# Patient Record
Sex: MALE | Race: WHITE | NOT HISPANIC OR LATINO | URBAN - METROPOLITAN AREA
[De-identification: names, ages, dates, MRNs, and addresses within clinical notes are randomized per-mention and may not be internally consistent; named-entity substitution may affect disease eponyms.]

---

## 2017-05-10 ENCOUNTER — EMERGENCY (EMERGENCY)
Facility: HOSPITAL | Age: 77
LOS: 0 days | Discharge: HOME | End: 2017-05-11
Attending: EMERGENCY MEDICINE | Admitting: EMERGENCY MEDICINE
Payer: MEDICARE

## 2017-05-10 VITALS
OXYGEN SATURATION: 97 % | WEIGHT: 164.91 LBS | DIASTOLIC BLOOD PRESSURE: 63 MMHG | HEART RATE: 88 BPM | TEMPERATURE: 98 F | SYSTOLIC BLOOD PRESSURE: 129 MMHG | RESPIRATION RATE: 17 BRPM

## 2017-05-10 DIAGNOSIS — R11.0 NAUSEA: ICD-10-CM

## 2017-05-10 PROCEDURE — 99283 EMERGENCY DEPT VISIT LOW MDM: CPT | Mod: 25

## 2017-05-10 NOTE — ED ADULT TRIAGE NOTE - CHIEF COMPLAINT QUOTE
recent back surgery 3/10/17 prescribed pain meds that are causing nausea since Sunday recent back surgery 4/10/17 prescribed pain meds that are causing nausea since Sunday

## 2017-05-11 VITALS
OXYGEN SATURATION: 95 % | RESPIRATION RATE: 18 BRPM | SYSTOLIC BLOOD PRESSURE: 153 MMHG | DIASTOLIC BLOOD PRESSURE: 88 MMHG | HEART RATE: 74 BPM | TEMPERATURE: 98 F

## 2017-05-11 RX ORDER — ONDANSETRON 8 MG/1
8 TABLET, FILM COATED ORAL ONCE
Qty: 0 | Refills: 0 | Status: COMPLETED | OUTPATIENT
Start: 2017-05-11 | End: 2017-05-11

## 2017-05-11 RX ORDER — HYDROMORPHONE HYDROCHLORIDE 2 MG/ML
4 INJECTION INTRAMUSCULAR; INTRAVENOUS; SUBCUTANEOUS ONCE
Qty: 0 | Refills: 0 | Status: DISCONTINUED | OUTPATIENT
Start: 2017-05-11 | End: 2017-05-11

## 2017-05-11 RX ADMIN — ONDANSETRON 8 MILLIGRAM(S): 8 TABLET, FILM COATED ORAL at 00:26

## 2017-05-11 RX ADMIN — HYDROMORPHONE HYDROCHLORIDE 4 MILLIGRAM(S): 2 INJECTION INTRAMUSCULAR; INTRAVENOUS; SUBCUTANEOUS at 06:13

## 2017-05-11 RX ADMIN — HYDROMORPHONE HYDROCHLORIDE 4 MILLIGRAM(S): 2 INJECTION INTRAMUSCULAR; INTRAVENOUS; SUBCUTANEOUS at 04:52

## 2017-05-11 RX ADMIN — HYDROMORPHONE HYDROCHLORIDE 4 MILLIGRAM(S): 2 INJECTION INTRAMUSCULAR; INTRAVENOUS; SUBCUTANEOUS at 03:54

## 2017-05-11 NOTE — ED PROVIDER NOTE - PROGRESS NOTE DETAILS
pt with no c/o vomiting after getting zofran will d/c back to rehab, pt wtihout abdominal distention, no nausea or vomiting afebrile, he states he feels better and nausea is gone

## 2017-05-11 NOTE — ED PROVIDER NOTE - CONSTITUTIONAL, MLM
normal... thin, pale, awake, alert, oriented to person, place, time/situation and in no apparent distress.

## 2017-05-11 NOTE — ED PROVIDER NOTE - DETAILS:
I, Rhiannon Tanner, performed the initial face to face bedside interview with this patient regarding history of present illness, review of symptoms and relevant past medical, social and family history.  I completed an independent physical examination.  I was the initial provider who evaluated this patient. The history, relevant review of systems, past medical and surgical history, medical decision making, and physical examination was documented by the scribe in my presence and I attest to the accuracy of the documentation.

## 2017-05-11 NOTE — ED PROVIDER NOTE - OBJECTIVE STATEMENT
75 y/o male presents to ED with c/o nausea.  Pt is currently taking pain medication for previous 77 y/o male presents to ED with c/o nausea.  Pt is currently taking pain medication for previous spinal surgery which he states makes him nauseous.   Denies fever, chills.

## 2017-05-11 NOTE — ED PROVIDER NOTE - NS ED MD SCRIBE ATTENDING SCRIBE SECTIONS
REVIEW OF SYSTEMS/PAST MEDICAL/SURGICAL/SOCIAL HISTORY/RESULTS/MANDATORY DOCUMENTATION/SCALES/PROGRESS NOTE/HISTORY OF PRESENT ILLNESS/VITAL SIGNS( Pullset)/PROVIDER CARE INITIATION/OBSERVATION MONITORING PLAN/PHYSICAL EXAM/CONSULTATIONS/SHIFT CHANGE/DISPOSITION/INTAKE ASSESSMENT/SCREENINGS/HIV

## 2017-05-11 NOTE — ED ADULT NURSE REASSESSMENT NOTE - NS ED NURSE REASSESS COMMENT FT1
pt appears comfortable with no complaints. pt states nausea has gone away. no other complaints. pt spinal infusion site has no s/s of infection and is clean.

## 2017-05-11 NOTE — ED PROVIDER NOTE - MEDICAL DECISION MAKING DETAILS
pt with nausea after getting oral pain medication, will give zofran, pt afebrile, no cough, no vomiting, no c/o chest pain, abdominal pain or back pain at this time, re myraal

## 2017-05-11 NOTE — ED PROVIDER NOTE - ENMT, MLM
Airway patent, Nasal mucosa clear. Mouth with dry opral mucosa Throat has no vesicles, no oropharyngeal exudates and uvula is midline.

## 2017-05-13 ENCOUNTER — EMERGENCY (EMERGENCY)
Facility: HOSPITAL | Age: 77
LOS: 0 days | Discharge: HOME | End: 2017-05-13
Attending: EMERGENCY MEDICINE | Admitting: EMERGENCY MEDICINE
Payer: MEDICARE

## 2017-05-13 VITALS
SYSTOLIC BLOOD PRESSURE: 103 MMHG | DIASTOLIC BLOOD PRESSURE: 75 MMHG | TEMPERATURE: 99 F | OXYGEN SATURATION: 98 % | RESPIRATION RATE: 16 BRPM | HEART RATE: 90 BPM | WEIGHT: 164.91 LBS

## 2017-05-13 VITALS
DIASTOLIC BLOOD PRESSURE: 73 MMHG | TEMPERATURE: 98 F | SYSTOLIC BLOOD PRESSURE: 118 MMHG | RESPIRATION RATE: 17 BRPM | HEART RATE: 83 BPM | OXYGEN SATURATION: 100 %

## 2017-05-13 DIAGNOSIS — M54.9 DORSALGIA, UNSPECIFIED: ICD-10-CM

## 2017-05-13 DIAGNOSIS — Z98.1 ARTHRODESIS STATUS: ICD-10-CM

## 2017-05-13 DIAGNOSIS — Z98.1 ARTHRODESIS STATUS: Chronic | ICD-10-CM

## 2017-05-13 PROCEDURE — 99284 EMERGENCY DEPT VISIT MOD MDM: CPT

## 2017-05-13 RX ORDER — HYDROMORPHONE HYDROCHLORIDE 2 MG/ML
2 INJECTION INTRAMUSCULAR; INTRAVENOUS; SUBCUTANEOUS ONCE
Qty: 0 | Refills: 0 | Status: DISCONTINUED | OUTPATIENT
Start: 2017-05-13 | End: 2017-05-13

## 2017-05-13 RX ORDER — HYDROMORPHONE HYDROCHLORIDE 2 MG/ML
4 INJECTION INTRAMUSCULAR; INTRAVENOUS; SUBCUTANEOUS ONCE
Qty: 0 | Refills: 0 | Status: DISCONTINUED | OUTPATIENT
Start: 2017-05-13 | End: 2017-05-13

## 2017-05-13 RX ADMIN — HYDROMORPHONE HYDROCHLORIDE 4 MILLIGRAM(S): 2 INJECTION INTRAMUSCULAR; INTRAVENOUS; SUBCUTANEOUS at 03:44

## 2017-05-13 RX ADMIN — HYDROMORPHONE HYDROCHLORIDE 2 MILLIGRAM(S): 2 INJECTION INTRAMUSCULAR; INTRAVENOUS; SUBCUTANEOUS at 03:25

## 2017-05-13 RX ADMIN — HYDROMORPHONE HYDROCHLORIDE 2 MILLIGRAM(S): 2 INJECTION INTRAMUSCULAR; INTRAVENOUS; SUBCUTANEOUS at 02:55

## 2017-05-13 NOTE — ED PROVIDER NOTE - MUSCULOSKELETAL MINIMAL EXAM
healing surgical site over the t/l spine with no discharge/motor intact/atraumatic/normal range of motion

## 2017-05-13 NOTE — ED PROVIDER NOTE - SKIN, MLM
Skin normal color for race, warm, dry and intact. No evidence of rash. back: surgical site intact of the thoracic lumbar spine with no discharge. Skin normal color for race, warm, dry and intact. No evidence of rash.

## 2017-05-13 NOTE — ED PROVIDER NOTE - NS ED MD SCRIBE ATTENDING SCRIBE SECTIONS
DISPOSITION/PHYSICAL EXAM/PAST MEDICAL/SURGICAL/SOCIAL HISTORY/PROGRESS NOTE/VITAL SIGNS( Pullset)/HISTORY OF PRESENT ILLNESS/RESULTS/REVIEW OF SYSTEMS

## 2017-05-14 ENCOUNTER — EMERGENCY (EMERGENCY)
Facility: HOSPITAL | Age: 77
LOS: 0 days | Discharge: HOME | End: 2017-05-14
Attending: EMERGENCY MEDICINE | Admitting: EMERGENCY MEDICINE
Payer: MEDICARE

## 2017-05-14 VITALS
TEMPERATURE: 98 F | SYSTOLIC BLOOD PRESSURE: 129 MMHG | HEIGHT: 69 IN | OXYGEN SATURATION: 95 % | WEIGHT: 164.91 LBS | DIASTOLIC BLOOD PRESSURE: 65 MMHG | HEART RATE: 75 BPM | RESPIRATION RATE: 16 BRPM

## 2017-05-14 VITALS
RESPIRATION RATE: 19 BRPM | DIASTOLIC BLOOD PRESSURE: 76 MMHG | TEMPERATURE: 98 F | OXYGEN SATURATION: 100 % | HEART RATE: 86 BPM | SYSTOLIC BLOOD PRESSURE: 146 MMHG

## 2017-05-14 DIAGNOSIS — M54.9 DORSALGIA, UNSPECIFIED: ICD-10-CM

## 2017-05-14 DIAGNOSIS — Z98.1 ARTHRODESIS STATUS: Chronic | ICD-10-CM

## 2017-05-14 DIAGNOSIS — M54.5 LOW BACK PAIN: ICD-10-CM

## 2017-05-14 DIAGNOSIS — Z98.1 ARTHRODESIS STATUS: ICD-10-CM

## 2017-05-14 PROCEDURE — 99283 EMERGENCY DEPT VISIT LOW MDM: CPT

## 2017-05-14 RX ORDER — PHENOBARBITAL 60 MG
5 TABLET ORAL ONCE
Qty: 0 | Refills: 0 | Status: DISCONTINUED | OUTPATIENT
Start: 2017-05-14 | End: 2017-05-14

## 2017-05-14 RX ORDER — HYDROMORPHONE HYDROCHLORIDE 2 MG/ML
2 INJECTION INTRAMUSCULAR; INTRAVENOUS; SUBCUTANEOUS ONCE
Qty: 0 | Refills: 0 | Status: DISCONTINUED | OUTPATIENT
Start: 2017-05-14 | End: 2017-05-14

## 2017-05-14 RX ORDER — HYDROMORPHONE HYDROCHLORIDE 2 MG/ML
6 INJECTION INTRAMUSCULAR; INTRAVENOUS; SUBCUTANEOUS ONCE
Qty: 0 | Refills: 0 | Status: DISCONTINUED | OUTPATIENT
Start: 2017-05-14 | End: 2017-05-14

## 2017-05-14 RX ORDER — LIDOCAINE 4 G/100G
10 CREAM TOPICAL ONCE
Qty: 0 | Refills: 0 | Status: COMPLETED | OUTPATIENT
Start: 2017-05-14 | End: 2017-05-14

## 2017-05-14 RX ADMIN — Medication 5 MILLIGRAM(S): at 06:41

## 2017-05-14 RX ADMIN — LIDOCAINE 10 MILLILITER(S): 4 CREAM TOPICAL at 06:41

## 2017-05-14 RX ADMIN — HYDROMORPHONE HYDROCHLORIDE 6 MILLIGRAM(S): 2 INJECTION INTRAMUSCULAR; INTRAVENOUS; SUBCUTANEOUS at 04:30

## 2017-05-14 RX ADMIN — HYDROMORPHONE HYDROCHLORIDE 6 MILLIGRAM(S): 2 INJECTION INTRAMUSCULAR; INTRAVENOUS; SUBCUTANEOUS at 04:00

## 2017-05-14 RX ADMIN — HYDROMORPHONE HYDROCHLORIDE 2 MILLIGRAM(S): 2 INJECTION INTRAMUSCULAR; INTRAVENOUS; SUBCUTANEOUS at 06:44

## 2017-05-14 RX ADMIN — Medication 30 MILLILITER(S): at 06:41

## 2017-05-14 NOTE — ED ADULT NURSE REASSESSMENT NOTE - NS ED NURSE REASSESS COMMENT FT1
Two RNs assisted pt to ambulate to bathroom, pt voided clear yellow urine. Pt states his pain is better than upon arrival but still present. Pt updated as to current plan of care, pt asking to go back to [facility] Briseida Dickinson made aware. Will continue to monitor.

## 2017-05-14 NOTE — ED PROVIDER NOTE - OBJECTIVE STATEMENT
75 yo M s/p recent spinal fusion presents from [facility] requesting pain meds. Pt takes dilaudid every 3 hours from 8am-8pm but wants pain meds after 8pm. Pt was in  ED yesterday for the same issue.

## 2017-05-14 NOTE — ED ADULT NURSE REASSESSMENT NOTE - NS ED NURSE REASSESS COMMENT FT1
Pt updated as to plan for discharge, pt verbalized understanding of plan of care. Awaiting transport for pt back to [facility] assisted living. Will continue to monitor.

## 2017-05-14 NOTE — ED ADULT NURSE NOTE - OBJECTIVE STATEMENT
Pt presents to the ED with complaints of increased chronic back pain, pt states he had back sx April 10th. Pt states he was discharged from a rehabilitation facility back to [facility] two days ago where his Dilaudid dosage was changed. Pt states he was receiving Dilaudid 6mg q3h around the clock. Pt now receives Dilaudid 6mg q3h from 8AM to 8PM and receives no pain medication after 8PM. Pt states he was here last night due to the same issue.

## 2017-05-14 NOTE — ED PROVIDER NOTE - MEDICAL DECISION MAKING DETAILS
77 yo male s/p spinal fusion requesting pain medication as the current orders at AL stop his diluadid from 8p-8a.  No new neuro symptoms

## 2017-05-14 NOTE — ED ADULT NURSE REASSESSMENT NOTE - NS ED NURSE REASSESS COMMENT FT1
Pt began complaining of GERD/acid like pain. MD Goins made aware and pt re-evaluated by MD. Pt medicated as per MD order. Pt updated as to current plan of care. Awaiting transportation back to [facility]. Will continue to monitor.

## 2017-05-14 NOTE — ED PROVIDER NOTE - DETAILS:
Mary Goins MD - The scribe's documentation has been prepared under my direction and personally reviewed by me in its entirety. I confirm that the note above accurately reflects all work, treatment, procedures, and medical decision making performed by me.

## 2017-05-14 NOTE — ED PROVIDER NOTE - NS ED MD SCRIBE ATTENDING SCRIBE SECTIONS
PAST MEDICAL/SURGICAL/SOCIAL HISTORY/HISTORY OF PRESENT ILLNESS/PROGRESS NOTE/RESULTS/DISPOSITION/PHYSICAL EXAM/REVIEW OF SYSTEMS

## 2017-05-14 NOTE — ED ADULT TRIAGE NOTE - NS ED NURSE DIRECT TO ROOM YN
Yes normal... Well appearing, well nourished, awake, alert, oriented to person, place, time/situation and in no apparent distress.

## 2017-05-14 NOTE — ED PROVIDER NOTE - PROGRESS NOTE DETAILS
attempted multiple times to contact the MD dr Springer that covers the [facility], but the number provided by them was disconnected.  Supervisor states she will inform the RN in the morning to call Dr Springer about changing his medication

## 2017-05-14 NOTE — ED ADULT NURSE REASSESSMENT NOTE - NS ED NURSE REASSESS COMMENT FT1
Pt updated as to contact with [facility] in regards to pt care and numerous attempts made to contact MD Springer on pt behalf in regards to pain medication. Unable to get in contact with MD Moj[facility]. Pt medicated as per MD order, call bell within reach. Pt has no questions in regards to plan of care at this time. Will continue to monitor.

## 2017-05-18 ENCOUNTER — EMERGENCY (EMERGENCY)
Facility: HOSPITAL | Age: 77
LOS: 0 days | Discharge: HOME | End: 2017-05-18
Attending: EMERGENCY MEDICINE | Admitting: EMERGENCY MEDICINE
Payer: MEDICARE

## 2017-05-18 VITALS
TEMPERATURE: 98 F | DIASTOLIC BLOOD PRESSURE: 61 MMHG | HEART RATE: 78 BPM | SYSTOLIC BLOOD PRESSURE: 114 MMHG | HEIGHT: 69 IN | OXYGEN SATURATION: 100 % | WEIGHT: 160.06 LBS | RESPIRATION RATE: 16 BRPM

## 2017-05-18 VITALS
SYSTOLIC BLOOD PRESSURE: 158 MMHG | RESPIRATION RATE: 18 BRPM | HEART RATE: 80 BPM | DIASTOLIC BLOOD PRESSURE: 84 MMHG | OXYGEN SATURATION: 100 % | TEMPERATURE: 98 F

## 2017-05-18 DIAGNOSIS — Z98.1 ARTHRODESIS STATUS: Chronic | ICD-10-CM

## 2017-05-18 DIAGNOSIS — R33.9 RETENTION OF URINE, UNSPECIFIED: ICD-10-CM

## 2017-05-18 DIAGNOSIS — R11.0 NAUSEA: ICD-10-CM

## 2017-05-18 DIAGNOSIS — G89.4 CHRONIC PAIN SYNDROME: ICD-10-CM

## 2017-05-18 LAB
ALBUMIN SERPL ELPH-MCNC: 3.4 G/DL — SIGNIFICANT CHANGE UP (ref 3.3–5)
ALP SERPL-CCNC: 123 U/L — HIGH (ref 40–120)
ALT FLD-CCNC: 13 U/L — SIGNIFICANT CHANGE UP (ref 12–78)
ANION GAP SERPL CALC-SCNC: 7 MMOL/L — SIGNIFICANT CHANGE UP (ref 5–17)
APPEARANCE UR: CLEAR — SIGNIFICANT CHANGE UP
AST SERPL-CCNC: 13 U/L — LOW (ref 15–37)
BASOPHILS # BLD AUTO: 0.1 K/UL — SIGNIFICANT CHANGE UP (ref 0–0.2)
BASOPHILS NFR BLD AUTO: 0.9 % — SIGNIFICANT CHANGE UP (ref 0–2)
BILIRUB SERPL-MCNC: 0.3 MG/DL — SIGNIFICANT CHANGE UP (ref 0.2–1.2)
BILIRUB UR-MCNC: NEGATIVE — SIGNIFICANT CHANGE UP
BUN SERPL-MCNC: 11 MG/DL — SIGNIFICANT CHANGE UP (ref 7–23)
CALCIUM SERPL-MCNC: 9.1 MG/DL — SIGNIFICANT CHANGE UP (ref 8.5–10.1)
CHLORIDE SERPL-SCNC: 104 MMOL/L — SIGNIFICANT CHANGE UP (ref 96–108)
CO2 SERPL-SCNC: 27 MMOL/L — SIGNIFICANT CHANGE UP (ref 22–31)
COLOR SPEC: YELLOW — SIGNIFICANT CHANGE UP
CREAT SERPL-MCNC: 1.18 MG/DL — SIGNIFICANT CHANGE UP (ref 0.5–1.3)
DIFF PNL FLD: NEGATIVE — SIGNIFICANT CHANGE UP
EOSINOPHIL # BLD AUTO: 0.2 K/UL — SIGNIFICANT CHANGE UP (ref 0–0.5)
EOSINOPHIL NFR BLD AUTO: 3.7 % — SIGNIFICANT CHANGE UP (ref 0–6)
GLUCOSE SERPL-MCNC: 109 MG/DL — HIGH (ref 70–99)
GLUCOSE UR QL: NEGATIVE MG/DL — SIGNIFICANT CHANGE UP
HCT VFR BLD CALC: 36.7 % — LOW (ref 39–50)
HGB BLD-MCNC: 11.5 G/DL — LOW (ref 13–17)
KETONES UR-MCNC: (no result)
LEUKOCYTE ESTERASE UR-ACNC: NEGATIVE — SIGNIFICANT CHANGE UP
LYMPHOCYTES # BLD AUTO: 0.9 K/UL — LOW (ref 1–3.3)
LYMPHOCYTES # BLD AUTO: 14 % — SIGNIFICANT CHANGE UP (ref 13–44)
MCHC RBC-ENTMCNC: 26.4 PG — LOW (ref 27–34)
MCHC RBC-ENTMCNC: 31.4 GM/DL — LOW (ref 32–36)
MCV RBC AUTO: 84 FL — SIGNIFICANT CHANGE UP (ref 80–100)
MONOCYTES # BLD AUTO: 0.4 K/UL — SIGNIFICANT CHANGE UP (ref 0–0.9)
MONOCYTES NFR BLD AUTO: 6.4 % — SIGNIFICANT CHANGE UP (ref 2–14)
NEUTROPHILS # BLD AUTO: 5 K/UL — SIGNIFICANT CHANGE UP (ref 1.8–7.4)
NEUTROPHILS NFR BLD AUTO: 75 % — SIGNIFICANT CHANGE UP (ref 43–77)
NITRITE UR-MCNC: NEGATIVE — SIGNIFICANT CHANGE UP
PH UR: 8 — SIGNIFICANT CHANGE UP (ref 5–8)
PLATELET # BLD AUTO: 389 K/UL — SIGNIFICANT CHANGE UP (ref 150–400)
POTASSIUM SERPL-MCNC: 4.2 MMOL/L — SIGNIFICANT CHANGE UP (ref 3.5–5.3)
POTASSIUM SERPL-SCNC: 4.2 MMOL/L — SIGNIFICANT CHANGE UP (ref 3.5–5.3)
PROT SERPL-MCNC: 7.5 GM/DL — SIGNIFICANT CHANGE UP (ref 6–8.3)
PROT UR-MCNC: NEGATIVE MG/DL — SIGNIFICANT CHANGE UP
RBC # BLD: 4.37 M/UL — SIGNIFICANT CHANGE UP (ref 4.2–5.8)
RBC # FLD: 14.6 % — HIGH (ref 10.3–14.5)
SODIUM SERPL-SCNC: 138 MMOL/L — SIGNIFICANT CHANGE UP (ref 135–145)
SP GR SPEC: 1.01 — SIGNIFICANT CHANGE UP (ref 1.01–1.02)
UROBILINOGEN FLD QL: NEGATIVE MG/DL — SIGNIFICANT CHANGE UP
WBC # BLD: 6.7 K/UL — SIGNIFICANT CHANGE UP (ref 3.8–10.5)
WBC # FLD AUTO: 6.7 K/UL — SIGNIFICANT CHANGE UP (ref 3.8–10.5)

## 2017-05-18 PROCEDURE — 99284 EMERGENCY DEPT VISIT MOD MDM: CPT

## 2017-05-18 RX ORDER — ONDANSETRON 8 MG/1
8 TABLET, FILM COATED ORAL ONCE
Qty: 0 | Refills: 0 | Status: COMPLETED | OUTPATIENT
Start: 2017-05-18 | End: 2017-05-18

## 2017-05-18 RX ORDER — SODIUM CHLORIDE 9 MG/ML
1000 INJECTION INTRAMUSCULAR; INTRAVENOUS; SUBCUTANEOUS ONCE
Qty: 0 | Refills: 0 | Status: COMPLETED | OUTPATIENT
Start: 2017-05-18 | End: 2017-05-18

## 2017-05-18 RX ADMIN — ONDANSETRON 8 MILLIGRAM(S): 8 TABLET, FILM COATED ORAL at 01:44

## 2017-05-18 RX ADMIN — SODIUM CHLORIDE 1000 MILLILITER(S): 9 INJECTION INTRAMUSCULAR; INTRAVENOUS; SUBCUTANEOUS at 01:45

## 2017-05-18 NOTE — ED PROVIDER NOTE - MEDICAL DECISION MAKING DETAILS
Pt pw decreased urine output and nausea. Will scan bladder, give IVF for hydration, give IV Zofran for nausea, draw labs reassess.

## 2017-05-18 NOTE — ED ADULT NURSE REASSESSMENT NOTE - NS ED NURSE REASSESS COMMENT FT1
report taken at the change of shift at bedside. pt awake alert and oriented x4 resting comfortably in bed with no acute distress noted. Vitals stable. Afebrile. Cunningham in place. 1L ns infusing at this time. Awaiting for lab results. Will cont to monitor for safety and comfort.

## 2017-05-18 NOTE — ED PROVIDER NOTE - PROGRESS NOTE DETAILS
Alfredo VEGA: YURIDIA Campos has scanned pt's bladder showing 250mL of urine in bladder. Pt states he cannot urinate, will place moreno catheter to drain bladder. pt had 800ml drain from bladder. pt is demanding his cathter be removed, pt is aao x 3, will d/c cathter and d/c pt to home

## 2017-05-18 NOTE — ED ADULT NURSE REASSESSMENT NOTE - NS ED NURSE REASSESS COMMENT FT1
pt c/o burning sensation at moreno insertion site. Dr. JOHNSON made aware. Moreno d/cd per md order. Urine sample obtained and sent to lab. Will cont to monitor for safety and comfort.

## 2017-05-18 NOTE — ED ADULT NURSE REASSESSMENT NOTE - NS ED NURSE REASSESS COMMENT FT1
pt c/o chills. Warm to touch. rectal temp obtained and 98.7 noted. Warm blanket provided for comfort. Will cont to monitor for safety and comfort.

## 2017-05-18 NOTE — ED PROVIDER NOTE - NS ED MD SCRIBE ATTENDING SCRIBE SECTIONS
REVIEW OF SYSTEMS/PROGRESS NOTE/HISTORY OF PRESENT ILLNESS/RESULTS/PAST MEDICAL/SURGICAL/SOCIAL HISTORY/DISPOSITION/VITAL SIGNS( Pullset)/PHYSICAL EXAM

## 2017-05-18 NOTE — ED ADULT TRIAGE NOTE - CHIEF COMPLAINT QUOTE
Pt. to the ED C/O Urinary Retention x 2 days- Pt. states hx. of Back surgery 1 month ago and hx of of Urinary Retention

## 2017-05-18 NOTE — ED PROVIDER NOTE - ENMT, MLM
Airway patent, Nasal mucosa clear. Mouth with dry MM. Throat has no vesicles, no oropharyngeal exudates and uvula is midline.

## 2017-05-18 NOTE — ED ADULT NURSE REASSESSMENT NOTE - NS ED NURSE REASSESS COMMENT FT1
pt assisted to use urinal at bedside. Pt tolerated well. Pt made aware of transport  0530am. Will cont to monitor for safety and comfort.

## 2017-05-18 NOTE — ED PROVIDER NOTE - OBJECTIVE STATEMENT
75 y/o M with Hx of chronic pain on dilaudid 6mg [id number] presents to ED for evaluation of nausea after taking his pain medication and decreased urine output. Pt states that he has been nauseas since earlier this week and that his PO anti nausea medication is not working. He also reports he has not been able to urinate for the past 16 hours. Pt reports he has been having a poor appetite for 2 days. Pt denies fever, SOB, n/v/d, dysuria, hematuria.

## 2017-05-19 ENCOUNTER — EMERGENCY (EMERGENCY)
Facility: HOSPITAL | Age: 77
LOS: 0 days | Discharge: HOME | End: 2017-05-20
Attending: EMERGENCY MEDICINE | Admitting: EMERGENCY MEDICINE
Payer: MEDICARE

## 2017-05-19 VITALS
RESPIRATION RATE: 16 BRPM | TEMPERATURE: 98 F | HEART RATE: 72 BPM | WEIGHT: 160.06 LBS | DIASTOLIC BLOOD PRESSURE: 61 MMHG | SYSTOLIC BLOOD PRESSURE: 116 MMHG | OXYGEN SATURATION: 95 % | HEIGHT: 69 IN

## 2017-05-19 DIAGNOSIS — Z98.1 ARTHRODESIS STATUS: ICD-10-CM

## 2017-05-19 DIAGNOSIS — M54.9 DORSALGIA, UNSPECIFIED: ICD-10-CM

## 2017-05-19 DIAGNOSIS — Z98.1 ARTHRODESIS STATUS: Chronic | ICD-10-CM

## 2017-05-19 PROCEDURE — 99283 EMERGENCY DEPT VISIT LOW MDM: CPT

## 2017-05-19 RX ORDER — HYDROMORPHONE HYDROCHLORIDE 2 MG/ML
6 INJECTION INTRAMUSCULAR; INTRAVENOUS; SUBCUTANEOUS ONCE
Qty: 0 | Refills: 0 | Status: DISCONTINUED | OUTPATIENT
Start: 2017-05-19 | End: 2017-05-19

## 2017-05-19 RX ADMIN — HYDROMORPHONE HYDROCHLORIDE 6 MILLIGRAM(S): 2 INJECTION INTRAMUSCULAR; INTRAVENOUS; SUBCUTANEOUS at 22:13

## 2017-05-19 NOTE — ED PROVIDER NOTE - NS ED MD SCRIBE ATTENDING SCRIBE SECTIONS
PHYSICAL EXAM/PAST MEDICAL/SURGICAL/SOCIAL HISTORY/DISPOSITION/HISTORY OF PRESENT ILLNESS/HIV/VITAL SIGNS( Pullset)/REVIEW OF SYSTEMS

## 2017-05-19 NOTE — ED PROVIDER NOTE - OBJECTIVE STATEMENT
Pt is a 77 y/o M presents to the ED c/o chronic lower back pain. States he had surgery done 30 days ago in [place] for the lower back and was prescribed 6ml of Dilaudid every 3 hours not taken the Dilaudid since 12pm today due to a pharmacy issue. Denies any new pain as he has chronic low back pain. Was seen in the ED twice in the past week for similar sx. Denies any leg weakness or urinary retention or loss of bowels.

## 2017-05-19 NOTE — ED ADULT NURSE NOTE - CHPI ED SYMPTOMS NEG
no tingling/no anorexia/no motor function loss/no neck tenderness/no numbness/no constipation/no difficulty bearing weight/no bladder dysfunction/no fatigue/no bowel dysfunction

## 2017-05-19 NOTE — ED PROVIDER NOTE - PROGRESS NOTE DETAILS
patient resting comfortably. no concern for acute process at this time. will discharge back to nursing facility. pain likely secondary to missed pain control doses at 3pm, 6pm, and 9pm today.

## 2017-05-19 NOTE — ED PROVIDER NOTE - MUSCULOSKELETAL, MLM
Spine appears normal, range of motion is not limited, no muscle or joint tenderness. 5/5 strength in B/L lower extremities.

## 2017-05-20 VITALS
HEART RATE: 75 BPM | DIASTOLIC BLOOD PRESSURE: 63 MMHG | RESPIRATION RATE: 16 BRPM | SYSTOLIC BLOOD PRESSURE: 126 MMHG | TEMPERATURE: 98 F | OXYGEN SATURATION: 95 %

## 2017-05-22 ENCOUNTER — EMERGENCY (EMERGENCY)
Facility: HOSPITAL | Age: 77
LOS: 0 days | Discharge: HOME | End: 2017-05-23
Attending: FAMILY MEDICINE | Admitting: FAMILY MEDICINE
Payer: MEDICARE

## 2017-05-22 VITALS
HEIGHT: 69 IN | RESPIRATION RATE: 16 BRPM | WEIGHT: 160.06 LBS | DIASTOLIC BLOOD PRESSURE: 67 MMHG | SYSTOLIC BLOOD PRESSURE: 127 MMHG | TEMPERATURE: 98 F | OXYGEN SATURATION: 95 % | HEART RATE: 82 BPM

## 2017-05-22 DIAGNOSIS — Z98.1 ARTHRODESIS STATUS: Chronic | ICD-10-CM

## 2017-05-22 DIAGNOSIS — F41.9 ANXIETY DISORDER, UNSPECIFIED: ICD-10-CM

## 2017-05-22 DIAGNOSIS — N40.0 BENIGN PROSTATIC HYPERPLASIA WITHOUT LOWER URINARY TRACT SYMPTOMS: ICD-10-CM

## 2017-05-22 DIAGNOSIS — M54.9 DORSALGIA, UNSPECIFIED: ICD-10-CM

## 2017-05-22 DIAGNOSIS — G62.9 POLYNEUROPATHY, UNSPECIFIED: ICD-10-CM

## 2017-05-22 DIAGNOSIS — F32.9 MAJOR DEPRESSIVE DISORDER, SINGLE EPISODE, UNSPECIFIED: ICD-10-CM

## 2017-05-22 DIAGNOSIS — Z98.1 ARTHRODESIS STATUS: ICD-10-CM

## 2017-05-22 DIAGNOSIS — D64.9 ANEMIA, UNSPECIFIED: ICD-10-CM

## 2017-05-22 DIAGNOSIS — M48.00 SPINAL STENOSIS, SITE UNSPECIFIED: ICD-10-CM

## 2017-05-22 DIAGNOSIS — K21.9 GASTRO-ESOPHAGEAL REFLUX DISEASE WITHOUT ESOPHAGITIS: ICD-10-CM

## 2017-05-22 PROCEDURE — 71010: CPT | Mod: 26

## 2017-05-22 PROCEDURE — 99284 EMERGENCY DEPT VISIT MOD MDM: CPT

## 2017-05-22 RX ORDER — ONDANSETRON 8 MG/1
8 TABLET, FILM COATED ORAL ONCE
Qty: 0 | Refills: 0 | Status: COMPLETED | OUTPATIENT
Start: 2017-05-22 | End: 2017-05-22

## 2017-05-22 RX ORDER — HYDROMORPHONE HYDROCHLORIDE 2 MG/ML
1 INJECTION INTRAMUSCULAR; INTRAVENOUS; SUBCUTANEOUS ONCE
Qty: 0 | Refills: 0 | Status: DISCONTINUED | OUTPATIENT
Start: 2017-05-22 | End: 2017-05-22

## 2017-05-22 RX ORDER — SODIUM CHLORIDE 9 MG/ML
1000 INJECTION INTRAMUSCULAR; INTRAVENOUS; SUBCUTANEOUS ONCE
Qty: 0 | Refills: 0 | Status: COMPLETED | OUTPATIENT
Start: 2017-05-22 | End: 2017-05-22

## 2017-05-22 NOTE — ED PROVIDER NOTE - CHPI ED SYMPTOMS NEG
no fever/no diarrhea/no hematuria/no blood in stool/no abdominal distension/no dysuria/no vomiting/no chills/no burning urination

## 2017-05-22 NOTE — ED PROVIDER NOTE - NS ED MD SCRIBE ATTENDING SCRIBE SECTIONS
PHYSICAL EXAM/PROGRESS NOTE/REVIEW OF SYSTEMS/VITAL SIGNS( Pullset)/HISTORY OF PRESENT ILLNESS/DISPOSITION/INTAKE ASSESSMENT/SCREENINGS/HIV/RESULTS/CONSULTATIONS/SHIFT CHANGE/PAST MEDICAL/SURGICAL/SOCIAL HISTORY

## 2017-05-22 NOTE — ED PROVIDER NOTE - OBJECTIVE STATEMENT
77 y/o M PMHx spinal fusion 1 month ago, takes dilaudid, presents to the ED c/o nausea. The pt provides that he has been having nausea since this morning. The dennis notes that he has chronic upper and lower back pain. No h/o cp, cough, sob, abd pain, diarrhea/constipation, headache, fever, chills, dizziness, bowel/urinary incontinence. or rash. 75 y/o M PMHx spinal fusion 1 month ago, takes dilaudid, presents to the ED c/o nausea. The pt provides that he has been having nausea since this morning. The pt notes that he has chronic upper and lower back pain. No h/o cp, cough, sob, abd pain, diarrhea/constipation, headache, fever, chills, dizziness, bowel/urinary incontinence. or rash.

## 2017-05-22 NOTE — ED PROVIDER NOTE - MEDICAL DECISION MAKING DETAILS
Pt with chronic back pain on opiates, c/o nausea due to opiates.  Antiemetics, iv fluids.  Pt needs follow up with his pain management doctor re side effects of pain meds.

## 2017-05-22 NOTE — ED PROVIDER NOTE - PRINCIPAL DIAGNOSIS
Spinal Block    Patient location during procedure: OB  Indication:at surgeon's request  Performed By  Anesthesiologist: GORGE BELCHER  Preanesthetic Checklist  Completed: patient identified, site marked, surgical consent, pre-op evaluation, timeout performed, IV checked, risks and benefits discussed and monitors and equipment checked  Spinal Block Prep:  Patient Position:sitting  Sterile Tech:gloves, cap, mask and sterile barriers  Prep:Chloraprep  Patient Monitoring:blood pressure monitoring, continuous pulse oximetry and EKG  Spinal Block Procedure  Approach:midline  Guidance:landmark technique and palpation technique  Location:L4-L5  Needle Type:Alba  Needle Gauge:25  Placement of Spinal needle event:cerebrospinal fluid  Fluid Appearance:clear  Post Assessment  Patient Tolerance:patient tolerated the procedure well with no apparent complications  Complications no             Chronic back pain greater than 3 months duration

## 2017-05-23 ENCOUNTER — INPATIENT (INPATIENT)
Facility: HOSPITAL | Age: 77
LOS: 9 days | Discharge: NURSING, REHAB OR LONG-TERM CARE | DRG: 552 | End: 2017-06-02
Attending: INTERNAL MEDICINE | Admitting: INTERNAL MEDICINE
Payer: MEDICARE

## 2017-05-23 VITALS
RESPIRATION RATE: 17 BRPM | DIASTOLIC BLOOD PRESSURE: 91 MMHG | OXYGEN SATURATION: 94 % | TEMPERATURE: 99 F | HEART RATE: 85 BPM | SYSTOLIC BLOOD PRESSURE: 174 MMHG | WEIGHT: 160.06 LBS | HEIGHT: 69 IN

## 2017-05-23 DIAGNOSIS — K21.0 GASTRO-ESOPHAGEAL REFLUX DISEASE WITH ESOPHAGITIS: ICD-10-CM

## 2017-05-23 DIAGNOSIS — R11.2 NAUSEA WITH VOMITING, UNSPECIFIED: ICD-10-CM

## 2017-05-23 DIAGNOSIS — G62.9 POLYNEUROPATHY, UNSPECIFIED: ICD-10-CM

## 2017-05-23 DIAGNOSIS — Z98.1 ARTHRODESIS STATUS: Chronic | ICD-10-CM

## 2017-05-23 DIAGNOSIS — F41.9 ANXIETY DISORDER, UNSPECIFIED: ICD-10-CM

## 2017-05-23 DIAGNOSIS — M54.9 DORSALGIA, UNSPECIFIED: ICD-10-CM

## 2017-05-23 DIAGNOSIS — M40.204 UNSPECIFIED KYPHOSIS, THORACIC REGION: ICD-10-CM

## 2017-05-23 LAB
ALBUMIN SERPL ELPH-MCNC: 3.1 G/DL — LOW (ref 3.3–5)
ALBUMIN SERPL ELPH-MCNC: 3.7 G/DL — SIGNIFICANT CHANGE UP (ref 3.3–5)
ALP SERPL-CCNC: 112 U/L — SIGNIFICANT CHANGE UP (ref 40–120)
ALP SERPL-CCNC: 126 U/L — HIGH (ref 30–120)
ALT FLD-CCNC: 12 U/L DA — SIGNIFICANT CHANGE UP (ref 10–60)
ALT FLD-CCNC: 12 U/L — SIGNIFICANT CHANGE UP (ref 12–78)
ANION GAP SERPL CALC-SCNC: 10 MMOL/L — SIGNIFICANT CHANGE UP (ref 5–17)
ANION GAP SERPL CALC-SCNC: 8 MMOL/L — SIGNIFICANT CHANGE UP (ref 5–17)
APPEARANCE UR: CLEAR — SIGNIFICANT CHANGE UP
APPEARANCE UR: CLEAR — SIGNIFICANT CHANGE UP
APTT BLD: 33 SEC — SIGNIFICANT CHANGE UP (ref 27.5–37.4)
AST SERPL-CCNC: 15 U/L — SIGNIFICANT CHANGE UP (ref 10–40)
AST SERPL-CCNC: 15 U/L — SIGNIFICANT CHANGE UP (ref 15–37)
BASOPHILS # BLD AUTO: 0.1 K/UL — SIGNIFICANT CHANGE UP (ref 0–0.2)
BASOPHILS # BLD AUTO: 0.1 K/UL — SIGNIFICANT CHANGE UP (ref 0–0.2)
BASOPHILS NFR BLD AUTO: 0.7 % — SIGNIFICANT CHANGE UP (ref 0–2)
BASOPHILS NFR BLD AUTO: 1.3 % — SIGNIFICANT CHANGE UP (ref 0–2)
BILIRUB SERPL-MCNC: 0.2 MG/DL — SIGNIFICANT CHANGE UP (ref 0.2–1.2)
BILIRUB SERPL-MCNC: 0.9 MG/DL — SIGNIFICANT CHANGE UP (ref 0.2–1.2)
BILIRUB UR-MCNC: NEGATIVE — SIGNIFICANT CHANGE UP
BILIRUB UR-MCNC: NEGATIVE — SIGNIFICANT CHANGE UP
BUN SERPL-MCNC: 10 MG/DL — SIGNIFICANT CHANGE UP (ref 7–23)
BUN SERPL-MCNC: 14 MG/DL — SIGNIFICANT CHANGE UP (ref 7–23)
CALCIUM SERPL-MCNC: 8.8 MG/DL — SIGNIFICANT CHANGE UP (ref 8.5–10.1)
CALCIUM SERPL-MCNC: 9.6 MG/DL — SIGNIFICANT CHANGE UP (ref 8.4–10.5)
CHLORIDE SERPL-SCNC: 103 MMOL/L — SIGNIFICANT CHANGE UP (ref 96–108)
CHLORIDE SERPL-SCNC: 107 MMOL/L — SIGNIFICANT CHANGE UP (ref 96–108)
CO2 SERPL-SCNC: 25 MMOL/L — SIGNIFICANT CHANGE UP (ref 22–31)
CO2 SERPL-SCNC: 26 MMOL/L — SIGNIFICANT CHANGE UP (ref 22–31)
COLOR SPEC: YELLOW — SIGNIFICANT CHANGE UP
COLOR SPEC: YELLOW — SIGNIFICANT CHANGE UP
CREAT SERPL-MCNC: 1.07 MG/DL — SIGNIFICANT CHANGE UP (ref 0.5–1.3)
CREAT SERPL-MCNC: 1.09 MG/DL — SIGNIFICANT CHANGE UP (ref 0.5–1.3)
DIFF PNL FLD: NEGATIVE — SIGNIFICANT CHANGE UP
DIFF PNL FLD: NEGATIVE — SIGNIFICANT CHANGE UP
EOSINOPHIL # BLD AUTO: 0.2 K/UL — SIGNIFICANT CHANGE UP (ref 0–0.5)
EOSINOPHIL # BLD AUTO: 0.3 K/UL — SIGNIFICANT CHANGE UP (ref 0–0.5)
EOSINOPHIL NFR BLD AUTO: 2.6 % — SIGNIFICANT CHANGE UP (ref 0–6)
EOSINOPHIL NFR BLD AUTO: 4.3 % — SIGNIFICANT CHANGE UP (ref 0–6)
GLUCOSE SERPL-MCNC: 108 MG/DL — HIGH (ref 70–99)
GLUCOSE SERPL-MCNC: 125 MG/DL — HIGH (ref 70–99)
GLUCOSE UR QL: NEGATIVE MG/DL — SIGNIFICANT CHANGE UP
GLUCOSE UR QL: NEGATIVE MG/DL — SIGNIFICANT CHANGE UP
HCT VFR BLD CALC: 34.8 % — LOW (ref 39–50)
HCT VFR BLD CALC: 36 % — LOW (ref 39–50)
HGB BLD-MCNC: 11.1 G/DL — LOW (ref 13–17)
HGB BLD-MCNC: 11.8 G/DL — LOW (ref 13–17)
INR BLD: 1.36 RATIO — HIGH (ref 0.88–1.16)
KETONES UR-MCNC: (no result)
KETONES UR-MCNC: ABNORMAL
LEUKOCYTE ESTERASE UR-ACNC: NEGATIVE — SIGNIFICANT CHANGE UP
LEUKOCYTE ESTERASE UR-ACNC: NEGATIVE — SIGNIFICANT CHANGE UP
LIDOCAIN IGE QN: 115 U/L — SIGNIFICANT CHANGE UP (ref 73–393)
LYMPHOCYTES # BLD AUTO: 1 K/UL — SIGNIFICANT CHANGE UP (ref 1–3.3)
LYMPHOCYTES # BLD AUTO: 1.2 K/UL — SIGNIFICANT CHANGE UP (ref 1–3.3)
LYMPHOCYTES # BLD AUTO: 13.2 % — SIGNIFICANT CHANGE UP (ref 13–44)
LYMPHOCYTES # BLD AUTO: 14.1 % — SIGNIFICANT CHANGE UP (ref 13–44)
MCHC RBC-ENTMCNC: 25.9 PG — LOW (ref 27–34)
MCHC RBC-ENTMCNC: 27.2 PG — SIGNIFICANT CHANGE UP (ref 27–34)
MCHC RBC-ENTMCNC: 31 GM/DL — LOW (ref 32–36)
MCHC RBC-ENTMCNC: 34 GM/DL — SIGNIFICANT CHANGE UP (ref 32–36)
MCV RBC AUTO: 79.9 FL — LOW (ref 80–100)
MCV RBC AUTO: 83.7 FL — SIGNIFICANT CHANGE UP (ref 80–100)
MONOCYTES # BLD AUTO: 0.5 K/UL — SIGNIFICANT CHANGE UP (ref 0–0.9)
MONOCYTES # BLD AUTO: 0.7 K/UL — SIGNIFICANT CHANGE UP (ref 0–0.9)
MONOCYTES NFR BLD AUTO: 6.5 % — SIGNIFICANT CHANGE UP (ref 2–14)
MONOCYTES NFR BLD AUTO: 7.3 % — SIGNIFICANT CHANGE UP (ref 2–14)
NEUTROPHILS # BLD AUTO: 5.2 K/UL — SIGNIFICANT CHANGE UP (ref 1.8–7.4)
NEUTROPHILS # BLD AUTO: 7.1 K/UL — SIGNIFICANT CHANGE UP (ref 1.8–7.4)
NEUTROPHILS NFR BLD AUTO: 74.3 % — SIGNIFICANT CHANGE UP (ref 43–77)
NEUTROPHILS NFR BLD AUTO: 75.7 % — SIGNIFICANT CHANGE UP (ref 43–77)
NITRITE UR-MCNC: NEGATIVE — SIGNIFICANT CHANGE UP
NITRITE UR-MCNC: NEGATIVE — SIGNIFICANT CHANGE UP
PH UR: 5 — SIGNIFICANT CHANGE UP (ref 5–8)
PH UR: 7 — SIGNIFICANT CHANGE UP (ref 5–8)
PLATELET # BLD AUTO: 339 K/UL — SIGNIFICANT CHANGE UP (ref 150–400)
PLATELET # BLD AUTO: 375 K/UL — SIGNIFICANT CHANGE UP (ref 150–400)
POTASSIUM SERPL-MCNC: 3.8 MMOL/L — SIGNIFICANT CHANGE UP (ref 3.5–5.3)
POTASSIUM SERPL-MCNC: 4.2 MMOL/L — SIGNIFICANT CHANGE UP (ref 3.5–5.3)
POTASSIUM SERPL-SCNC: 3.8 MMOL/L — SIGNIFICANT CHANGE UP (ref 3.5–5.3)
POTASSIUM SERPL-SCNC: 4.2 MMOL/L — SIGNIFICANT CHANGE UP (ref 3.5–5.3)
PROT SERPL-MCNC: 6.9 GM/DL — SIGNIFICANT CHANGE UP (ref 6–8.3)
PROT SERPL-MCNC: 7.9 G/DL — SIGNIFICANT CHANGE UP (ref 6–8.3)
PROT UR-MCNC: NEGATIVE MG/DL — SIGNIFICANT CHANGE UP
PROT UR-MCNC: NEGATIVE MG/DL — SIGNIFICANT CHANGE UP
PROTHROM AB SERPL-ACNC: 14.9 SEC — HIGH (ref 9.8–12.7)
RBC # BLD: 4.3 M/UL — SIGNIFICANT CHANGE UP (ref 4.2–5.8)
RBC # BLD: 4.35 M/UL — SIGNIFICANT CHANGE UP (ref 4.2–5.8)
RBC # FLD: 15 % — HIGH (ref 10.3–14.5)
RBC # FLD: 15.2 % — HIGH (ref 10.3–14.5)
SODIUM SERPL-SCNC: 139 MMOL/L — SIGNIFICANT CHANGE UP (ref 135–145)
SODIUM SERPL-SCNC: 140 MMOL/L — SIGNIFICANT CHANGE UP (ref 135–145)
SP GR SPEC: 1 — LOW (ref 1.01–1.02)
SP GR SPEC: 1.01 — SIGNIFICANT CHANGE UP (ref 1.01–1.02)
UROBILINOGEN FLD QL: NEGATIVE MG/DL — SIGNIFICANT CHANGE UP
UROBILINOGEN FLD QL: NEGATIVE MG/DL — SIGNIFICANT CHANGE UP
WBC # BLD: 7 K/UL — SIGNIFICANT CHANGE UP (ref 3.8–10.5)
WBC # BLD: 9.3 K/UL — SIGNIFICANT CHANGE UP (ref 3.8–10.5)
WBC # FLD AUTO: 7 K/UL — SIGNIFICANT CHANGE UP (ref 3.8–10.5)
WBC # FLD AUTO: 9.3 K/UL — SIGNIFICANT CHANGE UP (ref 3.8–10.5)

## 2017-05-23 PROCEDURE — 99285 EMERGENCY DEPT VISIT HI MDM: CPT

## 2017-05-23 PROCEDURE — 93010 ELECTROCARDIOGRAM REPORT: CPT

## 2017-05-23 RX ORDER — CYCLOBENZAPRINE HYDROCHLORIDE 10 MG/1
10 TABLET, FILM COATED ORAL EVERY 12 HOURS
Qty: 0 | Refills: 0 | Status: DISCONTINUED | OUTPATIENT
Start: 2017-05-23 | End: 2017-06-02

## 2017-05-23 RX ORDER — DULOXETINE HYDROCHLORIDE 30 MG/1
0 CAPSULE, DELAYED RELEASE ORAL
Qty: 0 | Refills: 0 | COMMUNITY

## 2017-05-23 RX ORDER — TAMSULOSIN HYDROCHLORIDE 0.4 MG/1
0.4 CAPSULE ORAL AT BEDTIME
Qty: 0 | Refills: 0 | Status: DISCONTINUED | OUTPATIENT
Start: 2017-05-23 | End: 2017-05-29

## 2017-05-23 RX ORDER — CYCLOBENZAPRINE HYDROCHLORIDE 10 MG/1
10 TABLET, FILM COATED ORAL ONCE
Qty: 0 | Refills: 0 | Status: COMPLETED | OUTPATIENT
Start: 2017-05-23 | End: 2017-05-23

## 2017-05-23 RX ORDER — ONDANSETRON 8 MG/1
1 TABLET, FILM COATED ORAL
Qty: 28 | Refills: 0 | OUTPATIENT
Start: 2017-05-23 | End: 2017-05-30

## 2017-05-23 RX ORDER — AMLODIPINE BESYLATE 2.5 MG/1
10 TABLET ORAL DAILY
Qty: 0 | Refills: 0 | Status: DISCONTINUED | OUTPATIENT
Start: 2017-05-23 | End: 2017-06-02

## 2017-05-23 RX ORDER — PANTOPRAZOLE SODIUM 20 MG/1
40 TABLET, DELAYED RELEASE ORAL DAILY
Qty: 0 | Refills: 0 | Status: DISCONTINUED | OUTPATIENT
Start: 2017-05-23 | End: 2017-06-02

## 2017-05-23 RX ORDER — PANTOPRAZOLE SODIUM 20 MG/1
0 TABLET, DELAYED RELEASE ORAL
Qty: 0 | Refills: 0 | COMMUNITY

## 2017-05-23 RX ORDER — SODIUM CHLORIDE 9 MG/ML
500 INJECTION INTRAMUSCULAR; INTRAVENOUS; SUBCUTANEOUS ONCE
Qty: 0 | Refills: 0 | Status: COMPLETED | OUTPATIENT
Start: 2017-05-23 | End: 2017-05-23

## 2017-05-23 RX ORDER — HYDROMORPHONE HYDROCHLORIDE 2 MG/ML
0 INJECTION INTRAMUSCULAR; INTRAVENOUS; SUBCUTANEOUS
Qty: 0 | Refills: 0 | COMMUNITY

## 2017-05-23 RX ORDER — DOCUSATE SODIUM 100 MG
100 CAPSULE ORAL THREE TIMES A DAY
Qty: 0 | Refills: 0 | Status: DISCONTINUED | OUTPATIENT
Start: 2017-05-23 | End: 2017-06-02

## 2017-05-23 RX ORDER — HYDROMORPHONE HYDROCHLORIDE 2 MG/ML
0.5 INJECTION INTRAMUSCULAR; INTRAVENOUS; SUBCUTANEOUS ONCE
Qty: 0 | Refills: 0 | Status: DISCONTINUED | OUTPATIENT
Start: 2017-05-23 | End: 2017-05-23

## 2017-05-23 RX ORDER — ALPRAZOLAM 0.25 MG
0 TABLET ORAL
Qty: 0 | Refills: 0 | COMMUNITY

## 2017-05-23 RX ORDER — OXYCODONE HYDROCHLORIDE 5 MG/1
1 TABLET ORAL
Qty: 0 | Refills: 0 | COMMUNITY

## 2017-05-23 RX ORDER — SENNA PLUS 8.6 MG/1
0 TABLET ORAL
Qty: 0 | Refills: 0 | COMMUNITY

## 2017-05-23 RX ORDER — ONDANSETRON 8 MG/1
4 TABLET, FILM COATED ORAL ONCE
Qty: 0 | Refills: 0 | Status: COMPLETED | OUTPATIENT
Start: 2017-05-23 | End: 2017-05-23

## 2017-05-23 RX ORDER — DOCUSATE SODIUM 100 MG
0 CAPSULE ORAL
Qty: 0 | Refills: 0 | COMMUNITY

## 2017-05-23 RX ORDER — HYDROMORPHONE HYDROCHLORIDE 2 MG/ML
1 INJECTION INTRAMUSCULAR; INTRAVENOUS; SUBCUTANEOUS
Qty: 0 | Refills: 0 | Status: DISCONTINUED | OUTPATIENT
Start: 2017-05-23 | End: 2017-05-25

## 2017-05-23 RX ORDER — DULOXETINE HYDROCHLORIDE 30 MG/1
60 CAPSULE, DELAYED RELEASE ORAL DAILY
Qty: 0 | Refills: 0 | Status: DISCONTINUED | OUTPATIENT
Start: 2017-05-23 | End: 2017-06-02

## 2017-05-23 RX ORDER — SENNA PLUS 8.6 MG/1
2 TABLET ORAL AT BEDTIME
Qty: 0 | Refills: 0 | Status: DISCONTINUED | OUTPATIENT
Start: 2017-05-23 | End: 2017-06-02

## 2017-05-23 RX ORDER — HYDROMORPHONE HYDROCHLORIDE 2 MG/ML
0.5 INJECTION INTRAMUSCULAR; INTRAVENOUS; SUBCUTANEOUS
Qty: 0 | Refills: 0 | Status: DISCONTINUED | OUTPATIENT
Start: 2017-05-23 | End: 2017-05-25

## 2017-05-23 RX ORDER — LISINOPRIL 2.5 MG/1
20 TABLET ORAL DAILY
Qty: 0 | Refills: 0 | Status: DISCONTINUED | OUTPATIENT
Start: 2017-05-23 | End: 2017-06-02

## 2017-05-23 RX ORDER — ONDANSETRON 8 MG/1
4 TABLET, FILM COATED ORAL ONCE
Qty: 0 | Refills: 0 | Status: DISCONTINUED | OUTPATIENT
Start: 2017-05-23 | End: 2017-05-23

## 2017-05-23 RX ORDER — ENOXAPARIN SODIUM 100 MG/ML
40 INJECTION SUBCUTANEOUS EVERY 24 HOURS
Qty: 0 | Refills: 0 | Status: DISCONTINUED | OUTPATIENT
Start: 2017-05-23 | End: 2017-06-02

## 2017-05-23 RX ORDER — ACETAMINOPHEN 500 MG
650 TABLET ORAL EVERY 6 HOURS
Qty: 0 | Refills: 0 | Status: DISCONTINUED | OUTPATIENT
Start: 2017-05-23 | End: 2017-06-02

## 2017-05-23 RX ORDER — ALPRAZOLAM 0.25 MG
0.25 TABLET ORAL
Qty: 0 | Refills: 0 | Status: DISCONTINUED | OUTPATIENT
Start: 2017-05-23 | End: 2017-05-30

## 2017-05-23 RX ORDER — ONDANSETRON 8 MG/1
4 TABLET, FILM COATED ORAL EVERY 4 HOURS
Qty: 0 | Refills: 0 | Status: DISCONTINUED | OUTPATIENT
Start: 2017-05-23 | End: 2017-05-26

## 2017-05-23 RX ORDER — CYCLOBENZAPRINE HYDROCHLORIDE 10 MG/1
10 TABLET, FILM COATED ORAL EVERY 12 HOURS
Qty: 0 | Refills: 0 | Status: DISCONTINUED | OUTPATIENT
Start: 2017-05-23 | End: 2017-05-23

## 2017-05-23 RX ORDER — LISINOPRIL 2.5 MG/1
0 TABLET ORAL
Qty: 0 | Refills: 0 | COMMUNITY

## 2017-05-23 RX ADMIN — ENOXAPARIN SODIUM 40 MILLIGRAM(S): 100 INJECTION SUBCUTANEOUS at 21:34

## 2017-05-23 RX ADMIN — Medication 100 MILLIGRAM(S): at 21:35

## 2017-05-23 RX ADMIN — Medication 0.1 MILLIGRAM(S): at 22:17

## 2017-05-23 RX ADMIN — HYDROMORPHONE HYDROCHLORIDE 0.5 MILLIGRAM(S): 2 INJECTION INTRAMUSCULAR; INTRAVENOUS; SUBCUTANEOUS at 19:23

## 2017-05-23 RX ADMIN — HYDROMORPHONE HYDROCHLORIDE 1 MILLIGRAM(S): 2 INJECTION INTRAMUSCULAR; INTRAVENOUS; SUBCUTANEOUS at 00:18

## 2017-05-23 RX ADMIN — HYDROMORPHONE HYDROCHLORIDE 0.5 MILLIGRAM(S): 2 INJECTION INTRAMUSCULAR; INTRAVENOUS; SUBCUTANEOUS at 22:23

## 2017-05-23 RX ADMIN — HYDROMORPHONE HYDROCHLORIDE 0.5 MILLIGRAM(S): 2 INJECTION INTRAMUSCULAR; INTRAVENOUS; SUBCUTANEOUS at 22:45

## 2017-05-23 RX ADMIN — SODIUM CHLORIDE 1000 MILLILITER(S): 9 INJECTION INTRAMUSCULAR; INTRAVENOUS; SUBCUTANEOUS at 00:18

## 2017-05-23 RX ADMIN — HYDROMORPHONE HYDROCHLORIDE 1 MILLIGRAM(S): 2 INJECTION INTRAMUSCULAR; INTRAVENOUS; SUBCUTANEOUS at 01:00

## 2017-05-23 RX ADMIN — ONDANSETRON 4 MILLIGRAM(S): 8 TABLET, FILM COATED ORAL at 19:17

## 2017-05-23 RX ADMIN — CYCLOBENZAPRINE HYDROCHLORIDE 10 MILLIGRAM(S): 10 TABLET, FILM COATED ORAL at 22:16

## 2017-05-23 RX ADMIN — Medication 200 MILLIGRAM(S): at 23:45

## 2017-05-23 RX ADMIN — TAMSULOSIN HYDROCHLORIDE 0.4 MILLIGRAM(S): 0.4 CAPSULE ORAL at 21:35

## 2017-05-23 RX ADMIN — ONDANSETRON 8 MILLIGRAM(S): 8 TABLET, FILM COATED ORAL at 00:18

## 2017-05-23 RX ADMIN — SODIUM CHLORIDE 500 MILLILITER(S): 9 INJECTION INTRAMUSCULAR; INTRAVENOUS; SUBCUTANEOUS at 19:21

## 2017-05-23 RX ADMIN — HYDROMORPHONE HYDROCHLORIDE 0.5 MILLIGRAM(S): 2 INJECTION INTRAMUSCULAR; INTRAVENOUS; SUBCUTANEOUS at 19:16

## 2017-05-23 RX ADMIN — ONDANSETRON 4 MILLIGRAM(S): 8 TABLET, FILM COATED ORAL at 22:23

## 2017-05-23 NOTE — ED ADULT NURSE NOTE - OBJECTIVE STATEMENT
Pt states he is s/p spinal fusion one month ago.  Complaint of lower back pain radiating to upper chest    Healed suture line noted on back.  Clean with no drainage noted. Able to move extremities well. Left foot drop noted  Extremities warm color good Positive pedal pulses noted  Blanchable erythema noted both heels.  Blanchable erythema noted on buttocks  No open areas noted Pt states he is s/p spinal fusion one month ago.  Complaint of lower back pain radiating to upper chest    Healed suture line noted on back.  Clean with no drainage noted. Able to move extremities well. Left foot drop noted  Extremities warm color good Positive pedal pulses noted  Blanchable erythema noted both heels.  Blanchable erythema noted on buttocks  No open areas noted Pt turned and positioned  Pressure off bony prominences

## 2017-05-23 NOTE — ED PROVIDER NOTE - MUSCULOSKELETAL, MLM
Spine appears normal, range of motion is not limited, no muscle or joint tenderness. lower extremities full ROM intact. Deep tendon reflexes intact. Negative babinski's sign.

## 2017-05-23 NOTE — ED ADULT NURSE NOTE - PMH
Anemia    Anxiety    Benign prostatic hypertrophy without urinary obstruction    Depression    GERD (gastroesophageal reflux disease)    Hypertension    Kyphosis    Neuropathy    Spinal stenosis

## 2017-05-23 NOTE — H&P ADULT - HISTORY OF PRESENT ILLNESS
This is a 76y Male complaining of pain, low back with history of chronic back pain, spinal fusion (approximately a month ago at [facility]) presents to the ED from [facility] c/o lower back pain, as well as nausea. Pt takes 6mg Dilaudid every 3 hours for pain, and took his medication this morning. Pt states before the surgery, he saw someone for pain management, but he does not currently have a pain management physician. Denies numbness/tingling in lower extremities. No further complaints at this time. This is a 75 YO Male complaining of pain, low back with history of chronic back pain, spinal fusion (approximately a month ago at [facility]) presents to the ED from [facility] c/o lower back pain, as well as nausea. Patient takes 6 mg Dilaudid every 3 hours for pain, and took his medication this morning. Patient states before the surgery, he saw someone for pain management, but he does not currently have a pain management physician. Denies numbness/tingling in lower extremities. No further complaints at this time.

## 2017-05-23 NOTE — ED ADULT NURSE NOTE - CHPI ED SYMPTOMS NEG
no decreased eating/drinking/no fever/no numbness/no nausea/no dizziness/no vomiting/no tingling/no weakness/no chills

## 2017-05-23 NOTE — ED ADULT NURSE NOTE - CHIEF COMPLAINT QUOTE
" Im having intractable lower back pain and I cant control my nauseousness. " Pt sent from [facility]

## 2017-05-23 NOTE — ED PROVIDER NOTE - SKIN, MLM
Skin normal color for race, warm, dry and intact. No evidence of rash. Large surgical scar over thoracic and lumbar spine, healed.

## 2017-05-23 NOTE — ED PROVIDER NOTE - DETAILS:
Karan Vazquez MD - The scribe's documentation has been prepared under my direction and personally reviewed by me in its entirety. I confirm that the note above accurately reflects all work, treatment, procedures, and medical decision making performed by me.

## 2017-05-23 NOTE — ED PROVIDER NOTE - OBJECTIVE STATEMENT
75 y/o M pt with history of chronic back pain, spinal fusion (approximately a month ago at [facility]) presents to the ED from [facility] c/o lower back pain, as well as nausea. Pt takes 6mg Dilaudid every 3 hours for pain, and took his medication this morning. Pt states before the surgery, he saw someone for pain management, but he does not currently have a pain management physician. Denies numbness/tingling in lower extremities. No further complaints at this time.   PCP: Dr. Springer

## 2017-05-23 NOTE — ED ADULT TRIAGE NOTE - CHIEF COMPLAINT QUOTE
" Im having intractable lower back pain and I cant control my nauseousness. " Pt sent from [facility]

## 2017-05-24 DIAGNOSIS — I10 ESSENTIAL (PRIMARY) HYPERTENSION: ICD-10-CM

## 2017-05-24 LAB
ANION GAP SERPL CALC-SCNC: 9 MMOL/L — SIGNIFICANT CHANGE UP (ref 5–17)
BASOPHILS # BLD AUTO: 0.1 K/UL — SIGNIFICANT CHANGE UP (ref 0–0.2)
BASOPHILS NFR BLD AUTO: 1.3 % — SIGNIFICANT CHANGE UP (ref 0–2)
BUN SERPL-MCNC: 9 MG/DL — SIGNIFICANT CHANGE UP (ref 7–23)
CALCIUM SERPL-MCNC: 9.1 MG/DL — SIGNIFICANT CHANGE UP (ref 8.4–10.5)
CHLORIDE SERPL-SCNC: 105 MMOL/L — SIGNIFICANT CHANGE UP (ref 96–108)
CHOLEST SERPL-MCNC: 165 MG/DL — SIGNIFICANT CHANGE UP (ref 10–199)
CO2 SERPL-SCNC: 26 MMOL/L — SIGNIFICANT CHANGE UP (ref 22–31)
CREAT SERPL-MCNC: 0.97 MG/DL — SIGNIFICANT CHANGE UP (ref 0.5–1.3)
EOSINOPHIL # BLD AUTO: 0.3 K/UL — SIGNIFICANT CHANGE UP (ref 0–0.5)
EOSINOPHIL NFR BLD AUTO: 3.6 % — SIGNIFICANT CHANGE UP (ref 0–6)
GLUCOSE SERPL-MCNC: 111 MG/DL — HIGH (ref 70–99)
HBA1C BLD-MCNC: 5.6 % — SIGNIFICANT CHANGE UP (ref 4–5.6)
HCT VFR BLD CALC: 35.8 % — LOW (ref 39–50)
HDLC SERPL-MCNC: 46 MG/DL — SIGNIFICANT CHANGE UP (ref 40–125)
HGB BLD-MCNC: 11.6 G/DL — LOW (ref 13–17)
LIPID PNL WITH DIRECT LDL SERPL: 103 MG/DL — SIGNIFICANT CHANGE UP
LYMPHOCYTES # BLD AUTO: 1.2 K/UL — SIGNIFICANT CHANGE UP (ref 1–3.3)
LYMPHOCYTES # BLD AUTO: 17.2 % — SIGNIFICANT CHANGE UP (ref 13–44)
MAGNESIUM SERPL-MCNC: 1.5 MG/DL — LOW (ref 1.6–2.6)
MCHC RBC-ENTMCNC: 26.4 PG — LOW (ref 27–34)
MCHC RBC-ENTMCNC: 32.3 GM/DL — SIGNIFICANT CHANGE UP (ref 32–36)
MCV RBC AUTO: 81.7 FL — SIGNIFICANT CHANGE UP (ref 80–100)
MONOCYTES # BLD AUTO: 0.6 K/UL — SIGNIFICANT CHANGE UP (ref 0–0.9)
MONOCYTES NFR BLD AUTO: 8.7 % — SIGNIFICANT CHANGE UP (ref 2–14)
NEUTROPHILS # BLD AUTO: 4.9 K/UL — SIGNIFICANT CHANGE UP (ref 1.8–7.4)
NEUTROPHILS NFR BLD AUTO: 69.2 % — SIGNIFICANT CHANGE UP (ref 43–77)
PHOSPHATE SERPL-MCNC: 3.3 MG/DL — SIGNIFICANT CHANGE UP (ref 2.5–4.5)
PLATELET # BLD AUTO: 355 K/UL — SIGNIFICANT CHANGE UP (ref 150–400)
POTASSIUM SERPL-MCNC: 4 MMOL/L — SIGNIFICANT CHANGE UP (ref 3.5–5.3)
POTASSIUM SERPL-SCNC: 4 MMOL/L — SIGNIFICANT CHANGE UP (ref 3.5–5.3)
RAPID RVP RESULT: SIGNIFICANT CHANGE UP
RBC # BLD: 4.39 M/UL — SIGNIFICANT CHANGE UP (ref 4.2–5.8)
RBC # FLD: 15 % — HIGH (ref 10.3–14.5)
SODIUM SERPL-SCNC: 140 MMOL/L — SIGNIFICANT CHANGE UP (ref 135–145)
T3 SERPL-MCNC: 107 NG/DL — SIGNIFICANT CHANGE UP (ref 80–200)
T4 AB SER-ACNC: 7.1 UG/DL — SIGNIFICANT CHANGE UP (ref 4.6–12)
TOTAL CHOLESTEROL/HDL RATIO MEASUREMENT: 3.6 RATIO — SIGNIFICANT CHANGE UP (ref 3.4–9.6)
TRIGL SERPL-MCNC: 81 MG/DL — SIGNIFICANT CHANGE UP (ref 10–149)
TSH SERPL-MCNC: 2.96 UIU/ML — SIGNIFICANT CHANGE UP (ref 0.27–4.2)
WBC # BLD: 7 K/UL — SIGNIFICANT CHANGE UP (ref 3.8–10.5)
WBC # FLD AUTO: 7 K/UL — SIGNIFICANT CHANGE UP (ref 3.8–10.5)

## 2017-05-24 RX ORDER — MAGNESIUM SULFATE 500 MG/ML
2 VIAL (ML) INJECTION ONCE
Qty: 0 | Refills: 0 | Status: COMPLETED | OUTPATIENT
Start: 2017-05-24 | End: 2017-05-24

## 2017-05-24 RX ADMIN — CYCLOBENZAPRINE HYDROCHLORIDE 10 MILLIGRAM(S): 10 TABLET, FILM COATED ORAL at 21:21

## 2017-05-24 RX ADMIN — Medication 100 MILLIGRAM(S): at 21:21

## 2017-05-24 RX ADMIN — Medication 0.25 MILLIGRAM(S): at 05:17

## 2017-05-24 RX ADMIN — HYDROMORPHONE HYDROCHLORIDE 1 MILLIGRAM(S): 2 INJECTION INTRAMUSCULAR; INTRAVENOUS; SUBCUTANEOUS at 11:36

## 2017-05-24 RX ADMIN — HYDROMORPHONE HYDROCHLORIDE 1 MILLIGRAM(S): 2 INJECTION INTRAMUSCULAR; INTRAVENOUS; SUBCUTANEOUS at 08:36

## 2017-05-24 RX ADMIN — HYDROMORPHONE HYDROCHLORIDE 1 MILLIGRAM(S): 2 INJECTION INTRAMUSCULAR; INTRAVENOUS; SUBCUTANEOUS at 14:39

## 2017-05-24 RX ADMIN — ONDANSETRON 4 MILLIGRAM(S): 8 TABLET, FILM COATED ORAL at 14:51

## 2017-05-24 RX ADMIN — HYDROMORPHONE HYDROCHLORIDE 1 MILLIGRAM(S): 2 INJECTION INTRAMUSCULAR; INTRAVENOUS; SUBCUTANEOUS at 12:06

## 2017-05-24 RX ADMIN — HYDROMORPHONE HYDROCHLORIDE 1 MILLIGRAM(S): 2 INJECTION INTRAMUSCULAR; INTRAVENOUS; SUBCUTANEOUS at 19:39

## 2017-05-24 RX ADMIN — HYDROMORPHONE HYDROCHLORIDE 1 MILLIGRAM(S): 2 INJECTION INTRAMUSCULAR; INTRAVENOUS; SUBCUTANEOUS at 20:00

## 2017-05-24 RX ADMIN — Medication 100 MILLIGRAM(S): at 14:26

## 2017-05-24 RX ADMIN — HYDROMORPHONE HYDROCHLORIDE 0.5 MILLIGRAM(S): 2 INJECTION INTRAMUSCULAR; INTRAVENOUS; SUBCUTANEOUS at 05:00

## 2017-05-24 RX ADMIN — TAMSULOSIN HYDROCHLORIDE 0.4 MILLIGRAM(S): 0.4 CAPSULE ORAL at 21:21

## 2017-05-24 RX ADMIN — HYDROMORPHONE HYDROCHLORIDE 1 MILLIGRAM(S): 2 INJECTION INTRAMUSCULAR; INTRAVENOUS; SUBCUTANEOUS at 15:09

## 2017-05-24 RX ADMIN — HYDROMORPHONE HYDROCHLORIDE 1 MILLIGRAM(S): 2 INJECTION INTRAMUSCULAR; INTRAVENOUS; SUBCUTANEOUS at 08:06

## 2017-05-24 RX ADMIN — DULOXETINE HYDROCHLORIDE 60 MILLIGRAM(S): 30 CAPSULE, DELAYED RELEASE ORAL at 11:36

## 2017-05-24 RX ADMIN — Medication 0.1 MILLIGRAM(S): at 05:13

## 2017-05-24 RX ADMIN — LISINOPRIL 20 MILLIGRAM(S): 2.5 TABLET ORAL at 05:13

## 2017-05-24 RX ADMIN — AMLODIPINE BESYLATE 10 MILLIGRAM(S): 2.5 TABLET ORAL at 05:17

## 2017-05-24 RX ADMIN — Medication 30 MILLILITER(S): at 14:51

## 2017-05-24 RX ADMIN — PANTOPRAZOLE SODIUM 40 MILLIGRAM(S): 20 TABLET, DELAYED RELEASE ORAL at 11:36

## 2017-05-24 RX ADMIN — Medication 100 MILLIGRAM(S): at 05:13

## 2017-05-24 RX ADMIN — CYCLOBENZAPRINE HYDROCHLORIDE 10 MILLIGRAM(S): 10 TABLET, FILM COATED ORAL at 11:33

## 2017-05-24 RX ADMIN — ENOXAPARIN SODIUM 40 MILLIGRAM(S): 100 INJECTION SUBCUTANEOUS at 21:20

## 2017-05-24 RX ADMIN — Medication 0.1 MILLIGRAM(S): at 17:25

## 2017-05-24 RX ADMIN — SENNA PLUS 2 TABLET(S): 8.6 TABLET ORAL at 21:21

## 2017-05-24 RX ADMIN — HYDROMORPHONE HYDROCHLORIDE 0.5 MILLIGRAM(S): 2 INJECTION INTRAMUSCULAR; INTRAVENOUS; SUBCUTANEOUS at 04:32

## 2017-05-24 RX ADMIN — Medication 0.25 MILLIGRAM(S): at 17:25

## 2017-05-24 NOTE — PROVIDER CONTACT NOTE (MEDICATION) - ACTION/TREATMENT ORDERED:
Pt was educated on the importance of adhering to MD's treatment as his lab result has come back with depleted Mg level.  MD to follow up with pt.

## 2017-05-24 NOTE — CONSULT NOTE ADULT - SUBJECTIVE AND OBJECTIVE BOX
Pt is sp back surgery with foot drop and continued pain. The patient does not tolerate oral dilaudid because of n/v. The patient is not having pain while in the prone position. THe patient takes diluadid 6 mg PO q3 as an outpt. He has not has imaging studies since the surgery.  is cw pt history. Please see H+P for full ROS and Physical

## 2017-05-24 NOTE — PROGRESS NOTE ADULT - SUBJECTIVE AND OBJECTIVE BOX
Patient is a 76y old  Male who presents with a chief complaint of Nausea, vomiting and back pain (23 May 2017 20:31)      INTERVAL HPI/OVERNIGHT EVENTS: Patient seen and examined. NAD. No complaints. No n/v. Back pain under control with iv regimen.      Vital Signs Last 24 Hrs  T(C): 36.9, Max: 37.4 ( @ 18:17)  T(F): 98.4, Max: 99.4 ( @ 18:17)  HR: 76 (75 - 85)  BP: 157/78 (157/78 - 177/80)  BP(mean): 78 (78 - 78)  RR: 16 (16 - 17)  SpO2: 95% (94% - 96%)I&O's Summary      LABS:                        11.6   7.0   )-----------( 355      ( 24 May 2017 05:45 )             35.8     -    140  |  105  |  9   ----------------------------<  111<H>  4.0   |  26  |  0.97    Ca    9.1      24 May 2017 05:45  Phos  3.3     -  Mg     1.5         TPro  7.9  /  Alb  3.7  /  TBili  0.9  /  DBili  x   /  AST  15  /  ALT  12  /  AlkPhos  126<H>      PT/INR - ( 23 May 2017 19:09 )   PT: 14.9 sec;   INR: 1.36 ratio         PTT - ( 23 May 2017 19:09 )  PTT:33.0 sec  Urinalysis Basic - ( 23 May 2017 19:44 )    Color: Yellow / Appearance: Clear / S.005 / pH: x  Gluc: x / Ketone: Trace  / Bili: Negative / Urobili: Negative mg/dL   Blood: x / Protein: Negative mg/dL / Nitrite: Negative   Leuk Esterase: Negative / RBC: x / WBC x   Sq Epi: x / Non Sq Epi: x / Bacteria: x      CAPILLARY BLOOD GLUCOSE          enoxaparin Injectable 40milliGRAM(s) SubCutaneous every 24 hours  acetaminophen   Tablet. 650milliGRAM(s) Oral every 6 hours PRN  HYDROmorphone  Injectable 1milliGRAM(s) IV Push every 3 hours PRN  HYDROmorphone  Injectable 0.5milliGRAM(s) IV Push every 3 hours PRN  lisinopril 20milliGRAM(s) Oral daily  cloNIDine 0.1milliGRAM(s) Oral every 12 hours  tamsulosin 0.4milliGRAM(s) Oral at bedtime  DULoxetine 60milliGRAM(s) Oral daily  ondansetron Injectable 4milliGRAM(s) IV Push every 4 hours PRN  ALPRAZolam 0.25milliGRAM(s) Oral two times a day  amLODIPine   Tablet 10milliGRAM(s) Oral daily  docusate sodium 100milliGRAM(s) Oral three times a day  senna 2Tablet(s) Oral at bedtime  pantoprazole   Suspension 40milliGRAM(s) Oral daily  cyclobenzaprine 10milliGRAM(s) Oral every 12 hours  aluminum hydroxide/magnesium hydroxide/simethicone Suspension 30milliLiter(s) Oral every 6 hours PRN      REVIEW OF SYSTEMS:  CONSTITUTIONAL: No fever, weight loss, or fatigue  NECK: No pain or stiffness  RESPIRATORY: No cough, wheezing, chills or hemoptysis; No shortness of breath  CARDIOVASCULAR: No chest pain, palpitations, dizziness, or leg swelling  GASTROINTESTINAL: No abdominal or epigastric pain. No nausea, vomiting, or hematemesis; No diarrhea or constipation. No melena or hematochezia.  GENITOURINARY: No dysuria, frequency, hematuria, or incontinence  NEUROLOGICAL: No headaches, loss of strength, numbness, or tremors  SKIN: No itching, burning  MUSCULOSKELETAL: No joint pain or swelling; No muscle, back, or extremity pain  PSYCHIATRIC: No depression, mood swings, HEME/LYMPH: No easy bruising, or bleeding gums  ALLERY AND IMMUNOLOGIC: No hives     RADIOLOGY & ADDITIONAL TESTS:    Imaging Personally Reviewed:  [x ] YES  [ ] NO    Consultant(s) Notes Reviewed:  [x ] YES  [ ] NO    PHYSICAL EXAM:  GENERAL: NAD, well-groomed, well-developed  HEAD:  Atraumatic, Normocephalic  EYES: EOMI, PERRLA, conjunctiva and sclera clear  ENMT: No tonsillar erythema, exudates, or enlargement; Moist mucous membranes  NECK: Supple, No JVD  NERVOUS SYSTEM:  Awake & alert, Good concentration;   CHEST/LUNG: Clear to auscultation bilaterally; No rales, rhonchi, wheezing,  HEART: Regular rate and rhythm  ABDOMEN: Soft, Nontender, Nondistended; Bowel sounds present  EXTREMITIES:  No clubbing, cyanosis, or edema  LYMPH: No lymphadenopathy noted  SKIN: No rashes    Care Discussed with Consultants/Other Providers [ x] YES  [ ] NO

## 2017-05-24 NOTE — PHYSICAL THERAPY INITIAL EVALUATION ADULT - RANGE OF MOTION EXAMINATION, REHAB EVAL
left foot drop/bilateral lower extremity ROM was WFL (within functional limits)/deficits as listed below/bilateral upper extremity ROM was WFL (within functional limits)

## 2017-05-24 NOTE — CONSULT NOTE ADULT - PROBLEM SELECTOR RECOMMENDATION 9
MRI of LS spine  Neurology consultation  Due to n/v with PO diluadid consider oral oxycodone 10 mg PO q3 PRN

## 2017-05-25 DIAGNOSIS — Z29.9 ENCOUNTER FOR PROPHYLACTIC MEASURES, UNSPECIFIED: ICD-10-CM

## 2017-05-25 DIAGNOSIS — M21.372 FOOT DROP, LEFT FOOT: ICD-10-CM

## 2017-05-25 LAB
ANION GAP SERPL CALC-SCNC: 9 MMOL/L — SIGNIFICANT CHANGE UP (ref 5–17)
BUN SERPL-MCNC: 17 MG/DL — SIGNIFICANT CHANGE UP (ref 7–23)
CALCIUM SERPL-MCNC: 9.1 MG/DL — SIGNIFICANT CHANGE UP (ref 8.4–10.5)
CHLORIDE SERPL-SCNC: 101 MMOL/L — SIGNIFICANT CHANGE UP (ref 96–108)
CO2 SERPL-SCNC: 27 MMOL/L — SIGNIFICANT CHANGE UP (ref 22–31)
CREAT SERPL-MCNC: 1.02 MG/DL — SIGNIFICANT CHANGE UP (ref 0.5–1.3)
GLUCOSE SERPL-MCNC: 114 MG/DL — HIGH (ref 70–99)
HCT VFR BLD CALC: 35.7 % — LOW (ref 39–50)
HGB BLD-MCNC: 11.4 G/DL — LOW (ref 13–17)
MAGNESIUM SERPL-MCNC: 2.1 MG/DL — SIGNIFICANT CHANGE UP (ref 1.6–2.6)
MCHC RBC-ENTMCNC: 25.8 PG — LOW (ref 27–34)
MCHC RBC-ENTMCNC: 32 GM/DL — SIGNIFICANT CHANGE UP (ref 32–36)
MCV RBC AUTO: 80.6 FL — SIGNIFICANT CHANGE UP (ref 80–100)
PLATELET # BLD AUTO: 322 K/UL — SIGNIFICANT CHANGE UP (ref 150–400)
POTASSIUM SERPL-MCNC: 4.1 MMOL/L — SIGNIFICANT CHANGE UP (ref 3.5–5.3)
POTASSIUM SERPL-SCNC: 4.1 MMOL/L — SIGNIFICANT CHANGE UP (ref 3.5–5.3)
RBC # BLD: 4.44 M/UL — SIGNIFICANT CHANGE UP (ref 4.2–5.8)
RBC # FLD: 15.5 % — HIGH (ref 10.3–14.5)
SODIUM SERPL-SCNC: 137 MMOL/L — SIGNIFICANT CHANGE UP (ref 135–145)
WBC # BLD: 6.4 K/UL — SIGNIFICANT CHANGE UP (ref 3.8–10.5)
WBC # FLD AUTO: 6.4 K/UL — SIGNIFICANT CHANGE UP (ref 3.8–10.5)

## 2017-05-25 PROCEDURE — 72110 X-RAY EXAM L-2 SPINE 4/>VWS: CPT | Mod: 26

## 2017-05-25 PROCEDURE — 72070 X-RAY EXAM THORAC SPINE 2VWS: CPT | Mod: 26

## 2017-05-25 RX ORDER — HYDROMORPHONE HYDROCHLORIDE 2 MG/ML
0.5 INJECTION INTRAMUSCULAR; INTRAVENOUS; SUBCUTANEOUS
Qty: 0 | Refills: 0 | Status: DISCONTINUED | OUTPATIENT
Start: 2017-05-25 | End: 2017-05-26

## 2017-05-25 RX ORDER — OXYCODONE HYDROCHLORIDE 5 MG/1
10 TABLET ORAL
Qty: 0 | Refills: 0 | Status: DISCONTINUED | OUTPATIENT
Start: 2017-05-25 | End: 2017-05-26

## 2017-05-25 RX ADMIN — Medication 100 MILLIGRAM(S): at 13:39

## 2017-05-25 RX ADMIN — OXYCODONE HYDROCHLORIDE 10 MILLIGRAM(S): 5 TABLET ORAL at 18:51

## 2017-05-25 RX ADMIN — AMLODIPINE BESYLATE 10 MILLIGRAM(S): 2.5 TABLET ORAL at 05:32

## 2017-05-25 RX ADMIN — OXYCODONE HYDROCHLORIDE 10 MILLIGRAM(S): 5 TABLET ORAL at 16:10

## 2017-05-25 RX ADMIN — HYDROMORPHONE HYDROCHLORIDE 1 MILLIGRAM(S): 2 INJECTION INTRAMUSCULAR; INTRAVENOUS; SUBCUTANEOUS at 13:55

## 2017-05-25 RX ADMIN — PANTOPRAZOLE SODIUM 40 MILLIGRAM(S): 20 TABLET, DELAYED RELEASE ORAL at 06:10

## 2017-05-25 RX ADMIN — CYCLOBENZAPRINE HYDROCHLORIDE 10 MILLIGRAM(S): 10 TABLET, FILM COATED ORAL at 21:18

## 2017-05-25 RX ADMIN — ONDANSETRON 4 MILLIGRAM(S): 8 TABLET, FILM COATED ORAL at 20:00

## 2017-05-25 RX ADMIN — HYDROMORPHONE HYDROCHLORIDE 1 MILLIGRAM(S): 2 INJECTION INTRAMUSCULAR; INTRAVENOUS; SUBCUTANEOUS at 05:33

## 2017-05-25 RX ADMIN — LISINOPRIL 20 MILLIGRAM(S): 2.5 TABLET ORAL at 05:32

## 2017-05-25 RX ADMIN — HYDROMORPHONE HYDROCHLORIDE 1 MILLIGRAM(S): 2 INJECTION INTRAMUSCULAR; INTRAVENOUS; SUBCUTANEOUS at 00:15

## 2017-05-25 RX ADMIN — DULOXETINE HYDROCHLORIDE 60 MILLIGRAM(S): 30 CAPSULE, DELAYED RELEASE ORAL at 13:38

## 2017-05-25 RX ADMIN — OXYCODONE HYDROCHLORIDE 10 MILLIGRAM(S): 5 TABLET ORAL at 18:17

## 2017-05-25 RX ADMIN — HYDROMORPHONE HYDROCHLORIDE 1 MILLIGRAM(S): 2 INJECTION INTRAMUSCULAR; INTRAVENOUS; SUBCUTANEOUS at 10:17

## 2017-05-25 RX ADMIN — ENOXAPARIN SODIUM 40 MILLIGRAM(S): 100 INJECTION SUBCUTANEOUS at 21:18

## 2017-05-25 RX ADMIN — Medication 100 MILLIGRAM(S): at 05:32

## 2017-05-25 RX ADMIN — HYDROMORPHONE HYDROCHLORIDE 1 MILLIGRAM(S): 2 INJECTION INTRAMUSCULAR; INTRAVENOUS; SUBCUTANEOUS at 11:55

## 2017-05-25 RX ADMIN — HYDROMORPHONE HYDROCHLORIDE 1 MILLIGRAM(S): 2 INJECTION INTRAMUSCULAR; INTRAVENOUS; SUBCUTANEOUS at 11:14

## 2017-05-25 RX ADMIN — TAMSULOSIN HYDROCHLORIDE 0.4 MILLIGRAM(S): 0.4 CAPSULE ORAL at 21:18

## 2017-05-25 RX ADMIN — HYDROMORPHONE HYDROCHLORIDE 0.5 MILLIGRAM(S): 2 INJECTION INTRAMUSCULAR; INTRAVENOUS; SUBCUTANEOUS at 20:05

## 2017-05-25 RX ADMIN — Medication 0.25 MILLIGRAM(S): at 06:10

## 2017-05-25 RX ADMIN — HYDROMORPHONE HYDROCHLORIDE 1 MILLIGRAM(S): 2 INJECTION INTRAMUSCULAR; INTRAVENOUS; SUBCUTANEOUS at 00:30

## 2017-05-25 RX ADMIN — CYCLOBENZAPRINE HYDROCHLORIDE 10 MILLIGRAM(S): 10 TABLET, FILM COATED ORAL at 10:19

## 2017-05-25 RX ADMIN — OXYCODONE HYDROCHLORIDE 10 MILLIGRAM(S): 5 TABLET ORAL at 15:03

## 2017-05-25 RX ADMIN — Medication 30 MILLILITER(S): at 14:54

## 2017-05-25 RX ADMIN — HYDROMORPHONE HYDROCHLORIDE 0.5 MILLIGRAM(S): 2 INJECTION INTRAMUSCULAR; INTRAVENOUS; SUBCUTANEOUS at 20:20

## 2017-05-25 RX ADMIN — Medication 0.1 MILLIGRAM(S): at 05:33

## 2017-05-25 RX ADMIN — Medication 100 MILLIGRAM(S): at 21:18

## 2017-05-25 RX ADMIN — HYDROMORPHONE HYDROCHLORIDE 1 MILLIGRAM(S): 2 INJECTION INTRAMUSCULAR; INTRAVENOUS; SUBCUTANEOUS at 13:39

## 2017-05-25 RX ADMIN — HYDROMORPHONE HYDROCHLORIDE 1 MILLIGRAM(S): 2 INJECTION INTRAMUSCULAR; INTRAVENOUS; SUBCUTANEOUS at 05:50

## 2017-05-25 RX ADMIN — SENNA PLUS 2 TABLET(S): 8.6 TABLET ORAL at 21:18

## 2017-05-25 NOTE — PROGRESS NOTE ADULT - SUBJECTIVE AND OBJECTIVE BOX
Patient is a 76y old  Male who presents with a chief complaint of Nausea, vomiting and back pain (23 May 2017 20:31)    HPI:  This is a 76y Male complaining of pain, low back with history of chronic back pain, spinal fusion (approximately a month ago at [facility] for [facility]) presents to the ED from [facility] c/o lower back pain, as well as nausea. Pt takes 6mg Dilaudid every 3 hours for pain, and took his medication this morning. Pt states before the surgery, he saw someone for pain management, but he does not currently have a pain management physician. Denies numbness/tingling in lower extremities. No further complaints at this time. (23 May 2017 20:31)    INTERVAL HPI/OVERNIGHT EVENTS:  Chart reviewed, notes reviewed.   Patient seen and examined.  Pain is fairly controlled with current medications.  Ambulated with physical therapy.   Being followed by following specialists.     Consultant(s) Notes Reviewed:  [ ] Yes    Care Discussed with Consultants/Other Providers: [ ] Yes    REVIEW OF SYSTEMS:  CONSTITUTIONAL: No fever, weight loss, or fatigue  EYES: No eye pain, visual disturbances, or discharge  ENMT:  No difficulty hearing, tinnitus, vertigo; No sinus or throat pain  NECK: No pain or stiffness  BREASTS: No pain, masses, or nipple discharge  RESPIRATORY: No cough, wheezing, chills or hemoptysis; No shortness of breath  CARDIOVASCULAR: No chest pain, palpitations, dizziness, or leg swelling  GASTROINTESTINAL: No abdominal or epigastric pain. No nausea, vomiting, or hematemesis; No diarrhea or constipation. No melena or hematochezia.  GENITOURINARY: No dysuria, frequency, hematuria, or incontinence  NEUROLOGICAL: No headaches, memory loss, loss of strength, numbness, or tremors  SKIN: No itching, burning, rashes, or lesions   LYMPH NODES: No enlarged glands  ENDOCRINE: No heat or cold intolerance; No hair loss; No polydipsia or polyuria  MUSCULOSKELETAL: No joint pain or swelling; No muscle, back, or extremity pain  PSYCHIATRIC: No depression, anxiety, mood swings, or difficulty sleeping  HEME/LYMPH: No easy bruising, or bleeding gums  ALLERGY AND IMMUNOLOGIC: No hives or eczema  Allergies    No Known Allergies    Intolerances      MEDICATIONS  (STANDING):  enoxaparin Injectable 40milliGRAM(s) SubCutaneous every 24 hours  lisinopril 20milliGRAM(s) Oral daily  cloNIDine 0.1milliGRAM(s) Oral every 12 hours  tamsulosin 0.4milliGRAM(s) Oral at bedtime  DULoxetine 60milliGRAM(s) Oral daily  ALPRAZolam 0.25milliGRAM(s) Oral two times a day  amLODIPine   Tablet 10milliGRAM(s) Oral daily  docusate sodium 100milliGRAM(s) Oral three times a day  senna 2Tablet(s) Oral at bedtime  pantoprazole   Suspension 40milliGRAM(s) Oral daily  cyclobenzaprine 10milliGRAM(s) Oral every 12 hours    MEDICATIONS  (PRN):  acetaminophen   Tablet. 650milliGRAM(s) Oral every 6 hours PRN Mild Pain (1 - 3)  ondansetron Injectable 4milliGRAM(s) IV Push every 4 hours PRN Nausea and/or Vomiting  aluminum hydroxide/magnesium hydroxide/simethicone Suspension 30milliLiter(s) Oral every 6 hours PRN Dyspepsia  oxyCODONE IR 10milliGRAM(s) Oral every 3 hours PRN Severe Pain (7 - 10)  HYDROmorphone  Injectable 0.5milliGRAM(s) IV Push every 3 hours PRN Severe Pain (7 - 10)    Vital Signs Last 24 Hrs  T(C): 36.6, Max: 37.1 (05-24 @ 16:21)  T(F): 97.8, Max: 98.7 (05-24 @ 16:21)  HR: 89 (74 - 90)  BP: 116/68 (104/66 - 152/87)  BP(mean): --  RR: 16 (14 - 16)  SpO2: 96% (96% - 100%)  PHYSICAL EXAM:  GENERAL: NAD, well-groomed, well-developed  HEAD:  Atraumatic, Normocephalic  EYES: EOMI, PERRLA, conjunctiva and sclera clear  ENMT: No tonsillar erythema, exudates, or enlargement; Moist mucous membranes, Good dentition, No lesions  NECK: Supple, No JVD, Normal thyroid  CHEST/LUNG: Clear to auscultation bilaterally; No rales, rhonchi, wheezing, or rubs  HEART: Regular rate and rhythm; No murmurs, rubs, or gallops  ABDOMEN: Soft, Nontender, Nondistended; Bowel sounds present  EXTREMITIES:  2+ Peripheral Pulses, No clubbing, cyanosis, or edema  MS: No joint swelling or deformity.   LYMPH: No lymphadenopathy noted  SKIN: No rashes or lesions  NERVOUS SYSTEM:  Motor Strength 4/5 B/L upper and lower extremities; DTRs 2+ intact and symmetric  PSYCH:  Alert & Oriented X3, Good concentration.                        11.4   6.4   )-----------( 322      ( 25 May 2017 06:53 )             35.7     05-    137  |  101  |  17  ----------------------------<  114<H>  4.1   |  27  |  1.02    Ca    9.1      25 May 2017 06:53  Phos  3.3     05-24  Mg     2.1         TPro  7.9  /  Alb  3.7  /  TBili  0.9  /  DBili  x   /  AST  15  /  ALT  12  /  AlkPhos  126<H>      CAPILLARY BLOOD GLUCOSE    PT/INR - ( 23 May 2017 19:09 )   PT: 14.9 sec;   INR: 1.36 ratio         PTT - ( 23 May 2017 19:09 )  PTT:33.0 sec  -24 [id number] 5.6    -24 Chol 165  HDL 46 Trig 81    Thyroid          PT/INR - ( 23 May 2017 19:09 )   PT: 14.9 sec;   INR: 1.36 ratio         PTT - ( 23 May 2017 19:09 )  PTT:33.0 sec  Urinalysis Basic - ( 23 May 2017 19:44 )    Color: Yellow / Appearance: Clear / S.005 / pH: x  Gluc: x / Ketone: Trace  / Bili: Negative / Urobili: Negative mg/dL   Blood: x / Protein: Negative mg/dL / Nitrite: Negative   Leuk Esterase: Negative / RBC: x / WBC x   Sq Epi: x / Non Sq Epi: x / Bacteria: x        RADIOLOGY TEST: (IMAGES REVIEWED BY ME)    Imaging Personally Reviewed:  [ ] YES        HEALTH ISSUES - PROBLEM Dx:  Essential hypertension: Essential hypertension  Anxiety: Anxiety  Gastroesophageal reflux disease with esophagitis: Gastroesophageal reflux disease with esophagitis  Kyphosis of thoracic region, unspecified kyphosis type: Kyphosis of thoracic region, unspecified kyphosis type  Neuropathy: Neuropathy  Intractable back pain: Intractable back pain  Nausea and vomiting, intractability of vomiting not specified, unspecified vomiting type: Nausea and vomiting, intractability of vomiting not specified, unspecified vomiting type Patient is a 76y old  Male who presents with a chief complaint of Nausea, vomiting and back pain (23 May 2017 20:31)    HPI:  This is a 76y Male complaining of pain, low back with history of chronic back pain, spinal fusion (approximately a month ago at [facility] for [facility]) presents to the ED from [facility] c/o lower back pain, as well as nausea. Pt takes 6mg Dilaudid every 3 hours for pain, and took his medication this morning. Pt states before the surgery, he saw someone for pain management, but he does not currently have a pain management physician. Denies numbness/tingling in lower extremities. No further complaints at this time. (23 May 2017 20:31)    INTERVAL HPI/OVERNIGHT EVENTS:  Chart reviewed, notes reviewed.   Patient seen and examined.  Pain is fairly controlled with current medications.  Ambulated with physical therapy.   Being followed by following specialists.     Consultant(s) Notes Reviewed:  [ ] Yes    Care Discussed with Consultants/Other Providers: [ ] Yes    REVIEW OF SYSTEMS:  CONSTITUTIONAL: No fever, weight loss, or fatigue  EYES: No eye pain, visual disturbances, or discharge  ENMT:  No difficulty hearing, tinnitus, vertigo; No sinus or throat pain  NECK: No pain or stiffness  BREASTS: No pain, masses, or nipple discharge  RESPIRATORY: No cough, wheezing, chills or hemoptysis; No shortness of breath  CARDIOVASCULAR: No chest pain, palpitations, dizziness, or leg swelling  GASTROINTESTINAL: No abdominal or epigastric pain. No nausea, vomiting, or hematemesis; No diarrhea or constipation. No melena or hematochezia.  GENITOURINARY: No dysuria, frequency, hematuria, or incontinence  NEUROLOGICAL: No headaches, memory loss, loss of strength, numbness, or tremors  SKIN: No itching, burning, rashes, or lesions   LYMPH NODES: No enlarged glands  ENDOCRINE: No heat or cold intolerance; No hair loss; No polydipsia or polyuria  MUSCULOSKELETAL: + back pain.   PSYCHIATRIC: No depression, anxiety, mood swings, or difficulty sleeping  HEME/LYMPH: No easy bruising, or bleeding gums  ALLERGY AND IMMUNOLOGIC: No hives or eczema  Allergies    No Known Allergies    Intolerances      MEDICATIONS  (STANDING):  enoxaparin Injectable 40milliGRAM(s) SubCutaneous every 24 hours  lisinopril 20milliGRAM(s) Oral daily  cloNIDine 0.1milliGRAM(s) Oral every 12 hours  tamsulosin 0.4milliGRAM(s) Oral at bedtime  DULoxetine 60milliGRAM(s) Oral daily  ALPRAZolam 0.25milliGRAM(s) Oral two times a day  amLODIPine   Tablet 10milliGRAM(s) Oral daily  docusate sodium 100milliGRAM(s) Oral three times a day  senna 2Tablet(s) Oral at bedtime  pantoprazole   Suspension 40milliGRAM(s) Oral daily  cyclobenzaprine 10milliGRAM(s) Oral every 12 hours    MEDICATIONS  (PRN):  acetaminophen   Tablet. 650milliGRAM(s) Oral every 6 hours PRN Mild Pain (1 - 3)  ondansetron Injectable 4milliGRAM(s) IV Push every 4 hours PRN Nausea and/or Vomiting  aluminum hydroxide/magnesium hydroxide/simethicone Suspension 30milliLiter(s) Oral every 6 hours PRN Dyspepsia  oxyCODONE IR 10milliGRAM(s) Oral every 3 hours PRN Severe Pain (7 - 10)  HYDROmorphone  Injectable 0.5milliGRAM(s) IV Push every 3 hours PRN Severe Pain (7 - 10)    Vital Signs Last 24 Hrs  T(C): 36.6, Max: 37.1 (05-24 @ 16:21)  T(F): 97.8, Max: 98.7 (05-24 @ 16:21)  HR: 89 (74 - 90)  BP: 116/68 (104/66 - 152/87)  BP(mean): --  RR: 16 (14 - 16)  SpO2: 96% (96% - 100%)    PHYSICAL EXAM:  GENERAL: NAD, well-groomed, well-developed  HEAD:  Atraumatic, Normocephalic  EYES: EOMI, PERRLA, conjunctiva and sclera clear  ENMT: No tonsillar erythema, exudates, or enlargement; Moist mucous membranes, Good dentition, No lesions  NECK: Supple, No JVD, Normal thyroid  CHEST/LUNG: Clear to auscultation bilaterally; No rales, rhonchi, wheezing, or rubs  HEART: Regular rate and rhythm; No murmurs, rubs, or gallops  ABDOMEN: Soft, Nontender, Nondistended; Bowel sounds present  EXTREMITIES:  2+ Peripheral Pulses, No clubbing, cyanosis, or edema  MS: No joint swelling or deformity.   LYMPH: No lymphadenopathy noted  SKIN: No rashes or lesions  NERVOUS SYSTEM:  left foot drop.   PSYCH:  Alert & Oriented X3, Good concentration.                        11.4   6.4   )-----------( 322      ( 25 May 2017 06:53 )             35.7     05-    137  |  101  |  17  ----------------------------<  114<H>  4.1   |  27  |  1.02    Ca    9.1      25 May 2017 06:53  Phos  3.3     05-24  Mg     2.1         TPro  7.9  /  Alb  3.7  /  TBili  0.9  /  DBili  x   /  AST  15  /  ALT  12  /  AlkPhos  126<H>      CAPILLARY BLOOD GLUCOSE    PT/INR - ( 23 May 2017 19:09 )   PT: 14.9 sec;   INR: 1.36 ratio         PTT - ( 23 May 2017 19:09 )  PTT:33.0 sec   [id number] 5.6    -24 Chol 165  HDL 46 Trig 81    Thyroid          PT/INR - ( 23 May 2017 19:09 )   PT: 14.9 sec;   INR: 1.36 ratio         PTT - ( 23 May 2017 19:09 )  PTT:33.0 sec  Urinalysis Basic - ( 23 May 2017 19:44 )    Color: Yellow / Appearance: Clear / S.005 / pH: x  Gluc: x / Ketone: Trace  / Bili: Negative / Urobili: Negative mg/dL   Blood: x / Protein: Negative mg/dL / Nitrite: Negative   Leuk Esterase: Negative / RBC: x / WBC x   Sq Epi: x / Non Sq Epi: x / Bacteria: x        RADIOLOGY TEST: (IMAGES REVIEWED BY ME)    Imaging Personally Reviewed:  [ ] YES        HEALTH ISSUES - PROBLEM Dx:  Essential hypertension: Essential hypertension  Anxiety: Anxiety  Gastroesophageal reflux disease with esophagitis: Gastroesophageal reflux disease with esophagitis  Kyphosis of thoracic region, unspecified kyphosis type: Kyphosis of thoracic region, unspecified kyphosis type  Neuropathy: Neuropathy  Intractable back pain: Intractable back pain  Nausea and vomiting, intractability of vomiting not specified, unspecified vomiting type: Nausea and vomiting, intractability of vomiting not specified, unspecified vomiting type

## 2017-05-25 NOTE — CONSULT NOTE ADULT - PROBLEM SELECTOR RECOMMENDATION 9
Chronic.  Had spinal fusion surgery done last month at [facility] surgeries.  Pt was using large dose of Dilaudid daily(6 mg every three hrs) - Vomiting likely secondary to opioid overuse.  Pian meds are reduced.  Recommend pain management eval to optimize his meds to avoid opioid withdrawal.  No indication for repeat spinal images for now.  Counseling was done to use pain meds wisely and if possible completely come off of them in near future.

## 2017-05-25 NOTE — CONSULT NOTE ADULT - SUBJECTIVE AND OBJECTIVE BOX
Patient is a 76y old  Male who presents with a chief complaint of Nausea, vomiting and back pain (23 May 2017 20:31)      HPI:  This is a 76y Male complaining of pain, low back with history of chronic back pain, spinal fusion (approximately a month ago at [facility]) presents to the ED from [facility] c/o lower back pain, as well as nausea. Pt takes 6mg Dilaudid every 3 hours for pain, and took his medication this morning. Pt states before the surgery, he saw someone for pain management, but he does not currently have a pain management physician. Denies numbness/tingling in lower extremities. No further complaints at this time. (23 May 2017 20:31).  No bladder or bowel dysfunction.  Walks with walker.      PAST MEDICAL & SURGICAL HISTORY:  Benign prostatic hypertrophy without urinary obstruction  Anxiety  Depression  Hypertension  Spinal stenosis  Kyphosis  Neuropathy  GERD (gastroesophageal reflux disease)  Anemia  No pertinent past medical history  No pertinent past medical history  S/P spinal fusion    MEDICATIONS  (STANDING):  enoxaparin Injectable 40milliGRAM(s) SubCutaneous every 24 hours  lisinopril 20milliGRAM(s) Oral daily  cloNIDine 0.1milliGRAM(s) Oral every 12 hours  tamsulosin 0.4milliGRAM(s) Oral at bedtime  DULoxetine 60milliGRAM(s) Oral daily  ALPRAZolam 0.25milliGRAM(s) Oral two times a day  amLODIPine   Tablet 10milliGRAM(s) Oral daily  docusate sodium 100milliGRAM(s) Oral three times a day  senna 2Tablet(s) Oral at bedtime  pantoprazole   Suspension 40milliGRAM(s) Oral daily  cyclobenzaprine 10milliGRAM(s) Oral every 12 hours    MEDICATIONS  (PRN):  acetaminophen   Tablet. 650milliGRAM(s) Oral every 6 hours PRN Mild Pain (1 - 3)  ondansetron Injectable 4milliGRAM(s) IV Push every 4 hours PRN Nausea and/or Vomiting  aluminum hydroxide/magnesium hydroxide/simethicone Suspension 30milliLiter(s) Oral every 6 hours PRN Dyspepsia  oxyCODONE IR 10milliGRAM(s) Oral every 3 hours PRN Severe Pain (7 - 10)  HYDROmorphone  Injectable 0.5milliGRAM(s) IV Push every 3 hours PRN Severe Pain (7 - 10)      Allergies    No Known Allergies    SOCIAL HISTORY:    Ex Smoker. Quite in past.  Denies use of alcohol.    FAMILY HISTORY: Asked. N/C.      REVIEW OF SYSTEMS:    CONSTITUTIONAL: No fever  EYES: No eye pain,   ENMT:  No sinus or throat pain  NECK: No pain or stiffness  RESPIRATORY: No cough, No hemoptysis; No shortness of breath  CARDIOVASCULAR: No acute chest pain, palpitations,  or leg swelling  GASTROINTESTINAL: No abdominal pain. No nausea, vomiting, or hematemesis;  No melena or hematochezia.  GENITOURINARY: No  hematuria, or incontinence  MUSCULOSKELETAL: Had kyphosis/S/p Spinal fusion surgery only a month ago to correct his posture.  SKIN: No itching, rashes, or lesions   LYMPH NODES: No enlarged glands  NEUROLOGICAL: No headaches, memory loss,   PSYCHIATRIC: No depression, anxiety, mood swings, or difficulty sleeping  ENDOCRINE: No heat or cold intolerance;   HEME/LYMPH: No easy bruising, or bleeding gums  Allergy/Immunology. No medication allergy. No seasonal allergies.    PHYSICAL EXAM:  Vital Signs Last 24 Hrs  T(F): 98.5  HR: 75  BP: 99/62  RR: 16  Wt(kg): --    GENERAL: NAD, well-groomed, well-developed  HEAD:  Atraumatic, Normocephalic  EYES: EOMI, PERRLA, conjunctiva and sclera clear  NECK: Supple, No JVD, thyroid non-palpable    On Neurological Examination:    Mental Status - Pt is alert, awake, oriented X3. Higher functions are intact Follows commands well and able to answer questions appropriately.    Speech -  Normal. Pt has no aphasia.    Cranial Nerves - Pupils 3 mm equal and reactive to light, extraocular eye movements intact. Pt has no visual field deficit.  Pt has no facial asymmetry. Tongue - is in midline.    Motor Exam - 4 plus/5 all over, No drift. No shaking or tremors. Muscle tone - is normal all over. Moves all extremities equally. Has left Foot drop.    Sensory Exam -  Pt withdraws all extremities equally on stimulation. No asymmetry seen. No complaints of tingling, numbness.    Gait - Not tested currently. Pt walks with walker as per base line.    Deep tendon Reflexes - 2 plus all over.    Coordination - Fine finger movements are normal on both sides. Finger to nose is also normal on both sides.       Neck Supple -  Yes.    LABS:                        11.4   6.4   )-----------( 322      ( 25 May 2017 06:53 )             35.7         137  |  101  |  17  ----------------------------<  114<H>  4.1   |  27  |  1.02    Ca    9.1      25 May 2017 06:53  Phos  3.3       Mg     2.1         TPro  7.9  /  Alb  3.7  /  TBili  0.9  /  DBili  x   /  AST  15  /  ALT  12  /  AlkPhos  126<H>      PT/INR - ( 23 May 2017 19:09 )   PT: 14.9 sec;   INR: 1.36 ratio         PTT - ( 23 May 2017 19:09 )  PTT:33.0 sec  Urinalysis Basic - ( 23 May 2017 19:44 )    Color: Yellow / Appearance: Clear / S.005 / pH: x  Gluc: x / Ketone: Trace  / Bili: Negative / Urobili: Negative mg/dL   Blood: x / Protein: Negative mg/dL / Nitrite: Negative   Leuk Esterase: Negative / RBC: x / WBC x   Sq Epi: x / Non Sq Epi: x / Bacteria: x        RADIOLOGY & ADDITIONAL STUDIES:      EXAM:  CHEST SINGLE VIEW FRONTAL                            PROCEDURE DATE:  2017        INTERPRETATION:    INDICATION: Chest pain with Nausea vomiting.    Single frontal view of chest dated 2017 11:54 PM.  Prior study   2016.    FINDINGS /   IMPRESSION:     There is elevation of right hemidiaphragm.  There is no acute   consolidation. Cardiac and mediastinal structures are within normal   limits.   Postoperative changes related to thoracic fusion rods and   supporting hardware.      CATALINA BAEZA   This document has been electronically signed. May 23 2017 11:37AM Patient is a 76y old  Male who presents with a chief complaint of Nausea, vomiting and back pain (23 May 2017 20:31)      HPI:  This is a 76y Male complaining of pain, low back with history of chronic back pain, spinal fusion (approximately a month ago at [facility]) presents to the ED from [facility] c/o lower back pain, as well as nausea. Pt takes 6mg Dilaudid every 3 hours for pain, and took his medication this morning. Pt states before the surgery, he saw someone for pain management, but he does not currently have a pain management physician. Denies numbness/tingling in lower extremities. No further complaints at this time. (23 May 2017 20:31).  No bladder or bowel dysfunction.  Walks with walker.      PAST MEDICAL & SURGICAL HISTORY:  Benign prostatic hypertrophy without urinary obstruction  Anxiety  Depression  Hypertension  Spinal stenosis  Kyphosis  Neuropathy  GERD (gastroesophageal reflux disease)  Anemia  No pertinent past medical history  No pertinent past medical history  S/P spinal fusion    MEDICATIONS  (STANDING):  enoxaparin Injectable 40milliGRAM(s) SubCutaneous every 24 hours  lisinopril 20milliGRAM(s) Oral daily  cloNIDine 0.1milliGRAM(s) Oral every 12 hours  tamsulosin 0.4milliGRAM(s) Oral at bedtime  DULoxetine 60milliGRAM(s) Oral daily  ALPRAZolam 0.25milliGRAM(s) Oral two times a day  amLODIPine   Tablet 10milliGRAM(s) Oral daily  docusate sodium 100milliGRAM(s) Oral three times a day  senna 2Tablet(s) Oral at bedtime  pantoprazole   Suspension 40milliGRAM(s) Oral daily  cyclobenzaprine 10milliGRAM(s) Oral every 12 hours    MEDICATIONS  (PRN):  acetaminophen   Tablet. 650milliGRAM(s) Oral every 6 hours PRN Mild Pain (1 - 3)  ondansetron Injectable 4milliGRAM(s) IV Push every 4 hours PRN Nausea and/or Vomiting  aluminum hydroxide/magnesium hydroxide/simethicone Suspension 30milliLiter(s) Oral every 6 hours PRN Dyspepsia  oxyCODONE IR 10milliGRAM(s) Oral every 3 hours PRN Severe Pain (7 - 10)  HYDROmorphone  Injectable 0.5milliGRAM(s) IV Push every 3 hours PRN Severe Pain (7 - 10)      Allergies    No Known Allergies    SOCIAL HISTORY:    Ex Smoker. Quite in past.  Denies use of alcohol.    FAMILY HISTORY: Asked. N/C.      REVIEW OF SYSTEMS:    CONSTITUTIONAL: No fever  EYES: No eye pain,   ENMT:  No sinus or throat pain  NECK: No pain or stiffness  RESPIRATORY: No cough, No hemoptysis; No shortness of breath  CARDIOVASCULAR: No acute chest pain, palpitations,  or leg swelling  GASTROINTESTINAL: No abdominal pain. No nausea, vomiting, or hematemesis;  No melena or hematochezia.  GENITOURINARY: No  hematuria, or incontinence  MUSCULOSKELETAL: Had kyphosis/S/p Spinal fusion surgery only a month ago to correct his posture.  SKIN: No itching, rashes, or lesions   LYMPH NODES: No enlarged glands  NEUROLOGICAL: Low back pain/Neuropathy.  PSYCHIATRIC: H/o depression/anxiety.  ENDOCRINE: No heat or cold intolerance;   HEME/LYMPH: No easy bruising, or bleeding gums  Allergy/Immunology. No medication allergy. No seasonal allergies.    PHYSICAL EXAM:  Vital Signs Last 24 Hrs  T(F): 98.5  HR: 75  BP: 99/62  RR: 16    GENERAL: NAD, well-groomed, well-developed  HEAD:  Atraumatic, Normocephalic  EYES: EOMI, PERRLA, conjunctiva and sclera clear  NECK: Supple, No JVD.    On Neurological Examination:    Mental Status - Pt is alert, awake, oriented X3. Higher functions are intact Follows commands well and able to answer questions appropriately.    Speech -  Normal. Pt has no aphasia.    Cranial Nerves - Pupils 3 mm equal and reactive to light, extraocular eye movements intact. Pt has no visual field deficit.  Pt has no facial asymmetry. Tongue - is in midline.    Motor Exam - 4 plus/5 all over, No drift. No shaking or tremors. Muscle tone - is normal all over. Moves all extremities equally. Has left Foot drop.    Sensory Exam -  Pt withdraws all extremities equally on stimulation. No asymmetry seen. No complaints of tingling, numbness.    Gait - Not tested currently. Pt walks with walker as per base line.    Deep tendon Reflexes - 2 plus all over.    Coordination - Fine finger movements are normal on both sides. Finger to nose is also normal on both sides.       Neck Supple -  Yes.    LABS:                        11.4   6.4   )-----------( 322      ( 25 May 2017 06:53 )             35.7     05-    137  |  101  |  17  ----------------------------<  114<H>  4.1   |  27  |  1.02    Ca    9.1      25 May 2017 06:53  Phos  3.3     05-  Mg     2.1         TPro  7.9  /  Alb  3.7  /  TBili  0.9  /  DBili  x   /  AST  15  /  ALT  12  /  AlkPhos  126<H>      PT/INR - ( 23 May 2017 19:09 )   PT: 14.9 sec;   INR: 1.36 ratio         PTT - ( 23 May 2017 19:09 )  PTT:33.0 sec  Urinalysis Basic - ( 23 May 2017 19:44 )    Color: Yellow / Appearance: Clear / S.005 / pH: x  Gluc: x / Ketone: Trace  / Bili: Negative / Urobili: Negative mg/dL   Blood: x / Protein: Negative mg/dL / Nitrite: Negative   Leuk Esterase: Negative / RBC: x / WBC x   Sq Epi: x / Non Sq Epi: x / Bacteria: x      RADIOLOGY & ADDITIONAL STUDIES:      EXAM:  CHEST SINGLE VIEW FRONTAL                            PROCEDURE DATE:  2017        INTERPRETATION:    INDICATION: Chest pain with Nausea vomiting.    Single frontal view of chest dated 2017 11:54 PM.  Prior study   2016.    FINDINGS /   IMPRESSION:     There is elevation of right hemidiaphragm.  There is no acute   consolidation. Cardiac and mediastinal structures are within normal   limits.   Postoperative changes related to thoracic fusion rods and   supporting hardware.      CATALINA BAEZA   This document has been electronically signed. May 23 2017 11:37AM

## 2017-05-25 NOTE — CONSULT NOTE ADULT - PROBLEM SELECTOR RECOMMENDATION 4
Chronic.  Secondary to LS radiculopathy.  Pt says that Braces have been ordered through [facility].  Ambulation with RW/Assistance to prevent - trip/Falls.

## 2017-05-26 LAB — VIT B12 SERPL-MCNC: 585 PG/ML — SIGNIFICANT CHANGE UP (ref 243–894)

## 2017-05-26 RX ORDER — ALPRAZOLAM 0.25 MG
0.25 TABLET ORAL ONCE
Qty: 0 | Refills: 0 | Status: DISCONTINUED | OUTPATIENT
Start: 2017-05-26 | End: 2017-05-26

## 2017-05-26 RX ORDER — HYDROMORPHONE HYDROCHLORIDE 2 MG/ML
6 INJECTION INTRAMUSCULAR; INTRAVENOUS; SUBCUTANEOUS EVERY 4 HOURS
Qty: 0 | Refills: 0 | Status: DISCONTINUED | OUTPATIENT
Start: 2017-05-26 | End: 2017-06-02

## 2017-05-26 RX ORDER — ONDANSETRON 8 MG/1
4 TABLET, FILM COATED ORAL EVERY 4 HOURS
Qty: 0 | Refills: 0 | Status: DISCONTINUED | OUTPATIENT
Start: 2017-05-26 | End: 2017-06-02

## 2017-05-26 RX ORDER — HYDROMORPHONE HYDROCHLORIDE 2 MG/ML
4 INJECTION INTRAMUSCULAR; INTRAVENOUS; SUBCUTANEOUS EVERY 4 HOURS
Qty: 0 | Refills: 0 | Status: DISCONTINUED | OUTPATIENT
Start: 2017-05-26 | End: 2017-05-26

## 2017-05-26 RX ADMIN — PANTOPRAZOLE SODIUM 40 MILLIGRAM(S): 20 TABLET, DELAYED RELEASE ORAL at 12:50

## 2017-05-26 RX ADMIN — TAMSULOSIN HYDROCHLORIDE 0.4 MILLIGRAM(S): 0.4 CAPSULE ORAL at 22:48

## 2017-05-26 RX ADMIN — HYDROMORPHONE HYDROCHLORIDE 6 MILLIGRAM(S): 2 INJECTION INTRAMUSCULAR; INTRAVENOUS; SUBCUTANEOUS at 15:13

## 2017-05-26 RX ADMIN — HYDROMORPHONE HYDROCHLORIDE 0.5 MILLIGRAM(S): 2 INJECTION INTRAMUSCULAR; INTRAVENOUS; SUBCUTANEOUS at 06:45

## 2017-05-26 RX ADMIN — CYCLOBENZAPRINE HYDROCHLORIDE 10 MILLIGRAM(S): 10 TABLET, FILM COATED ORAL at 22:48

## 2017-05-26 RX ADMIN — HYDROMORPHONE HYDROCHLORIDE 0.5 MILLIGRAM(S): 2 INJECTION INTRAMUSCULAR; INTRAVENOUS; SUBCUTANEOUS at 03:20

## 2017-05-26 RX ADMIN — HYDROMORPHONE HYDROCHLORIDE 0.5 MILLIGRAM(S): 2 INJECTION INTRAMUSCULAR; INTRAVENOUS; SUBCUTANEOUS at 10:47

## 2017-05-26 RX ADMIN — CYCLOBENZAPRINE HYDROCHLORIDE 10 MILLIGRAM(S): 10 TABLET, FILM COATED ORAL at 08:55

## 2017-05-26 RX ADMIN — Medication 100 MILLIGRAM(S): at 22:48

## 2017-05-26 RX ADMIN — Medication 0.25 MILLIGRAM(S): at 17:00

## 2017-05-26 RX ADMIN — OXYCODONE HYDROCHLORIDE 10 MILLIGRAM(S): 5 TABLET ORAL at 08:54

## 2017-05-26 RX ADMIN — DULOXETINE HYDROCHLORIDE 60 MILLIGRAM(S): 30 CAPSULE, DELAYED RELEASE ORAL at 12:48

## 2017-05-26 RX ADMIN — ONDANSETRON 4 MILLIGRAM(S): 8 TABLET, FILM COATED ORAL at 10:51

## 2017-05-26 RX ADMIN — Medication 0.25 MILLIGRAM(S): at 22:47

## 2017-05-26 RX ADMIN — Medication 100 MILLIGRAM(S): at 06:44

## 2017-05-26 RX ADMIN — HYDROMORPHONE HYDROCHLORIDE 6 MILLIGRAM(S): 2 INJECTION INTRAMUSCULAR; INTRAVENOUS; SUBCUTANEOUS at 23:16

## 2017-05-26 RX ADMIN — AMLODIPINE BESYLATE 10 MILLIGRAM(S): 2.5 TABLET ORAL at 06:44

## 2017-05-26 RX ADMIN — HYDROMORPHONE HYDROCHLORIDE 0.5 MILLIGRAM(S): 2 INJECTION INTRAMUSCULAR; INTRAVENOUS; SUBCUTANEOUS at 03:35

## 2017-05-26 RX ADMIN — ENOXAPARIN SODIUM 40 MILLIGRAM(S): 100 INJECTION SUBCUTANEOUS at 22:48

## 2017-05-26 RX ADMIN — OXYCODONE HYDROCHLORIDE 10 MILLIGRAM(S): 5 TABLET ORAL at 13:20

## 2017-05-26 RX ADMIN — ONDANSETRON 4 MILLIGRAM(S): 8 TABLET, FILM COATED ORAL at 15:00

## 2017-05-26 RX ADMIN — HYDROMORPHONE HYDROCHLORIDE 6 MILLIGRAM(S): 2 INJECTION INTRAMUSCULAR; INTRAVENOUS; SUBCUTANEOUS at 19:44

## 2017-05-26 RX ADMIN — Medication 0.1 MILLIGRAM(S): at 17:22

## 2017-05-26 RX ADMIN — HYDROMORPHONE HYDROCHLORIDE 0.5 MILLIGRAM(S): 2 INJECTION INTRAMUSCULAR; INTRAVENOUS; SUBCUTANEOUS at 00:00

## 2017-05-26 RX ADMIN — ONDANSETRON 4 MILLIGRAM(S): 8 TABLET, FILM COATED ORAL at 18:50

## 2017-05-26 RX ADMIN — ONDANSETRON 4 MILLIGRAM(S): 8 TABLET, FILM COATED ORAL at 22:48

## 2017-05-26 RX ADMIN — HYDROMORPHONE HYDROCHLORIDE 0.5 MILLIGRAM(S): 2 INJECTION INTRAMUSCULAR; INTRAVENOUS; SUBCUTANEOUS at 06:30

## 2017-05-26 RX ADMIN — Medication 0.25 MILLIGRAM(S): at 06:45

## 2017-05-26 RX ADMIN — OXYCODONE HYDROCHLORIDE 10 MILLIGRAM(S): 5 TABLET ORAL at 09:30

## 2017-05-26 RX ADMIN — LISINOPRIL 20 MILLIGRAM(S): 2.5 TABLET ORAL at 06:44

## 2017-05-26 RX ADMIN — HYDROMORPHONE HYDROCHLORIDE 6 MILLIGRAM(S): 2 INJECTION INTRAMUSCULAR; INTRAVENOUS; SUBCUTANEOUS at 15:49

## 2017-05-26 RX ADMIN — HYDROMORPHONE HYDROCHLORIDE 0.5 MILLIGRAM(S): 2 INJECTION INTRAMUSCULAR; INTRAVENOUS; SUBCUTANEOUS at 00:15

## 2017-05-26 RX ADMIN — Medication 100 MILLIGRAM(S): at 12:50

## 2017-05-26 RX ADMIN — OXYCODONE HYDROCHLORIDE 10 MILLIGRAM(S): 5 TABLET ORAL at 12:54

## 2017-05-26 RX ADMIN — HYDROMORPHONE HYDROCHLORIDE 6 MILLIGRAM(S): 2 INJECTION INTRAMUSCULAR; INTRAVENOUS; SUBCUTANEOUS at 19:13

## 2017-05-26 RX ADMIN — HYDROMORPHONE HYDROCHLORIDE 6 MILLIGRAM(S): 2 INJECTION INTRAMUSCULAR; INTRAVENOUS; SUBCUTANEOUS at 23:48

## 2017-05-26 RX ADMIN — Medication 0.1 MILLIGRAM(S): at 06:44

## 2017-05-26 RX ADMIN — SENNA PLUS 2 TABLET(S): 8.6 TABLET ORAL at 22:48

## 2017-05-26 RX ADMIN — HYDROMORPHONE HYDROCHLORIDE 0.5 MILLIGRAM(S): 2 INJECTION INTRAMUSCULAR; INTRAVENOUS; SUBCUTANEOUS at 11:10

## 2017-05-26 NOTE — PROGRESS NOTE ADULT - SUBJECTIVE AND OBJECTIVE BOX
Patient is a 76y old  Male who presents with a chief complaint of Nausea, vomiting and back pain (23 May 2017 20:31)    HPI:  This is a 76y Male complaining of pain, low back with history of chronic back pain, spinal fusion (approximately a month ago at [facility]) presents to the ED from [facility] c/o lower back pain, as well as nausea. Pt takes 6mg Dilaudid every 3 hours for pain, and took his medication this morning. Pt states before the surgery, he saw someone for pain management, but he does not currently have a pain management physician. Denies numbness/tingling in lower extremities. No further complaints at this time. (23 May 2017 20:31)    INTERVAL HPI/OVERNIGHT EVENTS:  Chart reviewed, notes reviewed.   Patient seen and examined.  Ambulated with physical therapy.   Being followed by following specialists: pain mm, neurology.     Consultant(s) Notes Reviewed:  [X] Yes    Care Discussed with Consultants/Other Providers: [X] Yes    Still having pain. Doesn't want Oxycodone.  No further nausea.  Tolerating diet well.     REVIEW OF SYSTEMS:  CONSTITUTIONAL: No fever, weight loss, or fatigue  EYES: No eye pain, visual disturbances, or discharge  ENMT:  No difficulty hearing, tinnitus, vertigo; No sinus or throat pain  NECK: No pain or stiffness  BREASTS: No pain, masses, or nipple discharge  RESPIRATORY: No cough, wheezing, chills or hemoptysis; No shortness of breath  CARDIOVASCULAR: No chest pain, palpitations, dizziness, or leg swelling  GASTROINTESTINAL: No abdominal or epigastric pain. No nausea, vomiting, or hematemesis; No diarrhea or constipation. No melena or hematochezia.  GENITOURINARY: No dysuria, frequency, hematuria, or incontinence  NEUROLOGICAL: No headaches, memory loss, loss of strength, numbness, or tremors  SKIN: No itching, burning, rashes, or lesions   LYMPH NODES: No enlarged glands  ENDOCRINE: No heat or cold intolerance; No hair loss; No polydipsia or polyuria  MUSCULOSKELETAL: + back pain.   PSYCHIATRIC: No depression, anxiety, mood swings, or difficulty sleeping  HEME/LYMPH: No easy bruising, or bleeding gums  ALLERGY AND IMMUNOLOGIC: No hives or eczema  Allergies    No Known Allergies    Intolerances      MEDICATIONS  (STANDING):  enoxaparin Injectable 40milliGRAM(s) SubCutaneous every 24 hours  lisinopril 20milliGRAM(s) Oral daily  cloNIDine 0.1milliGRAM(s) Oral every 12 hours  tamsulosin 0.4milliGRAM(s) Oral at bedtime  DULoxetine 60milliGRAM(s) Oral daily  ALPRAZolam 0.25milliGRAM(s) Oral two times a day  amLODIPine   Tablet 10milliGRAM(s) Oral daily  docusate sodium 100milliGRAM(s) Oral three times a day  senna 2Tablet(s) Oral at bedtime  pantoprazole   Suspension 40milliGRAM(s) Oral daily  cyclobenzaprine 10milliGRAM(s) Oral every 12 hours    MEDICATIONS  (PRN):  acetaminophen   Tablet. 650milliGRAM(s) Oral every 6 hours PRN Mild Pain (1 - 3)  aluminum hydroxide/magnesium hydroxide/simethicone Suspension 30milliLiter(s) Oral every 6 hours PRN Dyspepsia  ondansetron    Tablet 4milliGRAM(s) Oral every 4 hours PRN Nausea and/or Vomiting  HYDROmorphone   Tablet 6milliGRAM(s) Oral every 4 hours PRN Severe Pain (7 - 10)  HYDROmorphone   Tablet 4milliGRAM(s) Oral every 4 hours PRN Moderate Pain (4 - 6)    Vital Signs Last 24 Hrs  T(C): 36.3, Max: 36.9 (05-25 @ 15:32)  T(F): 97.4, Max: 98.5 (05-25 @ 15:32)  HR: 86 (75 - 90)  BP: 117/67 (99/62 - 147/84)  BP(mean): --  RR: 16 (16 - 18)  SpO2: 95% (93% - 96%)    PHYSICAL EXAM:  GENERAL: NAD, well-groomed, well-developed  HEAD:  Atraumatic, Normocephalic  EYES: EOMI, PERRLA, conjunctiva and sclera clear  ENMT: No tonsillar erythema, exudates, or enlargement; Moist mucous membranes, Good dentition, No lesions  NECK: Supple, No JVD, Normal thyroid  CHEST/LUNG: Clear to auscultation bilaterally; No rales, rhonchi, wheezing, or rubs  HEART: Regular rate and rhythm; No murmurs, rubs, or gallops  ABDOMEN: Soft, Nontender, Nondistended; Bowel sounds present  EXTREMITIES:  2+ Peripheral Pulses, No clubbing, cyanosis, or edema  MS: No joint swelling or deformity.   LYMPH: No lymphadenopathy noted  SKIN: No rashes or lesions  NERVOUS SYSTEM:  left foot drop.   PSYCH:  Alert & Oriented X3, Good concentration.             LABS:                      11.4   6.4   )-----------( 322      ( 25 May 2017 06:53 )             35.7     05-25    137  |  101  |  17  ----------------------------<  114<H>  4.1   |  27  |  1.02    Ca    9.1      25 May 2017 06:53  Mg     2.1     05-25      CAPILLARY BLOOD GLUCOSE      05-24 [id number] 5.6    05-24 Chol 165  HDL 46 Trig 81      RADIOLOGY TEST: (IMAGES REVIEWED BY ME)  EXAM:  LUMBO-SACRAL SPINE                          EXAM:  THORACIC SPINE                          PROCEDURE DATE:  05/25/2017        INTERPRETATION:  Clinical history back pain history of spinal fusion.    AP and lateral views of the thoracic and lumbar spine.    There are no prior imaging studies for comparison.    There is bilateral posterior spinal jessica and screw instrumentation T4-S1.  There is a scoliosis with convex curvature to the right at L3.  At L3, there is a compression fracture deformity with anterior and   posterior displacement.    There is some mild compression deformities of was 1 liver one from   yesterday several thoracic vertebral bodies.      Impression:    Long segment thoracic lumbar spinal fusion. There is compression fracture   deformities of the L3 to vertebral body with anterior and posterior   displaced fracture fragments.  Recommend further clinical correlation and comparison with prior imaging   studies.  If there are no prior imaging studies for comparison, recommend further   evaluation with CT scan to evaluate the spinal canal.      Imaging Personally Reviewed:  [X] YES        HEALTH ISSUES - PROBLEM Dx:  Essential hypertension: Essential hypertension  Anxiety: Anxiety  Gastroesophageal reflux disease with esophagitis: Gastroesophageal reflux disease with esophagitis  Kyphosis of thoracic region, unspecified kyphosis type: Kyphosis of thoracic region, unspecified kyphosis type  Neuropathy: Neuropathy  Intractable back pain: Intractable back pain  Nausea and vomiting, intractability of vomiting not specified, unspecified vomiting type: Nausea and vomiting, intractability of vomiting not specified, unspecified vomiting type

## 2017-05-26 NOTE — PROGRESS NOTE ADULT - ASSESSMENT
76y Male complaining of pain, low back with history of chronic back pain, spinal fusion (approximately a month ago at [facility]) presents to the ED from [facility] c/o lower back pain, as well as nausea. Pt takes 6mg Dilaudid every 3 hours for pain.  Pt has left foot drop which is chronic.

## 2017-05-26 NOTE — PROGRESS NOTE ADULT - ATTENDING COMMENTS
Pain is accessed and addressed. Pt's pain is being addressed. .  Risk of falls accessed. Fall prevention and plan of care is in place.  Use of narcotic pain meds was discussed. Pt is advised to use narcotic meds wisely and to refrain from over using them.

## 2017-05-26 NOTE — PROGRESS NOTE ADULT - SUBJECTIVE AND OBJECTIVE BOX
Neurology Follow up note    TOM HERRERA 76y Male    HPI:  This is a 77 YO Male complaining of pain, low back with history of chronic back pain, spinal fusion (approximately a month ago at [facility]) presents to the ED from [facility] c/o lower back pain, as well as nausea. Patient takes 6 mg Dilaudid every 3 hours for pain, and took his medication this morning. Patient states before the surgery, he saw someone for pain management, but he does not currently have a pain management physician. Denies numbness/tingling in lower extremities. No further complaints at this time. (23 May 2017 20:31)      Interval History -    Patient is seen, chart was reviewed and case was discussed with the treatment team.      Vital Signs Last 24 Hrs  T(C): 37.1, Max: 37.1 (05-26 @ 15:25)  T(F): 98.8, Max: 98.8 (05-26 @ 15:25)  HR: 102 (86 - 102)  BP: 147/77 (117/67 - 147/84)  BP(mean): --  RR: 18 (16 - 18)  SpO2: 94% (93% - 95%)    REVIEW OF SYSTEMS:    CONSTITUTIONAL: No fever  EYES: No eye pain,   ENMT:  No sinus or throat pain  NECK: No pain or stiffness  RESPIRATORY: No cough, No hemoptysis; No shortness of breath  CARDIOVASCULAR: No acute chest pain, palpitations,  or leg swelling  GASTROINTESTINAL: No abdominal pain. No nausea, vomiting, or hematemesis;  No melena or hematochezia.  GENITOURINARY: No  hematuria, or incontinence  MUSCULOSKELETAL: Had kyphosis/S/p Spinal fusion surgery only a month ago to correct his posture.  SKIN: No itching, rashes, or lesions   LYMPH NODES: No enlarged glands  NEUROLOGICAL: Low back pain/Neuropathy.  PSYCHIATRIC: H/o depression/anxiety.  ENDOCRINE: No heat or cold intolerance;   HEME/LYMPH: No easy bruising, or bleeding gums  Allergy/Immunology. No medication allergy. No seasonal allergies.    GENERAL: NAD, well-groomed, well-developed  HEAD:  Atraumatic, Normocephalic  EYES: EOMI, PERRLA, conjunctiva and sclera clear  NECK: Supple, No JVD.    On Neurological Examination:    Mental Status - Pt is alert, awake, oriented X3. Higher functions are intact Follows commands well and able to answer questions appropriately.    Speech -  Normal. Pt has no aphasia.    Cranial Nerves - Pupils 3 mm equal and reactive to light, extraocular eye movements intact. Pt has no visual field deficit. Pt has no facial asymmetry. Tongue - is in midline.    Motor Exam - 4 plus/5 all over, No drift. No shaking or tremors. Muscle tone - is normal all over. Moves all extremities equally. Has left Foot drop.    Sensory Exam -  Pt withdraws all extremities equally on stimulation. No asymmetry seen. No complaints of tingling, numbness.    Gait - Pt walks with walker as per base line.    Deep tendon Reflexes - 2 plus all over.    Coordination - Fine finger movements are normal on both sides. Finger to nose is also normal on both sides.       Neck Supple -  Yes.      MEDICATIONS    enoxaparin Injectable 40milliGRAM(s) SubCutaneous every 24 hours  acetaminophen   Tablet. 650milliGRAM(s) Oral every 6 hours PRN  lisinopril 20milliGRAM(s) Oral daily  cloNIDine 0.1milliGRAM(s) Oral every 12 hours  tamsulosin 0.4milliGRAM(s) Oral at bedtime  DULoxetine 60milliGRAM(s) Oral daily  ALPRAZolam 0.25milliGRAM(s) Oral two times a day  amLODIPine   Tablet 10milliGRAM(s) Oral daily  docusate sodium 100milliGRAM(s) Oral three times a day  senna 2Tablet(s) Oral at bedtime  pantoprazole   Suspension 40milliGRAM(s) Oral daily  cyclobenzaprine 10milliGRAM(s) Oral every 12 hours  aluminum hydroxide/magnesium hydroxide/simethicone Suspension 30milliLiter(s) Oral every 6 hours PRN  ondansetron    Tablet 4milliGRAM(s) Oral every 4 hours PRN  HYDROmorphone   Tablet 6milliGRAM(s) Oral every 4 hours PRN  HYDROmorphone   Tablet 4milliGRAM(s) Oral every 4 hours PRN      Allergies    No Known Allergies    LABS:    CBC Full  -  ( 25 May 2017 06:53 )  WBC Count : 6.4 K/uL  Hemoglobin : 11.4 g/dL  Hematocrit : 35.7 %  Platelet Count - Automated : 322 K/uL  Mean Cell Volume : 80.6 fl  Mean Cell Hemoglobin : 25.8 pg  Mean Cell Hemoglobin Concentration : 32.0 gm/dL  Auto Neutrophil # : x  Auto Lymphocyte # : x  Auto Monocyte # : x  Auto Eosinophil # : x  Auto Basophil # : x  Auto Neutrophil % : x  Auto Lymphocyte % : x  Auto Monocyte % : x  Auto Eosinophil % : x  Auto Basophil % : x      05-25    137  |  101  |  17  ----------------------------<  114<H>  4.1   |  27  |  1.02    Ca    9.1      25 May 2017 06:53  Mg     2.1     05-25      Vitamin B12, Serum: 585 pg/mL (05-26 @ 12:01)      RADIOLOGY    EXAM:  LUMBO-SACRAL SPINE                          EXAM:  THORACIC SPINE                            PROCEDURE DATE:  05/25/2017        INTERPRETATION:  Clinical history back pain history of spinal fusion.    AP and lateral views of the thoracic and lumbar spine.    There are no prior imaging studies for comparison.    There is bilateral posterior spinal jessica and screw instrumentation T4-S1.  There is a scoliosis with convex curvature to the right at L3.  At L3, there is a compression fracture deformity with anterior and   posterior displacement.    There is some mild compression deformities of was 1 liver one from   yesterday several thoracic vertebral bodies.      Impression:    Long segment thoracic lumbar spinal fusion. There is compression fracture   deformities of the L3 to vertebral body with anterior and posterior   displaced fracture fragments.  Recommend further clinical correlation and comparison with prior imaging   studies.  If there are no prior imaging studies for comparison, recommend further   evaluation with CT scan to evaluate the spinal canal.        ANU BONILLA M.D., ATTENDING RADIOLOGIST  This document has been electronically signed. May 26 2017  8:49AM       Advanced care planning was discussed with family. Pt is full code.  Pain is accessed and addressed. Pt has no pain currently.  Pt was screened for signs of clinical depression. No signs of deprression.  Risk of falls accessed. Fall prevention and plan of care is in place.  Pt is screened for tobacco and alcohol use. Counseling was done for smoking and alcohol cessation. Pt doesn't smoke or drink.  Use of narcotic pain meds was dicussed. Pt is advised to use narcotic meds wisely and to refrain from over using them.  Plan of care was discussed with patient family. Questions answered.

## 2017-05-27 RX ADMIN — ONDANSETRON 4 MILLIGRAM(S): 8 TABLET, FILM COATED ORAL at 23:03

## 2017-05-27 RX ADMIN — HYDROMORPHONE HYDROCHLORIDE 6 MILLIGRAM(S): 2 INJECTION INTRAMUSCULAR; INTRAVENOUS; SUBCUTANEOUS at 03:47

## 2017-05-27 RX ADMIN — ONDANSETRON 4 MILLIGRAM(S): 8 TABLET, FILM COATED ORAL at 14:47

## 2017-05-27 RX ADMIN — HYDROMORPHONE HYDROCHLORIDE 6 MILLIGRAM(S): 2 INJECTION INTRAMUSCULAR; INTRAVENOUS; SUBCUTANEOUS at 07:46

## 2017-05-27 RX ADMIN — Medication 0.25 MILLIGRAM(S): at 18:22

## 2017-05-27 RX ADMIN — HYDROMORPHONE HYDROCHLORIDE 6 MILLIGRAM(S): 2 INJECTION INTRAMUSCULAR; INTRAVENOUS; SUBCUTANEOUS at 23:52

## 2017-05-27 RX ADMIN — HYDROMORPHONE HYDROCHLORIDE 6 MILLIGRAM(S): 2 INJECTION INTRAMUSCULAR; INTRAVENOUS; SUBCUTANEOUS at 11:45

## 2017-05-27 RX ADMIN — Medication 100 MILLIGRAM(S): at 23:04

## 2017-05-27 RX ADMIN — AMLODIPINE BESYLATE 10 MILLIGRAM(S): 2.5 TABLET ORAL at 06:51

## 2017-05-27 RX ADMIN — PANTOPRAZOLE SODIUM 40 MILLIGRAM(S): 20 TABLET, DELAYED RELEASE ORAL at 09:51

## 2017-05-27 RX ADMIN — CYCLOBENZAPRINE HYDROCHLORIDE 10 MILLIGRAM(S): 10 TABLET, FILM COATED ORAL at 23:04

## 2017-05-27 RX ADMIN — HYDROMORPHONE HYDROCHLORIDE 6 MILLIGRAM(S): 2 INJECTION INTRAMUSCULAR; INTRAVENOUS; SUBCUTANEOUS at 15:11

## 2017-05-27 RX ADMIN — Medication 0.25 MILLIGRAM(S): at 06:50

## 2017-05-27 RX ADMIN — Medication 100 MILLIGRAM(S): at 14:47

## 2017-05-27 RX ADMIN — DULOXETINE HYDROCHLORIDE 60 MILLIGRAM(S): 30 CAPSULE, DELAYED RELEASE ORAL at 09:51

## 2017-05-27 RX ADMIN — HYDROMORPHONE HYDROCHLORIDE 6 MILLIGRAM(S): 2 INJECTION INTRAMUSCULAR; INTRAVENOUS; SUBCUTANEOUS at 03:16

## 2017-05-27 RX ADMIN — Medication 30 MILLILITER(S): at 09:49

## 2017-05-27 RX ADMIN — ONDANSETRON 4 MILLIGRAM(S): 8 TABLET, FILM COATED ORAL at 02:52

## 2017-05-27 RX ADMIN — ENOXAPARIN SODIUM 40 MILLIGRAM(S): 100 INJECTION SUBCUTANEOUS at 23:04

## 2017-05-27 RX ADMIN — LISINOPRIL 20 MILLIGRAM(S): 2.5 TABLET ORAL at 06:51

## 2017-05-27 RX ADMIN — Medication 0.1 MILLIGRAM(S): at 06:51

## 2017-05-27 RX ADMIN — HYDROMORPHONE HYDROCHLORIDE 6 MILLIGRAM(S): 2 INJECTION INTRAMUSCULAR; INTRAVENOUS; SUBCUTANEOUS at 16:00

## 2017-05-27 RX ADMIN — HYDROMORPHONE HYDROCHLORIDE 6 MILLIGRAM(S): 2 INJECTION INTRAMUSCULAR; INTRAVENOUS; SUBCUTANEOUS at 11:15

## 2017-05-27 RX ADMIN — HYDROMORPHONE HYDROCHLORIDE 6 MILLIGRAM(S): 2 INJECTION INTRAMUSCULAR; INTRAVENOUS; SUBCUTANEOUS at 19:13

## 2017-05-27 RX ADMIN — HYDROMORPHONE HYDROCHLORIDE 6 MILLIGRAM(S): 2 INJECTION INTRAMUSCULAR; INTRAVENOUS; SUBCUTANEOUS at 07:16

## 2017-05-27 RX ADMIN — CYCLOBENZAPRINE HYDROCHLORIDE 10 MILLIGRAM(S): 10 TABLET, FILM COATED ORAL at 09:50

## 2017-05-27 RX ADMIN — HYDROMORPHONE HYDROCHLORIDE 6 MILLIGRAM(S): 2 INJECTION INTRAMUSCULAR; INTRAVENOUS; SUBCUTANEOUS at 23:21

## 2017-05-27 RX ADMIN — ONDANSETRON 4 MILLIGRAM(S): 8 TABLET, FILM COATED ORAL at 18:49

## 2017-05-27 RX ADMIN — ONDANSETRON 4 MILLIGRAM(S): 8 TABLET, FILM COATED ORAL at 10:41

## 2017-05-27 RX ADMIN — ONDANSETRON 4 MILLIGRAM(S): 8 TABLET, FILM COATED ORAL at 06:51

## 2017-05-27 RX ADMIN — HYDROMORPHONE HYDROCHLORIDE 6 MILLIGRAM(S): 2 INJECTION INTRAMUSCULAR; INTRAVENOUS; SUBCUTANEOUS at 19:45

## 2017-05-27 RX ADMIN — SENNA PLUS 2 TABLET(S): 8.6 TABLET ORAL at 23:04

## 2017-05-27 RX ADMIN — Medication 100 MILLIGRAM(S): at 06:51

## 2017-05-27 RX ADMIN — TAMSULOSIN HYDROCHLORIDE 0.4 MILLIGRAM(S): 0.4 CAPSULE ORAL at 23:04

## 2017-05-27 NOTE — PROGRESS NOTE ADULT - ATTENDING COMMENTS
Pain is accessed and addressed. Pt's pain is being addressed. .  Risk of falls accessed. Fall prevention and plan of care is in place.  Use of narcotic pain meds was discussed. Pt is advised to use narcotic meds wisely and to refrain from over using them.  Discharge planning.

## 2017-05-27 NOTE — PROGRESS NOTE ADULT - SUBJECTIVE AND OBJECTIVE BOX
Neurology Follow up note    TOM HERRERA 76y Male    HPI:  This is a 77 YO Male complaining of pain, low back with history of chronic back pain, spinal fusion (approximately a month ago at [facility]) presents to the ED from [facility] c/o lower back pain, as well as nausea. Patient takes 6 mg Dilaudid every 3 hours for pain, and took his medication this morning. Patient states before the surgery, he saw someone for pain management, but he does not currently have a pain management physician. Denies numbness/tingling in lower extremities. No further complaints at this time. (23 May 2017 20:31)      Interval History -    Patient is seen, chart was reviewed and case was discussed with the treatment team.  Pt is not in any distress.     Vital Signs Last 24 Hrs  T(C): 36.6, Max: 37.1 (05-26 @ 23:30)  T(F): 97.9, Max: 98.8 (05-26 @ 23:30)  HR: 84 (84 - 98)  BP: 113/67 (113/67 - 139/91)  BP(mean): --  RR: 16 (16 - 18)  SpO2: 91% (91% - 92%)    REVIEW OF SYSTEMS:    CONSTITUTIONAL: No fever  EYES: No eye pain,   ENMT:  No sinus or throat pain  NECK: No pain or stiffness  RESPIRATORY: No cough, No hemoptysis; No shortness of breath  CARDIOVASCULAR: No acute chest pain, palpitations,  or leg swelling  GASTROINTESTINAL: No abdominal pain. No nausea, vomiting, or hematemesis;  No melena or hematochezia.  GENITOURINARY: No  hematuria, or incontinence  MUSCULOSKELETAL: Had kyphosis/S/p Spinal fusion surgery only a month ago to correct his posture.  SKIN: No itching, rashes, or lesions   LYMPH NODES: No enlarged glands  NEUROLOGICAL: Low back pain/Neuropathy.  PSYCHIATRIC: H/o depression/anxiety.  ENDOCRINE: No heat or cold intolerance;   HEME/LYMPH: No easy bruising, or bleeding gums  Allergy/Immunology. No medication allergy. No seasonal allergies.    GENERAL: NAD, well-groomed, well-developed  HEAD:  Atraumatic, Normocephalic  EYES: EOMI, PERRLA, conjunctiva and sclera clear  NECK: Supple, No JVD.    On Neurological Examination:    Mental Status - Pt is alert, awake, oriented X3. Higher functions are intact Follows commands well and able to answer questions appropriately.    Speech -  Normal. Pt has no aphasia.    Cranial Nerves - Pupils 3 mm equal and reactive to light, extraocular eye movements intact. Pt has no visual field deficit. Pt has no facial asymmetry. Tongue - is in midline.    Motor Exam - 4 plus/5 all over, No drift. No shaking or tremors. Muscle tone - is normal all over. Moves all extremities equally. Has left Foot drop.    Sensory Exam -  Pt withdraws all extremities equally on stimulation. No asymmetry seen. No complaints of tingling, numbness.    Gait - Pt walks with walker as per base line.    Deep tendon Reflexes - 2 plus all over.    Coordination - Fine finger movements are normal on both sides. Finger to nose is also normal on both sides.       Neck Supple -  Yes.      MEDICATIONS    enoxaparin Injectable 40milliGRAM(s) SubCutaneous every 24 hours  acetaminophen   Tablet. 650milliGRAM(s) Oral every 6 hours PRN  lisinopril 20milliGRAM(s) Oral daily  cloNIDine 0.1milliGRAM(s) Oral every 12 hours  tamsulosin 0.4milliGRAM(s) Oral at bedtime  DULoxetine 60milliGRAM(s) Oral daily  ALPRAZolam 0.25milliGRAM(s) Oral two times a day  amLODIPine   Tablet 10milliGRAM(s) Oral daily  docusate sodium 100milliGRAM(s) Oral three times a day  senna 2Tablet(s) Oral at bedtime  pantoprazole   Suspension 40milliGRAM(s) Oral daily  cyclobenzaprine 10milliGRAM(s) Oral every 12 hours  aluminum hydroxide/magnesium hydroxide/simethicone Suspension 30milliLiter(s) Oral every 6 hours PRN  ondansetron    Tablet 4milliGRAM(s) Oral every 4 hours PRN  HYDROmorphone   Tablet 6milliGRAM(s) Oral every 4 hours PRN  HYDROmorphone   Tablet 4milliGRAM(s) Oral every 4 hours PRN      Allergies    No Known Allergies      Vitamin B12, Serum: 585 pg/mL (05-26 @ 12:01)      RADIOLOGY    EXAM:  LUMBO-SACRAL SPINE                          EXAM:  THORACIC SPINE                            PROCEDURE DATE:  05/25/2017        INTERPRETATION:  Clinical history back pain history of spinal fusion.    AP and lateral views of the thoracic and lumbar spine.    There are no prior imaging studies for comparison.    There is bilateral posterior spinal jessica and screw instrumentation T4-S1.  There is a scoliosis with convex curvature to the right at L3.  At L3, there is a compression fracture deformity with anterior and   posterior displacement.    There is some mild compression deformities of was 1 liver one from   yesterday several thoracic vertebral bodies.      Impression:    Long segment thoracic lumbar spinal fusion. There is compression fracture   deformities of the L3 to vertebral body with anterior and posterior   displaced fracture fragments.  Recommend further clinical correlation and comparison with prior imaging   studies.  If there are no prior imaging studies for comparison, recommend further   evaluation with CT scan to evaluate the spinal canal.        ANU BONILLA M.D., ATTENDING RADIOLOGIST  This document has been electronically signed. May 26 2017  8:49AM

## 2017-05-27 NOTE — PROGRESS NOTE ADULT - SUBJECTIVE AND OBJECTIVE BOX
Patient is a 76y old  Male who presents with a chief complaint of Nausea, vomiting and back pain (23 May 2017 20:31)    HPI:  This is a 76y Male complaining of pain, low back with history of chronic back pain, spinal fusion (approximately a month ago at [facility]) presents to the ED from [facility] c/o lower back pain, as well as nausea. Pt takes 6mg Dilaudid every 3 hours for pain, and took his medication this morning. Pt states before the surgery, he saw someone for pain management, but he does not currently have a pain management physician. Denies numbness/tingling in lower extremities. No further complaints at this time. (23 May 2017 20:31)    INTERVAL HPI/OVERNIGHT EVENTS:  Chart reviewed, notes reviewed.   Patient seen and examined.  Ambulated with physical therapy.   Being followed by following specialists: pain mm, neurology.     Consultant(s) Notes Reviewed:  [X] Yes    Care Discussed with Consultants/Other Providers: [X] Yes    On Oral Dilaudid with Zofran prior to Dilaudid.   Still c/o back pain.   No further nausea.  Tolerating diet well.     REVIEW OF SYSTEMS:  CONSTITUTIONAL: No fever, weight loss, or fatigue  EYES: No eye pain, visual disturbances, or discharge  ENMT:  No difficulty hearing, tinnitus, vertigo; No sinus or throat pain  NECK: No pain or stiffness  BREASTS: No pain, masses, or nipple discharge  RESPIRATORY: No cough, wheezing, chills or hemoptysis; No shortness of breath  CARDIOVASCULAR: No chest pain, palpitations, dizziness, or leg swelling  GASTROINTESTINAL: No abdominal or epigastric pain. No nausea, vomiting, or hematemesis; No diarrhea or constipation. No melena or hematochezia.  GENITOURINARY: No dysuria, frequency, hematuria, or incontinence  NEUROLOGICAL: No headaches, memory loss, loss of strength, numbness, or tremors  SKIN: No itching, burning, rashes, or lesions   LYMPH NODES: No enlarged glands  ENDOCRINE: No heat or cold intolerance; No hair loss; No polydipsia or polyuria  MUSCULOSKELETAL: + back pain.   PSYCHIATRIC: No depression, anxiety, mood swings, or difficulty sleeping  HEME/LYMPH: No easy bruising, or bleeding gums  ALLERGY AND IMMUNOLOGIC: No hives or eczema  Allergies    No Known Allergies    Intolerances    MEDICATIONS  (STANDING):  enoxaparin Injectable 40milliGRAM(s) SubCutaneous every 24 hours  lisinopril 20milliGRAM(s) Oral daily  cloNIDine 0.1milliGRAM(s) Oral every 12 hours  tamsulosin 0.4milliGRAM(s) Oral at bedtime  DULoxetine 60milliGRAM(s) Oral daily  ALPRAZolam 0.25milliGRAM(s) Oral two times a day  amLODIPine   Tablet 10milliGRAM(s) Oral daily  docusate sodium 100milliGRAM(s) Oral three times a day  senna 2Tablet(s) Oral at bedtime  pantoprazole   Suspension 40milliGRAM(s) Oral daily  cyclobenzaprine 10milliGRAM(s) Oral every 12 hours    MEDICATIONS  (PRN):  acetaminophen   Tablet. 650milliGRAM(s) Oral every 6 hours PRN Mild Pain (1 - 3)  aluminum hydroxide/magnesium hydroxide/simethicone Suspension 30milliLiter(s) Oral every 6 hours PRN Dyspepsia  ondansetron    Tablet 4milliGRAM(s) Oral every 4 hours PRN Nausea and/or Vomiting  HYDROmorphone   Tablet 6milliGRAM(s) Oral every 4 hours PRN Severe Pain (7 - 10)  HYDROmorphone   Tablet 4milliGRAM(s) Oral every 4 hours PRN Moderate Pain (4 - 6)    Vital Signs Last 24 Hrs  T(C): 36.4, Max: 37.1 (05-26 @ 15:25)  T(F): 97.5, Max: 98.8 (05-26 @ 15:25)  HR: 84 (84 - 102)  BP: 121/73 (121/73 - 147/77)  BP(mean): --  RR: 16 (16 - 18)  SpO2: 91% (91% - 94%)    PHYSICAL EXAM:  GENERAL: NAD, well-groomed, well-developed  HEAD:  Atraumatic, Normocephalic  EYES: EOMI, PERRLA, conjunctiva and sclera clear  ENMT: No tonsillar erythema, exudates, or enlargement; Moist mucous membranes, Good dentition, No lesions  NECK: Supple, No JVD, Normal thyroid  CHEST/LUNG: Clear to auscultation bilaterally; No rales, rhonchi, wheezing, or rubs  HEART: Regular rate and rhythm; No murmurs, rubs, or gallops  ABDOMEN: Soft, Nontender, Nondistended; Bowel sounds present  EXTREMITIES:  2+ Peripheral Pulses, No clubbing, cyanosis, or edema  MS: No joint swelling or deformity.   LYMPH: No lymphadenopathy noted  SKIN: No rashes or lesions  NERVOUS SYSTEM:  left foot drop.   PSYCH:  Alert & Oriented X3, Good concentration.             LABS:            CAPILLARY BLOOD GLUCOSE      05-24 [id number] 5.6    05-24 Chol 165  HDL 46 Trig 81    Thyroid                      HEALTH ISSUES - PROBLEM Dx:  Essential hypertension: Essential hypertension  Anxiety: Anxiety  Gastroesophageal reflux disease with esophagitis: Gastroesophageal reflux disease with esophagitis  Kyphosis of thoracic region, unspecified kyphosis type: Kyphosis of thoracic region, unspecified kyphosis type  Neuropathy: Neuropathy  Intractable back pain: Intractable back pain  Nausea and vomiting, intractability of vomiting not specified, unspecified vomiting type: Nausea and vomiting, intractability of vomiting not specified, unspecified vomiting type

## 2017-05-28 LAB
ANION GAP SERPL CALC-SCNC: 5 MMOL/L — SIGNIFICANT CHANGE UP (ref 5–17)
BUN SERPL-MCNC: 28 MG/DL — HIGH (ref 7–23)
CALCIUM SERPL-MCNC: 8.6 MG/DL — SIGNIFICANT CHANGE UP (ref 8.4–10.5)
CHLORIDE SERPL-SCNC: 105 MMOL/L — SIGNIFICANT CHANGE UP (ref 96–108)
CO2 SERPL-SCNC: 30 MMOL/L — SIGNIFICANT CHANGE UP (ref 22–31)
CREAT SERPL-MCNC: 0.97 MG/DL — SIGNIFICANT CHANGE UP (ref 0.5–1.3)
GLUCOSE SERPL-MCNC: 102 MG/DL — HIGH (ref 70–99)
HCT VFR BLD CALC: 31.2 % — LOW (ref 39–50)
HGB BLD-MCNC: 10.2 G/DL — LOW (ref 13–17)
MCHC RBC-ENTMCNC: 27 PG — SIGNIFICANT CHANGE UP (ref 27–34)
MCHC RBC-ENTMCNC: 32.8 GM/DL — SIGNIFICANT CHANGE UP (ref 32–36)
MCV RBC AUTO: 82.4 FL — SIGNIFICANT CHANGE UP (ref 80–100)
PLATELET # BLD AUTO: 284 K/UL — SIGNIFICANT CHANGE UP (ref 150–400)
POTASSIUM SERPL-MCNC: 4.3 MMOL/L — SIGNIFICANT CHANGE UP (ref 3.5–5.3)
POTASSIUM SERPL-SCNC: 4.3 MMOL/L — SIGNIFICANT CHANGE UP (ref 3.5–5.3)
RBC # BLD: 3.79 M/UL — LOW (ref 4.2–5.8)
RBC # FLD: 14.9 % — HIGH (ref 10.3–14.5)
SODIUM SERPL-SCNC: 140 MMOL/L — SIGNIFICANT CHANGE UP (ref 135–145)
WBC # BLD: 5.7 K/UL — SIGNIFICANT CHANGE UP (ref 3.8–10.5)
WBC # FLD AUTO: 5.7 K/UL — SIGNIFICANT CHANGE UP (ref 3.8–10.5)

## 2017-05-28 RX ADMIN — LISINOPRIL 20 MILLIGRAM(S): 2.5 TABLET ORAL at 06:55

## 2017-05-28 RX ADMIN — HYDROMORPHONE HYDROCHLORIDE 6 MILLIGRAM(S): 2 INJECTION INTRAMUSCULAR; INTRAVENOUS; SUBCUTANEOUS at 21:03

## 2017-05-28 RX ADMIN — HYDROMORPHONE HYDROCHLORIDE 6 MILLIGRAM(S): 2 INJECTION INTRAMUSCULAR; INTRAVENOUS; SUBCUTANEOUS at 12:25

## 2017-05-28 RX ADMIN — HYDROMORPHONE HYDROCHLORIDE 6 MILLIGRAM(S): 2 INJECTION INTRAMUSCULAR; INTRAVENOUS; SUBCUTANEOUS at 16:25

## 2017-05-28 RX ADMIN — Medication 0.25 MILLIGRAM(S): at 18:08

## 2017-05-28 RX ADMIN — TAMSULOSIN HYDROCHLORIDE 0.4 MILLIGRAM(S): 0.4 CAPSULE ORAL at 21:03

## 2017-05-28 RX ADMIN — Medication 0.1 MILLIGRAM(S): at 06:55

## 2017-05-28 RX ADMIN — CYCLOBENZAPRINE HYDROCHLORIDE 10 MILLIGRAM(S): 10 TABLET, FILM COATED ORAL at 10:13

## 2017-05-28 RX ADMIN — ONDANSETRON 4 MILLIGRAM(S): 8 TABLET, FILM COATED ORAL at 06:56

## 2017-05-28 RX ADMIN — HYDROMORPHONE HYDROCHLORIDE 6 MILLIGRAM(S): 2 INJECTION INTRAMUSCULAR; INTRAVENOUS; SUBCUTANEOUS at 08:15

## 2017-05-28 RX ADMIN — Medication 100 MILLIGRAM(S): at 06:55

## 2017-05-28 RX ADMIN — HYDROMORPHONE HYDROCHLORIDE 6 MILLIGRAM(S): 2 INJECTION INTRAMUSCULAR; INTRAVENOUS; SUBCUTANEOUS at 03:45

## 2017-05-28 RX ADMIN — HYDROMORPHONE HYDROCHLORIDE 6 MILLIGRAM(S): 2 INJECTION INTRAMUSCULAR; INTRAVENOUS; SUBCUTANEOUS at 07:19

## 2017-05-28 RX ADMIN — HYDROMORPHONE HYDROCHLORIDE 6 MILLIGRAM(S): 2 INJECTION INTRAMUSCULAR; INTRAVENOUS; SUBCUTANEOUS at 11:29

## 2017-05-28 RX ADMIN — Medication 100 MILLIGRAM(S): at 15:27

## 2017-05-28 RX ADMIN — CYCLOBENZAPRINE HYDROCHLORIDE 10 MILLIGRAM(S): 10 TABLET, FILM COATED ORAL at 21:03

## 2017-05-28 RX ADMIN — Medication 0.25 MILLIGRAM(S): at 06:56

## 2017-05-28 RX ADMIN — Medication 0.1 MILLIGRAM(S): at 18:08

## 2017-05-28 RX ADMIN — ENOXAPARIN SODIUM 40 MILLIGRAM(S): 100 INJECTION SUBCUTANEOUS at 21:04

## 2017-05-28 RX ADMIN — ONDANSETRON 4 MILLIGRAM(S): 8 TABLET, FILM COATED ORAL at 19:52

## 2017-05-28 RX ADMIN — AMLODIPINE BESYLATE 10 MILLIGRAM(S): 2.5 TABLET ORAL at 06:55

## 2017-05-28 RX ADMIN — ONDANSETRON 4 MILLIGRAM(S): 8 TABLET, FILM COATED ORAL at 02:55

## 2017-05-28 RX ADMIN — ONDANSETRON 4 MILLIGRAM(S): 8 TABLET, FILM COATED ORAL at 15:56

## 2017-05-28 RX ADMIN — HYDROMORPHONE HYDROCHLORIDE 6 MILLIGRAM(S): 2 INJECTION INTRAMUSCULAR; INTRAVENOUS; SUBCUTANEOUS at 15:27

## 2017-05-28 RX ADMIN — DULOXETINE HYDROCHLORIDE 60 MILLIGRAM(S): 30 CAPSULE, DELAYED RELEASE ORAL at 10:14

## 2017-05-28 RX ADMIN — Medication 30 MILLILITER(S): at 16:36

## 2017-05-28 RX ADMIN — PANTOPRAZOLE SODIUM 40 MILLIGRAM(S): 20 TABLET, DELAYED RELEASE ORAL at 10:14

## 2017-05-28 RX ADMIN — HYDROMORPHONE HYDROCHLORIDE 6 MILLIGRAM(S): 2 INJECTION INTRAMUSCULAR; INTRAVENOUS; SUBCUTANEOUS at 21:50

## 2017-05-28 RX ADMIN — Medication 100 MILLIGRAM(S): at 21:03

## 2017-05-28 RX ADMIN — HYDROMORPHONE HYDROCHLORIDE 6 MILLIGRAM(S): 2 INJECTION INTRAMUSCULAR; INTRAVENOUS; SUBCUTANEOUS at 03:15

## 2017-05-28 RX ADMIN — SENNA PLUS 2 TABLET(S): 8.6 TABLET ORAL at 21:03

## 2017-05-28 NOTE — PROGRESS NOTE ADULT - SUBJECTIVE AND OBJECTIVE BOX
Neurology Follow up note    TOM HERRERA 76y Male    HPI:  This is a 75 YO Male complaining of pain, low back with history of chronic back pain, spinal fusion (approximately a month ago at [facility]) presents to the ED from [facility] c/o lower back pain, as well as nausea. Patient takes 6 mg Dilaudid every 3 hours for pain, and took his medication this morning. Patient states before the surgery, he saw someone for pain management, but he does not currently have a pain management physician. Denies numbness/tingling in lower extremities. No further complaints at this time. (23 May 2017 20:31)    Interval History -    Patient is seen, chart was reviewed and case was discussed with the treatment team.  Lying on bed comfortably.   Able to walk with help.      Vital Signs Last 24 Hrs  T(C): 36.3, Max: 36.6 (05-27 @ 15:00)  T(F): 97.4, Max: 97.9 (05-27 @ 15:00)  HR: 82 (81 - 86)  BP: 107/63 (94/56 - 116/73)  BP(mean): --  RR: 18 (16 - 18)  SpO2: 92% (91% - 93%)    REVIEW OF SYSTEMS:    CONSTITUTIONAL: No fever  EYES: No eye pain,   ENMT:  No sinus or throat pain  NECK: No pain or stiffness  RESPIRATORY: No cough, No hemoptysis; No shortness of breath  CARDIOVASCULAR: No acute chest pain, palpitations,  or leg swelling  GASTROINTESTINAL: No abdominal pain. No nausea, vomiting, or hematemesis;  No melena or hematochezia.  GENITOURINARY: No  hematuria, or incontinence  MUSCULOSKELETAL: Had kyphosis/S/p Spinal fusion surgery only a month ago to correct his posture.  SKIN: No itching, rashes, or lesions   LYMPH NODES: No enlarged glands  NEUROLOGICAL: Low back pain/Neuropathy.  PSYCHIATRIC: H/o depression/anxiety.  ENDOCRINE: No heat or cold intolerance;   HEME/LYMPH: No easy bruising, or bleeding gums  Allergy/Immunology. No medication allergy. No seasonal allergies.    GENERAL: NAD, well-groomed, well-developed  HEAD:  Atraumatic, Normocephalic  EYES: EOMI, PERRLA, conjunctiva and sclera clear  NECK: Supple, No JVD.    On Neurological Examination:    Mental Status - Pt is alert, awake, oriented X3. Higher functions are intact Follows commands well and able to answer questions appropriately.    Speech -  Normal. Pt has no aphasia.    Cranial Nerves - Pupils 3 mm equal and reactive to light, extraocular eye movements intact. Pt has no visual field deficit. Pt has no facial asymmetry. Tongue - is in midline.    Motor Exam - 4 plus/5 all over, No drift. No shaking or tremors. Muscle tone - is normal all over. Moves all extremities equally. Has left Foot drop.    Sensory Exam -  Pt withdraws all extremities equally on stimulation. No asymmetry seen. No complaints of tingling, numbness.    Gait - Pt walks with walker as per base line.    Deep tendon Reflexes - 2 plus all over.    Coordination - Fine finger movements are normal on both sides. Finger to nose is also normal on both sides.       Neck Supple -  Yes.    MEDICATIONS    enoxaparin Injectable 40milliGRAM(s) SubCutaneous every 24 hours  acetaminophen   Tablet. 650milliGRAM(s) Oral every 6 hours PRN  lisinopril 20milliGRAM(s) Oral daily  cloNIDine 0.1milliGRAM(s) Oral every 12 hours  tamsulosin 0.4milliGRAM(s) Oral at bedtime  DULoxetine 60milliGRAM(s) Oral daily  ALPRAZolam 0.25milliGRAM(s) Oral two times a day  amLODIPine   Tablet 10milliGRAM(s) Oral daily  docusate sodium 100milliGRAM(s) Oral three times a day  senna 2Tablet(s) Oral at bedtime  pantoprazole   Suspension 40milliGRAM(s) Oral daily  cyclobenzaprine 10milliGRAM(s) Oral every 12 hours  aluminum hydroxide/magnesium hydroxide/simethicone Suspension 30milliLiter(s) Oral every 6 hours PRN  ondansetron    Tablet 4milliGRAM(s) Oral every 4 hours PRN  HYDROmorphone   Tablet 6milliGRAM(s) Oral every 4 hours PRN  HYDROmorphone   Tablet 4milliGRAM(s) Oral every 4 hours PRN    Allergies    No Known Allergies      LABS:    CBC Full  -  ( 28 May 2017 06:10 )  WBC Count : 5.7 K/uL  Hemoglobin : 10.2 g/dL  Hematocrit : 31.2 %  Platelet Count - Automated : 284 K/uL  Mean Cell Volume : 82.4 fl  Mean Cell Hemoglobin : 27.0 pg  Mean Cell Hemoglobin Concentration : 32.8 gm/dL      05-28    140  |  105  |  28<H>  ----------------------------<  102<H>  4.3   |  30  |  0.97    Ca    8.6      28 May 2017 06:10

## 2017-05-28 NOTE — PROGRESS NOTE ADULT - SUBJECTIVE AND OBJECTIVE BOX
Patient is a 76y old  Male who presents with a chief complaint of Nausea, vomiting and back pain (23 May 2017 20:31)    HPI:  This is a 76y Male complaining of pain, low back with history of chronic back pain, spinal fusion (approximately a month ago at [facility]) presents to the ED from [facility] c/o lower back pain, as well as nausea. Pt takes 6mg Dilaudid every 3 hours for pain, and took his medication this morning. Pt states before the surgery, he saw someone for pain management, but he does not currently have a pain management physician. Denies numbness/tingling in lower extremities. No further complaints at this time. (23 May 2017 20:31)    INTERVAL HPI/OVERNIGHT EVENTS:  Chart reviewed, notes reviewed.   Patient seen and examined.  Ambulated with physical therapy.   Being followed by following specialists: pain mm, neurology.     Consultant(s) Notes Reviewed:  [X] Yes    Care Discussed with Consultants/Other Providers: [X] Yes    On Oral Dilaudid with Zofran prior to Dilaudid.   Still c/o back pain.   No further nausea.  Tolerating diet well.     REVIEW OF SYSTEMS:  CONSTITUTIONAL: No fever, weight loss, or fatigue  EYES: No eye pain, visual disturbances, or discharge  ENMT:  No difficulty hearing, tinnitus, vertigo; No sinus or throat pain  NECK: No pain or stiffness  BREASTS: No pain, masses, or nipple discharge  RESPIRATORY: No cough, wheezing, chills or hemoptysis; No shortness of breath  CARDIOVASCULAR: No chest pain, palpitations, dizziness, or leg swelling  GASTROINTESTINAL: No abdominal or epigastric pain. No nausea, vomiting, or hematemesis; No diarrhea or constipation. No melena or hematochezia.  GENITOURINARY: No dysuria, frequency, hematuria, or incontinence  NEUROLOGICAL: No headaches, memory loss, loss of strength, numbness, or tremors  SKIN: No itching, burning, rashes, or lesions   LYMPH NODES: No enlarged glands  ENDOCRINE: No heat or cold intolerance; No hair loss; No polydipsia or polyuria  MUSCULOSKELETAL: + back pain.   PSYCHIATRIC: No depression, anxiety, mood swings, or difficulty sleeping  HEME/LYMPH: No easy bruising, or bleeding gums  ALLERGY AND IMMUNOLOGIC: No hives or eczema  Allergies    No Known Allergies    Intolerances    MEDICATIONS  (STANDING):  enoxaparin Injectable 40milliGRAM(s) SubCutaneous every 24 hours  lisinopril 20milliGRAM(s) Oral daily  cloNIDine 0.1milliGRAM(s) Oral every 12 hours  tamsulosin 0.4milliGRAM(s) Oral at bedtime  DULoxetine 60milliGRAM(s) Oral daily  ALPRAZolam 0.25milliGRAM(s) Oral two times a day  amLODIPine   Tablet 10milliGRAM(s) Oral daily  docusate sodium 100milliGRAM(s) Oral three times a day  senna 2Tablet(s) Oral at bedtime  pantoprazole   Suspension 40milliGRAM(s) Oral daily  cyclobenzaprine 10milliGRAM(s) Oral every 12 hours    MEDICATIONS  (PRN):  acetaminophen   Tablet. 650milliGRAM(s) Oral every 6 hours PRN Mild Pain (1 - 3)  aluminum hydroxide/magnesium hydroxide/simethicone Suspension 30milliLiter(s) Oral every 6 hours PRN Dyspepsia  ondansetron    Tablet 4milliGRAM(s) Oral every 4 hours PRN Nausea and/or Vomiting  HYDROmorphone   Tablet 6milliGRAM(s) Oral every 4 hours PRN Severe Pain (7 - 10)  HYDROmorphone   Tablet 4milliGRAM(s) Oral every 4 hours PRN Moderate Pain (4 - 6)    Vital Signs Last 24 Hrs  T(C): 36.3, Max: 36.6 (05-27 @ 15:00)  T(F): 97.4, Max: 97.9 (05-27 @ 15:00)  HR: 82 (81 - 86)  BP: 107/63 (94/56 - 116/73)  BP(mean): --  RR: 18 (16 - 18)  SpO2: 92% (91% - 93%)    PHYSICAL EXAM:  GENERAL: NAD, well-groomed, well-developed  HEAD:  Atraumatic, Normocephalic  EYES: EOMI, PERRLA, conjunctiva and sclera clear  ENMT: No tonsillar erythema, exudates, or enlargement; Moist mucous membranes, Good dentition, No lesions  NECK: Supple, No JVD, Normal thyroid  CHEST/LUNG: Clear to auscultation bilaterally; No rales, rhonchi, wheezing, or rubs  HEART: Regular rate and rhythm; No murmurs, rubs, or gallops  ABDOMEN: Soft, Nontender, Nondistended; Bowel sounds present  EXTREMITIES:  2+ Peripheral Pulses, No clubbing, cyanosis, or edema  MS: No joint swelling or deformity.   LYMPH: No lymphadenopathy noted  SKIN: No rashes or lesions  NERVOUS SYSTEM:  left foot drop.   PSYCH:  Alert & Oriented X3, Good concentration.             LABS:                                10.2   5.7   )-----------( 284      ( 28 May 2017 06:10 )             31.2     28 May 2017 06:10    140    |  105    |  28     ----------------------------<  102    4.3     |  30     |  0.97     Ca    8.6        28 May 2017 06:10      CAPILLARY BLOOD GLUCOSE      05-24 [id number] 5.6    05-24 Chol 165  HDL 46 Trig 81    HEALTH ISSUES - PROBLEM Dx:  Essential hypertension: Essential hypertension  Anxiety: Anxiety  Gastroesophageal reflux disease with esophagitis: Gastroesophageal reflux disease with esophagitis  Kyphosis of thoracic region, unspecified kyphosis type: Kyphosis of thoracic region, unspecified kyphosis type  Neuropathy: Neuropathy  Intractable back pain: Intractable back pain  Nausea and vomiting, intractability of vomiting not specified, unspecified vomiting type: Nausea and vomiting, intractability of vomiting not specified, unspecified vomiting type

## 2017-05-29 DIAGNOSIS — K59.00 CONSTIPATION, UNSPECIFIED: ICD-10-CM

## 2017-05-29 DIAGNOSIS — N40.1 BENIGN PROSTATIC HYPERPLASIA WITH LOWER URINARY TRACT SYMPTOMS: ICD-10-CM

## 2017-05-29 RX ORDER — MULTIVIT WITH MIN/MFOLATE/K2 340-15/3 G
150 POWDER (GRAM) ORAL EVERY 6 HOURS
Qty: 0 | Refills: 0 | Status: COMPLETED | OUTPATIENT
Start: 2017-05-29 | End: 2017-05-30

## 2017-05-29 RX ORDER — TAMSULOSIN HYDROCHLORIDE 0.4 MG/1
0.8 CAPSULE ORAL AT BEDTIME
Qty: 0 | Refills: 0 | Status: DISCONTINUED | OUTPATIENT
Start: 2017-05-29 | End: 2017-06-02

## 2017-05-29 RX ADMIN — TAMSULOSIN HYDROCHLORIDE 0.8 MILLIGRAM(S): 0.4 CAPSULE ORAL at 21:33

## 2017-05-29 RX ADMIN — Medication 100 MILLIGRAM(S): at 05:40

## 2017-05-29 RX ADMIN — AMLODIPINE BESYLATE 10 MILLIGRAM(S): 2.5 TABLET ORAL at 05:40

## 2017-05-29 RX ADMIN — Medication 0.1 MILLIGRAM(S): at 05:40

## 2017-05-29 RX ADMIN — Medication 0.25 MILLIGRAM(S): at 17:37

## 2017-05-29 RX ADMIN — ONDANSETRON 4 MILLIGRAM(S): 8 TABLET, FILM COATED ORAL at 21:32

## 2017-05-29 RX ADMIN — HYDROMORPHONE HYDROCHLORIDE 6 MILLIGRAM(S): 2 INJECTION INTRAMUSCULAR; INTRAVENOUS; SUBCUTANEOUS at 22:39

## 2017-05-29 RX ADMIN — HYDROMORPHONE HYDROCHLORIDE 6 MILLIGRAM(S): 2 INJECTION INTRAMUSCULAR; INTRAVENOUS; SUBCUTANEOUS at 18:00

## 2017-05-29 RX ADMIN — Medication 0.25 MILLIGRAM(S): at 05:40

## 2017-05-29 RX ADMIN — CYCLOBENZAPRINE HYDROCHLORIDE 10 MILLIGRAM(S): 10 TABLET, FILM COATED ORAL at 10:00

## 2017-05-29 RX ADMIN — ENOXAPARIN SODIUM 40 MILLIGRAM(S): 100 INJECTION SUBCUTANEOUS at 21:33

## 2017-05-29 RX ADMIN — HYDROMORPHONE HYDROCHLORIDE 6 MILLIGRAM(S): 2 INJECTION INTRAMUSCULAR; INTRAVENOUS; SUBCUTANEOUS at 01:52

## 2017-05-29 RX ADMIN — HYDROMORPHONE HYDROCHLORIDE 6 MILLIGRAM(S): 2 INJECTION INTRAMUSCULAR; INTRAVENOUS; SUBCUTANEOUS at 22:09

## 2017-05-29 RX ADMIN — HYDROMORPHONE HYDROCHLORIDE 6 MILLIGRAM(S): 2 INJECTION INTRAMUSCULAR; INTRAVENOUS; SUBCUTANEOUS at 14:36

## 2017-05-29 RX ADMIN — ONDANSETRON 4 MILLIGRAM(S): 8 TABLET, FILM COATED ORAL at 09:29

## 2017-05-29 RX ADMIN — Medication 100 MILLIGRAM(S): at 21:32

## 2017-05-29 RX ADMIN — HYDROMORPHONE HYDROCHLORIDE 6 MILLIGRAM(S): 2 INJECTION INTRAMUSCULAR; INTRAVENOUS; SUBCUTANEOUS at 13:59

## 2017-05-29 RX ADMIN — SENNA PLUS 2 TABLET(S): 8.6 TABLET ORAL at 21:32

## 2017-05-29 RX ADMIN — HYDROMORPHONE HYDROCHLORIDE 6 MILLIGRAM(S): 2 INJECTION INTRAMUSCULAR; INTRAVENOUS; SUBCUTANEOUS at 02:40

## 2017-05-29 RX ADMIN — HYDROMORPHONE HYDROCHLORIDE 6 MILLIGRAM(S): 2 INJECTION INTRAMUSCULAR; INTRAVENOUS; SUBCUTANEOUS at 06:07

## 2017-05-29 RX ADMIN — Medication 100 MILLIGRAM(S): at 13:31

## 2017-05-29 RX ADMIN — LISINOPRIL 20 MILLIGRAM(S): 2.5 TABLET ORAL at 05:40

## 2017-05-29 RX ADMIN — HYDROMORPHONE HYDROCHLORIDE 6 MILLIGRAM(S): 2 INJECTION INTRAMUSCULAR; INTRAVENOUS; SUBCUTANEOUS at 10:01

## 2017-05-29 RX ADMIN — HYDROMORPHONE HYDROCHLORIDE 6 MILLIGRAM(S): 2 INJECTION INTRAMUSCULAR; INTRAVENOUS; SUBCUTANEOUS at 06:50

## 2017-05-29 RX ADMIN — PANTOPRAZOLE SODIUM 40 MILLIGRAM(S): 20 TABLET, DELAYED RELEASE ORAL at 12:46

## 2017-05-29 RX ADMIN — ONDANSETRON 4 MILLIGRAM(S): 8 TABLET, FILM COATED ORAL at 05:40

## 2017-05-29 RX ADMIN — DULOXETINE HYDROCHLORIDE 60 MILLIGRAM(S): 30 CAPSULE, DELAYED RELEASE ORAL at 12:45

## 2017-05-29 RX ADMIN — Medication 0.1 MILLIGRAM(S): at 17:42

## 2017-05-29 RX ADMIN — ONDANSETRON 4 MILLIGRAM(S): 8 TABLET, FILM COATED ORAL at 00:56

## 2017-05-29 RX ADMIN — CYCLOBENZAPRINE HYDROCHLORIDE 10 MILLIGRAM(S): 10 TABLET, FILM COATED ORAL at 21:32

## 2017-05-29 RX ADMIN — ONDANSETRON 4 MILLIGRAM(S): 8 TABLET, FILM COATED ORAL at 17:32

## 2017-05-29 RX ADMIN — ONDANSETRON 4 MILLIGRAM(S): 8 TABLET, FILM COATED ORAL at 13:31

## 2017-05-29 RX ADMIN — HYDROMORPHONE HYDROCHLORIDE 6 MILLIGRAM(S): 2 INJECTION INTRAMUSCULAR; INTRAVENOUS; SUBCUTANEOUS at 10:31

## 2017-05-29 NOTE — PROGRESS NOTE ADULT - ATTENDING COMMENTS
Continue PT.  Fall precautions.  DC planning to rehab. Continue PT.  Fall precautions.  DC to rehab in am if bed is available.

## 2017-05-29 NOTE — PROGRESS NOTE ADULT - SUBJECTIVE AND OBJECTIVE BOX
Patient is a 76y old  Male who presents with a chief complaint of Nausea, vomiting and back pain (23 May 2017 20:31)    HPI:  This is a 77 YO Male complaining of pain, low back with history of chronic back pain, spinal fusion (approximately a month ago at [facility]) presents to the ED from [facility] c/o lower back pain, as well as nausea. Patient takes 6 mg Dilaudid every 3 hours for pain, and took his medication this morning. Patient states before the surgery, he saw someone for pain management, but he does not currently have a pain management physician. Denies numbness/tingling in lower extremities. No further complaints at this time. (23 May 2017 20:31)    INTERVAL HPI/OVERNIGHT EVENTS:  Chart reviewed, notes reviewed.   Patient seen and examined.  Pain is fairly controlled with current medications.  Ambulated with physical therapy.   Being followed by following specialists: neurology and pain management.    Consultant(s) Notes Reviewed:  [X] Yes    Care Discussed with Consultants/Other Providers: [X] Yes    C/O constipation and difficulty voiding.   No numbness or tingling.   No dysuria or hematuria.     REVIEW OF SYSTEMS:  CONSTITUTIONAL: No fever, weight loss, or fatigue  EYES: No eye pain, visual disturbances, or discharge  ENMT:  No difficulty hearing, tinnitus, vertigo; No sinus or throat pain  NECK: No pain or stiffness  BREASTS: No pain, masses, or nipple discharge  RESPIRATORY: No cough, wheezing, chills or hemoptysis; No shortness of breath  CARDIOVASCULAR: No chest pain, palpitations, dizziness, or leg swelling  GASTROINTESTINAL: No abdominal or epigastric pain. No nausea, vomiting, or hematemesis; + Constipation.   GENITOURINARY: difficulty voiding. No dysuria or hematuria.   NEUROLOGICAL: No headaches, memory loss, loss of strength, numbness, or tremors  SKIN: No itching, burning, rashes, or lesions   LYMPH NODES: No enlarged glands  ENDOCRINE: No heat or cold intolerance; No hair loss; No polydipsia or polyuria  MUSCULOSKELETAL: back pain.   PSYCHIATRIC: No depression, anxiety, mood swings, or difficulty sleeping  HEME/LYMPH: No easy bruising, or bleeding gums  ALLERGY AND IMMUNOLOGIC: No hives or eczema  Allergies    No Known Allergies    Intolerances      MEDICATIONS  (STANDING):  enoxaparin Injectable 40milliGRAM(s) SubCutaneous every 24 hours  lisinopril 20milliGRAM(s) Oral daily  cloNIDine 0.1milliGRAM(s) Oral every 12 hours  DULoxetine 60milliGRAM(s) Oral daily  ALPRAZolam 0.25milliGRAM(s) Oral two times a day  amLODIPine   Tablet 10milliGRAM(s) Oral daily  docusate sodium 100milliGRAM(s) Oral three times a day  senna 2Tablet(s) Oral at bedtime  pantoprazole   Suspension 40milliGRAM(s) Oral daily  cyclobenzaprine 10milliGRAM(s) Oral every 12 hours  tamsulosin 0.8milliGRAM(s) Oral at bedtime    MEDICATIONS  (PRN):  acetaminophen   Tablet. 650milliGRAM(s) Oral every 6 hours PRN Mild Pain (1 - 3)  aluminum hydroxide/magnesium hydroxide/simethicone Suspension 30milliLiter(s) Oral every 6 hours PRN Dyspepsia  ondansetron    Tablet 4milliGRAM(s) Oral every 4 hours PRN Nausea and/or Vomiting  HYDROmorphone   Tablet 6milliGRAM(s) Oral every 4 hours PRN Severe Pain (7 - 10)  HYDROmorphone   Tablet 4milliGRAM(s) Oral every 4 hours PRN Moderate Pain (4 - 6)    Vital Signs Last 24 Hrs  T(C): 36.8, Max: 36.9 (05-28 @ 23:00)  T(F): 98.3, Max: 98.5 (05-28 @ 23:00)  HR: 74 (74 - 93)  BP: 92/60 (92/60 - 144/74)  BP(mean): --  RR: 15 (15 - 16)  SpO2: 92% (90% - 100%)    PHYSICAL EXAM:  GENERAL: NAD, well-groomed, well-developed  HEAD:  Atraumatic, Normocephalic  EYES: EOMI, PERRLA, conjunctiva and sclera clear  ENMT: No tonsillar erythema, exudates, or enlargement; Moist mucous membranes, Good dentition, No lesions  NECK: Supple, No JVD, Normal thyroid  CHEST/LUNG: Clear to auscultation bilaterally; No rales, rhonchi, wheezing, or rubs  HEART: Regular rate and rhythm; No murmurs, rubs, or gallops  ABDOMEN: Soft, Nontender, Nondistended; Bowel sounds present  EXTREMITIES:  2+ Peripheral Pulses, No clubbing, cyanosis, or edema  MS: No joint swelling or deformity.   LYMPH: No lymphadenopathy noted  SKIN: No rashes or lesions  NERVOUS SYSTEM:  Motor Strength 4/5 B/L upper and lower extremities; DTRs 2+ intact and symmetric  PSYCH:  Alert & Oriented X3, Good concentration.                        10.2   5.7   )-----------( 284      ( 28 May 2017 06:10 )             31.2     28 May 2017 06:10    140    |  105    |  28     ----------------------------<  102    4.3     |  30     |  0.97     Ca    8.6        28 May 2017 06:10      CAPILLARY BLOOD GLUCOSE      05-24 [id number] 5.6    05-24 Chol 165  HDL 46 Trig 81      RADIOLOGY TEST: (IMAGES REVIEWED BY ME)    Imaging Personally Reviewed:  [ ] YES        HEALTH ISSUES - PROBLEM Dx:  Left foot drop: Left foot drop  Need for prophylactic measure: Need for prophylactic measure  Essential hypertension: Essential hypertension  Anxiety: Anxiety  Gastroesophageal reflux disease with esophagitis: Gastroesophageal reflux disease with esophagitis  Kyphosis of thoracic region, unspecified kyphosis type: Kyphosis of thoracic region, unspecified kyphosis type  Neuropathy: Neuropathy  Intractable back pain: Intractable back pain  Nausea and vomiting, intractability of vomiting not specified, unspecified vomiting type: Nausea and vomiting, intractability of vomiting not specified, unspecified vomiting type

## 2017-05-29 NOTE — DIETITIAN INITIAL EVALUATION ADULT. - OTHER INFO
76M adm c lower back pain and nausea. Pt seen per LOS policy (>7days). Pt minimally verbal at time of visit and unable to provide detailed past nutrition related information. On DASH/TLC diet c reported fair intake throughout adm, which was confirmed by F&N aide. However, pt noted to not be feeling well for lunch today, denies interest in alternative meal. Also receives Ensure Enlive TID, which pt states he is sipping on throughout the day. Tolerating diet consistency. Pt from [facility], where he is on no added salt diet. NKFA. Skin intact; erick 20.

## 2017-05-29 NOTE — PROGRESS NOTE ADULT - SUBJECTIVE AND OBJECTIVE BOX
Neurology Follow up note    TOM HERRERA 76y Male    HPI:  This is a 75 YO Male complaining of pain, low back with history of chronic back pain, spinal fusion (approximately a month ago at [facility]) presents to the ED from [facility] c/o lower back pain, as well as nausea. Patient takes 6 mg Dilaudid every 3 hours for pain, and took his medication this morning. Patient states before the surgery, he saw someone for pain management, but he does not currently have a pain management physician. Denies numbness/tingling in lower extremities. No further complaints at this time. (23 May 2017 20:31)      Interval History -    Patient is seen, chart was reviewed and case was discussed with the treatment team.    Vital Signs Last 24 Hrs  T(C): 36.8, Max: 36.9 (05-28 @ 23:00)  T(F): 98.3, Max: 98.5 (05-28 @ 23:00)  HR: 74 (74 - 93)  BP: 92/60 (92/60 - 144/74)  BP(mean): --  RR: 15 (15 - 16)  SpO2: 92% (90% - 100%)        REVIEW OF SYSTEMS:    CONSTITUTIONAL: No fever  EYES: No eye pain,   ENMT:  No sinus or throat pain  NECK: No pain or stiffness  RESPIRATORY: No cough, No hemoptysis; No shortness of breath  CARDIOVASCULAR: No acute chest pain, palpitations,  or leg swelling  GASTROINTESTINAL: No abdominal pain. No nausea, vomiting, or hematemesis;  No melena or hematochezia.  GENITOURINARY: No  hematuria, or incontinence  MUSCULOSKELETAL: Had kyphosis/S/p Spinal fusion surgery only a month ago to correct his posture.  SKIN: No itching, rashes, or lesions   LYMPH NODES: No enlarged glands  NEUROLOGICAL: Low back pain/Neuropathy.  PSYCHIATRIC: H/o depression/anxiety.  ENDOCRINE: No heat or cold intolerance;   HEME/LYMPH: No easy bruising, or bleeding gums  Allergy/Immunology. No medication allergy. No seasonal allergies.    GENERAL: NAD, well-groomed, well-developed  HEAD:  Atraumatic, Normocephalic  EYES: EOMI, PERRLA, conjunctiva and sclera clear  NECK: Supple, No JVD.    On Neurological Examination:    Mental Status - Pt is alert, awake, oriented X3. Higher functions are intact Follows commands well.    Speech -  Normal. Pt has no aphasia.    Cranial Nerves - Pupils 3 mm equal and reactive to light. Pt has no facial asymmetry. Tongue - is in midline.    Motor Exam - 4 plus/5 all over, No drift. No shaking or tremors. Muscle tone - is normal all over.  Has left Foot drop, which is old.    Sensory Exam -  Pt withdraws all extremities equally on stimulation. No asymmetry seen. No complaints of tingling, numbness.    Gait - Pt walks with walker as per base line.    Deep tendon Reflexes - 2 plus all over.    Coordination - Fine finger movements are normal on both sides. Finger to nose is also normal on both sides.       Neck Supple -  Yes.      MEDICATIONS    enoxaparin Injectable 40milliGRAM(s) SubCutaneous every 24 hours  acetaminophen   Tablet. 650milliGRAM(s) Oral every 6 hours PRN  lisinopril 20milliGRAM(s) Oral daily  cloNIDine 0.1milliGRAM(s) Oral every 12 hours  DULoxetine 60milliGRAM(s) Oral daily  ALPRAZolam 0.25milliGRAM(s) Oral two times a day  amLODIPine   Tablet 10milliGRAM(s) Oral daily  docusate sodium 100milliGRAM(s) Oral three times a day  senna 2Tablet(s) Oral at bedtime  pantoprazole   Suspension 40milliGRAM(s) Oral daily  cyclobenzaprine 10milliGRAM(s) Oral every 12 hours  aluminum hydroxide/magnesium hydroxide/simethicone Suspension 30milliLiter(s) Oral every 6 hours PRN  ondansetron    Tablet 4milliGRAM(s) Oral every 4 hours PRN  HYDROmorphone   Tablet 6milliGRAM(s) Oral every 4 hours PRN  HYDROmorphone   Tablet 4milliGRAM(s) Oral every 4 hours PRN  tamsulosin 0.8milliGRAM(s) Oral at bedtime  magnesium citrate Solution 150milliLiter(s) Oral every 6 hours    Allergies    No Known Allergies    Intolerances      LABS:    CBC Full  -  ( 28 May 2017 06:10 )  WBC Count : 5.7 K/uL  Hemoglobin : 10.2 g/dL  Hematocrit : 31.2 %  Platelet Count - Automated : 284 K/uL  Mean Cell Volume : 82.4 fl  Mean Cell Hemoglobin : 27.0 pg  Mean Cell Hemoglobin Concentration : 32.8 gm/dL    05-28    140  |  105  |  28<H>  ----------------------------<  102<H>  4.3   |  30  |  0.97    Ca    8.6      28 May 2017 06:10 Neurology Follow up note    TOM HERRERA 76y Male    HPI:  This is a 77 YO Male complaining of pain, low back with history of chronic back pain, spinal fusion (approximately a month ago at [facility]) [facility] ED [facility] c/o lower back pain, as well as nausea. Patient takes 6 mg Dilaudid every 3 hours for pain, and took his medication this morning. Patient states before the surgery, he saw someone for pain management, but he does not currently have a pain management physician. Denies numbness/tingling in lower extremities. No further complaints at this time. (23 May 2017 20:31)      Interval History -    Patient is seen, chart was reviewed and case was discussed with the treatment team.    Vital Signs Last 24 Hrs  T(C): 36.8, Max: 36.9 (05-28 @ 23:00)  T(F): 98.3, Max: 98.5 (05-28 @ 23:00)  HR: 74 (74 - 93)  BP: 92/60 (92/60 - 144/74)  BP(mean): --  RR: 15 (15 - 16)  SpO2: 92% (90% - 100%)    REVIEW OF SYSTEMS:    CONSTITUTIONAL: No fever  EYES: No eye pain,   ENMT:  No sinus or throat pain  NECK: No pain or stiffness  RESPIRATORY: No cough, No hemoptysis; No shortness of breath  CARDIOVASCULAR: No acute chest pain, palpitations,  or leg swelling  GASTROINTESTINAL: No abdominal pain. No nausea, vomiting, or hematemesis;  No melena or hematochezia.  GENITOURINARY: No  hematuria, or incontinence  MUSCULOSKELETAL: Had kyphosis/S/p Spinal fusion surgery only a month ago to correct his posture.  SKIN: No itching, rashes, or lesions   LYMPH NODES: No enlarged glands  NEUROLOGICAL: Low back pain/Neuropathy.  PSYCHIATRIC: H/o depression/anxiety.  ENDOCRINE: No heat or cold intolerance;   HEME/LYMPH: No easy bruising, or bleeding gums  Allergy/Immunology. No medication allergy. No seasonal allergies.    GENERAL: NAD, well-groomed, well-developed  HEAD:  Atraumatic, Normocephalic  EYES: EOMI, PERRLA, conjunctiva and sclera clear  NECK: Supple, No JVD.    On Neurological Examination:    Mental Status - Pt is alert, awake, oriented X3. Higher functions are intact Follows commands well.    Speech -  Normal. Pt has no aphasia.    Cranial Nerves - Pupils 3 mm equal and reactive to light. Pt has no facial asymmetry. Tongue - is in midline.    Motor Exam - 4 plus/5 all over, No drift. No shaking or tremors. Muscle tone - is normal all over.  Has left Foot drop, which is old.    Sensory Exam -  Pt withdraws all extremities equally on stimulation. No asymmetry seen. No complaints of tingling, numbness.    Gait - Pt walks with walker as per base line.    Deep tendon Reflexes - 2 plus all over.    Coordination - Fine finger movements are normal on both sides. Finger to nose is also normal on both sides.       Neck Supple -  Yes.    MEDICATIONS    enoxaparin Injectable 40milliGRAM(s) SubCutaneous every 24 hours  acetaminophen   Tablet. 650milliGRAM(s) Oral every 6 hours PRN  lisinopril 20milliGRAM(s) Oral daily  cloNIDine 0.1milliGRAM(s) Oral every 12 hours  DULoxetine 60milliGRAM(s) Oral daily  ALPRAZolam 0.25milliGRAM(s) Oral two times a day  amLODIPine   Tablet 10milliGRAM(s) Oral daily  docusate sodium 100milliGRAM(s) Oral three times a day  senna 2Tablet(s) Oral at bedtime  pantoprazole   Suspension 40milliGRAM(s) Oral daily  cyclobenzaprine 10milliGRAM(s) Oral every 12 hours  aluminum hydroxide/magnesium hydroxide/simethicone Suspension 30milliLiter(s) Oral every 6 hours PRN  ondansetron    Tablet 4milliGRAM(s) Oral every 4 hours PRN  HYDROmorphone   Tablet 6milliGRAM(s) Oral every 4 hours PRN  HYDROmorphone   Tablet 4milliGRAM(s) Oral every 4 hours PRN  tamsulosin 0.8milliGRAM(s) Oral at bedtime  magnesium citrate Solution 150milliLiter(s) Oral every 6 hours    Allergies    No Known Allergies    LABS:    CBC Full  -  ( 28 May 2017 06:10 )  WBC Count : 5.7 K/uL  Hemoglobin : 10.2 g/dL  Hematocrit : 31.2 %  Platelet Count - Automated : 284 K/uL  Mean Cell Volume : 82.4 fl  Mean Cell Hemoglobin : 27.0 pg  Mean Cell Hemoglobin Concentration : 32.8 gm/dL    05-28    140  |  105  |  28<H>  ----------------------------<  102<H>  4.3   |  30  |  0.97    Ca    8.6      28 May 2017 06:10

## 2017-05-29 NOTE — PROGRESS NOTE ADULT - ASSESSMENT
76 years old male with back pain, nausea  and vomiting. Possible narcotic dependence. Now has difficulty voiding and constipation.

## 2017-05-29 NOTE — PROGRESS NOTE ADULT - ASSESSMENT
76y Male complaining of pain, low back with history of chronic back pain, spinal fusion (approximately a [facility] ago at [facility]) presents to the ED from [facility] c/o lower back pain, as well as nausea. Pt takes 6mg Dilaudid every 3 hours for pain.  Pt has left foot drop which is chronic. 76y Male complaining of pain, low back with history of chronic back pain, spinal fusion (approximately a month ago at [facility]) presents to the ED from [facility] c/o lower back pain, as well as nausea. Pt takes 6mg Dilaudid every 3 hours for pain.  Pt has left foot drop which is chronic.  Pt is able to walk with walker.

## 2017-05-29 NOTE — DIETITIAN INITIAL EVALUATION ADULT. - NS AS NUTRI INTERV MEALS SNACK
Mineral - modified diet/dash/tlc c ensure enlive tid; honor food pref/General/healthful diet/Fat - modified diet

## 2017-05-30 RX ORDER — ALPRAZOLAM 0.25 MG
1 TABLET ORAL
Qty: 10 | Refills: 0 | OUTPATIENT
Start: 2017-05-30 | End: 2017-06-04

## 2017-05-30 RX ORDER — PANTOPRAZOLE SODIUM 20 MG/1
1 TABLET, DELAYED RELEASE ORAL
Qty: 30 | Refills: 0 | OUTPATIENT
Start: 2017-05-30 | End: 2017-06-29

## 2017-05-30 RX ORDER — AMLODIPINE BESYLATE 2.5 MG/1
1 TABLET ORAL
Qty: 30 | Refills: 0 | OUTPATIENT
Start: 2017-05-30 | End: 2017-06-29

## 2017-05-30 RX ORDER — TAMSULOSIN HYDROCHLORIDE 0.4 MG/1
1 CAPSULE ORAL
Qty: 0 | Refills: 0 | COMMUNITY

## 2017-05-30 RX ORDER — HYDROMORPHONE HYDROCHLORIDE 2 MG/ML
3 INJECTION INTRAMUSCULAR; INTRAVENOUS; SUBCUTANEOUS
Qty: 90 | Refills: 0 | OUTPATIENT
Start: 2017-05-30 | End: 2017-06-04

## 2017-05-30 RX ORDER — DOCUSATE SODIUM 100 MG
300 CAPSULE ORAL
Qty: 0 | Refills: 0 | COMMUNITY

## 2017-05-30 RX ORDER — HYDROMORPHONE HYDROCHLORIDE 2 MG/ML
6 INJECTION INTRAMUSCULAR; INTRAVENOUS; SUBCUTANEOUS
Qty: 0 | Refills: 0 | COMMUNITY

## 2017-05-30 RX ORDER — ONDANSETRON 8 MG/1
1 TABLET, FILM COATED ORAL
Qty: 180 | Refills: 0 | OUTPATIENT
Start: 2017-05-30 | End: 2017-06-29

## 2017-05-30 RX ORDER — DULOXETINE HYDROCHLORIDE 30 MG/1
1 CAPSULE, DELAYED RELEASE ORAL
Qty: 30 | Refills: 0 | OUTPATIENT
Start: 2017-05-30 | End: 2017-06-29

## 2017-05-30 RX ORDER — AMLODIPINE BESYLATE 2.5 MG/1
1 TABLET ORAL
Qty: 0 | Refills: 0 | COMMUNITY

## 2017-05-30 RX ORDER — SENNA PLUS 8.6 MG/1
8.6 TABLET ORAL
Qty: 0 | Refills: 0 | COMMUNITY

## 2017-05-30 RX ORDER — DULOXETINE HYDROCHLORIDE 30 MG/1
0 CAPSULE, DELAYED RELEASE ORAL
Qty: 0 | Refills: 0 | COMMUNITY

## 2017-05-30 RX ORDER — PANTOPRAZOLE SODIUM 20 MG/1
40 TABLET, DELAYED RELEASE ORAL
Qty: 0 | Refills: 0 | COMMUNITY

## 2017-05-30 RX ORDER — ALPRAZOLAM 0.25 MG
0 TABLET ORAL
Qty: 0 | Refills: 0 | COMMUNITY

## 2017-05-30 RX ORDER — LISINOPRIL 2.5 MG/1
1 TABLET ORAL
Qty: 30 | Refills: 0 | OUTPATIENT
Start: 2017-05-30 | End: 2017-06-29

## 2017-05-30 RX ORDER — TAMSULOSIN HYDROCHLORIDE 0.4 MG/1
2 CAPSULE ORAL
Qty: 60 | Refills: 0 | OUTPATIENT
Start: 2017-05-30 | End: 2017-06-29

## 2017-05-30 RX ORDER — LISINOPRIL 2.5 MG/1
20 TABLET ORAL
Qty: 0 | Refills: 0 | COMMUNITY

## 2017-05-30 RX ORDER — DOCUSATE SODIUM 100 MG
1 CAPSULE ORAL
Qty: 90 | Refills: 0 | OUTPATIENT
Start: 2017-05-30 | End: 2017-06-29

## 2017-05-30 RX ORDER — SENNA PLUS 8.6 MG/1
2 TABLET ORAL
Qty: 60 | Refills: 0 | OUTPATIENT
Start: 2017-05-30 | End: 2017-06-29

## 2017-05-30 RX ORDER — CYCLOBENZAPRINE HYDROCHLORIDE 10 MG/1
1 TABLET, FILM COATED ORAL
Qty: 60 | Refills: 0 | OUTPATIENT
Start: 2017-05-30 | End: 2017-06-29

## 2017-05-30 RX ADMIN — TAMSULOSIN HYDROCHLORIDE 0.8 MILLIGRAM(S): 0.4 CAPSULE ORAL at 21:03

## 2017-05-30 RX ADMIN — CYCLOBENZAPRINE HYDROCHLORIDE 10 MILLIGRAM(S): 10 TABLET, FILM COATED ORAL at 21:03

## 2017-05-30 RX ADMIN — AMLODIPINE BESYLATE 10 MILLIGRAM(S): 2.5 TABLET ORAL at 05:41

## 2017-05-30 RX ADMIN — HYDROMORPHONE HYDROCHLORIDE 6 MILLIGRAM(S): 2 INJECTION INTRAMUSCULAR; INTRAVENOUS; SUBCUTANEOUS at 19:17

## 2017-05-30 RX ADMIN — HYDROMORPHONE HYDROCHLORIDE 6 MILLIGRAM(S): 2 INJECTION INTRAMUSCULAR; INTRAVENOUS; SUBCUTANEOUS at 15:17

## 2017-05-30 RX ADMIN — CYCLOBENZAPRINE HYDROCHLORIDE 10 MILLIGRAM(S): 10 TABLET, FILM COATED ORAL at 10:28

## 2017-05-30 RX ADMIN — SENNA PLUS 2 TABLET(S): 8.6 TABLET ORAL at 21:04

## 2017-05-30 RX ADMIN — HYDROMORPHONE HYDROCHLORIDE 6 MILLIGRAM(S): 2 INJECTION INTRAMUSCULAR; INTRAVENOUS; SUBCUTANEOUS at 02:38

## 2017-05-30 RX ADMIN — HYDROMORPHONE HYDROCHLORIDE 6 MILLIGRAM(S): 2 INJECTION INTRAMUSCULAR; INTRAVENOUS; SUBCUTANEOUS at 11:00

## 2017-05-30 RX ADMIN — HYDROMORPHONE HYDROCHLORIDE 6 MILLIGRAM(S): 2 INJECTION INTRAMUSCULAR; INTRAVENOUS; SUBCUTANEOUS at 18:56

## 2017-05-30 RX ADMIN — ONDANSETRON 4 MILLIGRAM(S): 8 TABLET, FILM COATED ORAL at 10:29

## 2017-05-30 RX ADMIN — Medication 0.1 MILLIGRAM(S): at 05:41

## 2017-05-30 RX ADMIN — Medication 0.1 MILLIGRAM(S): at 17:36

## 2017-05-30 RX ADMIN — DULOXETINE HYDROCHLORIDE 60 MILLIGRAM(S): 30 CAPSULE, DELAYED RELEASE ORAL at 12:19

## 2017-05-30 RX ADMIN — HYDROMORPHONE HYDROCHLORIDE 6 MILLIGRAM(S): 2 INJECTION INTRAMUSCULAR; INTRAVENOUS; SUBCUTANEOUS at 23:14

## 2017-05-30 RX ADMIN — Medication 0.25 MILLIGRAM(S): at 05:40

## 2017-05-30 RX ADMIN — HYDROMORPHONE HYDROCHLORIDE 6 MILLIGRAM(S): 2 INJECTION INTRAMUSCULAR; INTRAVENOUS; SUBCUTANEOUS at 06:22

## 2017-05-30 RX ADMIN — ONDANSETRON 4 MILLIGRAM(S): 8 TABLET, FILM COATED ORAL at 18:27

## 2017-05-30 RX ADMIN — HYDROMORPHONE HYDROCHLORIDE 6 MILLIGRAM(S): 2 INJECTION INTRAMUSCULAR; INTRAVENOUS; SUBCUTANEOUS at 10:28

## 2017-05-30 RX ADMIN — LISINOPRIL 20 MILLIGRAM(S): 2.5 TABLET ORAL at 05:40

## 2017-05-30 RX ADMIN — HYDROMORPHONE HYDROCHLORIDE 6 MILLIGRAM(S): 2 INJECTION INTRAMUSCULAR; INTRAVENOUS; SUBCUTANEOUS at 14:47

## 2017-05-30 RX ADMIN — Medication 100 MILLIGRAM(S): at 05:41

## 2017-05-30 RX ADMIN — HYDROMORPHONE HYDROCHLORIDE 6 MILLIGRAM(S): 2 INJECTION INTRAMUSCULAR; INTRAVENOUS; SUBCUTANEOUS at 06:52

## 2017-05-30 RX ADMIN — ONDANSETRON 4 MILLIGRAM(S): 8 TABLET, FILM COATED ORAL at 01:36

## 2017-05-30 RX ADMIN — PANTOPRAZOLE SODIUM 40 MILLIGRAM(S): 20 TABLET, DELAYED RELEASE ORAL at 12:19

## 2017-05-30 RX ADMIN — ONDANSETRON 4 MILLIGRAM(S): 8 TABLET, FILM COATED ORAL at 14:42

## 2017-05-30 RX ADMIN — HYDROMORPHONE HYDROCHLORIDE 6 MILLIGRAM(S): 2 INJECTION INTRAMUSCULAR; INTRAVENOUS; SUBCUTANEOUS at 02:08

## 2017-05-30 RX ADMIN — ONDANSETRON 4 MILLIGRAM(S): 8 TABLET, FILM COATED ORAL at 22:38

## 2017-05-30 RX ADMIN — Medication 0.25 MILLIGRAM(S): at 17:36

## 2017-05-30 RX ADMIN — ENOXAPARIN SODIUM 40 MILLIGRAM(S): 100 INJECTION SUBCUTANEOUS at 21:02

## 2017-05-30 RX ADMIN — Medication 100 MILLIGRAM(S): at 21:04

## 2017-05-30 RX ADMIN — ONDANSETRON 4 MILLIGRAM(S): 8 TABLET, FILM COATED ORAL at 05:41

## 2017-05-30 RX ADMIN — Medication 100 MILLIGRAM(S): at 13:04

## 2017-05-30 NOTE — PROGRESS NOTE ADULT - ASSESSMENT
76y Male complaining of pain, low back with history of chronic back pain, spinal fusion (approximately a month ago at [facility]) presents to the ED from [facility] c/o lower back pain, as well as nausea. Pt takes 6mg Dilaudid every 3 hours for pain.  Pt has left foot drop which is chronic.  Pt is able to walk with walker.

## 2017-05-30 NOTE — DISCHARGE NOTE ADULT - HOSPITAL COURSE
patient was admitted with increasing back pain and nausea and vomiting.  he was seen by Pain management and neurology.   Patient has chronic foot drop of left foot.   Patient was changed to oral Oxycodone as per pain management but couldn't tolerate it. He was placed back on oral Dilaudid and it was changed to every 4 hrs as needed. He was given Zofran 30 mins before Dilaudid with good results. He is also on Flexeril.     Patient shows signs of narcotic dependence and is advised to follow up with pain management as out patient and try to get weaned off narcotics.     Patient is being discharged to [facility] as per PT recommendations.     His prognosis is very guarded. patient was admitted with increasing back pain and nausea and vomiting.  he was seen by Pain management and neurology.   Patient has chronic foot drop of left foot.   Patient was changed to oral Oxycodone as per pain management but couldn't tolerate it. He was placed back on oral Dilaudid and it was changed to every 4 hrs as needed. He was given Zofran 30 mins before Dilaudid with good results. He is also on Flexeril.     Patient shows signs of narcotic dependence and is advised to follow up with pain management as out patient and try to get weaned off narcotics.     Patient is being discharged to [facility] as per PT recommendations with AFO brace.     His prognosis is very guarded.

## 2017-05-30 NOTE — DISCHARGE NOTE ADULT - MEDICATION SUMMARY - MEDICATIONS TO TAKE
I will START or STAY ON the medications listed below when I get home from the hospital:    Dilaudid 2 mg oral tablet  -- 3 tab(s) by mouth every 4 hours, As Needed - for severe pain MDD = 18 MDD:18  -- Indication: For Pain    lisinopril 20 mg oral tablet  -- 1 tab(s) by mouth once a day  -- Do not take this drug if you are pregnant.  It is very important that you take or use this exactly as directed.  Do not skip doses or discontinue unless directed by your doctor.  Some non-prescription drugs may aggravate your condition.  Read all labels carefully.  If a warning appears, check with your doctor before taking.    -- Indication: For Essential hypertension    cloNIDine 0.1 mg oral tablet  -- 1 tab(s) by mouth 2 times a day  -- It is very important that you take or use this exactly as directed.  Do not skip doses or discontinue unless directed by your doctor.  May cause drowsiness.  Alcohol may intensify this effect.  Use care when operating dangerous machinery.  Some non-prescription drugs may aggravate your condition.  Read all labels carefully.  If a warning appears, check with your doctor before taking.    -- Indication: For Essential hypertension    tamsulosin 0.4 mg oral capsule  -- 2 cap(s) by mouth once a day (at bedtime)  -- Indication: For Benign prostatic hypertrophy without urinary obstruction    DULoxetine 60 mg oral delayed release capsule  -- 1 cap(s) by mouth once a day  -- Indication: For Neuropathy    ondansetron 4 mg oral tablet  -- 1 tab(s) by mouth every 4 hours, As needed, Nausea and/or Vomiting  -- Indication: For Nausea and vomiting    ALPRAZolam 0.25 mg oral tablet  -- 1 tab(s) by mouth 2 times a day MDD =2 MDD:2  -- Indication: For Anxiety    amLODIPine 10 mg oral tablet  -- 1 tab(s) by mouth once a day  -- Indication: For Essential hypertension    docusate sodium 100 mg oral capsule  -- 1 cap(s) by mouth 3 times a day  -- Indication: For Constipation, unspecified constipation type    senna oral tablet  -- 2 tab(s) by mouth once a day (at bedtime)  -- Indication: For Constipation, unspecified constipation type    cyclobenzaprine 10 mg oral tablet  -- 1 tab(s) by mouth every 12 hours  -- Indication: For Back pain    Protonix 40 mg oral delayed release tablet  -- 1 tab(s) by mouth once a day  -- It is very important that you take or use this exactly as directed.  Do not skip doses or discontinue unless directed by your doctor.  Obtain medical advice before taking any non-prescription drugs as some may affect the action of this medication.  Swallow whole.  Do not crush.    -- Indication: For GERD (gastroesophageal reflux disease)

## 2017-05-30 NOTE — DISCHARGE NOTE ADULT - MEDICATION SUMMARY - MEDICATIONS TO CHANGE
I will SWITCH the dose or number of times a day I take the medications listed below when I get home from the hospital:    Flomax 0.4 mg oral capsule  -- 1 cap(s) by mouth once a day    Zofran ODT 8 mg oral tablet, disintegrating  -- 1 tab(s) by mouth every 6 hours    Dilaudid  -- 6 milligram(s) by mouth q 3 hours    Colace  -- 300 milligram(s) by mouth once a day (at bedtime)

## 2017-05-30 NOTE — DISCHARGE NOTE ADULT - PLAN OF CARE
Better pain control Low salt, low cholesterol diet.   Increase activity with PT at [facility] after AFO brace is delivered.  Fall precautions.   Try to wean your pain medications as tolerated.  Follow up with pain management as out patient.   Follow up with Dr. Springer at [facility].

## 2017-05-30 NOTE — DISCHARGE NOTE ADULT - NS AS ACTIVITY OBS
Walking-Indoors allowed/Do not make important decisions/Do not drive or operate machinery/Bathing allowed/Walking-Outdoors allowed/Showering allowed/No Heavy lifting/straining

## 2017-05-30 NOTE — PROGRESS NOTE ADULT - SUBJECTIVE AND OBJECTIVE BOX
Neurology Follow up note    TOM HERRERA 76y Male    HPI:  This is a 77 YO Male complaining of pain, low back with history of chronic back pain, spinal fusion (approximately a month ago at [facility]) presents to the ED from [facility] c/o lower back pain, as well as nausea. Patient takes 6 mg Dilaudid every 3 hours for pain, and took his medication this morning. Patient states before the surgery, he saw someone for pain management, but he does not currently have a pain management physician. Denies numbness/tingling in lower extremities. No further complaints at this time. (23 May 2017 20:31)      Interval History -    Patient is seen, chart was reviewed and case was discussed with the treatment team.  Pt is not in any distress.       Vital Signs Last 24 Hrs  T(C): 36.9, Max: 36.9 (05-30 @ 07:51)  T(F): 98.5, Max: 98.5 (05-30 @ 07:51)  HR: 70 (70 - 83)  BP: 101/67 (101/67 - 128/79)  BP(mean): --  RR: 16 (16 - 16)  SpO2: 91% (91% - 93%)    REVIEW OF SYSTEMS:    CONSTITUTIONAL: No fever  EYES: No eye pain,   ENMT:  No sinus or throat pain  NECK: No pain or stiffness  RESPIRATORY: No cough, No hemoptysis; No shortness of breath  CARDIOVASCULAR: No acute chest pain, palpitations,  or leg swelling  GASTROINTESTINAL: No abdominal pain. No nausea, vomiting, or hematemesis;  No melena or hematochezia.  GENITOURINARY: No  hematuria, or incontinence  MUSCULOSKELETAL: Had kyphosis/S/p Spinal fusion surgery only a month ago to correct his posture.  SKIN: No itching, rashes, or lesions   LYMPH NODES: No enlarged glands  NEUROLOGICAL: Low back pain/Neuropathy.  PSYCHIATRIC: H/o depression/anxiety.  ENDOCRINE: No heat or cold intolerance;   HEME/LYMPH: No easy bruising, or bleeding gums  Allergy/Immunology. No medication allergy. No seasonal allergies.    GENERAL: NAD, well-groomed, well-developed  HEAD:  Atraumatic, Normocephalic  EYES: EOMI, PERRLA, conjunctiva and sclera clear  NECK: Supple, No JVD.    On Neurological Examination:    Mental Status - Pt is alert, awake, oriented X3. Higher functions are intact Follows commands well.    Speech -  Normal. Pt has no aphasia.    Cranial Nerves - Pupils 3 mm equal and reactive to light. Pt has no facial asymmetry. Tongue - is in midline.    Motor Exam - 4 plus/5 all over, No drift. No shaking or tremors. Muscle tone - is normal all over.  Has left Foot drop, which is old.    Sensory Exam -  Pt withdraws all extremities equally on stimulation. No asymmetry seen. No complaints of tingling, numbness.    Gait - Pt walks with walker as per base line.    Deep tendon Reflexes - 2 plus all over.    Coordination - Fine finger movements are normal on both sides. Finger to nose is also normal on both sides.       Neck Supple -  Yes.    MEDICATIONS    enoxaparin Injectable 40milliGRAM(s) SubCutaneous every 24 hours  acetaminophen   Tablet. 650milliGRAM(s) Oral every 6 hours PRN  lisinopril 20milliGRAM(s) Oral daily  cloNIDine 0.1milliGRAM(s) Oral every 12 hours  DULoxetine 60milliGRAM(s) Oral daily  ALPRAZolam 0.25milliGRAM(s) Oral two times a day  amLODIPine   Tablet 10milliGRAM(s) Oral daily  docusate sodium 100milliGRAM(s) Oral three times a day  senna 2Tablet(s) Oral at bedtime  pantoprazole   Suspension 40milliGRAM(s) Oral daily  cyclobenzaprine 10milliGRAM(s) Oral every 12 hours  aluminum hydroxide/magnesium hydroxide/simethicone Suspension 30milliLiter(s) Oral every 6 hours PRN  ondansetron    Tablet 4milliGRAM(s) Oral every 4 hours PRN  HYDROmorphone   Tablet 6milliGRAM(s) Oral every 4 hours PRN  HYDROmorphone   Tablet 4milliGRAM(s) Oral every 4 hours PRN  tamsulosin 0.8milliGRAM(s) Oral at bedtime      Allergies    No Known Allergies

## 2017-05-30 NOTE — DISCHARGE NOTE ADULT - SECONDARY DIAGNOSIS.
Intractable vomiting with nausea, unspecified vomiting type Narcotic dependency, continuous Essential hypertension Foot drop, left foot Anxiety Gastroesophageal reflux disease with esophagitis

## 2017-05-30 NOTE — DISCHARGE NOTE ADULT - PATIENT PORTAL LINK FT
“You can access the [facility] Patient Portal, offered by [facility], by registering with the following website: [email or web address]”

## 2017-05-30 NOTE — PROGRESS NOTE ADULT - SUBJECTIVE AND OBJECTIVE BOX
Patient is a 76y old  Male who presents with a chief complaint of Nausea, vomiting and back pain (23 May 2017 20:31)    HPI:  This is a 77 YO Male complaining of pain, low back with history of chronic back pain, spinal fusion (approximately a month ago at [facility] for [facility]) presents to the ED from [facility] c/o lower back pain, as well as nausea. Patient takes 6 mg Dilaudid every 3 hours for pain, and took his medication this morning. Patient states before the surgery, he saw someone for pain management, but he does not currently have a pain management physician. Denies numbness/tingling in lower extremities. No further complaints at this time. (23 May 2017 20:31)    INTERVAL HPI/OVERNIGHT EVENTS:  Chart reviewed, notes reviewed.   Patient seen and examined.  Pain is fairly controlled with current medications.  Ambulated with physical therapy.   Being followed by following specialists: neurology and pain management.    Consultant(s) Notes Reviewed:  [X] Yes    Care Discussed with Consultants/Other Providers: [X] Yes    C/O constipation and difficulty voiding.   No numbness or tingling.   No dysuria or hematuria.     REVIEW OF SYSTEMS:  CONSTITUTIONAL: No fever, weight loss, or fatigue  EYES: No eye pain, visual disturbances, or discharge  ENMT:  No difficulty hearing, tinnitus, vertigo; No sinus or throat pain  NECK: No pain or stiffness  BREASTS: No pain, masses, or nipple discharge  RESPIRATORY: No cough, wheezing, chills or hemoptysis; No shortness of breath  CARDIOVASCULAR: No chest pain, palpitations, dizziness, or leg swelling  GASTROINTESTINAL: No abdominal or epigastric pain. No nausea, vomiting, or hematemesis; + Constipation.   GENITOURINARY: difficulty voiding. No dysuria or hematuria.   NEUROLOGICAL: No headaches, memory loss, loss of strength, numbness, or tremors  SKIN: No itching, burning, rashes, or lesions   LYMPH NODES: No enlarged glands  ENDOCRINE: No heat or cold intolerance; No hair loss; No polydipsia or polyuria  MUSCULOSKELETAL: back pain.   PSYCHIATRIC: No depression, anxiety, mood swings, or difficulty sleeping  HEME/LYMPH: No easy bruising, or bleeding gums  ALLERGY AND IMMUNOLOGIC: No hives or eczema    Allergies: No Known Allergies    Intolerances    MEDICATIONS  (STANDING):  enoxaparin Injectable 40milliGRAM(s) SubCutaneous every 24 hours  lisinopril 20milliGRAM(s) Oral daily  cloNIDine 0.1milliGRAM(s) Oral every 12 hours  DULoxetine 60milliGRAM(s) Oral daily  ALPRAZolam 0.25milliGRAM(s) Oral two times a day  amLODIPine   Tablet 10milliGRAM(s) Oral daily  docusate sodium 100milliGRAM(s) Oral three times a day  senna 2Tablet(s) Oral at bedtime  pantoprazole   Suspension 40milliGRAM(s) Oral daily  cyclobenzaprine 10milliGRAM(s) Oral every 12 hours  tamsulosin 0.8milliGRAM(s) Oral at bedtime    MEDICATIONS  (PRN):  acetaminophen   Tablet. 650milliGRAM(s) Oral every 6 hours PRN Mild Pain (1 - 3)  aluminum hydroxide/magnesium hydroxide/simethicone Suspension 30milliLiter(s) Oral every 6 hours PRN Dyspepsia  ondansetron    Tablet 4milliGRAM(s) Oral every 4 hours PRN Nausea and/or Vomiting  HYDROmorphone   Tablet 6milliGRAM(s) Oral every 4 hours PRN Severe Pain (7 - 10)  HYDROmorphone   Tablet 4milliGRAM(s) Oral every 4 hours PRN Moderate Pain (4 - 6)    Vital Signs Last 24 Hrs  T(C): 36.9, Max: 37.1 (05-29 @ 15:40)  T(F): 98.5, Max: 98.7 (05-29 @ 15:40)  HR: 76 (75 - 83)  BP: 117/75 (106/69 - 128/79)  BP(mean): --  RR: 16 (14 - 16)  SpO2: 93% (93% - 98%)    PHYSICAL EXAM:  GENERAL: NAD, well-groomed, well-developed  HEAD:  Atraumatic, Normocephalic  EYES: EOMI, PERRLA, conjunctiva and sclera clear  ENMT: No tonsillar erythema, exudates, or enlargement; Moist mucous membranes, Good dentition, No lesions  NECK: Supple, No JVD, Normal thyroid  CHEST/LUNG: Clear to auscultation bilaterally; No rales, rhonchi, wheezing, or rubs  HEART: Regular rate and rhythm; No murmurs, rubs, or gallops  ABDOMEN: Soft, Nontender, Nondistended; Bowel sounds present  EXTREMITIES:  2+ Peripheral Pulses, No clubbing, cyanosis, or edema  MS: No joint swelling or deformity.   LYMPH: No lymphadenopathy noted  SKIN: No rashes or lesions  NERVOUS SYSTEM:  Motor Strength 4/5 B/L upper and lower extremities; DTRs 2+ intact and symmetric  PSYCH:  Alert & Oriented X3, Good concentration.        LABS:          CAPILLARY BLOOD GLUCOSE      05-24 [id number] 5.6    05-24 Chol 165  HDL 46 Trig 81    Thyroid            HEALTH ISSUES - PROBLEM Dx:  Left foot drop: Left foot drop  Need for prophylactic measure: Need for prophylactic measure  Essential hypertension: Essential hypertension  Anxiety: Anxiety  Gastroesophageal reflux disease with esophagitis: Gastroesophageal reflux disease with esophagitis  Kyphosis of thoracic region, unspecified kyphosis type: Kyphosis of thoracic region, unspecified kyphosis type  Neuropathy: Neuropathy  Intractable back pain: Intractable back pain  Nausea and vomiting, intractability of vomiting not specified, unspecified vomiting type: Nausea and vomiting, intractability of vomiting not specified, unspecified vomiting type

## 2017-05-30 NOTE — DISCHARGE NOTE ADULT - CARE PLAN
Principal Discharge DX:	Intractable back pain  Goal:	Better pain control  Instructions for follow-up, activity and diet:	Low salt, low cholesterol diet.   Increase activity with PT at [facility] after AFO brace is delivered.  Fall precautions.   Try to wean your pain medications as tolerated.  Follow up with pain management as out patient.   Follow up with Dr. Springer at [facility].  Secondary Diagnosis:	Intractable vomiting with nausea, unspecified vomiting type  Secondary Diagnosis:	Narcotic dependency, continuous  Secondary Diagnosis:	Essential hypertension  Secondary Diagnosis:	Foot drop, left foot  Secondary Diagnosis:	Anxiety  Secondary Diagnosis:	Gastroesophageal reflux disease with esophagitis Principal Discharge DX:	Intractable back pain  Goal:	Better pain control  Instructions for follow-up, activity and diet:	Low salt, low cholesterol diet.   Increase activity with PT at [facility] after AFO brace is delivered.  Fall precautions.   Try to wean your pain medications as tolerated.  Follow up with pain management as out patient.   Follow up with Dr. Springer at [facility].  Secondary Diagnosis:	Intractable vomiting with nausea, unspecified vomiting type  Secondary Diagnosis:	Narcotic dependency, continuous  Secondary Diagnosis:	Essential hypertension  Secondary Diagnosis:	Foot drop, left foot  Secondary Diagnosis:	Anxiety  Secondary Diagnosis:	Gastroesophageal reflux disease with esophagitis Principal Discharge DX:	Intractable back pain  Goal:	Better pain control  Instructions for follow-up, activity and diet:	Low salt, low cholesterol diet.   Increase activity with PT at [facility] after AFO brace is delivered.  Fall precautions.   Try to wean your pain medications as tolerated.  Follow up with pain management as out patient.   Follow up with Dr. pSringer at [facility].  Secondary Diagnosis:	Intractable vomiting with nausea, unspecified vomiting type  Secondary Diagnosis:	Narcotic dependency, continuous  Secondary Diagnosis:	Essential hypertension  Secondary Diagnosis:	Foot drop, left foot  Secondary Diagnosis:	Anxiety  Secondary Diagnosis:	Gastroesophageal reflux disease with esophagitis Principal Discharge DX:	Intractable back pain  Goal:	Better pain control  Instructions for follow-up, activity and diet:	Low salt, low cholesterol diet.   Increase activity with PT at [facility] after AFO brace is delivered.  Fall precautions.   Try to wean your pain medications as tolerated.  Follow up with pain management as out patient.   Follow up with Dr. Springer at [facility].  Secondary Diagnosis:	Intractable vomiting with nausea, unspecified vomiting type  Secondary Diagnosis:	Narcotic dependency, continuous  Secondary Diagnosis:	Essential hypertension  Secondary Diagnosis:	Foot drop, left foot  Secondary Diagnosis:	Anxiety  Secondary Diagnosis:	Gastroesophageal reflux disease with esophagitis Principal Discharge DX:	Intractable back pain  Goal:	Better pain control  Instructions for follow-up, activity and diet:	Low salt, low cholesterol diet.   Increase activity with PT at [facility] after AFO brace is delivered.  Fall precautions.   Try to wean your pain medications as tolerated.  Follow up with pain management as out patient.   Follow up with Dr. Springer at [facility].  Secondary Diagnosis:	Intractable vomiting with nausea, unspecified vomiting type  Secondary Diagnosis:	Narcotic dependency, continuous  Secondary Diagnosis:	Essential hypertension  Secondary Diagnosis:	Foot drop, left foot  Secondary Diagnosis:	Anxiety  Secondary Diagnosis:	Gastroesophageal reflux disease with esophagitis Principal Discharge DX:	Intractable back pain  Goal:	Better pain control  Instructions for follow-up, activity and diet:	Low salt, low cholesterol diet.   Increase activity with PT at [facility] after AFO brace is delivered.  Fall precautions.   Try to wean your pain medications as tolerated.  Follow up with pain management as out patient.   Follow up with Dr. Springer at [facility].  Secondary Diagnosis:	Intractable vomiting with nausea, unspecified vomiting type  Secondary Diagnosis:	Narcotic dependency, continuous  Secondary Diagnosis:	Essential hypertension  Secondary Diagnosis:	Foot drop, left foot  Secondary Diagnosis:	Anxiety  Secondary Diagnosis:	Gastroesophageal reflux disease with esophagitis Principal Discharge DX:	Intractable back pain  Goal:	Better pain control  Instructions for follow-up, activity and diet:	Low salt, low cholesterol diet.   Increase activity with PT at [facility] after AFO brace is delivered.  Fall precautions.   Try to wean your pain medications as tolerated.  Follow up with pain management as out patient.   Follow up with Dr. Springer at [facility].  Secondary Diagnosis:	Intractable vomiting with nausea, unspecified vomiting type  Secondary Diagnosis:	Narcotic dependency, continuous  Secondary Diagnosis:	Essential hypertension  Secondary Diagnosis:	Foot drop, left foot  Secondary Diagnosis:	Anxiety  Secondary Diagnosis:	Gastroesophageal reflux disease with esophagitis

## 2017-05-31 RX ORDER — ONDANSETRON 8 MG/1
4 TABLET, FILM COATED ORAL ONCE
Qty: 0 | Refills: 0 | Status: COMPLETED | OUTPATIENT
Start: 2017-05-31 | End: 2017-05-31

## 2017-05-31 RX ADMIN — ONDANSETRON 4 MILLIGRAM(S): 8 TABLET, FILM COATED ORAL at 20:24

## 2017-05-31 RX ADMIN — HYDROMORPHONE HYDROCHLORIDE 6 MILLIGRAM(S): 2 INJECTION INTRAMUSCULAR; INTRAVENOUS; SUBCUTANEOUS at 09:50

## 2017-05-31 RX ADMIN — CYCLOBENZAPRINE HYDROCHLORIDE 10 MILLIGRAM(S): 10 TABLET, FILM COATED ORAL at 22:50

## 2017-05-31 RX ADMIN — HYDROMORPHONE HYDROCHLORIDE 6 MILLIGRAM(S): 2 INJECTION INTRAMUSCULAR; INTRAVENOUS; SUBCUTANEOUS at 17:48

## 2017-05-31 RX ADMIN — HYDROMORPHONE HYDROCHLORIDE 6 MILLIGRAM(S): 2 INJECTION INTRAMUSCULAR; INTRAVENOUS; SUBCUTANEOUS at 09:20

## 2017-05-31 RX ADMIN — ONDANSETRON 4 MILLIGRAM(S): 8 TABLET, FILM COATED ORAL at 08:51

## 2017-05-31 RX ADMIN — SENNA PLUS 2 TABLET(S): 8.6 TABLET ORAL at 22:50

## 2017-05-31 RX ADMIN — Medication 100 MILLIGRAM(S): at 22:50

## 2017-05-31 RX ADMIN — ONDANSETRON 4 MILLIGRAM(S): 8 TABLET, FILM COATED ORAL at 12:55

## 2017-05-31 RX ADMIN — TAMSULOSIN HYDROCHLORIDE 0.8 MILLIGRAM(S): 0.4 CAPSULE ORAL at 22:50

## 2017-05-31 RX ADMIN — HYDROMORPHONE HYDROCHLORIDE 6 MILLIGRAM(S): 2 INJECTION INTRAMUSCULAR; INTRAVENOUS; SUBCUTANEOUS at 04:33

## 2017-05-31 RX ADMIN — DULOXETINE HYDROCHLORIDE 60 MILLIGRAM(S): 30 CAPSULE, DELAYED RELEASE ORAL at 12:55

## 2017-05-31 RX ADMIN — AMLODIPINE BESYLATE 10 MILLIGRAM(S): 2.5 TABLET ORAL at 05:27

## 2017-05-31 RX ADMIN — Medication 0.1 MILLIGRAM(S): at 05:27

## 2017-05-31 RX ADMIN — HYDROMORPHONE HYDROCHLORIDE 6 MILLIGRAM(S): 2 INJECTION INTRAMUSCULAR; INTRAVENOUS; SUBCUTANEOUS at 00:05

## 2017-05-31 RX ADMIN — ONDANSETRON 4 MILLIGRAM(S): 8 TABLET, FILM COATED ORAL at 17:06

## 2017-05-31 RX ADMIN — Medication 100 MILLIGRAM(S): at 13:34

## 2017-05-31 RX ADMIN — ONDANSETRON 4 MILLIGRAM(S): 8 TABLET, FILM COATED ORAL at 22:00

## 2017-05-31 RX ADMIN — ENOXAPARIN SODIUM 40 MILLIGRAM(S): 100 INJECTION SUBCUTANEOUS at 21:50

## 2017-05-31 RX ADMIN — HYDROMORPHONE HYDROCHLORIDE 6 MILLIGRAM(S): 2 INJECTION INTRAMUSCULAR; INTRAVENOUS; SUBCUTANEOUS at 13:33

## 2017-05-31 RX ADMIN — LISINOPRIL 20 MILLIGRAM(S): 2.5 TABLET ORAL at 05:27

## 2017-05-31 RX ADMIN — HYDROMORPHONE HYDROCHLORIDE 6 MILLIGRAM(S): 2 INJECTION INTRAMUSCULAR; INTRAVENOUS; SUBCUTANEOUS at 22:30

## 2017-05-31 RX ADMIN — Medication 30 MILLILITER(S): at 16:26

## 2017-05-31 RX ADMIN — HYDROMORPHONE HYDROCHLORIDE 6 MILLIGRAM(S): 2 INJECTION INTRAMUSCULAR; INTRAVENOUS; SUBCUTANEOUS at 14:03

## 2017-05-31 RX ADMIN — Medication 100 MILLIGRAM(S): at 05:27

## 2017-05-31 RX ADMIN — HYDROMORPHONE HYDROCHLORIDE 6 MILLIGRAM(S): 2 INJECTION INTRAMUSCULAR; INTRAVENOUS; SUBCUTANEOUS at 05:28

## 2017-05-31 RX ADMIN — ONDANSETRON 4 MILLIGRAM(S): 8 TABLET, FILM COATED ORAL at 04:01

## 2017-05-31 RX ADMIN — CYCLOBENZAPRINE HYDROCHLORIDE 10 MILLIGRAM(S): 10 TABLET, FILM COATED ORAL at 09:22

## 2017-05-31 RX ADMIN — Medication 0.1 MILLIGRAM(S): at 17:12

## 2017-05-31 RX ADMIN — HYDROMORPHONE HYDROCHLORIDE 6 MILLIGRAM(S): 2 INJECTION INTRAMUSCULAR; INTRAVENOUS; SUBCUTANEOUS at 22:00

## 2017-05-31 RX ADMIN — HYDROMORPHONE HYDROCHLORIDE 6 MILLIGRAM(S): 2 INJECTION INTRAMUSCULAR; INTRAVENOUS; SUBCUTANEOUS at 18:18

## 2017-05-31 NOTE — PROGRESS NOTE ADULT - SUBJECTIVE AND OBJECTIVE BOX
Patient is a 76y old  Male who presents with a chief complaint of Nausea, vomiting and back pain (23 May 2017 20:31)    HPI:  This is a 77 YO Male complaining of pain, low back with history of chronic back pain, spinal fusion (approximately a month ago at [facility] for [facility]) presents to the ED from [facility] c/o lower back pain, as well as nausea. Patient takes 6 mg Dilaudid every 3 hours for pain, and took his medication this morning. Patient states before the surgery, he saw someone for pain management, but he does not currently have a pain management physician. Denies numbness/tingling in lower extremities. No further complaints at this time. (23 May 2017 20:31)    INTERVAL HPI/OVERNIGHT EVENTS:  Chart reviewed, notes reviewed.   Patient seen and examined.  Pain is fairly controlled with current medications.  Ambulated with physical therapy.   Being followed by following specialists: neurology and pain management.    Consultant(s) Notes Reviewed:  [X] Yes    Care Discussed with Consultants/Other Providers: [X] Yes    C/O constipation and difficulty voiding.   No numbness or tingling.   No dysuria or hematuria.     REVIEW OF SYSTEMS:  CONSTITUTIONAL: No fever, weight loss, or fatigue  EYES: No eye pain, visual disturbances, or discharge  ENMT:  No difficulty hearing, tinnitus, vertigo; No sinus or throat pain  NECK: No pain or stiffness  BREASTS: No pain, masses, or nipple discharge  RESPIRATORY: No cough, wheezing, chills or hemoptysis; No shortness of breath  CARDIOVASCULAR: No chest pain, palpitations, dizziness, or leg swelling  GASTROINTESTINAL: No abdominal or epigastric pain. No nausea, vomiting, or hematemesis; + Constipation.   GENITOURINARY: difficulty voiding. No dysuria or hematuria.   NEUROLOGICAL: No headaches, memory loss, loss of strength, numbness, or tremors  SKIN: No itching, burning, rashes, or lesions   LYMPH NODES: No enlarged glands  ENDOCRINE: No heat or cold intolerance; No hair loss; No polydipsia or polyuria  MUSCULOSKELETAL: back pain.   PSYCHIATRIC: No depression, anxiety, mood swings, or difficulty sleeping  HEME/LYMPH: No easy bruising, or bleeding gums  ALLERGY AND IMMUNOLOGIC: No hives or eczema    Allergies: No Known Allergies    Intolerances    MEDICATIONS  (STANDING):  enoxaparin Injectable 40milliGRAM(s) SubCutaneous every 24 hours  lisinopril 20milliGRAM(s) Oral daily  cloNIDine 0.1milliGRAM(s) Oral every 12 hours  DULoxetine 60milliGRAM(s) Oral daily  ALPRAZolam 0.25milliGRAM(s) Oral two times a day  amLODIPine   Tablet 10milliGRAM(s) Oral daily  docusate sodium 100milliGRAM(s) Oral three times a day  senna 2Tablet(s) Oral at bedtime  pantoprazole   Suspension 40milliGRAM(s) Oral daily  cyclobenzaprine 10milliGRAM(s) Oral every 12 hours  tamsulosin 0.8milliGRAM(s) Oral at bedtime    MEDICATIONS  (PRN):  acetaminophen   Tablet. 650milliGRAM(s) Oral every 6 hours PRN Mild Pain (1 - 3)  aluminum hydroxide/magnesium hydroxide/simethicone Suspension 30milliLiter(s) Oral every 6 hours PRN Dyspepsia  ondansetron    Tablet 4milliGRAM(s) Oral every 4 hours PRN Nausea and/or Vomiting  HYDROmorphone   Tablet 6milliGRAM(s) Oral every 4 hours PRN Severe Pain (7 - 10)  HYDROmorphone   Tablet 4milliGRAM(s) Oral every 4 hours PRN Moderate Pain (4 - 6)    Vital Signs Last 24 Hrs  T(C): 36.9, Max: 37.1 (05-29 @ 15:40)  T(F): 98.5, Max: 98.7 (05-29 @ 15:40)  HR: 76 (75 - 83)  BP: 117/75 (106/69 - 128/79)  BP(mean): --  RR: 16 (14 - 16)  SpO2: 93% (93% - 98%)    PHYSICAL EXAM:  GENERAL: NAD, well-groomed, well-developed  HEAD:  Atraumatic, Normocephalic  EYES: EOMI, PERRLA, conjunctiva and sclera clear  ENMT: No tonsillar erythema, exudates, or enlargement; Moist mucous membranes, Good dentition, No lesions  NECK: Supple, No JVD, Normal thyroid  CHEST/LUNG: Clear to auscultation bilaterally; No rales, rhonchi, wheezing, or rubs  HEART: Regular rate and rhythm; No murmurs, rubs, or gallops  ABDOMEN: Soft, Nontender, Nondistended; Bowel sounds present  EXTREMITIES:  2+ Peripheral Pulses, No clubbing, cyanosis, or edema  MS: No joint swelling or deformity.   LYMPH: No lymphadenopathy noted  SKIN: No rashes or lesions  NERVOUS SYSTEM:  Motor Strength 4/5 B/L upper and lower extremities; DTRs 2+ intact and symmetric  PSYCH:  Alert & Oriented X3, Good concentration.        LABS:          CAPILLARY BLOOD GLUCOSE      05-24 [id number] 5.6    05-24 Chol 165  HDL 46 Trig 81    Thyroid            HEALTH ISSUES - PROBLEM Dx:  Left foot drop: Left foot drop  Need for prophylactic measure: Need for prophylactic measure  Essential hypertension: Essential hypertension  Anxiety: Anxiety  Gastroesophageal reflux disease with esophagitis: Gastroesophageal reflux disease with esophagitis  Kyphosis of thoracic region, unspecified kyphosis type: Kyphosis of thoracic region, unspecified kyphosis type  Neuropathy: Neuropathy  Intractable back pain: Intractable back pain  Nausea and vomiting, intractability of vomiting not specified, unspecified vomiting type: Nausea and vomiting, intractability of vomiting not specified, unspecified vomiting type

## 2017-06-01 RX ORDER — ALPRAZOLAM 0.25 MG
TABLET ORAL
Qty: 0 | Refills: 0 | Status: DISCONTINUED | OUTPATIENT
Start: 2017-06-01 | End: 2017-06-02

## 2017-06-01 RX ORDER — METOCLOPRAMIDE HCL 10 MG
5 TABLET ORAL ONCE
Qty: 0 | Refills: 0 | Status: COMPLETED | OUTPATIENT
Start: 2017-06-01 | End: 2017-06-01

## 2017-06-01 RX ORDER — ALPRAZOLAM 0.25 MG
0.25 TABLET ORAL ONCE
Qty: 0 | Refills: 0 | Status: DISCONTINUED | OUTPATIENT
Start: 2017-06-01 | End: 2017-06-01

## 2017-06-01 RX ORDER — ALPRAZOLAM 0.25 MG
0.25 TABLET ORAL
Qty: 0 | Refills: 0 | Status: DISCONTINUED | OUTPATIENT
Start: 2017-06-01 | End: 2017-06-02

## 2017-06-01 RX ADMIN — HYDROMORPHONE HYDROCHLORIDE 6 MILLIGRAM(S): 2 INJECTION INTRAMUSCULAR; INTRAVENOUS; SUBCUTANEOUS at 14:42

## 2017-06-01 RX ADMIN — CYCLOBENZAPRINE HYDROCHLORIDE 10 MILLIGRAM(S): 10 TABLET, FILM COATED ORAL at 09:02

## 2017-06-01 RX ADMIN — HYDROMORPHONE HYDROCHLORIDE 6 MILLIGRAM(S): 2 INJECTION INTRAMUSCULAR; INTRAVENOUS; SUBCUTANEOUS at 21:30

## 2017-06-01 RX ADMIN — ONDANSETRON 4 MILLIGRAM(S): 8 TABLET, FILM COATED ORAL at 01:36

## 2017-06-01 RX ADMIN — DULOXETINE HYDROCHLORIDE 60 MILLIGRAM(S): 30 CAPSULE, DELAYED RELEASE ORAL at 11:14

## 2017-06-01 RX ADMIN — Medication 0.25 MILLIGRAM(S): at 17:55

## 2017-06-01 RX ADMIN — HYDROMORPHONE HYDROCHLORIDE 6 MILLIGRAM(S): 2 INJECTION INTRAMUSCULAR; INTRAVENOUS; SUBCUTANEOUS at 20:49

## 2017-06-01 RX ADMIN — ENOXAPARIN SODIUM 40 MILLIGRAM(S): 100 INJECTION SUBCUTANEOUS at 22:05

## 2017-06-01 RX ADMIN — ONDANSETRON 4 MILLIGRAM(S): 8 TABLET, FILM COATED ORAL at 05:24

## 2017-06-01 RX ADMIN — Medication 0.25 MILLIGRAM(S): at 11:13

## 2017-06-01 RX ADMIN — HYDROMORPHONE HYDROCHLORIDE 6 MILLIGRAM(S): 2 INJECTION INTRAMUSCULAR; INTRAVENOUS; SUBCUTANEOUS at 15:30

## 2017-06-01 RX ADMIN — PANTOPRAZOLE SODIUM 40 MILLIGRAM(S): 20 TABLET, DELAYED RELEASE ORAL at 11:15

## 2017-06-01 RX ADMIN — Medication 5 MILLIGRAM(S): at 02:24

## 2017-06-01 RX ADMIN — CYCLOBENZAPRINE HYDROCHLORIDE 10 MILLIGRAM(S): 10 TABLET, FILM COATED ORAL at 22:05

## 2017-06-01 RX ADMIN — TAMSULOSIN HYDROCHLORIDE 0.8 MILLIGRAM(S): 0.4 CAPSULE ORAL at 22:05

## 2017-06-01 RX ADMIN — SENNA PLUS 2 TABLET(S): 8.6 TABLET ORAL at 22:05

## 2017-06-01 RX ADMIN — Medication 30 MILLILITER(S): at 03:18

## 2017-06-01 RX ADMIN — ONDANSETRON 4 MILLIGRAM(S): 8 TABLET, FILM COATED ORAL at 09:02

## 2017-06-01 RX ADMIN — HYDROMORPHONE HYDROCHLORIDE 6 MILLIGRAM(S): 2 INJECTION INTRAMUSCULAR; INTRAVENOUS; SUBCUTANEOUS at 05:30

## 2017-06-01 RX ADMIN — ONDANSETRON 4 MILLIGRAM(S): 8 TABLET, FILM COATED ORAL at 14:45

## 2017-06-01 RX ADMIN — Medication 0.1 MILLIGRAM(S): at 17:55

## 2017-06-01 RX ADMIN — ONDANSETRON 4 MILLIGRAM(S): 8 TABLET, FILM COATED ORAL at 20:05

## 2017-06-01 RX ADMIN — HYDROMORPHONE HYDROCHLORIDE 6 MILLIGRAM(S): 2 INJECTION INTRAMUSCULAR; INTRAVENOUS; SUBCUTANEOUS at 05:00

## 2017-06-01 RX ADMIN — Medication 100 MILLIGRAM(S): at 22:05

## 2017-06-01 NOTE — PROGRESS NOTE ADULT - SUBJECTIVE AND OBJECTIVE BOX
Patient is a 76y old  Male who presents with a chief complaint of Nausea, vomiting and back pain (23 May 2017 20:31)    HPI:  This is a 75 YO Male complaining of pain, low back with history of chronic back pain, spinal fusion (approximately a month ago at [facility] for [facility]) presents to the ED from [facility] c/o lower back pain, as well as nausea. Patient takes 6 mg Dilaudid every 3 hours for pain, and took his medication this morning. Patient states before the surgery, he saw someone for pain management, but he does not currently have a pain management physician. Denies numbness/tingling in lower extremities. No further complaints at this time. (23 May 2017 20:31)    INTERVAL HPI/OVERNIGHT EVENTS:  Chart reviewed, notes reviewed.   Patient seen and examined.  Pain is fairly controlled with current medications.  Ambulated with physical therapy.   Being followed by following specialists: neurology and pain management.    Consultant(s) Notes Reviewed:  [X] Yes    Care Discussed with Consultants/Other Providers: [X] Yes    c/o some nausea today. Improved with meds.   No numbness or tingling.   No dysuria or hematuria.   Awaiting AFO brace for discharge to [facility].     REVIEW OF SYSTEMS:  CONSTITUTIONAL: No fever, weight loss, or fatigue  EYES: No eye pain, visual disturbances, or discharge  ENMT:  No difficulty hearing, tinnitus, vertigo; No sinus or throat pain  NECK: No pain or stiffness  BREASTS: No pain, masses, or nipple discharge  RESPIRATORY: No cough, wheezing, chills or hemoptysis; No shortness of breath  CARDIOVASCULAR: No chest pain, palpitations, dizziness, or leg swelling  GASTROINTESTINAL: No abdominal or epigastric pain. No nausea, vomiting, or hematemesis; + Constipation.   GENITOURINARY: difficulty voiding. No dysuria or hematuria.   NEUROLOGICAL: No headaches, memory loss, loss of strength, numbness, or tremors  SKIN: No itching, burning, rashes, or lesions   LYMPH NODES: No enlarged glands  ENDOCRINE: No heat or cold intolerance; No hair loss; No polydipsia or polyuria  MUSCULOSKELETAL: back pain.   PSYCHIATRIC: No depression, anxiety, mood swings, or difficulty sleeping  HEME/LYMPH: No easy bruising, or bleeding gums  ALLERGY AND IMMUNOLOGIC: No hives or eczema    Allergies: No Known Allergies    Intolerances    MEDICATIONS  (STANDING):  enoxaparin Injectable 40milliGRAM(s) SubCutaneous every 24 hours  lisinopril 20milliGRAM(s) Oral daily  cloNIDine 0.1milliGRAM(s) Oral every 12 hours  DULoxetine 60milliGRAM(s) Oral daily  amLODIPine   Tablet 10milliGRAM(s) Oral daily  docusate sodium 100milliGRAM(s) Oral three times a day  senna 2Tablet(s) Oral at bedtime  pantoprazole   Suspension 40milliGRAM(s) Oral daily  cyclobenzaprine 10milliGRAM(s) Oral every 12 hours  tamsulosin 0.8milliGRAM(s) Oral at bedtime  ALPRAZolam 0.25milliGRAM(s) Oral two times a day  ALPRAZolam       MEDICATIONS  (PRN):  acetaminophen   Tablet. 650milliGRAM(s) Oral every 6 hours PRN Mild Pain (1 - 3)  aluminum hydroxide/magnesium hydroxide/simethicone Suspension 30milliLiter(s) Oral every 6 hours PRN Dyspepsia  ondansetron    Tablet 4milliGRAM(s) Oral every 4 hours PRN Nausea and/or Vomiting  HYDROmorphone   Tablet 6milliGRAM(s) Oral every 4 hours PRN Severe Pain (7 - 10)  HYDROmorphone   Tablet 4milliGRAM(s) Oral every 4 hours PRN Moderate Pain (4 - 6)    Vital Signs Last 24 Hrs  T(C): 36.4, Max: 36.8 (06-01 @ 07:19)  T(F): 97.5, Max: 98.3 (06-01 @ 07:19)  HR: 97 (76 - 97)  BP: 154/85 (103/65 - 154/85)  BP(mean): --  RR: 16 (16 - 20)  SpO2: 96% (93% - 98%)    PHYSICAL EXAM:  GENERAL: NAD, well-groomed, well-developed  HEAD:  Atraumatic, Normocephalic  EYES: EOMI, PERRLA, conjunctiva and sclera clear  ENMT: No tonsillar erythema, exudates, or enlargement; Moist mucous membranes, Good dentition, No lesions  NECK: Supple, No JVD, Normal thyroid  CHEST/LUNG: Clear to auscultation bilaterally; No rales, rhonchi, wheezing, or rubs  HEART: Regular rate and rhythm; No murmurs, rubs, or gallops  ABDOMEN: Soft, Nontender, Nondistended; Bowel sounds present  EXTREMITIES:  2+ Peripheral Pulses, No clubbing, cyanosis, or edema  MS: No joint swelling or deformity.   LYMPH: No lymphadenopathy noted  SKIN: No rashes or lesions  NERVOUS SYSTEM:  Motor Strength 4/5 B/L upper and lower extremities; DTRs 2+ intact and symmetric  PSYCH:  Alert & Oriented X3, Good concentration.        LABS:  05-24 [id number] 5.6    05-24 Chol 165  HDL 46 Trig 81    HEALTH ISSUES - PROBLEM Dx:  Left foot drop: Left foot drop  Need for prophylactic measure: Need for prophylactic measure  Essential hypertension: Essential hypertension  Anxiety: Anxiety  Gastroesophageal reflux disease with esophagitis: Gastroesophageal reflux disease with esophagitis  Kyphosis of thoracic region, unspecified kyphosis type: Kyphosis of thoracic region, unspecified kyphosis type  Neuropathy: Neuropathy  Intractable back pain: Intractable back pain  Nausea and vomiting, intractability of vomiting not specified, unspecified vomiting type: Nausea and vomiting, intractability of vomiting not specified, unspecified vomiting type

## 2017-06-01 NOTE — PROGRESS NOTE ADULT - ATTENDING COMMENTS
Stable for discharge, awaiting AFO brace.   Discussed with Dr. Springer from [facility] yesterday on phone in detail.   Patient will benefit from slow tapering of his pain meds as out patient.

## 2017-06-01 NOTE — PROGRESS NOTE ADULT - ATTENDING COMMENTS
Continue PT.  Fall precautions.  Counseling was done to use narcotic pain meds wisely.  Awaits DC to rehab.

## 2017-06-02 VITALS
TEMPERATURE: 97 F | RESPIRATION RATE: 16 BRPM | SYSTOLIC BLOOD PRESSURE: 115 MMHG | DIASTOLIC BLOOD PRESSURE: 74 MMHG | HEART RATE: 78 BPM | OXYGEN SATURATION: 90 %

## 2017-06-02 PROCEDURE — 96375 TX/PRO/DX INJ NEW DRUG ADDON: CPT

## 2017-06-02 PROCEDURE — 85027 COMPLETE CBC AUTOMATED: CPT

## 2017-06-02 PROCEDURE — 84436 ASSAY OF TOTAL THYROXINE: CPT

## 2017-06-02 PROCEDURE — 85610 PROTHROMBIN TIME: CPT

## 2017-06-02 PROCEDURE — 80053 COMPREHEN METABOLIC PANEL: CPT

## 2017-06-02 PROCEDURE — 97530 THERAPEUTIC ACTIVITIES: CPT

## 2017-06-02 PROCEDURE — 82607 VITAMIN B-12: CPT

## 2017-06-02 PROCEDURE — 87798 DETECT AGENT NOS DNA AMP: CPT

## 2017-06-02 PROCEDURE — 85730 THROMBOPLASTIN TIME PARTIAL: CPT

## 2017-06-02 PROCEDURE — 99285 EMERGENCY DEPT VISIT HI MDM: CPT | Mod: 25

## 2017-06-02 PROCEDURE — 96372 THER/PROPH/DIAG INJ SC/IM: CPT | Mod: XU

## 2017-06-02 PROCEDURE — 84443 ASSAY THYROID STIM HORMONE: CPT

## 2017-06-02 PROCEDURE — 81003 URINALYSIS AUTO W/O SCOPE: CPT

## 2017-06-02 PROCEDURE — 87581 M.PNEUMON DNA AMP PROBE: CPT

## 2017-06-02 PROCEDURE — 72070 X-RAY EXAM THORAC SPINE 2VWS: CPT

## 2017-06-02 PROCEDURE — 83036 HEMOGLOBIN GLYCOSYLATED A1C: CPT

## 2017-06-02 PROCEDURE — 96374 THER/PROPH/DIAG INJ IV PUSH: CPT

## 2017-06-02 PROCEDURE — 72110 X-RAY EXAM L-2 SPINE 4/>VWS: CPT

## 2017-06-02 PROCEDURE — 80048 BASIC METABOLIC PNL TOTAL CA: CPT

## 2017-06-02 PROCEDURE — 83735 ASSAY OF MAGNESIUM: CPT

## 2017-06-02 PROCEDURE — 93005 ELECTROCARDIOGRAM TRACING: CPT

## 2017-06-02 PROCEDURE — 84480 ASSAY TRIIODOTHYRONINE (T3): CPT

## 2017-06-02 PROCEDURE — 97161 PT EVAL LOW COMPLEX 20 MIN: CPT

## 2017-06-02 PROCEDURE — 87633 RESP VIRUS 12-25 TARGETS: CPT

## 2017-06-02 PROCEDURE — 97116 GAIT TRAINING THERAPY: CPT

## 2017-06-02 PROCEDURE — 80061 LIPID PANEL: CPT

## 2017-06-02 PROCEDURE — 84100 ASSAY OF PHOSPHORUS: CPT

## 2017-06-02 PROCEDURE — 87486 CHLMYD PNEUM DNA AMP PROBE: CPT

## 2017-06-02 PROCEDURE — 97110 THERAPEUTIC EXERCISES: CPT

## 2017-06-02 RX ADMIN — HYDROMORPHONE HYDROCHLORIDE 6 MILLIGRAM(S): 2 INJECTION INTRAMUSCULAR; INTRAVENOUS; SUBCUTANEOUS at 06:48

## 2017-06-02 RX ADMIN — ONDANSETRON 4 MILLIGRAM(S): 8 TABLET, FILM COATED ORAL at 14:37

## 2017-06-02 RX ADMIN — HYDROMORPHONE HYDROCHLORIDE 6 MILLIGRAM(S): 2 INJECTION INTRAMUSCULAR; INTRAVENOUS; SUBCUTANEOUS at 02:00

## 2017-06-02 RX ADMIN — AMLODIPINE BESYLATE 10 MILLIGRAM(S): 2.5 TABLET ORAL at 05:30

## 2017-06-02 RX ADMIN — Medication 0.1 MILLIGRAM(S): at 05:29

## 2017-06-02 RX ADMIN — ONDANSETRON 4 MILLIGRAM(S): 8 TABLET, FILM COATED ORAL at 05:28

## 2017-06-02 RX ADMIN — PANTOPRAZOLE SODIUM 40 MILLIGRAM(S): 20 TABLET, DELAYED RELEASE ORAL at 11:46

## 2017-06-02 RX ADMIN — CYCLOBENZAPRINE HYDROCHLORIDE 10 MILLIGRAM(S): 10 TABLET, FILM COATED ORAL at 10:36

## 2017-06-02 RX ADMIN — HYDROMORPHONE HYDROCHLORIDE 6 MILLIGRAM(S): 2 INJECTION INTRAMUSCULAR; INTRAVENOUS; SUBCUTANEOUS at 12:01

## 2017-06-02 RX ADMIN — ONDANSETRON 4 MILLIGRAM(S): 8 TABLET, FILM COATED ORAL at 10:36

## 2017-06-02 RX ADMIN — Medication 30 MILLILITER(S): at 11:46

## 2017-06-02 RX ADMIN — HYDROMORPHONE HYDROCHLORIDE 6 MILLIGRAM(S): 2 INJECTION INTRAMUSCULAR; INTRAVENOUS; SUBCUTANEOUS at 02:45

## 2017-06-02 RX ADMIN — HYDROMORPHONE HYDROCHLORIDE 6 MILLIGRAM(S): 2 INJECTION INTRAMUSCULAR; INTRAVENOUS; SUBCUTANEOUS at 11:31

## 2017-06-02 RX ADMIN — Medication 100 MILLIGRAM(S): at 13:47

## 2017-06-02 RX ADMIN — HYDROMORPHONE HYDROCHLORIDE 6 MILLIGRAM(S): 2 INJECTION INTRAMUSCULAR; INTRAVENOUS; SUBCUTANEOUS at 15:58

## 2017-06-02 RX ADMIN — Medication 0.25 MILLIGRAM(S): at 05:25

## 2017-06-02 RX ADMIN — HYDROMORPHONE HYDROCHLORIDE 6 MILLIGRAM(S): 2 INJECTION INTRAMUSCULAR; INTRAVENOUS; SUBCUTANEOUS at 15:52

## 2017-06-02 RX ADMIN — Medication 100 MILLIGRAM(S): at 05:25

## 2017-06-02 RX ADMIN — HYDROMORPHONE HYDROCHLORIDE 6 MILLIGRAM(S): 2 INJECTION INTRAMUSCULAR; INTRAVENOUS; SUBCUTANEOUS at 06:09

## 2017-06-02 RX ADMIN — ONDANSETRON 4 MILLIGRAM(S): 8 TABLET, FILM COATED ORAL at 01:22

## 2017-06-02 RX ADMIN — LISINOPRIL 20 MILLIGRAM(S): 2.5 TABLET ORAL at 05:29

## 2017-06-02 RX ADMIN — DULOXETINE HYDROCHLORIDE 60 MILLIGRAM(S): 30 CAPSULE, DELAYED RELEASE ORAL at 11:46

## 2017-06-02 NOTE — PROGRESS NOTE ADULT - ATTENDING COMMENTS
Stable for discharge, with AFO brace.   Discussed with Dr. Springer from [facility] yesterday on phone in detail.   Patient will benefit from slow tapering of his pain meds as out patient.

## 2017-06-02 NOTE — PROGRESS NOTE ADULT - PROBLEM SELECTOR PLAN 7
Chronic.  Patient was getting AFO at [facility].  Will be discharged to [facility] once AFO available.
Chronic.  Patient was getting AFO at [facility].  Will be discharged to [facility] once AFO available.
Chronic.  Will be discharged to [facility] once AFO available.
Chronic.  Will be discharged to [facility] once AFO available.
Continue clonidine, lisinopril and Norvasc as per out patient dose with holding parameters.
Chronic.  Patient was getting brace as out patient.
Continue clonidine, lisinopril and norvasc

## 2017-06-02 NOTE — PROGRESS NOTE ADULT - PROVIDER SPECIALTY LIST ADULT
Hospitalist
Internal Medicine
Neurology
Hospitalist

## 2017-06-02 NOTE — PROGRESS NOTE ADULT - PROBLEM SELECTOR PLAN 6
Continue xanax
Continue lisinopril.
Continue xanax as needed.
Continue lisinopril.

## 2017-06-02 NOTE — PROGRESS NOTE ADULT - PROBLEM SELECTOR PROBLEM 5
Gastroesophageal reflux disease with esophagitis
Anxiety
Gastroesophageal reflux disease with esophagitis
Anxiety

## 2017-06-02 NOTE — PROGRESS NOTE ADULT - PROBLEM SELECTOR PROBLEM 1
Nausea and vomiting, intractability of vomiting not specified, unspecified vomiting type
Intractable back pain
Nausea and vomiting, intractability of vomiting not specified, unspecified vomiting type
Intractable back pain
Intractable back pain

## 2017-06-02 NOTE — PROGRESS NOTE ADULT - PROBLEM SELECTOR PROBLEM 7
Essential hypertension
Left foot drop

## 2017-06-02 NOTE — PROGRESS NOTE ADULT - PROBLEM/PLAN-2
Form completed.  
Forms received from: [employer]   Phone number listed: [phone]   Fax listed: [phone]  Date received: 1-4-17  Form description: client dx info  Once forms are completed, please return to Barbara via fax.  Is patient requesting to be contacted when forms are completed: n/a  Form placed: provider desk  Liliana Davila    
DISPLAY PLAN FREE TEXT

## 2017-06-02 NOTE — PROGRESS NOTE ADULT - NSHPATTENDINGPLANDISCUSS_GEN_ALL_CORE
patient. Questions answered. D/w covering nurse. Would follow.
patient. Questions answered. Would continue to follow.
patient. Questions answered. Would follow.
Patient. Questions answered. Would continue to follow.
Patient. Questions answered. Would continue to follow.
patient and case management.
patient and nursing.
patient.
patient.
patient. Questions answered.
patient and nursing.
patient and nursing.

## 2017-06-02 NOTE — PROGRESS NOTE ADULT - PROBLEM SELECTOR PLAN 2
Continue iv dilaudid regimen for now  Pain management consult called
On Cymbalta.  Vitamin B12 level - WNL at 585
Improved.   Most likely due to high dose pain meds.  Continue Zofran prior to pain meds.
Pain management consult noted and appreciated.  Continue patient on Dilaudid PO with Zofran prior to pain medications.  Discussed with patient at length about possibility of dependence issue.   He will need follow up with his Pain management and surgeon as out patient.
Pain management consult noted and appreciated.  Continue patient on Dilaudid PO with Zofran prior to pain medications.  Discussed with patient at length about possibility of dependence issue.   He will need follow up with his Pain management and surgeon as out patient.
Pain management consult noted and appreciated.  Patient was tried on Oxycodone but states it's not working.   Will place back on Dilaudid PO with Zofran prior to pain medications.  Discussed with patient at length about possibility of dependence issue.   He will need follow up with his Pain management and surgeon as out patient.
Pain management consult noted and appreciated.  Will try patient on Oral Oxycodone very 3 hrs as needed as per pain management recommendations.  Will use lower dose Dilaudid for break thru pain only.   Discussed with patient in detail.
Improved.   Most likely due to high dose pain meds.  Continue Zofran prior to pain meds.
On Cymbalta.  Vitamin B12 level is pending.

## 2017-06-02 NOTE — PROGRESS NOTE ADULT - SUBJECTIVE AND OBJECTIVE BOX
Patient is a 76y old  Male who presents with a chief complaint of Nausea, vomiting and back pain (23 May 2017 20:31)    HPI:  This is a 77 YO Male complaining of pain, low back with history of chronic back pain, spinal fusion (approximately a month ago at [facility] for [facility]) presents to the ED from [facility] c/o lower back pain, as well as nausea. Patient takes 6 mg Dilaudid every 3 hours for pain, and took his medication this morning. Patient states before the surgery, he saw someone for pain management, but he does not currently have a pain management physician. Denies numbness/tingling in lower extremities. No further complaints at this time. (23 May 2017 20:31)    INTERVAL HPI/OVERNIGHT EVENTS:  Chart reviewed, notes reviewed.   Patient seen and examined.  Pain is fairly controlled with current medications.  Ambulated with physical therapy.   Being followed by following specialists: neurology and pain management.    Consultant(s) Notes Reviewed:  [X] Yes    Care Discussed with Consultants/Other Providers: [X] Yes    c/o some nausea today. Improved with meds.   No numbness or tingling.   No dysuria or hematuria.   AFO brace delivered today.   patient c/o it being too tight.       REVIEW OF SYSTEMS:  CONSTITUTIONAL: No fever, weight loss, or fatigue  EYES: No eye pain, visual disturbances, or discharge  ENMT:  No difficulty hearing, tinnitus, vertigo; No sinus or throat pain  NECK: No pain or stiffness  BREASTS: No pain, masses, or nipple discharge  RESPIRATORY: No cough, wheezing, chills or hemoptysis; No shortness of breath  CARDIOVASCULAR: No chest pain, palpitations, dizziness, or leg swelling  GASTROINTESTINAL: No abdominal or epigastric pain. No nausea, vomiting, or hematemesis; + Constipation.   GENITOURINARY: difficulty voiding. No dysuria or hematuria.   NEUROLOGICAL: No headaches, memory loss, loss of strength, numbness, or tremors  SKIN: No itching, burning, rashes, or lesions   LYMPH NODES: No enlarged glands  ENDOCRINE: No heat or cold intolerance; No hair loss; No polydipsia or polyuria  MUSCULOSKELETAL: back pain.   PSYCHIATRIC: No depression, anxiety, mood swings, or difficulty sleeping  HEME/LYMPH: No easy bruising, or bleeding gums  ALLERGY AND IMMUNOLOGIC: No hives or eczema    Allergies: No Known Allergies    Intolerances    MEDICATIONS  (STANDING):  enoxaparin Injectable 40milliGRAM(s) SubCutaneous every 24 hours  lisinopril 20milliGRAM(s) Oral daily  cloNIDine 0.1milliGRAM(s) Oral every 12 hours  DULoxetine 60milliGRAM(s) Oral daily  amLODIPine   Tablet 10milliGRAM(s) Oral daily  docusate sodium 100milliGRAM(s) Oral three times a day  senna 2Tablet(s) Oral at bedtime  pantoprazole   Suspension 40milliGRAM(s) Oral daily  cyclobenzaprine 10milliGRAM(s) Oral every 12 hours  tamsulosin 0.8milliGRAM(s) Oral at bedtime  ALPRAZolam 0.25milliGRAM(s) Oral two times a day  ALPRAZolam       MEDICATIONS  (PRN):  acetaminophen   Tablet. 650milliGRAM(s) Oral every 6 hours PRN Mild Pain (1 - 3)  aluminum hydroxide/magnesium hydroxide/simethicone Suspension 30milliLiter(s) Oral every 6 hours PRN Dyspepsia  ondansetron    Tablet 4milliGRAM(s) Oral every 4 hours PRN Nausea and/or Vomiting  HYDROmorphone   Tablet 6milliGRAM(s) Oral every 4 hours PRN Severe Pain (7 - 10)  HYDROmorphone   Tablet 4milliGRAM(s) Oral every 4 hours PRN Moderate Pain (4 - 6)    Vital Signs Last 24 Hrs  T(C): 36.3, Max: 36.4 (06-01 @ 15:00)  T(F): 97.3, Max: 97.6 (06-01 @ 23:03)  HR: 78 (78 - 97)  BP: 115/74 (115/74 - 166/94)  BP(mean): --  RR: 16 (16 - 16)  SpO2: 90% (90% - 96%)    PHYSICAL EXAM:  GENERAL: NAD, well-groomed, well-developed  HEAD:  Atraumatic, Normocephalic  EYES: EOMI, PERRLA, conjunctiva and sclera clear  ENMT: No tonsillar erythema, exudates, or enlargement; Moist mucous membranes, Good dentition, No lesions  NECK: Supple, No JVD, Normal thyroid  CHEST/LUNG: Clear to auscultation bilaterally; No rales, rhonchi, wheezing, or rubs  HEART: Regular rate and rhythm; No murmurs, rubs, or gallops  ABDOMEN: Soft, Nontender, Nondistended; Bowel sounds present  EXTREMITIES:  2+ Peripheral Pulses, No clubbing, cyanosis, or edema  MS: No joint swelling or deformity.   LYMPH: No lymphadenopathy noted  SKIN: No rashes or lesions  NERVOUS SYSTEM:  Motor Strength 4/5 B/L upper and lower extremities; DTRs 2+ intact and symmetric  PSYCH:  Alert & Oriented X3, Good concentration.        LABS:    05-24 [id number] 5.6    05-24 Chol 165  HDL 46 Trig 81    HEALTH ISSUES - PROBLEM Dx:  Left foot drop: Left foot drop  Need for prophylactic measure: Need for prophylactic measure  Essential hypertension: Essential hypertension  Anxiety: Anxiety  Gastroesophageal reflux disease with esophagitis: Gastroesophageal reflux disease with esophagitis  Kyphosis of thoracic region, unspecified kyphosis type: Kyphosis of thoracic region, unspecified kyphosis type  Neuropathy: Neuropathy  Intractable back pain: Intractable back pain  Nausea and vomiting, intractability of vomiting not specified, unspecified vomiting type: Nausea and vomiting, intractability of vomiting not specified, unspecified vomiting type

## 2017-06-02 NOTE — PROGRESS NOTE ADULT - PROBLEM SELECTOR PLAN 8
Continue Lovenox for DVT prophylaxis.
Lovenox for DVT prophylaxis.
Continue Lovenox for DVT prophylaxis.

## 2017-06-02 NOTE — PROGRESS NOTE ADULT - PROBLEM SELECTOR PROBLEM 6
Anxiety
Essential hypertension

## 2017-06-02 NOTE — PROGRESS NOTE ADULT - PROBLEM SELECTOR PLAN 1
Appears resolved  Tolerating diet
Chronic.  Had spinal fusion surgery done last month at [facility].  Xray T/L spine done showed -  There is compression fracture   deformities of the L3 to vertebral body with anterior and posterior   displaced fracture fragments.  Pt knows about this finding since 2014.  Pt is able to walk with help.  Denies any new complaints.  Continue PT.  Fall prtecautions.
Chronic.  Had spinal fusion surgery done last month at [facility].  Xray T/L spine done showed -  There is compression fracture   deformities of the L3 to vertebral body with anterior and posterior   displaced fracture fragments.  Pt knows about this finding since 2014.
Chronic.  Had spinal fusion surgery done last month at [facility].  Xray T/L spine done showed -  There is compression fracture   deformities of the L3 to vertebral body with anterior and posterior   displaced fracture fragments.  Pt knows about this finding since 2014.  Pt is able to walk with help.  Denies any new complaints.  Continue PT.  Fall precautions.
Chronic.  Had spinal fusion surgery done last month at [facility].  Xray T/L spine done showed -  There is compression fracture   deformities of the L3 to vertebral body with anterior and posterior   displaced fracture fragments.  Pt knows about this finding since 2014.  Pt is able to walk with help.  Pt takes large doses of dilaudid on daily basis and doesn't want to taper or reduce the dose.  Continue PT.  Fall precautions.
Chronic.  [facility]  Xray T/L spine done showed -  There is compression fracture   deformities of the L3 to vertebral body with anterior and posterior   displaced fracture fragments.  Pt knows about this finding since 2014.  Pt is able to walk with help.  Denies any new complaints.
Improved.   Tolerating diet.   Continue Zofran Oral.   Most likely due to high dose narcotics.
Improved.   Tolerating diet.   Continue Zofran as needed.   Most likely due to high dose narcotics.
Improved.   Tolerating diet.   Continue Zofran as needed. Will change to Zofran Oral.   Most likely due to high dose narcotics.
Improved.   Tolerating diet.   Continue Zofran as needed. Will change to Zofran Oral.   Most likely due to high dose narcotics.
Pain stable on Dilaudid 6 mg every 4 hrs.  patient is not willing to taper down meds at this time.   Will need follow up with pain management as out patient.
Pain stable on Dilaudid 6 mg every 4 hrs.  patient is not willing to taper down meds at this time.   Will need follow up with pain management as out patient.  D/C planning to long-term today.
Chronic.  Had spinal fusion surgery done last month at [facility].  Pt was using large dose of Dilaudid daily(6 mg every three hrs) - Vomiting likely secondary to opioid overuse.  Pian meds are reduced.  Recommend pain management eval to optimize his meds to avoid opioid withdrawal.  Xray T/L spine done showed -  There is compression fracture   deformities of the L3 to vertebral body with anterior and posterior   displaced fracture fragments.  Pt knows about this finding since 2014.
Pain stable on Dilaudid 6 mg every 4 hrs.  patient is not willing to taper down meds at this time.   Will need follow up with pain management as out patient.

## 2017-06-02 NOTE — PROGRESS NOTE ADULT - PROBLEM SELECTOR PLAN 4
PT
Continue senna and Colace.  Moving his bowels.
Continue senna and Colace.  Moving his bowels.
Continue senna and Colace.  moved his bowels.
Continue senna and Colace.  moved his bowels.
s/p Spinal surgery.   PT eval for ambulation.  Will continue PT at [facility].
Mag citrate orally today.   Will continue Senna and Colace.

## 2017-06-02 NOTE — PROGRESS NOTE ADULT - PROBLEM SELECTOR PLAN 5
Continue protonix
Continue Protonix as per outpatient regimen.
Continue Xanax

## 2017-06-02 NOTE — PROGRESS NOTE ADULT - PROBLEM SELECTOR PROBLEM 3
Neuropathy
Left foot drop
Left foot drop
BPH (benign prostatic hypertrophy) with urinary retention
Neuropathy
BPH (benign prostatic hypertrophy) with urinary retention
Left foot drop

## 2017-06-02 NOTE — PROGRESS NOTE ADULT - PROBLEM SELECTOR PROBLEM 4
Kyphosis of thoracic region, unspecified kyphosis type
Constipation, unspecified constipation type
Kyphosis of thoracic region, unspecified kyphosis type
Constipation, unspecified constipation type

## 2017-06-22 ENCOUNTER — EMERGENCY (EMERGENCY)
Facility: HOSPITAL | Age: 77
LOS: 0 days | Discharge: NURSING, REHAB OR LONG-TERM CARE | End: 2017-06-23
Attending: EMERGENCY MEDICINE | Admitting: EMERGENCY MEDICINE
Payer: MEDICARE

## 2017-06-22 VITALS
DIASTOLIC BLOOD PRESSURE: 97 MMHG | RESPIRATION RATE: 18 BRPM | SYSTOLIC BLOOD PRESSURE: 168 MMHG | TEMPERATURE: 98 F | WEIGHT: 171.96 LBS | HEART RATE: 84 BPM | OXYGEN SATURATION: 98 % | HEIGHT: 69 IN

## 2017-06-22 DIAGNOSIS — F32.9 MAJOR DEPRESSIVE DISORDER, SINGLE EPISODE, UNSPECIFIED: ICD-10-CM

## 2017-06-22 DIAGNOSIS — K21.9 GASTRO-ESOPHAGEAL REFLUX DISEASE WITHOUT ESOPHAGITIS: ICD-10-CM

## 2017-06-22 DIAGNOSIS — M40.209 UNSPECIFIED KYPHOSIS, SITE UNSPECIFIED: ICD-10-CM

## 2017-06-22 DIAGNOSIS — R10.9 UNSPECIFIED ABDOMINAL PAIN: ICD-10-CM

## 2017-06-22 DIAGNOSIS — D64.9 ANEMIA, UNSPECIFIED: ICD-10-CM

## 2017-06-22 DIAGNOSIS — F41.9 ANXIETY DISORDER, UNSPECIFIED: ICD-10-CM

## 2017-06-22 DIAGNOSIS — R11.0 NAUSEA: ICD-10-CM

## 2017-06-22 DIAGNOSIS — I10 ESSENTIAL (PRIMARY) HYPERTENSION: ICD-10-CM

## 2017-06-22 DIAGNOSIS — N40.0 BENIGN PROSTATIC HYPERPLASIA WITHOUT LOWER URINARY TRACT SYMPTOMS: ICD-10-CM

## 2017-06-22 DIAGNOSIS — M48.00 SPINAL STENOSIS, SITE UNSPECIFIED: ICD-10-CM

## 2017-06-22 DIAGNOSIS — Z98.1 ARTHRODESIS STATUS: Chronic | ICD-10-CM

## 2017-06-22 LAB
APPEARANCE UR: CLEAR — SIGNIFICANT CHANGE UP
BILIRUB UR-MCNC: NEGATIVE — SIGNIFICANT CHANGE UP
COLOR SPEC: YELLOW — SIGNIFICANT CHANGE UP
DIFF PNL FLD: NEGATIVE — SIGNIFICANT CHANGE UP
GLUCOSE UR QL: NEGATIVE MG/DL — SIGNIFICANT CHANGE UP
KETONES UR-MCNC: NEGATIVE — SIGNIFICANT CHANGE UP
LEUKOCYTE ESTERASE UR-ACNC: NEGATIVE — SIGNIFICANT CHANGE UP
NITRITE UR-MCNC: NEGATIVE — SIGNIFICANT CHANGE UP
PH UR: 7 — SIGNIFICANT CHANGE UP (ref 5–8)
PROT UR-MCNC: NEGATIVE MG/DL — SIGNIFICANT CHANGE UP
SP GR SPEC: 1.01 — SIGNIFICANT CHANGE UP (ref 1.01–1.02)
UROBILINOGEN FLD QL: NEGATIVE MG/DL — SIGNIFICANT CHANGE UP

## 2017-06-22 PROCEDURE — 99285 EMERGENCY DEPT VISIT HI MDM: CPT

## 2017-06-22 RX ORDER — HYDROMORPHONE HYDROCHLORIDE 2 MG/ML
1 INJECTION INTRAMUSCULAR; INTRAVENOUS; SUBCUTANEOUS ONCE
Qty: 0 | Refills: 0 | Status: DISCONTINUED | OUTPATIENT
Start: 2017-06-22 | End: 2017-06-22

## 2017-06-22 RX ORDER — ONDANSETRON 8 MG/1
1000 TABLET, FILM COATED ORAL ONCE
Qty: 0 | Refills: 0 | Status: DISCONTINUED | OUTPATIENT
Start: 2017-06-22 | End: 2017-06-22

## 2017-06-22 RX ORDER — SODIUM CHLORIDE 9 MG/ML
1000 INJECTION INTRAMUSCULAR; INTRAVENOUS; SUBCUTANEOUS ONCE
Qty: 0 | Refills: 0 | Status: COMPLETED | OUTPATIENT
Start: 2017-06-22 | End: 2017-06-22

## 2017-06-22 RX ORDER — ONDANSETRON 8 MG/1
8 TABLET, FILM COATED ORAL ONCE
Qty: 0 | Refills: 0 | Status: COMPLETED | OUTPATIENT
Start: 2017-06-22 | End: 2017-06-22

## 2017-06-22 RX ADMIN — ONDANSETRON 8 MILLIGRAM(S): 8 TABLET, FILM COATED ORAL at 23:43

## 2017-06-22 RX ADMIN — HYDROMORPHONE HYDROCHLORIDE 1 MILLIGRAM(S): 2 INJECTION INTRAMUSCULAR; INTRAVENOUS; SUBCUTANEOUS at 23:43

## 2017-06-22 RX ADMIN — SODIUM CHLORIDE 1000 MILLILITER(S): 9 INJECTION INTRAMUSCULAR; INTRAVENOUS; SUBCUTANEOUS at 23:43

## 2017-06-23 VITALS
DIASTOLIC BLOOD PRESSURE: 83 MMHG | HEART RATE: 85 BPM | SYSTOLIC BLOOD PRESSURE: 164 MMHG | OXYGEN SATURATION: 98 % | RESPIRATION RATE: 18 BRPM

## 2017-06-23 PROBLEM — G62.9 POLYNEUROPATHY, UNSPECIFIED: Chronic | Status: ACTIVE | Noted: 2017-05-23

## 2017-06-23 PROBLEM — F32.9 MAJOR DEPRESSIVE DISORDER, SINGLE EPISODE, UNSPECIFIED: Chronic | Status: ACTIVE | Noted: 2017-05-23

## 2017-06-23 PROBLEM — D64.9 ANEMIA, UNSPECIFIED: Chronic | Status: ACTIVE | Noted: 2017-05-23

## 2017-06-23 PROBLEM — K21.9 GASTRO-ESOPHAGEAL REFLUX DISEASE WITHOUT ESOPHAGITIS: Chronic | Status: ACTIVE | Noted: 2017-05-23

## 2017-06-23 PROBLEM — I10 ESSENTIAL (PRIMARY) HYPERTENSION: Chronic | Status: ACTIVE | Noted: 2017-05-23

## 2017-06-23 PROBLEM — M48.00 SPINAL STENOSIS, SITE UNSPECIFIED: Chronic | Status: ACTIVE | Noted: 2017-05-23

## 2017-06-23 PROBLEM — M40.209 UNSPECIFIED KYPHOSIS, SITE UNSPECIFIED: Chronic | Status: ACTIVE | Noted: 2017-05-23

## 2017-06-23 PROBLEM — F41.9 ANXIETY DISORDER, UNSPECIFIED: Chronic | Status: ACTIVE | Noted: 2017-05-23

## 2017-06-23 PROBLEM — N40.0 BENIGN PROSTATIC HYPERPLASIA WITHOUT LOWER URINARY TRACT SYMPTOMS: Chronic | Status: ACTIVE | Noted: 2017-05-23

## 2017-06-23 LAB
ALBUMIN SERPL ELPH-MCNC: 3.5 G/DL — SIGNIFICANT CHANGE UP (ref 3.3–5)
ALP SERPL-CCNC: 115 U/L — SIGNIFICANT CHANGE UP (ref 40–120)
ALT FLD-CCNC: 16 U/L — SIGNIFICANT CHANGE UP (ref 12–78)
ANION GAP SERPL CALC-SCNC: 8 MMOL/L — SIGNIFICANT CHANGE UP (ref 5–17)
AST SERPL-CCNC: 14 U/L — LOW (ref 15–37)
BASOPHILS # BLD AUTO: 0.1 K/UL — SIGNIFICANT CHANGE UP (ref 0–0.2)
BASOPHILS NFR BLD AUTO: 1.2 % — SIGNIFICANT CHANGE UP (ref 0–2)
BILIRUB SERPL-MCNC: 0.3 MG/DL — SIGNIFICANT CHANGE UP (ref 0.2–1.2)
BUN SERPL-MCNC: 16 MG/DL — SIGNIFICANT CHANGE UP (ref 7–23)
CALCIUM SERPL-MCNC: 9.6 MG/DL — SIGNIFICANT CHANGE UP (ref 8.5–10.1)
CHLORIDE SERPL-SCNC: 105 MMOL/L — SIGNIFICANT CHANGE UP (ref 96–108)
CO2 SERPL-SCNC: 24 MMOL/L — SIGNIFICANT CHANGE UP (ref 22–31)
CREAT SERPL-MCNC: 1.1 MG/DL — SIGNIFICANT CHANGE UP (ref 0.5–1.3)
EOSINOPHIL # BLD AUTO: 0.1 K/UL — SIGNIFICANT CHANGE UP (ref 0–0.5)
EOSINOPHIL NFR BLD AUTO: 1.9 % — SIGNIFICANT CHANGE UP (ref 0–6)
GLUCOSE SERPL-MCNC: 104 MG/DL — HIGH (ref 70–99)
HCT VFR BLD CALC: 37.4 % — LOW (ref 39–50)
HGB BLD-MCNC: 12 G/DL — LOW (ref 13–17)
LYMPHOCYTES # BLD AUTO: 1.4 K/UL — SIGNIFICANT CHANGE UP (ref 1–3.3)
LYMPHOCYTES # BLD AUTO: 17.7 % — SIGNIFICANT CHANGE UP (ref 13–44)
MCHC RBC-ENTMCNC: 24.8 PG — LOW (ref 27–34)
MCHC RBC-ENTMCNC: 32.1 GM/DL — SIGNIFICANT CHANGE UP (ref 32–36)
MCV RBC AUTO: 77.4 FL — LOW (ref 80–100)
MONOCYTES # BLD AUTO: 0.6 K/UL — SIGNIFICANT CHANGE UP (ref 0–0.9)
MONOCYTES NFR BLD AUTO: 7.4 % — SIGNIFICANT CHANGE UP (ref 2–14)
NEUTROPHILS # BLD AUTO: 5.6 K/UL — SIGNIFICANT CHANGE UP (ref 1.8–7.4)
NEUTROPHILS NFR BLD AUTO: 71.8 % — SIGNIFICANT CHANGE UP (ref 43–77)
PLATELET # BLD AUTO: 306 K/UL — SIGNIFICANT CHANGE UP (ref 150–400)
POTASSIUM SERPL-MCNC: 4.1 MMOL/L — SIGNIFICANT CHANGE UP (ref 3.5–5.3)
POTASSIUM SERPL-SCNC: 4.1 MMOL/L — SIGNIFICANT CHANGE UP (ref 3.5–5.3)
PROT SERPL-MCNC: 7.3 GM/DL — SIGNIFICANT CHANGE UP (ref 6–8.3)
RBC # BLD: 4.84 M/UL — SIGNIFICANT CHANGE UP (ref 4.2–5.8)
RBC # FLD: 14.9 % — HIGH (ref 10.3–14.5)
SODIUM SERPL-SCNC: 137 MMOL/L — SIGNIFICANT CHANGE UP (ref 135–145)
WBC # BLD: 7.8 K/UL — SIGNIFICANT CHANGE UP (ref 3.8–10.5)
WBC # FLD AUTO: 7.8 K/UL — SIGNIFICANT CHANGE UP (ref 3.8–10.5)

## 2017-06-23 PROCEDURE — 74177 CT ABD & PELVIS W/CONTRAST: CPT | Mod: 26

## 2017-06-23 RX ORDER — ONDANSETRON 8 MG/1
4 TABLET, FILM COATED ORAL ONCE
Qty: 0 | Refills: 0 | Status: COMPLETED | OUTPATIENT
Start: 2017-06-23 | End: 2017-06-23

## 2017-06-23 RX ORDER — HYDROMORPHONE HYDROCHLORIDE 2 MG/ML
0.5 INJECTION INTRAMUSCULAR; INTRAVENOUS; SUBCUTANEOUS ONCE
Qty: 0 | Refills: 0 | Status: DISCONTINUED | OUTPATIENT
Start: 2017-06-23 | End: 2017-06-23

## 2017-06-23 RX ORDER — HYDROMORPHONE HYDROCHLORIDE 2 MG/ML
1 INJECTION INTRAMUSCULAR; INTRAVENOUS; SUBCUTANEOUS ONCE
Qty: 0 | Refills: 0 | Status: DISCONTINUED | OUTPATIENT
Start: 2017-06-23 | End: 2017-06-23

## 2017-06-23 RX ORDER — ONDANSETRON 8 MG/1
1 TABLET, FILM COATED ORAL
Qty: 6 | Refills: 0 | OUTPATIENT
Start: 2017-06-23 | End: 2017-06-25

## 2017-06-23 RX ADMIN — HYDROMORPHONE HYDROCHLORIDE 1 MILLIGRAM(S): 2 INJECTION INTRAMUSCULAR; INTRAVENOUS; SUBCUTANEOUS at 02:55

## 2017-06-23 RX ADMIN — HYDROMORPHONE HYDROCHLORIDE 1 MILLIGRAM(S): 2 INJECTION INTRAMUSCULAR; INTRAVENOUS; SUBCUTANEOUS at 03:25

## 2017-06-23 RX ADMIN — ONDANSETRON 4 MILLIGRAM(S): 8 TABLET, FILM COATED ORAL at 04:10

## 2017-06-23 RX ADMIN — HYDROMORPHONE HYDROCHLORIDE 1 MILLIGRAM(S): 2 INJECTION INTRAMUSCULAR; INTRAVENOUS; SUBCUTANEOUS at 00:15

## 2017-06-23 NOTE — ED PROVIDER NOTE - OBJECTIVE STATEMENT
71 year old male with PMH of chronic back pain on outpatient dilaudid, hx of herniated discs, presents with cc of nausea, abdominal fullness, distention which he attributes to his pills. No cp, sob, or palpitation. No fever or chills. No melena or hematochezia. No dysuria hematuria or frequency. No recent exotic travel. No visual or neurological deficits. 71 year old male with PMH of chronic back pain on outpatient dilaudid, hx of herniated discs, presents with cc of nausea, abdominal pain, mild to moderate, diffuse, non radiating, distention which he attributes to his pills (he suspects it is due to constipation brought about by his opiate meds). No cp, sob, or palpitation. No fever or chills. No melena or hematochezia. No dysuria hematuria or frequency. No recent exotic travel. No visual or neurological deficits. No recent trauma. No recent exotic travel. Denies IVDU. Denies visual or neurological complaints. No saddle anesthesia. No rectal or urinary incontinence or retention (other than feeling constipated he is able to pass flatulence and defecates although not as much as usual).

## 2017-06-23 NOTE — ED PROVIDER NOTE - MEDICAL DECISION MAKING DETAILS
Ryan COOPER: Spoke with patient regarding the results of the imaging and labs. Outpatient follow up and instructions to return if any health concerns provided.

## 2017-06-24 LAB
CULTURE RESULTS: SIGNIFICANT CHANGE UP
SPECIMEN SOURCE: SIGNIFICANT CHANGE UP

## 2017-06-28 ENCOUNTER — EMERGENCY (EMERGENCY)
Facility: HOSPITAL | Age: 77
LOS: 0 days | Discharge: HOME | End: 2017-06-29
Attending: EMERGENCY MEDICINE | Admitting: EMERGENCY MEDICINE
Payer: MEDICARE

## 2017-06-28 VITALS
OXYGEN SATURATION: 100 % | HEART RATE: 79 BPM | TEMPERATURE: 98 F | RESPIRATION RATE: 15 BRPM | SYSTOLIC BLOOD PRESSURE: 117 MMHG | DIASTOLIC BLOOD PRESSURE: 58 MMHG

## 2017-06-28 VITALS
DIASTOLIC BLOOD PRESSURE: 82 MMHG | HEART RATE: 76 BPM | RESPIRATION RATE: 15 BRPM | OXYGEN SATURATION: 100 % | TEMPERATURE: 98 F | SYSTOLIC BLOOD PRESSURE: 152 MMHG

## 2017-06-28 DIAGNOSIS — Z98.1 ARTHRODESIS STATUS: Chronic | ICD-10-CM

## 2017-06-28 LAB
ALBUMIN SERPL ELPH-MCNC: 3.3 G/DL — SIGNIFICANT CHANGE UP (ref 3.3–5)
ALP SERPL-CCNC: 105 U/L — SIGNIFICANT CHANGE UP (ref 40–120)
ALT FLD-CCNC: 9 U/L — LOW (ref 12–78)
ANION GAP SERPL CALC-SCNC: 7 MMOL/L — SIGNIFICANT CHANGE UP (ref 5–17)
APPEARANCE UR: CLEAR — SIGNIFICANT CHANGE UP
AST SERPL-CCNC: 13 U/L — LOW (ref 15–37)
BASOPHILS # BLD AUTO: 0.1 K/UL — SIGNIFICANT CHANGE UP (ref 0–0.2)
BASOPHILS NFR BLD AUTO: 0.9 % — SIGNIFICANT CHANGE UP (ref 0–2)
BILIRUB SERPL-MCNC: 0.3 MG/DL — SIGNIFICANT CHANGE UP (ref 0.2–1.2)
BILIRUB UR-MCNC: NEGATIVE — SIGNIFICANT CHANGE UP
BUN SERPL-MCNC: 20 MG/DL — SIGNIFICANT CHANGE UP (ref 7–23)
CALCIUM SERPL-MCNC: 9.5 MG/DL — SIGNIFICANT CHANGE UP (ref 8.5–10.1)
CHLORIDE SERPL-SCNC: 104 MMOL/L — SIGNIFICANT CHANGE UP (ref 96–108)
CO2 SERPL-SCNC: 28 MMOL/L — SIGNIFICANT CHANGE UP (ref 22–31)
COLOR SPEC: YELLOW — SIGNIFICANT CHANGE UP
CREAT SERPL-MCNC: 1.31 MG/DL — HIGH (ref 0.5–1.3)
DIFF PNL FLD: NEGATIVE — SIGNIFICANT CHANGE UP
ELLIPTOCYTES BLD QL SMEAR: SLIGHT — SIGNIFICANT CHANGE UP
EOSINOPHIL # BLD AUTO: 0.1 K/UL — SIGNIFICANT CHANGE UP (ref 0–0.5)
EOSINOPHIL NFR BLD AUTO: 1.2 % — SIGNIFICANT CHANGE UP (ref 0–6)
GLUCOSE SERPL-MCNC: 113 MG/DL — HIGH (ref 70–99)
GLUCOSE UR QL: NEGATIVE MG/DL — SIGNIFICANT CHANGE UP
HCT VFR BLD CALC: 35.5 % — LOW (ref 39–50)
HGB BLD-MCNC: 11.4 G/DL — LOW (ref 13–17)
KETONES UR-MCNC: NEGATIVE — SIGNIFICANT CHANGE UP
LEUKOCYTE ESTERASE UR-ACNC: NEGATIVE — SIGNIFICANT CHANGE UP
LIDOCAIN IGE QN: 121 U/L — SIGNIFICANT CHANGE UP (ref 73–393)
LYMPHOCYTES # BLD AUTO: 0.8 K/UL — LOW (ref 1–3.3)
LYMPHOCYTES # BLD AUTO: 11.5 % — LOW (ref 13–44)
MAGNESIUM SERPL-MCNC: 2.1 MG/DL — SIGNIFICANT CHANGE UP (ref 1.6–2.6)
MANUAL DIF COMMENT BLD-IMP: SIGNIFICANT CHANGE UP
MCHC RBC-ENTMCNC: 24.7 PG — LOW (ref 27–34)
MCHC RBC-ENTMCNC: 32.2 GM/DL — SIGNIFICANT CHANGE UP (ref 32–36)
MCV RBC AUTO: 76.9 FL — LOW (ref 80–100)
MICROCYTES BLD QL: SLIGHT — SIGNIFICANT CHANGE UP
MONOCYTES # BLD AUTO: 0.4 K/UL — SIGNIFICANT CHANGE UP (ref 0–0.9)
MONOCYTES NFR BLD AUTO: 6.4 % — SIGNIFICANT CHANGE UP (ref 2–14)
NEUTROPHILS # BLD AUTO: 5.5 K/UL — SIGNIFICANT CHANGE UP (ref 1.8–7.4)
NEUTROPHILS NFR BLD AUTO: 80 % — HIGH (ref 43–77)
NITRITE UR-MCNC: NEGATIVE — SIGNIFICANT CHANGE UP
PH UR: 8 — SIGNIFICANT CHANGE UP (ref 5–8)
PLAT MORPH BLD: NORMAL — SIGNIFICANT CHANGE UP
PLATELET # BLD AUTO: 321 K/UL — SIGNIFICANT CHANGE UP (ref 150–400)
POLYCHROMASIA BLD QL SMEAR: SLIGHT — SIGNIFICANT CHANGE UP
POTASSIUM SERPL-MCNC: 4.3 MMOL/L — SIGNIFICANT CHANGE UP (ref 3.5–5.3)
POTASSIUM SERPL-SCNC: 4.3 MMOL/L — SIGNIFICANT CHANGE UP (ref 3.5–5.3)
PROT SERPL-MCNC: 7.3 GM/DL — SIGNIFICANT CHANGE UP (ref 6–8.3)
PROT UR-MCNC: NEGATIVE MG/DL — SIGNIFICANT CHANGE UP
RBC # BLD: 4.62 M/UL — SIGNIFICANT CHANGE UP (ref 4.2–5.8)
RBC # FLD: 15.7 % — HIGH (ref 10.3–14.5)
RBC BLD AUTO: (no result)
SODIUM SERPL-SCNC: 139 MMOL/L — SIGNIFICANT CHANGE UP (ref 135–145)
SP GR SPEC: 1.01 — SIGNIFICANT CHANGE UP (ref 1.01–1.02)
TROPONIN I SERPL-MCNC: <0.015 NG/ML — SIGNIFICANT CHANGE UP (ref 0.01–0.04)
UROBILINOGEN FLD QL: NEGATIVE MG/DL — SIGNIFICANT CHANGE UP
WBC # BLD: 6.9 K/UL — SIGNIFICANT CHANGE UP (ref 3.8–10.5)
WBC # FLD AUTO: 6.9 K/UL — SIGNIFICANT CHANGE UP (ref 3.8–10.5)

## 2017-06-28 PROCEDURE — 93010 ELECTROCARDIOGRAM REPORT: CPT

## 2017-06-28 PROCEDURE — 71010: CPT | Mod: 26

## 2017-06-28 PROCEDURE — 99285 EMERGENCY DEPT VISIT HI MDM: CPT

## 2017-06-28 RX ORDER — METOCLOPRAMIDE HCL 10 MG
10 TABLET ORAL ONCE
Qty: 0 | Refills: 0 | Status: COMPLETED | OUTPATIENT
Start: 2017-06-28 | End: 2017-06-28

## 2017-06-28 RX ORDER — HYDROMORPHONE HYDROCHLORIDE 2 MG/ML
1 INJECTION INTRAMUSCULAR; INTRAVENOUS; SUBCUTANEOUS ONCE
Qty: 0 | Refills: 0 | Status: DISCONTINUED | OUTPATIENT
Start: 2017-06-28 | End: 2017-06-28

## 2017-06-28 RX ORDER — SODIUM CHLORIDE 9 MG/ML
3 INJECTION INTRAMUSCULAR; INTRAVENOUS; SUBCUTANEOUS ONCE
Qty: 0 | Refills: 0 | Status: COMPLETED | OUTPATIENT
Start: 2017-06-28 | End: 2017-06-28

## 2017-06-28 RX ORDER — SODIUM CHLORIDE 9 MG/ML
1000 INJECTION INTRAMUSCULAR; INTRAVENOUS; SUBCUTANEOUS ONCE
Qty: 0 | Refills: 0 | Status: COMPLETED | OUTPATIENT
Start: 2017-06-28 | End: 2017-06-28

## 2017-06-28 RX ADMIN — SODIUM CHLORIDE 1000 MILLILITER(S): 9 INJECTION INTRAMUSCULAR; INTRAVENOUS; SUBCUTANEOUS at 18:42

## 2017-06-28 RX ADMIN — HYDROMORPHONE HYDROCHLORIDE 1 MILLIGRAM(S): 2 INJECTION INTRAMUSCULAR; INTRAVENOUS; SUBCUTANEOUS at 19:37

## 2017-06-28 RX ADMIN — Medication 10 MILLIGRAM(S): at 18:50

## 2017-06-28 RX ADMIN — HYDROMORPHONE HYDROCHLORIDE 1 MILLIGRAM(S): 2 INJECTION INTRAMUSCULAR; INTRAVENOUS; SUBCUTANEOUS at 18:51

## 2017-06-28 RX ADMIN — SODIUM CHLORIDE 3 MILLILITER(S): 9 INJECTION INTRAMUSCULAR; INTRAVENOUS; SUBCUTANEOUS at 18:42

## 2017-06-28 NOTE — ED PROVIDER NOTE - OBJECTIVE STATEMENT
77 y/o F with Hx of spinal stenosis, HTN, spinal fusion 1.5 months ago presents to ED from [facility] for evaluation of nausea. Pt states he takes dilaudid for his back pain but notes it has been making him nauseas which prompted come to the ED. Pt has no other complaints. Pt also reports his chronic back pain which is unchanged.

## 2017-06-28 NOTE — ED PROVIDER NOTE - REFUSAL OF SERVICE, MDM
: Patient is competent. Discussed at length that leaving prior to continuing treatment could result in, but not limited to, further deterioration and/or death. Patient expressed understanding and still wants to sign out against medical advice. Advised patient to follow up with their primary care physician tomorrow or return at any time if they change their mind and want to continue the treatment/work up.

## 2017-06-28 NOTE — ED ADULT NURSE NOTE - OBJECTIVE STATEMENT
pt has chronic back pain and states due to nausea couldn't take pain meds. However, according to [facility] pain meds and nausea meds were given.

## 2017-06-28 NOTE — ED ADULT NURSE REASSESSMENT NOTE - NS ED NURSE REASSESS COMMENT FT1
Ambulated to bathroom with assistance, warm blanket and pillow given to patient. Plan of care explained.

## 2017-06-28 NOTE — ED PROVIDER NOTE - PROGRESS NOTE DETAILS
Sarwat pt refused CT abd to r/o obstruction   : Patient is competent. Discussed at length that leaving prior to continuing treatment could result in, but not limited to, further deterioration and/or death. Patient expressed understanding and still wants to sign out against medical advice. Advised patient to follow up with their primary care physician tomorrow or return at any time if they change their mind and want to continue the treatment/work up.

## 2017-06-28 NOTE — ED ADULT TRIAGE NOTE - CHIEF COMPLAINT QUOTE
Patient states he has had  back pain for 7 years and has been nauseous. Pt called the ambulance because the assisted living stated he couldn't have anymore pain medication. Last dose of dilaudid 2 mg was 4pm.

## 2017-06-30 DIAGNOSIS — R11.0 NAUSEA: ICD-10-CM

## 2017-06-30 DIAGNOSIS — M54.5 LOW BACK PAIN: ICD-10-CM

## 2017-06-30 DIAGNOSIS — K21.9 GASTRO-ESOPHAGEAL REFLUX DISEASE WITHOUT ESOPHAGITIS: ICD-10-CM

## 2017-06-30 DIAGNOSIS — I10 ESSENTIAL (PRIMARY) HYPERTENSION: ICD-10-CM

## 2017-06-30 DIAGNOSIS — F32.9 MAJOR DEPRESSIVE DISORDER, SINGLE EPISODE, UNSPECIFIED: ICD-10-CM

## 2017-06-30 DIAGNOSIS — D64.9 ANEMIA, UNSPECIFIED: ICD-10-CM

## 2017-06-30 LAB
CULTURE RESULTS: SIGNIFICANT CHANGE UP
SPECIMEN SOURCE: SIGNIFICANT CHANGE UP

## 2017-07-01 ENCOUNTER — EMERGENCY (EMERGENCY)
Facility: HOSPITAL | Age: 77
LOS: 0 days | Discharge: HOME | End: 2017-07-02
Attending: EMERGENCY MEDICINE | Admitting: EMERGENCY MEDICINE
Payer: MEDICARE

## 2017-07-01 VITALS
SYSTOLIC BLOOD PRESSURE: 163 MMHG | OXYGEN SATURATION: 95 % | RESPIRATION RATE: 18 BRPM | DIASTOLIC BLOOD PRESSURE: 92 MMHG | TEMPERATURE: 98 F | HEART RATE: 93 BPM

## 2017-07-01 DIAGNOSIS — Z98.1 ARTHRODESIS STATUS: Chronic | ICD-10-CM

## 2017-07-01 DIAGNOSIS — N40.0 BENIGN PROSTATIC HYPERPLASIA WITHOUT LOWER URINARY TRACT SYMPTOMS: ICD-10-CM

## 2017-07-01 DIAGNOSIS — M48.00 SPINAL STENOSIS, SITE UNSPECIFIED: ICD-10-CM

## 2017-07-01 DIAGNOSIS — F41.9 ANXIETY DISORDER, UNSPECIFIED: ICD-10-CM

## 2017-07-01 DIAGNOSIS — G62.9 POLYNEUROPATHY, UNSPECIFIED: ICD-10-CM

## 2017-07-01 DIAGNOSIS — M54.9 DORSALGIA, UNSPECIFIED: ICD-10-CM

## 2017-07-01 DIAGNOSIS — D64.9 ANEMIA, UNSPECIFIED: ICD-10-CM

## 2017-07-01 DIAGNOSIS — F32.9 MAJOR DEPRESSIVE DISORDER, SINGLE EPISODE, UNSPECIFIED: ICD-10-CM

## 2017-07-01 DIAGNOSIS — K21.9 GASTRO-ESOPHAGEAL REFLUX DISEASE WITHOUT ESOPHAGITIS: ICD-10-CM

## 2017-07-01 DIAGNOSIS — I10 ESSENTIAL (PRIMARY) HYPERTENSION: ICD-10-CM

## 2017-07-01 PROCEDURE — 99284 EMERGENCY DEPT VISIT MOD MDM: CPT

## 2017-07-01 PROCEDURE — 74000: CPT | Mod: 26

## 2017-07-01 RX ORDER — HYDROMORPHONE HYDROCHLORIDE 2 MG/ML
1 INJECTION INTRAMUSCULAR; INTRAVENOUS; SUBCUTANEOUS ONCE
Qty: 0 | Refills: 0 | Status: DISCONTINUED | OUTPATIENT
Start: 2017-07-01 | End: 2017-07-01

## 2017-07-01 RX ORDER — ONDANSETRON 8 MG/1
4 TABLET, FILM COATED ORAL ONCE
Qty: 0 | Refills: 0 | Status: COMPLETED | OUTPATIENT
Start: 2017-07-01 | End: 2017-07-01

## 2017-07-01 RX ORDER — SODIUM CHLORIDE 9 MG/ML
500 INJECTION INTRAMUSCULAR; INTRAVENOUS; SUBCUTANEOUS ONCE
Qty: 0 | Refills: 0 | Status: COMPLETED | OUTPATIENT
Start: 2017-07-01 | End: 2017-07-01

## 2017-07-01 RX ADMIN — ONDANSETRON 4 MILLIGRAM(S): 8 TABLET, FILM COATED ORAL at 16:50

## 2017-07-01 RX ADMIN — SODIUM CHLORIDE 500 MILLILITER(S): 9 INJECTION INTRAMUSCULAR; INTRAVENOUS; SUBCUTANEOUS at 16:50

## 2017-07-01 RX ADMIN — HYDROMORPHONE HYDROCHLORIDE 1 MILLIGRAM(S): 2 INJECTION INTRAMUSCULAR; INTRAVENOUS; SUBCUTANEOUS at 17:05

## 2017-07-01 RX ADMIN — Medication 1 MILLIGRAM(S): at 20:20

## 2017-07-01 RX ADMIN — Medication 1 MILLIGRAM(S): at 22:22

## 2017-07-01 RX ADMIN — HYDROMORPHONE HYDROCHLORIDE 1 MILLIGRAM(S): 2 INJECTION INTRAMUSCULAR; INTRAVENOUS; SUBCUTANEOUS at 16:50

## 2017-07-01 NOTE — ED ADULT NURSE NOTE - NS ED NURSE DC INFO COMPLEXITY
Straightforward: Basic instructions, no meds, no home treatment/Patient asked questions/Verbalized Understanding

## 2017-07-01 NOTE — ED PROVIDER NOTE - PROGRESS NOTE DETAILS
I, aMxine Franks am scribing for and in the presence of Dr. Davis for this pt. Pt at [facility] takes Dilaudid, and is subsequently nauseous when he does. The Zofran does not alleviate his nausea. I, Maxine Franks am scribing for and in the presence of Dr. Davis for this pt. [facility] at [facility], and is subsequently nauseous when he does. The Zofran does not alleviate his nausea. Pt is seeing a pain management doctor on Wednesday, who will be injecting steroids to help pain. I Ari Davis MD attest to the portions of the documentation done for me by the scribe. I supervised the scribe for the documentation done for me and under my supervision.

## 2017-07-01 NOTE — ED ADULT NURSE REASSESSMENT NOTE - NS ED NURSE REASSESS COMMENT FT1
Pt. C/O Anxiety, No respiratory depression noted - Pt. awake and alert x 4- medicated as ordered. Will cont to monitor as ordered. Safety maintained

## 2017-07-01 NOTE — ED ADULT NURSE REASSESSMENT NOTE - NS ED NURSE REASSESS COMMENT FT1
Received patient from Jefferson MENCHACA RN @ 1915 pm- Pt. is resting in bed- Pt. states back pain is much better at this time, but C/O Bulging in right side of abdomen. Pt. also hypertensive at this time. Dr. Cabrera made aware and to the bedside. Will cont to monitor pt. closely- safety maintained

## 2017-07-01 NOTE — ED ADULT NURSE NOTE - CHPI ED SYMPTOMS NEG
no abdominal distension/no fever/no hematuria/no diarrhea/no burning urination/no vomiting/no dysuria/no chills/no nausea

## 2017-07-01 NOTE — ED ADULT NURSE NOTE - DISCHARGE TEACHING
Pt. D/C as ordered. Pt. verbalizes understanding of D/C, F/U and worsening of symptoms- Safety maintained

## 2017-07-01 NOTE — ED PROVIDER NOTE - OBJECTIVE STATEMENT
75 y/o male hx chronic back pain receives 12 mg Dilaudid a day presents to ED due to back pain and nausea.

## 2017-07-01 NOTE — ED ADULT NURSE NOTE - CAS EDN DISCHARGE ASSESSMENT
Symptoms improved/No adverse reaction to first time med in ED/Awake/Alert and oriented to person, place and time

## 2017-07-01 NOTE — ED ADULT NURSE NOTE - OBJECTIVE STATEMENT
rec' d pt c/o generalized lower back pain and "buldge in abdomen" Pt denies trauma. Pt a&0x4, deneis n/v/d -  no acute distress. pt medicated per MD orders. will mtr.

## 2017-07-02 VITALS
TEMPERATURE: 99 F | RESPIRATION RATE: 17 BRPM | DIASTOLIC BLOOD PRESSURE: 99 MMHG | HEART RATE: 100 BPM | SYSTOLIC BLOOD PRESSURE: 151 MMHG | OXYGEN SATURATION: 98 %

## 2017-07-02 NOTE — ED ADULT NURSE REASSESSMENT NOTE - NS ED NURSE REASSESS COMMENT FT1
Pt. pending transport- No acute distress noted at this time- Will cont to monitor patient closely- Safety maintained

## 2017-07-15 ENCOUNTER — EMERGENCY (EMERGENCY)
Facility: HOSPITAL | Age: 77
LOS: 0 days | Discharge: HOME | End: 2017-07-15
Attending: EMERGENCY MEDICINE | Admitting: EMERGENCY MEDICINE
Payer: MEDICARE

## 2017-07-15 VITALS
HEART RATE: 69 BPM | RESPIRATION RATE: 16 BRPM | OXYGEN SATURATION: 96 % | TEMPERATURE: 98 F | SYSTOLIC BLOOD PRESSURE: 143 MMHG | DIASTOLIC BLOOD PRESSURE: 82 MMHG

## 2017-07-15 VITALS
WEIGHT: 164.91 LBS | TEMPERATURE: 98 F | HEART RATE: 71 BPM | RESPIRATION RATE: 15 BRPM | SYSTOLIC BLOOD PRESSURE: 121 MMHG | HEIGHT: 69 IN | OXYGEN SATURATION: 96 % | DIASTOLIC BLOOD PRESSURE: 72 MMHG

## 2017-07-15 DIAGNOSIS — K21.9 GASTRO-ESOPHAGEAL REFLUX DISEASE WITHOUT ESOPHAGITIS: ICD-10-CM

## 2017-07-15 DIAGNOSIS — D64.9 ANEMIA, UNSPECIFIED: ICD-10-CM

## 2017-07-15 DIAGNOSIS — R11.2 NAUSEA WITH VOMITING, UNSPECIFIED: ICD-10-CM

## 2017-07-15 DIAGNOSIS — Z98.1 ARTHRODESIS STATUS: Chronic | ICD-10-CM

## 2017-07-15 DIAGNOSIS — M48.00 SPINAL STENOSIS, SITE UNSPECIFIED: ICD-10-CM

## 2017-07-15 DIAGNOSIS — F41.9 ANXIETY DISORDER, UNSPECIFIED: ICD-10-CM

## 2017-07-15 DIAGNOSIS — R11.0 NAUSEA: ICD-10-CM

## 2017-07-15 DIAGNOSIS — F32.9 MAJOR DEPRESSIVE DISORDER, SINGLE EPISODE, UNSPECIFIED: ICD-10-CM

## 2017-07-15 DIAGNOSIS — I10 ESSENTIAL (PRIMARY) HYPERTENSION: ICD-10-CM

## 2017-07-15 DIAGNOSIS — N40.0 BENIGN PROSTATIC HYPERPLASIA WITHOUT LOWER URINARY TRACT SYMPTOMS: ICD-10-CM

## 2017-07-15 DIAGNOSIS — Z98.1 ARTHRODESIS STATUS: ICD-10-CM

## 2017-07-15 DIAGNOSIS — G62.9 POLYNEUROPATHY, UNSPECIFIED: ICD-10-CM

## 2017-07-15 LAB
ALBUMIN SERPL ELPH-MCNC: 3.2 G/DL — LOW (ref 3.3–5)
ALP SERPL-CCNC: 87 U/L — SIGNIFICANT CHANGE UP (ref 40–120)
ALT FLD-CCNC: 14 U/L — SIGNIFICANT CHANGE UP (ref 12–78)
ANION GAP SERPL CALC-SCNC: 6 MMOL/L — SIGNIFICANT CHANGE UP (ref 5–17)
AST SERPL-CCNC: 11 U/L — LOW (ref 15–37)
BASOPHILS # BLD AUTO: 0.1 K/UL — SIGNIFICANT CHANGE UP (ref 0–0.2)
BASOPHILS NFR BLD AUTO: 1 % — SIGNIFICANT CHANGE UP (ref 0–2)
BILIRUB SERPL-MCNC: 0.2 MG/DL — SIGNIFICANT CHANGE UP (ref 0.2–1.2)
BUN SERPL-MCNC: 21 MG/DL — SIGNIFICANT CHANGE UP (ref 7–23)
CALCIUM SERPL-MCNC: 9.2 MG/DL — SIGNIFICANT CHANGE UP (ref 8.5–10.1)
CHLORIDE SERPL-SCNC: 105 MMOL/L — SIGNIFICANT CHANGE UP (ref 96–108)
CO2 SERPL-SCNC: 28 MMOL/L — SIGNIFICANT CHANGE UP (ref 22–31)
CREAT SERPL-MCNC: 1.26 MG/DL — SIGNIFICANT CHANGE UP (ref 0.5–1.3)
EOSINOPHIL # BLD AUTO: 0.2 K/UL — SIGNIFICANT CHANGE UP (ref 0–0.5)
EOSINOPHIL NFR BLD AUTO: 3 % — SIGNIFICANT CHANGE UP (ref 0–6)
GLUCOSE SERPL-MCNC: 98 MG/DL — SIGNIFICANT CHANGE UP (ref 70–99)
HCT VFR BLD CALC: 32.8 % — LOW (ref 39–50)
HGB BLD-MCNC: 10.4 G/DL — LOW (ref 13–17)
LIDOCAIN IGE QN: 109 U/L — SIGNIFICANT CHANGE UP (ref 73–393)
LYMPHOCYTES # BLD AUTO: 1.2 K/UL — SIGNIFICANT CHANGE UP (ref 1–3.3)
LYMPHOCYTES # BLD AUTO: 18.4 % — SIGNIFICANT CHANGE UP (ref 13–44)
MCHC RBC-ENTMCNC: 23.9 PG — LOW (ref 27–34)
MCHC RBC-ENTMCNC: 31.7 GM/DL — LOW (ref 32–36)
MCV RBC AUTO: 75.6 FL — LOW (ref 80–100)
MONOCYTES # BLD AUTO: 0.6 K/UL — SIGNIFICANT CHANGE UP (ref 0–0.9)
MONOCYTES NFR BLD AUTO: 9.7 % — SIGNIFICANT CHANGE UP (ref 2–14)
NEUTROPHILS # BLD AUTO: 4.4 K/UL — SIGNIFICANT CHANGE UP (ref 1.8–7.4)
NEUTROPHILS NFR BLD AUTO: 67.9 % — SIGNIFICANT CHANGE UP (ref 43–77)
PLATELET # BLD AUTO: 306 K/UL — SIGNIFICANT CHANGE UP (ref 150–400)
POTASSIUM SERPL-MCNC: 4.1 MMOL/L — SIGNIFICANT CHANGE UP (ref 3.5–5.3)
POTASSIUM SERPL-SCNC: 4.1 MMOL/L — SIGNIFICANT CHANGE UP (ref 3.5–5.3)
PROT SERPL-MCNC: 6.4 GM/DL — SIGNIFICANT CHANGE UP (ref 6–8.3)
RBC # BLD: 4.33 M/UL — SIGNIFICANT CHANGE UP (ref 4.2–5.8)
RBC # FLD: 15.7 % — HIGH (ref 10.3–14.5)
SODIUM SERPL-SCNC: 139 MMOL/L — SIGNIFICANT CHANGE UP (ref 135–145)
WBC # BLD: 6.4 K/UL — SIGNIFICANT CHANGE UP (ref 3.8–10.5)
WBC # FLD AUTO: 6.4 K/UL — SIGNIFICANT CHANGE UP (ref 3.8–10.5)

## 2017-07-15 PROCEDURE — 99284 EMERGENCY DEPT VISIT MOD MDM: CPT

## 2017-07-15 RX ORDER — ONDANSETRON 8 MG/1
4 TABLET, FILM COATED ORAL ONCE
Qty: 0 | Refills: 0 | Status: COMPLETED | OUTPATIENT
Start: 2017-07-15 | End: 2017-07-15

## 2017-07-15 RX ORDER — FAMOTIDINE 10 MG/ML
20 INJECTION INTRAVENOUS ONCE
Qty: 0 | Refills: 0 | Status: COMPLETED | OUTPATIENT
Start: 2017-07-15 | End: 2017-07-15

## 2017-07-15 RX ORDER — SODIUM CHLORIDE 9 MG/ML
1000 INJECTION INTRAMUSCULAR; INTRAVENOUS; SUBCUTANEOUS ONCE
Qty: 0 | Refills: 0 | Status: COMPLETED | OUTPATIENT
Start: 2017-07-15 | End: 2017-07-15

## 2017-07-15 RX ADMIN — FAMOTIDINE 20 MILLIGRAM(S): 10 INJECTION INTRAVENOUS at 18:33

## 2017-07-15 RX ADMIN — ONDANSETRON 4 MILLIGRAM(S): 8 TABLET, FILM COATED ORAL at 18:07

## 2017-07-15 RX ADMIN — SODIUM CHLORIDE 1000 MILLILITER(S): 9 INJECTION INTRAMUSCULAR; INTRAVENOUS; SUBCUTANEOUS at 18:07

## 2017-07-15 NOTE — ED ADULT NURSE NOTE - EENT ASSESSMENT, MLM
2/22/2017    I talked to Klaudia about the process of obtaining her mother's test report. For individuals other than the surviving spouse, [employer] requires a signature from the executor or  of the patient estate, fiduciary, or letters of appointment from a from probate court.  Klaudia reports that her uncle is the executor of her mother's estate, and may be willing to help obtain the test report. A copy of her mother's death certificate is also required.  I will mail Klaudia the appropriate forms.    Dara Parra MS, [facility]  Certified Genetic Counselor  P. [phone]  F. [phone]     WDL

## 2017-07-15 NOTE — ED PROVIDER NOTE - NEUROLOGICAL, MLM
Alert and oriented, no focal deficits, no motor or sensory deficits. No changes in gait, able to move all extremities.

## 2017-07-15 NOTE — ED PROVIDER NOTE - CONSTITUTIONAL, MLM
normal... Ill appearing, awake, alert, oriented to person, place, time/situation and in no apparent distress.

## 2017-07-15 NOTE — ED ADULT NURSE NOTE - OBJECTIVE STATEMENT
Pt is a 77y male, A & O x 3, VSS, presents to ED w/ nausea, vomiting x 2, pt states lower back pain, chronic, pt denies chest pain, SOB, diarrhea, denies weakness, chills, pt in no apparent distress, will continue to monitor.

## 2017-07-15 NOTE — ED ADULT NURSE REASSESSMENT NOTE - COMFORT CARE
patient comfortable in bed, ambulated to bathroom.  hourly rounding completed/side rails up/repositioned
assisted to bedside commode

## 2017-07-15 NOTE — ED PROVIDER NOTE - PROGRESS NOTE DETAILS
nausea resolved; no vomiting; tolerated water  abdomen soft, non tender  will return to amarilis gaines mD

## 2017-07-15 NOTE — ED ADULT NURSE NOTE - CHPI ED SYMPTOMS NEG
no anorexia/no motor function loss/no tingling/no constipation/no numbness/no neck tenderness/no fatigue/no bowel dysfunction/no difficulty bearing weight/no bladder dysfunction

## 2017-07-15 NOTE — ED PROVIDER NOTE - OBJECTIVE STATEMENT
76 y/o M w/ PMHx of HTN and PSHx of spinal fusion of presents to ED c/o nausea and back pain coming in from the [facility] facility. Pt states he has not been able to eat or sleep for 2 days, but states he ate soup before coming to the ED and was able to keep it down, he has thrown up bile twice, and has had dysuria. Pt denies fever, chills, abd pain, and any previous abd surgeries, or any other acute c/o.

## 2017-07-15 NOTE — ED ADULT NURSE NOTE - ED STAT RN HANDOFF DETAILS
Report given to YURIDIA Cesar Report given to YURIDIA Resendiz, transport set up for patient discharge.

## 2017-07-27 ENCOUNTER — EMERGENCY (EMERGENCY)
Facility: HOSPITAL | Age: 77
LOS: 0 days | Discharge: HOME | End: 2017-07-27
Attending: EMERGENCY MEDICINE | Admitting: EMERGENCY MEDICINE
Payer: MEDICARE

## 2017-07-27 VITALS
TEMPERATURE: 98 F | OXYGEN SATURATION: 100 % | SYSTOLIC BLOOD PRESSURE: 115 MMHG | HEIGHT: 68 IN | RESPIRATION RATE: 16 BRPM | WEIGHT: 179.9 LBS | HEART RATE: 68 BPM | DIASTOLIC BLOOD PRESSURE: 54 MMHG

## 2017-07-27 VITALS
SYSTOLIC BLOOD PRESSURE: 153 MMHG | HEART RATE: 69 BPM | TEMPERATURE: 98 F | OXYGEN SATURATION: 100 % | RESPIRATION RATE: 16 BRPM | DIASTOLIC BLOOD PRESSURE: 68 MMHG

## 2017-07-27 DIAGNOSIS — Z98.1 ARTHRODESIS STATUS: Chronic | ICD-10-CM

## 2017-07-27 PROCEDURE — 99284 EMERGENCY DEPT VISIT MOD MDM: CPT

## 2017-07-27 RX ORDER — ONDANSETRON 8 MG/1
4 TABLET, FILM COATED ORAL ONCE
Qty: 0 | Refills: 0 | Status: COMPLETED | OUTPATIENT
Start: 2017-07-27 | End: 2017-07-27

## 2017-07-27 RX ORDER — HYDROMORPHONE HYDROCHLORIDE 2 MG/ML
1 INJECTION INTRAMUSCULAR; INTRAVENOUS; SUBCUTANEOUS ONCE
Qty: 0 | Refills: 0 | Status: DISCONTINUED | OUTPATIENT
Start: 2017-07-27 | End: 2017-07-27

## 2017-07-27 RX ADMIN — ONDANSETRON 104 MILLIGRAM(S): 8 TABLET, FILM COATED ORAL at 19:00

## 2017-07-27 RX ADMIN — HYDROMORPHONE HYDROCHLORIDE 1 MILLIGRAM(S): 2 INJECTION INTRAMUSCULAR; INTRAVENOUS; SUBCUTANEOUS at 19:00

## 2017-07-27 NOTE — ED ADULT NURSE NOTE - OBJECTIVE STATEMENT
Pt presents from [facility] with CBP, not relieved by oral dilaudid and nausea x "a few days from the dilaudid I take"

## 2017-07-27 NOTE — ED ADULT NURSE REASSESSMENT NOTE - COMFORT CARE
assisted with urinal and placed back into bed. call bell given. hourly rounding completed/assisted to bathroom/repositioned/side rails up

## 2017-07-27 NOTE — ED ADULT NURSE NOTE - NS ED NURSE DC INFO COMPLEXITY
Verbalized Understanding/Returned Demonstration/Patient asked questions/Moderate: Comprehensive teaching

## 2017-07-27 NOTE — ED PROVIDER NOTE - CONSTITUTIONAL, MLM
normal... Chronically ill appearing. Awake, alert, oriented to person, place, time/situation and in no apparent distress.

## 2017-07-27 NOTE — ED PROVIDER NOTE - OBJECTIVE STATEMENT
77 year old pt with hx of HTN, chronic lower back pain and dropfoot secondary to spinal stenosis s/p surgery, tx with PO Dilaudid and steroid injections, presents to the ED from [facility] for nausea and persistent back pain. Pt states nausea is due to taking Dilaudid PO; given Zofran at facility with minimal relief of nausea. No vomiting. Also notes that taking Dilaudid PO does not help sx and cause nausea and wants to be given IV Dilaudid as this is the only that helps. [facility] does not provide Dilaudid intravenously. Being followed by pain management and is due for upcoming steroid injection. Walks with walker at baseline. PMD is Dr. Springer. JACQUELINE

## 2017-07-28 DIAGNOSIS — M48.00 SPINAL STENOSIS, SITE UNSPECIFIED: ICD-10-CM

## 2017-07-28 DIAGNOSIS — N40.0 BENIGN PROSTATIC HYPERPLASIA WITHOUT LOWER URINARY TRACT SYMPTOMS: ICD-10-CM

## 2017-07-28 DIAGNOSIS — F32.9 MAJOR DEPRESSIVE DISORDER, SINGLE EPISODE, UNSPECIFIED: ICD-10-CM

## 2017-07-28 DIAGNOSIS — F41.9 ANXIETY DISORDER, UNSPECIFIED: ICD-10-CM

## 2017-07-28 DIAGNOSIS — M54.9 DORSALGIA, UNSPECIFIED: ICD-10-CM

## 2017-07-28 DIAGNOSIS — G62.9 POLYNEUROPATHY, UNSPECIFIED: ICD-10-CM

## 2017-07-28 DIAGNOSIS — Z98.1 ARTHRODESIS STATUS: ICD-10-CM

## 2017-07-28 DIAGNOSIS — D64.9 ANEMIA, UNSPECIFIED: ICD-10-CM

## 2017-07-28 DIAGNOSIS — I10 ESSENTIAL (PRIMARY) HYPERTENSION: ICD-10-CM

## 2017-07-31 ENCOUNTER — EMERGENCY (EMERGENCY)
Facility: HOSPITAL | Age: 77
LOS: 0 days | Discharge: HOME | End: 2017-07-31
Attending: EMERGENCY MEDICINE | Admitting: EMERGENCY MEDICINE
Payer: MEDICARE

## 2017-07-31 VITALS
TEMPERATURE: 98 F | HEART RATE: 71 BPM | RESPIRATION RATE: 18 BRPM | DIASTOLIC BLOOD PRESSURE: 69 MMHG | OXYGEN SATURATION: 100 % | SYSTOLIC BLOOD PRESSURE: 124 MMHG

## 2017-07-31 VITALS
RESPIRATION RATE: 16 BRPM | TEMPERATURE: 98 F | DIASTOLIC BLOOD PRESSURE: 66 MMHG | HEIGHT: 68 IN | HEART RATE: 75 BPM | SYSTOLIC BLOOD PRESSURE: 119 MMHG | OXYGEN SATURATION: 97 % | WEIGHT: 160.06 LBS

## 2017-07-31 DIAGNOSIS — Z98.1 ARTHRODESIS STATUS: Chronic | ICD-10-CM

## 2017-07-31 PROCEDURE — 93010 ELECTROCARDIOGRAM REPORT: CPT

## 2017-07-31 PROCEDURE — 99284 EMERGENCY DEPT VISIT MOD MDM: CPT

## 2017-07-31 RX ORDER — ONDANSETRON 8 MG/1
4 TABLET, FILM COATED ORAL ONCE
Qty: 0 | Refills: 0 | Status: COMPLETED | OUTPATIENT
Start: 2017-07-31 | End: 2017-07-31

## 2017-07-31 RX ORDER — HYDROMORPHONE HYDROCHLORIDE 2 MG/ML
1 INJECTION INTRAMUSCULAR; INTRAVENOUS; SUBCUTANEOUS ONCE
Qty: 0 | Refills: 0 | Status: DISCONTINUED | OUTPATIENT
Start: 2017-07-31 | End: 2017-07-31

## 2017-07-31 RX ORDER — SODIUM CHLORIDE 9 MG/ML
500 INJECTION INTRAMUSCULAR; INTRAVENOUS; SUBCUTANEOUS
Qty: 0 | Refills: 0 | Status: DISCONTINUED | OUTPATIENT
Start: 2017-07-31 | End: 2017-07-31

## 2017-07-31 RX ADMIN — SODIUM CHLORIDE 250 MILLILITER(S): 9 INJECTION INTRAMUSCULAR; INTRAVENOUS; SUBCUTANEOUS at 18:26

## 2017-07-31 RX ADMIN — HYDROMORPHONE HYDROCHLORIDE 1 MILLIGRAM(S): 2 INJECTION INTRAMUSCULAR; INTRAVENOUS; SUBCUTANEOUS at 18:25

## 2017-07-31 RX ADMIN — ONDANSETRON 4 MILLIGRAM(S): 8 TABLET, FILM COATED ORAL at 18:26

## 2017-07-31 NOTE — ED ADULT NURSE NOTE - CHPI ED SYMPTOMS NEG
no constipation/no motor function loss/no bowel dysfunction/no numbness/no bladder dysfunction/no anorexia/no tingling/no neck tenderness/no fatigue

## 2017-07-31 NOTE — ED PROVIDER NOTE - MEDICAL DECISION MAKING DETAILS
78 y/o M from [facility] w/ hx of HTN, anxiety, depression, spinal fusion (April 2017) presents to ED c/o CP, abd pain, and lower back pain. 76 y/o M from [facility] w/ hx of HTN, anxiety, depression, spinal fusion (April 2017) presents to ED c/o CP, abd pain, and lower back pain. Plan pain management.

## 2017-07-31 NOTE — ED PROVIDER NOTE - CONSTITUTIONAL, MLM
normal... Well appearing, well nourished, awake, alert, oriented to person, place, time/situation and in no apparent distress. Chronically ill-appearing, awake, alert, oriented to person, place, time/situation and in no apparent distress.

## 2017-07-31 NOTE — ED PROVIDER NOTE - OBJECTIVE STATEMENT
76 y/o M from [facility] w/ hx of HTN, anxiety, depression, spinal fusion (April 2017) presents to ED c/o CP, abd pain, and lower back pain. Pt reports decreased eating because of nausea. Pt has problems with insurance in obtaining necessary pain management medication. Pt has no other complaints and denies SOB, fever/chills, n/v/d and GOMES. Surgeon Dr. Olmos ([facility] [place]).

## 2017-07-31 NOTE — ED ADULT NURSE NOTE - OBJECTIVE STATEMENT
Pt is a 77y male, A & O x 3, VSS, presents to ED w/ back pain, and pain 'everywhere", pt denies N/V/D.

## 2017-07-31 NOTE — ED PROVIDER NOTE - MUSCULOSKELETAL, MLM
Spine appears normal, range of motion is not limited, no muscle or joint tenderness +TTP to lumbar and paraspinal region

## 2017-08-02 DIAGNOSIS — M48.00 SPINAL STENOSIS, SITE UNSPECIFIED: ICD-10-CM

## 2017-08-02 DIAGNOSIS — N40.0 BENIGN PROSTATIC HYPERPLASIA WITHOUT LOWER URINARY TRACT SYMPTOMS: ICD-10-CM

## 2017-08-02 DIAGNOSIS — M54.9 DORSALGIA, UNSPECIFIED: ICD-10-CM

## 2017-08-02 DIAGNOSIS — D64.9 ANEMIA, UNSPECIFIED: ICD-10-CM

## 2017-08-02 DIAGNOSIS — Z98.1 ARTHRODESIS STATUS: ICD-10-CM

## 2017-08-02 DIAGNOSIS — F32.9 MAJOR DEPRESSIVE DISORDER, SINGLE EPISODE, UNSPECIFIED: ICD-10-CM

## 2017-08-02 DIAGNOSIS — I10 ESSENTIAL (PRIMARY) HYPERTENSION: ICD-10-CM

## 2017-08-02 DIAGNOSIS — F41.9 ANXIETY DISORDER, UNSPECIFIED: ICD-10-CM

## 2017-08-02 DIAGNOSIS — G62.9 POLYNEUROPATHY, UNSPECIFIED: ICD-10-CM

## 2017-08-02 DIAGNOSIS — K21.9 GASTRO-ESOPHAGEAL REFLUX DISEASE WITHOUT ESOPHAGITIS: ICD-10-CM

## 2017-08-13 ENCOUNTER — EMERGENCY (EMERGENCY)
Facility: HOSPITAL | Age: 77
LOS: 0 days | Discharge: HOME | End: 2017-08-13
Attending: EMERGENCY MEDICINE | Admitting: EMERGENCY MEDICINE
Payer: MEDICARE

## 2017-08-13 VITALS
DIASTOLIC BLOOD PRESSURE: 86 MMHG | RESPIRATION RATE: 17 BRPM | SYSTOLIC BLOOD PRESSURE: 113 MMHG | TEMPERATURE: 98 F | HEART RATE: 94 BPM | OXYGEN SATURATION: 100 %

## 2017-08-13 VITALS
HEART RATE: 79 BPM | HEIGHT: 67 IN | DIASTOLIC BLOOD PRESSURE: 65 MMHG | RESPIRATION RATE: 18 BRPM | OXYGEN SATURATION: 97 % | TEMPERATURE: 99 F | SYSTOLIC BLOOD PRESSURE: 121 MMHG | WEIGHT: 164.91 LBS

## 2017-08-13 DIAGNOSIS — M54.9 DORSALGIA, UNSPECIFIED: ICD-10-CM

## 2017-08-13 DIAGNOSIS — M48.00 SPINAL STENOSIS, SITE UNSPECIFIED: ICD-10-CM

## 2017-08-13 DIAGNOSIS — F41.9 ANXIETY DISORDER, UNSPECIFIED: ICD-10-CM

## 2017-08-13 DIAGNOSIS — I10 ESSENTIAL (PRIMARY) HYPERTENSION: ICD-10-CM

## 2017-08-13 DIAGNOSIS — Z98.1 ARTHRODESIS STATUS: Chronic | ICD-10-CM

## 2017-08-13 DIAGNOSIS — F32.9 MAJOR DEPRESSIVE DISORDER, SINGLE EPISODE, UNSPECIFIED: ICD-10-CM

## 2017-08-13 DIAGNOSIS — Z98.1 ARTHRODESIS STATUS: ICD-10-CM

## 2017-08-13 PROCEDURE — 71020: CPT | Mod: 26

## 2017-08-13 PROCEDURE — 99284 EMERGENCY DEPT VISIT MOD MDM: CPT

## 2017-08-13 RX ORDER — HYDROMORPHONE HYDROCHLORIDE 2 MG/ML
1 INJECTION INTRAMUSCULAR; INTRAVENOUS; SUBCUTANEOUS ONCE
Qty: 0 | Refills: 0 | Status: DISCONTINUED | OUTPATIENT
Start: 2017-08-13 | End: 2017-08-13

## 2017-08-13 RX ORDER — ONDANSETRON 8 MG/1
4 TABLET, FILM COATED ORAL ONCE
Qty: 0 | Refills: 0 | Status: COMPLETED | OUTPATIENT
Start: 2017-08-13 | End: 2017-08-13

## 2017-08-13 RX ADMIN — ONDANSETRON 4 MILLIGRAM(S): 8 TABLET, FILM COATED ORAL at 17:08

## 2017-08-13 RX ADMIN — HYDROMORPHONE HYDROCHLORIDE 1 MILLIGRAM(S): 2 INJECTION INTRAMUSCULAR; INTRAVENOUS; SUBCUTANEOUS at 17:08

## 2017-08-13 NOTE — ED PROVIDER NOTE - OBJECTIVE STATEMENT
76 y/o M presents to the ED c/o back pain. The pt provides that he has increased pain associated with nausea and notes that his meds are not helping. No h/o recent trauma, abd pain, nvd, headache, fever ,chills, dizziness, cp, cough, sob, or urinary incontinence. 76 y/o M presents to the ED c/o back pain. The pt provides that he has increased pain associated with nausea and notes that his meds are not helping. No h/o recent trauma, abd pain, nvd, headache, fever ,chills, dizziness, cp, cough, sob, or urinary incontinence.  Says he takes 6mg of Dilaudid daily but cant keep it down due to nausea.  Asking for IV dilaudid bc this helps.  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  Denies any new symptoms, just his typical pain that worsened.

## 2017-08-13 NOTE — ED PROVIDER NOTE - PROGRESS NOTE DETAILS
Feels much better. Wants to go back to facility. Feels much better. Wants to go back to facility.  Sitting up and appears comfortable after 1 dose of medicine.

## 2017-08-13 NOTE — ED ADULT NURSE REASSESSMENT NOTE - NS ED NURSE REASSESS COMMENT FT1
called for transport. Iv lock pulled out. Pt given d/c papers.  Ate dinner. waiting transport back to the facility.

## 2017-08-13 NOTE — ED PROVIDER NOTE - CHPI ED SYMPTOMS NEG
no difficulty bearing weight/no motor function loss/no anorexia/no neck tenderness/no bladder dysfunction/no fatigue/no numbness/no constipation/no bowel dysfunction/no tingling

## 2017-08-13 NOTE — ED PROVIDER NOTE - MEDICAL DECISION MAKING DETAILS
76 y/o M c/o back pain, to obtain xray, labs, reassess. 76 y/o M c/o back pain.  Chronic in nature, worsened bc he cant tolerate his PO meds.  Says IV dilaudid works.  Will give one dose of IV dilaudid w/ zofran.  Given pain in right upper back will check CXR to eval for pulm etiology although likely MSK in nature.

## 2017-08-13 NOTE — ED PROVIDER NOTE - SKIN, MLM
Skin normal color for race, warm, dry and intact. No evidence of rash. Skin normal color for race, warm, dry and intact.  Large vertical scar from lower neck to sacral area from previous back surgery.  No rash or redness.

## 2017-09-20 ENCOUNTER — EMERGENCY (EMERGENCY)
Facility: HOSPITAL | Age: 77
LOS: 0 days | Discharge: HOME | End: 2017-09-21
Attending: EMERGENCY MEDICINE | Admitting: FAMILY MEDICINE
Payer: MEDICARE

## 2017-09-20 VITALS
HEIGHT: 69 IN | WEIGHT: 164.91 LBS | TEMPERATURE: 97 F | DIASTOLIC BLOOD PRESSURE: 92 MMHG | RESPIRATION RATE: 18 BRPM | SYSTOLIC BLOOD PRESSURE: 147 MMHG | HEART RATE: 74 BPM | OXYGEN SATURATION: 97 %

## 2017-09-20 DIAGNOSIS — Z98.1 ARTHRODESIS STATUS: Chronic | ICD-10-CM

## 2017-09-20 DIAGNOSIS — M54.9 DORSALGIA, UNSPECIFIED: ICD-10-CM

## 2017-09-20 DIAGNOSIS — M54.5 LOW BACK PAIN: ICD-10-CM

## 2017-09-20 DIAGNOSIS — R11.0 NAUSEA: ICD-10-CM

## 2017-09-20 PROCEDURE — 99284 EMERGENCY DEPT VISIT MOD MDM: CPT

## 2017-09-20 RX ORDER — HYDROMORPHONE HYDROCHLORIDE 2 MG/ML
1 INJECTION INTRAMUSCULAR; INTRAVENOUS; SUBCUTANEOUS ONCE
Qty: 0 | Refills: 0 | Status: DISCONTINUED | OUTPATIENT
Start: 2017-09-20 | End: 2017-09-20

## 2017-09-20 RX ORDER — ONDANSETRON 8 MG/1
8 TABLET, FILM COATED ORAL ONCE
Qty: 0 | Refills: 0 | Status: COMPLETED | OUTPATIENT
Start: 2017-09-20 | End: 2017-09-20

## 2017-09-20 RX ADMIN — HYDROMORPHONE HYDROCHLORIDE 1 MILLIGRAM(S): 2 INJECTION INTRAMUSCULAR; INTRAVENOUS; SUBCUTANEOUS at 23:53

## 2017-09-20 RX ADMIN — ONDANSETRON 8 MILLIGRAM(S): 8 TABLET, FILM COATED ORAL at 23:53

## 2017-09-20 RX ADMIN — Medication 1 MILLIGRAM(S): at 23:53

## 2017-09-21 VITALS — DIASTOLIC BLOOD PRESSURE: 78 MMHG | OXYGEN SATURATION: 98 % | HEART RATE: 78 BPM | SYSTOLIC BLOOD PRESSURE: 148 MMHG

## 2017-09-21 LAB
ALBUMIN SERPL ELPH-MCNC: 3.6 G/DL — SIGNIFICANT CHANGE UP (ref 3.3–5)
ALP SERPL-CCNC: 85 U/L — SIGNIFICANT CHANGE UP (ref 40–120)
ALT FLD-CCNC: 14 U/L — SIGNIFICANT CHANGE UP (ref 12–78)
ANION GAP SERPL CALC-SCNC: 7 MMOL/L — SIGNIFICANT CHANGE UP (ref 5–17)
AST SERPL-CCNC: 11 U/L — LOW (ref 15–37)
BASOPHILS # BLD AUTO: 0 K/UL — SIGNIFICANT CHANGE UP (ref 0–0.2)
BASOPHILS NFR BLD AUTO: 0.5 % — SIGNIFICANT CHANGE UP (ref 0–2)
BILIRUB SERPL-MCNC: 0.3 MG/DL — SIGNIFICANT CHANGE UP (ref 0.2–1.2)
BUN SERPL-MCNC: 19 MG/DL — SIGNIFICANT CHANGE UP (ref 7–23)
CALCIUM SERPL-MCNC: 9.4 MG/DL — SIGNIFICANT CHANGE UP (ref 8.5–10.1)
CHLORIDE SERPL-SCNC: 104 MMOL/L — SIGNIFICANT CHANGE UP (ref 96–108)
CO2 SERPL-SCNC: 29 MMOL/L — SIGNIFICANT CHANGE UP (ref 22–31)
CREAT SERPL-MCNC: 1.13 MG/DL — SIGNIFICANT CHANGE UP (ref 0.5–1.3)
EOSINOPHIL # BLD AUTO: 0.1 K/UL — SIGNIFICANT CHANGE UP (ref 0–0.5)
EOSINOPHIL NFR BLD AUTO: 0.8 % — SIGNIFICANT CHANGE UP (ref 0–6)
GLUCOSE SERPL-MCNC: 85 MG/DL — SIGNIFICANT CHANGE UP (ref 70–99)
HCT VFR BLD CALC: 35.7 % — LOW (ref 39–50)
HGB BLD-MCNC: 11.2 G/DL — LOW (ref 13–17)
LYMPHOCYTES # BLD AUTO: 1.3 K/UL — SIGNIFICANT CHANGE UP (ref 1–3.3)
LYMPHOCYTES # BLD AUTO: 19.4 % — SIGNIFICANT CHANGE UP (ref 13–44)
MCHC RBC-ENTMCNC: 23.7 PG — LOW (ref 27–34)
MCHC RBC-ENTMCNC: 31.4 GM/DL — LOW (ref 32–36)
MCV RBC AUTO: 75.7 FL — LOW (ref 80–100)
MONOCYTES # BLD AUTO: 0.5 K/UL — SIGNIFICANT CHANGE UP (ref 0–0.9)
MONOCYTES NFR BLD AUTO: 7.8 % — SIGNIFICANT CHANGE UP (ref 2–14)
NEUTROPHILS # BLD AUTO: 4.9 K/UL — SIGNIFICANT CHANGE UP (ref 1.8–7.4)
NEUTROPHILS NFR BLD AUTO: 71.5 % — SIGNIFICANT CHANGE UP (ref 43–77)
PLATELET # BLD AUTO: 220 K/UL — SIGNIFICANT CHANGE UP (ref 150–400)
POTASSIUM SERPL-MCNC: 4.2 MMOL/L — SIGNIFICANT CHANGE UP (ref 3.5–5.3)
POTASSIUM SERPL-SCNC: 4.2 MMOL/L — SIGNIFICANT CHANGE UP (ref 3.5–5.3)
PROT SERPL-MCNC: 7.1 GM/DL — SIGNIFICANT CHANGE UP (ref 6–8.3)
RBC # BLD: 4.72 M/UL — SIGNIFICANT CHANGE UP (ref 4.2–5.8)
RBC # FLD: 17 % — HIGH (ref 10.3–14.5)
SODIUM SERPL-SCNC: 140 MMOL/L — SIGNIFICANT CHANGE UP (ref 135–145)
WBC # BLD: 6.9 K/UL — SIGNIFICANT CHANGE UP (ref 3.8–10.5)
WBC # FLD AUTO: 6.9 K/UL — SIGNIFICANT CHANGE UP (ref 3.8–10.5)

## 2017-09-21 RX ORDER — OXYCODONE HYDROCHLORIDE 5 MG/1
10 TABLET ORAL ONCE
Qty: 0 | Refills: 0 | Status: DISCONTINUED | OUTPATIENT
Start: 2017-09-21 | End: 2017-09-21

## 2017-09-21 RX ADMIN — OXYCODONE HYDROCHLORIDE 10 MILLIGRAM(S): 5 TABLET ORAL at 01:58

## 2017-09-21 NOTE — ED PROVIDER NOTE - OBJECTIVE STATEMENT
77 year old male hx of chronic back pain, chronic nausea due to chronic constipation presents to the ED complaining of feeling nauseated, and also complaining of his chronic back pain. No saddle anesthesia. No melena or hematochezia. No dysuria hematuria or frequency. No recent exotic travel. Sees own primary care pain management doctor. 77 year old male hx of chronic back pain, chronic nausea due to chronic constipation presents to the ED complaining of feeling nauseated, and also complaining of his chronic back pain. No saddle anesthesia. or incontinence No melena or hematochezia. No dysuria hematuria or frequency. No recent exotic travel. Sees own primary care pain management doctor. No cp, sob, or palpitation. Has been to the ED multiple times before the same or similar complaint.

## 2017-09-21 NOTE — ED PROVIDER NOTE - MEDICAL DECISION MAKING DETAILS
Ryan OCOPER: Patient's blood work reviewed. Patient's nausea and chronic back pain treated in the ED. Outpatient follow up and instructions to return if any worsening of symptoms provided.

## 2017-09-21 NOTE — ED PROVIDER NOTE - CARE PLAN
Principal Discharge DX:	Chronic low back pain, unspecified back pain laterality, with sciatica presence unspecified  Secondary Diagnosis:	Nausea

## 2017-09-27 ENCOUNTER — EMERGENCY (EMERGENCY)
Facility: HOSPITAL | Age: 77
LOS: 0 days | Discharge: HOME | End: 2017-09-27
Attending: EMERGENCY MEDICINE | Admitting: EMERGENCY MEDICINE
Payer: MEDICARE

## 2017-09-27 VITALS
OXYGEN SATURATION: 97 % | RESPIRATION RATE: 18 BRPM | TEMPERATURE: 98 F | HEART RATE: 78 BPM | HEIGHT: 69 IN | SYSTOLIC BLOOD PRESSURE: 132 MMHG | WEIGHT: 164.91 LBS | DIASTOLIC BLOOD PRESSURE: 71 MMHG

## 2017-09-27 DIAGNOSIS — Z98.1 ARTHRODESIS STATUS: Chronic | ICD-10-CM

## 2017-09-27 DIAGNOSIS — M54.9 DORSALGIA, UNSPECIFIED: ICD-10-CM

## 2017-09-27 DIAGNOSIS — R11.0 NAUSEA: ICD-10-CM

## 2017-09-27 DIAGNOSIS — G89.29 OTHER CHRONIC PAIN: ICD-10-CM

## 2017-09-27 DIAGNOSIS — R11.2 NAUSEA WITH VOMITING, UNSPECIFIED: ICD-10-CM

## 2017-09-27 PROCEDURE — 99284 EMERGENCY DEPT VISIT MOD MDM: CPT

## 2017-09-27 RX ORDER — SODIUM CHLORIDE 9 MG/ML
1000 INJECTION INTRAMUSCULAR; INTRAVENOUS; SUBCUTANEOUS ONCE
Qty: 0 | Refills: 0 | Status: COMPLETED | OUTPATIENT
Start: 2017-09-27 | End: 2017-09-27

## 2017-09-27 RX ORDER — KETOROLAC TROMETHAMINE 30 MG/ML
30 SYRINGE (ML) INJECTION ONCE
Qty: 0 | Refills: 0 | Status: DISCONTINUED | OUTPATIENT
Start: 2017-09-27 | End: 2017-09-27

## 2017-09-27 RX ORDER — ONDANSETRON 8 MG/1
4 TABLET, FILM COATED ORAL ONCE
Qty: 0 | Refills: 0 | Status: COMPLETED | OUTPATIENT
Start: 2017-09-27 | End: 2017-09-27

## 2017-09-27 RX ADMIN — SODIUM CHLORIDE 1000 MILLILITER(S): 9 INJECTION INTRAMUSCULAR; INTRAVENOUS; SUBCUTANEOUS at 12:05

## 2017-09-27 RX ADMIN — ONDANSETRON 4 MILLIGRAM(S): 8 TABLET, FILM COATED ORAL at 12:08

## 2017-09-27 RX ADMIN — Medication 30 MILLIGRAM(S): at 12:08

## 2017-09-27 NOTE — ED PROVIDER NOTE - OBJECTIVE STATEMENT
76 yo male with PMHx of chronic back pain presents to the ED BIBEMS from the [facility] c/o nausea starting this morning. Pt states he feels severe nausea and is unable to sleep or eat. +vomiting. Also c/o back pain. States his nausea is caused by PO Dilaudid but does not occur with IV Dilaudid. Last dose of PO Dilaudid at 4am this morning at nursing home, per EMS. Last BM yesterday. Ambulatory with walker. Has an appointment with his pain management doctor on 10/3.

## 2017-09-27 NOTE — ED ADULT NURSE NOTE - CHPI ED SYMPTOMS NEG
no dysuria/no diarrhea/no blood in stool/no fever/no vomiting/no chills/no hematuria/no abdominal distension/no burning urination

## 2017-09-27 NOTE — ED PROVIDER NOTE - PROGRESS NOTE DETAILS
patient ambulatory in ED with steady gait  explained to patient that he needs to follow up with his pain managament doctor

## 2017-10-07 ENCOUNTER — EMERGENCY (EMERGENCY)
Facility: HOSPITAL | Age: 77
LOS: 0 days | Discharge: HOME | End: 2017-10-07
Attending: EMERGENCY MEDICINE | Admitting: EMERGENCY MEDICINE
Payer: MEDICARE

## 2017-10-07 VITALS
RESPIRATION RATE: 16 BRPM | SYSTOLIC BLOOD PRESSURE: 111 MMHG | DIASTOLIC BLOOD PRESSURE: 74 MMHG | TEMPERATURE: 98 F | HEART RATE: 71 BPM | OXYGEN SATURATION: 98 %

## 2017-10-07 VITALS
TEMPERATURE: 98 F | HEART RATE: 69 BPM | WEIGHT: 160.06 LBS | OXYGEN SATURATION: 96 % | HEIGHT: 67 IN | DIASTOLIC BLOOD PRESSURE: 65 MMHG | SYSTOLIC BLOOD PRESSURE: 106 MMHG | RESPIRATION RATE: 18 BRPM

## 2017-10-07 DIAGNOSIS — M54.9 DORSALGIA, UNSPECIFIED: ICD-10-CM

## 2017-10-07 DIAGNOSIS — Z98.1 ARTHRODESIS STATUS: Chronic | ICD-10-CM

## 2017-10-07 PROCEDURE — 99285 EMERGENCY DEPT VISIT HI MDM: CPT

## 2017-10-07 RX ORDER — HYDROMORPHONE HYDROCHLORIDE 2 MG/ML
1 INJECTION INTRAMUSCULAR; INTRAVENOUS; SUBCUTANEOUS ONCE
Qty: 0 | Refills: 0 | Status: DISCONTINUED | OUTPATIENT
Start: 2017-10-07 | End: 2017-10-07

## 2017-10-07 RX ORDER — ONDANSETRON 8 MG/1
4 TABLET, FILM COATED ORAL ONCE
Qty: 0 | Refills: 0 | Status: COMPLETED | OUTPATIENT
Start: 2017-10-07 | End: 2017-10-07

## 2017-10-07 RX ADMIN — HYDROMORPHONE HYDROCHLORIDE 1 MILLIGRAM(S): 2 INJECTION INTRAMUSCULAR; INTRAVENOUS; SUBCUTANEOUS at 03:42

## 2017-10-07 RX ADMIN — ONDANSETRON 4 MILLIGRAM(S): 8 TABLET, FILM COATED ORAL at 03:42

## 2017-10-07 NOTE — ED PROVIDER NOTE - NS_ ATTENDINGSCRIBEDETAILS _ED_A_ED_FT
I, Willis Calhoun, performed the initial face to face bedside interview with this patient regarding history of present illness, review of symptoms and relevant past medical, social and family history.  I completed an independent physical examination.  I was the initial provider who evaluated this patient.  The history, relevant review of systems, past medical and surgical history, medical decision making, and physical examination was documented by the scribe in my presence and I attest to the accuracy of the documentation.

## 2017-10-07 NOTE — ED PROVIDER NOTE - OBJECTIVE STATEMENT
76 y/o M with PMHx of chronic back pain, spinal stenosis, BPH, foot drop s/p spinal fusion presents to the ED c/o back pain. pt had back pain since service in [place]. Pt was found to have fx in vertebrae and had fusion of his vertebrae. Denies urinary incontinence. Non smoker. Pt is ambulatory. 78 y/o M with PMHx of chronic back pain, spinal stenosis, BPH, foot drop s/p spinal fusion presents to the ED c/o back pain. pt had back pain since service in [place]. Pt was found to have fx in vertebrae and had fusion of his vertebrae.  Presented similar in the past w/ this pain. Denies urinary incontinence. Non smoker. Pt is ambulatory.

## 2017-10-07 NOTE — ED PROVIDER NOTE - MUSCULOSKELETAL, MLM
Spine appears normal, range of motion is not limited, no muscle or joint tenderness. Chronic left footdrop.

## 2017-10-07 NOTE — ED PROVIDER NOTE - MEDICAL DECISION MAKING DETAILS
Pt w/ acute on chronic back pain.  Prior surgery.  LLE foot drop.  Takes PO meds but not working enough.  Will give IV Dilaudid and Zofran x 1 and reevaluate.

## 2017-10-07 NOTE — ED PROVIDER NOTE - SKIN, MLM
Skin normal color for race, warm, dry and intact. No evidence of rash. Large healed midline scar throughout entire spine.

## 2017-10-07 NOTE — ED ADULT NURSE NOTE - OBJECTIVE STATEMENT
Pt presents to the ER c/o chronic back pain exacerbation. Pt reports spinal fusion in April 2017, Pt takes Dilaudid 6mg Q4. Pt reports feeling most comfortable lying supine. Neuro intact, denies radiating pain, good peripheral sensation.

## 2017-10-14 ENCOUNTER — EMERGENCY (EMERGENCY)
Facility: HOSPITAL | Age: 77
LOS: 0 days | Discharge: HOME | End: 2017-10-14
Attending: EMERGENCY MEDICINE | Admitting: EMERGENCY MEDICINE
Payer: MEDICARE

## 2017-10-14 VITALS
RESPIRATION RATE: 19 BRPM | DIASTOLIC BLOOD PRESSURE: 72 MMHG | OXYGEN SATURATION: 100 % | TEMPERATURE: 98 F | HEART RATE: 69 BPM | SYSTOLIC BLOOD PRESSURE: 151 MMHG

## 2017-10-14 VITALS
TEMPERATURE: 98 F | HEART RATE: 70 BPM | HEIGHT: 67 IN | OXYGEN SATURATION: 100 % | WEIGHT: 149.91 LBS | DIASTOLIC BLOOD PRESSURE: 69 MMHG | RESPIRATION RATE: 18 BRPM | SYSTOLIC BLOOD PRESSURE: 159 MMHG

## 2017-10-14 DIAGNOSIS — K21.9 GASTRO-ESOPHAGEAL REFLUX DISEASE WITHOUT ESOPHAGITIS: ICD-10-CM

## 2017-10-14 DIAGNOSIS — M54.9 DORSALGIA, UNSPECIFIED: ICD-10-CM

## 2017-10-14 DIAGNOSIS — M48.00 SPINAL STENOSIS, SITE UNSPECIFIED: ICD-10-CM

## 2017-10-14 DIAGNOSIS — K59.00 CONSTIPATION, UNSPECIFIED: ICD-10-CM

## 2017-10-14 DIAGNOSIS — I10 ESSENTIAL (PRIMARY) HYPERTENSION: ICD-10-CM

## 2017-10-14 DIAGNOSIS — G89.29 OTHER CHRONIC PAIN: ICD-10-CM

## 2017-10-14 DIAGNOSIS — M51.9 UNSPECIFIED THORACIC, THORACOLUMBAR AND LUMBOSACRAL INTERVERTEBRAL DISC DISORDER: ICD-10-CM

## 2017-10-14 DIAGNOSIS — Z98.1 ARTHRODESIS STATUS: Chronic | ICD-10-CM

## 2017-10-14 LAB
ALBUMIN SERPL ELPH-MCNC: 3.8 G/DL — SIGNIFICANT CHANGE UP (ref 3.3–5)
ALP SERPL-CCNC: 89 U/L — SIGNIFICANT CHANGE UP (ref 40–120)
ALT FLD-CCNC: 17 U/L — SIGNIFICANT CHANGE UP (ref 12–78)
ANION GAP SERPL CALC-SCNC: 7 MMOL/L — SIGNIFICANT CHANGE UP (ref 5–17)
AST SERPL-CCNC: 13 U/L — LOW (ref 15–37)
BASOPHILS # BLD AUTO: 0.1 K/UL — SIGNIFICANT CHANGE UP (ref 0–0.2)
BASOPHILS NFR BLD AUTO: 0.7 % — SIGNIFICANT CHANGE UP (ref 0–2)
BILIRUB SERPL-MCNC: 0.4 MG/DL — SIGNIFICANT CHANGE UP (ref 0.2–1.2)
BUN SERPL-MCNC: 20 MG/DL — SIGNIFICANT CHANGE UP (ref 7–23)
CALCIUM SERPL-MCNC: 9.4 MG/DL — SIGNIFICANT CHANGE UP (ref 8.5–10.1)
CHLORIDE SERPL-SCNC: 103 MMOL/L — SIGNIFICANT CHANGE UP (ref 96–108)
CO2 SERPL-SCNC: 28 MMOL/L — SIGNIFICANT CHANGE UP (ref 22–31)
CREAT SERPL-MCNC: 1.08 MG/DL — SIGNIFICANT CHANGE UP (ref 0.5–1.3)
EOSINOPHIL # BLD AUTO: 0 K/UL — SIGNIFICANT CHANGE UP (ref 0–0.5)
EOSINOPHIL NFR BLD AUTO: 0.3 % — SIGNIFICANT CHANGE UP (ref 0–6)
GLUCOSE SERPL-MCNC: 101 MG/DL — HIGH (ref 70–99)
HCT VFR BLD CALC: 37.4 % — LOW (ref 39–50)
HGB BLD-MCNC: 11.8 G/DL — LOW (ref 13–17)
LYMPHOCYTES # BLD AUTO: 1.3 K/UL — SIGNIFICANT CHANGE UP (ref 1–3.3)
LYMPHOCYTES # BLD AUTO: 13.6 % — SIGNIFICANT CHANGE UP (ref 13–44)
MCHC RBC-ENTMCNC: 24.2 PG — LOW (ref 27–34)
MCHC RBC-ENTMCNC: 31.7 GM/DL — LOW (ref 32–36)
MCV RBC AUTO: 76.5 FL — LOW (ref 80–100)
MONOCYTES # BLD AUTO: 0.8 K/UL — SIGNIFICANT CHANGE UP (ref 0–0.9)
MONOCYTES NFR BLD AUTO: 8.2 % — SIGNIFICANT CHANGE UP (ref 2–14)
NEUTROPHILS # BLD AUTO: 7.3 K/UL — SIGNIFICANT CHANGE UP (ref 1.8–7.4)
NEUTROPHILS NFR BLD AUTO: 77.2 % — HIGH (ref 43–77)
PLATELET # BLD AUTO: 230 K/UL — SIGNIFICANT CHANGE UP (ref 150–400)
POTASSIUM SERPL-MCNC: 3.9 MMOL/L — SIGNIFICANT CHANGE UP (ref 3.5–5.3)
POTASSIUM SERPL-SCNC: 3.9 MMOL/L — SIGNIFICANT CHANGE UP (ref 3.5–5.3)
PROT SERPL-MCNC: 7.5 GM/DL — SIGNIFICANT CHANGE UP (ref 6–8.3)
RBC # BLD: 4.89 M/UL — SIGNIFICANT CHANGE UP (ref 4.2–5.8)
RBC # FLD: 16.6 % — HIGH (ref 10.3–14.5)
SODIUM SERPL-SCNC: 138 MMOL/L — SIGNIFICANT CHANGE UP (ref 135–145)
WBC # BLD: 9.5 K/UL — SIGNIFICANT CHANGE UP (ref 3.8–10.5)
WBC # FLD AUTO: 9.5 K/UL — SIGNIFICANT CHANGE UP (ref 3.8–10.5)

## 2017-10-14 PROCEDURE — 99284 EMERGENCY DEPT VISIT MOD MDM: CPT

## 2017-10-14 PROCEDURE — 74022 RADEX COMPL AQT ABD SERIES: CPT | Mod: 26

## 2017-10-14 RX ORDER — HYDROMORPHONE HYDROCHLORIDE 2 MG/ML
1 INJECTION INTRAMUSCULAR; INTRAVENOUS; SUBCUTANEOUS ONCE
Qty: 0 | Refills: 0 | Status: DISCONTINUED | OUTPATIENT
Start: 2017-10-14 | End: 2017-10-14

## 2017-10-14 RX ORDER — ONDANSETRON 8 MG/1
4 TABLET, FILM COATED ORAL ONCE
Qty: 0 | Refills: 0 | Status: COMPLETED | OUTPATIENT
Start: 2017-10-14 | End: 2017-10-14

## 2017-10-14 RX ORDER — SODIUM CHLORIDE 9 MG/ML
1000 INJECTION INTRAMUSCULAR; INTRAVENOUS; SUBCUTANEOUS ONCE
Qty: 0 | Refills: 0 | Status: COMPLETED | OUTPATIENT
Start: 2017-10-14 | End: 2017-10-14

## 2017-10-14 RX ADMIN — SODIUM CHLORIDE 1000 MILLILITER(S): 9 INJECTION INTRAMUSCULAR; INTRAVENOUS; SUBCUTANEOUS at 11:19

## 2017-10-14 RX ADMIN — Medication 1 MILLIGRAM(S): at 11:19

## 2017-10-14 RX ADMIN — HYDROMORPHONE HYDROCHLORIDE 1 MILLIGRAM(S): 2 INJECTION INTRAMUSCULAR; INTRAVENOUS; SUBCUTANEOUS at 11:45

## 2017-10-14 RX ADMIN — ONDANSETRON 4 MILLIGRAM(S): 8 TABLET, FILM COATED ORAL at 11:19

## 2017-10-14 RX ADMIN — HYDROMORPHONE HYDROCHLORIDE 1 MILLIGRAM(S): 2 INJECTION INTRAMUSCULAR; INTRAVENOUS; SUBCUTANEOUS at 11:29

## 2017-10-14 RX ADMIN — HYDROMORPHONE HYDROCHLORIDE 1 MILLIGRAM(S): 2 INJECTION INTRAMUSCULAR; INTRAVENOUS; SUBCUTANEOUS at 15:52

## 2017-10-14 NOTE — ED PROVIDER NOTE - OBJECTIVE STATEMENT
78 y/o M with PMHx of GERD, spinal stenosis, HTN, chronic back pain due to hernia discs (cause of past multiple visits followed by outpatient pain management) presents to the ED c/o worsening pain in his lower back that feels like his usual pain. Pt admits to chronic constipation due to drugs. Pt denies CP, SOB, abd pain, visual problems, incontinence, saddle anesthesia. 76 y/o M with PMHx of GERD, spinal stenosis, HTN, chronic back pain due to herniated discs (cause of past multiple visits followed by outpatient pain management) presents to the ED c/o worsening pain in his lower back that feels like his usual pain. Pt admits to chronic constipation due to opiate meds. Pt denies CP, SOB, abd pain, visual problems, incontinence, saddle anesthesia.

## 2017-10-14 NOTE — ED PROVIDER NOTE - NS_ ATTENDINGSCRIBEDETAILS _ED_A_ED_FT
I Ari Davis MD saw and examined this patient. Scribe documented for me and under my supervision. I have modified the scribe's documentation where necessary to reflect my history and physical exam findings and other relevant documentations.

## 2017-10-14 NOTE — ED ADULT NURSE NOTE - OBJECTIVE STATEMENT
Laceration to left scalp of hitting head on cement wall at camp, denies any LOC
c/o back pain and unable to take oral meds due to nausea

## 2017-10-14 NOTE — ED PROVIDER NOTE - MEDICAL DECISION MAKING DETAILS
78 y/o M with PMHx of chronic back pain due to hernia discs presents to the ED c/o worsening lower back pain. Plan for X-ray, labs, treat pt's pain, reevaluate.

## 2017-10-14 NOTE — ED PROVIDER NOTE - NEUROLOGICAL, MLM
Alert and oriented, no focal deficits, no motor or sensory deficits. No saddle anesthesia. 5/5 strength in upper and lower extremities in flexion and extension. Intanct sensation and proprioception. CN 2-12 intact.

## 2017-10-18 ENCOUNTER — EMERGENCY (EMERGENCY)
Facility: HOSPITAL | Age: 77
LOS: 0 days | Discharge: HOME | End: 2017-10-19
Attending: FAMILY MEDICINE | Admitting: FAMILY MEDICINE
Payer: MEDICARE

## 2017-10-18 VITALS
RESPIRATION RATE: 15 BRPM | SYSTOLIC BLOOD PRESSURE: 133 MMHG | TEMPERATURE: 98 F | HEART RATE: 72 BPM | OXYGEN SATURATION: 97 % | DIASTOLIC BLOOD PRESSURE: 69 MMHG | WEIGHT: 164.91 LBS | HEIGHT: 69 IN

## 2017-10-18 DIAGNOSIS — G89.29 OTHER CHRONIC PAIN: ICD-10-CM

## 2017-10-18 DIAGNOSIS — M54.2 CERVICALGIA: ICD-10-CM

## 2017-10-18 DIAGNOSIS — R11.0 NAUSEA: ICD-10-CM

## 2017-10-18 DIAGNOSIS — Z98.1 ARTHRODESIS STATUS: Chronic | ICD-10-CM

## 2017-10-18 DIAGNOSIS — M54.5 LOW BACK PAIN: ICD-10-CM

## 2017-10-18 PROCEDURE — 99284 EMERGENCY DEPT VISIT MOD MDM: CPT

## 2017-10-18 RX ORDER — HYDROMORPHONE HYDROCHLORIDE 2 MG/ML
1 INJECTION INTRAMUSCULAR; INTRAVENOUS; SUBCUTANEOUS ONCE
Qty: 0 | Refills: 0 | Status: DISCONTINUED | OUTPATIENT
Start: 2017-10-18 | End: 2017-10-18

## 2017-10-18 RX ORDER — ONDANSETRON 8 MG/1
8 TABLET, FILM COATED ORAL ONCE
Qty: 0 | Refills: 0 | Status: COMPLETED | OUTPATIENT
Start: 2017-10-18 | End: 2017-10-18

## 2017-10-18 RX ADMIN — ONDANSETRON 8 MILLIGRAM(S): 8 TABLET, FILM COATED ORAL at 20:00

## 2017-10-18 RX ADMIN — HYDROMORPHONE HYDROCHLORIDE 1 MILLIGRAM(S): 2 INJECTION INTRAMUSCULAR; INTRAVENOUS; SUBCUTANEOUS at 23:49

## 2017-10-18 RX ADMIN — HYDROMORPHONE HYDROCHLORIDE 1 MILLIGRAM(S): 2 INJECTION INTRAMUSCULAR; INTRAVENOUS; SUBCUTANEOUS at 20:00

## 2017-10-18 NOTE — ED PROVIDER NOTE - OBJECTIVE STATEMENT
Pt is a 78 yo wm with hx chronic lbp s/p surg in [facility] h[facility] April by Dr. Schwab with [facility] who c/o feeling nauseated from his po dilaudid and not having any relief.  Pt sees pain management who has injected back in past but will not see him until next year. Has hx dropped foot o left, neuropothy, anxiety. Seen in er in past.

## 2017-10-18 NOTE — ED ADULT NURSE NOTE - OBJECTIVE STATEMENT
Pt presents to ER c/o chronic back pain with hx of fractured vertebrae. Pt reports onset of symptoms began a few hours ago. Denies fall/trauma, denies numbness/tingling. Pt able to ambulate without assist slowly. AO x 3 oriented to baseline, normal breathing pattern with no difficulty
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
## 2017-10-18 NOTE — ED ADULT NURSE REASSESSMENT NOTE - COMFORT CARE
warm blanket provided/side rails down/repositioned/patient requested one side rail down. hourly rounding completed

## 2017-10-18 NOTE — ED PROVIDER NOTE - MEDICAL DECISION MAKING DETAILS
Pt with chronic low back pain, here with exacerbation, no injury neurologically at baseline. Pain meds

## 2017-10-19 ENCOUNTER — EMERGENCY (EMERGENCY)
Facility: HOSPITAL | Age: 77
LOS: 0 days | Discharge: NURSING, REHAB OR LONG-TERM CARE | End: 2017-10-19
Attending: EMERGENCY MEDICINE | Admitting: EMERGENCY MEDICINE
Payer: MEDICARE

## 2017-10-19 VITALS
DIASTOLIC BLOOD PRESSURE: 69 MMHG | SYSTOLIC BLOOD PRESSURE: 141 MMHG | RESPIRATION RATE: 17 BRPM | OXYGEN SATURATION: 98 % | TEMPERATURE: 98 F | HEART RATE: 79 BPM

## 2017-10-19 VITALS
TEMPERATURE: 98 F | HEART RATE: 76 BPM | DIASTOLIC BLOOD PRESSURE: 101 MMHG | SYSTOLIC BLOOD PRESSURE: 163 MMHG | WEIGHT: 266.98 LBS | RESPIRATION RATE: 16 BRPM | OXYGEN SATURATION: 96 %

## 2017-10-19 DIAGNOSIS — F32.9 MAJOR DEPRESSIVE DISORDER, SINGLE EPISODE, UNSPECIFIED: ICD-10-CM

## 2017-10-19 DIAGNOSIS — R10.9 UNSPECIFIED ABDOMINAL PAIN: ICD-10-CM

## 2017-10-19 DIAGNOSIS — M54.5 LOW BACK PAIN: ICD-10-CM

## 2017-10-19 DIAGNOSIS — G89.29 OTHER CHRONIC PAIN: ICD-10-CM

## 2017-10-19 DIAGNOSIS — D64.9 ANEMIA, UNSPECIFIED: ICD-10-CM

## 2017-10-19 DIAGNOSIS — Z98.1 ARTHRODESIS STATUS: ICD-10-CM

## 2017-10-19 DIAGNOSIS — R11.0 NAUSEA: ICD-10-CM

## 2017-10-19 DIAGNOSIS — K21.9 GASTRO-ESOPHAGEAL REFLUX DISEASE WITHOUT ESOPHAGITIS: ICD-10-CM

## 2017-10-19 DIAGNOSIS — N40.0 BENIGN PROSTATIC HYPERPLASIA WITHOUT LOWER URINARY TRACT SYMPTOMS: ICD-10-CM

## 2017-10-19 DIAGNOSIS — Z98.1 ARTHRODESIS STATUS: Chronic | ICD-10-CM

## 2017-10-19 DIAGNOSIS — I10 ESSENTIAL (PRIMARY) HYPERTENSION: ICD-10-CM

## 2017-10-19 PROCEDURE — 99284 EMERGENCY DEPT VISIT MOD MDM: CPT

## 2017-10-19 RX ORDER — HYDROMORPHONE HYDROCHLORIDE 2 MG/ML
1 INJECTION INTRAMUSCULAR; INTRAVENOUS; SUBCUTANEOUS ONCE
Qty: 0 | Refills: 0 | Status: DISCONTINUED | OUTPATIENT
Start: 2017-10-19 | End: 2017-10-19

## 2017-10-19 RX ORDER — ONDANSETRON 8 MG/1
4 TABLET, FILM COATED ORAL ONCE
Qty: 0 | Refills: 0 | Status: COMPLETED | OUTPATIENT
Start: 2017-10-19 | End: 2017-10-19

## 2017-10-19 RX ORDER — ONDANSETRON 8 MG/1
8 TABLET, FILM COATED ORAL ONCE
Qty: 0 | Refills: 0 | Status: DISCONTINUED | OUTPATIENT
Start: 2017-10-19 | End: 2017-10-19

## 2017-10-19 RX ORDER — HYDROMORPHONE HYDROCHLORIDE 2 MG/ML
0.5 INJECTION INTRAMUSCULAR; INTRAVENOUS; SUBCUTANEOUS ONCE
Qty: 0 | Refills: 0 | Status: DISCONTINUED | OUTPATIENT
Start: 2017-10-19 | End: 2017-10-19

## 2017-10-19 RX ADMIN — HYDROMORPHONE HYDROCHLORIDE 1 MILLIGRAM(S): 2 INJECTION INTRAMUSCULAR; INTRAVENOUS; SUBCUTANEOUS at 18:20

## 2017-10-19 RX ADMIN — HYDROMORPHONE HYDROCHLORIDE 1 MILLIGRAM(S): 2 INJECTION INTRAMUSCULAR; INTRAVENOUS; SUBCUTANEOUS at 18:29

## 2017-10-19 RX ADMIN — HYDROMORPHONE HYDROCHLORIDE 0.5 MILLIGRAM(S): 2 INJECTION INTRAMUSCULAR; INTRAVENOUS; SUBCUTANEOUS at 19:48

## 2017-10-19 RX ADMIN — ONDANSETRON 4 MILLIGRAM(S): 8 TABLET, FILM COATED ORAL at 18:29

## 2017-10-19 NOTE — ED PROVIDER NOTE - OBJECTIVE STATEMENT
76 y/o M presents to ED c/o lower back pain, nausea, abd pain. Surgery spinal fusion April 2017, on Dilaudid 6mg q4h, states it makes him nauseous and constipated. Gone to multiple doctors for back pain, pain management gave him 3 injections of steroids last one 2 weeks ago, still having low back pain, stated they couldn't give him any more. Pt states his back pain has increased after his spinal fusion surgery. No recent falls, CP, palpitations, or any other acute complaints. Last BM yesterday, some trouble urinating chronic. Able to ambulate. Comes from [facility] assisted living.

## 2017-10-19 NOTE — ED PROVIDER NOTE - MUSCULOSKELETAL, MLM
Spine appears normal, no focal tenderness, nml straight leg raise, nml dosi plantar flexion strength to right foot, left foot prior history of foot drop.

## 2017-10-19 NOTE — ED PROVIDER NOTE - MEDICAL DECISION MAKING DETAILS
78 y/o M presents w/ back pain and nausea s/p spinal fusion, plan for IV zofran and dilaudid. 78 y/o M presents w/ back pain and nausea s/p spinal fusion, plan for IV zofran and dilaudid.  Exam shows no red flags, pt ambulatory, reports he just had breakthrough pain and nausea. Well appearing, has had multiple similar visits for same. Will DC back to SNF

## 2017-10-19 NOTE — ED ADULT TRIAGE NOTE - CHIEF COMPLAINT QUOTE
pt with complaints of back pain pt has been seen in ED several times pt with complaints of back pain pt has been seen in ED several times for same complaint

## 2017-10-26 ENCOUNTER — EMERGENCY (EMERGENCY)
Facility: HOSPITAL | Age: 77
LOS: 0 days | Discharge: HOME | End: 2017-10-26
Attending: EMERGENCY MEDICINE | Admitting: EMERGENCY MEDICINE
Payer: MEDICARE

## 2017-10-26 VITALS
HEART RATE: 67 BPM | TEMPERATURE: 98 F | RESPIRATION RATE: 17 BRPM | DIASTOLIC BLOOD PRESSURE: 68 MMHG | SYSTOLIC BLOOD PRESSURE: 146 MMHG | OXYGEN SATURATION: 99 %

## 2017-10-26 VITALS
DIASTOLIC BLOOD PRESSURE: 68 MMHG | SYSTOLIC BLOOD PRESSURE: 132 MMHG | TEMPERATURE: 98 F | WEIGHT: 169.09 LBS | HEART RATE: 72 BPM | HEIGHT: 69 IN | OXYGEN SATURATION: 98 % | RESPIRATION RATE: 18 BRPM

## 2017-10-26 VITALS
RESPIRATION RATE: 16 BRPM | TEMPERATURE: 97 F | WEIGHT: 139.99 LBS | SYSTOLIC BLOOD PRESSURE: 100 MMHG | DIASTOLIC BLOOD PRESSURE: 57 MMHG | OXYGEN SATURATION: 98 % | HEIGHT: 70 IN | HEART RATE: 68 BPM

## 2017-10-26 VITALS
HEART RATE: 70 BPM | OXYGEN SATURATION: 100 % | DIASTOLIC BLOOD PRESSURE: 71 MMHG | SYSTOLIC BLOOD PRESSURE: 136 MMHG | RESPIRATION RATE: 18 BRPM

## 2017-10-26 DIAGNOSIS — Z98.1 ARTHRODESIS STATUS: Chronic | ICD-10-CM

## 2017-10-26 DIAGNOSIS — I10 ESSENTIAL (PRIMARY) HYPERTENSION: ICD-10-CM

## 2017-10-26 DIAGNOSIS — D64.9 ANEMIA, UNSPECIFIED: ICD-10-CM

## 2017-10-26 DIAGNOSIS — M54.9 DORSALGIA, UNSPECIFIED: ICD-10-CM

## 2017-10-26 DIAGNOSIS — G89.29 OTHER CHRONIC PAIN: ICD-10-CM

## 2017-10-26 DIAGNOSIS — F41.9 ANXIETY DISORDER, UNSPECIFIED: ICD-10-CM

## 2017-10-26 DIAGNOSIS — F32.9 MAJOR DEPRESSIVE DISORDER, SINGLE EPISODE, UNSPECIFIED: ICD-10-CM

## 2017-10-26 DIAGNOSIS — M48.00 SPINAL STENOSIS, SITE UNSPECIFIED: ICD-10-CM

## 2017-10-26 DIAGNOSIS — N40.0 BENIGN PROSTATIC HYPERPLASIA WITHOUT LOWER URINARY TRACT SYMPTOMS: ICD-10-CM

## 2017-10-26 DIAGNOSIS — R11.0 NAUSEA: ICD-10-CM

## 2017-10-26 PROCEDURE — 99284 EMERGENCY DEPT VISIT MOD MDM: CPT | Mod: 25

## 2017-10-26 PROCEDURE — 93010 ELECTROCARDIOGRAM REPORT: CPT

## 2017-10-26 RX ORDER — ONDANSETRON 8 MG/1
4 TABLET, FILM COATED ORAL ONCE
Qty: 0 | Refills: 0 | Status: COMPLETED | OUTPATIENT
Start: 2017-10-26 | End: 2017-10-26

## 2017-10-26 RX ORDER — HYDROMORPHONE HYDROCHLORIDE 2 MG/ML
1 INJECTION INTRAMUSCULAR; INTRAVENOUS; SUBCUTANEOUS ONCE
Qty: 0 | Refills: 0 | Status: DISCONTINUED | OUTPATIENT
Start: 2017-10-26 | End: 2017-10-26

## 2017-10-26 RX ORDER — KETOROLAC TROMETHAMINE 30 MG/ML
30 SYRINGE (ML) INJECTION ONCE
Qty: 0 | Refills: 0 | Status: DISCONTINUED | OUTPATIENT
Start: 2017-10-26 | End: 2017-10-26

## 2017-10-26 RX ADMIN — ONDANSETRON 4 MILLIGRAM(S): 8 TABLET, FILM COATED ORAL at 20:55

## 2017-10-26 RX ADMIN — Medication 30 MILLIGRAM(S): at 07:28

## 2017-10-26 RX ADMIN — ONDANSETRON 4 MILLIGRAM(S): 8 TABLET, FILM COATED ORAL at 07:29

## 2017-10-26 RX ADMIN — HYDROMORPHONE HYDROCHLORIDE 1 MILLIGRAM(S): 2 INJECTION INTRAMUSCULAR; INTRAVENOUS; SUBCUTANEOUS at 20:55

## 2017-10-26 NOTE — ED ADULT TRIAGE NOTE - CHIEF COMPLAINT QUOTE
from [facility]. back pain and nausea. no injury. chronic back pain, patient has not taken medication because he states it makes him nauseous. seen in ED this morning.

## 2017-10-26 NOTE — ED PROVIDER NOTE - OBJECTIVE STATEMENT
78 y/o male with PMHx of Anemia, Anxiety, Benign prostatic hypertrophy without urinary obstruction, Depression, GERD ,Hypertension, Kyphosis, Neuropathy, Spinal stenosis presents to the ED c/o back pain. 78 y/o male with PMHx of Anemia, Anxiety, Benign prostatic hypertrophy without urinary obstruction, Depression, GERD ,Hypertension, Kyphosis, Neuropathy, Spinal stenosis presents to the ED BIBA from [facility] c/o back pain. Pt has taken Dilaudid but it makes im nauseas. Pt has been trying to go to pain management. Pt had one injection which helped but he cant have another one for another 2 months. +Difficulty urination. Dr Mccabe at [facility] 78 y/o male with PMHx of Anemia, Anxiety, Benign prostatic hypertrophy without urinary obstruction, Depression, GERD ,Hypertension, Kyphosis, Neuropathy, Spinal stenosis presents to the ED BIBA from [facility] c/o back pain. Pt has taken Dilaudid but it makes him nauseas. Pt has been trying to go to pain management. Pt had one injection which helped but he cant have another one for another 2 months. No changes in weakness or numbness. no changes in urination. no new trauma. Dr Mccabe at [facility]

## 2017-10-26 NOTE — ED PROVIDER NOTE - OBJECTIVE STATEMENT
78 yo male with hx anxiety, depression, chronic back pain bibems requesting dilaudid. Patient states he takes dilaudid po at [facility], but it makes him nauesous. Patient comes to ED every week for similar complaints, and states "I am here for IV dilaudid". No change in pain. no problems with bladder/bowel habits. Ambulatory. no vomiting. no fever

## 2017-10-26 NOTE — ED PROVIDER NOTE - MUSCULOSKELETAL, MLM
Spine appears normal, range of motion is not limited, no muscle or joint tenderness Spine appears normal, range of motion is not limited. +Scar to midline of back. +Weakness to left leg Spine appears normal, range of motion is not limited. +Scar to midline of back. +Weakness to left leg (baseline). no spinal ttp

## 2017-10-26 NOTE — ED PROVIDER NOTE - PROGRESS NOTE DETAILS
patient with multiple ED visiits for IV dilaudid; patient in no acute distress  explained to patient that he needs to follow up with his pain management physician --

## 2017-10-26 NOTE — ED PROVIDER NOTE - MEDICAL DECISION MAKING DETAILS
Pt presenting with chronic back pain on Dilaudid but unable to take due to nausea. Give IV Dilaudid and Zofran. No new neurologic deficits. Will D/C after medication. No need for new imaging. Pt presenting with chronic back pain on Dilaudid but unable to take due to nausea. Give IV Dilaudid and Zofran. No new neurologic deficits. Will D/C after medication. No need for new imaging. no new symptoms

## 2017-11-08 ENCOUNTER — EMERGENCY (EMERGENCY)
Facility: HOSPITAL | Age: 77
LOS: 0 days | Discharge: HOME | End: 2017-11-08
Attending: EMERGENCY MEDICINE | Admitting: EMERGENCY MEDICINE
Payer: MEDICARE

## 2017-11-08 VITALS
OXYGEN SATURATION: 100 % | HEART RATE: 65 BPM | SYSTOLIC BLOOD PRESSURE: 142 MMHG | TEMPERATURE: 97 F | DIASTOLIC BLOOD PRESSURE: 68 MMHG | RESPIRATION RATE: 18 BRPM

## 2017-11-08 VITALS
RESPIRATION RATE: 17 BRPM | SYSTOLIC BLOOD PRESSURE: 126 MMHG | DIASTOLIC BLOOD PRESSURE: 67 MMHG | OXYGEN SATURATION: 100 % | HEART RATE: 76 BPM | WEIGHT: 169.98 LBS | HEIGHT: 69 IN | TEMPERATURE: 98 F

## 2017-11-08 DIAGNOSIS — R60.0 LOCALIZED EDEMA: ICD-10-CM

## 2017-11-08 DIAGNOSIS — M48.00 SPINAL STENOSIS, SITE UNSPECIFIED: ICD-10-CM

## 2017-11-08 DIAGNOSIS — F32.9 MAJOR DEPRESSIVE DISORDER, SINGLE EPISODE, UNSPECIFIED: ICD-10-CM

## 2017-11-08 DIAGNOSIS — D64.9 ANEMIA, UNSPECIFIED: ICD-10-CM

## 2017-11-08 DIAGNOSIS — R60.9 EDEMA, UNSPECIFIED: ICD-10-CM

## 2017-11-08 DIAGNOSIS — F41.9 ANXIETY DISORDER, UNSPECIFIED: ICD-10-CM

## 2017-11-08 DIAGNOSIS — Z98.1 ARTHRODESIS STATUS: Chronic | ICD-10-CM

## 2017-11-08 DIAGNOSIS — N40.0 BENIGN PROSTATIC HYPERPLASIA WITHOUT LOWER URINARY TRACT SYMPTOMS: ICD-10-CM

## 2017-11-08 DIAGNOSIS — I10 ESSENTIAL (PRIMARY) HYPERTENSION: ICD-10-CM

## 2017-11-08 DIAGNOSIS — K21.9 GASTRO-ESOPHAGEAL REFLUX DISEASE WITHOUT ESOPHAGITIS: ICD-10-CM

## 2017-11-08 LAB
ALBUMIN SERPL ELPH-MCNC: 3 G/DL — LOW (ref 3.3–5)
ALP SERPL-CCNC: 81 U/L — SIGNIFICANT CHANGE UP (ref 40–120)
ALT FLD-CCNC: 14 U/L — SIGNIFICANT CHANGE UP (ref 12–78)
ANION GAP SERPL CALC-SCNC: 8 MMOL/L — SIGNIFICANT CHANGE UP (ref 5–17)
APTT BLD: 30.4 SEC — SIGNIFICANT CHANGE UP (ref 27.5–37.4)
AST SERPL-CCNC: 11 U/L — LOW (ref 15–37)
BASOPHILS # BLD AUTO: 0 K/UL — SIGNIFICANT CHANGE UP (ref 0–0.2)
BASOPHILS NFR BLD AUTO: 0.7 % — SIGNIFICANT CHANGE UP (ref 0–2)
BILIRUB SERPL-MCNC: 0.2 MG/DL — SIGNIFICANT CHANGE UP (ref 0.2–1.2)
BUN SERPL-MCNC: 21 MG/DL — SIGNIFICANT CHANGE UP (ref 7–23)
CALCIUM SERPL-MCNC: 8.3 MG/DL — LOW (ref 8.5–10.1)
CHLORIDE SERPL-SCNC: 106 MMOL/L — SIGNIFICANT CHANGE UP (ref 96–108)
CO2 SERPL-SCNC: 25 MMOL/L — SIGNIFICANT CHANGE UP (ref 22–31)
CREAT SERPL-MCNC: 1.07 MG/DL — SIGNIFICANT CHANGE UP (ref 0.5–1.3)
EOSINOPHIL # BLD AUTO: 0.1 K/UL — SIGNIFICANT CHANGE UP (ref 0–0.5)
EOSINOPHIL NFR BLD AUTO: 1.5 % — SIGNIFICANT CHANGE UP (ref 0–6)
GLUCOSE SERPL-MCNC: 86 MG/DL — SIGNIFICANT CHANGE UP (ref 70–99)
HCT VFR BLD CALC: 33.6 % — LOW (ref 39–50)
HGB BLD-MCNC: 10 G/DL — LOW (ref 13–17)
INR BLD: 1.33 RATIO — HIGH (ref 0.88–1.16)
LYMPHOCYTES # BLD AUTO: 1.4 K/UL — SIGNIFICANT CHANGE UP (ref 1–3.3)
LYMPHOCYTES # BLD AUTO: 21.3 % — SIGNIFICANT CHANGE UP (ref 13–44)
MCHC RBC-ENTMCNC: 22.8 PG — LOW (ref 27–34)
MCHC RBC-ENTMCNC: 29.8 GM/DL — LOW (ref 32–36)
MCV RBC AUTO: 76.5 FL — LOW (ref 80–100)
MONOCYTES # BLD AUTO: 0.8 K/UL — SIGNIFICANT CHANGE UP (ref 0–0.9)
MONOCYTES NFR BLD AUTO: 12.9 % — SIGNIFICANT CHANGE UP (ref 2–14)
NEUTROPHILS # BLD AUTO: 4.2 K/UL — SIGNIFICANT CHANGE UP (ref 1.8–7.4)
NEUTROPHILS NFR BLD AUTO: 63.6 % — SIGNIFICANT CHANGE UP (ref 43–77)
NT-PROBNP SERPL-SCNC: 773 PG/ML — HIGH (ref 0–450)
PLATELET # BLD AUTO: 238 K/UL — SIGNIFICANT CHANGE UP (ref 150–400)
POTASSIUM SERPL-MCNC: 4.1 MMOL/L — SIGNIFICANT CHANGE UP (ref 3.5–5.3)
POTASSIUM SERPL-SCNC: 4.1 MMOL/L — SIGNIFICANT CHANGE UP (ref 3.5–5.3)
PROT SERPL-MCNC: 6.3 GM/DL — SIGNIFICANT CHANGE UP (ref 6–8.3)
PROTHROM AB SERPL-ACNC: 14.4 SEC — HIGH (ref 9.8–12.7)
RBC # BLD: 4.4 M/UL — SIGNIFICANT CHANGE UP (ref 4.2–5.8)
RBC # FLD: 16.8 % — HIGH (ref 10.3–14.5)
SODIUM SERPL-SCNC: 139 MMOL/L — SIGNIFICANT CHANGE UP (ref 135–145)
TROPONIN I SERPL-MCNC: 0.02 NG/ML — SIGNIFICANT CHANGE UP (ref 0.01–0.04)
WBC # BLD: 6.5 K/UL — SIGNIFICANT CHANGE UP (ref 3.8–10.5)
WBC # FLD AUTO: 6.5 K/UL — SIGNIFICANT CHANGE UP (ref 3.8–10.5)

## 2017-11-08 PROCEDURE — 99284 EMERGENCY DEPT VISIT MOD MDM: CPT

## 2017-11-08 RX ORDER — HYDROMORPHONE HYDROCHLORIDE 2 MG/ML
2 INJECTION INTRAMUSCULAR; INTRAVENOUS; SUBCUTANEOUS ONCE
Qty: 0 | Refills: 0 | Status: DISCONTINUED | OUTPATIENT
Start: 2017-11-08 | End: 2017-11-08

## 2017-11-08 RX ORDER — FUROSEMIDE 40 MG
20 TABLET ORAL ONCE
Qty: 0 | Refills: 0 | Status: COMPLETED | OUTPATIENT
Start: 2017-11-08 | End: 2017-11-08

## 2017-11-08 RX ADMIN — HYDROMORPHONE HYDROCHLORIDE 2 MILLIGRAM(S): 2 INJECTION INTRAMUSCULAR; INTRAVENOUS; SUBCUTANEOUS at 20:40

## 2017-11-08 RX ADMIN — HYDROMORPHONE HYDROCHLORIDE 2 MILLIGRAM(S): 2 INJECTION INTRAMUSCULAR; INTRAVENOUS; SUBCUTANEOUS at 21:00

## 2017-11-08 RX ADMIN — Medication 20 MILLIGRAM(S): at 22:02

## 2017-11-08 NOTE — ED PROVIDER NOTE - MEDICAL DECISION MAKING DETAILS
patient with no evidence of CHF, appropriate for discharge home with outpatient management of peripheral edema

## 2017-11-08 NOTE — ED PROVIDER NOTE - MUSCULOSKELETAL, MLM
Spine appears normal, range of motion is not limited, no muscle or joint tenderness. 2+ pitting LE edema.

## 2017-11-08 NOTE — ED PROVIDER NOTE - OBJECTIVE STATEMENT
77 year old male pt, with hx of GERD, HTN, BPH, presents to the ED for evaluation of LE edema, possible CHF. Nursing home noted LE edema today, which pt believes is secondary to PO pain meds. Pt denies any SOB.

## 2017-11-16 ENCOUNTER — EMERGENCY (EMERGENCY)
Facility: HOSPITAL | Age: 77
LOS: 0 days | Discharge: HOME | End: 2017-11-16
Attending: EMERGENCY MEDICINE
Payer: MEDICARE

## 2017-11-16 VITALS
TEMPERATURE: 98 F | RESPIRATION RATE: 18 BRPM | DIASTOLIC BLOOD PRESSURE: 75 MMHG | HEART RATE: 60 BPM | OXYGEN SATURATION: 100 % | SYSTOLIC BLOOD PRESSURE: 142 MMHG

## 2017-11-16 VITALS
TEMPERATURE: 98 F | RESPIRATION RATE: 16 BRPM | SYSTOLIC BLOOD PRESSURE: 109 MMHG | HEART RATE: 72 BPM | DIASTOLIC BLOOD PRESSURE: 61 MMHG | OXYGEN SATURATION: 96 %

## 2017-11-16 DIAGNOSIS — M79.604 PAIN IN RIGHT LEG: ICD-10-CM

## 2017-11-16 DIAGNOSIS — R60.0 LOCALIZED EDEMA: ICD-10-CM

## 2017-11-16 DIAGNOSIS — I10 ESSENTIAL (PRIMARY) HYPERTENSION: ICD-10-CM

## 2017-11-16 DIAGNOSIS — M48.00 SPINAL STENOSIS, SITE UNSPECIFIED: ICD-10-CM

## 2017-11-16 DIAGNOSIS — Z98.1 ARTHRODESIS STATUS: Chronic | ICD-10-CM

## 2017-11-16 LAB
ALBUMIN SERPL ELPH-MCNC: 3.4 G/DL — SIGNIFICANT CHANGE UP (ref 3.3–5)
ALP SERPL-CCNC: 90 U/L — SIGNIFICANT CHANGE UP (ref 40–120)
ALT FLD-CCNC: 17 U/L — SIGNIFICANT CHANGE UP (ref 12–78)
ANION GAP SERPL CALC-SCNC: 6 MMOL/L — SIGNIFICANT CHANGE UP (ref 5–17)
AST SERPL-CCNC: 17 U/L — SIGNIFICANT CHANGE UP (ref 15–37)
BASOPHILS # BLD AUTO: 0 K/UL — SIGNIFICANT CHANGE UP (ref 0–0.2)
BASOPHILS NFR BLD AUTO: 0.4 % — SIGNIFICANT CHANGE UP (ref 0–2)
BILIRUB SERPL-MCNC: 0.3 MG/DL — SIGNIFICANT CHANGE UP (ref 0.2–1.2)
BUN SERPL-MCNC: 21 MG/DL — SIGNIFICANT CHANGE UP (ref 7–23)
CALCIUM SERPL-MCNC: 8.9 MG/DL — SIGNIFICANT CHANGE UP (ref 8.5–10.1)
CHLORIDE SERPL-SCNC: 104 MMOL/L — SIGNIFICANT CHANGE UP (ref 96–108)
CO2 SERPL-SCNC: 29 MMOL/L — SIGNIFICANT CHANGE UP (ref 22–31)
CREAT SERPL-MCNC: 1.12 MG/DL — SIGNIFICANT CHANGE UP (ref 0.5–1.3)
EOSINOPHIL # BLD AUTO: 0.1 K/UL — SIGNIFICANT CHANGE UP (ref 0–0.5)
EOSINOPHIL NFR BLD AUTO: 1.3 % — SIGNIFICANT CHANGE UP (ref 0–6)
GLUCOSE SERPL-MCNC: 100 MG/DL — HIGH (ref 70–99)
HCT VFR BLD CALC: 34.8 % — LOW (ref 39–50)
HGB BLD-MCNC: 10.9 G/DL — LOW (ref 13–17)
LYMPHOCYTES # BLD AUTO: 1 K/UL — SIGNIFICANT CHANGE UP (ref 1–3.3)
LYMPHOCYTES # BLD AUTO: 16 % — SIGNIFICANT CHANGE UP (ref 13–44)
MCHC RBC-ENTMCNC: 23.5 PG — LOW (ref 27–34)
MCHC RBC-ENTMCNC: 31.2 GM/DL — LOW (ref 32–36)
MCV RBC AUTO: 75.2 FL — LOW (ref 80–100)
MONOCYTES # BLD AUTO: 0.5 K/UL — SIGNIFICANT CHANGE UP (ref 0–0.9)
MONOCYTES NFR BLD AUTO: 8.2 % — SIGNIFICANT CHANGE UP (ref 2–14)
NEUTROPHILS # BLD AUTO: 4.8 K/UL — SIGNIFICANT CHANGE UP (ref 1.8–7.4)
NEUTROPHILS NFR BLD AUTO: 74.1 % — SIGNIFICANT CHANGE UP (ref 43–77)
NT-PROBNP SERPL-SCNC: 828 PG/ML — HIGH (ref 0–450)
PLATELET # BLD AUTO: 286 K/UL — SIGNIFICANT CHANGE UP (ref 150–400)
POTASSIUM SERPL-MCNC: 4.3 MMOL/L — SIGNIFICANT CHANGE UP (ref 3.5–5.3)
POTASSIUM SERPL-SCNC: 4.3 MMOL/L — SIGNIFICANT CHANGE UP (ref 3.5–5.3)
PROT SERPL-MCNC: 7.3 GM/DL — SIGNIFICANT CHANGE UP (ref 6–8.3)
RBC # BLD: 4.62 M/UL — SIGNIFICANT CHANGE UP (ref 4.2–5.8)
RBC # FLD: 16.8 % — HIGH (ref 10.3–14.5)
SODIUM SERPL-SCNC: 139 MMOL/L — SIGNIFICANT CHANGE UP (ref 135–145)
WBC # BLD: 6.5 K/UL — SIGNIFICANT CHANGE UP (ref 3.8–10.5)
WBC # FLD AUTO: 6.5 K/UL — SIGNIFICANT CHANGE UP (ref 3.8–10.5)

## 2017-11-16 PROCEDURE — 99284 EMERGENCY DEPT VISIT MOD MDM: CPT

## 2017-11-16 PROCEDURE — 93970 EXTREMITY STUDY: CPT | Mod: 26

## 2017-11-16 RX ORDER — HYDROMORPHONE HYDROCHLORIDE 2 MG/ML
6 INJECTION INTRAMUSCULAR; INTRAVENOUS; SUBCUTANEOUS ONCE
Qty: 0 | Refills: 0 | Status: DISCONTINUED | OUTPATIENT
Start: 2017-11-16 | End: 2017-11-16

## 2017-11-16 RX ORDER — ONDANSETRON 8 MG/1
4 TABLET, FILM COATED ORAL ONCE
Qty: 0 | Refills: 0 | Status: COMPLETED | OUTPATIENT
Start: 2017-11-16 | End: 2017-11-16

## 2017-11-16 RX ORDER — HYDROMORPHONE HYDROCHLORIDE 2 MG/ML
1 INJECTION INTRAMUSCULAR; INTRAVENOUS; SUBCUTANEOUS ONCE
Qty: 0 | Refills: 0 | Status: DISCONTINUED | OUTPATIENT
Start: 2017-11-16 | End: 2017-11-16

## 2017-11-16 RX ADMIN — ONDANSETRON 4 MILLIGRAM(S): 8 TABLET, FILM COATED ORAL at 17:49

## 2017-11-16 RX ADMIN — HYDROMORPHONE HYDROCHLORIDE 1 MILLIGRAM(S): 2 INJECTION INTRAMUSCULAR; INTRAVENOUS; SUBCUTANEOUS at 17:49

## 2017-11-16 NOTE — ED PROVIDER NOTE - CARE PLAN
Principal Discharge DX:	Edema  Instructions for follow-up, activity and diet:	1. Continue home medications as prescribed  2. Follow-up with your primary care doctor or medicine clinic at [phone] in 2-3 days   3. Return immediately for any worsening or concerning symptoms

## 2017-11-16 NOTE — ED PROVIDER NOTE - OBJECTIVE STATEMENT
76 yo male PMH HTN, BPH, anemia, chronic back pain s/p back surgery in April, presents with 1 week of worsening leg swelling, worse on the right.  States pain is 4/10 at rest, exacerbated by walking.  Was seen here 1 week ago, dc'd on Lasix which the nursing home didn't give him until yesterday and provided no relief.  No doppler was done.  No hx of DVT/PE.  No fever, CP, SOB, urinary or bowel incontinence.  Has urinary frequency from the Lasix and constipation from Dilaudid he takes for his back pain.  Has chronic n/v, reports one episode of NBNB emesis this morning.

## 2017-11-16 NOTE — ED PROVIDER NOTE - PLAN OF CARE
1. Continue home medications as prescribed  2. Follow-up with your primary care doctor or medicine clinic at [phone] in 2-3 days   3. Return immediately for any worsening or concerning symptoms

## 2017-11-16 NOTE — ED PROVIDER NOTE - PROGRESS NOTE DETAILS
[facility] for Dr. Bojorquez:  Patient requesting his usual dose of dilaudid labs wnl, duplex us unremarkable. pro-bnp c/w findings from ~1 week ago. pt appears comfortable, ambulating without difficulty. discussed importance of close f/u with pmd, indications to return to ED. stable for d/c home. - Faisal Bojorquez MD

## 2017-11-16 NOTE — ED PROVIDER NOTE - MEDICAL DECISION MAKING DETAILS
78 yo male, frequent visits to ED, h/o chronic back pain, presents with 1 week worsening LE edema, right greater than left.  Seen 1 week ago for similar presentation, dc'd on lasix.  Denies CP, SOB.  Most consistent with venous stasis, may also have component of heart failure.  Will check CBC, CMP, eval renal function, pro-BNP, duplex US eval for DVT. Pt requesting pain meds, will give dilaudid which is what he takes at home.  Will reassess after labs and imaging.

## 2017-11-16 NOTE — ED ADULT NURSE NOTE - OBJECTIVE STATEMENT
Pt with chronic back pain is c/o increasing back pain, le bilat leg edema and le bilat leg pain.  Pt also reports hx of neuropathy.

## 2017-11-16 NOTE — ED PROVIDER NOTE - MUSCULOSKELETAL, MLM
Spine appears normal, range of motion is not limited, no muscle or joint tenderness. No midline CTL tenderness. 5/5 str bilateral LE with exception of 3/5 dorsiflexion LLE (known foot drop).

## 2017-11-16 NOTE — ED PROVIDER NOTE - NEUROLOGICAL, MLM
Alert and oriented, no focal deficits, no motor or sensory deficits. sensations grossly intact bilateral LE.

## 2017-12-14 ENCOUNTER — EMERGENCY (EMERGENCY)
Facility: HOSPITAL | Age: 77
LOS: 0 days | Discharge: HOME | End: 2017-12-14
Attending: EMERGENCY MEDICINE | Admitting: EMERGENCY MEDICINE
Payer: MEDICARE

## 2017-12-14 VITALS
OXYGEN SATURATION: 98 % | TEMPERATURE: 98 F | WEIGHT: 179.9 LBS | HEIGHT: 69 IN | HEART RATE: 67 BPM | RESPIRATION RATE: 16 BRPM | SYSTOLIC BLOOD PRESSURE: 110 MMHG | DIASTOLIC BLOOD PRESSURE: 51 MMHG

## 2017-12-14 DIAGNOSIS — I10 ESSENTIAL (PRIMARY) HYPERTENSION: ICD-10-CM

## 2017-12-14 DIAGNOSIS — M54.9 DORSALGIA, UNSPECIFIED: ICD-10-CM

## 2017-12-14 DIAGNOSIS — N40.1 BENIGN PROSTATIC HYPERPLASIA WITH LOWER URINARY TRACT SYMPTOMS: ICD-10-CM

## 2017-12-14 DIAGNOSIS — M48.00 SPINAL STENOSIS, SITE UNSPECIFIED: ICD-10-CM

## 2017-12-14 DIAGNOSIS — G89.29 OTHER CHRONIC PAIN: ICD-10-CM

## 2017-12-14 DIAGNOSIS — Z98.1 ARTHRODESIS STATUS: Chronic | ICD-10-CM

## 2017-12-14 PROCEDURE — 72192 CT PELVIS W/O DYE: CPT | Mod: 26

## 2017-12-14 PROCEDURE — 72131 CT LUMBAR SPINE W/O DYE: CPT | Mod: 26

## 2017-12-14 PROCEDURE — 76377 3D RENDER W/INTRP POSTPROCES: CPT | Mod: 26

## 2017-12-14 PROCEDURE — 99284 EMERGENCY DEPT VISIT MOD MDM: CPT

## 2017-12-14 RX ORDER — OXYCODONE AND ACETAMINOPHEN 5; 325 MG/1; MG/1
1 TABLET ORAL ONCE
Qty: 0 | Refills: 0 | Status: DISCONTINUED | OUTPATIENT
Start: 2017-12-14 | End: 2017-12-14

## 2017-12-14 RX ORDER — ONDANSETRON 8 MG/1
4 TABLET, FILM COATED ORAL ONCE
Qty: 0 | Refills: 0 | Status: COMPLETED | OUTPATIENT
Start: 2017-12-14 | End: 2017-12-14

## 2017-12-14 RX ORDER — HYDROMORPHONE HYDROCHLORIDE 2 MG/ML
1 INJECTION INTRAMUSCULAR; INTRAVENOUS; SUBCUTANEOUS ONCE
Qty: 0 | Refills: 0 | Status: DISCONTINUED | OUTPATIENT
Start: 2017-12-14 | End: 2017-12-14

## 2017-12-14 RX ADMIN — ONDANSETRON 4 MILLIGRAM(S): 8 TABLET, FILM COATED ORAL at 12:07

## 2017-12-14 RX ADMIN — HYDROMORPHONE HYDROCHLORIDE 1 MILLIGRAM(S): 2 INJECTION INTRAMUSCULAR; INTRAVENOUS; SUBCUTANEOUS at 12:11

## 2017-12-14 RX ADMIN — HYDROMORPHONE HYDROCHLORIDE 1 MILLIGRAM(S): 2 INJECTION INTRAMUSCULAR; INTRAVENOUS; SUBCUTANEOUS at 12:02

## 2017-12-14 RX ADMIN — OXYCODONE AND ACETAMINOPHEN 1 TABLET(S): 5; 325 TABLET ORAL at 15:24

## 2017-12-14 NOTE — ED PROVIDER NOTE - MEDICAL DECISION MAKING DETAILS
Pt with acute on chronic back pain s/p trauma.  frequently comes for opiate medication.  pt with lower extremity edema, has had negative sonos and lab workup in the past.  Given history, will provide limited pain meds, CT L spine and pelvis.

## 2017-12-14 NOTE — ED PROVIDER NOTE - MUSCULOSKELETAL, MLM
(+) bilateral lower extremity edema, symmetric.  Surgical scar down entire spine with (+) paraspinal tenderness in lumbar region

## 2017-12-14 NOTE — ED ADULT NURSE REASSESSMENT NOTE - NS ED NURSE REASSESS COMMENT FT1
Pt would not let ambulance take him w/o meds, I explained Dr was not giving more meds but he insisted on speaking with him.  ordered percocet which was given. Pt then able to take pt.

## 2017-12-14 NOTE — ED PROVIDER NOTE - OBJECTIVE STATEMENT
78 yo male PMH HTN, BPH, anemia, chronic back pain s/p back surgery in April presents to the ED c/o acute on chronic back pain s/p fall from standing and landed on buttocks.  No hit to head, no LOC.  He has also had worsening edema of lower extremities for the past 2 months.  He notes he has mild SOB.  Pt typically has Dilaudid for back pain.  Not on anticoags.

## 2017-12-14 NOTE — ED ADULT TRIAGE NOTE - CHIEF COMPLAINT QUOTE
unwitnessed fall at nursing home.  pt complains of back pain.  no known head injury or LOC.  no anticoagulants

## 2017-12-20 ENCOUNTER — EMERGENCY (EMERGENCY)
Facility: HOSPITAL | Age: 77
LOS: 0 days | Discharge: HOME | End: 2017-12-21
Attending: EMERGENCY MEDICINE | Admitting: EMERGENCY MEDICINE
Payer: MEDICARE

## 2017-12-20 VITALS
SYSTOLIC BLOOD PRESSURE: 105 MMHG | DIASTOLIC BLOOD PRESSURE: 47 MMHG | TEMPERATURE: 97 F | WEIGHT: 175.05 LBS | OXYGEN SATURATION: 97 % | HEART RATE: 62 BPM | RESPIRATION RATE: 16 BRPM | HEIGHT: 69 IN

## 2017-12-20 DIAGNOSIS — Z98.1 ARTHRODESIS STATUS: Chronic | ICD-10-CM

## 2017-12-20 PROCEDURE — 99283 EMERGENCY DEPT VISIT LOW MDM: CPT

## 2017-12-21 VITALS
SYSTOLIC BLOOD PRESSURE: 137 MMHG | RESPIRATION RATE: 15 BRPM | HEART RATE: 71 BPM | OXYGEN SATURATION: 100 % | DIASTOLIC BLOOD PRESSURE: 59 MMHG | TEMPERATURE: 98 F

## 2017-12-21 DIAGNOSIS — M54.9 DORSALGIA, UNSPECIFIED: ICD-10-CM

## 2017-12-21 RX ORDER — HYDROMORPHONE HYDROCHLORIDE 2 MG/ML
1 INJECTION INTRAMUSCULAR; INTRAVENOUS; SUBCUTANEOUS ONCE
Qty: 0 | Refills: 0 | Status: DISCONTINUED | OUTPATIENT
Start: 2017-12-21 | End: 2017-12-21

## 2017-12-21 RX ORDER — ONDANSETRON 8 MG/1
8 TABLET, FILM COATED ORAL ONCE
Qty: 0 | Refills: 0 | Status: COMPLETED | OUTPATIENT
Start: 2017-12-21 | End: 2017-12-21

## 2017-12-21 RX ADMIN — HYDROMORPHONE HYDROCHLORIDE 1 MILLIGRAM(S): 2 INJECTION INTRAMUSCULAR; INTRAVENOUS; SUBCUTANEOUS at 03:55

## 2017-12-21 RX ADMIN — ONDANSETRON 8 MILLIGRAM(S): 8 TABLET, FILM COATED ORAL at 00:43

## 2017-12-21 RX ADMIN — HYDROMORPHONE HYDROCHLORIDE 1 MILLIGRAM(S): 2 INJECTION INTRAMUSCULAR; INTRAVENOUS; SUBCUTANEOUS at 00:43

## 2017-12-21 NOTE — ED PROVIDER NOTE - CONSTITUTIONAL, MLM
normal... Well appearing, well nourished, awake, alert, oriented to person, place, time/situation and in  distress.

## 2017-12-21 NOTE — ED PROVIDER NOTE - MUSCULOSKELETAL, MLM
Spine appears normal, range of motion is limited by pain, ttp lumbar spine and paralumbar. well healed scar

## 2017-12-21 NOTE — ED ADULT NURSE NOTE - OBJECTIVE STATEMENT
Pt reports that he fell 2 days ago for which he was seen in the ED and that his back pain is not getting any better. Pt has chronic back pain. Pt complains of nausea which he says is related to the back pain.

## 2017-12-21 NOTE — ED PROVIDER NOTE - OBJECTIVE STATEMENT
72 yo male biba from assisted living for acute on chronic back pain with nausea. pain 8/10 low back. no vomiting

## 2017-12-21 NOTE — ED ADULT NURSE REASSESSMENT NOTE - NS ED NURSE REASSESS COMMENT FT1
pt to be discharged. awaiting transport to [facility]. Comfort and safety maintained. will continue to monitor.

## 2017-12-25 ENCOUNTER — EMERGENCY (EMERGENCY)
Facility: HOSPITAL | Age: 77
LOS: 0 days | Discharge: HOME | End: 2017-12-25
Attending: EMERGENCY MEDICINE
Payer: MEDICARE

## 2017-12-25 VITALS
HEART RATE: 71 BPM | SYSTOLIC BLOOD PRESSURE: 121 MMHG | OXYGEN SATURATION: 99 % | DIASTOLIC BLOOD PRESSURE: 78 MMHG | RESPIRATION RATE: 17 BRPM | TEMPERATURE: 97 F

## 2017-12-25 VITALS
HEART RATE: 65 BPM | RESPIRATION RATE: 16 BRPM | HEIGHT: 70 IN | OXYGEN SATURATION: 98 % | SYSTOLIC BLOOD PRESSURE: 119 MMHG | WEIGHT: 175.05 LBS | TEMPERATURE: 98 F | DIASTOLIC BLOOD PRESSURE: 53 MMHG

## 2017-12-25 DIAGNOSIS — N40.0 BENIGN PROSTATIC HYPERPLASIA WITHOUT LOWER URINARY TRACT SYMPTOMS: ICD-10-CM

## 2017-12-25 DIAGNOSIS — I10 ESSENTIAL (PRIMARY) HYPERTENSION: ICD-10-CM

## 2017-12-25 DIAGNOSIS — M54.9 DORSALGIA, UNSPECIFIED: ICD-10-CM

## 2017-12-25 DIAGNOSIS — Z98.1 ARTHRODESIS STATUS: Chronic | ICD-10-CM

## 2017-12-25 PROCEDURE — 99283 EMERGENCY DEPT VISIT LOW MDM: CPT

## 2017-12-25 RX ORDER — MORPHINE SULFATE 50 MG/1
4 CAPSULE, EXTENDED RELEASE ORAL ONCE
Qty: 0 | Refills: 0 | Status: DISCONTINUED | OUTPATIENT
Start: 2017-12-25 | End: 2017-12-25

## 2017-12-25 RX ORDER — ONDANSETRON 8 MG/1
4 TABLET, FILM COATED ORAL ONCE
Qty: 0 | Refills: 0 | Status: COMPLETED | OUTPATIENT
Start: 2017-12-25 | End: 2017-12-25

## 2017-12-25 RX ORDER — ACETAMINOPHEN 500 MG
975 TABLET ORAL ONCE
Qty: 0 | Refills: 0 | Status: COMPLETED | OUTPATIENT
Start: 2017-12-25 | End: 2017-12-25

## 2017-12-25 RX ADMIN — ONDANSETRON 4 MILLIGRAM(S): 8 TABLET, FILM COATED ORAL at 07:03

## 2017-12-25 RX ADMIN — Medication 975 MILLIGRAM(S): at 07:02

## 2017-12-25 RX ADMIN — MORPHINE SULFATE 4 MILLIGRAM(S): 50 CAPSULE, EXTENDED RELEASE ORAL at 07:03

## 2017-12-25 NOTE — ED PROVIDER NOTE - MEDICAL DECISION MAKING DETAILS
77M with acute on chronic back pain. No recent trauma. No f/c, new neuro sx, new deficits, bowel/bladder incontinence, procedures, trauma. Not c/w infectious cord pathology, cord impingement. Discussed need for close f/u with pain management Dr, pt is already on large doses of PO pain meds. Pt to be given subcu morphine, tylenol, zofran. To f/u with pain management doctor. Pt is agreeable to plan. Discussed proper f/u and indications to return to ED - Faisal Bojorquez MD

## 2017-12-25 NOTE — ED ADULT NURSE NOTE - OBJECTIVE STATEMENT
Pt presented to ED c/o back pain. As per pt, pt's on Dilaudid 2mg 3 tab btw 12&4 am and morphine 30mg 1 tab @ 8am&8pm for chronic back pain but no relief for few days. Pt well known to ED and came in for same complaints many time. No other complains noted.

## 2017-12-25 NOTE — ED ADULT NURSE NOTE - CHPI ED SYMPTOMS NEG
no dizziness/no fever/no nausea/no numbness/no tingling/no vomiting/no decreased eating/drinking/no weakness/no chills

## 2017-12-25 NOTE — ED PROVIDER NOTE - PLAN OF CARE
1. Take home pain medications as prescribed  2. Follow-up with your pain management doctor this week  3. Return immediately for any worsening or concerning symptoms

## 2017-12-25 NOTE — ED PROVIDER NOTE - OBJECTIVE STATEMENT
77M with pmh of HTN, BPH, chronic back pain on oral opioid medication (dilaudid 6 mg bid, morphine 30mg bid), seen 4 days ago for the same symptoms, presents with worsening of chronic back pain. pain severe, "lower back," pt states he is going to see a pain management doctor this upcoming week for a steroid injection. no recent injections. no f/c. no bowel or bladder incontinence. no saddle anesthesia. does have chronic L drop foot. denies dysuria or hematuria. does feel some nausea from "extreme pain and medication." 77M with pmh of HTN, BPH, chronic back pain on oral opioid medication (dilaudid 6 mg bid, morphine 30mg bid), seen 4 days ago for the same symptoms, presents with worsening of chronic back pain. pain severe, "lower back," pt states he is going to see a pain management doctor this upcoming week for a steroid injection. no recent injections. did have fall several weeks ago, seen at  ED with neg imaging, no falls since that time. no f/c. no bowel or bladder incontinence. no saddle anesthesia. does have chronic L drop foot. no new numbness or weakness. denies dysuria or hematuria. does feel some nausea from "extreme pain and medication."

## 2017-12-25 NOTE — ED PROVIDER NOTE - MUSCULOSKELETAL, MLM
Spine appears normal, range of motion is not limited, no muscle or joint tenderness. 5/5 strength bilat LE including hip flexion, knee flexion/extension, foot dorsiflexion/plantarflexion with exception of L foot dorsiflexion reduced. no midline C, T, L ttp, +diffuse paraspinal ttp.

## 2017-12-25 NOTE — ED PROVIDER NOTE - CARE PLAN
Principal Discharge DX:	Chronic back pain  Instructions for follow-up, activity and diet:	1. Take home pain medications as prescribed  2. Follow-up with your pain management doctor this week  3. Return immediately for any worsening or concerning symptoms

## 2017-12-25 NOTE — ED PROVIDER NOTE - CARDIAC, MLM
Normal rate, regular rhythm.  Heart sounds S1, S2.  No murmurs, rubs or gallops. 2+ rad pulses bilat. bialt LE warm, well perfused.

## 2017-12-25 NOTE — ED ADULT NURSE REASSESSMENT NOTE - NS ED NURSE REASSESS COMMENT FT1
pt received resting w/ eyes closed. audible breathing noted. easily awaken to verbal stimulus. pending discharge . will cont to monitor.

## 2017-12-26 NOTE — ED ADULT TRIAGE NOTE - ESI TRIAGE ACUITY LEVEL, MLM
Urinary Tract Infections in Men: Care Instructions  Your Care Instructions    A urinary tract infection, or UTI, is a general term for an infection anywhere between the kidneys and the tip of the penis. UTIs can also be a result of a prostate problem. Most cause pain or burning when you urinate. Most UTIs are caused by bacteria and can be cured with antibiotics. It is important to complete your treatment so that the infection does not get worse. Follow-up care is a key part of your treatment and safety. Be sure to make and go to all appointments, and call your doctor if you are having problems. It's also a good idea to know your test results and keep a list of the medicines you take. How can you care for yourself at home? · Take your antibiotics as prescribed. Do not stop taking them just because you feel better. You need to take the full course of antibiotics. · Take your medicines exactly as prescribed. Your doctor may have prescribed a medicine, such as phenazopyridine (Pyridium), to help relieve pain when you urinate. This turns your urine orange. You may stop taking it when your symptoms get better. But be sure to take all of your antibiotics, which treat the infection. · Drink extra water for the next day or two. This will help make the urine less concentrated and help wash out the bacteria causing the infection. (If you have kidney, heart, or liver disease and have to limit your fluids, talk with your doctor before you increase your fluid intake.)  · Avoid drinks that are carbonated or have caffeine. They can irritate the bladder. · Urinate often. Try to empty your bladder each time. · To relieve pain, take a hot bath or lay a heating pad (set on low) over your lower belly or genital area. Never go to sleep with a heating pad in place. To help prevent UTIs  · Drink plenty of fluids, enough so that your urine is light yellow or clear like water.  If you have kidney, heart, or liver disease and have to limit fluids, talk with your doctor before you increase the amount of fluids you drink. · Urinate when you have the urge. Do not hold your urine for a long time. Urinate before you go to sleep. · Keep your penis clean. Catheter care  If you have a drainage tube (catheter) in place, the following steps will help you care for it. · Always wash your hands before and after touching your catheter. · Check the area around the urethra for inflammation or signs of infection. Signs of infection include irritated, swollen, red, or tender skin, or pus around the catheter. · Clean the area around the catheter with soap and water two times a day. Dry with a clean towel afterward. · Do not apply powder or lotion to the skin around the catheter. To empty the urine collection bag  · Wash your hands with soap and water. · Without touching the drain spout, remove the spout from its sleeve at the bottom of the collection bag. Open the valve on the spout. · Let the urine flow out of the bag and into the toilet or a container. Do not let the tubing or drain spout touch anything. · After you empty the bag, clean the end of the drain spout with tissue and water. Close the valve and put the drain spout back into its sleeve at the bottom of the collection bag. · Wash your hands with soap and water. When should you call for help? Call your doctor now or seek immediate medical care if:  ? · Symptoms such as a fever, chills, nausea, or vomiting get worse or happen for the first time. ? · You have new pain in your back just below your rib cage. This is called flank pain. ? · There is new blood or pus in your urine. ? · You are not able to take or keep down your antibiotics. ? Watch closely for changes in your health, and be sure to contact your doctor if:  ? · You are not getting better after taking an antibiotic for 2 days. ? · Your symptoms go away but then come back. Where can you learn more?   Go to http://marcial-karla.info/. Enter T041 in the search box to learn more about \"Urinary Tract Infections in Men: Care Instructions. \"  Current as of: May 12, 2017  Content Version: 11.4  © 4689-1925 [employer]. Care instructions adapted under license by [employer] (which disclaims liability or warranty for this information). If you have questions about a medical condition or this instruction, always ask your healthcare professional. [address] any warranty or liability for your use of this information. Bronchitis: Care Instructions  Your Care Instructions    Bronchitis is inflammation of the bronchial tubes, which carry air to the lungs. The tubes swell and produce mucus, or phlegm. The mucus and inflamed bronchial tubes make you cough. You may have trouble breathing. Most cases of bronchitis are caused by viruses like those that cause colds. Antibiotics usually do not help and they may be harmful. Bronchitis usually develops rapidly and lasts about 2 to 3 weeks in otherwise healthy people. Follow-up care is a key part of your treatment and safety. Be sure to make and go to all appointments, and call your doctor if you are having problems. It's also a good idea to know your test results and keep a list of the medicines you take. How can you care for yourself at home? · Take all medicines exactly as prescribed. Call your doctor if you think you are having a problem with your medicine. · Get some extra rest.  · Take an over-the-counter pain medicine, such as acetaminophen (Tylenol), ibuprofen (Advil, Motrin), or naproxen (Aleve) to reduce fever and relieve body aches. Read and follow all instructions on the label. · Do not take two or more pain medicines at the same time unless the doctor told you to. Many pain medicines have acetaminophen, which is Tylenol. Too much acetaminophen (Tylenol) can be harmful.   · Take an over-the-counter 3 cough medicine that contains dextromethorphan to help quiet a dry, hacking cough so that you can sleep. Avoid cough medicines that have more than one active ingredient. Read and follow all instructions on the label. · Breathe moist air from a humidifier, hot shower, or sink filled with hot water. The heat and moisture will thin mucus so you can cough it out. · Do not smoke. Smoking can make bronchitis worse. If you need help quitting, talk to your doctor about stop-smoking programs and medicines. These can increase your chances of quitting for good. When should you call for help? Call 911 anytime you think you may need emergency care. For example, call if:  ? · You have severe trouble breathing. ?Call your doctor now or seek immediate medical care if:  ? · You have new or worse trouble breathing. ? · You cough up dark brown or bloody mucus (sputum). ? · You have a new or higher fever. ? · You have a new rash. ? Watch closely for changes in your health, and be sure to contact your doctor if:  ? · You cough more deeply or more often, especially if you notice more mucus or a change in the color of your mucus. ? · You are not getting better as expected. Where can you learn more? Go to http://marcial-karla.info/. Enter H333 in the search box to learn more about \"Bronchitis: Care Instructions. \"  Current as of: May 12, 2017  Content Version: 11.4  © 6760-8769 [employer]. Care instructions adapted under license by [employer] (which disclaims liability or warranty for this information). If you have questions about a medical condition or this instruction, always ask your healthcare professional. [address] any warranty or liability for your use of this information.

## 2018-01-01 ENCOUNTER — EMERGENCY (EMERGENCY)
Facility: HOSPITAL | Age: 78
LOS: 0 days | Discharge: HOME | End: 2018-01-01
Attending: EMERGENCY MEDICINE | Admitting: EMERGENCY MEDICINE
Payer: MEDICARE

## 2018-01-01 VITALS — DIASTOLIC BLOOD PRESSURE: 56 MMHG | SYSTOLIC BLOOD PRESSURE: 103 MMHG

## 2018-01-01 VITALS
SYSTOLIC BLOOD PRESSURE: 88 MMHG | OXYGEN SATURATION: 92 % | RESPIRATION RATE: 16 BRPM | HEIGHT: 71 IN | WEIGHT: 164.91 LBS | DIASTOLIC BLOOD PRESSURE: 60 MMHG | HEART RATE: 71 BPM

## 2018-01-01 DIAGNOSIS — K21.9 GASTRO-ESOPHAGEAL REFLUX DISEASE WITHOUT ESOPHAGITIS: ICD-10-CM

## 2018-01-01 DIAGNOSIS — I10 ESSENTIAL (PRIMARY) HYPERTENSION: ICD-10-CM

## 2018-01-01 DIAGNOSIS — R11.0 NAUSEA: ICD-10-CM

## 2018-01-01 DIAGNOSIS — F41.9 ANXIETY DISORDER, UNSPECIFIED: ICD-10-CM

## 2018-01-01 DIAGNOSIS — Z98.1 ARTHRODESIS STATUS: ICD-10-CM

## 2018-01-01 DIAGNOSIS — M54.9 DORSALGIA, UNSPECIFIED: ICD-10-CM

## 2018-01-01 DIAGNOSIS — G89.29 OTHER CHRONIC PAIN: ICD-10-CM

## 2018-01-01 DIAGNOSIS — Z98.1 ARTHRODESIS STATUS: Chronic | ICD-10-CM

## 2018-01-01 DIAGNOSIS — N40.0 BENIGN PROSTATIC HYPERPLASIA WITHOUT LOWER URINARY TRACT SYMPTOMS: ICD-10-CM

## 2018-01-01 LAB
ACANTHOCYTES BLD QL SMEAR: SIGNIFICANT CHANGE UP
ALBUMIN SERPL ELPH-MCNC: 3.2 G/DL — LOW (ref 3.3–5)
ALP SERPL-CCNC: 102 U/L — SIGNIFICANT CHANGE UP (ref 40–120)
ALT FLD-CCNC: 15 U/L — SIGNIFICANT CHANGE UP (ref 12–78)
ANION GAP SERPL CALC-SCNC: 5 MMOL/L — SIGNIFICANT CHANGE UP (ref 5–17)
ANISOCYTOSIS BLD QL: SLIGHT — SIGNIFICANT CHANGE UP
AST SERPL-CCNC: 16 U/L — SIGNIFICANT CHANGE UP (ref 15–37)
BASOPHILS # BLD AUTO: 0.1 K/UL — SIGNIFICANT CHANGE UP (ref 0–0.2)
BASOPHILS NFR BLD AUTO: 0.8 % — SIGNIFICANT CHANGE UP (ref 0–2)
BILIRUB SERPL-MCNC: 0.3 MG/DL — SIGNIFICANT CHANGE UP (ref 0.2–1.2)
BUN SERPL-MCNC: 38 MG/DL — HIGH (ref 7–23)
CALCIUM SERPL-MCNC: 8.7 MG/DL — SIGNIFICANT CHANGE UP (ref 8.5–10.1)
CHLORIDE SERPL-SCNC: 101 MMOL/L — SIGNIFICANT CHANGE UP (ref 96–108)
CO2 SERPL-SCNC: 32 MMOL/L — HIGH (ref 22–31)
CREAT SERPL-MCNC: 1.51 MG/DL — HIGH (ref 0.5–1.3)
DACRYOCYTES BLD QL SMEAR: SLIGHT — SIGNIFICANT CHANGE UP
ELLIPTOCYTES BLD QL SMEAR: SIGNIFICANT CHANGE UP
EOSINOPHIL # BLD AUTO: 0 K/UL — SIGNIFICANT CHANGE UP (ref 0–0.5)
EOSINOPHIL NFR BLD AUTO: 0.7 % — SIGNIFICANT CHANGE UP (ref 0–6)
GLUCOSE SERPL-MCNC: 101 MG/DL — HIGH (ref 70–99)
HCT VFR BLD CALC: 30.2 % — LOW (ref 39–50)
HGB BLD-MCNC: 9.3 G/DL — LOW (ref 13–17)
LYMPHOCYTES # BLD AUTO: 0.9 K/UL — LOW (ref 1–3.3)
LYMPHOCYTES # BLD AUTO: 14.8 % — SIGNIFICANT CHANGE UP (ref 13–44)
MCHC RBC-ENTMCNC: 23.4 PG — LOW (ref 27–34)
MCHC RBC-ENTMCNC: 30.7 GM/DL — LOW (ref 32–36)
MCV RBC AUTO: 76.2 FL — LOW (ref 80–100)
MICROCYTES BLD QL: SLIGHT — SIGNIFICANT CHANGE UP
MONOCYTES # BLD AUTO: 0.5 K/UL — SIGNIFICANT CHANGE UP (ref 0–0.9)
MONOCYTES NFR BLD AUTO: 8.5 % — SIGNIFICANT CHANGE UP (ref 2–14)
NEUTROPHILS # BLD AUTO: 4.7 K/UL — SIGNIFICANT CHANGE UP (ref 1.8–7.4)
NEUTROPHILS NFR BLD AUTO: 75.2 % — SIGNIFICANT CHANGE UP (ref 43–77)
OVALOCYTES BLD QL SMEAR: SLIGHT — SIGNIFICANT CHANGE UP
PLAT MORPH BLD: NORMAL — SIGNIFICANT CHANGE UP
PLATELET # BLD AUTO: 250 K/UL — SIGNIFICANT CHANGE UP (ref 150–400)
PLATELET COUNT - ESTIMATE: NORMAL — SIGNIFICANT CHANGE UP
POIKILOCYTOSIS BLD QL AUTO: SIGNIFICANT CHANGE UP
POTASSIUM SERPL-MCNC: 4.5 MMOL/L — SIGNIFICANT CHANGE UP (ref 3.5–5.3)
POTASSIUM SERPL-SCNC: 4.5 MMOL/L — SIGNIFICANT CHANGE UP (ref 3.5–5.3)
PROT SERPL-MCNC: 7.3 GM/DL — SIGNIFICANT CHANGE UP (ref 6–8.3)
RBC # BLD: 3.97 M/UL — LOW (ref 4.2–5.8)
RBC # FLD: 15.9 % — HIGH (ref 10.3–14.5)
RBC BLD AUTO: (no result)
SCHISTOCYTES BLD QL AUTO: SLIGHT — SIGNIFICANT CHANGE UP
SODIUM SERPL-SCNC: 138 MMOL/L — SIGNIFICANT CHANGE UP (ref 135–145)
WBC # BLD: 6.2 K/UL — SIGNIFICANT CHANGE UP (ref 3.8–10.5)
WBC # FLD AUTO: 6.2 K/UL — SIGNIFICANT CHANGE UP (ref 3.8–10.5)

## 2018-01-01 PROCEDURE — 99284 EMERGENCY DEPT VISIT MOD MDM: CPT

## 2018-01-01 RX ORDER — HYDROMORPHONE HYDROCHLORIDE 2 MG/ML
0.5 INJECTION INTRAMUSCULAR; INTRAVENOUS; SUBCUTANEOUS ONCE
Qty: 0 | Refills: 0 | Status: DISCONTINUED | OUTPATIENT
Start: 2018-01-01 | End: 2018-01-01

## 2018-01-01 RX ORDER — SODIUM CHLORIDE 9 MG/ML
1000 INJECTION INTRAMUSCULAR; INTRAVENOUS; SUBCUTANEOUS ONCE
Qty: 0 | Refills: 0 | Status: COMPLETED | OUTPATIENT
Start: 2018-01-01 | End: 2018-01-01

## 2018-01-01 RX ORDER — ONDANSETRON 8 MG/1
8 TABLET, FILM COATED ORAL ONCE
Qty: 0 | Refills: 0 | Status: COMPLETED | OUTPATIENT
Start: 2018-01-01 | End: 2018-01-01

## 2018-01-01 RX ORDER — HYDROMORPHONE HYDROCHLORIDE 2 MG/ML
1 INJECTION INTRAMUSCULAR; INTRAVENOUS; SUBCUTANEOUS ONCE
Qty: 0 | Refills: 0 | Status: DISCONTINUED | OUTPATIENT
Start: 2018-01-01 | End: 2018-01-01

## 2018-01-01 RX ADMIN — ONDANSETRON 8 MILLIGRAM(S): 8 TABLET, FILM COATED ORAL at 03:03

## 2018-01-01 RX ADMIN — SODIUM CHLORIDE 1000 MILLILITER(S): 9 INJECTION INTRAMUSCULAR; INTRAVENOUS; SUBCUTANEOUS at 03:03

## 2018-01-01 RX ADMIN — SODIUM CHLORIDE 1000 MILLILITER(S): 9 INJECTION INTRAMUSCULAR; INTRAVENOUS; SUBCUTANEOUS at 05:15

## 2018-01-01 RX ADMIN — HYDROMORPHONE HYDROCHLORIDE 1 MILLIGRAM(S): 2 INJECTION INTRAMUSCULAR; INTRAVENOUS; SUBCUTANEOUS at 03:46

## 2018-01-01 RX ADMIN — HYDROMORPHONE HYDROCHLORIDE 0.5 MILLIGRAM(S): 2 INJECTION INTRAMUSCULAR; INTRAVENOUS; SUBCUTANEOUS at 06:50

## 2018-01-01 NOTE — ED PROVIDER NOTE - OBJECTIVE STATEMENT
76 yo male with h/o HTN, chronic pain secondary to an old back injury s/p surgery in April 2017, on chronic pain meds--dilaudid.  Pt states the dilaudid generally does not help his pain, but is enough to take the edge off.  For the past couple of days the pain has been poorly controlled by the PO meds and when this happens, he needs a shot. 78 yo male with h/o HTN, chronic pain secondary to an old back injury s/p surgery in April 2017, on chronic pain meds--dilaudid.  Pt states the dilaudid generally does not help his pain, but is enough to take the edge off.  For the past couple of days the pain has been poorly controlled by the PO meds and when this happens, he needs a shot.  Pt also reports nausea at all times which he thinks is secondary to the dilaudid, somewhat relieved with PO zofran, but not always.  Feels more nauseated when the pain is poorly controlled as it is now.  Pt reports general decreased appetite and poor PO intake secondary to his chronic symptoms.  Denies abdominal pain, chest pain, sob.

## 2018-01-01 NOTE — ED ADULT NURSE NOTE - OBJECTIVE STATEMENT
pt c/o nausea, denies vomiting x1 day. states that he has chronic lower back pain for which he takes PO dilaudid. states that he frequently gets nauseas from the dilaudid and takes PO zofran concurrently, but it is no longer working. states that he had a spinal fusion at [facility] for special surgery last year. denies chest pain/sob.

## 2018-01-01 NOTE — ED PROVIDER NOTE - CONSTITUTIONAL, MLM
normal... Well nourished, awake, alert, oriented to person, place, time/situation, pale, chronically ill appearing male,  no distress at rest.

## 2018-01-10 ENCOUNTER — EMERGENCY (EMERGENCY)
Facility: HOSPITAL | Age: 78
LOS: 0 days | Discharge: HOME | End: 2018-01-10
Attending: EMERGENCY MEDICINE | Admitting: EMERGENCY MEDICINE
Payer: MEDICARE

## 2018-01-10 VITALS
TEMPERATURE: 97 F | OXYGEN SATURATION: 98 % | HEART RATE: 64 BPM | HEIGHT: 69 IN | WEIGHT: 175.05 LBS | DIASTOLIC BLOOD PRESSURE: 64 MMHG | SYSTOLIC BLOOD PRESSURE: 116 MMHG | RESPIRATION RATE: 18 BRPM

## 2018-01-10 VITALS
TEMPERATURE: 98 F | DIASTOLIC BLOOD PRESSURE: 38 MMHG | RESPIRATION RATE: 18 BRPM | SYSTOLIC BLOOD PRESSURE: 115 MMHG | HEART RATE: 66 BPM | OXYGEN SATURATION: 98 %

## 2018-01-10 DIAGNOSIS — D64.9 ANEMIA, UNSPECIFIED: ICD-10-CM

## 2018-01-10 DIAGNOSIS — M40.209 UNSPECIFIED KYPHOSIS, SITE UNSPECIFIED: ICD-10-CM

## 2018-01-10 DIAGNOSIS — M54.42 LUMBAGO WITH SCIATICA, LEFT SIDE: ICD-10-CM

## 2018-01-10 DIAGNOSIS — N40.0 BENIGN PROSTATIC HYPERPLASIA WITHOUT LOWER URINARY TRACT SYMPTOMS: ICD-10-CM

## 2018-01-10 DIAGNOSIS — Z98.1 ARTHRODESIS STATUS: Chronic | ICD-10-CM

## 2018-01-10 DIAGNOSIS — M54.41 LUMBAGO WITH SCIATICA, RIGHT SIDE: ICD-10-CM

## 2018-01-10 DIAGNOSIS — I10 ESSENTIAL (PRIMARY) HYPERTENSION: ICD-10-CM

## 2018-01-10 PROCEDURE — 99283 EMERGENCY DEPT VISIT LOW MDM: CPT | Mod: 25

## 2018-01-10 NOTE — ED ADULT NURSE NOTE - TEMPLATE LIST FOR HEAD TO TOE ASSESSMENT
** FOLLOW UP PLAN**:    PLEASE SCHEDULE LABS WITH OFFICE VISIT 3 MONTHS FROM TODAY TO FOLLOW UP ON  Vitamin Deficiencies  Fatigue, unspecified type  Gastroesophageal reflux disease without esophagitis  Hair loss  OTHER MEDICAL ISSUES, AND TO REVIEW TEST RESULTS    BE SURE TO SCHEDULE YOUR LAB DRAW APPOINTMENT FOR AT LEAST 1 WEEK BEFORE YOUR NEXT VISIT      YOU HAVE BEEN REFERRED FOR GYNE EXAM WITH ULYSSES FAGAN MD AND ALSO FOR GENETIC COUNSELING RE FH OF EARLY BREAST CANCER   PLEASE  MAKE SURE TO SCHEDULE GYNE APPOINTMENT(S) AT THE   OR BY CALLING THE NUMBER BELOW AND  ONCOLOGY APPOINTMENT(S) BY TELEPHONE      YOU MAY CONTACT THE CLINIC IF ANY QUESTIONS OR CONCERNS -[phone] OR VIA itzbig              General

## 2018-01-10 NOTE — ED ADULT NURSE REASSESSMENT NOTE - NS ED NURSE REASSESS COMMENT FT1
Pt slept throughtout the entire ED stay. Pt easily arousable. VS as charted. Will continue to monitor.

## 2018-01-10 NOTE — ED ADULT TRIAGE NOTE - CHIEF COMPLAINT QUOTE
back pain radiating down right leg to big toe. took Dilaudid and Zofran [facility] ago[facility]

## 2018-01-10 NOTE — ED ADULT NURSE NOTE - OBJECTIVE STATEMENT
back pain radiating down right leg to big toe. took Dilaud[facility] and [facility]

## 2018-01-10 NOTE — ED PROVIDER NOTE - OBJECTIVE STATEMENT
78 y/o male in ED from [facility] c/o chronic lower back pain x months.  pt states seen in ED for same in the past.  pt states took his dilaudid PTA still with pain. pt states pain radiates down LLE.  pt denies any fever, HA, cp, sob, n/v/d/abd pain

## 2018-01-10 NOTE — ED PROVIDER NOTE - MEDICAL DECISION MAKING DETAILS
pt with chronic lower back pain seen in ED on 1/1/18 for same.  negative w/u.  pt sleeping during entire ED visit today and in no acute distress. will d/c home with f/u

## 2018-01-10 NOTE — ED PROVIDER NOTE - CARE PLAN
Principal Discharge DX:	Chronic low back pain with bilateral sciatica, unspecified back pain laterality

## 2018-01-26 ENCOUNTER — EMERGENCY (EMERGENCY)
Facility: HOSPITAL | Age: 78
LOS: 0 days | Discharge: HOME | End: 2018-01-26
Attending: EMERGENCY MEDICINE | Admitting: EMERGENCY MEDICINE
Payer: MEDICARE

## 2018-01-26 VITALS
OXYGEN SATURATION: 100 % | SYSTOLIC BLOOD PRESSURE: 168 MMHG | HEART RATE: 79 BPM | RESPIRATION RATE: 15 BRPM | DIASTOLIC BLOOD PRESSURE: 74 MMHG | TEMPERATURE: 98 F

## 2018-01-26 VITALS
HEART RATE: 76 BPM | SYSTOLIC BLOOD PRESSURE: 110 MMHG | RESPIRATION RATE: 17 BRPM | WEIGHT: 171.96 LBS | DIASTOLIC BLOOD PRESSURE: 46 MMHG | TEMPERATURE: 98 F | OXYGEN SATURATION: 98 %

## 2018-01-26 DIAGNOSIS — F32.9 MAJOR DEPRESSIVE DISORDER, SINGLE EPISODE, UNSPECIFIED: ICD-10-CM

## 2018-01-26 DIAGNOSIS — M54.5 LOW BACK PAIN: ICD-10-CM

## 2018-01-26 DIAGNOSIS — Z98.1 ARTHRODESIS STATUS: Chronic | ICD-10-CM

## 2018-01-26 DIAGNOSIS — I10 ESSENTIAL (PRIMARY) HYPERTENSION: ICD-10-CM

## 2018-01-26 DIAGNOSIS — F41.9 ANXIETY DISORDER, UNSPECIFIED: ICD-10-CM

## 2018-01-26 DIAGNOSIS — M48.00 SPINAL STENOSIS, SITE UNSPECIFIED: ICD-10-CM

## 2018-01-26 DIAGNOSIS — K21.9 GASTRO-ESOPHAGEAL REFLUX DISEASE WITHOUT ESOPHAGITIS: ICD-10-CM

## 2018-01-26 DIAGNOSIS — N40.0 BENIGN PROSTATIC HYPERPLASIA WITHOUT LOWER URINARY TRACT SYMPTOMS: ICD-10-CM

## 2018-01-26 DIAGNOSIS — D64.9 ANEMIA, UNSPECIFIED: ICD-10-CM

## 2018-01-26 LAB
ALBUMIN SERPL ELPH-MCNC: 3 G/DL — LOW (ref 3.3–5)
ALP SERPL-CCNC: 79 U/L — SIGNIFICANT CHANGE UP (ref 40–120)
ALT FLD-CCNC: 16 U/L — SIGNIFICANT CHANGE UP (ref 12–78)
ANION GAP SERPL CALC-SCNC: 7 MMOL/L — SIGNIFICANT CHANGE UP (ref 5–17)
ANISOCYTOSIS BLD QL: SLIGHT — SIGNIFICANT CHANGE UP
APTT BLD: 32 SEC — SIGNIFICANT CHANGE UP (ref 27.5–37.4)
AST SERPL-CCNC: 12 U/L — LOW (ref 15–37)
BASOPHILS # BLD AUTO: 0.1 K/UL — SIGNIFICANT CHANGE UP (ref 0–0.2)
BASOPHILS NFR BLD AUTO: 1.1 % — SIGNIFICANT CHANGE UP (ref 0–2)
BILIRUB SERPL-MCNC: 0.4 MG/DL — SIGNIFICANT CHANGE UP (ref 0.2–1.2)
BUN SERPL-MCNC: 38 MG/DL — HIGH (ref 7–23)
BURR CELLS BLD QL SMEAR: PRESENT — SIGNIFICANT CHANGE UP
CALCIUM SERPL-MCNC: 8.5 MG/DL — SIGNIFICANT CHANGE UP (ref 8.5–10.1)
CHLORIDE SERPL-SCNC: 104 MMOL/L — SIGNIFICANT CHANGE UP (ref 96–108)
CO2 SERPL-SCNC: 28 MMOL/L — SIGNIFICANT CHANGE UP (ref 22–31)
CREAT SERPL-MCNC: 1.37 MG/DL — HIGH (ref 0.5–1.3)
ELLIPTOCYTES BLD QL SMEAR: SIGNIFICANT CHANGE UP
EOSINOPHIL # BLD AUTO: 0 K/UL — SIGNIFICANT CHANGE UP (ref 0–0.5)
EOSINOPHIL NFR BLD AUTO: 0.4 % — SIGNIFICANT CHANGE UP (ref 0–6)
GLUCOSE SERPL-MCNC: 101 MG/DL — HIGH (ref 70–99)
HCT VFR BLD CALC: 29.1 % — LOW (ref 39–50)
HGB BLD-MCNC: 9.1 G/DL — LOW (ref 13–17)
HYPOCHROMIA BLD QL: SLIGHT — SIGNIFICANT CHANGE UP
INR BLD: 1.46 RATIO — HIGH (ref 0.88–1.16)
LYMPHOCYTES # BLD AUTO: 0.7 K/UL — LOW (ref 1–3.3)
LYMPHOCYTES # BLD AUTO: 12 % — LOW (ref 13–44)
MANUAL DIF COMMENT BLD-IMP: SIGNIFICANT CHANGE UP
MCHC RBC-ENTMCNC: 23.3 PG — LOW (ref 27–34)
MCHC RBC-ENTMCNC: 31.3 GM/DL — LOW (ref 32–36)
MCV RBC AUTO: 74.4 FL — LOW (ref 80–100)
MICROCYTES BLD QL: SLIGHT — SIGNIFICANT CHANGE UP
MONOCYTES # BLD AUTO: 0.6 K/UL — SIGNIFICANT CHANGE UP (ref 0–0.9)
MONOCYTES NFR BLD AUTO: 9.7 % — SIGNIFICANT CHANGE UP (ref 2–14)
NEUTROPHILS # BLD AUTO: 4.6 K/UL — SIGNIFICANT CHANGE UP (ref 1.8–7.4)
NEUTROPHILS NFR BLD AUTO: 76.7 % — SIGNIFICANT CHANGE UP (ref 43–77)
PLAT MORPH BLD: NORMAL — SIGNIFICANT CHANGE UP
PLATELET # BLD AUTO: 214 K/UL — SIGNIFICANT CHANGE UP (ref 150–400)
PLATELET COUNT - ESTIMATE: NORMAL — SIGNIFICANT CHANGE UP
POIKILOCYTOSIS BLD QL AUTO: SIGNIFICANT CHANGE UP
POLYCHROMASIA BLD QL SMEAR: SLIGHT — SIGNIFICANT CHANGE UP
POTASSIUM SERPL-MCNC: 4.1 MMOL/L — SIGNIFICANT CHANGE UP (ref 3.5–5.3)
POTASSIUM SERPL-SCNC: 4.1 MMOL/L — SIGNIFICANT CHANGE UP (ref 3.5–5.3)
PROT SERPL-MCNC: 6.8 GM/DL — SIGNIFICANT CHANGE UP (ref 6–8.3)
PROTHROM AB SERPL-ACNC: 15.9 SEC — HIGH (ref 9.8–12.7)
RBC # BLD: 3.91 M/UL — LOW (ref 4.2–5.8)
RBC # FLD: 16.5 % — HIGH (ref 10.3–14.5)
RBC BLD AUTO: (no result)
SCHISTOCYTES BLD QL AUTO: SLIGHT — SIGNIFICANT CHANGE UP
SODIUM SERPL-SCNC: 139 MMOL/L — SIGNIFICANT CHANGE UP (ref 135–145)
WBC # BLD: 6 K/UL — SIGNIFICANT CHANGE UP (ref 3.8–10.5)
WBC # FLD AUTO: 6 K/UL — SIGNIFICANT CHANGE UP (ref 3.8–10.5)

## 2018-01-26 PROCEDURE — 70486 CT MAXILLOFACIAL W/O DYE: CPT | Mod: 26

## 2018-01-26 PROCEDURE — 76377 3D RENDER W/INTRP POSTPROCES: CPT | Mod: 26

## 2018-01-26 PROCEDURE — 72125 CT NECK SPINE W/O DYE: CPT | Mod: 26

## 2018-01-26 PROCEDURE — 93971 EXTREMITY STUDY: CPT | Mod: 26,RT

## 2018-01-26 PROCEDURE — 99284 EMERGENCY DEPT VISIT MOD MDM: CPT

## 2018-01-26 PROCEDURE — 93010 ELECTROCARDIOGRAM REPORT: CPT

## 2018-01-26 PROCEDURE — 70450 CT HEAD/BRAIN W/O DYE: CPT | Mod: 26

## 2018-01-26 RX ORDER — ONDANSETRON 8 MG/1
4 TABLET, FILM COATED ORAL ONCE
Qty: 0 | Refills: 0 | Status: COMPLETED | OUTPATIENT
Start: 2018-01-26 | End: 2018-01-26

## 2018-01-26 RX ORDER — TETANUS TOXOID, REDUCED DIPHTHERIA TOXOID AND ACELLULAR PERTUSSIS VACCINE, ADSORBED 5; 2.5; 8; 8; 2.5 [IU]/.5ML; [IU]/.5ML; UG/.5ML; UG/.5ML; UG/.5ML
0.5 SUSPENSION INTRAMUSCULAR ONCE
Qty: 0 | Refills: 0 | Status: COMPLETED | OUTPATIENT
Start: 2018-01-26 | End: 2018-01-26

## 2018-01-26 RX ORDER — CEPHALEXIN 500 MG
1 CAPSULE ORAL
Qty: 28 | Refills: 0 | OUTPATIENT
Start: 2018-01-26 | End: 2018-02-01

## 2018-01-26 RX ORDER — HYDROMORPHONE HYDROCHLORIDE 2 MG/ML
1 INJECTION INTRAMUSCULAR; INTRAVENOUS; SUBCUTANEOUS ONCE
Qty: 0 | Refills: 0 | Status: DISCONTINUED | OUTPATIENT
Start: 2018-01-26 | End: 2018-01-26

## 2018-01-26 RX ORDER — CEPHALEXIN 500 MG
500 CAPSULE ORAL ONCE
Qty: 0 | Refills: 0 | Status: COMPLETED | OUTPATIENT
Start: 2018-01-26 | End: 2018-01-26

## 2018-01-26 RX ADMIN — Medication 500 MILLIGRAM(S): at 12:32

## 2018-01-26 RX ADMIN — ONDANSETRON 4 MILLIGRAM(S): 8 TABLET, FILM COATED ORAL at 12:32

## 2018-01-26 RX ADMIN — HYDROMORPHONE HYDROCHLORIDE 1 MILLIGRAM(S): 2 INJECTION INTRAMUSCULAR; INTRAVENOUS; SUBCUTANEOUS at 12:32

## 2018-01-26 RX ADMIN — TETANUS TOXOID, REDUCED DIPHTHERIA TOXOID AND ACELLULAR PERTUSSIS VACCINE, ADSORBED 0.5 MILLILITER(S): 5; 2.5; 8; 8; 2.5 SUSPENSION INTRAMUSCULAR at 12:32

## 2018-01-26 NOTE — ED PROVIDER NOTE - NS ED ROS FT
CONST: no fevers, no chills  EYES: no pain  ENT: left facial pain, no sore throat   CV: no chest pain  RESP: no shortness of breath  ABD: no abdominal pain   : no dysuria  MSK: no new back pain  NEURO: no headache or additional neurologic complaints  HEME: not on blood thinners,  no easy bleeding  SKIN:  no rash

## 2018-01-26 NOTE — ED PROVIDER NOTE - PROGRESS NOTE DETAILS
Laurie PGY3: remains neurologically intact, will give home pain medication, first dose of keflex for cellulitis and send home via ambulette with rx sent to pharmacy.

## 2018-01-26 NOTE — ED PROVIDER NOTE - ATTENDING CONTRIBUTION TO CARE
GEN: NAD, left side maxilla with  superficial 1 cm laceration with ttp slight swelling  HEAD/EYES: NCAT, PERRL, EOMI, anicteric sclerae, no conjunctival pallor  ENT: mucus membranes moist, oropharynx WNL, trachea midline, no JVD  CHEST: lungs CTA with equal breath sounds bilaterally, chest wall nontender and atraumatic  CV: heart with reg rhythm S1, S2, no murmur; distal pulses intact and symmetric bilaterally  ABDOMEN: normoactive bowel sounds, soft, ND, nontender, no masses  MSK: extremities atraumatic and nontender, bilateral lower extremity edema with right leg erythema. back is without midline tenderness, deformity or .  SKIN: no rash, no bruising, no cyanosis. color appropriate for ethnicity  NEURO:AAO x 3 no facial asymmetry.  sensation, motor, coordination are intact  PSYCH: Affect appropriate  agree with resident evaluation, plan and disposition

## 2018-01-26 NOTE — ED PROVIDER NOTE - MEDICAL DECISION MAKING DETAILS
Laurie PGY3: fall at home, unwitnessed but ambulatory shortly after, facial pain on left, will obtain head, max face and neck ct, basic labs and ultrasound for potential dvt on right leg,

## 2018-01-26 NOTE — ED PROVIDER NOTE - PHYSICAL EXAMINATION
Laurie: A & O x 3, NAD, HEENT with left laceration in upper maxillary area on left cheek less than 1 cm and poor dentition,  lungs CTAB, heart with reg rhythm; abdomen soft NTND; extremities with moderate edema worse on right than left; skin with erythematous changes to right lower extremity, no pain on palpation of cervical spine, extremities, chest wall or hips, ranges hips well, neuro exam non focal with no motor or sensory deficits

## 2018-01-26 NOTE — ED PROVIDER NOTE - OBJECTIVE STATEMENT
77y history of chronic lower back pain since operation in april 2017. Complaining of difficulty sleeping because of chronic back pain. Patient was pacing around room last night and states he fell asleep while walking in his room and fell to the ground at 7:30am and was helped up by the nurses in his assisted facility ([facility] in [place]). States he hit his head and it bent the frame of his glasses. Found by EMS ambulating independently. not on blood thinners. Denies headache but has some pain on left maxillary area. No changes in chronic nausea since the fall.     PMD:

## 2018-01-26 NOTE — ED PROVIDER NOTE - CARE PLAN
Principal Discharge DX:	Fall Principal Discharge DX:	Fall  Assessment and plan of treatment:	Follow up with your primary care doctor within 48-72 hours.   You must return for new, worsening or concerning symptoms; specifically including those listed on the attached sheet.   Please take 500mg pills of cephalexin (Keflex) as indicated on your prescription with respect to the warnings.   Continue your home medications as needed as prescribed.

## 2018-01-26 NOTE — ED PROVIDER NOTE - PLAN OF CARE
Follow up with your primary care doctor within 48-72 hours.   You must return for new, worsening or concerning symptoms; specifically including those listed on the attached sheet.   Please take 500mg pills of cephalexin (Keflex) as indicated on your prescription with respect to the warnings.   Continue your home medications as needed as prescribed.

## 2018-02-26 ENCOUNTER — EMERGENCY (EMERGENCY)
Facility: HOSPITAL | Age: 78
LOS: 0 days | Discharge: HOME | End: 2018-02-27
Attending: EMERGENCY MEDICINE | Admitting: EMERGENCY MEDICINE
Payer: MEDICARE

## 2018-02-26 VITALS
DIASTOLIC BLOOD PRESSURE: 41 MMHG | HEART RATE: 64 BPM | WEIGHT: 171.96 LBS | HEIGHT: 69 IN | OXYGEN SATURATION: 94 % | TEMPERATURE: 98 F | RESPIRATION RATE: 17 BRPM | SYSTOLIC BLOOD PRESSURE: 81 MMHG

## 2018-02-26 DIAGNOSIS — Z98.1 ARTHRODESIS STATUS: Chronic | ICD-10-CM

## 2018-02-26 PROCEDURE — 99283 EMERGENCY DEPT VISIT LOW MDM: CPT

## 2018-02-26 RX ORDER — HYDROMORPHONE HYDROCHLORIDE 2 MG/ML
1 INJECTION INTRAMUSCULAR; INTRAVENOUS; SUBCUTANEOUS ONCE
Qty: 0 | Refills: 0 | Status: DISCONTINUED | OUTPATIENT
Start: 2018-02-26 | End: 2018-02-27

## 2018-02-26 RX ORDER — ONDANSETRON 8 MG/1
4 TABLET, FILM COATED ORAL ONCE
Qty: 0 | Refills: 0 | Status: DISCONTINUED | OUTPATIENT
Start: 2018-02-26 | End: 2018-02-27

## 2018-02-26 NOTE — ED PROVIDER NOTE - OBJECTIVE STATEMENT
76 yo male c/o acute on chronic back pain. pt states he got oob tonight with back pain tried to walk around to see if it would decrease and it didn't so he came to the er. denies any trauma. pt has pain management. no bowel or bladder incontinenece no numbness or tingling down the legs

## 2018-02-26 NOTE — ED ADULT NURSE NOTE - TOBACCO SCREENING (FROM THE ASSIST)
Problem: Spiritual Distress, Risk/Actual (Adult,Obstetrics,Pediatric)  Intervention: Facilitate Personal Exploration/Expression of Spirituality  Provided initial visit post [employer] call. Introduced and offered pastoral support with pt's . Explored family's concerns. No spiritual needs expressed at this time. Family aware of 's presence as needed.         
Statement Selected

## 2018-02-26 NOTE — ED ADULT NURSE NOTE - OBJECTIVE STATEMENT
Pt presented to ED c/o back pain. As per pt, pt had increased back pain that's not resolved with normal pain med. Hx chronic back pain and seen in ED multiple times for same complains. Pt also stated of falling today from WC onto his knees. Denies pain to the site. No visible injury noted.

## 2018-02-26 NOTE — ED ADULT TRIAGE NOTE - CHIEF COMPLAINT QUOTE
Patient presents from nursing home. EMS states patient was sent in by nursing home for fall out of wheelchair complaining of knee pain. At triage patient denies knee pain, reports exacerbation of chronic back pain. Patient denies head trauma

## 2018-02-26 NOTE — ED ADULT NURSE NOTE - CHPI ED SYMPTOMS NEG
no fever/no nausea/no dizziness/no weakness/no chills/no decreased eating/drinking/no numbness/no vomiting/no tingling

## 2018-02-27 VITALS
TEMPERATURE: 97 F | HEART RATE: 61 BPM | DIASTOLIC BLOOD PRESSURE: 78 MMHG | OXYGEN SATURATION: 98 % | SYSTOLIC BLOOD PRESSURE: 102 MMHG | RESPIRATION RATE: 19 BRPM

## 2018-02-27 RX ORDER — ONDANSETRON 8 MG/1
4 TABLET, FILM COATED ORAL ONCE
Qty: 0 | Refills: 0 | Status: COMPLETED | OUTPATIENT
Start: 2018-02-27 | End: 2018-02-27

## 2018-02-27 RX ORDER — MORPHINE SULFATE 50 MG/1
8 CAPSULE, EXTENDED RELEASE ORAL ONCE
Qty: 0 | Refills: 0 | Status: DISCONTINUED | OUTPATIENT
Start: 2018-02-27 | End: 2018-02-27

## 2018-02-27 RX ADMIN — MORPHINE SULFATE 8 MILLIGRAM(S): 50 CAPSULE, EXTENDED RELEASE ORAL at 00:42

## 2018-02-27 RX ADMIN — ONDANSETRON 4 MILLIGRAM(S): 8 TABLET, FILM COATED ORAL at 00:42

## 2018-02-27 NOTE — ED ADULT NURSE REASSESSMENT NOTE - NS ED NURSE REASSESS COMMENT FT1
Multiple attempts made to insert PVL with failure. Pt stated of not allergic to morphine and requesting for morphine IM. MD damian made aware. New order given for morphine IM. Allergy on MAR updated.

## 2018-02-28 DIAGNOSIS — I10 ESSENTIAL (PRIMARY) HYPERTENSION: ICD-10-CM

## 2018-02-28 DIAGNOSIS — M54.9 DORSALGIA, UNSPECIFIED: ICD-10-CM

## 2018-02-28 DIAGNOSIS — N40.0 BENIGN PROSTATIC HYPERPLASIA WITHOUT LOWER URINARY TRACT SYMPTOMS: ICD-10-CM

## 2018-02-28 DIAGNOSIS — F41.9 ANXIETY DISORDER, UNSPECIFIED: ICD-10-CM

## 2018-02-28 DIAGNOSIS — F32.9 MAJOR DEPRESSIVE DISORDER, SINGLE EPISODE, UNSPECIFIED: ICD-10-CM

## 2018-02-28 DIAGNOSIS — M48.00 SPINAL STENOSIS, SITE UNSPECIFIED: ICD-10-CM

## 2018-03-16 ENCOUNTER — EMERGENCY (EMERGENCY)
Facility: HOSPITAL | Age: 78
LOS: 0 days | Discharge: HOME | End: 2018-03-17
Attending: EMERGENCY MEDICINE | Admitting: EMERGENCY MEDICINE
Payer: MEDICARE

## 2018-03-16 VITALS
TEMPERATURE: 98 F | DIASTOLIC BLOOD PRESSURE: 49 MMHG | WEIGHT: 145.06 LBS | SYSTOLIC BLOOD PRESSURE: 99 MMHG | RESPIRATION RATE: 16 BRPM | HEART RATE: 65 BPM | HEIGHT: 69 IN

## 2018-03-16 DIAGNOSIS — N40.0 BENIGN PROSTATIC HYPERPLASIA WITHOUT LOWER URINARY TRACT SYMPTOMS: ICD-10-CM

## 2018-03-16 DIAGNOSIS — K21.9 GASTRO-ESOPHAGEAL REFLUX DISEASE WITHOUT ESOPHAGITIS: ICD-10-CM

## 2018-03-16 DIAGNOSIS — M79.606 PAIN IN LEG, UNSPECIFIED: ICD-10-CM

## 2018-03-16 DIAGNOSIS — Z98.1 ARTHRODESIS STATUS: ICD-10-CM

## 2018-03-16 DIAGNOSIS — R06.02 SHORTNESS OF BREATH: ICD-10-CM

## 2018-03-16 DIAGNOSIS — F41.9 ANXIETY DISORDER, UNSPECIFIED: ICD-10-CM

## 2018-03-16 DIAGNOSIS — G62.9 POLYNEUROPATHY, UNSPECIFIED: ICD-10-CM

## 2018-03-16 DIAGNOSIS — R60.0 LOCALIZED EDEMA: ICD-10-CM

## 2018-03-16 DIAGNOSIS — M40.00 POSTURAL KYPHOSIS, SITE UNSPECIFIED: ICD-10-CM

## 2018-03-16 DIAGNOSIS — Z98.1 ARTHRODESIS STATUS: Chronic | ICD-10-CM

## 2018-03-16 DIAGNOSIS — M54.9 DORSALGIA, UNSPECIFIED: ICD-10-CM

## 2018-03-16 DIAGNOSIS — I10 ESSENTIAL (PRIMARY) HYPERTENSION: ICD-10-CM

## 2018-03-16 DIAGNOSIS — F32.9 MAJOR DEPRESSIVE DISORDER, SINGLE EPISODE, UNSPECIFIED: ICD-10-CM

## 2018-03-16 LAB
ALBUMIN SERPL ELPH-MCNC: 3.3 G/DL — SIGNIFICANT CHANGE UP (ref 3.3–5)
ALP SERPL-CCNC: 74 U/L — SIGNIFICANT CHANGE UP (ref 40–120)
ALT FLD-CCNC: 17 U/L — SIGNIFICANT CHANGE UP (ref 12–78)
ANION GAP SERPL CALC-SCNC: 5 MMOL/L — SIGNIFICANT CHANGE UP (ref 5–17)
AST SERPL-CCNC: 9 U/L — LOW (ref 15–37)
BASOPHILS # BLD AUTO: 0.02 K/UL — SIGNIFICANT CHANGE UP (ref 0–0.2)
BASOPHILS NFR BLD AUTO: 0.3 % — SIGNIFICANT CHANGE UP (ref 0–2)
BILIRUB SERPL-MCNC: 0.3 MG/DL — SIGNIFICANT CHANGE UP (ref 0.2–1.2)
BUN SERPL-MCNC: 41 MG/DL — HIGH (ref 7–23)
CALCIUM SERPL-MCNC: 8.4 MG/DL — LOW (ref 8.5–10.1)
CHLORIDE SERPL-SCNC: 105 MMOL/L — SIGNIFICANT CHANGE UP (ref 96–108)
CO2 SERPL-SCNC: 30 MMOL/L — SIGNIFICANT CHANGE UP (ref 22–31)
CREAT SERPL-MCNC: 1.8 MG/DL — HIGH (ref 0.5–1.3)
EOSINOPHIL # BLD AUTO: 0.06 K/UL — SIGNIFICANT CHANGE UP (ref 0–0.5)
EOSINOPHIL NFR BLD AUTO: 0.9 % — SIGNIFICANT CHANGE UP (ref 0–6)
GLUCOSE SERPL-MCNC: 110 MG/DL — HIGH (ref 70–99)
HCT VFR BLD CALC: 32.6 % — LOW (ref 39–50)
HGB BLD-MCNC: 9.9 G/DL — LOW (ref 13–17)
IMM GRANULOCYTES NFR BLD AUTO: 0.4 % — SIGNIFICANT CHANGE UP (ref 0–1.5)
LYMPHOCYTES # BLD AUTO: 0.94 K/UL — LOW (ref 1–3.3)
LYMPHOCYTES # BLD AUTO: 13.6 % — SIGNIFICANT CHANGE UP (ref 13–44)
MCHC RBC-ENTMCNC: 23.7 PG — LOW (ref 27–34)
MCHC RBC-ENTMCNC: 30.4 GM/DL — LOW (ref 32–36)
MCV RBC AUTO: 78.2 FL — LOW (ref 80–100)
MONOCYTES # BLD AUTO: 0.72 K/UL — SIGNIFICANT CHANGE UP (ref 0–0.9)
MONOCYTES NFR BLD AUTO: 10.4 % — SIGNIFICANT CHANGE UP (ref 2–14)
NEUTROPHILS # BLD AUTO: 5.16 K/UL — SIGNIFICANT CHANGE UP (ref 1.8–7.4)
NEUTROPHILS NFR BLD AUTO: 74.4 % — SIGNIFICANT CHANGE UP (ref 43–77)
NRBC # BLD: 0 /100 WBCS — SIGNIFICANT CHANGE UP (ref 0–0)
PLATELET # BLD AUTO: 232 K/UL — SIGNIFICANT CHANGE UP (ref 150–400)
POTASSIUM SERPL-MCNC: 4.4 MMOL/L — SIGNIFICANT CHANGE UP (ref 3.5–5.3)
POTASSIUM SERPL-SCNC: 4.4 MMOL/L — SIGNIFICANT CHANGE UP (ref 3.5–5.3)
PROT SERPL-MCNC: 7.3 GM/DL — SIGNIFICANT CHANGE UP (ref 6–8.3)
RBC # BLD: 4.17 M/UL — LOW (ref 4.2–5.8)
RBC # FLD: 18.5 % — HIGH (ref 10.3–14.5)
SODIUM SERPL-SCNC: 140 MMOL/L — SIGNIFICANT CHANGE UP (ref 135–145)
TROPONIN I SERPL-MCNC: <0.015 NG/ML — SIGNIFICANT CHANGE UP (ref 0.01–0.04)
WBC # BLD: 6.93 K/UL — SIGNIFICANT CHANGE UP (ref 3.8–10.5)
WBC # FLD AUTO: 6.93 K/UL — SIGNIFICANT CHANGE UP (ref 3.8–10.5)

## 2018-03-16 PROCEDURE — 99284 EMERGENCY DEPT VISIT MOD MDM: CPT

## 2018-03-16 PROCEDURE — 93970 EXTREMITY STUDY: CPT | Mod: 26

## 2018-03-16 PROCEDURE — 93010 ELECTROCARDIOGRAM REPORT: CPT

## 2018-03-16 PROCEDURE — 71046 X-RAY EXAM CHEST 2 VIEWS: CPT | Mod: 26

## 2018-03-16 RX ORDER — HYDROMORPHONE HYDROCHLORIDE 2 MG/ML
0.5 INJECTION INTRAMUSCULAR; INTRAVENOUS; SUBCUTANEOUS ONCE
Qty: 0 | Refills: 0 | Status: DISCONTINUED | OUTPATIENT
Start: 2018-03-16 | End: 2018-03-16

## 2018-03-16 RX ADMIN — HYDROMORPHONE HYDROCHLORIDE 0.5 MILLIGRAM(S): 2 INJECTION INTRAMUSCULAR; INTRAVENOUS; SUBCUTANEOUS at 22:47

## 2018-03-16 RX ADMIN — HYDROMORPHONE HYDROCHLORIDE 0.5 MILLIGRAM(S): 2 INJECTION INTRAMUSCULAR; INTRAVENOUS; SUBCUTANEOUS at 21:08

## 2018-03-16 RX ADMIN — HYDROMORPHONE HYDROCHLORIDE 0.5 MILLIGRAM(S): 2 INJECTION INTRAMUSCULAR; INTRAVENOUS; SUBCUTANEOUS at 22:35

## 2018-03-16 NOTE — ED ADULT TRIAGE NOTE - CHIEF COMPLAINT QUOTE
pt brought by EMS from [facility] for eval of AMS, pedal edema and chronic back pain. denies trauma. per SNF director pt has possibly hoarding medications?  pt is currently A&Ox3

## 2018-03-16 NOTE — ED PROVIDER NOTE - OBJECTIVE STATEMENT
76 y/o male BIBEMS from [facility] with PMHx of anemia, GERD, BPH, neuropathy, postural kyphosis, SIP spinal fusion, HTN, anxiety disorder, depression presents to the ED c/o worsening bilateral pedal edema x1 month. +mild SOB. Notes chronic back pain at baseline with associated with sharp pain radiating down LE which is exacerbated when walking. Denies CP, palpitations, hx of CAD with stents or CHD, cardiac PSHx. PCP/Dr. Jameson. Sent in from [facility] for suspicion of hoarding medication and for social work. Pt is not confused, experiencing AMS. Hx of chronic visits for pain management and dc. 78 y/o male BIBEMS from [facility] with PMHx of anemia, GERD, BPH, neuropathy, postural kyphosis, hx of spinal fusion, HTN, anxiety disorder, depression presents to the ED c/o worsening bilateral pedal edema x1 month. +mild SOB (has chronic SOB but is worse today). Notes chronic back pain at baseline with association with sharp pain radiating down LE which is exacerbated when walking. Denies CP, palpitations, no hx of CAD with stents or CHD, cardiac PSHx. PCP/Dr. Jameson. Sent in from [facility] leg swelling. Pt is not confused, or AMS able to answer questions, baseline mentation, and in no acute distress as per patient. Hx of chronic visits for pain management in the past.

## 2018-03-16 NOTE — ED PROVIDER NOTE - CARDIAC, MLM
Normal rate, regular rhythm.  Heart sounds S1, S2.  No murmurs, rubs or gallops. no tachypnea or retractions. Normal rate, regular rhythm.  Heart sounds S1, S2.  No  rubs or gallops. no tachypnea or retractions.

## 2018-03-16 NOTE — ED PROVIDER NOTE - NEUROLOGICAL, MLM
Alert and oriented, no focal deficits, no motor or sensory deficits. Alert and oriented, no focal deficits, no motor or sensory deficits. CNs 2-12 intact.

## 2018-03-16 NOTE — ED PROVIDER NOTE - NS_ ATTENDINGSCRIBEDETAILS _ED_A_ED_FT
I Ari Davis MD saw and examined the patient. Scribe documented for me and under my supervision. I have modified the scribe's documentation where necessary to reflect my history, physical exam and other relevant documentations pertinent to the care of the patient.

## 2018-03-16 NOTE — ED ADULT NURSE NOTE - OBJECTIVE STATEMENT
pt BIBA from [facility] for AMS evaluation, Pt A&O x4, c/o lower back pain, denies falls/injuries. Swelling to lower extremeties, denies CP, SOB, palpitations, dizziness at this time.

## 2018-03-16 NOTE — ED PROVIDER NOTE - MEDICAL DECISION MAKING DETAILS
Trop x3. CXR, labs, DVT US of LE, pain management, social work evaluation. Trop x3. CXR, labs, DVT US of LE, pain management.

## 2018-03-16 NOTE — ED PROVIDER NOTE - MUSCULOSKELETAL, MLM
Spine appears normal, range of motion is not limited, no muscle or joint tenderness Spine appears normal, range of motion is not limited, no muscle or joint tenderness. 5/5 strength in all limbs on flexion and extension.

## 2018-03-16 NOTE — ED PROVIDER NOTE - SKIN, MLM
bilateral mild to moderate swelling of LE. 2+ bilateral DP pulses. 5/5 strength on flexion and extension of bilateral LE.

## 2018-03-16 NOTE — ED PROVIDER NOTE - CHIEF COMPLAINT
The patient is a 77y Male complaining of altered mental status. The patient is a 77y Male complaining of leg swelling

## 2018-03-17 VITALS
OXYGEN SATURATION: 100 % | TEMPERATURE: 98 F | HEART RATE: 77 BPM | SYSTOLIC BLOOD PRESSURE: 127 MMHG | DIASTOLIC BLOOD PRESSURE: 69 MMHG | RESPIRATION RATE: 17 BRPM

## 2018-03-17 LAB — TROPONIN I SERPL-MCNC: <0.015 NG/ML — SIGNIFICANT CHANGE UP (ref 0.01–0.04)

## 2018-05-08 ENCOUNTER — INPATIENT (INPATIENT)
Facility: HOSPITAL | Age: 78
LOS: 7 days | Discharge: ANOTHER HOSPITAL | End: 2018-05-16
Attending: INTERNAL MEDICINE | Admitting: INTERNAL MEDICINE
Payer: MEDICARE

## 2018-05-08 VITALS
RESPIRATION RATE: 15 BRPM | DIASTOLIC BLOOD PRESSURE: 48 MMHG | WEIGHT: 179.9 LBS | TEMPERATURE: 101 F | SYSTOLIC BLOOD PRESSURE: 108 MMHG | HEART RATE: 96 BPM | HEIGHT: 69 IN | OXYGEN SATURATION: 87 %

## 2018-05-08 DIAGNOSIS — Z98.1 ARTHRODESIS STATUS: Chronic | ICD-10-CM

## 2018-05-08 LAB
ABO RH CONFIRMATION: SIGNIFICANT CHANGE UP
ADD ON TEST-SPECIMEN IN LAB: SIGNIFICANT CHANGE UP
ANION GAP SERPL CALC-SCNC: 10 MMOL/L — SIGNIFICANT CHANGE UP (ref 5–17)
APPEARANCE UR: CLEAR — SIGNIFICANT CHANGE UP
APTT BLD: 29.1 SEC — SIGNIFICANT CHANGE UP (ref 27.5–37.4)
APTT BLD: 31.5 SEC — SIGNIFICANT CHANGE UP (ref 27.5–37.4)
BASE EXCESS BLDA CALC-SCNC: -3.2 MMOL/L — LOW (ref -2–2)
BASE EXCESS BLDV CALC-SCNC: -6 MMOL/L — LOW (ref -2–2)
BASOPHILS # BLD AUTO: 0.01 K/UL — SIGNIFICANT CHANGE UP (ref 0–0.2)
BASOPHILS NFR BLD AUTO: 0.2 % — SIGNIFICANT CHANGE UP (ref 0–2)
BILIRUB UR-MCNC: NEGATIVE — SIGNIFICANT CHANGE UP
BLD GP AB SCN SERPL QL: SIGNIFICANT CHANGE UP
BUN SERPL-MCNC: 34 MG/DL — HIGH (ref 7–23)
CALCIUM SERPL-MCNC: 7.9 MG/DL — LOW (ref 8.5–10.1)
CHLORIDE SERPL-SCNC: 105 MMOL/L — SIGNIFICANT CHANGE UP (ref 96–108)
CK SERPL-CCNC: 4406 U/L — HIGH (ref 26–308)
CO2 SERPL-SCNC: 25 MMOL/L — SIGNIFICANT CHANGE UP (ref 22–31)
COLOR SPEC: YELLOW — SIGNIFICANT CHANGE UP
CREAT SERPL-MCNC: 1.81 MG/DL — HIGH (ref 0.5–1.3)
DIFF PNL FLD: (no result)
EOSINOPHIL # BLD AUTO: 0 K/UL — SIGNIFICANT CHANGE UP (ref 0–0.5)
EOSINOPHIL NFR BLD AUTO: 0 % — SIGNIFICANT CHANGE UP (ref 0–6)
GLUCOSE SERPL-MCNC: 155 MG/DL — HIGH (ref 70–99)
GLUCOSE UR QL: NEGATIVE MG/DL — SIGNIFICANT CHANGE UP
HCO3 BLDA-SCNC: 21 MMOL/L — SIGNIFICANT CHANGE UP (ref 21–29)
HCO3 BLDV-SCNC: 19 MMOL/L — LOW (ref 21–29)
HCT VFR BLD CALC: 24.6 % — LOW (ref 39–50)
HCT VFR BLD CALC: 36.4 % — LOW (ref 39–50)
HGB BLD-MCNC: 11.7 G/DL — LOW (ref 13–17)
HGB BLD-MCNC: 7.4 G/DL — LOW (ref 13–17)
HOROWITZ INDEX BLDA+IHG-RTO: SIGNIFICANT CHANGE UP
IMM GRANULOCYTES NFR BLD AUTO: 0.6 % — SIGNIFICANT CHANGE UP (ref 0–1.5)
INR BLD: 1.65 RATIO — HIGH (ref 0.88–1.16)
KETONES UR-MCNC: (no result)
LACTATE SERPL-SCNC: 1.6 MMOL/L — SIGNIFICANT CHANGE UP (ref 0.7–2)
LEUKOCYTE ESTERASE UR-ACNC: (no result)
LYMPHOCYTES # BLD AUTO: 0.45 K/UL — LOW (ref 1–3.3)
LYMPHOCYTES # BLD AUTO: 8.8 % — LOW (ref 13–44)
MAGNESIUM SERPL-MCNC: 1.9 MG/DL — SIGNIFICANT CHANGE UP (ref 1.6–2.6)
MCHC RBC-ENTMCNC: 23.1 PG — LOW (ref 27–34)
MCHC RBC-ENTMCNC: 24.6 PG — LOW (ref 27–34)
MCHC RBC-ENTMCNC: 30.1 GM/DL — LOW (ref 32–36)
MCHC RBC-ENTMCNC: 32.1 GM/DL — SIGNIFICANT CHANGE UP (ref 32–36)
MCV RBC AUTO: 76.6 FL — LOW (ref 80–100)
MCV RBC AUTO: 76.6 FL — LOW (ref 80–100)
MONOCYTES # BLD AUTO: 0.5 K/UL — SIGNIFICANT CHANGE UP (ref 0–0.9)
MONOCYTES NFR BLD AUTO: 9.7 % — SIGNIFICANT CHANGE UP (ref 2–14)
NEUTROPHILS # BLD AUTO: 4.15 K/UL — SIGNIFICANT CHANGE UP (ref 1.8–7.4)
NEUTROPHILS NFR BLD AUTO: 80.7 % — HIGH (ref 43–77)
NITRITE UR-MCNC: POSITIVE
NT-PROBNP SERPL-SCNC: HIGH PG/ML (ref 0–450)
PCO2 BLDA: 38 MMHG — SIGNIFICANT CHANGE UP (ref 32–46)
PCO2 BLDV: 36 MMHG — SIGNIFICANT CHANGE UP (ref 35–50)
PH BLDA: 7.36 — SIGNIFICANT CHANGE UP (ref 7.35–7.45)
PH BLDV: 7.34 — LOW (ref 7.35–7.45)
PH UR: 6 — SIGNIFICANT CHANGE UP (ref 5–8)
PHOSPHATE SERPL-MCNC: 3.4 MG/DL — SIGNIFICANT CHANGE UP (ref 2.5–4.5)
PLATELET # BLD AUTO: 131 K/UL — LOW (ref 150–400)
PLATELET # BLD AUTO: 191 K/UL — SIGNIFICANT CHANGE UP (ref 150–400)
PO2 BLDA: 86 MMHG — SIGNIFICANT CHANGE UP (ref 74–108)
PO2 BLDV: 50 MMHG — HIGH (ref 25–45)
POTASSIUM SERPL-MCNC: 3.9 MMOL/L — SIGNIFICANT CHANGE UP (ref 3.5–5.3)
POTASSIUM SERPL-SCNC: 3.9 MMOL/L — SIGNIFICANT CHANGE UP (ref 3.5–5.3)
PROT UR-MCNC: 100 MG/DL
PROTHROM AB SERPL-ACNC: 18 SEC — HIGH (ref 9.8–12.7)
RBC # BLD: 3.21 M/UL — LOW (ref 4.2–5.8)
RBC # BLD: 4.75 M/UL — SIGNIFICANT CHANGE UP (ref 4.2–5.8)
RBC # FLD: 16.9 % — HIGH (ref 10.3–14.5)
RBC # FLD: 17 % — HIGH (ref 10.3–14.5)
SAO2 % BLDA: 96 % — SIGNIFICANT CHANGE UP (ref 92–96)
SAO2 % BLDV: 79 % — SIGNIFICANT CHANGE UP (ref 67–88)
SODIUM SERPL-SCNC: 140 MMOL/L — SIGNIFICANT CHANGE UP (ref 135–145)
SP GR SPEC: 1.01 — SIGNIFICANT CHANGE UP (ref 1.01–1.02)
TROPONIN I SERPL-MCNC: 25.8 NG/ML — HIGH (ref 0.01–0.04)
TROPONIN I SERPL-MCNC: 30.6 NG/ML — HIGH (ref 0.01–0.04)
TYPE + AB SCN PNL BLD: SIGNIFICANT CHANGE UP
UROBILINOGEN FLD QL: NEGATIVE MG/DL — SIGNIFICANT CHANGE UP
WBC # BLD: 10.31 K/UL — SIGNIFICANT CHANGE UP (ref 3.8–10.5)
WBC # BLD: 5.14 K/UL — SIGNIFICANT CHANGE UP (ref 3.8–10.5)
WBC # FLD AUTO: 10.31 K/UL — SIGNIFICANT CHANGE UP (ref 3.8–10.5)
WBC # FLD AUTO: 5.14 K/UL — SIGNIFICANT CHANGE UP (ref 3.8–10.5)

## 2018-05-08 PROCEDURE — 71045 X-RAY EXAM CHEST 1 VIEW: CPT | Mod: 26,77

## 2018-05-08 PROCEDURE — 93010 ELECTROCARDIOGRAM REPORT: CPT | Mod: 76

## 2018-05-08 PROCEDURE — 93970 EXTREMITY STUDY: CPT | Mod: 26

## 2018-05-08 PROCEDURE — 70450 CT HEAD/BRAIN W/O DYE: CPT | Mod: 26

## 2018-05-08 PROCEDURE — 71045 X-RAY EXAM CHEST 1 VIEW: CPT | Mod: 26

## 2018-05-08 PROCEDURE — 93306 TTE W/DOPPLER COMPLETE: CPT | Mod: 26

## 2018-05-08 PROCEDURE — 99285 EMERGENCY DEPT VISIT HI MDM: CPT

## 2018-05-08 RX ORDER — ASPIRIN/CALCIUM CARB/MAGNESIUM 324 MG
324 TABLET ORAL ONCE
Qty: 0 | Refills: 0 | Status: DISCONTINUED | OUTPATIENT
Start: 2018-05-08 | End: 2018-05-08

## 2018-05-08 RX ORDER — CEFTRIAXONE 500 MG/1
1000 INJECTION, POWDER, FOR SOLUTION INTRAMUSCULAR; INTRAVENOUS EVERY 24 HOURS
Qty: 0 | Refills: 0 | Status: DISCONTINUED | OUTPATIENT
Start: 2018-05-09 | End: 2018-05-09

## 2018-05-08 RX ORDER — PIPERACILLIN AND TAZOBACTAM 4; .5 G/20ML; G/20ML
3.38 INJECTION, POWDER, LYOPHILIZED, FOR SOLUTION INTRAVENOUS ONCE
Qty: 0 | Refills: 0 | Status: COMPLETED | OUTPATIENT
Start: 2018-05-08 | End: 2018-05-08

## 2018-05-08 RX ORDER — HEPARIN SODIUM 5000 [USP'U]/ML
5000 INJECTION INTRAVENOUS; SUBCUTANEOUS ONCE
Qty: 0 | Refills: 0 | Status: DISCONTINUED | OUTPATIENT
Start: 2018-05-08 | End: 2018-05-08

## 2018-05-08 RX ORDER — HEPARIN SODIUM 5000 [USP'U]/ML
INJECTION INTRAVENOUS; SUBCUTANEOUS
Qty: 25000 | Refills: 0 | Status: DISCONTINUED | OUTPATIENT
Start: 2018-05-08 | End: 2018-05-11

## 2018-05-08 RX ORDER — CEFTRIAXONE 500 MG/1
1 INJECTION, POWDER, FOR SOLUTION INTRAMUSCULAR; INTRAVENOUS ONCE
Qty: 0 | Refills: 0 | Status: DISCONTINUED | OUTPATIENT
Start: 2018-05-08 | End: 2018-05-08

## 2018-05-08 RX ORDER — HEPARIN SODIUM 5000 [USP'U]/ML
5000 INJECTION INTRAVENOUS; SUBCUTANEOUS ONCE
Qty: 0 | Refills: 0 | Status: COMPLETED | OUTPATIENT
Start: 2018-05-08 | End: 2018-05-08

## 2018-05-08 RX ORDER — HYDROMORPHONE HYDROCHLORIDE 2 MG/ML
1 INJECTION INTRAMUSCULAR; INTRAVENOUS; SUBCUTANEOUS EVERY 4 HOURS
Qty: 0 | Refills: 0 | Status: DISCONTINUED | OUTPATIENT
Start: 2018-05-08 | End: 2018-05-09

## 2018-05-08 RX ORDER — CEFTRIAXONE 500 MG/1
1000 INJECTION, POWDER, FOR SOLUTION INTRAMUSCULAR; INTRAVENOUS ONCE
Qty: 0 | Refills: 0 | Status: COMPLETED | OUTPATIENT
Start: 2018-05-08 | End: 2018-05-08

## 2018-05-08 RX ORDER — ACETAMINOPHEN 500 MG
1000 TABLET ORAL ONCE
Qty: 0 | Refills: 0 | Status: DISCONTINUED | OUTPATIENT
Start: 2018-05-08 | End: 2018-05-08

## 2018-05-08 RX ORDER — VANCOMYCIN HCL 1 G
1000 VIAL (EA) INTRAVENOUS ONCE
Qty: 0 | Refills: 0 | Status: COMPLETED | OUTPATIENT
Start: 2018-05-08 | End: 2018-05-08

## 2018-05-08 RX ORDER — ACETAMINOPHEN 500 MG
650 TABLET ORAL ONCE
Qty: 0 | Refills: 0 | Status: COMPLETED | OUTPATIENT
Start: 2018-05-08 | End: 2018-05-08

## 2018-05-08 RX ORDER — PANTOPRAZOLE SODIUM 20 MG/1
40 TABLET, DELAYED RELEASE ORAL DAILY
Qty: 0 | Refills: 0 | Status: DISCONTINUED | OUTPATIENT
Start: 2018-05-08 | End: 2018-05-16

## 2018-05-08 RX ORDER — HEPARIN SODIUM 5000 [USP'U]/ML
5000 INJECTION INTRAVENOUS; SUBCUTANEOUS EVERY 6 HOURS
Qty: 0 | Refills: 0 | Status: DISCONTINUED | OUTPATIENT
Start: 2018-05-08 | End: 2018-05-11

## 2018-05-08 RX ORDER — FUROSEMIDE 40 MG
40 TABLET ORAL ONCE
Qty: 0 | Refills: 0 | Status: COMPLETED | OUTPATIENT
Start: 2018-05-08 | End: 2018-05-08

## 2018-05-08 RX ORDER — HEPARIN SODIUM 5000 [USP'U]/ML
INJECTION INTRAVENOUS; SUBCUTANEOUS
Qty: 25000 | Refills: 0 | Status: DISCONTINUED | OUTPATIENT
Start: 2018-05-08 | End: 2018-05-08

## 2018-05-08 RX ORDER — CEFTRIAXONE 500 MG/1
1 INJECTION, POWDER, FOR SOLUTION INTRAMUSCULAR; INTRAVENOUS EVERY 24 HOURS
Qty: 0 | Refills: 0 | Status: DISCONTINUED | OUTPATIENT
Start: 2018-05-08 | End: 2018-05-08

## 2018-05-08 RX ORDER — SODIUM CHLORIDE 9 MG/ML
2400 INJECTION INTRAMUSCULAR; INTRAVENOUS; SUBCUTANEOUS ONCE
Qty: 0 | Refills: 0 | Status: COMPLETED | OUTPATIENT
Start: 2018-05-08 | End: 2018-05-08

## 2018-05-08 RX ORDER — ASPIRIN/CALCIUM CARB/MAGNESIUM 324 MG
300 TABLET ORAL ONCE
Qty: 0 | Refills: 0 | Status: COMPLETED | OUTPATIENT
Start: 2018-05-08 | End: 2018-05-08

## 2018-05-08 RX ORDER — HYDROMORPHONE HYDROCHLORIDE 2 MG/ML
0.5 INJECTION INTRAMUSCULAR; INTRAVENOUS; SUBCUTANEOUS ONCE
Qty: 0 | Refills: 0 | Status: DISCONTINUED | OUTPATIENT
Start: 2018-05-08 | End: 2018-05-08

## 2018-05-08 RX ORDER — ACETAMINOPHEN 500 MG
650 TABLET ORAL EVERY 6 HOURS
Qty: 0 | Refills: 0 | Status: DISCONTINUED | OUTPATIENT
Start: 2018-05-08 | End: 2018-05-16

## 2018-05-08 RX ORDER — ASPIRIN/CALCIUM CARB/MAGNESIUM 324 MG
600 TABLET ORAL ONCE
Qty: 0 | Refills: 0 | Status: COMPLETED | OUTPATIENT
Start: 2018-05-08 | End: 2018-05-08

## 2018-05-08 RX ADMIN — HEPARIN SODIUM 5000 UNIT(S): 5000 INJECTION INTRAVENOUS; SUBCUTANEOUS at 22:52

## 2018-05-08 RX ADMIN — CEFTRIAXONE 1000 MILLIGRAM(S): 500 INJECTION, POWDER, FOR SOLUTION INTRAMUSCULAR; INTRAVENOUS at 10:15

## 2018-05-08 RX ADMIN — PIPERACILLIN AND TAZOBACTAM 200 GRAM(S): 4; .5 INJECTION, POWDER, LYOPHILIZED, FOR SOLUTION INTRAVENOUS at 13:15

## 2018-05-08 RX ADMIN — SODIUM CHLORIDE 2400 MILLILITER(S): 9 INJECTION INTRAMUSCULAR; INTRAVENOUS; SUBCUTANEOUS at 09:38

## 2018-05-08 RX ADMIN — Medication 600 MILLIGRAM(S): at 12:10

## 2018-05-08 RX ADMIN — HYDROMORPHONE HYDROCHLORIDE 1 MILLIGRAM(S): 2 INJECTION INTRAMUSCULAR; INTRAVENOUS; SUBCUTANEOUS at 20:30

## 2018-05-08 RX ADMIN — Medication 40 MILLIGRAM(S): at 17:23

## 2018-05-08 RX ADMIN — HEPARIN SODIUM 1250 UNIT(S)/HR: 5000 INJECTION INTRAVENOUS; SUBCUTANEOUS at 22:53

## 2018-05-08 RX ADMIN — HYDROMORPHONE HYDROCHLORIDE 0.5 MILLIGRAM(S): 2 INJECTION INTRAMUSCULAR; INTRAVENOUS; SUBCUTANEOUS at 18:19

## 2018-05-08 RX ADMIN — HYDROMORPHONE HYDROCHLORIDE 0.5 MILLIGRAM(S): 2 INJECTION INTRAMUSCULAR; INTRAVENOUS; SUBCUTANEOUS at 18:04

## 2018-05-08 RX ADMIN — Medication 650 MILLIGRAM(S): at 10:10

## 2018-05-08 RX ADMIN — HEPARIN SODIUM 1000 UNIT(S)/HR: 5000 INJECTION INTRAVENOUS; SUBCUTANEOUS at 14:34

## 2018-05-08 RX ADMIN — HYDROMORPHONE HYDROCHLORIDE 1 MILLIGRAM(S): 2 INJECTION INTRAMUSCULAR; INTRAVENOUS; SUBCUTANEOUS at 20:03

## 2018-05-08 RX ADMIN — Medication 650 MILLIGRAM(S): at 21:05

## 2018-05-08 RX ADMIN — PANTOPRAZOLE SODIUM 40 MILLIGRAM(S): 20 TABLET, DELAYED RELEASE ORAL at 14:26

## 2018-05-08 RX ADMIN — Medication 250 MILLIGRAM(S): at 10:15

## 2018-05-08 RX ADMIN — HEPARIN SODIUM 5000 UNIT(S): 5000 INJECTION INTRAVENOUS; SUBCUTANEOUS at 14:34

## 2018-05-08 NOTE — CONSULT NOTE ADULT - ASSESSMENT
Hypoxia , SOB- there could be a component of pulmonary edema in the setting of MI.  Will give stat lasix 40mg iv .  Close monitoring of the respiratory status.    NSTEMI- Discussed with Dr Feliz interventional cardiologist.  This could be secondary to demand from anemia and severe hypoxia.  No acute invasive intervention per interventional team.  Continue heparin drip.  Statin if pt can tolerate po.  BP borderline and so will hold off betablockers.    YUNIOR - Diuresis with close monitoring of the renal function and electrolytes.  Goal potassium of 4 and magnesium of 2.  Strict I/O and daily wt checks. Low sodium diet. Nutrition education.     Anemia- no over bleeding so far.  Management pre primary team.   Pt about to receive PRBC.  Goal Hgb 9.    Other medical issues- Management per primary team.   Thank you for allowing me to participate in the care of this patient. Please feel free to contact me with any questions.

## 2018-05-08 NOTE — H&P ADULT - HISTORY OF PRESENT ILLNESS
The patient is a 77 year old mall who had a fall at his assisted living.  He presented with hypoxia.  He denies CP.  In the ED he is noted to be hypoxic and placed on NIV for likely pulmonary edema.  in YUNIOR on CKD, NSTEMI, and anemia

## 2018-05-08 NOTE — ED PROVIDER NOTE - CARE PLAN
Principal Discharge DX:	PNA (pneumonia)  Secondary Diagnosis:	Sepsis  Secondary Diagnosis:	NSTEMI (non-ST elevated myocardial infarction)

## 2018-05-08 NOTE — ED PROVIDER NOTE - MEDICAL DECISION MAKING DETAILS
pt found hypoxic w/ minimal response to supplemental O2 - consider PE vs PNA - attempt bipap, fluids/abx started.

## 2018-05-08 NOTE — ED PROVIDER NOTE - NS_ ATTENDINGSCRIBEDETAILS _ED_A_ED_FT
I, Howard Guerrero MD,  performed the initial face to face bedside interview with this patient regarding history of present illness, review of symptoms and relevant past medical, social and family history.  I completed an independent physical examination.    The history, relevant review of systems, past medical and surgical history, medical decision making, and physical examination was documented by the scribe in my presence and I attest to the accuracy of the documentation.

## 2018-05-08 NOTE — ED ADULT NURSE REASSESSMENT NOTE - NS ED NURSE REASSESS COMMENT FT1
Pt report given to CCU with CCU RN at bedside at this time. CCU aware that pt needs to be initiated on Heparin.

## 2018-05-08 NOTE — ED ADULT NURSE NOTE - OBJECTIVE STATEMENT
Pt reports fall this am but is vague on details with different answers to different providers. Pt denies head injury to MD and RN. Pt initially reported dizziness prior  to fall, but now denies any dizziness prior to fall. Pt resp unlabored, but noted to have hypoxia unrelieved by O2 via nc or O2 via 100% NRB. Pt placed on BiPap as ordered by MD. TOlerating well with O2 sat greater than 98% at this time. Pt remained alert and able to answer some questions throughout hypoxic episode. Pt febrile with IVF infusing as ordered and pt medicated as ordered. Will monitor for relief. Pt IVL x2 initiated upon arrival with difficulty obtaining bloodwork. IVF initiated with later bloodwork via butterfly.  Pt denies any headache or neck pain with chronic back pain reported by patient. Pt denies any change to his chronic back pain. Currently awaiting MD cho for CT scan.  Pt asked about his glasses with no glasses noted when pt was coming in from ambulance.

## 2018-05-08 NOTE — PATIENT PROFILE ADULT. - VISION (WITH CORRECTIVE LENSES IF THE PATIENT USUALLY WEARS THEM):
glasses not present in hospital/Partially impaired: cannot see medication labels or newsprint, but can see obstacles in path, and the surrounding layout; can count fingers at arm's length

## 2018-05-08 NOTE — H&P ADULT - ASSESSMENT
A/P 77 male who is presenting with NSTEMI, Acute hypoxic respiratory failure, YUNIOR on CKD, anemia.      Plan:  Will be admitted to the CCU    PULM: Patient likely in pulmonary edema causing acute hypoxic respiratory failure.  Continue NIV, ABG, CXR in AM.      Cardio: Cardio to see the patient.  UFH started in ED.  ASA ID.  Hold plavix as he is on NIV.  No bbx at this time because of borderline BP.  ECHO    Renal: in YUNIOR on CKD--Repeat labs in AM.      GI: NPO for now    B/L LE Dopplers A/P 77 male who is presenting with NSTEMI, Acute hypoxic respiratory failure, YUNIOR on CKD, anemia.      Plan:  Will be admitted to the CCU    PULM: Patient likely in pulmonary edema causing acute hypoxic respiratory failure.  Continue NIV, ABG, CXR in AM.      Cardio: Cardio to see the patient.  UFH started in ED.  ASA ND.  Hold plavix as he is on NIV.  No bbx at this time because of borderline BP.  ECHO    Renal: in YUNIOR on CKD--Repeat labs in AM.      ID: UTI--Start rocephin    HEM: Anemia--Guaiac stool and transfuse 1 U PRBC    GI: NPO for now    B/L LE Dopplers

## 2018-05-08 NOTE — ED PROVIDER NOTE - PROGRESS NOTE DETAILS
cxr appearance multifocal PNA, pt appears improved on BiPAP, now saO2 100% + trop, d/w interventional cards, suspected demand ischemia 2/2 to hypoxia, now w/ resolution of depression on BiPAP, no intervention recommended @ this time.  ICU consulted. ED Attending Dr. Guerrero spoke with Dr. Babb who will consult on pt. Yolanda COOPER: Pt comes ot the Ed complaining of weakness. Pt is found to be tachypneic and retracting to breath, no head lacerations or trauma. Pt Yolanda COOPER: Pt comes ot the Ed complaining of weakness. Pt is found to be tachypneic and retracting to breath, no head lacerations or trauma.

## 2018-05-08 NOTE — CONSULT NOTE ADULT - SUBJECTIVE AND OBJECTIVE BOX
Patient is a 77y old  Male who presents with a chief complaint of AMI and hypoxia.    HPI:  The patient is a 77 year old male with prior medical history as stated below presented after a fall at his assisted living.  He presented with hypoxia.  He denies CP.  In the ED he is noted to be hypoxic and placed on NIV for likely pulmonary edema.  Also noted to be in YUNIOR on CKD, NSTEMI, and anemia.    He is admitted to CCU and is on bipap currently.  He denies any CP but c/o back pain.      PAST MEDICAL & SURGICAL HISTORY:  Benign prostatic hypertrophy without urinary obstruction  Anxiety  Depression  Hypertension  Spinal stenosis  Kyphosis  Neuropathy  GERD (gastroesophageal reflux disease)  Anemia  S/P spinal fusion      MEDICATIONS  (STANDING):  cefTRIAXone Injectable. 1000 milliGRAM(s) IV Push every 24 hours  heparin  Infusion.  Unit(s)/Hr (10 mL/Hr) IV Continuous <Continuous>  pantoprazole  Injectable 40 milliGRAM(s) IV Push daily    MEDICATIONS  (PRN):      FAMILY HISTORY:  unable to obtain from pt.       SOCIAL HISTORY:  unable to obtain on pt.    REVIEW OF SYSTEMS:  CONSTITUTIONAL:    No fatigue, malaise, lethargy.  No fever or chills.  HEENT:  Eyes:  No visual changes.     ENT:  No epistaxis.  No sinus pain.    RESPIRATORY:  No cough.  No wheeze.  No hemoptysis.  c/o shortness of breath.  CARDIOVASCULAR:  No chest pains.  No palpitations. c/o shortness of breath, No orthopnea or PND.  GASTROINTESTINAL:  No abdominal pain.  No nausea or vomiting.    GENITOURINARY:    No hematuria.    MUSCULOSKELETAL:  No musculoskeletal pain.  No joint swelling.  No arthritis.  NEUROLOGICAL:  No tingling or numbness or weakness.  PSYCHIATRIC:  No confusion  SKIN:  No rashes.    ENDOCRINE:  No unexplained weight loss.  No polydipsia.   HEMATOLOGIC:  No anemia.  No prolonged or excessive bleeding.   ALLERGIC AND IMMUNOLOGIC:  No pruritus.          Vital Signs Last 24 Hrs  T(C): 36.2 (08 May 2018 13:15), Max: 39.6 (08 May 2018 09:45)  T(F): 97.2 (08 May 2018 13:15), Max: 103.3 (08 May 2018 09:45)  HR: 74 (08 May 2018 14:00) (72 - 112)  BP: 102/59 (08 May 2018 14:00) (94/52 - 113/60)  BP(mean): 69 (08 May 2018 14:00) (69 - 72)  RR: 25 (08 May 2018 14:00) (15 - 26)  SpO2: 100% (08 May 2018 14:00) (74% - 100%)    PHYSICAL EXAM-    Constitutional: The patient appears to be normal, well developed, well nourished and alert and oriented to time, place and person. The patient does not appear acutely ill.     Head: Head is normocephalic and atraumatic.      Neck: No jugular venous distention. No audible carotid bruits. There are strong carotid pulses bilaterally. No JVD.     Cardiovascular: Regular rate and rhythm without S3, S4. No murmurs or rubs are appreciated.      Respiratory: crackles b/l     Abdomen: Soft, nontender, nondistended with positive bowel sounds.      Extremity: No tenderness. No  pitting edema     Neurologic: The patient is alert and oriented.      Skin: No rash, no obvious lesions noted.      Psychiatric: The patient appears to be emotionally stable.      INTERPRETATION OF TELEMETRY: sinus rythm, bigeminy, isolated PVCs.     ECG: Sinus rythm , normal axis, ST depression in V5-6.     I&O's Detail      LABS:                        7.4    5.14  )-----------( 131      ( 08 May 2018 10:04 )             24.6     05-08    143  |  106  |  42<H>  ----------------------------<  117<H>  4.1   |  25  |  2.21<H>    Ca    7.9<L>      08 May 2018 11:07    TPro  6.5  /  Alb  2.6<L>  /  TBili  0.4  /  DBili  x   /  AST  170<H>  /  ALT  31  /  AlkPhos  63  05-08    CARDIAC MARKERS ( 08 May 2018 14:20 )  25.800 ng/mL / x     / 4406 U/L / x     / x      CARDIAC MARKERS ( 08 May 2018 11:07 )  20.100 ng/mL / x     / x     / x     / x          PT/INR - ( 08 May 2018 13:06 )   PT: 18.0 sec;   INR: 1.65 ratio         PTT - ( 08 May 2018 13:06 )  PTT:29.1 sec  Urinalysis Basic - ( 08 May 2018 10:04 )    Color: Yellow / Appearance: Clear / S.015 / pH: x  Gluc: x / Ketone: Trace  / Bili: Negative / Urobili: Negative mg/dL   Blood: x / Protein: 100 mg/dL / Nitrite: Positive   Leuk Esterase: Moderate / RBC: 11-25 /HPF / WBC >50   Sq Epi: x / Non Sq Epi: Occasional / Bacteria: Many      I&O's Summary    BNP  RADIOLOGY & ADDITIONAL STUDIES:  < from: Xray Chest 1 View- PORTABLE-Urgent (18 @ 10:10) >  EXAM:  XR CHEST PORTABLE URGENT 1V                            PROCEDURE DATE:  2018          INTERPRETATION:      INDICATION: Fever and chills weakness shortness of breath    Single frontal view of the chest compared to prior study of 3/16/2018    FINDINGS/  IMPRESSION:       There are parenchymal interstitial edema with perihilar vascular   prominence. There is no large pleural effusion. There is no pneumothorax.   There is cardiomegaly. Patient is status post multilevel thoracic   instrumentation.  There is osteopenia and degenerative changes.                  CATALINA BAEZA   This document has been electronically signed. May  8 2018 11:29AM    < end of copied text >

## 2018-05-08 NOTE — ED PROVIDER NOTE - ATTENDING CONTRIBUTION TO CARE
I, Howard Guerrero MD,  performed the initial face to face bedside interview with this patient regarding history of present illness, review of symptoms and relevant past medical, social and family history.  I completed an independent physical examination.  I was the initial provider who evaluated this patient. I have signed out the follow up of any pending tests (i.e. labs, radiological studies) to the resident.  I have communicated the patient’s plan of care and disposition with the resident.  The history, relevant review of systems, past medical and surgical history, medical decision making, and physical examination was documented by the scribe in my presence and I attest to the accuracy of the documentation.

## 2018-05-09 LAB
ANION GAP SERPL CALC-SCNC: 9 MMOL/L — SIGNIFICANT CHANGE UP (ref 5–17)
APTT BLD: 43.8 SEC — HIGH (ref 27.5–37.4)
APTT BLD: 50.2 SEC — HIGH (ref 27.5–37.4)
APTT BLD: 61.2 SEC — HIGH (ref 27.5–37.4)
BASE EXCESS BLDA CALC-SCNC: 2.6 MMOL/L — HIGH (ref -2–2)
BUN SERPL-MCNC: 30 MG/DL — HIGH (ref 7–23)
CALCIUM SERPL-MCNC: 8.3 MG/DL — LOW (ref 8.5–10.1)
CHLORIDE SERPL-SCNC: 107 MMOL/L — SIGNIFICANT CHANGE UP (ref 96–108)
CO2 SERPL-SCNC: 27 MMOL/L — SIGNIFICANT CHANGE UP (ref 22–31)
CREAT SERPL-MCNC: 1.78 MG/DL — HIGH (ref 0.5–1.3)
CULTURE RESULTS: NO GROWTH — SIGNIFICANT CHANGE UP
E COLI DNA BLD POS QL NAA+NON-PROBE: SIGNIFICANT CHANGE UP
GAS PNL BLDA: SIGNIFICANT CHANGE UP
GLUCOSE SERPL-MCNC: 116 MG/DL — HIGH (ref 70–99)
GRAM STN FLD: SIGNIFICANT CHANGE UP
GRAM STN FLD: SIGNIFICANT CHANGE UP
HCO3 BLDA-SCNC: 26 MMOL/L — SIGNIFICANT CHANGE UP (ref 21–29)
HCT VFR BLD CALC: 39.3 % — SIGNIFICANT CHANGE UP (ref 39–50)
HGB BLD-MCNC: 12.2 G/DL — LOW (ref 13–17)
MAGNESIUM SERPL-MCNC: 2.1 MG/DL — SIGNIFICANT CHANGE UP (ref 1.6–2.6)
MCHC RBC-ENTMCNC: 23.7 PG — LOW (ref 27–34)
MCHC RBC-ENTMCNC: 31 GM/DL — LOW (ref 32–36)
MCV RBC AUTO: 76.3 FL — LOW (ref 80–100)
METHOD TYPE: SIGNIFICANT CHANGE UP
NRBC # BLD: 0 /100 WBCS — SIGNIFICANT CHANGE UP (ref 0–0)
PCO2 BLDA: 36 MMHG — SIGNIFICANT CHANGE UP (ref 32–46)
PH BLDA: 7.47 — HIGH (ref 7.35–7.45)
PHOSPHATE SERPL-MCNC: 3.6 MG/DL — SIGNIFICANT CHANGE UP (ref 2.5–4.5)
PLATELET # BLD AUTO: 209 K/UL — SIGNIFICANT CHANGE UP (ref 150–400)
PO2 BLDA: 179 MMHG — HIGH (ref 74–108)
POTASSIUM SERPL-MCNC: 3.7 MMOL/L — SIGNIFICANT CHANGE UP (ref 3.5–5.3)
POTASSIUM SERPL-SCNC: 3.7 MMOL/L — SIGNIFICANT CHANGE UP (ref 3.5–5.3)
RBC # BLD: 5.15 M/UL — SIGNIFICANT CHANGE UP (ref 4.2–5.8)
RBC # FLD: 17 % — HIGH (ref 10.3–14.5)
SAO2 % BLDA: 100 % — HIGH (ref 92–96)
SODIUM SERPL-SCNC: 143 MMOL/L — SIGNIFICANT CHANGE UP (ref 135–145)
SPECIMEN SOURCE: SIGNIFICANT CHANGE UP
SPECIMEN SOURCE: SIGNIFICANT CHANGE UP
WBC # BLD: 9.91 K/UL — SIGNIFICANT CHANGE UP (ref 3.8–10.5)
WBC # FLD AUTO: 9.91 K/UL — SIGNIFICANT CHANGE UP (ref 3.8–10.5)

## 2018-05-09 PROCEDURE — 71045 X-RAY EXAM CHEST 1 VIEW: CPT | Mod: 26

## 2018-05-09 PROCEDURE — 93010 ELECTROCARDIOGRAM REPORT: CPT

## 2018-05-09 RX ORDER — ONDANSETRON 8 MG/1
4 TABLET, FILM COATED ORAL EVERY 6 HOURS
Qty: 0 | Refills: 0 | Status: DISCONTINUED | OUTPATIENT
Start: 2018-05-09 | End: 2018-05-14

## 2018-05-09 RX ORDER — FUROSEMIDE 40 MG
40 TABLET ORAL ONCE
Qty: 0 | Refills: 0 | Status: COMPLETED | OUTPATIENT
Start: 2018-05-09 | End: 2018-05-09

## 2018-05-09 RX ORDER — DULOXETINE HYDROCHLORIDE 30 MG/1
60 CAPSULE, DELAYED RELEASE ORAL DAILY
Qty: 0 | Refills: 0 | Status: DISCONTINUED | OUTPATIENT
Start: 2018-05-09 | End: 2018-05-16

## 2018-05-09 RX ORDER — HYDROMORPHONE HYDROCHLORIDE 2 MG/ML
1 INJECTION INTRAMUSCULAR; INTRAVENOUS; SUBCUTANEOUS
Qty: 0 | Refills: 0 | Status: DISCONTINUED | OUTPATIENT
Start: 2018-05-09 | End: 2018-05-10

## 2018-05-09 RX ORDER — METOPROLOL TARTRATE 50 MG
12.5 TABLET ORAL
Qty: 0 | Refills: 0 | Status: DISCONTINUED | OUTPATIENT
Start: 2018-05-09 | End: 2018-05-14

## 2018-05-09 RX ORDER — CEFEPIME 1 G/1
1000 INJECTION, POWDER, FOR SOLUTION INTRAMUSCULAR; INTRAVENOUS EVERY 12 HOURS
Qty: 0 | Refills: 0 | Status: DISCONTINUED | OUTPATIENT
Start: 2018-05-09 | End: 2018-05-09

## 2018-05-09 RX ORDER — ATORVASTATIN CALCIUM 80 MG/1
40 TABLET, FILM COATED ORAL AT BEDTIME
Qty: 0 | Refills: 0 | Status: DISCONTINUED | OUTPATIENT
Start: 2018-05-09 | End: 2018-05-16

## 2018-05-09 RX ORDER — ALPRAZOLAM 0.25 MG
0.25 TABLET ORAL
Qty: 0 | Refills: 0 | Status: DISCONTINUED | OUTPATIENT
Start: 2018-05-09 | End: 2018-05-16

## 2018-05-09 RX ORDER — OXYCODONE HYDROCHLORIDE 5 MG/1
10 TABLET ORAL EVERY 6 HOURS
Qty: 0 | Refills: 0 | Status: DISCONTINUED | OUTPATIENT
Start: 2018-05-09 | End: 2018-05-13

## 2018-05-09 RX ORDER — CEFEPIME 1 G/1
1000 INJECTION, POWDER, FOR SOLUTION INTRAMUSCULAR; INTRAVENOUS EVERY 12 HOURS
Qty: 0 | Refills: 0 | Status: DISCONTINUED | OUTPATIENT
Start: 2018-05-09 | End: 2018-05-14

## 2018-05-09 RX ORDER — ASPIRIN/CALCIUM CARB/MAGNESIUM 324 MG
81 TABLET ORAL DAILY
Qty: 0 | Refills: 0 | Status: DISCONTINUED | OUTPATIENT
Start: 2018-05-09 | End: 2018-05-16

## 2018-05-09 RX ADMIN — CEFTRIAXONE 1000 MILLIGRAM(S): 500 INJECTION, POWDER, FOR SOLUTION INTRAMUSCULAR; INTRAVENOUS at 10:31

## 2018-05-09 RX ADMIN — Medication 12.5 MILLIGRAM(S): at 10:11

## 2018-05-09 RX ADMIN — Medication 81 MILLIGRAM(S): at 10:17

## 2018-05-09 RX ADMIN — HYDROMORPHONE HYDROCHLORIDE 1 MILLIGRAM(S): 2 INJECTION INTRAMUSCULAR; INTRAVENOUS; SUBCUTANEOUS at 23:20

## 2018-05-09 RX ADMIN — HYDROMORPHONE HYDROCHLORIDE 1 MILLIGRAM(S): 2 INJECTION INTRAMUSCULAR; INTRAVENOUS; SUBCUTANEOUS at 13:53

## 2018-05-09 RX ADMIN — HYDROMORPHONE HYDROCHLORIDE 1 MILLIGRAM(S): 2 INJECTION INTRAMUSCULAR; INTRAVENOUS; SUBCUTANEOUS at 16:43

## 2018-05-09 RX ADMIN — HEPARIN SODIUM 1550 UNIT(S)/HR: 5000 INJECTION INTRAVENOUS; SUBCUTANEOUS at 14:53

## 2018-05-09 RX ADMIN — ONDANSETRON 4 MILLIGRAM(S): 8 TABLET, FILM COATED ORAL at 17:31

## 2018-05-09 RX ADMIN — Medication 0.25 MILLIGRAM(S): at 15:47

## 2018-05-09 RX ADMIN — HEPARIN SODIUM 1550 UNIT(S)/HR: 5000 INJECTION INTRAVENOUS; SUBCUTANEOUS at 22:44

## 2018-05-09 RX ADMIN — Medication 650 MILLIGRAM(S): at 05:58

## 2018-05-09 RX ADMIN — HYDROMORPHONE HYDROCHLORIDE 1 MILLIGRAM(S): 2 INJECTION INTRAMUSCULAR; INTRAVENOUS; SUBCUTANEOUS at 05:14

## 2018-05-09 RX ADMIN — OXYCODONE HYDROCHLORIDE 10 MILLIGRAM(S): 5 TABLET ORAL at 21:58

## 2018-05-09 RX ADMIN — OXYCODONE HYDROCHLORIDE 10 MILLIGRAM(S): 5 TABLET ORAL at 21:10

## 2018-05-09 RX ADMIN — ONDANSETRON 4 MILLIGRAM(S): 8 TABLET, FILM COATED ORAL at 23:13

## 2018-05-09 RX ADMIN — Medication 650 MILLIGRAM(S): at 18:00

## 2018-05-09 RX ADMIN — Medication 0.25 MILLIGRAM(S): at 21:10

## 2018-05-09 RX ADMIN — Medication 40 MILLIGRAM(S): at 10:11

## 2018-05-09 RX ADMIN — HYDROMORPHONE HYDROCHLORIDE 1 MILLIGRAM(S): 2 INJECTION INTRAMUSCULAR; INTRAVENOUS; SUBCUTANEOUS at 23:07

## 2018-05-09 RX ADMIN — HYDROMORPHONE HYDROCHLORIDE 1 MILLIGRAM(S): 2 INJECTION INTRAMUSCULAR; INTRAVENOUS; SUBCUTANEOUS at 10:16

## 2018-05-09 RX ADMIN — HYDROMORPHONE HYDROCHLORIDE 1 MILLIGRAM(S): 2 INJECTION INTRAMUSCULAR; INTRAVENOUS; SUBCUTANEOUS at 13:38

## 2018-05-09 RX ADMIN — HYDROMORPHONE HYDROCHLORIDE 1 MILLIGRAM(S): 2 INJECTION INTRAMUSCULAR; INTRAVENOUS; SUBCUTANEOUS at 10:11

## 2018-05-09 RX ADMIN — HYDROMORPHONE HYDROCHLORIDE 1 MILLIGRAM(S): 2 INJECTION INTRAMUSCULAR; INTRAVENOUS; SUBCUTANEOUS at 20:02

## 2018-05-09 RX ADMIN — HEPARIN SODIUM 1400 UNIT(S)/HR: 5000 INJECTION INTRAVENOUS; SUBCUTANEOUS at 07:35

## 2018-05-09 RX ADMIN — HYDROMORPHONE HYDROCHLORIDE 1 MILLIGRAM(S): 2 INJECTION INTRAMUSCULAR; INTRAVENOUS; SUBCUTANEOUS at 16:58

## 2018-05-09 RX ADMIN — HYDROMORPHONE HYDROCHLORIDE 1 MILLIGRAM(S): 2 INJECTION INTRAMUSCULAR; INTRAVENOUS; SUBCUTANEOUS at 20:15

## 2018-05-09 RX ADMIN — PANTOPRAZOLE SODIUM 40 MILLIGRAM(S): 20 TABLET, DELAYED RELEASE ORAL at 10:17

## 2018-05-09 RX ADMIN — Medication 12.5 MILLIGRAM(S): at 21:10

## 2018-05-09 RX ADMIN — DULOXETINE HYDROCHLORIDE 60 MILLIGRAM(S): 30 CAPSULE, DELAYED RELEASE ORAL at 15:47

## 2018-05-09 RX ADMIN — ATORVASTATIN CALCIUM 40 MILLIGRAM(S): 80 TABLET, FILM COATED ORAL at 21:10

## 2018-05-09 RX ADMIN — CEFEPIME 1000 MILLIGRAM(S): 1 INJECTION, POWDER, FOR SOLUTION INTRAMUSCULAR; INTRAVENOUS at 15:47

## 2018-05-09 RX ADMIN — HYDROMORPHONE HYDROCHLORIDE 1 MILLIGRAM(S): 2 INJECTION INTRAMUSCULAR; INTRAVENOUS; SUBCUTANEOUS at 00:39

## 2018-05-09 RX ADMIN — HYDROMORPHONE HYDROCHLORIDE 1 MILLIGRAM(S): 2 INJECTION INTRAMUSCULAR; INTRAVENOUS; SUBCUTANEOUS at 05:38

## 2018-05-09 NOTE — CONSULT NOTE ADULT - ASSESSMENT
The patient is a 77 year old male with past medical history bph, anxiety, hypertension, hyperlipidemia, s/p spinal fusion in past admitted on 5/8 after a fall at assisted living; patient was hypoxic upon admission and placed on 100% NRB and also found to have gram negative rods in blood as well as new AMI. Patient is poor historian and history per medical record.   1. Patient admitted with sepsis secondary to gram negative rods and growing E coli in urine so sepsis may be due to urinary origin; also with poor dentition and lower extremity venous stasis, foot ulcers, poor skin hygiene  - follow up cultures   - iv hydration and supportive care   - oxygen and nebs as needed per icu  - serial cbc and monitor temperature  - reviewed prior medical records to evaluate for resistant or atypical pathogens   - will optimize antibiotics to cefepime to cover more broadly  - monitor skin  2. other issues;  bph, anxiety, hypertension, hyperlipidemia, s/p spinal fusion  - per medicine/icu

## 2018-05-09 NOTE — PROGRESS NOTE ADULT - ASSESSMENT
IMP:    Acute hypoxic resp failure  NSTEMI with heart failure   UTI sepsis and GNR sepsis  ?? YUNIOR    Critically ill     Plan:    Start ASA 81   Start Statin  Start BBx  Cont with IV hep   Hold on ACEI given renal failure  Attempt to wean off NIV  NPO  Keep moreno in for strict I+Os  DVT prophy--IV hep    CCU care--d/w CCU staff IMP:    Acute hypoxic resp failure  NSTEMI with heart failure   UTI sepsis and GNR sepsis  ?? YUNIOR    Critically ill     Plan:    Start ASA 81   Start Statin  Start BBx  Cont with IV hep   Hold on ACEI given renal failure  Cont with CTX--will obtain ID consult   Attempt to wean off NIV  NPO  Keep moreno in for strict I+Os  DVT prophy--IV hep    CCU care--d/w CCU staff

## 2018-05-09 NOTE — CONSULT NOTE ADULT - SUBJECTIVE AND OBJECTIVE BOX
Patient is a 77y old  Male who presents with a chief complaint of AMI and hypoxia (08 May 2018 12:49)    HPI:  The patient is a 77 year old male with past medical history bph, anxiety, hypertension, hyperlipidemia, s/p spinal fusion in past admitted on  after a fall at assisted living; patient was hypoxic upon admission and placed on 100% NRB and also found to have gram negative rods in blood as well as new AMI. Patient is poor historian and history per medical record.     PMH: as above  PSH: as above  Meds: per reconciliation sheet, noted below  MEDICATIONS  (STANDING):  aspirin  chewable 81 milliGRAM(s) Oral daily  atorvastatin 40 milliGRAM(s) Oral at bedtime  cefepime   IVPB 1000 milliGRAM(s) IV Intermittent every 12 hours  heparin  Infusion.  Unit(s)/Hr (10 mL/Hr) IV Continuous <Continuous>  metoprolol tartrate 12.5 milliGRAM(s) Oral two times a day  pantoprazole  Injectable 40 milliGRAM(s) IV Push daily    MEDICATIONS  (PRN):  acetaminophen  Suppository 650 milliGRAM(s) Rectal every 6 hours PRN For Temp greater than 38.5 C (101.3 F)  heparin  Injectable 5000 Unit(s) IV Push every 6 hours PRN For aPTT less than 40  HYDROmorphone  Injectable 1 milliGRAM(s) IV Push every 3 hours PRN Moderate Pain (4 - 6)    Allergies    No Known Allergies    Intolerances      Social: no smoking, no alcohol, no illegal drugs; no recent travel, no exposure to TB  FAMILY HISTORY:     no history of premature cardivascular disease in first degree relatives  ROS: unable to obtain secondary to patient medical condition     Vital Signs Last 24 Hrs  T(C): 38.4 (09 May 2018 07:35), Max: 38.9 (08 May 2018 21:00)  T(F): 101.2 (09 May 2018 07:35), Max: 102 (08 May 2018 21:00)  HR: 101 (09 May 2018 11:00) (74 - 113)  BP: 136/58 (09 May 2018 11:00) (99/46 - 158/65)  BP(mean): 75 (09 May 2018 11:00) (55 - 89)  RR: 30 (09 May 2018 11:00) (15 - 37)  SpO2: 89% (09 May 2018 11:00) (89% - 100%)  Daily     Daily     PE:    Constitutional: frail looking  HEENT: NC/AT, EOMI, PERRLA, conjunctivae clear; ears and nose atraumatic; pharynx clear; rotten teeth  Neck: supple; thyroid not palpable  Back: no tenderness  Respiratory: respiratory effort normal; clear to auscultation  Cardiovascular: S1S2 regular, no murmurs  Abdomen: soft, not tender, not distended, positive BS; no liver or spleen organomegaly  Genitourinary: no suprapubic tenderness  Lymphatic: no LN palpable  Musculoskeletal: bilateral lower extremity edema, erythema; dirt on feet; dry ulcer on plantar aspect by first MTP   Neurological/ Psychiatric:   moving all extremities  Skin: no rashes; no palpable lesions; skin desquamation of anterior lower skin    Labs: all available labs reviewed                        12.2   9.91  )-----------( 209      ( 09 May 2018 05:53 )             39.3     -    143  |  107  |  30<H>  ----------------------------<  116<H>  3.7   |  27  |  1.78<H>    Ca    8.3<L>      09 May 2018 05:53  Phos  3.6     -  Mg     2.1         TPro  6.5  /  Alb  2.6<L>  /  TBili  0.4  /  DBili  x   /  AST  170<H>  /  ALT  31  /  AlkPhos  63  05-08     LIVER FUNCTIONS - ( 08 May 2018 11:07 )  Alb: 2.6 g/dL / Pro: 6.5 gm/dL / ALK PHOS: 63 U/L / ALT: 31 U/L / AST: 170 U/L / GGT: x           Urinalysis Basic - ( 08 May 2018 10:04 )    Color: Yellow / Appearance: Clear / S.015 / pH: x  Gluc: x / Ketone: Trace  / Bili: Negative / Urobili: Negative mg/dL   Blood: x / Protein: 100 mg/dL / Nitrite: Positive   Leuk Esterase: Moderate / RBC: 11-25 /HPF / WBC >50   Sq Epi: x / Non Sq Epi: Occasional / Bacteria: Many    Culture - Urine (18 @ 10:04)    Specimen Source: .Urine None    Culture Results:   >100,000 CFU/ml Escherichia coli      Culture - Blood (18 @ 10:04)    -  Escherichia coli: Detec    Gram Stain:   Growth in anaerobic bottle: Gram Negative Rods    Specimen Source: .Blood None    Organism: Blood Culture PCR    Culture Results:   Growth in anaerobic bottle: Gram Negative Rods  "Due to technical problems, Proteus sp. will Not be reported as part of  the BCID panel until further notice"  ***Blood Panel PCR results on this specimen are available  approximately 3 hours after the Gram stain result.***  Gram stain, PCR, and/or culture results may not always  correspond due to difference in methodologies.  ************************************************************  This PCR assay was performed using [employer].  The following targets are tested for: Enterococcus,  vancomycin resistant enterococci, Listeria monocytogenes,  coagulase negative staphylococci, S. aureus,  methicillin resistant S. aureus, Streptococcus agalactiae  (Group B), S. pneumoniae, S. pyogenes (Group A),  Acinetobacter baumannii, Enterobacter cloacae, E. coli,  Klebsiella oxytoca, K. pneumoniae, Proteus sp.,  Serratia marcescens, Haemophilus influenzae,  Neisseria meningitidis, Pseudomonas aeruginosa, Candida  albicans, C. glabrata, C krusei, C parapsilosis,  C. tropicalis and the KPC resistance gene.    Organism Identification: Blood Culture PCR    Method Type: PCR                    Radiology: all available radiological tests reviewed    Advanced directives addressed: full resuscitation

## 2018-05-09 NOTE — PROGRESS NOTE ADULT - ASSESSMENT
Hypoxia , SOB- Acute on chronic decompensated HFrEF- LVEF 45% in the setting of hypoxia and MI-   Will give lasix one dose iv today.  Close monitoring of the respiratory status.    NSTEMI- Discussed with Dr Feliz interventional cardiologist.  This could be secondary to demand from anemia and severe hypoxia.  No acute invasive intervention per interventional team.  Continue heparin drip.  Statin if pt can tolerate po.  Continue betablockers.    YUNIOR -Improving renal function. Diuresis with close monitoring of the renal function and electrolytes.  Goal potassium of 4 and magnesium of 2.  Strict I/O and daily wt checks. Low sodium diet. Nutrition education.     Anemia- no overt bleeding so far.   Management pre primary team.   s/p PRBC.  Goal Hgb 9.    Other medical issues- Management per primary team.   Thank you for allowing me to participate in the care of this patient. Please feel free to contact me with any questions.

## 2018-05-10 LAB
-  AMIKACIN: SIGNIFICANT CHANGE UP
-  AMOXICILLIN/CLAVULANIC ACID: SIGNIFICANT CHANGE UP
-  AMPICILLIN/SULBACTAM: SIGNIFICANT CHANGE UP
-  AMPICILLIN: SIGNIFICANT CHANGE UP
-  AZTREONAM: SIGNIFICANT CHANGE UP
-  CEFAZOLIN: SIGNIFICANT CHANGE UP
-  CEFEPIME: SIGNIFICANT CHANGE UP
-  CEFOXITIN: SIGNIFICANT CHANGE UP
-  CEFTRIAXONE: SIGNIFICANT CHANGE UP
-  CIPROFLOXACIN: SIGNIFICANT CHANGE UP
-  ERTAPENEM: SIGNIFICANT CHANGE UP
-  GENTAMICIN: SIGNIFICANT CHANGE UP
-  IMIPENEM: SIGNIFICANT CHANGE UP
-  LEVOFLOXACIN: SIGNIFICANT CHANGE UP
-  MEROPENEM: SIGNIFICANT CHANGE UP
-  NITROFURANTOIN: SIGNIFICANT CHANGE UP
-  PIPERACILLIN/TAZOBACTAM: SIGNIFICANT CHANGE UP
-  TIGECYCLINE: SIGNIFICANT CHANGE UP
-  TOBRAMYCIN: SIGNIFICANT CHANGE UP
-  TRIMETHOPRIM/SULFAMETHOXAZOLE: SIGNIFICANT CHANGE UP
ANION GAP SERPL CALC-SCNC: 9 MMOL/L — SIGNIFICANT CHANGE UP (ref 5–17)
APTT BLD: 35.4 SEC — SIGNIFICANT CHANGE UP (ref 27.5–37.4)
APTT BLD: 50.8 SEC — HIGH (ref 27.5–37.4)
APTT BLD: 98.1 SEC — HIGH (ref 27.5–37.4)
BASE EXCESS BLDA CALC-SCNC: 1.6 MMOL/L — SIGNIFICANT CHANGE UP (ref -2–2)
BASE EXCESS BLDA CALC-SCNC: 7.4 MMOL/L — HIGH (ref -2–2)
BLOOD GAS COMMENTS ARTERIAL: SIGNIFICANT CHANGE UP
BUN SERPL-MCNC: 24 MG/DL — HIGH (ref 7–23)
CALCIUM SERPL-MCNC: 8.7 MG/DL — SIGNIFICANT CHANGE UP (ref 8.5–10.1)
CHLORIDE SERPL-SCNC: 106 MMOL/L — SIGNIFICANT CHANGE UP (ref 96–108)
CO2 SERPL-SCNC: 29 MMOL/L — SIGNIFICANT CHANGE UP (ref 22–31)
CREAT SERPL-MCNC: 1.23 MG/DL — SIGNIFICANT CHANGE UP (ref 0.5–1.3)
CULTURE RESULTS: SIGNIFICANT CHANGE UP
GAS PNL BLDA: SIGNIFICANT CHANGE UP
GAS PNL BLDA: SIGNIFICANT CHANGE UP
GLUCOSE SERPL-MCNC: 118 MG/DL — HIGH (ref 70–99)
GRAM STN FLD: SIGNIFICANT CHANGE UP
HCO3 BLDA-SCNC: 26 MMOL/L — SIGNIFICANT CHANGE UP (ref 21–29)
HCO3 BLDA-SCNC: 31 MMOL/L — HIGH (ref 21–29)
HCT VFR BLD CALC: 39.2 % — SIGNIFICANT CHANGE UP (ref 39–50)
HGB BLD-MCNC: 12.3 G/DL — LOW (ref 13–17)
HOROWITZ INDEX BLDA+IHG-RTO: 100 — SIGNIFICANT CHANGE UP
MAGNESIUM SERPL-MCNC: 2 MG/DL — SIGNIFICANT CHANGE UP (ref 1.6–2.6)
MCHC RBC-ENTMCNC: 24 PG — LOW (ref 27–34)
MCHC RBC-ENTMCNC: 31.4 GM/DL — LOW (ref 32–36)
MCV RBC AUTO: 76.6 FL — LOW (ref 80–100)
METHOD TYPE: SIGNIFICANT CHANGE UP
NRBC # BLD: 0 /100 WBCS — SIGNIFICANT CHANGE UP (ref 0–0)
ORGANISM # SPEC MICROSCOPIC CNT: SIGNIFICANT CHANGE UP
ORGANISM # SPEC MICROSCOPIC CNT: SIGNIFICANT CHANGE UP
PCO2 BLDA: 39 MMHG — SIGNIFICANT CHANGE UP (ref 32–46)
PCO2 BLDA: 43 MMHG — SIGNIFICANT CHANGE UP (ref 32–46)
PH BLDA: 7.4 — SIGNIFICANT CHANGE UP (ref 7.35–7.45)
PH BLDA: 7.51 — HIGH (ref 7.35–7.45)
PHOSPHATE SERPL-MCNC: 2.7 MG/DL — SIGNIFICANT CHANGE UP (ref 2.5–4.5)
PLATELET # BLD AUTO: 224 K/UL — SIGNIFICANT CHANGE UP (ref 150–400)
PO2 BLDA: 79 MMHG — SIGNIFICANT CHANGE UP (ref 74–108)
PO2 BLDA: 83 MMHG — SIGNIFICANT CHANGE UP (ref 74–108)
POTASSIUM SERPL-MCNC: 3.4 MMOL/L — LOW (ref 3.5–5.3)
POTASSIUM SERPL-SCNC: 3.4 MMOL/L — LOW (ref 3.5–5.3)
RBC # BLD: 5.12 M/UL — SIGNIFICANT CHANGE UP (ref 4.2–5.8)
RBC # FLD: 17.2 % — HIGH (ref 10.3–14.5)
SAO2 % BLDA: 96 % — SIGNIFICANT CHANGE UP (ref 92–96)
SAO2 % BLDA: 96 % — SIGNIFICANT CHANGE UP (ref 92–96)
SODIUM SERPL-SCNC: 144 MMOL/L — SIGNIFICANT CHANGE UP (ref 135–145)
SPECIMEN SOURCE: SIGNIFICANT CHANGE UP
WBC # BLD: 10.73 K/UL — HIGH (ref 3.8–10.5)
WBC # FLD AUTO: 10.73 K/UL — HIGH (ref 3.8–10.5)

## 2018-05-10 RX ORDER — FUROSEMIDE 40 MG
20 TABLET ORAL
Qty: 0 | Refills: 0 | Status: DISCONTINUED | OUTPATIENT
Start: 2018-05-10 | End: 2018-05-10

## 2018-05-10 RX ORDER — POTASSIUM CHLORIDE 20 MEQ
40 PACKET (EA) ORAL ONCE
Qty: 0 | Refills: 0 | Status: COMPLETED | OUTPATIENT
Start: 2018-05-10 | End: 2018-05-10

## 2018-05-10 RX ORDER — DEXMEDETOMIDINE HYDROCHLORIDE IN 0.9% SODIUM CHLORIDE 4 UG/ML
0.5 INJECTION INTRAVENOUS
Qty: 200 | Refills: 0 | Status: DISCONTINUED | OUTPATIENT
Start: 2018-05-10 | End: 2018-05-12

## 2018-05-10 RX ORDER — FUROSEMIDE 40 MG
40 TABLET ORAL
Qty: 0 | Refills: 0 | Status: DISCONTINUED | OUTPATIENT
Start: 2018-05-10 | End: 2018-05-12

## 2018-05-10 RX ORDER — FUROSEMIDE 40 MG
40 TABLET ORAL
Qty: 0 | Refills: 0 | Status: DISCONTINUED | OUTPATIENT
Start: 2018-05-10 | End: 2018-05-10

## 2018-05-10 RX ORDER — POTASSIUM CHLORIDE 20 MEQ
10 PACKET (EA) ORAL
Qty: 0 | Refills: 0 | Status: COMPLETED | OUTPATIENT
Start: 2018-05-10 | End: 2018-05-10

## 2018-05-10 RX ORDER — HYDROMORPHONE HYDROCHLORIDE 2 MG/ML
1 INJECTION INTRAMUSCULAR; INTRAVENOUS; SUBCUTANEOUS ONCE
Qty: 0 | Refills: 0 | Status: DISCONTINUED | OUTPATIENT
Start: 2018-05-10 | End: 2018-05-10

## 2018-05-10 RX ORDER — FUROSEMIDE 40 MG
80 TABLET ORAL ONCE
Qty: 0 | Refills: 0 | Status: COMPLETED | OUTPATIENT
Start: 2018-05-10 | End: 2018-05-10

## 2018-05-10 RX ADMIN — HYDROMORPHONE HYDROCHLORIDE 1 MILLIGRAM(S): 2 INJECTION INTRAMUSCULAR; INTRAVENOUS; SUBCUTANEOUS at 05:33

## 2018-05-10 RX ADMIN — Medication 12.5 MILLIGRAM(S): at 05:16

## 2018-05-10 RX ADMIN — HEPARIN SODIUM 0 UNIT(S)/HR: 5000 INJECTION INTRAVENOUS; SUBCUTANEOUS at 20:26

## 2018-05-10 RX ADMIN — Medication 100 MILLIEQUIVALENT(S): at 11:04

## 2018-05-10 RX ADMIN — PANTOPRAZOLE SODIUM 40 MILLIGRAM(S): 20 TABLET, DELAYED RELEASE ORAL at 06:00

## 2018-05-10 RX ADMIN — HYDROMORPHONE HYDROCHLORIDE 1 MILLIGRAM(S): 2 INJECTION INTRAMUSCULAR; INTRAVENOUS; SUBCUTANEOUS at 02:17

## 2018-05-10 RX ADMIN — Medication 40 MILLIGRAM(S): at 12:17

## 2018-05-10 RX ADMIN — Medication 80 MILLIGRAM(S): at 08:44

## 2018-05-10 RX ADMIN — DEXMEDETOMIDINE HYDROCHLORIDE IN 0.9% SODIUM CHLORIDE 10.2 MICROGRAM(S)/KG/HR: 4 INJECTION INTRAVENOUS at 16:14

## 2018-05-10 RX ADMIN — HEPARIN SODIUM 1800 UNIT(S)/HR: 5000 INJECTION INTRAVENOUS; SUBCUTANEOUS at 06:33

## 2018-05-10 RX ADMIN — DEXMEDETOMIDINE HYDROCHLORIDE IN 0.9% SODIUM CHLORIDE 10.2 MICROGRAM(S)/KG/HR: 4 INJECTION INTRAVENOUS at 23:07

## 2018-05-10 RX ADMIN — Medication 100 MILLIEQUIVALENT(S): at 12:51

## 2018-05-10 RX ADMIN — Medication 40 MILLIGRAM(S): at 19:28

## 2018-05-10 RX ADMIN — Medication 100 MILLIEQUIVALENT(S): at 14:50

## 2018-05-10 RX ADMIN — Medication 1 MILLIGRAM(S): at 22:07

## 2018-05-10 RX ADMIN — DEXMEDETOMIDINE HYDROCHLORIDE IN 0.9% SODIUM CHLORIDE 10.2 MICROGRAM(S)/KG/HR: 4 INJECTION INTRAVENOUS at 18:14

## 2018-05-10 RX ADMIN — ONDANSETRON 4 MILLIGRAM(S): 8 TABLET, FILM COATED ORAL at 05:16

## 2018-05-10 RX ADMIN — HEPARIN SODIUM 1950 UNIT(S)/HR: 5000 INJECTION INTRAVENOUS; SUBCUTANEOUS at 13:47

## 2018-05-10 RX ADMIN — OXYCODONE HYDROCHLORIDE 10 MILLIGRAM(S): 5 TABLET ORAL at 03:15

## 2018-05-10 RX ADMIN — DEXMEDETOMIDINE HYDROCHLORIDE IN 0.9% SODIUM CHLORIDE 10.2 MICROGRAM(S)/KG/HR: 4 INJECTION INTRAVENOUS at 21:35

## 2018-05-10 RX ADMIN — CEFEPIME 1000 MILLIGRAM(S): 1 INJECTION, POWDER, FOR SOLUTION INTRAMUSCULAR; INTRAVENOUS at 19:28

## 2018-05-10 RX ADMIN — ONDANSETRON 4 MILLIGRAM(S): 8 TABLET, FILM COATED ORAL at 12:17

## 2018-05-10 RX ADMIN — ONDANSETRON 4 MILLIGRAM(S): 8 TABLET, FILM COATED ORAL at 19:28

## 2018-05-10 RX ADMIN — DEXMEDETOMIDINE HYDROCHLORIDE IN 0.9% SODIUM CHLORIDE 10.2 MICROGRAM(S)/KG/HR: 4 INJECTION INTRAVENOUS at 20:26

## 2018-05-10 RX ADMIN — CEFEPIME 1000 MILLIGRAM(S): 1 INJECTION, POWDER, FOR SOLUTION INTRAMUSCULAR; INTRAVENOUS at 06:02

## 2018-05-10 RX ADMIN — HEPARIN SODIUM 1700 UNIT(S)/HR: 5000 INJECTION INTRAVENOUS; SUBCUTANEOUS at 21:35

## 2018-05-10 RX ADMIN — ONDANSETRON 4 MILLIGRAM(S): 8 TABLET, FILM COATED ORAL at 23:07

## 2018-05-10 RX ADMIN — HYDROMORPHONE HYDROCHLORIDE 1 MILLIGRAM(S): 2 INJECTION INTRAMUSCULAR; INTRAVENOUS; SUBCUTANEOUS at 08:24

## 2018-05-10 RX ADMIN — HYDROMORPHONE HYDROCHLORIDE 1 MILLIGRAM(S): 2 INJECTION INTRAMUSCULAR; INTRAVENOUS; SUBCUTANEOUS at 02:10

## 2018-05-10 RX ADMIN — HYDROMORPHONE HYDROCHLORIDE 1 MILLIGRAM(S): 2 INJECTION INTRAMUSCULAR; INTRAVENOUS; SUBCUTANEOUS at 06:35

## 2018-05-10 RX ADMIN — HYDROMORPHONE HYDROCHLORIDE 1 MILLIGRAM(S): 2 INJECTION INTRAMUSCULAR; INTRAVENOUS; SUBCUTANEOUS at 05:15

## 2018-05-10 RX ADMIN — HYDROMORPHONE HYDROCHLORIDE 1 MILLIGRAM(S): 2 INJECTION INTRAMUSCULAR; INTRAVENOUS; SUBCUTANEOUS at 04:09

## 2018-05-10 RX ADMIN — OXYCODONE HYDROCHLORIDE 10 MILLIGRAM(S): 5 TABLET ORAL at 03:52

## 2018-05-10 NOTE — PROGRESS NOTE ADULT - ASSESSMENT
IMP:    Acute hypoxic resp failure--will stop dilaudid given tenuous resp status--high risk for intubation  NSTEMI with heart failure   UTI sepsis and GNR sepsis--E. coli  ?? YUNIOR  Chronic back pain maintained on narcotics    Critically ill    Plan:    Cont ASA 81   Cont Statin  Cont BBx  Lasix increased by cards  Cont with IV hep for another 24 hrs  Hold on ACEI given renal failure  Abx per ID  Stop dilaudid  Attempt to wean off NIV  NPO  Keep moreno in for strict I+Os  DVT prophy--IV hep    CCU care--d/w CCU staff and cards

## 2018-05-10 NOTE — PROGRESS NOTE ADULT - ASSESSMENT
1.Hypoxia , SOB- Acute on chronic decompensated HFrEF- LVEF 45%   Lasix iv stat 80mg and will give 40mg iv subsequnetly every 12hrs. Potassium supplementation ordered iv.   Close monitoring of the respiratory status.  Discussed with Dr West about pts respiratory status and the plan from heart failure standpoint.  Pt might need intubation if he remains hypoxic.    Will optimize GDMT for HFrEF when pt able to tolerate in terms of BP and po meds.     2.Sinus tachycardia- likely secondary to hypoxia, bacteremia and fever.     3.Sepsis, E coli sepsis- Management pre primary team.       4.NSTEMI- Discussed with Dr Feliz interventional cardiologist.  This could be secondary to demand from anemia and severe hypoxia.  No acute invasive intervention per interventional team and pt currently having bacteremia and fever and in respiratory failure.   Continue heparin drip.  Statin if pt can tolerate po.  Continue betablockers.    5. YUNIOR - Renal function back to normal.  Diuresis with close monitoring of the renal function and electrolytes.  Goal potassium of 4 and magnesium of 2.  Strict I/O and daily wt checks. Low sodium diet. Nutrition education.     6.Anemia- no overt bleeding so far.   Management pre primary team.   s/p PRBC.  Goal Hgb 9.    Other medical issues- Management per primary team.   Thank you for allowing me to participate in the care of this patient. Please feel free to contact me with any questions.

## 2018-05-10 NOTE — PROGRESS NOTE ADULT - ASSESSMENT
The patient is a 77 year old male with past medical history bph, anxiety, hypertension, hyperlipidemia, s/p spinal fusion in past admitted on 5/8 after a fall at assisted living; patient was hypoxic upon admission and placed on 100% NRB and also found to have gram negative rods in blood as well as new AMI. Patient is poor historian and history per medical record.   1. Patient admitted with sepsis secondary to gram negative rods and growing E coli in urine so sepsis may be due to urinary origin; also with poor dentition and lower extremity venous stasis, foot ulcers, poor skin hygiene  - follow up cultures   - iv hydration and supportive care   - oxygen and nebs as needed per icu  - serial cbc and monitor temperature  - reviewed prior medical records to evaluate for resistant or atypical pathogens   - in 2016 had resistant E coli; however since then did not have the same organism; awaiting sensitivity on E coli; urine culture is no growth  - day #2 cefepime  - tolerating antibiotics without rashes or side effects   - monitor skin  2. other issues;  bph, anxiety, hypertension, hyperlipidemia, s/p spinal fusion  - per medicine/icu

## 2018-05-11 LAB
-  AMIKACIN: SIGNIFICANT CHANGE UP
-  AMPICILLIN/SULBACTAM: SIGNIFICANT CHANGE UP
-  AMPICILLIN: SIGNIFICANT CHANGE UP
-  AZTREONAM: SIGNIFICANT CHANGE UP
-  CEFAZOLIN: SIGNIFICANT CHANGE UP
-  CEFEPIME: SIGNIFICANT CHANGE UP
-  CEFOXITIN: SIGNIFICANT CHANGE UP
-  CEFTRIAXONE: SIGNIFICANT CHANGE UP
-  CIPROFLOXACIN: SIGNIFICANT CHANGE UP
-  ERTAPENEM: SIGNIFICANT CHANGE UP
-  GENTAMICIN: SIGNIFICANT CHANGE UP
-  IMIPENEM: SIGNIFICANT CHANGE UP
-  LEVOFLOXACIN: SIGNIFICANT CHANGE UP
-  MEROPENEM: SIGNIFICANT CHANGE UP
-  PIPERACILLIN/TAZOBACTAM: SIGNIFICANT CHANGE UP
-  TOBRAMYCIN: SIGNIFICANT CHANGE UP
-  TRIMETHOPRIM/SULFAMETHOXAZOLE: SIGNIFICANT CHANGE UP
ANION GAP SERPL CALC-SCNC: 8 MMOL/L — SIGNIFICANT CHANGE UP (ref 5–17)
APTT BLD: 68 SEC — HIGH (ref 27.5–37.4)
APTT BLD: 83.6 SEC — HIGH (ref 27.5–37.4)
BASE EXCESS BLDA CALC-SCNC: 5 MMOL/L — HIGH (ref -2–2)
BLOOD GAS COMMENTS ARTERIAL: SIGNIFICANT CHANGE UP
BUN SERPL-MCNC: 29 MG/DL — HIGH (ref 7–23)
CALCIUM SERPL-MCNC: 8.5 MG/DL — SIGNIFICANT CHANGE UP (ref 8.5–10.1)
CHLORIDE SERPL-SCNC: 104 MMOL/L — SIGNIFICANT CHANGE UP (ref 96–108)
CO2 SERPL-SCNC: 35 MMOL/L — HIGH (ref 22–31)
CREAT SERPL-MCNC: 1.31 MG/DL — HIGH (ref 0.5–1.3)
CULTURE RESULTS: SIGNIFICANT CHANGE UP
CULTURE RESULTS: SIGNIFICANT CHANGE UP
GAS PNL BLDA: SIGNIFICANT CHANGE UP
GLUCOSE SERPL-MCNC: 112 MG/DL — HIGH (ref 70–99)
HCO3 BLDA-SCNC: 28 MMOL/L — SIGNIFICANT CHANGE UP (ref 21–29)
HCT VFR BLD CALC: 37.8 % — LOW (ref 39–50)
HGB BLD-MCNC: 11.5 G/DL — LOW (ref 13–17)
HOROWITZ INDEX BLDA+IHG-RTO: 50 — SIGNIFICANT CHANGE UP
MAGNESIUM SERPL-MCNC: 1.9 MG/DL — SIGNIFICANT CHANGE UP (ref 1.6–2.6)
MCHC RBC-ENTMCNC: 23.9 PG — LOW (ref 27–34)
MCHC RBC-ENTMCNC: 30.4 GM/DL — LOW (ref 32–36)
MCV RBC AUTO: 78.4 FL — LOW (ref 80–100)
METHOD TYPE: SIGNIFICANT CHANGE UP
NRBC # BLD: 0 /100 WBCS — SIGNIFICANT CHANGE UP (ref 0–0)
ORGANISM # SPEC MICROSCOPIC CNT: SIGNIFICANT CHANGE UP
PCO2 BLDA: 36 MMHG — SIGNIFICANT CHANGE UP (ref 32–46)
PH BLDA: 7.5 — HIGH (ref 7.35–7.45)
PHOSPHATE SERPL-MCNC: 2.6 MG/DL — SIGNIFICANT CHANGE UP (ref 2.5–4.5)
PLATELET # BLD AUTO: 217 K/UL — SIGNIFICANT CHANGE UP (ref 150–400)
PO2 BLDA: 46 MMHG — CRITICAL LOW (ref 74–108)
POTASSIUM SERPL-MCNC: 3.8 MMOL/L — SIGNIFICANT CHANGE UP (ref 3.5–5.3)
POTASSIUM SERPL-SCNC: 3.8 MMOL/L — SIGNIFICANT CHANGE UP (ref 3.5–5.3)
RBC # BLD: 4.82 M/UL — SIGNIFICANT CHANGE UP (ref 4.2–5.8)
RBC # FLD: 17.3 % — HIGH (ref 10.3–14.5)
SAO2 % BLDA: 82 % — LOW (ref 92–96)
SODIUM SERPL-SCNC: 147 MMOL/L — HIGH (ref 135–145)
SPECIMEN SOURCE: SIGNIFICANT CHANGE UP
SPECIMEN SOURCE: SIGNIFICANT CHANGE UP
WBC # BLD: 10.75 K/UL — HIGH (ref 3.8–10.5)
WBC # FLD AUTO: 10.75 K/UL — HIGH (ref 3.8–10.5)

## 2018-05-11 PROCEDURE — 71045 X-RAY EXAM CHEST 1 VIEW: CPT | Mod: 26

## 2018-05-11 RX ORDER — MAGNESIUM SULFATE 500 MG/ML
1 VIAL (ML) INJECTION ONCE
Qty: 0 | Refills: 0 | Status: COMPLETED | OUTPATIENT
Start: 2018-05-11 | End: 2018-05-11

## 2018-05-11 RX ORDER — POTASSIUM CHLORIDE 20 MEQ
10 PACKET (EA) ORAL ONCE
Qty: 0 | Refills: 0 | Status: COMPLETED | OUTPATIENT
Start: 2018-05-11 | End: 2018-05-11

## 2018-05-11 RX ADMIN — PANTOPRAZOLE SODIUM 40 MILLIGRAM(S): 20 TABLET, DELAYED RELEASE ORAL at 15:14

## 2018-05-11 RX ADMIN — Medication 12.5 MILLIGRAM(S): at 17:55

## 2018-05-11 RX ADMIN — ONDANSETRON 4 MILLIGRAM(S): 8 TABLET, FILM COATED ORAL at 06:03

## 2018-05-11 RX ADMIN — OXYCODONE HYDROCHLORIDE 10 MILLIGRAM(S): 5 TABLET ORAL at 17:46

## 2018-05-11 RX ADMIN — CEFEPIME 1000 MILLIGRAM(S): 1 INJECTION, POWDER, FOR SOLUTION INTRAMUSCULAR; INTRAVENOUS at 06:03

## 2018-05-11 RX ADMIN — Medication 100 GRAM(S): at 17:11

## 2018-05-11 RX ADMIN — DEXMEDETOMIDINE HYDROCHLORIDE IN 0.9% SODIUM CHLORIDE 10.2 MICROGRAM(S)/KG/HR: 4 INJECTION INTRAVENOUS at 10:28

## 2018-05-11 RX ADMIN — Medication 1 MILLIGRAM(S): at 20:00

## 2018-05-11 RX ADMIN — Medication 40 MILLIGRAM(S): at 06:03

## 2018-05-11 RX ADMIN — DEXMEDETOMIDINE HYDROCHLORIDE IN 0.9% SODIUM CHLORIDE 10.2 MICROGRAM(S)/KG/HR: 4 INJECTION INTRAVENOUS at 22:25

## 2018-05-11 RX ADMIN — Medication 81 MILLIGRAM(S): at 17:46

## 2018-05-11 RX ADMIN — Medication 100 MILLIEQUIVALENT(S): at 16:34

## 2018-05-11 RX ADMIN — HEPARIN SODIUM 1700 UNIT(S)/HR: 5000 INJECTION INTRAVENOUS; SUBCUTANEOUS at 06:01

## 2018-05-11 RX ADMIN — DEXMEDETOMIDINE HYDROCHLORIDE IN 0.9% SODIUM CHLORIDE 10.2 MICROGRAM(S)/KG/HR: 4 INJECTION INTRAVENOUS at 07:33

## 2018-05-11 RX ADMIN — DEXMEDETOMIDINE HYDROCHLORIDE IN 0.9% SODIUM CHLORIDE 10.2 MICROGRAM(S)/KG/HR: 4 INJECTION INTRAVENOUS at 02:14

## 2018-05-11 RX ADMIN — DEXMEDETOMIDINE HYDROCHLORIDE IN 0.9% SODIUM CHLORIDE 10.2 MICROGRAM(S)/KG/HR: 4 INJECTION INTRAVENOUS at 19:24

## 2018-05-11 RX ADMIN — CEFEPIME 1000 MILLIGRAM(S): 1 INJECTION, POWDER, FOR SOLUTION INTRAMUSCULAR; INTRAVENOUS at 17:55

## 2018-05-11 RX ADMIN — DULOXETINE HYDROCHLORIDE 60 MILLIGRAM(S): 30 CAPSULE, DELAYED RELEASE ORAL at 17:56

## 2018-05-11 RX ADMIN — DEXMEDETOMIDINE HYDROCHLORIDE IN 0.9% SODIUM CHLORIDE 10.2 MICROGRAM(S)/KG/HR: 4 INJECTION INTRAVENOUS at 00:36

## 2018-05-11 RX ADMIN — Medication 40 MILLIGRAM(S): at 17:56

## 2018-05-11 RX ADMIN — DEXMEDETOMIDINE HYDROCHLORIDE IN 0.9% SODIUM CHLORIDE 10.2 MICROGRAM(S)/KG/HR: 4 INJECTION INTRAVENOUS at 06:03

## 2018-05-11 NOTE — PROGRESS NOTE ADULT - ASSESSMENT
IMP:    Acute hypoxic resp failure--will stop dilaudid given tenuous resp status--high risk for intubation  NSTEMI with heart failure   UTI sepsis and GNR sepsis--E. coli  ?? YUNIOR  Chronic back pain maintained on narcotics    Critically ill    Plan:    Cont ASA 81   Cont Statin  Cont BBx  Lasix increased by cards  Likely D/CIV hep today  Try off NIV  CXR today wile likely PNA  Hold on ACEI given renal failure  Abx per ID  Stoped dilaudid  Attempt to wean off NIV  NPO  DVT prophy--IV hep

## 2018-05-11 NOTE — PROGRESS NOTE ADULT - ASSESSMENT
The patient is a 77 year old male with past medical history bph, anxiety, hypertension, hyperlipidemia, s/p spinal fusion in past admitted on 5/8 after a fall at assisted living; patient was hypoxic upon admission and placed on 100% NRB and also found to have gram negative rods in blood as well as new AMI. Patient is poor historian and history per medical record.   1. Patient admitted with sepsis secondary to gram negative rods and growing E coli in urine so sepsis may be due to urinary origin; also with poor dentition and lower extremity venous stasis, foot ulcers, poor skin hygiene  - follow up cultures   - iv hydration and supportive care   - oxygen and nebs as needed per icu  - serial cbc and monitor temperature  - reviewed prior medical records to evaluate for resistant or atypical pathogens   - will repeat blood cultures in am  - day #3 cefepime  - tolerating antibiotics without rashes or side effects   - monitor skin  2. other issues;  bph, anxiety, hypertension, hyperlipidemia, s/p spinal fusion  - per medicine/icu

## 2018-05-11 NOTE — PROGRESS NOTE ADULT - ASSESSMENT
1.Hypoxia , SOB- Acute on chronic decompensated HFrEF- LVEF 45%   Improvement in respiratory status noted slightly and his CXR shows improvement since yesterday with less congestion.  Close monitoring of the respiratory status.    Will decrease lasix to 40mg iv daily.  Diuresis with close monitoring of the renal function and electrolytes.  Goal potassium of 4 and magnesium of 2.   Strict I/O and daily wt checks.   Will optimize GDMT for HFrEF when pt able to tolerate in terms of BP and po meds.     2.Sinus tachycardia- likely secondary to hypoxia, bacteremia and fever.     3.Sepsis, E coli sepsis- Management pre primary team.       4.NSTEMI- Discussed with Dr Feliz interventional cardiologist.  This could be secondary to demand from anemia and severe hypoxia.  No acute invasive intervention per interventional team and pt currently having bacteremia and fever and in respiratory failure.   DC heparin drip.  Pt unable to tolerate po.     5. YUNIOR - Diuresis with close monitoring of the renal function and electrolytes.  Goal potassium of 4 and magnesium of 2.  Strict I/O and daily wt checks. Low sodium diet. Nutrition education.     6.Anemia- no overt bleeding so far.   Management pre primary team.   s/p PRBC.  Goal Hgb 9.    Other medical issues- Management per primary team.   Thank you for allowing me to participate in the care of this patient. Please feel free to contact me with any questions.

## 2018-05-12 LAB
ANION GAP SERPL CALC-SCNC: 11 MMOL/L — SIGNIFICANT CHANGE UP (ref 5–17)
BUN SERPL-MCNC: 46 MG/DL — HIGH (ref 7–23)
CALCIUM SERPL-MCNC: 8.8 MG/DL — SIGNIFICANT CHANGE UP (ref 8.5–10.1)
CHLORIDE SERPL-SCNC: 108 MMOL/L — SIGNIFICANT CHANGE UP (ref 96–108)
CO2 SERPL-SCNC: 31 MMOL/L — SIGNIFICANT CHANGE UP (ref 22–31)
CREAT SERPL-MCNC: 1.68 MG/DL — HIGH (ref 0.5–1.3)
GLUCOSE SERPL-MCNC: 100 MG/DL — HIGH (ref 70–99)
HCT VFR BLD CALC: 34.9 % — LOW (ref 39–50)
HGB BLD-MCNC: 10.7 G/DL — LOW (ref 13–17)
MAGNESIUM SERPL-MCNC: 2.5 MG/DL — SIGNIFICANT CHANGE UP (ref 1.6–2.6)
MCHC RBC-ENTMCNC: 23.7 PG — LOW (ref 27–34)
MCHC RBC-ENTMCNC: 30.7 GM/DL — LOW (ref 32–36)
MCV RBC AUTO: 77.4 FL — LOW (ref 80–100)
NRBC # BLD: 0 /100 WBCS — SIGNIFICANT CHANGE UP (ref 0–0)
PHOSPHATE SERPL-MCNC: 2.9 MG/DL — SIGNIFICANT CHANGE UP (ref 2.5–4.5)
PLATELET # BLD AUTO: 253 K/UL — SIGNIFICANT CHANGE UP (ref 150–400)
POTASSIUM SERPL-MCNC: 3.1 MMOL/L — LOW (ref 3.5–5.3)
POTASSIUM SERPL-SCNC: 3.1 MMOL/L — LOW (ref 3.5–5.3)
RBC # BLD: 4.51 M/UL — SIGNIFICANT CHANGE UP (ref 4.2–5.8)
RBC # FLD: 18.1 % — HIGH (ref 10.3–14.5)
SODIUM SERPL-SCNC: 150 MMOL/L — HIGH (ref 135–145)
WBC # BLD: 7.2 K/UL — SIGNIFICANT CHANGE UP (ref 3.8–10.5)
WBC # FLD AUTO: 7.2 K/UL — SIGNIFICANT CHANGE UP (ref 3.8–10.5)

## 2018-05-12 PROCEDURE — 93010 ELECTROCARDIOGRAM REPORT: CPT

## 2018-05-12 RX ORDER — POTASSIUM CHLORIDE 20 MEQ
10 PACKET (EA) ORAL
Qty: 0 | Refills: 0 | Status: COMPLETED | OUTPATIENT
Start: 2018-05-12 | End: 2018-05-12

## 2018-05-12 RX ORDER — HEPARIN SODIUM 5000 [USP'U]/ML
5000 INJECTION INTRAVENOUS; SUBCUTANEOUS EVERY 8 HOURS
Qty: 0 | Refills: 0 | Status: DISCONTINUED | OUTPATIENT
Start: 2018-05-12 | End: 2018-05-16

## 2018-05-12 RX ORDER — SODIUM CHLORIDE 9 MG/ML
1000 INJECTION, SOLUTION INTRAVENOUS
Qty: 0 | Refills: 0 | Status: DISCONTINUED | OUTPATIENT
Start: 2018-05-12 | End: 2018-05-14

## 2018-05-12 RX ORDER — MORPHINE SULFATE 50 MG/1
1 CAPSULE, EXTENDED RELEASE ORAL ONCE
Qty: 0 | Refills: 0 | Status: DISCONTINUED | OUTPATIENT
Start: 2018-05-12 | End: 2018-05-12

## 2018-05-12 RX ORDER — TAMSULOSIN HYDROCHLORIDE 0.4 MG/1
0.8 CAPSULE ORAL AT BEDTIME
Qty: 0 | Refills: 0 | Status: DISCONTINUED | OUTPATIENT
Start: 2018-05-12 | End: 2018-05-16

## 2018-05-12 RX ORDER — OXYCODONE HYDROCHLORIDE 5 MG/1
20 TABLET ORAL EVERY 12 HOURS
Qty: 0 | Refills: 0 | Status: DISCONTINUED | OUTPATIENT
Start: 2018-05-12 | End: 2018-05-13

## 2018-05-12 RX ADMIN — Medication 100 MILLIEQUIVALENT(S): at 08:36

## 2018-05-12 RX ADMIN — DEXMEDETOMIDINE HYDROCHLORIDE IN 0.9% SODIUM CHLORIDE 10.2 MICROGRAM(S)/KG/HR: 4 INJECTION INTRAVENOUS at 05:23

## 2018-05-12 RX ADMIN — MORPHINE SULFATE 1 MILLIGRAM(S): 50 CAPSULE, EXTENDED RELEASE ORAL at 03:20

## 2018-05-12 RX ADMIN — Medication 100 MILLIEQUIVALENT(S): at 11:06

## 2018-05-12 RX ADMIN — HEPARIN SODIUM 5000 UNIT(S): 5000 INJECTION INTRAVENOUS; SUBCUTANEOUS at 14:16

## 2018-05-12 RX ADMIN — OXYCODONE HYDROCHLORIDE 10 MILLIGRAM(S): 5 TABLET ORAL at 14:14

## 2018-05-12 RX ADMIN — CEFEPIME 1000 MILLIGRAM(S): 1 INJECTION, POWDER, FOR SOLUTION INTRAMUSCULAR; INTRAVENOUS at 06:39

## 2018-05-12 RX ADMIN — OXYCODONE HYDROCHLORIDE 20 MILLIGRAM(S): 5 TABLET ORAL at 09:44

## 2018-05-12 RX ADMIN — Medication 12.5 MILLIGRAM(S): at 14:16

## 2018-05-12 RX ADMIN — OXYCODONE HYDROCHLORIDE 20 MILLIGRAM(S): 5 TABLET ORAL at 20:50

## 2018-05-12 RX ADMIN — ATORVASTATIN CALCIUM 40 MILLIGRAM(S): 80 TABLET, FILM COATED ORAL at 21:50

## 2018-05-12 RX ADMIN — Medication 40 MILLIGRAM(S): at 05:24

## 2018-05-12 RX ADMIN — TAMSULOSIN HYDROCHLORIDE 0.8 MILLIGRAM(S): 0.4 CAPSULE ORAL at 14:42

## 2018-05-12 RX ADMIN — ONDANSETRON 4 MILLIGRAM(S): 8 TABLET, FILM COATED ORAL at 05:24

## 2018-05-12 RX ADMIN — Medication 81 MILLIGRAM(S): at 14:16

## 2018-05-12 RX ADMIN — HEPARIN SODIUM 5000 UNIT(S): 5000 INJECTION INTRAVENOUS; SUBCUTANEOUS at 21:49

## 2018-05-12 RX ADMIN — SODIUM CHLORIDE 75 MILLILITER(S): 9 INJECTION, SOLUTION INTRAVENOUS at 17:47

## 2018-05-12 RX ADMIN — CEFEPIME 1000 MILLIGRAM(S): 1 INJECTION, POWDER, FOR SOLUTION INTRAMUSCULAR; INTRAVENOUS at 18:17

## 2018-05-12 RX ADMIN — PANTOPRAZOLE SODIUM 40 MILLIGRAM(S): 20 TABLET, DELAYED RELEASE ORAL at 18:16

## 2018-05-12 RX ADMIN — Medication 100 MILLIEQUIVALENT(S): at 12:17

## 2018-05-12 RX ADMIN — DULOXETINE HYDROCHLORIDE 60 MILLIGRAM(S): 30 CAPSULE, DELAYED RELEASE ORAL at 14:16

## 2018-05-12 RX ADMIN — ONDANSETRON 4 MILLIGRAM(S): 8 TABLET, FILM COATED ORAL at 00:37

## 2018-05-12 NOTE — PROGRESS NOTE ADULT - ASSESSMENT
Hypoxia , SOB- there could be a component of pulmonary edema in the setting of MI.  Will give stat lasix 40mg iv .  Close monitoring of the respiratory status.    NSTEMI- Discussed with Dr Feliz.  No acute invasive intervention per interventional team.  Continue heparin drip.  Statin if pt can tolerate po.  BP borderline and so will hold off betablockers.    YUNIOR - Diuresis with close monitoring of the renal function and electrolytes.  Goal potassium of 4 and magnesium of 2.  Strict I/O and daily wt checks. Low sodium diet. Nutrition education.     Anemia- no over bleeding so far.  Management pre primary team.   Pt about to receive PRBC.  Goal Hgb 9.    Other medical issues- Management per primary team.

## 2018-05-12 NOTE — PROGRESS NOTE ADULT - ASSESSMENT
IMP:    Acute hypoxic resp failure--will stop dilaudid given tenuous resp status--high risk for intubation  NSTEMI with heart failure   UTI sepsis and GNR sepsis--E. coli  ?? YUNIOR  Chronic back pain maintained on narcotics    Critically ill    Plan:    Cont ASA 81   Cont Statin  Cont BBx  D/C lasix as his cr increased and he is hypernatremic  Added d5w 75 an hr for 24h for hypernatremia  Try off NIV  D/C Precedex  OOB today  40% VM  CXR today with likely PNA  Hold on ACEI given renal failure  Abx per ID  Added oxycontin back but at 20 mg bid not 30  Attempt to wean off NIV  PO Diet while off niv    DVT prophy--IV hep

## 2018-05-13 LAB
ANION GAP SERPL CALC-SCNC: 7 MMOL/L — SIGNIFICANT CHANGE UP (ref 5–17)
BUN SERPL-MCNC: 40 MG/DL — HIGH (ref 7–23)
CALCIUM SERPL-MCNC: 8.5 MG/DL — SIGNIFICANT CHANGE UP (ref 8.5–10.1)
CHLORIDE SERPL-SCNC: 106 MMOL/L — SIGNIFICANT CHANGE UP (ref 96–108)
CO2 SERPL-SCNC: 31 MMOL/L — SIGNIFICANT CHANGE UP (ref 22–31)
CREAT SERPL-MCNC: 1.31 MG/DL — HIGH (ref 0.5–1.3)
GLUCOSE SERPL-MCNC: 117 MG/DL — HIGH (ref 70–99)
HCT VFR BLD CALC: 33.8 % — LOW (ref 39–50)
HGB BLD-MCNC: 10.5 G/DL — LOW (ref 13–17)
MAGNESIUM SERPL-MCNC: 2.2 MG/DL — SIGNIFICANT CHANGE UP (ref 1.6–2.6)
MCHC RBC-ENTMCNC: 23.9 PG — LOW (ref 27–34)
MCHC RBC-ENTMCNC: 31.1 GM/DL — LOW (ref 32–36)
MCV RBC AUTO: 76.8 FL — LOW (ref 80–100)
NRBC # BLD: 0 /100 WBCS — SIGNIFICANT CHANGE UP (ref 0–0)
PHOSPHATE SERPL-MCNC: 1.8 MG/DL — LOW (ref 2.5–4.5)
PLATELET # BLD AUTO: 244 K/UL — SIGNIFICANT CHANGE UP (ref 150–400)
POTASSIUM SERPL-MCNC: 3.1 MMOL/L — LOW (ref 3.5–5.3)
POTASSIUM SERPL-SCNC: 3.1 MMOL/L — LOW (ref 3.5–5.3)
RBC # BLD: 4.4 M/UL — SIGNIFICANT CHANGE UP (ref 4.2–5.8)
RBC # FLD: 17.9 % — HIGH (ref 10.3–14.5)
SODIUM SERPL-SCNC: 144 MMOL/L — SIGNIFICANT CHANGE UP (ref 135–145)
TROPONIN I SERPL-MCNC: 2.37 NG/ML — HIGH (ref 0.01–0.04)
WBC # BLD: 5.89 K/UL — SIGNIFICANT CHANGE UP (ref 3.8–10.5)
WBC # FLD AUTO: 5.89 K/UL — SIGNIFICANT CHANGE UP (ref 3.8–10.5)

## 2018-05-13 RX ORDER — HYDROMORPHONE HYDROCHLORIDE 2 MG/ML
1 INJECTION INTRAMUSCULAR; INTRAVENOUS; SUBCUTANEOUS EVERY 6 HOURS
Qty: 0 | Refills: 0 | Status: DISCONTINUED | OUTPATIENT
Start: 2018-05-13 | End: 2018-05-14

## 2018-05-13 RX ORDER — POTASSIUM CHLORIDE 20 MEQ
40 PACKET (EA) ORAL ONCE
Qty: 0 | Refills: 0 | Status: COMPLETED | OUTPATIENT
Start: 2018-05-13 | End: 2018-05-13

## 2018-05-13 RX ORDER — SODIUM,POTASSIUM PHOSPHATES 278-250MG
2 POWDER IN PACKET (EA) ORAL
Qty: 0 | Refills: 0 | Status: COMPLETED | OUTPATIENT
Start: 2018-05-13 | End: 2018-05-14

## 2018-05-13 RX ADMIN — SODIUM CHLORIDE 75 MILLILITER(S): 9 INJECTION, SOLUTION INTRAVENOUS at 06:36

## 2018-05-13 RX ADMIN — Medication 12.5 MILLIGRAM(S): at 18:41

## 2018-05-13 RX ADMIN — TAMSULOSIN HYDROCHLORIDE 0.8 MILLIGRAM(S): 0.4 CAPSULE ORAL at 21:13

## 2018-05-13 RX ADMIN — HEPARIN SODIUM 5000 UNIT(S): 5000 INJECTION INTRAVENOUS; SUBCUTANEOUS at 21:13

## 2018-05-13 RX ADMIN — CEFEPIME 1000 MILLIGRAM(S): 1 INJECTION, POWDER, FOR SOLUTION INTRAMUSCULAR; INTRAVENOUS at 18:41

## 2018-05-13 RX ADMIN — ONDANSETRON 4 MILLIGRAM(S): 8 TABLET, FILM COATED ORAL at 11:16

## 2018-05-13 RX ADMIN — DULOXETINE HYDROCHLORIDE 60 MILLIGRAM(S): 30 CAPSULE, DELAYED RELEASE ORAL at 11:16

## 2018-05-13 RX ADMIN — ONDANSETRON 4 MILLIGRAM(S): 8 TABLET, FILM COATED ORAL at 18:40

## 2018-05-13 RX ADMIN — ONDANSETRON 4 MILLIGRAM(S): 8 TABLET, FILM COATED ORAL at 05:11

## 2018-05-13 RX ADMIN — Medication 2 PACKET(S): at 09:50

## 2018-05-13 RX ADMIN — CEFEPIME 1000 MILLIGRAM(S): 1 INJECTION, POWDER, FOR SOLUTION INTRAMUSCULAR; INTRAVENOUS at 05:10

## 2018-05-13 RX ADMIN — Medication 2 PACKET(S): at 18:41

## 2018-05-13 RX ADMIN — PANTOPRAZOLE SODIUM 40 MILLIGRAM(S): 20 TABLET, DELAYED RELEASE ORAL at 11:16

## 2018-05-13 RX ADMIN — Medication 1 MILLIGRAM(S): at 04:23

## 2018-05-13 RX ADMIN — ATORVASTATIN CALCIUM 40 MILLIGRAM(S): 80 TABLET, FILM COATED ORAL at 21:13

## 2018-05-13 RX ADMIN — Medication 40 MILLIEQUIVALENT(S): at 11:16

## 2018-05-13 RX ADMIN — ONDANSETRON 4 MILLIGRAM(S): 8 TABLET, FILM COATED ORAL at 23:37

## 2018-05-13 RX ADMIN — HEPARIN SODIUM 5000 UNIT(S): 5000 INJECTION INTRAVENOUS; SUBCUTANEOUS at 05:11

## 2018-05-13 RX ADMIN — HYDROMORPHONE HYDROCHLORIDE 1 MILLIGRAM(S): 2 INJECTION INTRAMUSCULAR; INTRAVENOUS; SUBCUTANEOUS at 13:40

## 2018-05-13 RX ADMIN — HEPARIN SODIUM 5000 UNIT(S): 5000 INJECTION INTRAVENOUS; SUBCUTANEOUS at 13:40

## 2018-05-13 RX ADMIN — HYDROMORPHONE HYDROCHLORIDE 1 MILLIGRAM(S): 2 INJECTION INTRAMUSCULAR; INTRAVENOUS; SUBCUTANEOUS at 21:13

## 2018-05-13 RX ADMIN — Medication 12.5 MILLIGRAM(S): at 05:10

## 2018-05-13 RX ADMIN — HYDROMORPHONE HYDROCHLORIDE 1 MILLIGRAM(S): 2 INJECTION INTRAMUSCULAR; INTRAVENOUS; SUBCUTANEOUS at 05:10

## 2018-05-13 RX ADMIN — OXYCODONE HYDROCHLORIDE 10 MILLIGRAM(S): 5 TABLET ORAL at 06:03

## 2018-05-13 RX ADMIN — OXYCODONE HYDROCHLORIDE 10 MILLIGRAM(S): 5 TABLET ORAL at 02:53

## 2018-05-13 RX ADMIN — ONDANSETRON 4 MILLIGRAM(S): 8 TABLET, FILM COATED ORAL at 02:39

## 2018-05-13 RX ADMIN — HYDROMORPHONE HYDROCHLORIDE 1 MILLIGRAM(S): 2 INJECTION INTRAMUSCULAR; INTRAVENOUS; SUBCUTANEOUS at 06:13

## 2018-05-13 RX ADMIN — Medication 81 MILLIGRAM(S): at 11:17

## 2018-05-13 NOTE — PROGRESS NOTE ADULT - ASSESSMENT
IMP:    Acute hypoxic resp failure--will stop dilaudid given tenuous resp status--high risk for intubation  NSTEMI with heart failure   UTI sepsis and GNR sepsis--E. coli  ?? YUNIOR  Chronic back pain maintained on narcotics    Critically ill    Plan:    Cont ASA 81   Cont Statin  Cont BBx  Will need a Cath as per cardio  D/C lasix as his cr increased and he was hypernatremic  Added D5W 75 an hr for additional  24h for hypernatremia  D/C NIV  OOB today  NCO2  CXR today with likely PNA  Hold on ACEI given renal failure  Abx per ID  Added oxycontin back but at 20 mg bid not 30  PO Diet    DVT prophy--IV hep

## 2018-05-13 NOTE — PROGRESS NOTE ADULT - ASSESSMENT
Hypoxia , SOB- there could be a component of pulmonary edema in the setting of MI.  Will give stat lasix 40mg iv .  Close monitoring of the respiratory status.    NSTEMI- Discussed with Dr Feliz.  No acute invasive intervention per interventional team.  Continue heparin drip.  Statin if pt can tolerate po.  BP borderline and so will hold off betablockers.    YUNIOR - Diuresis with close monitoring of the renal function and electrolytes.  Goal potassium of 4 and magnesium of 2.  Strict I/O and daily wt checks. Low sodium diet. Nutrition education.     Anemia- no over bleeding so far.  Management pre primary team.   Goal Hgb 9.

## 2018-05-14 LAB
ANION GAP SERPL CALC-SCNC: 7 MMOL/L — SIGNIFICANT CHANGE UP (ref 5–17)
BUN SERPL-MCNC: 23 MG/DL — SIGNIFICANT CHANGE UP (ref 7–23)
CALCIUM SERPL-MCNC: 8.5 MG/DL — SIGNIFICANT CHANGE UP (ref 8.5–10.1)
CHLORIDE SERPL-SCNC: 106 MMOL/L — SIGNIFICANT CHANGE UP (ref 96–108)
CO2 SERPL-SCNC: 29 MMOL/L — SIGNIFICANT CHANGE UP (ref 22–31)
CREAT SERPL-MCNC: 0.94 MG/DL — SIGNIFICANT CHANGE UP (ref 0.5–1.3)
GLUCOSE SERPL-MCNC: 125 MG/DL — HIGH (ref 70–99)
HCT VFR BLD CALC: 34.5 % — LOW (ref 39–50)
HGB BLD-MCNC: 10.6 G/DL — LOW (ref 13–17)
MAGNESIUM SERPL-MCNC: 2 MG/DL — SIGNIFICANT CHANGE UP (ref 1.6–2.6)
MCHC RBC-ENTMCNC: 23.8 PG — LOW (ref 27–34)
MCHC RBC-ENTMCNC: 30.7 GM/DL — LOW (ref 32–36)
MCV RBC AUTO: 77.5 FL — LOW (ref 80–100)
NRBC # BLD: 0 /100 WBCS — SIGNIFICANT CHANGE UP (ref 0–0)
PHOSPHATE SERPL-MCNC: 1.9 MG/DL — LOW (ref 2.5–4.5)
PLATELET # BLD AUTO: 272 K/UL — SIGNIFICANT CHANGE UP (ref 150–400)
POTASSIUM SERPL-MCNC: 3.5 MMOL/L — SIGNIFICANT CHANGE UP (ref 3.5–5.3)
POTASSIUM SERPL-SCNC: 3.5 MMOL/L — SIGNIFICANT CHANGE UP (ref 3.5–5.3)
RBC # BLD: 4.45 M/UL — SIGNIFICANT CHANGE UP (ref 4.2–5.8)
RBC # FLD: 17.8 % — HIGH (ref 10.3–14.5)
SODIUM SERPL-SCNC: 142 MMOL/L — SIGNIFICANT CHANGE UP (ref 135–145)
WBC # BLD: 6.04 K/UL — SIGNIFICANT CHANGE UP (ref 3.8–10.5)
WBC # FLD AUTO: 6.04 K/UL — SIGNIFICANT CHANGE UP (ref 3.8–10.5)

## 2018-05-14 PROCEDURE — 71045 X-RAY EXAM CHEST 1 VIEW: CPT | Mod: 26

## 2018-05-14 RX ORDER — HYDROMORPHONE HYDROCHLORIDE 2 MG/ML
1 INJECTION INTRAMUSCULAR; INTRAVENOUS; SUBCUTANEOUS EVERY 4 HOURS
Qty: 0 | Refills: 0 | Status: DISCONTINUED | OUTPATIENT
Start: 2018-05-14 | End: 2018-05-16

## 2018-05-14 RX ORDER — METOPROLOL TARTRATE 50 MG
50 TABLET ORAL DAILY
Qty: 0 | Refills: 0 | Status: DISCONTINUED | OUTPATIENT
Start: 2018-05-14 | End: 2018-05-16

## 2018-05-14 RX ORDER — POTASSIUM PHOSPHATE, MONOBASIC POTASSIUM PHOSPHATE, DIBASIC 236; 224 MG/ML; MG/ML
15 INJECTION, SOLUTION INTRAVENOUS ONCE
Qty: 0 | Refills: 0 | Status: COMPLETED | OUTPATIENT
Start: 2018-05-14 | End: 2018-05-14

## 2018-05-14 RX ORDER — ONDANSETRON 8 MG/1
4 TABLET, FILM COATED ORAL EVERY 4 HOURS
Qty: 0 | Refills: 0 | Status: DISCONTINUED | OUTPATIENT
Start: 2018-05-14 | End: 2018-05-16

## 2018-05-14 RX ORDER — OXYCODONE HYDROCHLORIDE 5 MG/1
30 TABLET ORAL EVERY 12 HOURS
Qty: 0 | Refills: 0 | Status: DISCONTINUED | OUTPATIENT
Start: 2018-05-14 | End: 2018-05-16

## 2018-05-14 RX ORDER — CEFUROXIME AXETIL 250 MG
250 TABLET ORAL EVERY 12 HOURS
Qty: 0 | Refills: 0 | Status: DISCONTINUED | OUTPATIENT
Start: 2018-05-14 | End: 2018-05-16

## 2018-05-14 RX ORDER — HYDROMORPHONE HYDROCHLORIDE 2 MG/ML
2 INJECTION INTRAMUSCULAR; INTRAVENOUS; SUBCUTANEOUS EVERY 4 HOURS
Qty: 0 | Refills: 0 | Status: DISCONTINUED | OUTPATIENT
Start: 2018-05-14 | End: 2018-05-14

## 2018-05-14 RX ORDER — LISINOPRIL 2.5 MG/1
2.5 TABLET ORAL DAILY
Qty: 0 | Refills: 0 | Status: DISCONTINUED | OUTPATIENT
Start: 2018-05-14 | End: 2018-05-16

## 2018-05-14 RX ADMIN — HYDROMORPHONE HYDROCHLORIDE 1 MILLIGRAM(S): 2 INJECTION INTRAMUSCULAR; INTRAVENOUS; SUBCUTANEOUS at 13:26

## 2018-05-14 RX ADMIN — Medication 0.25 MILLIGRAM(S): at 05:23

## 2018-05-14 RX ADMIN — PANTOPRAZOLE SODIUM 40 MILLIGRAM(S): 20 TABLET, DELAYED RELEASE ORAL at 09:06

## 2018-05-14 RX ADMIN — Medication 50 MILLIGRAM(S): at 17:18

## 2018-05-14 RX ADMIN — Medication 2 PACKET(S): at 18:41

## 2018-05-14 RX ADMIN — ONDANSETRON 4 MILLIGRAM(S): 8 TABLET, FILM COATED ORAL at 05:24

## 2018-05-14 RX ADMIN — TAMSULOSIN HYDROCHLORIDE 0.8 MILLIGRAM(S): 0.4 CAPSULE ORAL at 21:37

## 2018-05-14 RX ADMIN — LISINOPRIL 2.5 MILLIGRAM(S): 2.5 TABLET ORAL at 17:18

## 2018-05-14 RX ADMIN — HYDROMORPHONE HYDROCHLORIDE 1 MILLIGRAM(S): 2 INJECTION INTRAMUSCULAR; INTRAVENOUS; SUBCUTANEOUS at 09:18

## 2018-05-14 RX ADMIN — OXYCODONE HYDROCHLORIDE 30 MILLIGRAM(S): 5 TABLET ORAL at 20:34

## 2018-05-14 RX ADMIN — HYDROMORPHONE HYDROCHLORIDE 1 MILLIGRAM(S): 2 INJECTION INTRAMUSCULAR; INTRAVENOUS; SUBCUTANEOUS at 03:55

## 2018-05-14 RX ADMIN — HYDROMORPHONE HYDROCHLORIDE 1 MILLIGRAM(S): 2 INJECTION INTRAMUSCULAR; INTRAVENOUS; SUBCUTANEOUS at 21:37

## 2018-05-14 RX ADMIN — Medication 250 MILLIGRAM(S): at 18:41

## 2018-05-14 RX ADMIN — OXYCODONE HYDROCHLORIDE 30 MILLIGRAM(S): 5 TABLET ORAL at 18:41

## 2018-05-14 RX ADMIN — HEPARIN SODIUM 5000 UNIT(S): 5000 INJECTION INTRAVENOUS; SUBCUTANEOUS at 15:47

## 2018-05-14 RX ADMIN — Medication 81 MILLIGRAM(S): at 09:06

## 2018-05-14 RX ADMIN — HEPARIN SODIUM 5000 UNIT(S): 5000 INJECTION INTRAVENOUS; SUBCUTANEOUS at 21:37

## 2018-05-14 RX ADMIN — Medication 2 PACKET(S): at 05:44

## 2018-05-14 RX ADMIN — POTASSIUM PHOSPHATE, MONOBASIC POTASSIUM PHOSPHATE, DIBASIC 62.5 MILLIMOLE(S): 236; 224 INJECTION, SOLUTION INTRAVENOUS at 08:32

## 2018-05-14 RX ADMIN — DULOXETINE HYDROCHLORIDE 60 MILLIGRAM(S): 30 CAPSULE, DELAYED RELEASE ORAL at 09:05

## 2018-05-14 RX ADMIN — Medication 12.5 MILLIGRAM(S): at 05:23

## 2018-05-14 RX ADMIN — HEPARIN SODIUM 5000 UNIT(S): 5000 INJECTION INTRAVENOUS; SUBCUTANEOUS at 05:23

## 2018-05-14 RX ADMIN — CEFEPIME 1000 MILLIGRAM(S): 1 INJECTION, POWDER, FOR SOLUTION INTRAMUSCULAR; INTRAVENOUS at 05:23

## 2018-05-14 RX ADMIN — HYDROMORPHONE HYDROCHLORIDE 1 MILLIGRAM(S): 2 INJECTION INTRAMUSCULAR; INTRAVENOUS; SUBCUTANEOUS at 03:57

## 2018-05-14 RX ADMIN — ATORVASTATIN CALCIUM 40 MILLIGRAM(S): 80 TABLET, FILM COATED ORAL at 21:37

## 2018-05-14 NOTE — PROGRESS NOTE ADULT - ASSESSMENT
IMP:    Acute hypoxic resp failure--will stop dilaudid given tenuous resp status--high risk for intubation  NSTEMI with heart failure   UTI sepsis and GNR sepsis--E. coli  ?? YUNIOR  Chronic back pain maintained on narcotics    Critically ill    Plan:    Cont ASA 81   Cont Statin  Cont BBx  Will need a Cath as per cardio  D/C lasix as his cr increased and he was hypernatremic  Hypernatremia resloved  OOB today  D/C NCO2  CXR 1/14--Greatly improved  Hold on ACEI given renal failure  Abx per ID  Added oxycontin back 30 mg bid  Dilaudid 1mg IV q4h PRN pain  PO Diet    DVT prophy--SQH

## 2018-05-14 NOTE — PROGRESS NOTE ADULT - ASSESSMENT
1.Hypoxia , SOB- Acute on chronic decompensated HFrEF- LVEF 45%   Will start him on Toprol XL 50mg po daily and lisinopril 2.5mg po daily.  Diuresis with close monitoring of the renal function and electrolytes.  Goal potassium of 4 and magnesium of 2.   Strict I/O and daily wt checks.     2.Sepsis, E coli sepsis- Management pre primary team.   No fever or leucocytosis today.       4.NSTEMI- with wallmotion abnormalities noted on the echo with LVEF 45%.  No active chest pain.  off heparin drip.  Pt at this time not agreeable to proceed with left heart cath.  I advised the pt to think about it after explaining 	the indication, risks and benefits of the procedure.   Discussed with RN and ICU attending Dr Fontenot.     5. YUNIOR -Renal function normal.   Diuresis with close monitoring of the renal function and electrolytes.  Goal potassium of 4 and magnesium of 2.  Strict I/O and daily wt checks. Low sodium diet. Nutrition education.     6.Anemia- no overt bleeding so far.   Management pre primary team.   s/p PRBC.  Goal Hgb 9.    Other medical issues- Management per primary team.   Thank you for allowing me to participate in the care of this patient. Please feel free to contact me with any questions. 1.Hypoxia , SOB- Acute on chronic decompensated HFrEF- LVEF 45%   Will start him on Toprol XL 50mg po daily and lisinopril 2.5mg po daily.  Diuresis with close monitoring of the renal function and electrolytes.  Goal potassium of 4 and magnesium of 2.   Strict I/O and daily wt checks.     2.Sepsis, E coli sepsis- Management pre primary team.   No fever or leucocytosis today.     3. Acute HFrEF- known LVEF 45%- euvolemic- hold diuretics for now.    4.NSTEMI- with wall motion abnormalities noted on the echo with LVEF 45%.  No active chest pain.  off heparin drip.  Pt at this time not agreeable to proceed with left heart cath.  I advised the pt to think about it after explaining 	the indication, risks and benefits of the procedure.   Discussed with RN and ICU attending Dr Fontenot.     5. YUNIOR -Renal function normal.   Diuresis with close monitoring of the renal function and electrolytes.  Goal potassium of 4 and magnesium of 2.  Strict I/O and daily wt checks. Low sodium diet. Nutrition education.     6.Anemia- no overt bleeding so far.   Management pre primary team.   s/p PRBC.  Goal Hgb 9.    Other medical issues- Management per primary team.   Thank you for allowing me to participate in the care of this patient. Please feel free to contact me with any questions.

## 2018-05-14 NOTE — CHART NOTE - NSCHARTNOTEFT_GEN_A_CORE
Upon Nutritional Assessment by the Registered Dietitian your patient was determined to meet criteria / has evidence of the following diagnosis/diagnoses:          [ ]  Mild Protein Calorie Malnutrition        [ ]  Moderate Protein Calorie Malnutrition        [x] Severe Protein Calorie Malnutrition        [ ] Unspecified Protein Calorie Malnutrition        [ ] Underweight / BMI <19        [ ] Morbid Obesity / BMI > 40      Findings:  Pt reports decreased appetite and po intake x5 days PTA and in-patient. receiving DASH/TLC diet rx. Pt lethargic at time of assessment, could not provide typical diet recall. Reports was eating well at [facility] prior to loss of appetite, 3 meals/daily. Encourage po intake when feasible and ONS until appetite returns. Given pt h/o good po intake and appetite and limited PMH, suspect good po intake will return. No c/o n/v/d/c. Per LPN at [facility], pt wt has been stable x5 mo. Weights in EMR likely inaccurate. Dosing wt likely accurate. BMI 26.6, indicates overweight. NFPE reveals severe muscle wasting to temporal, moderate to clavicle. Moderate fat loss to ocular. Pt meets criteria for severe malnutrition in context of acute illness secondary to po intake meeting <75% ENN x11 days, severe to moderate muscle wasting, moderate fat loss.    Findings as based on:  •  Comprehensive nutrition assessment and consultation  •  Calorie counts (nutrient intake analysis)  •  Food acceptance and intake status from observations by staff  •  Follow up  •  Patient education  •  Intervention secondary to interdisciplinary rounds  •   concerns      Treatment:    The following diet has been recommended:  1) Continue with current diet rx: DASH/TLC 2) Provide ensure enlive 8oz. BID until pt po intake improves 3) Add MVI/minerals to ensure 100% RDI met 4) Obtain new wt Will continue to monitor po intake, wt, labs    PROVIDER Section:     By signing this assessment you are acknowledging and agree with the diagnosis/diagnoses assigned by the Registered Dietitian    Comments:

## 2018-05-14 NOTE — DIETITIAN INITIAL EVALUATION ADULT. - NS AS NUTRI DX NUTRIENT
Malnutrition/pt meets criteria for moderate protein calorie malnutrition in context of acute illness pt meets criteria for severe protein calorie malnutrition in context of acute illness/Malnutrition

## 2018-05-14 NOTE — DIETITIAN INITIAL EVALUATION ADULT. - PERTINENT MEDS FT
dextrose 5% IV, potassium phosphate IV, potassium phosphate, flomax, lipitor, lopressor, zofran, protonix

## 2018-05-14 NOTE — PROGRESS NOTE ADULT - ASSESSMENT
The patient is a 77 year old male with past medical history bph, anxiety, hypertension, hyperlipidemia, s/p spinal fusion in past admitted on 5/8 after a fall at assisted living; patient was hypoxic upon admission and placed on 100% NRB and also found to have gram negative rods in blood as well as new AMI. Patient is poor historian and history per medical record.   1. Patient admitted with sepsis secondary to gram negative rods and growing E coli in urine so sepsis may be due to urinary origin; also with poor dentition and lower extremity venous stasis, foot ulcers, poor skin hygiene  - follow up cultures   - iv hydration and supportive care   - oxygen and nebs as needed per icu  - serial cbc and monitor temperature  - reviewed prior medical records to evaluate for resistant or atypical pathogens   - will repeat blood cultures in am  - day #6 cefepime  - will change antibiotics to ceftin 250 mg po q 12 hours for 8 more days  - tolerating antibiotics without rashes or side effects   - monitor skin  2. other issues;  bph, anxiety, hypertension, hyperlipidemia, s/p spinal fusion  - per medicine/icu

## 2018-05-14 NOTE — DIETITIAN INITIAL EVALUATION ADULT. - SIGNS/SYMPTOMS
po intake meeting <75% ENN x11 days, mild muscle wasting po intake meeting <75% ENN x11 days, severe to moderate muscle wasting, moderate fat loss

## 2018-05-14 NOTE — DIETITIAN INITIAL EVALUATION ADULT. - OTHER INFO
Nutrition Assessment for LOS: 77yoM admitted for NSTEMI, s/p fall at [facility], YUNIOR, UTI sepsis. PMH includes BPH, HTN, neuropathy, GERD, anemia, anxiety, depression. 3+ edema on admit now resolved, no edema noted. Hubert 14. Low phos lab noted, being repleted. Pt reports decreased appetite and po intake x5 days PTA and in-patient. receiving DASH/TLC diet rx. Pt lethargic at time of assessment, could not provide typical diet recall. Reports was eating well at [facility] prior to loss of appetite, 3 meals/daily. Encourage po intake when feasible and ONS until appetite returns. Given pt h/o good po intake and appetite and limited PMH, suspect good po intake will return. No c/o n/v/d/c. Per LPN at [facility], pt wt has been stable x5 mo. BMI 26.6, indicates overweight. NFPE reveals mild to moderate temporal wasting. No clear signs of fat loss. Pt meets criteria for moderate malnutrition in context of acute illness secondary to po intake meeting <75% ENN x11 days, mild to moderate muscle wasting. Recommendations: 1) Continue with current diet rx: DASH/TLC 2) Provide ensure enlive 8oz. BID until pt po intake improves 3) Add MVI/minerals to ensure 100% RDI met 4) Obtain new wt Will continue to monitor po intake, wt, labs. Nutrition Assessment for LOS: 77yoM admitted for NSTEMI, s/p fall at [facility], YUNIOR, UTI sepsis. PMH includes BPH, HTN, neuropathy, GERD, anemia, anxiety, depression. 3+ edema on admit now resolved, no edema noted. Hubert 14. Low phos lab noted, being repleted. Pt reports decreased appetite and po intake x5 days PTA and in-patient. receiving DASH/TLC diet rx. Pt lethargic at time of assessment, could not provide typical diet recall. Reports was eating well at [facility] prior to loss of appetite, 3 meals/daily. Encourage po intake when feasible and ONS until appetite returns. Given pt h/o good po intake and appetite and limited PMH, suspect good po intake will return. No c/o n/v/d/c. Per LPN at [facility], pt wt has been stable x5 mo. Weights in EMR likely inaccurate. Dosing wt likely accurate. BMI 26.6, indicates overweight. NFPE reveals mild to moderate temporal wasting. No clear signs of fat loss. Pt meets criteria for moderate malnutrition in context of acute illness secondary to po intake meeting <75% ENN x11 days, mild to moderate muscle wasting. Recommendations: 1) Continue with current diet rx: DASH/TLC 2) Provide ensure enlive 8oz. BID until pt po intake improves 3) Add MVI/minerals to ensure 100% RDI met 4) Obtain new wt Will continue to monitor po intake, wt, labs. Nutrition Assessment for LOS: 77yoM admitted for NSTEMI, s/p fall at [facility], YUNIOR, UTI sepsis. PMH includes BPH, HTN, neuropathy, GERD, anemia, anxiety, depression. 3+ edema on admit now resolved, no edema noted. Hubert 14. Low phos lab noted, being repleted. Pt reports decreased appetite and po intake x5 days PTA and in-patient. receiving DASH/TLC diet rx. Pt lethargic at time of assessment, could not provide typical diet recall. Reports was eating well at [facility] prior to loss of appetite, 3 meals/daily. Encourage po intake when feasible and ONS until appetite returns. Given pt h/o good po intake and appetite and limited PMH, suspect good po intake will return. No c/o n/v/d/c. Per LPN at [facility], pt wt has been stable x5 mo. Weights in EMR likely inaccurate. Dosing wt likely accurate. BMI 26.6, indicates overweight. NFPE reveals severe muscle wasting to temporal, moderate to clavicle. Moderate fat loss to ocular. Pt meets criteria for severe malnutrition in context of acute illness secondary to po intake meeting <75% ENN x11 days, severe to moderate muscle wasting, moderate fat loss. Recommendations: 1) Continue with current diet rx: DASH/TLC 2) Provide ensure enlive 8oz. BID until pt po intake improves 3) Add MVI/minerals to ensure 100% RDI met 4) Obtain new wt Will continue to monitor po intake, wt, labs.

## 2018-05-14 NOTE — DIETITIAN INITIAL EVALUATION ADULT. - ENERGY NEEDS
Ht.      69"        Wt.    179.5#             26.6 BMI                  IBW    160#               Pt is at    113%  IBW

## 2018-05-15 LAB
ANION GAP SERPL CALC-SCNC: 9 MMOL/L — SIGNIFICANT CHANGE UP (ref 5–17)
BUN SERPL-MCNC: 19 MG/DL — SIGNIFICANT CHANGE UP (ref 7–23)
CALCIUM SERPL-MCNC: 8.4 MG/DL — LOW (ref 8.5–10.1)
CHLORIDE SERPL-SCNC: 109 MMOL/L — HIGH (ref 96–108)
CO2 SERPL-SCNC: 24 MMOL/L — SIGNIFICANT CHANGE UP (ref 22–31)
CREAT SERPL-MCNC: 0.91 MG/DL — SIGNIFICANT CHANGE UP (ref 0.5–1.3)
GLUCOSE SERPL-MCNC: 103 MG/DL — HIGH (ref 70–99)
HCT VFR BLD CALC: 36.4 % — LOW (ref 39–50)
HGB BLD-MCNC: 10.9 G/DL — LOW (ref 13–17)
MAGNESIUM SERPL-MCNC: 2.2 MG/DL — SIGNIFICANT CHANGE UP (ref 1.6–2.6)
MCHC RBC-ENTMCNC: 23.4 PG — LOW (ref 27–34)
MCHC RBC-ENTMCNC: 29.9 GM/DL — LOW (ref 32–36)
MCV RBC AUTO: 78.1 FL — LOW (ref 80–100)
NRBC # BLD: 0 /100 WBCS — SIGNIFICANT CHANGE UP (ref 0–0)
PHOSPHATE SERPL-MCNC: 2.7 MG/DL — SIGNIFICANT CHANGE UP (ref 2.5–4.5)
PLATELET # BLD AUTO: 213 K/UL — SIGNIFICANT CHANGE UP (ref 150–400)
POTASSIUM SERPL-MCNC: 4 MMOL/L — SIGNIFICANT CHANGE UP (ref 3.5–5.3)
POTASSIUM SERPL-SCNC: 4 MMOL/L — SIGNIFICANT CHANGE UP (ref 3.5–5.3)
RBC # BLD: 4.66 M/UL — SIGNIFICANT CHANGE UP (ref 4.2–5.8)
RBC # FLD: 18.5 % — HIGH (ref 10.3–14.5)
SODIUM SERPL-SCNC: 142 MMOL/L — SIGNIFICANT CHANGE UP (ref 135–145)
WBC # BLD: 6.43 K/UL — SIGNIFICANT CHANGE UP (ref 3.8–10.5)
WBC # FLD AUTO: 6.43 K/UL — SIGNIFICANT CHANGE UP (ref 3.8–10.5)

## 2018-05-15 RX ORDER — SODIUM CHLORIDE 9 MG/ML
1000 INJECTION INTRAMUSCULAR; INTRAVENOUS; SUBCUTANEOUS
Qty: 0 | Refills: 0 | Status: DISCONTINUED | OUTPATIENT
Start: 2018-05-15 | End: 2018-05-16

## 2018-05-15 RX ADMIN — PANTOPRAZOLE SODIUM 40 MILLIGRAM(S): 20 TABLET, DELAYED RELEASE ORAL at 10:16

## 2018-05-15 RX ADMIN — LISINOPRIL 2.5 MILLIGRAM(S): 2.5 TABLET ORAL at 06:15

## 2018-05-15 RX ADMIN — SODIUM CHLORIDE 75 MILLILITER(S): 9 INJECTION INTRAMUSCULAR; INTRAVENOUS; SUBCUTANEOUS at 17:35

## 2018-05-15 RX ADMIN — ATORVASTATIN CALCIUM 40 MILLIGRAM(S): 80 TABLET, FILM COATED ORAL at 22:28

## 2018-05-15 RX ADMIN — HYDROMORPHONE HYDROCHLORIDE 1 MILLIGRAM(S): 2 INJECTION INTRAMUSCULAR; INTRAVENOUS; SUBCUTANEOUS at 21:20

## 2018-05-15 RX ADMIN — Medication 81 MILLIGRAM(S): at 10:17

## 2018-05-15 RX ADMIN — HYDROMORPHONE HYDROCHLORIDE 1 MILLIGRAM(S): 2 INJECTION INTRAMUSCULAR; INTRAVENOUS; SUBCUTANEOUS at 06:35

## 2018-05-15 RX ADMIN — TAMSULOSIN HYDROCHLORIDE 0.8 MILLIGRAM(S): 0.4 CAPSULE ORAL at 22:28

## 2018-05-15 RX ADMIN — OXYCODONE HYDROCHLORIDE 30 MILLIGRAM(S): 5 TABLET ORAL at 06:16

## 2018-05-15 RX ADMIN — Medication 50 MILLIGRAM(S): at 06:15

## 2018-05-15 RX ADMIN — HYDROMORPHONE HYDROCHLORIDE 1 MILLIGRAM(S): 2 INJECTION INTRAMUSCULAR; INTRAVENOUS; SUBCUTANEOUS at 20:21

## 2018-05-15 RX ADMIN — OXYCODONE HYDROCHLORIDE 30 MILLIGRAM(S): 5 TABLET ORAL at 17:34

## 2018-05-15 RX ADMIN — ONDANSETRON 4 MILLIGRAM(S): 8 TABLET, FILM COATED ORAL at 10:12

## 2018-05-15 RX ADMIN — HEPARIN SODIUM 5000 UNIT(S): 5000 INJECTION INTRAVENOUS; SUBCUTANEOUS at 22:28

## 2018-05-15 RX ADMIN — DULOXETINE HYDROCHLORIDE 60 MILLIGRAM(S): 30 CAPSULE, DELAYED RELEASE ORAL at 17:34

## 2018-05-15 RX ADMIN — Medication 250 MILLIGRAM(S): at 17:34

## 2018-05-15 RX ADMIN — HEPARIN SODIUM 5000 UNIT(S): 5000 INJECTION INTRAVENOUS; SUBCUTANEOUS at 06:15

## 2018-05-15 RX ADMIN — Medication 250 MILLIGRAM(S): at 06:15

## 2018-05-15 RX ADMIN — OXYCODONE HYDROCHLORIDE 30 MILLIGRAM(S): 5 TABLET ORAL at 18:05

## 2018-05-15 NOTE — PROGRESS NOTE ADULT - EXTREMITIES
detailed exam
No cyanosis, clubbing or edema
No cyanosis, clubbing or edema
detailed exam

## 2018-05-15 NOTE — PROGRESS NOTE ADULT - ASSESSMENT
IMP:    Acute hypoxic resp failure--will stop dilaudid given tenuous resp status--high risk for intubation  NSTEMI with heart failure   UTI sepsis and GNR sepsis--E. coli  ?? YUNIOR  Chronic back pain maintained on narcotics    Critically ill    Plan:    Cont ASA 81   Cont Statin  Cont BBx  Possible cath today 5/15  D/Canelo  lasix as his cr increased and he was hypernatremic  Hypernatremia resloved  OOB today  CXR 1/14--Greatly improved  Will need an ACE added  Patient with severe protein calorie malnutrition --will add ensure and MVI  Abx per ID  Added oxycontin back 30 mg bid  Dilaudid 1mg IV q4h PRN pain  PO Diet    DVT prophy--SQH    Called into the Hospitalist at 9:01AM

## 2018-05-15 NOTE — PROGRESS NOTE ADULT - MS EXT PE MLT D E PC
no clubbing/no cyanosis/no pedal edema
no cyanosis/no clubbing
no pedal edema/no cyanosis/no clubbing
no clubbing/no pedal edema/no cyanosis

## 2018-05-15 NOTE — PROGRESS NOTE ADULT - RS GEN PE MLT RESP DETAILS PC
no rhonchi/breath sounds equal/no wheezes/no rales
rhonchi
rhonchi
no wheezes/no rhonchi/breath sounds equal
no wheezes/no rhonchi/no rales/breath sounds equal
rales
good air movement/no rales/breath sounds equal/no rhonchi/no wheezes

## 2018-05-15 NOTE — PROGRESS NOTE ADULT - COMMENTS
ROS o/w negative
ROS o/w negative
no cp or sob
no cp or sob  Back Pain--Chronic
not reliable
not reliable
no cp or sob  Back Pain--Chronic

## 2018-05-15 NOTE — PROGRESS NOTE ADULT - ASSESSMENT
1.Hypoxia , SOB- Acute on chronic decompensated HFrEF- LVEF 45%   Will start him on Toprol XL 50mg po daily and lisinopril 2.5mg po daily.  Diuresis with close monitoring of the renal function and electrolytes.  Goal potassium of 4 and magnesium of 2.   Strict I/O and daily wt checks.     2.Sepsis, E coli sepsis- Management pre primary team.   No fever or leucocytosis any more.    3. Acute HFrEF- known LVEF 45%- euvolemic- hold diuretics for now.    4.NSTEMI- with wall motion abnormalities noted on the echo with LVEF 45%.  No active chest pain.  off heparin drip.  Left heart cath today.  Keep NPO.  D/W Dr Fontenot and RN.     5. YUNIOR -resolved    6.Anemia- no overt bleeding so far.   Management pre primary team.   s/p PRBC.  Goal Hgb 9.    Other medical issues- Management per primary team.   Thank you for allowing me to participate in the care of this patient. Please feel free to contact me with any questions.

## 2018-05-15 NOTE — PROGRESS NOTE ADULT - CARDIOVASCULAR DETAILS
positive S2/positive S1
positive S1/positive S2

## 2018-05-15 NOTE — PROGRESS NOTE ADULT - GASTROINTESTINAL
detailed exam
Soft, non-tender, no hepatosplenomegaly, normal bowel sounds
Soft, non-tender, no hepatosplenomegaly, normal bowel sounds
detailed exam

## 2018-05-15 NOTE — PROGRESS NOTE ADULT - CARDIOVASCULAR
Regular rate & rhythm, normal S1, S2; no murmurs, gallops or rubs; no S3, S4
Regular rate & rhythm, normal S1, S2; no murmurs, gallops or rubs; no S3, S4
detailed exam

## 2018-05-15 NOTE — PROGRESS NOTE ADULT - SUBJECTIVE AND OBJECTIVE BOX
Patient is a 77y old  Male who presents with a chief complaint of AMI and hypoxia.    HPI:  The patient is a 77 year old male with prior medical history as stated below presented after a fall at his assisted living.  He presented with hypoxia.  He denies CP.  In the ED he is noted to be hypoxic and placed on NIV for likely pulmonary edema.  Also noted to be in YUNIOR on CKD, NSTEMI, and anemia.    He is admitted to CCU and is on bipap currently.  He denies any CP but c/o back pain.    - pt seen and examined by me today. Pt c/o back pain.No overnight events.  Respiratory status improving. Off bipap but on NRB mask.       PAST MEDICAL & SURGICAL HISTORY:  Benign prostatic hypertrophy without urinary obstruction  Anxiety  Depression  Hypertension  Spinal stenosis  Kyphosis  Neuropathy  GERD (gastroesophageal reflux disease)  Anemia  S/P spinal fusion      MEDICATIONS  (STANDING):  cefTRIAXone Injectable. 1000 milliGRAM(s) IV Push every 24 hours  heparin  Infusion.  Unit(s)/Hr (10 mL/Hr) IV Continuous <Continuous>  pantoprazole  Injectable 40 milliGRAM(s) IV Push daily    MEDICATIONS  (PRN):      FAMILY HISTORY:  unable to obtain from pt.       SOCIAL HISTORY:  unable to obtain on pt.    REVIEW OF SYSTEMS:  CONSTITUTIONAL:    No fatigue, malaise, lethargy.  No fever or chills.  HEENT:  Eyes:  No visual changes.     ENT:  No epistaxis.  No sinus pain.    RESPIRATORY:  No cough.  No wheeze.  No hemoptysis.  c/o shortness of breath.  CARDIOVASCULAR:  No chest pains.  No palpitations. c/o shortness of breath, No orthopnea or PND.  GASTROINTESTINAL:  No abdominal pain.  No nausea or vomiting.    GENITOURINARY:    No hematuria.    MUSCULOSKELETAL:  c/o back pain  NEUROLOGICAL:  No tingling or numbness or weakness.  PSYCHIATRIC:  No confusion  SKIN:  No rashes.    ENDOCRINE:  No unexplained weight loss.  No polydipsia.   HEMATOLOGIC:  No anemia.  No prolonged or excessive bleeding.   ALLERGIC AND IMMUNOLOGIC:  No pruritus.          Vital Signs Last 24 Hrs  T(C): 36.2 (08 May 2018 13:15), Max: 39.6 (08 May 2018 09:45)  T(F): 97.2 (08 May 2018 13:15), Max: 103.3 (08 May 2018 09:45)  HR: 74 (08 May 2018 14:00) (72 - 112)  BP: 102/59 (08 May 2018 14:00) (94/52 - 113/60)  BP(mean): 69 (08 May 2018 14:00) (69 - 72)  RR: 25 (08 May 2018 14:00) (15 - 26)  SpO2: 100% (08 May 2018 14:00) (74% - 100%)    PHYSICAL EXAM-    Constitutional: The patient appears to be normal, well developed, well nourished and alert and oriented to time, place and person. The patient does not appear acutely ill.     Head: Head is normocephalic and atraumatic.      Neck: No jugular venous distention. No audible carotid bruits. There are strong carotid pulses bilaterally. No JVD.     Cardiovascular: Regular rate and rhythm without S3, S4. No murmurs or rubs are appreciated.      Respiratory: CTA anteriorly.     Abdomen: Soft, nontender, nondistended with positive bowel sounds.      Extremity: No tenderness. No  pitting edema     Neurologic: The patient is alert and oriented.      Skin: No rash, no obvious lesions noted.      Psychiatric: The patient appears to be emotionally stable.      INTERPRETATION OF TELEMETRY: sinus rythm, bigeminy, isolated PVCs.     ECG: Sinus rythm , normal axis, no ST T  changes- EKG from today.     I&O's Detail      LABS:                        7.4    5.14  )-----------( 131      ( 08 May 2018 10:04 )             24.6     05-08    143  |  106  |  42<H>  ----------------------------<  117<H>  4.1   |  25  |  2.21<H>    Ca    7.9<L>      08 May 2018 11:07    TPro  6.5  /  Alb  2.6<L>  /  TBili  0.4  /  DBili  x   /  AST  170<H>  /  ALT  31  /  AlkPhos  63  05-08    CARDIAC MARKERS ( 08 May 2018 14:20 )  25.800 ng/mL / x     / 4406 U/L / x     / x      CARDIAC MARKERS ( 08 May 2018 11:07 )  20.100 ng/mL / x     / x     / x     / x          PT/INR - ( 08 May 2018 13:06 )   PT: 18.0 sec;   INR: 1.65 ratio         PTT - ( 08 May 2018 13:06 )  PTT:29.1 sec  Urinalysis Basic - ( 08 May 2018 10:04 )    Color: Yellow / Appearance: Clear / S.015 / pH: x  Gluc: x / Ketone: Trace  / Bili: Negative / Urobili: Negative mg/dL   Blood: x / Protein: 100 mg/dL / Nitrite: Positive   Leuk Esterase: Moderate / RBC: 11-25 /HPF / WBC >50   Sq Epi: x / Non Sq Epi: Occasional / Bacteria: Many      I&O's Summary    BNP  RADIOLOGY & ADDITIONAL STUDIES:  < from: Xray Chest 1 View- PORTABLE-Urgent (18 @ 10:10) >  EXAM:  XR CHEST PORTABLE URGENT 1V                            PROCEDURE DATE:  2018          INTERPRETATION:      INDICATION: Fever and chills weakness shortness of breath    Single frontal view of the chest compared to prior study of 3/16/2018    FINDINGS/  IMPRESSION:       There are parenchymal interstitial edema with perihilar vascular   prominence. There is no large pleural effusion. There is no pneumothorax.   There is cardiomegaly. Patient is status post multilevel thoracic   instrumentation.  There is osteopenia and degenerative changes.                  CATALINA BAEZA   This document has been electronically signed. May  8 2018 11:29AM    < end of copied text >
Patient is a 77y old  Male who presents with a chief complaint of AMI and hypoxia.    HPI:  The patient is a 77 year old male with prior medical history as stated below presented after a fall at his assisted living.  He presented with hypoxia.  He denies CP.  In the ED he is noted to be hypoxic and placed on NIV for likely pulmonary edema.  Also noted to be in YUNIOR on CKD, NSTEMI, and anemia.    He is admitted to CCU and is on bipap currently.  He denies any CP but c/o back pain.    - pt seen and examined by me today. Pt c/o back pain.No overnight events.  Respiratory status improving. Off bipap but on NRB mask.     5/10- pt seen and examined by me today.  Pt still on NRB mask and refusing BIpap per staff.  He is still in respiratory distress but reportedly better than last night.  Pt c/o back pain but no CP.       PAST MEDICAL & SURGICAL HISTORY:  Benign prostatic hypertrophy without urinary obstruction  Anxiety  Depression  Hypertension  Spinal stenosis  Kyphosis  Neuropathy  GERD (gastroesophageal reflux disease)  Anemia  S/P spinal fusion      MEDICATIONS  (STANDING):  cefTRIAXone Injectable. 1000 milliGRAM(s) IV Push every 24 hours  heparin  Infusion.  Unit(s)/Hr (10 mL/Hr) IV Continuous <Continuous>  pantoprazole  Injectable 40 milliGRAM(s) IV Push daily    MEDICATIONS  (PRN):      FAMILY HISTORY:  unable to obtain from pt.       SOCIAL HISTORY:  unable to obtain on pt.    REVIEW OF SYSTEMS:  CONSTITUTIONAL:    No fatigue, malaise, lethargy.  No fever or chills.  HEENT:  Eyes:  No visual changes.     ENT:  No epistaxis.  No sinus pain.    RESPIRATORY:  No cough.  No wheeze.  No hemoptysis.  c/o shortness of breath.  CARDIOVASCULAR:  No chest pains.  No palpitations. c/o shortness of breath, No orthopnea or PND.  GASTROINTESTINAL:  No abdominal pain.  No nausea or vomiting.    GENITOURINARY:    No hematuria.    MUSCULOSKELETAL:  c/o back pain  NEUROLOGICAL:  No tingling or numbness or weakness.  PSYCHIATRIC:  No confusion  SKIN:  No rashes.    ENDOCRINE:  No unexplained weight loss.  No polydipsia.   HEMATOLOGIC:  No anemia.  No prolonged or excessive bleeding.   ALLERGIC AND IMMUNOLOGIC:  No pruritus.          Vital Signs Last 24 Hrs  T(C): 36.2 (08 May 2018 13:15), Max: 39.6 (08 May 2018 09:45)  T(F): 97.2 (08 May 2018 13:15), Max: 103.3 (08 May 2018 09:45)  HR: 74 (08 May 2018 14:00) (72 - 112)  BP: 102/59 (08 May 2018 14:00) (94/52 - 113/60)  BP(mean): 69 (08 May 2018 14:00) (69 - 72)  RR: 25 (08 May 2018 14:00) (15 - 26)  SpO2: 100% (08 May 2018 14:00) (74% - 100%)    PHYSICAL EXAM-    Constitutional:mild respiratory distress.     Head: Head is normocephalic and atraumatic.      Neck: No jugular venous distention. No audible carotid bruits. There are strong carotid pulses bilaterally. No JVD.     Cardiovascular: Regular, tachycardic.       Respiratory: crackles b/l all lung fields.     Abdomen: Soft, nontender, nondistended with positive bowel sounds.      Extremity: No tenderness. No  pitting edema     Neurologic: The patient is alert and oriented.      Skin: No rash, no obvious lesions noted.      Psychiatric: The patient appears to be emotionally stable.      INTERPRETATION OF TELEMETRY: sinus tachycardia.     ECG: Sinus rythm , normal axis, no ST T  changes- EKG from today.     I&O's Detail      LABS:                        7.4    5.14  )-----------( 131      ( 08 May 2018 10:04 )             24.6     05-08    143  |  106  |  42<H>  ----------------------------<  117<H>  4.1   |  25  |  2.21<H>    Ca    7.9<L>      08 May 2018 11:07    TPro  6.5  /  Alb  2.6<L>  /  TBili  0.4  /  DBili  x   /  AST  170<H>  /  ALT  31  /  AlkPhos  63  05-08    CARDIAC MARKERS ( 08 May 2018 14:20 )  25.800 ng/mL / x     / 4406 U/L / x     / x      CARDIAC MARKERS ( 08 May 2018 11:07 )  20.100 ng/mL / x     / x     / x     / x          PT/INR - ( 08 May 2018 13:06 )   PT: 18.0 sec;   INR: 1.65 ratio         PTT - ( 08 May 2018 13:06 )  PTT:29.1 sec  Urinalysis Basic - ( 08 May 2018 10:04 )    Color: Yellow / Appearance: Clear / S.015 / pH: x  Gluc: x / Ketone: Trace  / Bili: Negative / Urobili: Negative mg/dL   Blood: x / Protein: 100 mg/dL / Nitrite: Positive   Leuk Esterase: Moderate / RBC: 11-25 /HPF / WBC >50   Sq Epi: x / Non Sq Epi: Occasional / Bacteria: Many      I&O's Summary    BNP  RADIOLOGY & ADDITIONAL STUDIES:  < from: Xray Chest 1 View- PORTABLE-Urgent (18 @ 10:10) >  EXAM:  XR CHEST PORTABLE URGENT 1V                            PROCEDURE DATE:  2018          INTERPRETATION:      INDICATION: Fever and chills weakness shortness of breath    Single frontal view of the chest compared to prior study of 3/16/2018    FINDINGS/  IMPRESSION:       There are parenchymal interstitial edema with perihilar vascular   prominence. There is no large pleural effusion. There is no pneumothorax.   There is cardiomegaly. Patient is status post multilevel thoracic   instrumentation.  There is osteopenia and degenerative changes.                  CATALINA BAEZA   This document has been electronically signed. May  8 2018 11:29AM    < end of copied text >
76 y/o male from assisted living admitted with NSTEMI (trop 30), Pulm edema, UTI sepsis and GNR sepsis.  Pt admitted to CCU and placed on NIV and IV hep and Abx.  TTE--1+ MR/EF 40-45%.  LE dopp negative for DVT.  CXR suggestive of pulm edema.      5/9:  Tm 101 On NIV.  awake and alert.  c/o chronic back pain.  No CP and resp status improved.     5/10:  No fevers.  E. coli UTI and bacteremia.  awake and alert.  denies CP or SOB.  C/O chronic back pain.  Remains on FM    5/11: On 40% FiO2, On NIV still.  On precedex    5/12: More awake today.  Was on NIV overnight.      5/13: He did not require NIV over night.  More clear and alert today         PAST MEDICAL & SURGICAL HISTORY:  Benign prostatic hypertrophy without urinary obstruction  Anxiety  Depression  Hypertension  Spinal stenosis  Kyphosis  Neuropathy  GERD (gastroesophageal reflux disease)  Anemia  S/P spinal fusion      FAMILY HISTORY:      Social Hx:    Allergies    No Known Allergies    Intolerances            ICU Vital Signs Last 24 Hrs  T(C): 37.2 (12 May 2018 20:41), Max: 37.2 (12 May 2018 20:41)  T(F): 98.9 (12 May 2018 20:41), Max: 98.9 (12 May 2018 20:41)  HR: 125 (13 May 2018 07:00) (98 - 132)  BP: 158/71 (13 May 2018 07:00) (111/47 - 164/69)  BP(mean): 91 (13 May 2018 07:00) (63 - 99)  ABP: --  ABP(mean): --  RR: 24 (13 May 2018 07:00) (16 - 27)  SpO2: 99% (13 May 2018 07:00) (89% - 100%)          I&O's Summary    12 May 2018 07:01  -  13 May 2018 07:00  --------------------------------------------------------  IN: 1835 mL / OUT: 2600 mL / NET: -765 mL                              10.5   5.89  )-----------( 244      ( 13 May 2018 05:37 )             33.8       05-13    144  |  106  |  40<H>  ----------------------------<  117<H>  3.1<L>   |  31  |  1.31<H>    Ca    8.5      13 May 2018 05:37  Phos  1.8     05-13  Mg     2.2     05-13        CARDIAC MARKERS ( 13 May 2018 05:37 )  2.370 ng/mL / x     / x     / x     / x            ABG - ( 11 May 2018 15:07 )  pH, Arterial: 7.50  pH, Blood: x     /  pCO2: 36    /  pO2: 46    / HCO3: 28    / Base Excess: 5.0   /  SaO2: 82                      MEDICATIONS  (STANDING):  aspirin  chewable 81 milliGRAM(s) Oral daily  atorvastatin 40 milliGRAM(s) Oral at bedtime  cefepime  Injectable. 1000 milliGRAM(s) IV Push every 12 hours  dextrose 5%. 1000 milliLiter(s) (75 mL/Hr) IV Continuous <Continuous>  DULoxetine 60 milliGRAM(s) Oral daily  heparin  Injectable 5000 Unit(s) SubCutaneous every 8 hours  metoprolol tartrate 12.5 milliGRAM(s) Oral two times a day  ondansetron Injectable 4 milliGRAM(s) IV Push every 6 hours  pantoprazole  Injectable 40 milliGRAM(s) IV Push daily  potassium chloride    Tablet ER 40 milliEquivalent(s) Oral once  potassium phosphate / sodium phosphate powder 2 Packet(s) Oral two times a day  tamsulosin 0.8 milliGRAM(s) Oral at bedtime    MEDICATIONS  (PRN):  acetaminophen  Suppository 650 milliGRAM(s) Rectal every 6 hours PRN For Temp greater than 38.5 C (101.3 F)  ALPRAZolam 0.25 milliGRAM(s) Oral two times a day PRN anxiety  HYDROmorphone   Tablet 1 milliGRAM(s) Oral every 6 hours PRN Moderate Pain (4 - 6)      DVT Prophylaxis:    Advanced Directives:  Discussed with:    Visit Information:    ** Time is exclusive of billed procedures and/or teaching and/or routine family updates.
78 y/o male from assisted living admitted with NSTEMI (trop 30), Pulm edema, UTI sepsis and GNR sepsis.  Pt admitted to CCU and placed on NIV and IV hep and Abx.  TTE--1+ MR/EF 40-45%.  LE dopp negative for DVT.  CXR suggestive of pulm edema.      5/9:  Tm 101 On NIV.  awake and alert.  c/o chronic back pain.  No CP and resp status improved.     5/10:  No fevers.  E. coli UTI and bacteremia.  awake and alert.  denies CP or SOB.  C/O chronic back pain.  Remains on FM    5/11: On 40% FiO2, On NIV still.  On precedex    5/12: More awake today.  Was on NIV overnight.           PAST MEDICAL & SURGICAL HISTORY:  Benign prostatic hypertrophy without urinary obstruction  Anxiety  Depression  Hypertension  Spinal stenosis  Kyphosis  Neuropathy  GERD (gastroesophageal reflux disease)  Anemia  S/P spinal fusion      FAMILY HISTORY:      Social Hx:    Allergies    No Known Allergies    Intolerances            ICU Vital Signs Last 24 Hrs  T(C): 36.4 (12 May 2018 08:42), Max: 36.9 (11 May 2018 16:25)  T(F): 97.5 (12 May 2018 08:42), Max: 98.5 (11 May 2018 16:25)  HR: 126 (12 May 2018 07:00) (52 - 126)  BP: 166/71 (12 May 2018 07:00) (70/46 - 166/71)  BP(mean): 90 (12 May 2018 07:00) (51 - 105)  ABP: --  ABP(mean): --  RR: 22 (12 May 2018 07:00) (20 - 44)  SpO2: 100% (12 May 2018 07:00) (84% - 100%)          I&O's Summary    11 May 2018 07:01  -  12 May 2018 07:00  --------------------------------------------------------  IN: 150 mL / OUT: 0 mL / NET: 150 mL    12 May 2018 07:01  -  12 May 2018 08:47  --------------------------------------------------------  IN: 0 mL / OUT: 900 mL / NET: -900 mL                              10.7   7.20  )-----------( 253      ( 12 May 2018 05:24 )             34.9       05-12    150<H>  |  108  |  46<H>  ----------------------------<  100<H>  3.1<L>   |  31  |  1.68<H>    Ca    8.8      12 May 2018 05:24  Phos  2.9     05-12  Mg     2.5     05-12              ABG - ( 11 May 2018 15:07 )  pH, Arterial: 7.50  pH, Blood: x     /  pCO2: 36    /  pO2: 46    / HCO3: 28    / Base Excess: 5.0   /  SaO2: 82                      MEDICATIONS  (STANDING):  aspirin  chewable 81 milliGRAM(s) Oral daily  atorvastatin 40 milliGRAM(s) Oral at bedtime  cefepime  Injectable. 1000 milliGRAM(s) IV Push every 12 hours  dexmedetomidine Infusion 0.5 MICROgram(s)/kG/Hr (10.2 mL/Hr) IV Continuous <Continuous>  dextrose 5%. 1000 milliLiter(s) (75 mL/Hr) IV Continuous <Continuous>  DULoxetine 60 milliGRAM(s) Oral daily  metoprolol tartrate 12.5 milliGRAM(s) Oral two times a day  ondansetron Injectable 4 milliGRAM(s) IV Push every 6 hours  pantoprazole  Injectable 40 milliGRAM(s) IV Push daily  potassium chloride  10 mEq/100 mL IVPB 10 milliEquivalent(s) IV Intermittent every 1 hour    MEDICATIONS  (PRN):  acetaminophen  Suppository 650 milliGRAM(s) Rectal every 6 hours PRN For Temp greater than 38.5 C (101.3 F)  ALPRAZolam 0.25 milliGRAM(s) Oral two times a day PRN anxiety  LORazepam   Injectable 1 milliGRAM(s) IV Push every 4 hours PRN Agitation  oxyCODONE    IR 10 milliGRAM(s) Oral every 6 hours PRN Moderate Pain (4 - 6)      DVT Prophylaxis: [facility]    Advanced Directives:  Discussed with:    Visit Information: 30 min    ** Time is exclusive of billed procedures and/or teaching and/or routine family updates.
CC:  NSTEMI/Sepsis/resp failure    HPI:    76 y/o male from assisted living admitted with NSTEMI (trop 30), Pulm edema, UTI sepsis and GNR sepsis.  Pt admitted to CCU and placed on NIV and IV hep and Abx.  TTE--1+ MR/EF 40-45%.  LE dopp negative for DVT.  CXR suggestive of pulm edema.      :  Tm 101 On NIV.  awake and alert.  c/o chronic back pain.  No CP and resp status improved.       PMH:  As above.     PSH:  As above.     FH: Non Contributory other than those listed in HPI    Social History:      MEDICATIONS  (STANDING):  cefTRIAXone Injectable. 1000 milliGRAM(s) IV Push every 24 hours  heparin  Infusion.  Unit(s)/Hr (10 mL/Hr) IV Continuous <Continuous>  pantoprazole  Injectable 40 milliGRAM(s) IV Push daily    MEDICATIONS  (PRN):  acetaminophen  Suppository 650 milliGRAM(s) Rectal every 6 hours PRN For Temp greater than 38.5 C (101.3 F)  heparin  Injectable 5000 Unit(s) IV Push every 6 hours PRN For aPTT less than 40  HYDROmorphone  Injectable 1 milliGRAM(s) IV Push every 3 hours PRN Moderate Pain (4 - 6)      Allergies: NKDA    ROS:  SEE BELOW        ICU Vital Signs Last 24 Hrs  T(C): 38.4 (09 May 2018 07:35), Max: 39.6 (08 May 2018 09:45)  T(F): 101.2 (09 May 2018 07:35), Max: 103.3 (08 May 2018 09:45)  HR: 97 (09 May 2018 08:00) (72 - 113)  BP: 142/76 (09 May 2018 08:00) (94/52 - 149/62)  BP(mean): 89 (09 May 2018 08:00) (55 - 89)  ABP: --  ABP(mean): --  RR: 15 (09 May 2018 08:00) (15 - 37)  SpO2: 100% (09 May 2018 08:00) (74% - 100%)          I&O's Summary    08 May 2018 07:01  -  09 May 2018 07:00  --------------------------------------------------------  IN: 433 mL / OUT: 3450 mL / NET: -3017 mL        Physical Exam:  SEE BELOW                          12.2   9.91  )-----------( 209      ( 09 May 2018 05:53 )             39.3       05-    143  |  107  |  30<H>  ----------------------------<  116<H>  3.7   |  27  |  1.78<H>    Ca    8.3<L>      09 May 2018 05:53  Phos  3.6     05-  Mg     2.1     -    TPro  6.5  /  Alb  2.6<L>  /  TBili  0.4  /  DBili  x   /  AST  170<H>  /  ALT  31  /  AlkPhos  63  05-08      CARDIAC MARKERS ( 08 May 2018 21:41 )  30.600 ng/mL / x     / x     / x     / x      CARDIAC MARKERS ( 08 May 2018 14:20 )  25.800 ng/mL / x     / 4406 U/L / x     / x      CARDIAC MARKERS ( 08 May 2018 11:07 )  20.100 ng/mL / x     / x     / x     / x            ABG - ( 09 May 2018 06:33 )  pH, Arterial: 7.47  pH, Blood: x     /  pCO2: 36    /  pO2: 179   / HCO3: 26    / Base Excess: 2.6   /  SaO2: 100                 Urinalysis Basic - ( 08 May 2018 10:04 )    Color: Yellow / Appearance: Clear / S.015 / pH: x  Gluc: x / Ketone: Trace  / Bili: Negative / Urobili: Negative mg/dL   Blood: x / Protein: 100 mg/dL / Nitrite: Positive   Leuk Esterase: Moderate / RBC: 11-25 /HPF / WBC >50   Sq Epi: x / Non Sq Epi: Occasional / Bacteria: Many        DVT Prophylaxis:                                                            Contraindication:     Advanced Directives:    Discussed with:    Visit Information:  Time spent excluding procedure:  45 cc mins    ** Time is exclusive of billed procedures and/or teaching and/or routine family updates.
CC:  Resp failure    HPI:    76 y/o male from assisted living admitted with NSTEMI (trop 30), Pulm edema, UTI sepsis and GNR sepsis.  Pt admitted to CCU and placed on NIV and IV hep and Abx.  TTE--1+ MR/EF 40-45%.  LE dopp negative for DVT.  CXR suggestive of pulm edema.      :  Tm 101 On NIV.  awake and alert.  c/o chronic back pain.  No CP and resp status improved.     5/10:  No fevers.  E. coli UTI and bacteremia.  awake and alert.  denies CP or SOB.  C/O chronic back pain.  Remains on FM      PMH:  As above.     PSH:  As above.     FH: Non Contributory other than those listed in HPI    Social History:      MEDICATIONS  (STANDING):  aspirin  chewable 81 milliGRAM(s) Oral daily  atorvastatin 40 milliGRAM(s) Oral at bedtime  cefepime  Injectable. 1000 milliGRAM(s) IV Push every 12 hours  DULoxetine 60 milliGRAM(s) Oral daily  furosemide   Injectable 40 milliGRAM(s) IV Push two times a day  furosemide   Injectable 80 milliGRAM(s) IV Push once  heparin  Infusion.  Unit(s)/Hr (10 mL/Hr) IV Continuous <Continuous>  metoprolol tartrate 12.5 milliGRAM(s) Oral two times a day  ondansetron Injectable 4 milliGRAM(s) IV Push every 6 hours  pantoprazole  Injectable 40 milliGRAM(s) IV Push daily  potassium chloride    Tablet ER 40 milliEquivalent(s) Oral once  potassium chloride  10 mEq/100 mL IVPB 10 milliEquivalent(s) IV Intermittent every 1 hour    MEDICATIONS  (PRN):  acetaminophen  Suppository 650 milliGRAM(s) Rectal every 6 hours PRN For Temp greater than 38.5 C (101.3 F)  ALPRAZolam 0.25 milliGRAM(s) Oral two times a day PRN anxiety  heparin  Injectable 5000 Unit(s) IV Push every 6 hours PRN For aPTT less than 40  HYDROmorphone  Injectable 1 milliGRAM(s) IV Push every 3 hours PRN Severe Pain (7 - 10)  oxyCODONE    IR 10 milliGRAM(s) Oral every 6 hours PRN Moderate Pain (4 - 6)      Allergies: NKDA    ROS:  SEE BELOW        ICU Vital Signs Last 24 Hrs  T(C): 37.2 (10 May 2018 01:02), Max: 37.9 (09 May 2018 18:00)  T(F): 98.9 (10 May 2018 01:02), Max: 100.2 (09 May 2018 18:00)  HR: 104 (10 May 2018 07:00) (77 - 122)  BP: 146/68 (10 May 2018 07:00) (120/62 - 163/90)  BP(mean): 87 (10 May 2018 07:00) (67 - 106)  ABP: --  ABP(mean): --  RR: 28 (10 May 2018 07:00) (17 - 32)  SpO2: 95% (10 May 2018 07:00) (85% - 100%)          I&O's Summary    09 May 2018 07:01  -  10 May 2018 07:00  --------------------------------------------------------  IN: 262 mL / OUT: 3450 mL / NET: -3188 mL        Physical Exam:  SEE BELOW                          12.3   10.73 )-----------( 224      ( 10 May 2018 05:40 )             39.2       05-10    144  |  106  |  24<H>  ----------------------------<  118<H>  3.4<L>   |  29  |  1.23    Ca    8.7      10 May 2018 05:40  Phos  2.7     05-10  Mg     2.0     10    TPro  6.5  /  Alb  2.6<L>  /  TBili  0.4  /  DBili  x   /  AST  170<H>  /  ALT  31  /  AlkPhos  63  05-08      CARDIAC MARKERS ( 08 May 2018 21:41 )  30.600 ng/mL / x     / x     / x     / x      CARDIAC MARKERS ( 08 May 2018 14:20 )  25.800 ng/mL / x     / 4406 U/L / x     / x      CARDIAC MARKERS ( 08 May 2018 11:07 )  20.100 ng/mL / x     / x     / x     / x            ABG - ( 09 May 2018 06:33 )  pH, Arterial: 7.47  pH, Blood: x     /  pCO2: 36    /  pO2: 179   / HCO3: 26    / Base Excess: 2.6   /  SaO2: 100                 Urinalysis Basic - ( 08 May 2018 10:04 )    Color: Yellow / Appearance: Clear / S.015 / pH: x  Gluc: x / Ketone: Trace  / Bili: Negative / Urobili: Negative mg/dL   Blood: x / Protein: 100 mg/dL / Nitrite: Positive   Leuk Esterase: Moderate / RBC: 11-25 /HPF / WBC >50   Sq Epi: x / Non Sq Epi: Occasional / Bacteria: Many        DVT Prophylaxis:                                                            Contraindication:     Advanced Directives:    Discussed with:    Visit Information:  Time spent excluding procedure:  60 cc mins    ** Time is exclusive of billed procedures and/or teaching and/or routine family updates.
CC:  Resp failure    HPI:    78 y/o male from assisted living admitted with NSTEMI (trop 30), Pulm edema, UTI sepsis and GNR sepsis.  Pt admitted to CCU and placed on NIV and IV hep and Abx.  TTE--1+ MR/EF 40-45%.  LE dopp negative for DVT.  CXR suggestive of pulm edema.      5/9:  Tm 101 On NIV.  awake and alert.  c/o chronic back pain.  No CP and resp status improved.     5/10:  No fevers.  E. coli UTI and bacteremia.  awake and alert.  denies CP or SOB.  C/O chronic back pain.  Remains on FM    5/11: On 40% FiO2, On NIV still.  On precedex         PAST MEDICAL & SURGICAL HISTORY:  Benign prostatic hypertrophy without urinary obstruction  Anxiety  Depression  Hypertension  Spinal stenosis  Kyphosis  Neuropathy  GERD (gastroesophageal reflux disease)  Anemia  S/P spinal fusion      FAMILY HISTORY:      Social Hx:    Allergies    No Known Allergies    Intolerances            ICU Vital Signs Last 24 Hrs  T(C): 37.1 (11 May 2018 08:33), Max: 37.8 (10 May 2018 10:00)  T(F): 98.7 (11 May 2018 08:33), Max: 100.1 (10 May 2018 10:00)  HR: 52 (11 May 2018 08:00) (52 - 117)  BP: 121/58 (11 May 2018 08:00) (98/56 - 156/78)  BP(mean): 73 (11 May 2018 08:00) (62 - 112)  ABP: --  ABP(mean): --  RR: 35 (11 May 2018 08:00) (14 - 35)  SpO2: 100% (11 May 2018 08:00) (91% - 100%)          I&O's Summary    10 May 2018 07:01  -  11 May 2018 07:00  --------------------------------------------------------  IN: 425 mL / OUT: 3050 mL / NET: -2625 mL                              11.5   10.75 )-----------( 217      ( 11 May 2018 03:47 )             37.8       05-11    147<H>  |  104  |  29<H>  ----------------------------<  112<H>  3.8   |  35<H>  |  1.31<H>    Ca    8.5      11 May 2018 03:47  Phos  2.6     05-11  Mg     1.9     05-11              ABG - ( 10 May 2018 22:01 )  pH, Arterial: 7.51  pH, Blood: x     /  pCO2: 39    /  pO2: 79    / HCO3: 31    / Base Excess: 7.4   /  SaO2: 96                      MEDICATIONS  (STANDING):  aspirin  chewable 81 milliGRAM(s) Oral daily  atorvastatin 40 milliGRAM(s) Oral at bedtime  cefepime  Injectable. 1000 milliGRAM(s) IV Push every 12 hours  dexmedetomidine Infusion 0.5 MICROgram(s)/kG/Hr (10.2 mL/Hr) IV Continuous <Continuous>  DULoxetine 60 milliGRAM(s) Oral daily  furosemide   Injectable 40 milliGRAM(s) IV Push two times a day  heparin  Infusion.  Unit(s)/Hr (10 mL/Hr) IV Continuous <Continuous>  metoprolol tartrate 12.5 milliGRAM(s) Oral two times a day  ondansetron Injectable 4 milliGRAM(s) IV Push every 6 hours  pantoprazole  Injectable 40 milliGRAM(s) IV Push daily    MEDICATIONS  (PRN):  acetaminophen  Suppository 650 milliGRAM(s) Rectal every 6 hours PRN For Temp greater than 38.5 C (101.3 F)  ALPRAZolam 0.25 milliGRAM(s) Oral two times a day PRN anxiety  heparin  Injectable 5000 Unit(s) IV Push every 6 hours PRN For aPTT less than 40  LORazepam   Injectable 1 milliGRAM(s) IV Push every 4 hours PRN Agitation  oxyCODONE    IR 10 milliGRAM(s) Oral every 6 hours PRN Moderate Pain (4 - 6)      DVT Prophylaxis: UFH    Advanced Directives:  Discussed with:    Visit Information: 30 min    ** Time is exclusive of billed procedures and/or teaching and/or routine family updates.
Patient is a 77y old  Male who presents with a chief complaint of AMI and hypoxia (08 May 2018 12:49)        Date of service: 05-11-18 @ 15:47  Patient confused; on oxygen via face mask  ROS: unable to obtain secondary to patient medical condition     MEDICATIONS  (STANDING):  aspirin  chewable 81 milliGRAM(s) Oral daily  atorvastatin 40 milliGRAM(s) Oral at bedtime  cefepime  Injectable. 1000 milliGRAM(s) IV Push every 12 hours  dexmedetomidine Infusion 0.5 MICROgram(s)/kG/Hr (10.2 mL/Hr) IV Continuous <Continuous>  DULoxetine 60 milliGRAM(s) Oral daily  furosemide   Injectable 40 milliGRAM(s) IV Push two times a day  magnesium sulfate  IVPB 1 Gram(s) IV Intermittent once  metoprolol tartrate 12.5 milliGRAM(s) Oral two times a day  ondansetron Injectable 4 milliGRAM(s) IV Push every 6 hours  pantoprazole  Injectable 40 milliGRAM(s) IV Push daily  potassium chloride  10 mEq/100 mL IVPB 10 milliEquivalent(s) IV Intermittent once    MEDICATIONS  (PRN):  acetaminophen  Suppository 650 milliGRAM(s) Rectal every 6 hours PRN For Temp greater than 38.5 C (101.3 F)  ALPRAZolam 0.25 milliGRAM(s) Oral two times a day PRN anxiety  LORazepam   Injectable 1 milliGRAM(s) IV Push every 4 hours PRN Agitation  oxyCODONE    IR 10 milliGRAM(s) Oral every 6 hours PRN Moderate Pain (4 - 6)      Vital Signs Last 24 Hrs  T(C): 36.6 (11 May 2018 13:11), Max: 37.1 (11 May 2018 08:33)  T(F): 97.9 (11 May 2018 13:11), Max: 98.7 (11 May 2018 08:33)  HR: 80 (11 May 2018 14:00) (52 - 110)  BP: 114/54 (11 May 2018 14:00) (98/56 - 148/53)  BP(mean): 69 (11 May 2018 14:00) (61 - 93)  RR: 39 (11 May 2018 14:00) (14 - 44)  SpO2: 93% (11 May 2018 14:00) (84% - 100%)    Physical Exam:      PE:    Constitutional: frail looking  HEENT: NC/AT, EOMI, PERRLA, conjunctivae clear; ears and nose atraumatic; pharynx clear; rotten teeth  Neck: supple; thyroid not palpable  Respiratory: respiratory effort increased; clear to auscultation  Cardiovascular: S1S2 regular, no murmurs  Abdomen: soft, not tender, not distended, positive BS; no liver or spleen organomegaly  Genitourinary: no suprapubic tenderness  Musculoskeletal: bilateral lower extremity edema, erythema; dirt on feet; dry ulcer on plantar aspect by first MTP   Neurological/ Psychiatric:   moving all extremities  Skin: no rashes; no palpable lesions; skin desquamation of anterior lower skin    Labs: all available labs reviewed                  Labs:                        Labs:                        11.5   10.75 )-----------( 217      ( 11 May 2018 03:47 )             37.8     05-11    147<H>  |  104  |  29<H>  ----------------------------<  112<H>  3.8   |  35<H>  |  1.31<H>    Ca    8.5      11 May 2018 03:47  Phos  2.6     05-11  Mg     1.9     05-11             Cultures:       Culture - Urine (collected 05-08-18 @ 17:30)  Source: .Urine Catheterized  Final Report (05-09-18 @ 22:11):    No growth    Culture - Urine (collected 05-08-18 @ 10:04)  Source: .Urine None  Final Report (05-10-18 @ 12:37):    >100,000 CFU/ml Escherichia coli  Organism: Escherichia coli (05-10-18 @ 12:37)  Organism: Escherichia coli (05-10-18 @ 12:37)      -  Amikacin: S <=8      -  Amoxicillin/Clavulanic Acid: S <=8/4      -  Ampicillin: R >16 These ampicillin results predict results for amoxicillin      -  Ampicillin/Sulbactam: S <=4/2      -  Aztreonam: S <=4      -  Cefazolin: S <=2 This predicts results for oral agents cefaclor, cefdinir, cefpodoxime, cefprozil, cefuroxime axetil, cephalexin and locarbef for uncomplicated UTI. Note that some isolates may be susceptible to these agents while testing resistant to cefazolin.      -  Cefepime: S <=2      -  Cefoxitin: S <=4      -  Ceftriaxone: S <=1 Enterobacter, Citrobacter, and Serratia may develop resistance during prolonged therapy      -  Ciprofloxacin: S <=0.5      -  Ertapenem: S <=0.5      -  Gentamicin: S <=1      -  Imipenem: S <=1      -  Levofloxacin: S <=1      -  Meropenem: S <=1      -  Nitrofurantoin: S <=32 Should not be used to treat pyelonephritis      -  Piperacillin/Tazobactam: S <=8      -  Tigecycline: S <=1      -  Tobramycin: S <=2      -  Trimethoprim/Sulfamethoxazole: S <=0.5/9.5      Method Type: HOMERO    Culture - Blood (collected 05-08-18 @ 10:04)  Source: .Blood None  Gram Stain (05-09-18 @ 20:48):    Growth in anaerobic bottle: Gram Negative Rods    Growth in aerobic bottle: Gram Negative Rods  Final Report (05-11-18 @ 09:00):    Growth in aerobic and anaerobic bottles: Escherichia coli    "Due to technical problems, Proteus sp. will Not be reported as part of    the BCID panel until further notice"    ***Blood Panel PCR results on this specimen are available    approximately 3 hours after the Gram stain result.***    Gram stain, PCR, and/or culture results may not always    correspond due to difference in methodologies.    ************************************************************    This PCR assay was performed using [employer].    The following targets are tested for: Enterococcus,    vancomycin resistant enterococci, Listeria monocytogenes,    coagulase negative staphylococci, S. aureus,    methicillin resistant S. aureus, Streptococcus agalactiae    (Group B), S. pneumoniae, S.pyogenes (Group A),    Acinetobacter baumannii, Enterobacter cloacae, E. coli,    Klebsiella oxytoca, K. pneumoniae, Proteus sp.,    Serratia marcescens, Haemophilus influenzae,    Neisseria meningitidis, Pseudomonas aeruginosa, Candida    albicans, C. glabrata, C krusei, C parapsilosis,    C. tropicalis and the KPC resistance gene.  Organism: Blood Culture PCR  Escherichia coli (05-11-18 @ 09:00)  Organism: Escherichia coli (05-11-18 @ 09:00)      -  Amikacin: S <=8      -  Ampicillin: R >16 These ampicillin results predict results for amoxicillin      -  Ampicillin/Sulbactam: S 8/4      -  Aztreonam: S <=4      -  Cefazolin: S <=2      -  Cefepime: S <=2      -  Cefoxitin: S <=4      -  Ceftriaxone: S <=1 Enterobacter, Citrobacter, and Serratia may develop resistance during prolonged therapy      -  Ciprofloxacin: S <=0.5      -  Ertapenem: S <=0.5      -  Gentamicin: S <=1      -  Imipenem: S <=1      -  Levofloxacin: S <=1      -  Meropenem: S <=1      -  Piperacillin/Tazobactam: S <=8      -  Tobramycin: S <=2      -  Trimethoprim/Sulfamethoxazole: S <=0.5/9.5      Method Type: HOMERO  Organism: Blood Culture PCR (05-11-18 @ 09:00)      -  Escherichia coli: Detec      Method Type: PCR    Culture - Blood (collected 05-08-18 @ 10:04)  Source: .Blood None  Gram Stain (05-10-18 @ 08:34):    Growth in anaerobic bottle: Gram Negative Rods  Final Report (05-11-18 @ 09:05):    Growth in anaerobic bottle: Escherichia coli    See previous culture [id number]                      Radiology: all available radiological tests reviewed    Advanced directives addressed: full resuscitation
Patient is a 77y old  Male who presents with a chief complaint of AMI and hypoxia (08 May 2018 12:49)      Date of service: 05-14-18 @ 12:46  Patient more alert, calm  ROS: no fever or chills; denies dizziness, no HA, no SOB or cough, no abdominal pain, no diarrhea or constipation; no dysuria, no urinary frequency, no legs pain, no rashes    MEDICATIONS  (STANDING):  aspirin  chewable 81 milliGRAM(s) Oral daily  atorvastatin 40 milliGRAM(s) Oral at bedtime  cefepime  Injectable. 1000 milliGRAM(s) IV Push every 12 hours  DULoxetine 60 milliGRAM(s) Oral daily  heparin  Injectable 5000 Unit(s) SubCutaneous every 8 hours  lisinopril 2.5 milliGRAM(s) Oral daily  metoprolol succinate ER 50 milliGRAM(s) Oral daily  oxyCODONE  ER Tablet 30 milliGRAM(s) Oral every 12 hours  pantoprazole  Injectable 40 milliGRAM(s) IV Push daily  potassium phosphate / sodium phosphate powder 2 Packet(s) Oral two times a day  tamsulosin 0.8 milliGRAM(s) Oral at bedtime    MEDICATIONS  (PRN):  acetaminophen  Suppository 650 milliGRAM(s) Rectal every 6 hours PRN For Temp greater than 38.5 C (101.3 F)  ALPRAZolam 0.25 milliGRAM(s) Oral two times a day PRN anxiety  HYDROmorphone  Injectable 1 milliGRAM(s) IV Push every 4 hours PRN Moderate Pain (4 - 6)  ondansetron Injectable 4 milliGRAM(s) IV Push every 4 hours PRN Nausea and/or Vomiting      Vital Signs Last 24 Hrs  T(C): 37.6 (14 May 2018 08:28), Max: 37.6 (14 May 2018 08:28)  T(F): 99.7 (14 May 2018 08:28), Max: 99.7 (14 May 2018 08:28)  HR: 92 (14 May 2018 08:00) (75 - 99)  BP: 142/73 (14 May 2018 08:00) (117/65 - 149/73)  BP(mean): 89 (14 May 2018 08:00) (68 - 93)  RR: 24 (14 May 2018 08:00) (17 - 31)  SpO2: 98% (14 May 2018 08:00) (92% - 99%)    Physical Exam:      PE:    Constitutional: frail looking  HEENT: NC/AT, EOMI, PERRLA, conjunctivae clear; ears and nose atraumatic; pharynx clear; rotten teeth  Neck: supple; thyroid not palpable  Respiratory: respiratory effort normal; clear to auscultation  Cardiovascular: S1S2 regular, no murmurs  Abdomen: soft, not tender, not distended, positive BS; no liver or spleen organomegaly  Genitourinary: no suprapubic tenderness  Musculoskeletal: bilateral lower extremity edema, erythema; dirt on feet; dry ulcer on plantar aspect by first MTP   Neurological/ Psychiatric:   moving all extremities  Skin: no rashes; no palpable lesions; skin desquamation of anterior lower skin    Labs: all available labs reviewed                Labs:                        10.6   6.04  )-----------( 272      ( 14 May 2018 04:52 )             34.5     05-14    142  |  106  |  23  ----------------------------<  125<H>  3.5   |  29  |  0.94    Ca    8.5      14 May 2018 04:52  Phos  1.9     05-14  Mg     2.0     05-14             Cultures:       Culture - Blood (collected 05-12-18 @ 05:31)  Source: .Blood None  Preliminary Report (05-13-18 @ 13:01):    No growth to date.    Culture - Blood (collected 05-12-18 @ 05:24)  Source: .Blood None  Preliminary Report (05-13-18 @ 13:01):    No growth to date.    Culture - Urine (collected 05-08-18 @ 17:30)  Source: .Urine Catheterized  Final Report (05-09-18 @ 22:11):    No growth    Culture - Urine (collected 05-08-18 @ 10:04)  Source: .Urine None  Final Report (05-10-18 @ 12:37):    >100,000 CFU/ml Escherichia coli  Organism: Escherichia coli (05-10-18 @ 12:37)  Organism: Escherichia coli (05-10-18 @ 12:37)      -  Amikacin: S <=8      -  Amoxicillin/Clavulanic Acid: S <=8/4      -  Ampicillin: R >16 These ampicillin results predict results for amoxicillin      -  Ampicillin/Sulbactam: S <=4/2      -  Aztreonam: S <=4      -  Cefazolin: S <=2 This predicts results for oral agents cefaclor, cefdinir, cefpodoxime, cefprozil, cefuroxime axetil, cephalexin and locarbef for uncomplicated UTI. Note that some isolates may be susceptible to these agents while testing resistant to cefazolin.      -  Cefepime: S <=2      -  Cefoxitin: S <=4      -  Ceftriaxone: S <=1 Enterobacter, Citrobacter, and Serratia may develop resistance during prolonged therapy      -  Ciprofloxacin: S <=0.5      -  Ertapenem: S <=0.5      -  Gentamicin: S <=1      -  Imipenem: S <=1      -  Levofloxacin: S <=1      -  Meropenem: S <=1      -  Nitrofurantoin: S <=32 Should not be used to treat pyelonephritis      -  Piperacillin/Tazobactam: S <=8      -  Tigecycline: S <=1      -  Tobramycin: S <=2      -  Trimethoprim/Sulfamethoxazole: S <=0.5/9.5      Method Type: HOMERO    Culture - Blood (collected 05-08-18 @ 10:04)  Source: .Blood None  Gram Stain (05-09-18 @ 20:48):    Growth in anaerobic bottle: Gram Negative Rods    Growth in aerobic bottle: Gram Negative Rods  Final Report (05-11-18 @ 09:00):    Growth in aerobic and anaerobic bottles: Escherichia coli    "Due to technical problems, Proteus sp. will Not be reported as part of    the BCID panel until further notice"    ***Blood Panel PCR results on this specimen are available    approximately 3 hours after the Gram stain result.***    Gram stain, PCR, and/or culture results may not always    correspond due to difference in methodologies.    ************************************************************    This PCR assay was performed using [employer].    The following targets are tested for: Enterococcus,    vancomycin resistant enterococci, Listeria monocytogenes,    coagulase negative staphylococci, S. aureus,    methicillin resistant S. aureus, Streptococcus agalactiae    (Group B), S. pneumoniae, S.pyogenes (Group A),    Acinetobacter baumannii, Enterobacter cloacae, E. coli,    Klebsiella oxytoca, K. pneumoniae, Proteus sp.,    Serratia marcescens, Haemophilus influenzae,    Neisseria meningitidis, Pseudomonas aeruginosa, Candida    albicans, C. glabrata, C krusei, C parapsilosis,    C. tropicalis and the KPC resistance gene.  Organism: Blood Culture PCR  Escherichia coli (05-11-18 @ 09:00)  Organism: Escherichia coli (05-11-18 @ 09:00)      -  Amikacin: S <=8      -  Ampicillin: R >16 These ampicillin results predict results for amoxicillin      -  Ampicillin/Sulbactam: S 8/4      -  Aztreonam: S <=4      -  Cefazolin: S <=2      -  Cefepime: S <=2      -  Cefoxitin: S <=4      -  Ceftriaxone: S <=1 Enterobacter, Citrobacter, and Serratia may develop resistance during prolonged therapy      -  Ciprofloxacin: S <=0.5      -  Ertapenem: S <=0.5      -  Gentamicin: S <=1      -  Imipenem: S <=1      -  Levofloxacin: S <=1      -  Meropenem: S <=1      -  Piperacillin/Tazobactam: S <=8      -  Tobramycin: S <=2      -  Trimethoprim/Sulfamethoxazole: S <=0.5/9.5      Method Type: HOMERO  Organism: Blood Culture PCR (05-11-18 @ 09:00)      -  Escherichia coli: Detec      Method Type: PCR    Culture - Blood (collected 05-08-18 @ 10:04)  Source: .Blood None  Gram Stain (05-10-18 @ 08:34):    Growth in anaerobic bottle: Gram Negative Rods  Final Report (05-11-18 @ 09:05):    Growth in anaerobic bottle: Escherichia coli    See previous culture [id number]                        Radiology: all available radiological tests reviewed    Advanced directives addressed: full resuscitation
Patient is a 77y old  Male who presents with a chief complaint of AMI and hypoxia (08 May 2018 12:49)    Date of service: 05-10-18 @ 12:16  Patient on bipap, moaning  ROS: unable to obtain secondary to patient medical condition      MEDICATIONS  (STANDING):  aspirin  chewable 81 milliGRAM(s) Oral daily  atorvastatin 40 milliGRAM(s) Oral at bedtime  cefepime  Injectable. 1000 milliGRAM(s) IV Push every 12 hours  DULoxetine 60 milliGRAM(s) Oral daily  furosemide   Injectable 40 milliGRAM(s) IV Push two times a day  heparin  Infusion.  Unit(s)/Hr (10 mL/Hr) IV Continuous <Continuous>  metoprolol tartrate 12.5 milliGRAM(s) Oral two times a day  ondansetron Injectable 4 milliGRAM(s) IV Push every 6 hours  pantoprazole  Injectable 40 milliGRAM(s) IV Push daily  potassium chloride    Tablet ER 40 milliEquivalent(s) Oral once  potassium chloride  10 mEq/100 mL IVPB 10 milliEquivalent(s) IV Intermittent every 1 hour    MEDICATIONS  (PRN):  acetaminophen  Suppository 650 milliGRAM(s) Rectal every 6 hours PRN For Temp greater than 38.5 C (101.3 F)  ALPRAZolam 0.25 milliGRAM(s) Oral two times a day PRN anxiety  heparin  Injectable 5000 Unit(s) IV Push every 6 hours PRN For aPTT less than 40  oxyCODONE    IR 10 milliGRAM(s) Oral every 6 hours PRN Moderate Pain (4 - 6)      Vital Signs Last 24 Hrs  T(C): 37.8 (10 May 2018 10:00), Max: 37.9 (09 May 2018 18:00)  T(F): 100.1 (10 May 2018 10:00), Max: 100.2 (09 May 2018 18:00)  HR: 94 (10 May 2018 11:43) (77 - 122)  BP: 144/78 (10 May 2018 09:00) (120/62 - 163/90)  BP(mean): 94 (10 May 2018 09:00) (67 - 107)  RR: 20 (10 May 2018 10:00) (17 - 30)  SpO2: 98% (10 May 2018 11:43) (85% - 100%)    Physical Exam:    PE:    Constitutional: frail looking  HEENT: NC/AT, EOMI, PERRLA, conjunctivae clear; ears and nose atraumatic; pharynx clear; rotten teeth  Neck: supple; thyroid not palpable  Respiratory: respiratory effort increased; clear to auscultation  Cardiovascular: S1S2 regular, no murmurs  Abdomen: soft, not tender, not distended, positive BS; no liver or spleen organomegaly  Genitourinary: no suprapubic tenderness  Musculoskeletal: bilateral lower extremity edema, erythema; dirt on feet; dry ulcer on plantar aspect by first MTP   Neurological/ Psychiatric:   moving all extremities  Skin: no rashes; no palpable lesions; skin desquamation of anterior lower skin    Labs: all available labs reviewed                  Labs:                        12.3   10.73 )-----------( 224      ( 10 May 2018 05:40 )             39.2     05-10    144  |  106  |  24<H>  ----------------------------<  118<H>  3.4<L>   |  29  |  1.23    Ca    8.7      10 May 2018 05:40  Phos  2.7     05-10  Mg     2.0     05-10             Cultures:       Culture - Urine (collected 05-08-18 @ 17:30)  Source: .Urine Catheterized  Final Report (05-09-18 @ 22:11):    No growth    Culture - Urine (collected 05-08-18 @ 10:04)  Source: .Urine None  Preliminary Report (05-09-18 @ 09:25):    >100,000 CFU/ml Escherichia coli    Culture - Blood (collected 05-08-18 @ 10:04)  Source: .Blood None  Gram Stain (05-09-18 @ 20:48):    Growth in anaerobic bottle: Gram Negative Rods    Growth in aerobic bottle: Gram Negative Rods  Preliminary Report (05-10-18 @ 10:39):    Growth in aerobic and anaerobic bottles: Escherichia coli    "Due to technical problems, Proteus sp. will Not be reported as part of    the BCID panel until further notice"    ***Blood Panel PCR results on this specimen are available    approximately 3 hours after the Gram stain result.***    Gram stain, PCR, and/or culture results may not always    correspond due to difference in methodologies.    ************************************************************    This PCR assay was performed using [employer].    The following targets are tested for: Enterococcus,    vancomycin resistant enterococci, Listeria monocytogenes,    coagulase negative staphylococci, S. aureus,    methicillin resistant S. aureus, Streptococcus agalactiae    (Group B), S. pneumoniae, S.pyogenes (Group A),    Acinetobacter baumannii, Enterobacter cloacae, E. coli,    Klebsiella oxytoca, K. pneumoniae, Proteus sp.,    Serratia marcescens, Haemophilus influenzae,    Neisseria meningitidis, Pseudomonas aeruginosa, Candida    albicans, C. glabrata, C krusei, C parapsilosis,    C. tropicalis and the KPC resistance gene.  Organism: Blood Culture PCR (05-09-18 @ 05:35)  Organism: Blood Culture PCR (05-09-18 @ 05:35)      -  Escherichia coli: Detec      Method Type: PCR    Culture - Blood (collected 05-08-18 @ 10:04)  Source: .Blood None  Gram Stain (05-10-18 @ 08:34):    Growth in anaerobic bottle: Gram Negative Rods  Preliminary Report (05-10-18 @ 08:35):    Growth in anaerobic bottle: Gram Negative Rods                      Radiology: all available radiological tests reviewed    Advanced directives addressed: full resuscitation
Patient is a 77y old  Male who presents with a chief complaint of AMI and hypoxia.    HPI:  The patient is a 77 year old male with prior medical history as stated below presented after a fall at his assisted living.  He presented with hypoxia.  He denies CP.  In the ED he is noted to be hypoxic and placed on NIV for likely pulmonary edema.  Also noted to be in YUNIOR on CKD, NSTEMI, and anemia.    18:  Pt. off bipap, confused.       PAST MEDICAL & SURGICAL HISTORY:  Benign prostatic hypertrophy without urinary obstruction  Anxiety  Depression  Hypertension  Spinal stenosis  Kyphosis  Neuropathy  GERD (gastroesophageal reflux disease)  Anemia  S/P spinal fusion      MEDICATIONS  (STANDING):  cefTRIAXone Injectable. 1000 milliGRAM(s) IV Push every 24 hours  heparin  Infusion.  Unit(s)/Hr (10 mL/Hr) IV Continuous <Continuous>  pantoprazole  Injectable 40 milliGRAM(s) IV Push daily    MEDICATIONS  (PRN):      FAMILY HISTORY:  unable to obtain from pt.       SOCIAL HISTORY:  unable to obtain on pt.    REVIEW OF SYSTEMS:  CONSTITUTIONAL:    No fatigue, malaise, lethargy.  No fever or chills.  HEENT:  Eyes:  No visual changes.     ENT:  No epistaxis.  No sinus pain.    RESPIRATORY:  No cough.  No wheeze.  No hemoptysis.  c/o shortness of breath.  CARDIOVASCULAR:  No chest pains.  No palpitations. c/o shortness of breath, No orthopnea or PND.  GASTROINTESTINAL:  No abdominal pain.  No nausea or vomiting.    GENITOURINARY:    No hematuria.    MUSCULOSKELETAL:  No musculoskeletal pain.  No joint swelling.  No arthritis.  NEUROLOGICAL:  No tingling or numbness or weakness.  PSYCHIATRIC:  No confusion  SKIN:  No rashes.    ENDOCRINE:  No unexplained weight loss.  No polydipsia.   HEMATOLOGIC:  No anemia.  No prolonged or excessive bleeding.   ALLERGIC AND IMMUNOLOGIC:  No pruritus.          Vital Signs Last 24 Hrs  T(C): 36.2 (08 May 2018 13:15), Max: 39.6 (08 May 2018 09:45)  T(F): 97.2 (08 May 2018 13:15), Max: 103.3 (08 May 2018 09:45)  HR: 74 (08 May 2018 14:00) (72 - 112)  BP: 102/59 (08 May 2018 14:00) (94/52 - 113/60)  BP(mean): 69 (08 May 2018 14:00) (69 - 72)  RR: 25 (08 May 2018 14:00) (15 - 26)  SpO2: 100% (08 May 2018 14:00) (74% - 100%)    PHYSICAL EXAM-    Constitutional: The patient appears to be normal, well developed, well nourished and alert and oriented to time, place and person. The patient does not appear acutely ill.     Head: Head is normocephalic and atraumatic.      Neck: No jugular venous distention. No audible carotid bruits. There are strong carotid pulses bilaterally. No JVD.     Cardiovascular: Regular rate and rhythm without S3, S4. No murmurs or rubs are appreciated.      Respiratory: crackles b/l     Abdomen: Soft, nontender, nondistended with positive bowel sounds.      Extremity: No tenderness. No  pitting edema     Neurologic: The patient is alert and oriented.      Skin: No rash, no obvious lesions noted.      Psychiatric: The patient appears to be emotionally stable.      INTERPRETATION OF TELEMETRY: sinus rythm, bigeminy, isolated PVCs.     ECG: Sinus rythm , normal axis, ST depression in V5-6.     I&O's Detail      LABS:                        7.4    5.14  )-----------( 131      ( 08 May 2018 10:04 )             24.6     05-08    143  |  106  |  42<H>  ----------------------------<  117<H>  4.1   |  25  |  2.21<H>    Ca    7.9<L>      08 May 2018 11:07    TPro  6.5  /  Alb  2.6<L>  /  TBili  0.4  /  DBili  x   /  AST  170<H>  /  ALT  31  /  AlkPhos  63  05-08    CARDIAC MARKERS ( 08 May 2018 14:20 )  25.800 ng/mL / x     / 4406 U/L / x     / x      CARDIAC MARKERS ( 08 May 2018 11:07 )  20.100 ng/mL / x     / x     / x     / x          PT/INR - ( 08 May 2018 13:06 )   PT: 18.0 sec;   INR: 1.65 ratio         PTT - ( 08 May 2018 13:06 )  PTT:29.1 sec  Urinalysis Basic - ( 08 May 2018 10:04 )    Color: Yellow / Appearance: Clear / S.015 / pH: x  Gluc: x / Ketone: Trace  / Bili: Negative / Urobili: Negative mg/dL   Blood: x / Protein: 100 mg/dL / Nitrite: Positive   Leuk Esterase: Moderate / RBC: 11-25 /HPF / WBC >50   Sq Epi: x / Non Sq Epi: Occasional / Bacteria: Many      I&O's Summary    BNP  RADIOLOGY & ADDITIONAL STUDIES:  < from: Xray Chest 1 View- PORTABLE-Urgent (18 @ 10:10) >  EXAM:  XR CHEST PORTABLE URGENT 1V                            PROCEDURE DATE:  2018          INTERPRETATION:      INDICATION: Fever and chills weakness shortness of breath    Single frontal view of the chest compared to prior study of 3/16/2018    FINDINGS/  IMPRESSION:       There are parenchymal interstitial edema with perihilar vascular   prominence. There is no large pleural effusion. There is no pneumothorax.   There is cardiomegaly. Patient is status post multilevel thoracic   instrumentation.  There is osteopenia and degenerative changes.                  CATALINA BAEZA   This document has been electronically signed. May  8 2018 11:29AM    < end of copied text >
Patient is a 77y old  Male who presents with a chief complaint of AMI and hypoxia.    HPI:  The patient is a 77 year old male with prior medical history as stated below presented after a fall at his assisted living.  He presented with hypoxia.  He denies CP.  In the ED he is noted to be hypoxic and placed on NIV for likely pulmonary edema.  Also noted to be in YUNIOR on CKD, NSTEMI, and anemia.    18:  Pt. off bipap, confused.       PAST MEDICAL & SURGICAL HISTORY:  Benign prostatic hypertrophy without urinary obstruction  Anxiety  Depression  Hypertension  Spinal stenosis  Kyphosis  Neuropathy  GERD (gastroesophageal reflux disease)  Anemia  S/P spinal fusion      MEDICATIONS  (STANDING):  cefTRIAXone Injectable. 1000 milliGRAM(s) IV Push every 24 hours  heparin  Infusion.  Unit(s)/Hr (10 mL/Hr) IV Continuous <Continuous>  pantoprazole  Injectable 40 milliGRAM(s) IV Push daily    MEDICATIONS  (PRN):      FAMILY HISTORY:  unable to obtain from pt.       SOCIAL HISTORY:  unable to obtain on pt.    REVIEW OF SYSTEMS:  CONSTITUTIONAL:    No fatigue, malaise, lethargy.  No fever or chills.  HEENT:  Eyes:  No visual changes.     ENT:  No epistaxis.  No sinus pain.    RESPIRATORY:  No cough.  No wheeze.  No hemoptysis.  c/o shortness of breath.  CARDIOVASCULAR:  No chest pains.  No palpitations. c/o shortness of breath, No orthopnea or PND.  GASTROINTESTINAL:  No abdominal pain.  No nausea or vomiting.    GENITOURINARY:    No hematuria.    MUSCULOSKELETAL:  No musculoskeletal pain.  No joint swelling.  No arthritis.  NEUROLOGICAL:  No tingling or numbness or weakness.  PSYCHIATRIC:  No confusion  SKIN:  No rashes.    ENDOCRINE:  No unexplained weight loss.  No polydipsia.   HEMATOLOGIC:  No anemia.  No prolonged or excessive bleeding.   ALLERGIC AND IMMUNOLOGIC:  No pruritus.          Vital Signs Last 24 Hrs  T(C): 36.2 (08 May 2018 13:15), Max: 39.6 (08 May 2018 09:45)  T(F): 97.2 (08 May 2018 13:15), Max: 103.3 (08 May 2018 09:45)  HR: 74 (08 May 2018 14:00) (72 - 112)  BP: 102/59 (08 May 2018 14:00) (94/52 - 113/60)  BP(mean): 69 (08 May 2018 14:00) (69 - 72)  RR: 25 (08 May 2018 14:00) (15 - 26)  SpO2: 100% (08 May 2018 14:00) (74% - 100%)    PHYSICAL EXAM-    Constitutional: The patient appears to be normal, well developed, well nourished and alert and oriented to time, place and person. The patient does not appear acutely ill.     Head: Head is normocephalic and atraumatic.      Neck: No jugular venous distention. No audible carotid bruits. There are strong carotid pulses bilaterally. No JVD.     Cardiovascular: Regular rate and rhythm without S3, S4. No murmurs or rubs are appreciated.      Respiratory: crackles b/l     Abdomen: Soft, nontender, nondistended with positive bowel sounds.      Extremity: No tenderness. No  pitting edema     Neurologic: The patient is alert and oriented.      Skin: No rash, no obvious lesions noted.      Psychiatric: The patient appears to be emotionally stable.      INTERPRETATION OF TELEMETRY: sinus rythm, bigeminy, isolated PVCs.     ECG: Sinus rythm , normal axis, ST depression in V5-6.     I&O's Detail      LABS:                        7.4    5.14  )-----------( 131      ( 08 May 2018 10:04 )             24.6     05-08    143  |  106  |  42<H>  ----------------------------<  117<H>  4.1   |  25  |  2.21<H>    Ca    7.9<L>      08 May 2018 11:07    TPro  6.5  /  Alb  2.6<L>  /  TBili  0.4  /  DBili  x   /  AST  170<H>  /  ALT  31  /  AlkPhos  63  05-08    CARDIAC MARKERS ( 08 May 2018 14:20 )  25.800 ng/mL / x     / 4406 U/L / x     / x      CARDIAC MARKERS ( 08 May 2018 11:07 )  20.100 ng/mL / x     / x     / x     / x          PT/INR - ( 08 May 2018 13:06 )   PT: 18.0 sec;   INR: 1.65 ratio         PTT - ( 08 May 2018 13:06 )  PTT:29.1 sec  Urinalysis Basic - ( 08 May 2018 10:04 )    Color: Yellow / Appearance: Clear / S.015 / pH: x  Gluc: x / Ketone: Trace  / Bili: Negative / Urobili: Negative mg/dL   Blood: x / Protein: 100 mg/dL / Nitrite: Positive   Leuk Esterase: Moderate / RBC: 11-25 /HPF / WBC >50   Sq Epi: x / Non Sq Epi: Occasional / Bacteria: Many      I&O's Summary    BNP  RADIOLOGY & ADDITIONAL STUDIES:  < from: Xray Chest 1 View- PORTABLE-Urgent (18 @ 10:10) >  EXAM:  XR CHEST PORTABLE URGENT 1V                            PROCEDURE DATE:  2018          INTERPRETATION:      INDICATION: Fever and chills weakness shortness of breath    Single frontal view of the chest compared to prior study of 3/16/2018    FINDINGS/  IMPRESSION:       There are parenchymal interstitial edema with perihilar vascular   prominence. There is no large pleural effusion. There is no pneumothorax.   There is cardiomegaly. Patient is status post multilevel thoracic   instrumentation.  There is osteopenia and degenerative changes.                  CATALINA BAEZA   This document has been electronically signed. May  8 2018 11:29AM    < end of copied text >
Patient is a 77y old  Male who presents with a chief complaint of AMI and hypoxia.    HPI:  The patient is a 77 year old male with prior medical history as stated below presented after a fall at his assisted living.  He presented with hypoxia.  He denies CP.  In the ED he is noted to be hypoxic and placed on NIV for likely pulmonary edema.  Also noted to be in YUNIOR on CKD, NSTEMI, and anemia.    He is admitted to CCU and is on bipap currently.  He denies any CP but c/o back pain.    - pt seen and examined by me today. Pt c/o back pain.No overnight events.  Respiratory status improving. Off bipap but on NRB mask.     5/10- pt seen and examined by me today.  Pt still on NRB mask and refusing BIpap per staff.  He is still in respiratory distress but reportedly better than last night.  Pt c/o back pain but no CP.   - Pt off bipap currently . He is lethargic on precedex and ativan drip that was started yesterday.        PAST MEDICAL & SURGICAL HISTORY:  Benign prostatic hypertrophy without urinary obstruction  Anxiety  Depression  Hypertension  Spinal stenosis  Kyphosis  Neuropathy  GERD (gastroesophageal reflux disease)  Anemia  S/P spinal fusion      MEDICATIONS  (STANDING):  cefTRIAXone Injectable. 1000 milliGRAM(s) IV Push every 24 hours  heparin  Infusion.  Unit(s)/Hr (10 mL/Hr) IV Continuous <Continuous>  pantoprazole  Injectable 40 milliGRAM(s) IV Push daily    MEDICATIONS  (PRN):      FAMILY HISTORY:  unable to obtain from pt.       SOCIAL HISTORY:  unable to obtain on pt.    REVIEW OF SYSTEMS:  Unable to obtain from pt      Vital Signs Last 24 Hrs  T(C): 36.2 (08 May 2018 13:15), Max: 39.6 (08 May 2018 09:45)  T(F): 97.2 (08 May 2018 13:15), Max: 103.3 (08 May 2018 09:45)  HR: 74 (08 May 2018 14:00) (72 - 112)  BP: 102/59 (08 May 2018 14:00) (94/52 - 113/60)  BP(mean): 69 (08 May 2018 14:00) (69 - 72)  RR: 25 (08 May 2018 14:00) (15 - 26)  SpO2: 100% (08 May 2018 14:00) (74% - 100%)    PHYSICAL EXAM-    Constitutional: mild respiratory distress and mild agitation.     Head: Head is normocephalic and atraumatic.      Neck: No jugular venous distention. No audible carotid bruits. There are strong carotid pulses bilaterally. No JVD.     Cardiovascular: Regular, tachycardic.       Respiratory: crackles b/l all lung fields.     Abdomen: Soft, nontender, nondistended with positive bowel sounds.      Extremity: No tenderness. No  pitting edema     Neurologic: The patient is alert and oriented.      Skin: No rash, no obvious lesions noted.      Psychiatric: The patient appears to be emotionally stable.      INTERPRETATION OF TELEMETRY: sinus tachycardia.     ECG: Sinus rythm , normal axis, no ST T  changes- EKG from today.     I&O's Detail      LABS:                        7.4    5.14  )-----------( 131      ( 08 May 2018 10:04 )             24.6     05-08    143  |  106  |  42<H>  ----------------------------<  117<H>  4.1   |  25  |  2.21<H>    Ca    7.9<L>      08 May 2018 11:07    TPro  6.5  /  Alb  2.6<L>  /  TBili  0.4  /  DBili  x   /  AST  170<H>  /  ALT  31  /  AlkPhos  63  05-08    CARDIAC MARKERS ( 08 May 2018 14:20 )  25.800 ng/mL / x     / 4406 U/L / x     / x      CARDIAC MARKERS ( 08 May 2018 11:07 )  20.100 ng/mL / x     / x     / x     / x          PT/INR - ( 08 May 2018 13:06 )   PT: 18.0 sec;   INR: 1.65 ratio         PTT - ( 08 May 2018 13:06 )  PTT:29.1 sec  Urinalysis Basic - ( 08 May 2018 10:04 )    Color: Yellow / Appearance: Clear / S.015 / pH: x  Gluc: x / Ketone: Trace  / Bili: Negative / Urobili: Negative mg/dL   Blood: x / Protein: 100 mg/dL / Nitrite: Positive   Leuk Esterase: Moderate / RBC: 11-25 /HPF / WBC >50   Sq Epi: x / Non Sq Epi: Occasional / Bacteria: Many      I&O's Summary    BNP  RADIOLOGY & ADDITIONAL STUDIES:  < from: Xray Chest 1 View- PORTABLE-Urgent (18 @ 10:10) >  EXAM:  XR CHEST PORTABLE URGENT 1V                            PROCEDURE DATE:  2018          INTERPRETATION:      INDICATION: Fever and chills weakness shortness of breath    Single frontal view of the chest compared to prior study of 3/16/2018    FINDINGS/  IMPRESSION:       There are parenchymal interstitial edema with perihilar vascular   prominence. There is no large pleural effusion. There is no pneumothorax.   There is cardiomegaly. Patient is status post multilevel thoracic   instrumentation.  There is osteopenia and degenerative changes.                  CATALINA BAEZA   This document has been electronically signed. May  8 2018 11:29AM    < end of copied text >
Patient is a 77y old  Male who presents with a chief complaint of AMI and hypoxia.    HPI:  The patient is a 77 year old male with prior medical history as stated below presented after a fall at his assisted living.  He presented with hypoxia.  He denies CP.  In the ED he is noted to be hypoxic and placed on NIV for likely pulmonary edema.  Also noted to be in YUNIOR on CKD, NSTEMI, and anemia.    He is admitted to CCU and is on bipap currently.  He denies any CP but c/o back pain.    - pt seen and examined by me today. Pt c/o back pain.No overnight events.  Respiratory status improving. Off bipap but on NRB mask.     5/10- pt seen and examined by me today.  Pt still on NRB mask and refusing BIpap per staff.  He is still in respiratory distress but reportedly better than last night.  Pt c/o back pain but no CP.   - Pt off bipap currently . He is lethargic on precedex and ativan drip that was started yesterday.    - Pt seen and examined by me today. Pt denies any CP or SOB at this time.  Sitting comfortably in bed.        PAST MEDICAL & SURGICAL HISTORY:  Benign prostatic hypertrophy without urinary obstruction  Anxiety  Depression  Hypertension  Spinal stenosis  Kyphosis  Neuropathy  GERD (gastroesophageal reflux disease)  Anemia  S/P spinal fusion      MEDICATIONS  (STANDING):  cefTRIAXone Injectable. 1000 milliGRAM(s) IV Push every 24 hours  heparin  Infusion.  Unit(s)/Hr (10 mL/Hr) IV Continuous <Continuous>  pantoprazole  Injectable 40 milliGRAM(s) IV Push daily    MEDICATIONS  (PRN):          REVIEW OF SYSTEMS:  per HPI      Vital Signs Last 24 Hrs  T(C): 36.2 (08 May 2018 13:15), Max: 39.6 (08 May 2018 09:45)  T(F): 97.2 (08 May 2018 13:15), Max: 103.3 (08 May 2018 09:45)  HR: 74 (08 May 2018 14:00) (72 - 112)  BP: 102/59 (08 May 2018 14:00) (94/52 - 113/60)  BP(mean): 69 (08 May 2018 14:00) (69 - 72)  RR: 25 (08 May 2018 14:00) (15 - 26)  SpO2: 100% (08 May 2018 14:00) (74% - 100%)    PHYSICAL EXAM-    Constitutional: no acute distress    Head: Head is normocephalic and atraumatic.      Neck: No jugular venous distention. No audible carotid bruits. There are strong carotid pulses bilaterally. No JVD.     Cardiovascular: Regular,rate and rythm.     Respiratory: CTA laterally     Abdomen: Soft, nontender, nondistended with positive bowel sounds.      Extremity: No tenderness. No  pitting edema     Neurologic: The patient is alert and oriented.      Skin: No rash, no obvious lesions noted.      Psychiatric: The patient appears to be emotionally stable.      INTERPRETATION OF TELEMETRY: sinus tachycardia.     ECG: Sinus rythm , normal axis, no ST T  changes- EKG from today.     I&O's Detail      LABS:                        7.4    5.14  )-----------( 131      ( 08 May 2018 10:04 )             24.6     -08    143  |  106  |  42<H>  ----------------------------<  117<H>  4.1   |  25  |  2.21<H>    Ca    7.9<L>      08 May 2018 11:07    TPro  6.5  /  Alb  2.6<L>  /  TBili  0.4  /  DBili  x   /  AST  170<H>  /  ALT  31  /  AlkPhos  63  -08    CARDIAC MARKERS ( 08 May 2018 14:20 )  25.800 ng/mL / x     / 4406 U/L / x     / x      CARDIAC MARKERS ( 08 May 2018 11:07 )  20.100 ng/mL / x     / x     / x     / x          PT/INR - ( 08 May 2018 13:06 )   PT: 18.0 sec;   INR: 1.65 ratio         PTT - ( 08 May 2018 13:06 )  PTT:29.1 sec  Urinalysis Basic - ( 08 May 2018 10:04 )    Color: Yellow / Appearance: Clear / S.015 / pH: x  Gluc: x / Ketone: Trace  / Bili: Negative / Urobili: Negative mg/dL   Blood: x / Protein: 100 mg/dL / Nitrite: Positive   Leuk Esterase: Moderate / RBC: 11-25 /HPF / WBC >50   Sq Epi: x / Non Sq Epi: Occasional / Bacteria: Many      I&O's Summary    BNP  RADIOLOGY & ADDITIONAL STUDIES:  < from: Xray Chest 1 View- PORTABLE-Urgent (18 @ 10:10) >  EXAM:  XR CHEST PORTABLE URGENT 1V                            PROCEDURE DATE:  2018          INTERPRETATION:      INDICATION: Fever and chills weakness shortness of breath    Single frontal view of the chest compared to prior study of 3/16/2018    FINDINGS/  IMPRESSION:       There are parenchymal interstitial edema with perihilar vascular   prominence. There is no large pleural effusion. There is no pneumothorax.   There is cardiomegaly. Patient is status post multilevel thoracic   instrumentation.  There is osteopenia and degenerative changes.                  CATALINA BAEZA   This document has been electronically signed. May  8 2018 11:29AM    < end of copied text >  < from: Transthoracic Echocardiogram (18 @ 14:50) >  PROCEDURE DATE:  2018        INTERPRETATION:  Transthoracic Echocardiography Report (TTE)     Demographics     Patient name          SHARON SANTOS       Age           77 year(s)     Med Rec #             [id number]          Gender        Male     Account #             [id number]            Date of Birth 1940     Interpreting          Andrea Carroll MD   Room Number   0042   Physician     Referring Physician   Po Camacho MD   Sonographer   Alejo Duenas, [facility]     Date of study         2018 02:24                         PM     Height                68.9 in            Weight        178.58 pounds    Type of Study:     TTE procedure: 2D echocardiogram AD     Study Location: [facility] Quality:Good    Indications   1) R06.00 - Dyspnea    M-Mode Measurements (cm)     LVEDd: 5.16 cm            LVESd: 3.86 cm   IVSEd: 1.16 cm   LVPWd: 0.91 cm            AO Root Dimension: 3.5 cm                             ACS: 1.3 cm     LA: 4.7 cm                             LVOT: 2 cm    Doppler Measurements:     AV Velocity:174 cm/s                 MV Peak E-Wave: 115 cm/s   AV Peak Gradient: 12.11 mmHg         MV Peak A-Wave: 67.6 cm/s                                        MVE/A Ratio: 1.7 %                                        MV Peak Gradient: 5.29 mmHg     Findings     Mitral Valve   Fibrocalcific changes noted to the mitral valve leaflets with preserved   leaflet excursion. Mild (1+) mitral regurgitation is present.     Aortic Valve   Fibrocalcific changes noted to the Aortic valve leaflets with preserved   leaflet excursion. Trace aortic regurgitation is present.     Tricuspid Valve   The tricuspid valve leaflets are well seen and appear thin and pliable   with preserved leaflets excursion. Trace tricuspid regurgitation noted.     Pulmonic Valve   Pulmonic valve not well seen.     Left Atrium   The left atrium is mildly dilated.     Left Ventricle   Left ventricle systolic function appears moderately impaired; segmental   wall motion abnormalities noted with at least a moderate area of apical   akinesis involving contiguous structures. Estimated Ejection Fraction is   40-45%.     Right Atrium   Normal appearing right atrium.     Right Ventricle   Normal appearing right ventricle structure and function.     Pericardial Effusion   No evidence of pericardial effusion.     Pleural Effusion   No evidence of pleural effusion.     Miscellaneous   All visualized extra cardiac structures appears to be normal.     Impression     Summary     Left ventricle systolic function appears moderately impaired; segmental   wall motion abnormalities noted with at least a moderate area of apical   akinesis involving contiguous structures. Estimated Ejection Fractionis   40-45%.   The left atrium is mildly dilated.   Normal appearing right atrium.   Normal appearing right ventricle structure and function.   Fibrocalcific changes noted to the Aortic valve leaflets with preserved   leaflet excursion. Trace aortic regurgitation is present.   Fibrocalcific changes noted to the mitral valve leaflets with preserved   leaflet excursion. Mild (1+) mitral regurgitation is present.   No evidence of pericardial effusion.     Signature     ----------------------------------------------------------------   Electronically signed by Andrea Carroll MD(Interpreting   physician) on 2018 05:57 PM   ----------------------------------------------------------------    < end of copied text >
Patient is a 77y old  Male who presents with a chief complaint of AMI and hypoxia.    HPI:  The patient is a 77 year old male with prior medical history as stated below presented after a fall at his assisted living.  He presented with hypoxia.  He denies CP.  In the ED he is noted to be hypoxic and placed on NIV for likely pulmonary edema.  Also noted to be in YUNIOR on CKD, NSTEMI, and anemia.    He is admitted to CCU and is on bipap currently.  He denies any CP but c/o back pain.    - pt seen and examined by me today. Pt c/o back pain.No overnight events.  Respiratory status improving. Off bipap but on NRB mask.     5/10- pt seen and examined by me today.  Pt still on NRB mask and refusing BIpap per staff.  He is still in respiratory distress but reportedly better than last night.  Pt c/o back pain but no CP.   - Pt off bipap currently . He is lethargic on precedex and ativan drip that was started yesterday.    - Pt seen and examined by me today. Pt denies any CP or SOB at this time.  Sitting comfortably in bed.    5/15- pt seen and examined by me today. Pt denies any CP . Lying flat in bed with no acute distress.           PAST MEDICAL & SURGICAL HISTORY:  Benign prostatic hypertrophy without urinary obstruction  Anxiety  Depression  Hypertension  Spinal stenosis  Kyphosis  Neuropathy  GERD (gastroesophageal reflux disease)  Anemia  S/P spinal fusion      MEDICATIONS  (STANDING):  cefTRIAXone Injectable. 1000 milliGRAM(s) IV Push every 24 hours  heparin  Infusion.  Unit(s)/Hr (10 mL/Hr) IV Continuous <Continuous>  pantoprazole  Injectable 40 milliGRAM(s) IV Push daily    MEDICATIONS  (PRN):          REVIEW OF SYSTEMS:  per HPI      Vital Signs Last 24 Hrs  T(C): 36.2 (08 May 2018 13:15), Max: 39.6 (08 May 2018 09:45)  T(F): 97.2 (08 May 2018 13:15), Max: 103.3 (08 May 2018 09:45)  HR: 74 (08 May 2018 14:00) (72 - 112)  BP: 102/59 (08 May 2018 14:00) (94/52 - 113/60)  BP(mean): 69 (08 May 2018 14:00) (69 - 72)  RR: 25 (08 May 2018 14:00) (15 - 26)  SpO2: 100% (08 May 2018 14:00) (74% - 100%)    PHYSICAL EXAM-    Constitutional: no acute distress    Head: Head is normocephalic and atraumatic.      Neck: No jugular venous distention. No audible carotid bruits. There are strong carotid pulses bilaterally. No JVD.     Cardiovascular: Regular,rate and rythm.     Respiratory: CTA laterally     Abdomen: Soft, nontender, nondistended with positive bowel sounds.      Extremity: No tenderness. No  pitting edema     Neurologic: The patient is alert and oriented.      Skin: No rash, no obvious lesions noted.      Psychiatric: The patient appears to be emotionally stable.      INTERPRETATION OF TELEMETRY: sinus tachycardia.     ECG: Sinus rythm , normal axis, no ST T  changes- EKG from today.     I&O's Detail      LABS:                        7.4    5.14  )-----------( 131      ( 08 May 2018 10:04 )             24.6     05-08    143  |  106  |  42<H>  ----------------------------<  117<H>  4.1   |  25  |  2.21<H>    Ca    7.9<L>      08 May 2018 11:07    TPro  6.5  /  Alb  2.6<L>  /  TBili  0.4  /  DBili  x   /  AST  170<H>  /  ALT  31  /  AlkPhos  63  05-08    CARDIAC MARKERS ( 08 May 2018 14:20 )  25.800 ng/mL / x     / 4406 U/L / x     / x      CARDIAC MARKERS ( 08 May 2018 11:07 )  20.100 ng/mL / x     / x     / x     / x          PT/INR - ( 08 May 2018 13:06 )   PT: 18.0 sec;   INR: 1.65 ratio         PTT - ( 08 May 2018 13:06 )  PTT:29.1 sec  Urinalysis Basic - ( 08 May 2018 10:04 )    Color: Yellow / Appearance: Clear / S.015 / pH: x  Gluc: x / Ketone: Trace  / Bili: Negative / Urobili: Negative mg/dL   Blood: x / Protein: 100 mg/dL / Nitrite: Positive   Leuk Esterase: Moderate / RBC: 11-25 /HPF / WBC >50   Sq Epi: x / Non Sq Epi: Occasional / Bacteria: Many      I&O's Summary    BNP  RADIOLOGY & ADDITIONAL STUDIES:  < from: Xray Chest 1 View- PORTABLE-Urgent (18 @ 10:10) >  EXAM:  XR CHEST PORTABLE URGENT 1V                            PROCEDURE DATE:  2018          INTERPRETATION:      INDICATION: Fever and chills weakness shortness of breath    Single frontal view of the chest compared to prior study of 3/16/2018    FINDINGS/  IMPRESSION:       There are parenchymal interstitial edema with perihilar vascular   prominence. There is no large pleural effusion. There is no pneumothorax.   There is cardiomegaly. Patient is status post multilevel thoracic   instrumentation.  There is osteopenia and degenerative changes.                  CATALINA BAEZA   This document has been electronically signed. May  8 2018 11:29AM    < end of copied text >  < from: Transthoracic Echocardiogram (18 @ 14:50) >  PROCEDURE DATE:  2018        INTERPRETATION:  Transthoracic Echocardiography Report (TTE)     Demographics     Patient name          SHARON SANTOS       Age           77 year(s)     Med Rec #             [id number]          Gender        Male     Account #             [id number]            Date of Birth 1940     Interpreting          Andrea Carroll MD   Room Number   0042   Physician     Referring Physician   Po Camacho MD   Sonographer   Alejo Duenas [facility]     Date of study         2018 02:24                         PM     Height                68.9 in            Weight        178.58 pounds    Type of Study:     TTE procedure: 2D echocardiogram AD     Study Location: [facility] Quality:Good    Indications   1) R06.00 - Dyspnea    M-Mode Measurements (cm)     LVEDd: 5.16 cm            LVESd: 3.86 cm   IVSEd: 1.16 cm   LVPWd: 0.91 cm            AO Root Dimension: 3.5 cm                             ACS: 1.3 cm     LA: 4.7 cm                             LVOT: 2 cm    Doppler Measurements:     AV Velocity:174 cm/s                 MV Peak E-Wave: 115 cm/s   AV Peak Gradient: 12.11 mmHg         MV Peak A-Wave: 67.6 cm/s                                        MVE/A Ratio: 1.7 %                                        MV Peak Gradient: 5.29 mmHg     Findings     Mitral Valve   Fibrocalcific changes noted to the mitral valve leaflets with preserved   leaflet excursion. Mild (1+) mitral regurgitation is present.     Aortic Valve   Fibrocalcific changes noted to the Aortic valve leaflets with preserved   leaflet excursion. Trace aortic regurgitation is present.     Tricuspid Valve   The tricuspid valve leaflets are well seen and appear thin and pliable   with preserved leaflets excursion. Trace tricuspid regurgitation noted.     Pulmonic Valve   Pulmonic valve not well seen.     Left Atrium   The left atrium is mildly dilated.     Left Ventricle   Left ventricle systolic function appears moderately impaired; segmental   wall motion abnormalities noted with at least a moderate area of apical   akinesis involving contiguous structures. Estimated Ejection Fraction is   40-45%.     Right Atrium   Normal appearing right atrium.     Right Ventricle   Normal appearing right ventricle structure and function.     Pericardial Effusion   No evidence of pericardial effusion.     Pleural Effusion   No evidence of pleural effusion.     Miscellaneous   All visualized extra cardiac structures appears to be normal.     Impression     Summary     Left ventricle systolic function appears moderately impaired; segmental   wall motion abnormalities noted with at least a moderate area of apical   akinesis involving contiguous structures. Estimated Ejection Fractionis   40-45%.   The left atrium is mildly dilated.   Normal appearing right atrium.   Normal appearing right ventricle structure and function.   Fibrocalcific changes noted to the Aortic valve leaflets with preserved   leaflet excursion. Trace aortic regurgitation is present.   Fibrocalcific changes noted to the mitral valve leaflets with preserved   leaflet excursion. Mild (1+) mitral regurgitation is present.   No evidence of pericardial effusion.     Signature     ----------------------------------------------------------------   Electronically signed by Andrea Carroll MD(Interpreting   physician) on 2018 05:57 PM   ----------------------------------------------------------------    < end of copied text >
· Subjective and Objective: 	  78 y/o male from assisted living admitted with NSTEMI (trop 30), Pulm edema, UTI sepsis and GNR sepsis.  Pt admitted to CCU and placed on NIV and IV hep and Abx.  TTE--1+ MR/EF 40-45%.  LE dopp negative for DVT.  CXR suggestive of pulm edema.      5/9:  Tm 101 On NIV.  awake and alert.  c/o chronic back pain.  No CP and resp status improved.     5/10:  No fevers.  E. coli UTI and bacteremia.  awake and alert.  denies CP or SOB.  C/O chronic back pain.  Remains on FM    5/11: On 40% FiO2, On NIV still.  On precedex    5/12: More awake today.  Was on NIV overnight.      5/13: He did not require NIV over night.  More clear and alert today    5/14: Now on RA, complaining of back pain    5/15: Patient agreed to cardiac cath.  Cardio to see if it can be done today.           PAST MEDICAL & SURGICAL HISTORY:  Benign prostatic hypertrophy without urinary obstruction  Anxiety  Depression  Hypertension  Spinal stenosis  Kyphosis  Neuropathy  GERD (gastroesophageal reflux disease)  Anemia  S/P spinal fusion      FAMILY HISTORY:      Social Hx:    Allergies    No Known Allergies    Intolerances            ICU Vital Signs Last 24 Hrs  T(C): 36.7 (14 May 2018 17:36), Max: 36.7 (14 May 2018 17:36)  T(F): 98.1 (14 May 2018 17:36), Max: 98.1 (14 May 2018 17:36)  HR: 71 (15 May 2018 08:00) (68 - 95)  BP: 132/64 (15 May 2018 08:00) (107/52 - 155/79)  BP(mean): 80 (15 May 2018 08:00) (59 - 97)  ABP: --  ABP(mean): --  RR: 20 (15 May 2018 08:00) (11 - 27)  SpO2: 100% (15 May 2018 08:00) (92% - 100%)          I&O's Summary    14 May 2018 07:01  -  15 May 2018 07:00  --------------------------------------------------------  IN: 300 mL / OUT: 1000 mL / NET: -700 mL                              10.9   6.43  )-----------( 213      ( 15 May 2018 05:33 )             36.4       05-15    142  |  109<H>  |  19  ----------------------------<  103<H>  4.0   |  24  |  0.91    Ca    8.4<L>      15 May 2018 05:33  Phos  2.7     05-15  Mg     2.2     05-15                      MEDICATIONS  (STANDING):  aspirin  chewable 81 milliGRAM(s) Oral daily  atorvastatin 40 milliGRAM(s) Oral at bedtime  cefuroxime   Tablet 250 milliGRAM(s) Oral every 12 hours  DULoxetine 60 milliGRAM(s) Oral daily  heparin  Injectable 5000 Unit(s) SubCutaneous every 8 hours  lisinopril 2.5 milliGRAM(s) Oral daily  metoprolol succinate ER 50 milliGRAM(s) Oral daily  oxyCODONE  ER Tablet 30 milliGRAM(s) Oral every 12 hours  pantoprazole  Injectable 40 milliGRAM(s) IV Push daily  tamsulosin 0.8 milliGRAM(s) Oral at bedtime    MEDICATIONS  (PRN):  acetaminophen  Suppository 650 milliGRAM(s) Rectal every 6 hours PRN For Temp greater than 38.5 C (101.3 F)  ALPRAZolam 0.25 milliGRAM(s) Oral two times a day PRN anxiety  HYDROmorphone  Injectable 1 milliGRAM(s) IV Push every 4 hours PRN Moderate Pain (4 - 6)  ondansetron Injectable 4 milliGRAM(s) IV Push every 4 hours PRN Nausea and/or Vomiting      DVT Prophylaxis: [facility]    Advanced Directives:  Discussed with:    Visit Information:    ** Time is exclusive of billed procedures and/or teaching and/or routine family updates.
· Subjective and Objective: 	  78 y/o male from assisted living admitted with NSTEMI (trop 30), Pulm edema, UTI sepsis and GNR sepsis.  Pt admitted to CCU and placed on NIV and IV hep and Abx.  TTE--1+ MR/EF 40-45%.  LE dopp negative for DVT.  CXR suggestive of pulm edema.      5/9:  Tm 101 On NIV.  awake and alert.  c/o chronic back pain.  No CP and resp status improved.     5/10:  No fevers.  E. coli UTI and bacteremia.  awake and alert.  denies CP or SOB.  C/O chronic back pain.  Remains on FM    5/11: On 40% FiO2, On NIV still.  On precedex    5/12: More awake today.  Was on NIV overnight.      5/13: He did not require NIV over night.  More clear and alert today    5/14: Now on RA, complaining of back pain      PAST MEDICAL & SURGICAL HISTORY:  Benign prostatic hypertrophy without urinary obstruction  Anxiety  Depression  Hypertension  Spinal stenosis  Kyphosis  Neuropathy  GERD (gastroesophageal reflux disease)  Anemia  S/P spinal fusion      FAMILY HISTORY:      Social Hx:    Allergies    No Known Allergies    Intolerances            ICU Vital Signs Last 24 Hrs  T(C): 37.6 (14 May 2018 08:28), Max: 37.6 (14 May 2018 08:28)  T(F): 99.7 (14 May 2018 08:28), Max: 99.7 (14 May 2018 08:28)  HR: 92 (14 May 2018 08:00) (75 - 106)  BP: 142/73 (14 May 2018 08:00) (117/65 - 153/71)  BP(mean): 89 (14 May 2018 08:00) (68 - 93)  ABP: --  ABP(mean): --  RR: 24 (14 May 2018 08:00) (17 - 31)  SpO2: 98% (14 May 2018 08:00) (92% - 99%)          I&O's Summary    13 May 2018 07:01  -  14 May 2018 07:00  --------------------------------------------------------  IN: 1140 mL / OUT: 675 mL / NET: 465 mL                              10.6   6.04  )-----------( 272      ( 14 May 2018 04:52 )             34.5       05-14    142  |  106  |  23  ----------------------------<  125<H>  3.5   |  29  |  0.94    Ca    8.5      14 May 2018 04:52  Phos  1.9     05-14  Mg     2.0     05-14        CARDIAC MARKERS ( 13 May 2018 05:37 )  2.370 ng/mL / x     / x     / x     / x                    MEDICATIONS  (STANDING):  aspirin  chewable 81 milliGRAM(s) Oral daily  atorvastatin 40 milliGRAM(s) Oral at bedtime  cefepime  Injectable. 1000 milliGRAM(s) IV Push every 12 hours  DULoxetine 60 milliGRAM(s) Oral daily  heparin  Injectable 5000 Unit(s) SubCutaneous every 8 hours  metoprolol tartrate 12.5 milliGRAM(s) Oral two times a day  oxyCODONE  ER Tablet 30 milliGRAM(s) Oral every 12 hours  pantoprazole  Injectable 40 milliGRAM(s) IV Push daily  potassium phosphate / sodium phosphate powder 2 Packet(s) Oral two times a day  tamsulosin 0.8 milliGRAM(s) Oral at bedtime    MEDICATIONS  (PRN):  acetaminophen  Suppository 650 milliGRAM(s) Rectal every 6 hours PRN For Temp greater than 38.5 C (101.3 F)  ALPRAZolam 0.25 milliGRAM(s) Oral two times a day PRN anxiety  HYDROmorphone  Injectable 2 milliGRAM(s) IV Push every 4 hours PRN Moderate Pain (4 - 6)  ondansetron Injectable 4 milliGRAM(s) IV Push every 4 hours PRN Nausea and/or Vomiting      DVT Prophylaxis: [facility]    Advanced Directives:  Discussed with:    Visit Information:    ** Time is exclusive of billed procedures and/or teaching and/or routine family updates.

## 2018-05-15 NOTE — CHART NOTE - NSCHARTNOTEFT_GEN_A_CORE
Nurse Practitioner Progress note:     HPI:  The patient is a 77 year old mall who had a fall at his assisted living.  He presented with hypoxia.  He denies CP.  In the ED he is noted to be hypoxic and placed on NIV for likely pulmonary edema. In YUNIOR on CKD, NSTEMI, and anemia (08 May 2018 12:49). Pt. now referred for [facility] for further evaluation.       T(C): 37 (05-15-18 @ 14:07), Max: 37 (05-15-18 @ 13:56)  HR: 66 (05-15-18 @ 14:07) (66 - 97)  BP: 133/61 (05-15-18 @ 14:07) (92/56 - 155/79)  RR: 17 (05-15-18 @ 14:07) (11 - 23)  SpO2: 94% (05-15-18 @ 14:07) (92% - 100%)  Wt(kg): --    PHYSICAL EXAM:  Neurologic: Non-focal, AxOx3.  No neuro deficits  Vascular: Peripheral pulses palpable 2+ bilaterally  Procedure Site: Rt. radial band removed manual pressure applied x20 minutes site benign soft no bleeding no hematoma +1radial pulse, pressure dressing applied no c/o numbness, tingling <3sec cap refill, fingers/hand warm to touch     	  LABS:	 	                        10.9   6.43  )-----------( 213      ( 15 May 2018 05:33 )             36.4   05-15    142  |  109<H>  |  19  ----------------------------<  103<H>  4.0   |  24  |  0.91    Ca    8.4<L>      15 May 2018 05:33  Phos  2.7     05-15  Mg     2.2     05-15      PROCEDURE RESULTS:    ASSESSMENT/PLAN:   The patient is a 77 year old mall who had a fall at his assisted living.  He presented with hypoxia.  He denies CP.  In the ED he is noted to be hypoxic and placed on NIV for likely pulmonary edema. In YUNIOR on CKD, NSTEMI, and anemia (08 May 2018 12:49). S/P LHC   	  -VS, labs, diet, activity as per post cath orders  -IV hydration  -Encourage PO fluids  -Continue current medications  -Plan of care D/W pt. and MD  -Post cath instructions reviewed with pt., pt. verbalizes and understands instructions  -Follow-up with attending Nurse Practitioner Progress note:     HPI:  The patient is a 77 year old mall who had a fall at his assisted living.  He presented with hypoxia.  He denies CP.  In the ED he is noted to be hypoxic and placed on NIV for likely pulmonary edema. In YUNIOR on CKD, NSTEMI, and anemia (08 May 2018 12:49). Pt. now referred for [facility] for further evaluation.       T(C): 37 (05-15-18 @ 14:07), Max: 37 (05-15-18 @ 13:56)  HR: 66 (05-15-18 @ 14:07) (66 - 97)  BP: 133/61 (05-15-18 @ 14:07) (92/56 - 155/79)  RR: 17 (05-15-18 @ 14:07) (11 - 23)  SpO2: 94% (05-15-18 @ 14:07) (92% - 100%)  Wt(kg): --    PHYSICAL EXAM:  Neurologic: Non-focal, AxOx3.  No neuro deficits  Vascular: Peripheral pulses palpable 2+ bilaterally  Procedure Site: Rt. radial band removed manual pressure applied x20 minutes site benign soft no bleeding no hematoma +1radial pulse, pressure dressing applied no c/o numbness, tingling <3sec cap refill, fingers/hand warm to touch     	  LABS:	 	                        10.9   6.43  )-----------( 213      ( 15 May 2018 05:33 )             36.4   05-15    142  |  109<H>  |  19  ----------------------------<  103<H>  4.0   |  24  |  0.91    Ca    8.4<L>      15 May 2018 05:33  Phos  2.7     05-15  Mg     2.2     05-15      PROCEDURE RESULTS:  Cardiac Cath Lab - Adult (05.15.18 @ 15:28) >  Diagnostic Conclusions  Three Vessel coronary artery disease (LAD,RCA,LCX) .  Left main coronary artery disease.  Hemodynamic status is normal.  Normal left ventricular end-diastolic pressure. EF 60%        ASSESSMENT/PLAN:    The patient is a 77 year old male who had a fall at his assisted living.  He presented with hypoxia.  Hypoxia , SOB- Acute on chronic decompensated HFrEF- LVEF 45% in the setting of hypoxia and MI S/P LHC      -Non urgent CABG is recommended.	  -VS, labs, diet, activity as per post cath orders  -IV hydration  -Encourage PO fluids  -Continue current medications  -Plan of care D/W pt. and MD  -Post cath instructions reviewed with pt., pt. verbalizes and understands instructions  -Follow-up with attending

## 2018-05-15 NOTE — PROGRESS NOTE ADULT - PROVIDER SPECIALTY LIST ADULT
Cardiology
Critical Care
Electrophysiology
Infectious Disease
Cardiology
Cardiology
Critical Care

## 2018-05-15 NOTE — PROGRESS NOTE ADULT - GASTROINTESTINAL DETAILS
bowel sounds normal/nontender/soft
nontender/soft/bowel sounds normal
soft/nontender
nontender/soft
soft

## 2018-05-16 ENCOUNTER — INPATIENT (INPATIENT)
Facility: HOSPITAL | Age: 78
LOS: 6 days | Discharge: OTHER INSTITUTION | DRG: 215 | End: 2018-05-23
Attending: THORACIC SURGERY (CARDIOTHORACIC VASCULAR SURGERY) | Admitting: THORACIC SURGERY (CARDIOTHORACIC VASCULAR SURGERY)
Payer: MEDICARE

## 2018-05-16 VITALS
TEMPERATURE: 99 F | OXYGEN SATURATION: 93 % | RESPIRATION RATE: 20 BRPM | HEART RATE: 75 BPM | SYSTOLIC BLOOD PRESSURE: 122 MMHG | DIASTOLIC BLOOD PRESSURE: 57 MMHG

## 2018-05-16 VITALS
RESPIRATION RATE: 25 BRPM | SYSTOLIC BLOOD PRESSURE: 142 MMHG | DIASTOLIC BLOOD PRESSURE: 63 MMHG | HEART RATE: 90 BPM | OXYGEN SATURATION: 100 %

## 2018-05-16 DIAGNOSIS — Z98.1 ARTHRODESIS STATUS: Chronic | ICD-10-CM

## 2018-05-16 DIAGNOSIS — I65.21 OCCLUSION AND STENOSIS OF RIGHT CAROTID ARTERY: ICD-10-CM

## 2018-05-16 DIAGNOSIS — A49.8 OTHER BACTERIAL INFECTIONS OF UNSPECIFIED SITE: ICD-10-CM

## 2018-05-16 DIAGNOSIS — I21.4 NON-ST ELEVATION (NSTEMI) MYOCARDIAL INFARCTION: ICD-10-CM

## 2018-05-16 DIAGNOSIS — Z29.9 ENCOUNTER FOR PROPHYLACTIC MEASURES, UNSPECIFIED: ICD-10-CM

## 2018-05-16 DIAGNOSIS — I25.10 ATHEROSCLEROTIC HEART DISEASE OF NATIVE CORONARY ARTERY WITHOUT ANGINA PECTORIS: ICD-10-CM

## 2018-05-16 DIAGNOSIS — M54.5 LOW BACK PAIN: ICD-10-CM

## 2018-05-16 DIAGNOSIS — L30.8 OTHER SPECIFIED DERMATITIS: ICD-10-CM

## 2018-05-16 DIAGNOSIS — F32.9 MAJOR DEPRESSIVE DISORDER, SINGLE EPISODE, UNSPECIFIED: ICD-10-CM

## 2018-05-16 DIAGNOSIS — I10 ESSENTIAL (PRIMARY) HYPERTENSION: ICD-10-CM

## 2018-05-16 LAB
ALBUMIN SERPL ELPH-MCNC: 3.2 G/DL — LOW (ref 3.3–5.2)
ALP SERPL-CCNC: 70 U/L — SIGNIFICANT CHANGE UP (ref 40–120)
ALT FLD-CCNC: 18 U/L — SIGNIFICANT CHANGE UP
ANION GAP SERPL CALC-SCNC: 15 MMOL/L — SIGNIFICANT CHANGE UP (ref 5–17)
APPEARANCE UR: CLEAR — SIGNIFICANT CHANGE UP
APTT BLD: 29.6 SEC — SIGNIFICANT CHANGE UP (ref 27.5–37.4)
AST SERPL-CCNC: 21 U/L — SIGNIFICANT CHANGE UP
BACTERIA # UR AUTO: ABNORMAL
BILIRUB DIRECT SERPL-MCNC: 0.1 MG/DL — SIGNIFICANT CHANGE UP (ref 0–0.3)
BILIRUB INDIRECT FLD-MCNC: 0.2 MG/DL — SIGNIFICANT CHANGE UP (ref 0.2–1)
BILIRUB SERPL-MCNC: 0.3 MG/DL — LOW (ref 0.4–2)
BILIRUB UR-MCNC: NEGATIVE — SIGNIFICANT CHANGE UP
BLD GP AB SCN SERPL QL: SIGNIFICANT CHANGE UP
BUN SERPL-MCNC: 17 MG/DL — SIGNIFICANT CHANGE UP (ref 8–20)
CALCIUM SERPL-MCNC: 8.8 MG/DL — SIGNIFICANT CHANGE UP (ref 8.6–10.2)
CHLORIDE SERPL-SCNC: 105 MMOL/L — SIGNIFICANT CHANGE UP (ref 98–107)
CK SERPL-CCNC: 25 U/L — LOW (ref 30–200)
CO2 SERPL-SCNC: 23 MMOL/L — SIGNIFICANT CHANGE UP (ref 22–29)
COLOR SPEC: YELLOW — SIGNIFICANT CHANGE UP
CREAT SERPL-MCNC: 0.94 MG/DL — SIGNIFICANT CHANGE UP (ref 0.5–1.3)
DIFF PNL FLD: ABNORMAL
EPI CELLS # UR: SIGNIFICANT CHANGE UP
GLUCOSE SERPL-MCNC: 108 MG/DL — SIGNIFICANT CHANGE UP (ref 70–115)
GLUCOSE UR QL: NEGATIVE MG/DL — SIGNIFICANT CHANGE UP
HBA1C BLD-MCNC: 6.1 % — HIGH (ref 4–5.6)
HCT VFR BLD CALC: 34 % — LOW (ref 42–52)
HGB BLD-MCNC: 10.3 G/DL — LOW (ref 14–18)
HYALINE CASTS # UR AUTO: ABNORMAL /LPF
INR BLD: 1.35 RATIO — HIGH (ref 0.88–1.16)
KETONES UR-MCNC: NEGATIVE — SIGNIFICANT CHANGE UP
LEUKOCYTE ESTERASE UR-ACNC: ABNORMAL
MAGNESIUM SERPL-MCNC: 2.1 MG/DL — SIGNIFICANT CHANGE UP (ref 1.6–2.6)
MCHC RBC-ENTMCNC: 23.4 PG — LOW (ref 27–31)
MCHC RBC-ENTMCNC: 30.3 G/DL — LOW (ref 32–36)
MCV RBC AUTO: 77.1 FL — LOW (ref 80–94)
MRSA PCR RESULT.: SIGNIFICANT CHANGE UP
NITRITE UR-MCNC: NEGATIVE — SIGNIFICANT CHANGE UP
NT-PROBNP SERPL-SCNC: 3318 PG/ML — HIGH (ref 0–300)
PH UR: 6 — SIGNIFICANT CHANGE UP (ref 5–8)
PHOSPHATE SERPL-MCNC: 2.8 MG/DL — SIGNIFICANT CHANGE UP (ref 2.4–4.7)
PLATELET # BLD AUTO: 320 K/UL — SIGNIFICANT CHANGE UP (ref 150–400)
POTASSIUM SERPL-MCNC: 4.1 MMOL/L — SIGNIFICANT CHANGE UP (ref 3.5–5.3)
POTASSIUM SERPL-SCNC: 4.1 MMOL/L — SIGNIFICANT CHANGE UP (ref 3.5–5.3)
PREALB SERPL-MCNC: 14 MG/DL — LOW (ref 18–38)
PROT SERPL-MCNC: 6.9 G/DL — SIGNIFICANT CHANGE UP (ref 6.6–8.7)
PROT UR-MCNC: 30 MG/DL
PROTHROM AB SERPL-ACNC: 14.9 SEC — HIGH (ref 9.8–12.7)
RBC # BLD: 4.41 M/UL — LOW (ref 4.6–6.2)
RBC # FLD: 18.4 % — HIGH (ref 11–15.6)
RBC CASTS # UR COMP ASSIST: ABNORMAL /HPF (ref 0–4)
S AUREUS DNA NOSE QL NAA+PROBE: SIGNIFICANT CHANGE UP
SODIUM SERPL-SCNC: 143 MMOL/L — SIGNIFICANT CHANGE UP (ref 135–145)
SP GR SPEC: 1.01 — SIGNIFICANT CHANGE UP (ref 1.01–1.02)
T3 SERPL-MCNC: 92 NG/DL — SIGNIFICANT CHANGE UP (ref 80–200)
T4 AB SER-ACNC: 7.8 UG/DL — SIGNIFICANT CHANGE UP (ref 4.5–12)
TROPONIN T SERPL-MCNC: 0.42 NG/ML — HIGH (ref 0–0.06)
TSH SERPL-MCNC: 3.13 UIU/ML — SIGNIFICANT CHANGE UP (ref 0.27–4.2)
TYPE + AB SCN PNL BLD: SIGNIFICANT CHANGE UP
UROBILINOGEN FLD QL: NEGATIVE MG/DL — SIGNIFICANT CHANGE UP
WBC # BLD: 7.1 K/UL — SIGNIFICANT CHANGE UP (ref 4.8–10.8)
WBC # FLD AUTO: 7.1 K/UL — SIGNIFICANT CHANGE UP (ref 4.8–10.8)
WBC UR QL: SIGNIFICANT CHANGE UP

## 2018-05-16 PROCEDURE — 99222 1ST HOSP IP/OBS MODERATE 55: CPT

## 2018-05-16 PROCEDURE — 99223 1ST HOSP IP/OBS HIGH 75: CPT

## 2018-05-16 PROCEDURE — 93880 EXTRACRANIAL BILAT STUDY: CPT | Mod: 26

## 2018-05-16 PROCEDURE — 93010 ELECTROCARDIOGRAM REPORT: CPT

## 2018-05-16 PROCEDURE — 71045 X-RAY EXAM CHEST 1 VIEW: CPT | Mod: 26

## 2018-05-16 PROCEDURE — 99233 SBSQ HOSP IP/OBS HIGH 50: CPT

## 2018-05-16 RX ORDER — SODIUM CHLORIDE 9 MG/ML
3 INJECTION INTRAMUSCULAR; INTRAVENOUS; SUBCUTANEOUS EVERY 8 HOURS
Qty: 0 | Refills: 0 | Status: DISCONTINUED | OUTPATIENT
Start: 2018-05-16 | End: 2018-05-23

## 2018-05-16 RX ORDER — HYDROMORPHONE HYDROCHLORIDE 2 MG/ML
4 INJECTION INTRAMUSCULAR; INTRAVENOUS; SUBCUTANEOUS EVERY 6 HOURS
Qty: 0 | Refills: 0 | Status: DISCONTINUED | OUTPATIENT
Start: 2018-05-16 | End: 2018-05-22

## 2018-05-16 RX ORDER — MORPHINE SULFATE 50 MG/1
15 CAPSULE, EXTENDED RELEASE ORAL
Qty: 0 | Refills: 0 | Status: DISCONTINUED | OUTPATIENT
Start: 2018-05-16 | End: 2018-05-22

## 2018-05-16 RX ORDER — CYCLOBENZAPRINE HYDROCHLORIDE 10 MG/1
10 TABLET, FILM COATED ORAL
Qty: 0 | Refills: 0 | Status: DISCONTINUED | OUTPATIENT
Start: 2018-05-16 | End: 2018-05-23

## 2018-05-16 RX ORDER — LIDOCAINE 4 G/100G
1 CREAM TOPICAL DAILY
Qty: 0 | Refills: 0 | Status: DISCONTINUED | OUTPATIENT
Start: 2018-05-16 | End: 2018-05-23

## 2018-05-16 RX ORDER — CEFUROXIME AXETIL 250 MG
250 TABLET ORAL EVERY 12 HOURS
Qty: 0 | Refills: 0 | Status: DISCONTINUED | OUTPATIENT
Start: 2018-05-16 | End: 2018-05-23

## 2018-05-16 RX ORDER — HYDROMORPHONE HYDROCHLORIDE 2 MG/ML
2 INJECTION INTRAMUSCULAR; INTRAVENOUS; SUBCUTANEOUS EVERY 6 HOURS
Qty: 0 | Refills: 0 | Status: DISCONTINUED | OUTPATIENT
Start: 2018-05-16 | End: 2018-05-23

## 2018-05-16 RX ORDER — ACETAMINOPHEN 500 MG
1000 TABLET ORAL ONCE
Qty: 0 | Refills: 0 | Status: COMPLETED | OUTPATIENT
Start: 2018-05-16 | End: 2018-05-16

## 2018-05-16 RX ORDER — ONDANSETRON 8 MG/1
4 TABLET, FILM COATED ORAL EVERY 6 HOURS
Qty: 0 | Refills: 0 | Status: DISCONTINUED | OUTPATIENT
Start: 2018-05-16 | End: 2018-05-23

## 2018-05-16 RX ORDER — DULOXETINE HYDROCHLORIDE 30 MG/1
60 CAPSULE, DELAYED RELEASE ORAL DAILY
Qty: 0 | Refills: 0 | Status: DISCONTINUED | OUTPATIENT
Start: 2018-05-16 | End: 2018-05-23

## 2018-05-16 RX ORDER — PANTOPRAZOLE SODIUM 20 MG/1
40 TABLET, DELAYED RELEASE ORAL
Qty: 0 | Refills: 0 | Status: DISCONTINUED | OUTPATIENT
Start: 2018-05-16 | End: 2018-05-23

## 2018-05-16 RX ORDER — HYDROMORPHONE HYDROCHLORIDE 2 MG/ML
0.5 INJECTION INTRAMUSCULAR; INTRAVENOUS; SUBCUTANEOUS EVERY 6 HOURS
Qty: 0 | Refills: 0 | Status: DISCONTINUED | OUTPATIENT
Start: 2018-05-16 | End: 2018-05-17

## 2018-05-16 RX ORDER — ATORVASTATIN CALCIUM 80 MG/1
80 TABLET, FILM COATED ORAL AT BEDTIME
Qty: 0 | Refills: 0 | Status: DISCONTINUED | OUTPATIENT
Start: 2018-05-16 | End: 2018-05-23

## 2018-05-16 RX ORDER — ONDANSETRON 8 MG/1
4 TABLET, FILM COATED ORAL ONCE
Qty: 0 | Refills: 0 | Status: COMPLETED | OUTPATIENT
Start: 2018-05-16 | End: 2018-05-16

## 2018-05-16 RX ORDER — DOCUSATE SODIUM 100 MG
100 CAPSULE ORAL THREE TIMES A DAY
Qty: 0 | Refills: 0 | Status: DISCONTINUED | OUTPATIENT
Start: 2018-05-16 | End: 2018-05-23

## 2018-05-16 RX ORDER — ASPIRIN/CALCIUM CARB/MAGNESIUM 324 MG
325 TABLET ORAL DAILY
Qty: 0 | Refills: 0 | Status: DISCONTINUED | OUTPATIENT
Start: 2018-05-16 | End: 2018-05-23

## 2018-05-16 RX ORDER — SENNA PLUS 8.6 MG/1
2 TABLET ORAL AT BEDTIME
Qty: 0 | Refills: 0 | Status: DISCONTINUED | OUTPATIENT
Start: 2018-05-16 | End: 2018-05-23

## 2018-05-16 RX ORDER — METOPROLOL TARTRATE 50 MG
25 TABLET ORAL EVERY 12 HOURS
Qty: 0 | Refills: 0 | Status: DISCONTINUED | OUTPATIENT
Start: 2018-05-16 | End: 2018-05-19

## 2018-05-16 RX ORDER — TAMSULOSIN HYDROCHLORIDE 0.4 MG/1
0.8 CAPSULE ORAL AT BEDTIME
Qty: 0 | Refills: 0 | Status: DISCONTINUED | OUTPATIENT
Start: 2018-05-16 | End: 2018-05-23

## 2018-05-16 RX ORDER — IPRATROPIUM/ALBUTEROL SULFATE 18-103MCG
3 AEROSOL WITH ADAPTER (GRAM) INHALATION ONCE
Qty: 0 | Refills: 0 | Status: DISCONTINUED | OUTPATIENT
Start: 2018-05-16 | End: 2018-05-23

## 2018-05-16 RX ORDER — HYDROMORPHONE HYDROCHLORIDE 2 MG/ML
2 INJECTION INTRAMUSCULAR; INTRAVENOUS; SUBCUTANEOUS ONCE
Qty: 0 | Refills: 0 | Status: DISCONTINUED | OUTPATIENT
Start: 2018-05-16 | End: 2018-05-16

## 2018-05-16 RX ADMIN — SODIUM CHLORIDE 3 MILLILITER(S): 9 INJECTION INTRAMUSCULAR; INTRAVENOUS; SUBCUTANEOUS at 09:28

## 2018-05-16 RX ADMIN — Medication 50 MILLIGRAM(S): at 05:14

## 2018-05-16 RX ADMIN — HYDROMORPHONE HYDROCHLORIDE 2 MILLIGRAM(S): 2 INJECTION INTRAMUSCULAR; INTRAVENOUS; SUBCUTANEOUS at 09:37

## 2018-05-16 RX ADMIN — HYDROMORPHONE HYDROCHLORIDE 0.5 MILLIGRAM(S): 2 INJECTION INTRAMUSCULAR; INTRAVENOUS; SUBCUTANEOUS at 15:45

## 2018-05-16 RX ADMIN — Medication 1000 MILLIGRAM(S): at 13:25

## 2018-05-16 RX ADMIN — HYDROMORPHONE HYDROCHLORIDE 4 MILLIGRAM(S): 2 INJECTION INTRAMUSCULAR; INTRAVENOUS; SUBCUTANEOUS at 14:35

## 2018-05-16 RX ADMIN — HYDROMORPHONE HYDROCHLORIDE 4 MILLIGRAM(S): 2 INJECTION INTRAMUSCULAR; INTRAVENOUS; SUBCUTANEOUS at 22:17

## 2018-05-16 RX ADMIN — Medication 400 MILLIGRAM(S): at 13:11

## 2018-05-16 RX ADMIN — Medication 400 MILLIGRAM(S): at 23:43

## 2018-05-16 RX ADMIN — ONDANSETRON 4 MILLIGRAM(S): 8 TABLET, FILM COATED ORAL at 08:00

## 2018-05-16 RX ADMIN — SENNA PLUS 2 TABLET(S): 8.6 TABLET ORAL at 22:11

## 2018-05-16 RX ADMIN — ATORVASTATIN CALCIUM 80 MILLIGRAM(S): 80 TABLET, FILM COATED ORAL at 22:11

## 2018-05-16 RX ADMIN — SODIUM CHLORIDE 3 MILLILITER(S): 9 INJECTION INTRAMUSCULAR; INTRAVENOUS; SUBCUTANEOUS at 22:09

## 2018-05-16 RX ADMIN — TAMSULOSIN HYDROCHLORIDE 0.8 MILLIGRAM(S): 0.4 CAPSULE ORAL at 22:11

## 2018-05-16 RX ADMIN — HYDROMORPHONE HYDROCHLORIDE 4 MILLIGRAM(S): 2 INJECTION INTRAMUSCULAR; INTRAVENOUS; SUBCUTANEOUS at 23:17

## 2018-05-16 RX ADMIN — MORPHINE SULFATE 15 MILLIGRAM(S): 50 CAPSULE, EXTENDED RELEASE ORAL at 17:32

## 2018-05-16 RX ADMIN — LIDOCAINE 1 PATCH: 4 CREAM TOPICAL at 13:11

## 2018-05-16 RX ADMIN — HYDROMORPHONE HYDROCHLORIDE 2 MILLIGRAM(S): 2 INJECTION INTRAMUSCULAR; INTRAVENOUS; SUBCUTANEOUS at 10:07

## 2018-05-16 RX ADMIN — HYDROMORPHONE HYDROCHLORIDE 4 MILLIGRAM(S): 2 INJECTION INTRAMUSCULAR; INTRAVENOUS; SUBCUTANEOUS at 15:05

## 2018-05-16 RX ADMIN — HYDROMORPHONE HYDROCHLORIDE 1 MILLIGRAM(S): 2 INJECTION INTRAMUSCULAR; INTRAVENOUS; SUBCUTANEOUS at 03:37

## 2018-05-16 RX ADMIN — OXYCODONE HYDROCHLORIDE 30 MILLIGRAM(S): 5 TABLET ORAL at 05:13

## 2018-05-16 RX ADMIN — Medication 25 MILLIGRAM(S): at 17:33

## 2018-05-16 RX ADMIN — Medication 250 MILLIGRAM(S): at 05:13

## 2018-05-16 RX ADMIN — Medication 100 MILLIGRAM(S): at 10:37

## 2018-05-16 RX ADMIN — Medication 250 MILLIGRAM(S): at 17:33

## 2018-05-16 RX ADMIN — HYDROMORPHONE HYDROCHLORIDE 0.5 MILLIGRAM(S): 2 INJECTION INTRAMUSCULAR; INTRAVENOUS; SUBCUTANEOUS at 16:30

## 2018-05-16 RX ADMIN — MORPHINE SULFATE 15 MILLIGRAM(S): 50 CAPSULE, EXTENDED RELEASE ORAL at 18:04

## 2018-05-16 RX ADMIN — Medication 325 MILLIGRAM(S): at 17:32

## 2018-05-16 RX ADMIN — HEPARIN SODIUM 5000 UNIT(S): 5000 INJECTION INTRAVENOUS; SUBCUTANEOUS at 05:13

## 2018-05-16 RX ADMIN — LISINOPRIL 2.5 MILLIGRAM(S): 2.5 TABLET ORAL at 05:13

## 2018-05-16 RX ADMIN — Medication 100 MILLIGRAM(S): at 22:11

## 2018-05-16 RX ADMIN — PANTOPRAZOLE SODIUM 40 MILLIGRAM(S): 20 TABLET, DELAYED RELEASE ORAL at 10:37

## 2018-05-16 NOTE — H&P ADULT - PROBLEM SELECTOR PLAN 5
results of carotid duplex reviewed with Dr Burgess  no further testing or vascualr referral at this time as pt does not want surgery as d/w Dr Burgess

## 2018-05-16 NOTE — H&P ADULT - HISTORY OF PRESENT ILLNESS
72 year old male with PMH chronic back pain, vertebral fx/collapse, spinal fusion, spinal stenosis, Castellanos rods, anemia, anxiety, BPH, depression, GERD, HTN, kyphosis, neuropathy, (A1c 6.2). 5/8 pt to [facility] from assisted living facility s/p syncopal episode. Pt reports no hx of CP, SOB, diaphoresis, dizziness, prior syncope, abd pain. Admitted to  with pulmonary edema, YUNIOR, E coli UTI and bacteremia, NSTEMI, urinary retention requiring periodic straight cath (pt with c/o having to "force urine out" but denies dysuria or frequency).   5/15 s/p C with results as follows:  (< from: Cardiac Cath Lab - Adult (05.15.18 @ 15:28) >  LMCA: Small caliber vessel. There is moderate diffuse disease noted.  LAD: Medium caliber vessel. There is a 80% stenosis noted in the ostialportion of the vessel.  LCx: There is a 80% stenosis noted in the proximal portion of the vessel OM3  LPDA has 99% stenosis  RCA: Small caliber vessel. There is mild diffuse disease noted.  < end of copied text >  5/16 transferred to [facility] CTICU, c/o back pain (chronic and unchanged from usual pain) but denies all other c/o at this time.

## 2018-05-16 NOTE — H&P ADULT - NSHPLABSRESULTS_GEN_ALL_CORE
Complete Blood Count (05.16.18 @ 10:12)    WBC Count: 7.1 K/uL    Hemoglobin: 10.3 g/dL    Hematocrit: 34.0 %    Platelet Count - Automated: 320 K/uL    Comprehensive Metabolic Panel (05.08.18 @ 11:07)    Sodium, Serum: 143 mmol/L    Potassium, Serum: 4.1 mmol/L    Chloride, Serum: 106 mmol/L    Carbon Dioxide, Serum: 25 mmol/L    Anion Gap, Serum: 12 mmol/L    Blood Urea Nitrogen, Serum: 42 mg/dL    Creatinine, Serum: 2.21 mg/dL    Glucose, Serum: 117 mg/dL    Calcium, Total Serum: 7.9 mg/dL    Protein Total, Serum: 6.5 gm/dL    Albumin, Serum: 2.6 g/dL    Bilirubin Total, Serum: 0.4 mg/dL    Alkaline Phosphatase, Serum: 63 U/L    Aspartate Aminotransferase (AST/SGOT): 170 U/L    Alanine Aminotransferase (ALT/SGPT): 31 U/L    CARDIAC MARKERS ( 16 May 2018 10:12 )  CK25 U/L / CKMBx     / Troponin T0.42 ng/mL /    < from: Xray Chest 1 View- PORTABLE-Urgent (05.16.18 @ 10:13) >  IMPRESSION: Mild interstitial edema.  < end of copied text > Complete Blood Count (05.16.18 @ 10:12)    WBC Count: 7.1 K/uL    Hemoglobin: 10.3 g/dL    Hematocrit: 34.0 %    Platelet Count - Automated: 320 K/uL    Basic Metabolic Panel (05.16.18 @ 10:12)    Sodium, Serum: 143 mmol/L    Potassium, Serum: 4.1 mmol/L    Chloride, Serum: 105 mmol/L    Carbon Dioxide, Serum: 23.0 mmol/L    Anion Gap, Serum: 15 mmol/L    Blood Urea Nitrogen, Serum: 17.0 mg/dL    Creatinine, Serum: 0.94 mg/dL    Glucose, Serum: 108 mg/dL    Calcium, Total Serum: 8.8 mg/dL      CARDIAC MARKERS ( 16 May 2018 10:12 )  CK25 U/L / CKMBx     / Troponin T0.42 ng/mL /    < from: Xray Chest 1 View- PORTABLE-Urgent (05.16.18 @ 10:13) >  IMPRESSION: Mild interstitial edema.  < end of copied text >

## 2018-05-16 NOTE — H&P ADULT - PMH
Anemia    Anxiety    Benign prostatic hypertrophy without urinary obstruction    Coronary artery disease involving native coronary artery of native heart, angina presence unspecified    Depression    GERD (gastroesophageal reflux disease)    Hypertension    Kyphosis    Neuropathy    Spinal stenosis    Syncope

## 2018-05-16 NOTE — H&P ADULT - NSHPPHYSICALEXAM_GEN_ALL_CORE
Neuro: A+O x 3, non-focal, speech clear and intact  HEENT: PERRL, EOMI, oral mucosa pink and moist  Neck: supple, no JVD  CV: regular rate, regular rhythm, +S1S2, no murmurs or rub  Pulm/chest: lung sounds CTA and equal bilaterally, no accessory muscle use noted  Abd: soft, NT, ND, +BS  Ext: ROTH x 4, trace BLE edema, both shins/feet with what appears to be a build up of dried skin and dirt. DP pulses 1+ bilat.  Skin: warm, well perfused, bilat groins with erythema, excoriation and rash

## 2018-05-16 NOTE — PROGRESS NOTE ADULT - SUBJECTIVE AND OBJECTIVE BOX
Pt was seen and examined at bedside. Pt is a 77M with a reported history of HTN, "breathing problems", and chronic LBP who was transferred from [facility] w/ NSTEMI.  He is reporting severe, non radicular pain in the lumbar region. He reports having undergone a multilevel lumbar fusion in 4/2017 at [facility]. He states that he has been following seeing pain management as an outpt w/ Dr. Springer.  He states that his pain has mildly improved since undergoing the lumbar fusion; however, pain still keeps him up most nights. Pain is located diffusely across the lumbar region. Aggravated with all types of activity. He does not identify any alleviating factors. He reports using MS Contin 30mg BID along with MS IR 15mg 2-3 x daily with moderate relief. No side effects. No radicular complaints. No weakness. No saddle anesthesia or bowel/bladder dysfunction     Laying in bed; appears uncomfortable.   VSS; afebrile  A&Ox3; PERRL; following commands  RRR  CTABL  +BS, S/NT/ND, Tolerating PO  ROTH; good strength     A/P: 77M admitted with NSTEMI         2. Post-laminectomy syndrome            -Ofirmev 1gm IV PRN   -MS Contin 15mg BID   -Dilaudid 2/4mg PO Sliding Scale Q6h PRN   -Dilaudid 0.5mg SQ Q6h PRN BTP  -Lidoderm  - Reviewed  -Will f/u   [phone]

## 2018-05-16 NOTE — H&P ADULT - ASSESSMENT
72 year old male with PMH chronic back pain, vertebral fx/collapse, spinal fusion, spinal stenosis, Castellanos rods, anemia, anxiety, BPH, depression, GERD, HTN, kyphosis, neuropathy, (A1c 6.2). 5/8 pt to [facility] from assisted living facility s/p syncopal episode. Pt reports no hx of CP, SOB, diaphoresis, dizziness, prior syncope, abd pain. Admitted to  with pulmonary edema, YUNIOR, E coli UTI and bacteremia, NSTEMI, urinary retention requiring periodic straight cath (pt with c/o having to "force urine out" but denies dysuria or frequency).   5/15 s/p C with results as follows:  (< from: Cardiac Cath Lab - Adult (05.15.18 @ 15:28) >  LMCA: Small caliber vessel. There is moderate diffuse disease noted.  LAD: Medium caliber vessel. There is a 80% stenosis noted in the ostialportion of the vessel.  LCx: There is a 80% stenosis noted in the proximal portion of the vessel OM3  LPDA has 99% stenosis  RCA: Small caliber vessel. There is mild diffuse disease noted.  < end of copied text >  5/16 transferred to [facility] CTICU, c/o back pain (chronic and unchanged from usual pain) but denies all other c/o at this time.

## 2018-05-16 NOTE — H&P ADULT - PROBLEM SELECTOR PLAN 4
bacteremia cleared on last set of BC at [facility]  ID consult appreciated  will continue PO cefuroxime x 1 week as recommended

## 2018-05-16 NOTE — H&P ADULT - PROBLEM SELECTOR PLAN 1
admitted to CTICU  continue work-up and evaluation for possible CABG (vs PCI, will discuss with interventional cardiology as pt at this point wants other options/does not want surgery)  PT consult  pulm consult  social work consult for support

## 2018-05-16 NOTE — H&P ADULT - PROBLEM SELECTOR PLAN 7
continue beta-blocker  no ACEI in case goes for [facility]  if needs further BP control can add home dose of amlodipine

## 2018-05-16 NOTE — PHYSICAL THERAPY INITIAL EVALUATION ADULT - ADDITIONAL COMMENTS
Pt. states he lives in a assisted living facility. Ambulates with rollator to and from room to dining room.

## 2018-05-16 NOTE — CONSULT NOTE ADULT - SUBJECTIVE AND OBJECTIVE BOX
PULMONARY CONSULT NOTE      SHAWNEE HERRERA-[id number]    Patient is a 77y old  Male who presents with a chief complaint of     HISTORY OF PRESENT ILLNESS: 77 year old mall who had a fall at his assisted living.  He presented to  with hypoxia.  Denied CP.  In the ED he is noted to be hypoxic and placed on NIV for likely pulmonary edema. In [facility] on CKD, NSTEMI, and anemia (08 May 2018 12:49). Pt. underwent LHC at  on 5/15 - Three Vessel coronary artery disease (LAD,RCA,LCX), Left main coronary artery disease, Hemodynamic status is normal, Normal left ventricular end-diastolic pressure. EF 60%. Referred to [facility] for CABG. He is now on RA. Denies any sob, cp, cough, wheeze. Says he quit smoking 40 y/a but not sure how reliable a historian b/c he said he had absolutely no medical issues prior.     MEDICATIONS  (STANDING):  acetaminophen  IVPB. 1000 milliGRAM(s) IV Intermittent once  cefuroxime   Tablet 250 milliGRAM(s) Oral every 12 hours  docusate sodium 100 milliGRAM(s) Oral three times a day  lidocaine   Patch 1 Patch Transdermal daily  morphine ER Tablet 15 milliGRAM(s) Oral two times a day  pantoprazole    Tablet 40 milliGRAM(s) Oral before breakfast  sodium chloride 0.9% lock flush 3 milliLiter(s) IV Push every 8 hours      MEDICATIONS  (PRN):  HYDROmorphone   Tablet 2 milliGRAM(s) Oral every 6 hours PRN Moderate Pain (4 - 6)  HYDROmorphone   Tablet 4 milliGRAM(s) Oral every 6 hours PRN Severe Pain (7 - 10)  HYDROmorphone  Injectable 0.5 milliGRAM(s) SubCutaneous every 6 hours PRN severe BTP persisting >60 min after PO admin      Allergies    No Known Allergies    Intolerances    Vanilla Ensure TID (Unknown)      PAST MEDICAL & SURGICAL HISTORY:  Benign prostatic hypertrophy without urinary obstruction  Anxiety  Depression  Hypertension  Spinal stenosis  Kyphosis  Neuropathy  GERD (gastroesophageal reflux disease)  Anemia  S/P spinal fusion      FAMILY HISTORY:NC      SOCIAL HISTORY  Smoking History: quit 40 y/a    REVIEW OF SYSTEMS:    CONSTITUTIONAL:  No fevers, chills, sweats    HEENT:  Eyes:  No diplopia or blurred vision. ENT:  No earache, sore throat or runny nose.    CARDIOVASCULAR:  per HPI    RESPIRATORY: per HPI      GASTROINTESTINAL:  No abdominal pain, nausea, vomiting or diarrhea.    GENITOURINARY:  No dysuria, frequency or urgency.    NEUROLOGIC:  No paresthesias, fasciculations, seizures or weakness.    PSYCHIATRIC:  No disorder of thought or mood.    Vital Signs Last 24 Hrs  T(C): 36.7 (16 May 2018 08:11), Max: 37.2 (16 May 2018 07:30)  T(F): 98 (16 May 2018 08:11), Max: 98.9 (16 May 2018 07:30)  HR: 84 (16 May 2018 09:00) (60 - 90)  BP: 137/75 (16 May 2018 09:00) (92/56 - 159/66)  BP(mean): 101 (16 May 2018 09:00) (63 - 101)  RR: 21 (16 May 2018 09:00) (11 - 25)  SpO2: 93% (16 May 2018 09:00) (92% - 100%)    PHYSICAL EXAMINATION:    GENERAL: The patient is in no apparent distress.     HEENT:  Mucous membranes are moist. excoriation on bridge of nose likely from BPAP    NECK: Supple.     LUNGS: Clear to auscultation without wheezing, rales, or rhonchi. Respirations unlabored    HEART: Regular rate and rhythm      ABDOMEN: Soft, nontender, and nondistended.       EXTREMITIES: Without any cyanosis, clubbing, rash, lesions or edema.    NEUROLOGIC: Grossly intact.      LABS:                        10.3   7.1   )-----------( 320      ( 16 May 2018 10:12 )             34.0     05-16    143  |  105  |  17.0  ----------------------------<  108  4.1   |  23.0  |  0.94    Ca    8.8      16 May 2018 10:12  Phos  2.8     05-16  Mg     2.1     05-16    TPro  6.9  /  Alb  3.2<L>  /  TBili  0.3<L>  /  DBili  0.1  /  AST  21  /  ALT  18  /  AlkPhos  70  05-16    PT/INR - ( 16 May 2018 10:12 )   PT: 14.9 sec;   INR: 1.35 ratio         PTT - ( 16 May 2018 10:12 )  PTT:29.6 sec      CARDIAC MARKERS ( 16 May 2018 10:12 )  x     / 0.42 ng/mL / 25 U/L / x     / x            Serum Pro-Brain Natriuretic Peptide: 3318 pg/mL (05-16-18 @ 10:12)    Blood Gas Profile - Arterial (05.11.18 @ 15:07)    pH, Arterial: 7.50    pCO2, Arterial: 36 mmHg    pO2, Arterial: 46: Test Name:_gas  Called by:[employer]_  Called to:LETICIA York RN_  Read back 2 Pt IDs:y Read back values:y Date/Tm:05/11/18 15:28 mmHg    HCO3, Arterial: 28 mmoL/L    Base Excess, Arterial: 5.0 mmol/L    Oxygen Saturation, Arterial: 82 %    FIO2, Arterial: 50    Blood Gas Comments Arterial: FIO2:_50  MODE:venti    Blood Gas Source Arterial: Arterial    Blood Gas Profile - Arterial (05.10.18 @ 22:01)    pH, Arterial: 7.51    pCO2, Arterial: 39 mmHg    pO2, Arterial: 79 mmHg    HCO3, Arterial: 31 mmoL/L    Base Excess, Arterial: 7.4 mmol/L    Oxygen Saturation, Arterial: 96 %    FIO2, Arterial: 100    Blood Gas Comments Arterial: bipap,12/6,100%    Blood Gas Source Arterial: Arterial          MICROBIOLOGY:  Culture - Urine (05.08.18 @ 10:04)    -  Cefepime: S <=2    -  Cefazolin: S <=2 This predicts results for oral agents cefaclor, cefdinir, cefpodoxime, cefprozil, cefuroxime axetil, cephalexin and locarbef for uncomplicated UTI. Note that some isolates may be susceptible to these agents while testing resistant to cefazolin.    -  Aztreonam: S <=4    -  Ampicillin: R >16 These ampicillin results predict results for amoxicillin    -  Ampicillin/Sulbactam: S <=4/2    -  Amikacin: S <=8    -  Amoxicillin/Clavulanic Acid: S <=8/4    -  Meropenem: S <=1    -  Nitrofurantoin: S <=32 Should not be used to treat pyelonephritis    -  Levofloxacin: S <=1    -  Imipenem: S <=1    -  Gentamicin: S <=1    -  Ertapenem: S <=0.5    -  Ciprofloxacin: S <=0.5    -  Ceftriaxone: S <=1 Enterobacter, Citrobacter, and Serratia may develop resistance during prolonged therapy    -  Cefoxitin: S <=4    -  Tobramycin: S <=2    -  Piperacillin/Tazobactam: S <=8    -  Tigecycline: S <=1    -  Trimethoprim/Sulfamethoxazole: S <=0.5/9.5    Specimen Source: .Urine None    Culture Results:   >100,000 CFU/ml Escherichia coli    Organism Identification: Escherichia coli    Organism: Escherichia coli    Method Type: HOMERO    Culture - Blood (05.08.18 @ 10:04)    -  Escherichia coli: Detec    -  Ampicillin: R >16 These ampicillin results predict results for amoxicillin    -  Ampicillin/Sulbactam: S 8/4    -  Aztreonam: S <=4    -  Cefazolin: S <=2    -  Cefepime: S <=2    -  Trimethoprim/Sulfamethoxazole: S <=0.5/9.5    -  Piperacillin/Tazobactam: S <=8    -  Tobramycin: S <=2    -  Cefoxitin: S <=4    -  Ceftriaxone: S <=1 Enterobacter, Citrobacter, and Serratia may develop resistance during prolonged therapy    -  Ciprofloxacin: S <=0.5    -  Ertapenem: S <=0.5    -  Gentamicin: S <=1    -  Imipenem: S <=1    -  Levofloxacin: S <=1    -  Meropenem: S <=1    Gram Stain:   Growth in anaerobic bottle: Gram Negative Rods  Growth in aerobic bottle: Gram Negative Rods    -  Amikacin: S <=8    Specimen Source: .Blood None    Organism: Blood Culture PCR    Organism: Escherichia coli    Culture Results:   Growth in aerobic and anaerobic bottles: Escherichia coli  "Due to technical problems, Proteus sp. will Not be reported as part of  the BCID panel until further notice"  ***Blood Panel PCR results on this specimen are available  approximately 3 hours after the Gram stain result.***  Gram stain, PCR, and/or culture results may not always  correspond due to difference in methodologies.  ************************************************************  This PCR assay was performed using [employer].  The following targets are tested for: Enterococcus,  vancomycin resistant enterococci, Listeria monocytogenes,  coagulase negative staphylococci, S. aureus,  methicillin resistant S. aureus, Streptococcus agalactiae  (Group B), S. pneumoniae, S.pyogenes (Group A),  Acinetobacter baumannii, Enterobacter cloacae, E. coli,  Klebsiella oxytoca, K. pneumoniae, Proteus sp.,  Serratia marcescens, Haemophilus influenzae,  Neisseria meningitidis, Pseudomonas aeruginosa, Candida  albicans, C. glabrata, C krusei, C parapsilosis,  C. tropicalis and the KPC resistance gene.    Organism Identification: Blood Culture PCR  Escherichia coli    Method Type: PCR    Method Type: HOMERO    Culture - Blood (05.08.18 @ 10:04)    -  Escherichia coli: Detec    -  Ampicillin: R >16 These ampicillin results predict results for amoxicillin    -  Ampicillin/Sulbactam: S 8/4    -  Aztreonam: S <=4    -  Cefazolin: S <=2    -  Cefepime: S <=2    -  Trimethoprim/Sulfamethoxazole: S <=0.5/9.5    -  Piperacillin/Tazobactam: S <=8    -  Tobramycin: S <=2    -  Cefoxitin: S <=4    -  Ceftriaxone: S <=1 Enterobacter, Citrobacter, and Serratia may develop resistance during prolonged therapy    -  Ciprofloxacin: S <=0.5    -  Ertapenem: S <=0.5    -  Gentamicin: S <=1    -  Imipenem: S <=1    -  Levofloxacin: S <=1    -  Meropenem: S <=1    Gram Stain:   Growth in anaerobic bottle: Gram Negative Rods  Growth in aerobic bottle: Gram Negative Rods    -  Amikacin: S <=8    Specimen Source: .Blood None    Organism: Blood Culture PCR    Organism: Escherichia coli    Culture Results:   Growth in aerobic and anaerobic bottles: Escherichia coli  "Due to technical problems, Proteus sp. will Not be reported as part of  the BCID panel until further notice"  ***Blood Panel PCR results on this specimen are available  approximately 3 hours after the Gram stain result.***  Gram stain, PCR, and/or culture results may not always  correspond due to difference in methodologies.  ************************************************************  This PCR assay was performed using [employer].  The following targets are tested for: Enterococcus,  vancomycin resistant enterococci, Listeria monocytogenes,  coagulase negative staphylococci, S. aureus,  methicillin resistant S. aureus, Streptococcus agalactiae  (Group B), S. pneumoniae, S.pyogenes (Group A),  Acinetobacter baumannii, Enterobacter cloacae, E. coli,  Klebsiella oxytoca, K. pneumoniae, Proteus sp.,  Serratia marcescens, Haemophilus influenzae,  Neisseria meningitidis, Pseudomonas aeruginosa, Candida  albicans, C. glabrata, C krusei, C parapsilosis,  C. tropicalis and the KPC resistance gene.    Organism Identification: Blood Culture PCR  Escherichia coli    Method Type: PCR    Method Type: HOMERO        RADIOLOGY & ADDITIONAL STUDIES:  < from: Xray Chest 1 View- PORTABLE-Urgent (05.16.18 @ 10:13) >   EXAM:  XR CHEST PORTABLE URGENT 1V                          PROCEDURE DATE:  05/16/2018          INTERPRETATION:  HISTORY:  Preop. Non-STEMI MI, CAD.  TECHNIQUE: Portable frontal view of the chest, 1 view.  COMPARISON: none.  FINDINGS:     HEART:difficult to access in this projection  LUNGS: Mild interstitial edema without effusion, infiltrate, or   pneumothorax    Extensive Castellanos rods are seen from the upper thoracic spine through   the lumbar spine.    IMPRESSION:   Mild interstitial edema.                  SUZE KELLY M.D., ATTENDING RADIOLOGIST    < end of copied text >  < from: Xray Chest 1 View- PORTABLE-Routine (05.14.18 @ 09:30) >    EXAM:  XR CHEST PORTABLE ROUTINE 1V                            PROCEDURE DATE:  05/14/2018          INTERPRETATION:  Clinical information: Hypoxia. Pneumonia.    Portable AP chest radiograph from 0845 hours:    COMPARISON:  May 11, 2018    FINDINGS:  Unremarkable cardiac, hilar and mediastinal contours.    Bilateral reticular opacities suggestive of pulmonary vascular   congestion. Marked improvement in previously described bilateral upper   lobe airspace disease. Possible trace right pleural effusion.    Orthopedic rods noted in the spine.    IMPRESSION:    Significant improvement in bilateral airspace disease. Mild pulmonary   vascular congestion.                AILYN VASQUES     < end of copied text >  < from: Xray Chest 1 View- PORTABLE-Urgent (05.08.18 @ 10:10) >    EXAM:  XR CHEST PORTABLE URGENT 1V                            PROCEDURE DATE:  05/08/2018          INTERPRETATION:      INDICATION: Fever and chills weakness shortness of breath    Single frontal view of the chest compared to prior study of 3/16/2018    FINDINGS/  IMPRESSION:       There are parenchymal interstitial edema with perihilar vascular   prominence. There is no large pleural effusion. There is no pneumothorax.   There is cardiomegaly. Patient is status post multilevel thoracic   instrumentation.  There is osteopenia and degenerative changes.                  CATALINA BAEZA     < end of copied text >

## 2018-05-16 NOTE — CONSULT NOTE ADULT - SUBJECTIVE AND OBJECTIVE BOX
[facility] [place] FLORENTIN MILIAN MD FACP   JANEL ANGEL MD  Diplomates American Board of Internal Medicine and Infecctious Diseases  [phone]  [id number] DAVI [id number]    T  77 year old male with past medical history bph, anxiety, hypertension, hyperlipidemia, s/p spinal fusion in past admitted on 5/8  to [facility] after a fall at assisted living; patient was hypoxic upon admission and placed on 100% NRB and also found to have gram negative rods in blood as well as new AMI. Patient is poor historian and history per medical record.   1. Patient admitted TO [facility]  with sepsis secondary to gram negative rods and growing E coli in urine so sepsis may be due to urinary origin; also with poor dentition and lower extremity venous stasis, foot ulcers, poor skin hygiene   PT TREATED WITH IV CEFEPIME THERE  AND NOW ON ORAL CEFTIN   TRANSFERRED TO [facility]  FOR CABG  ASKED TO EVALUATE FROM ID STANDPOINT     PAST MEDICAL & SURGICAL HISTORY:  Benign prostatic hypertrophy without urinary obstruction  Anxiety  Depression  Hypertension  Spinal stenosis  Kyphosis  Neuropathy  GERD (gastroesophageal reflux disease)  Anemia  S/P spinal fusion      ANTIBIOTICS  cefuroxime   Tablet 250 milliGRAM(s) Oral every 12 hours      Allergies    No Known Allergies    Intolerances    Vanilla Ensure TID (Unknown)      SOCIAL HISTORY:    FAMILY HISTORY:      Vital Signs Last 24 Hrs  T(C): 36.7 (16 May 2018 08:11), Max: 37.2 (16 May 2018 07:30)  T(F): 98 (16 May 2018 08:11), Max: 98.9 (16 May 2018 07:30)  HR: 84 (16 May 2018 09:00) (60 - 90)  BP: 137/75 (16 May 2018 09:00) (92/56 - 159/66)  BP(mean): 101 (16 May 2018 09:00) (63 - 101)  RR: 21 (16 May 2018 09:00) (11 - 25)  SpO2: 93% (16 May 2018 09:00) (92% - 100%)  Drug Dosing Weight  Height (cm): 175.26 (15 May 2018 14:07)  Weight (kg): 81.6 (15 May 2018 14:07)  BMI (kg/m2): 26.6 (15 May 2018 14:07)  BSA (m2): 1.97 (15 May 2018 14:07)      REVIEW OF SYSTEMS:    CONSTITUTIONAL:  As per HPI.    HEENT:  Eyes:  No diplopia or blurred vision. ENT:  No earache, sore throat or runny nose.    CARDIOVASCULAR:  No pressure, squeezing, strangling, tightness, heaviness or aching about the chest, neck, axilla or epigastrium.    RESPIRATORY:  No cough, shortness of breath, PND or orthopnea.    GASTROINTESTINAL:  No nausea, vomiting or diarrhea.    GENITOURINARY:  No dysuria, frequency or urgency.    MUSCULOSKELETAL:  As per HPI.    SKIN:  No change in skin, hair or nails.    NEUROLOGIC:  No paresthesias, fasciculations, seizures or weakness.                  PHYSICAL EXAMINATION:    GENERAL: The patient is a well-developed, well-nourished _____in no apparent distress. ___ is alert and oriented x3.    VITAL SIGNS: T(C): 36.7 (05-16-18 @ 08:11), Max: 37.2 (05-16-18 @ 07:30)  HR: 84 (05-16-18 @ 09:00) (60 - 90)  BP: 137/75 (05-16-18 @ 09:00) (92/56 - 159/66)  RR: 21 (05-16-18 @ 09:00) (11 - 25)  SpO2: 93% (05-16-18 @ 09:00) (92% - 100%)  Wt(kg): --    HEENT: Head is normocephalic and atraumatic.  ANICTERIC  NECK: Supple. No carotid bruits.  No lymphadenopathy or thyromegaly.    LUNGS:COARSE BREATH SOUNDS    HEART: Regular rate and rhythm without murmur.    ABDOMEN: Soft, nontender, and nondistended.  Positive bowel sounds.  No hepatosplenomegaly was noted. NO REBOUND NO GUARDING    EXTREMITIES: NO EDEMA NO ERYTHEMA    NEUROLOGIC: NON FOCAL      SKIN: No ulceration or induration present. NO RASH        BLOOD CULTURES  Culture Results:   No growth to date. (05-12 @ 05:31)  Culture Results:   No growth to date. (05-12 @ 05:24)       URINE CX          LABS:                        10.3   7.1   )-----------( 320      ( 16 May 2018 10:12 )             34.0     05-16    143  |  105  |  17.0  ----------------------------<  108  4.1   |  23.0  |  0.94    Ca    8.8      16 May 2018 10:12  Phos  2.8     05-16  Mg     2.1     05-16    TPro  6.9  /  Alb  3.2<L>  /  TBili  0.3<L>  /  DBili  0.1  /  AST  21  /  ALT  18  /  AlkPhos  70  05-16    PT/INR - ( 16 May 2018 10:12 )   PT: 14.9 sec;   INR: 1.35 ratio         PTT - ( 16 May 2018 10:12 )  PTT:29.6 sec      RADIOLOGY & ADDITIONAL STUDIES:      ASSESSMENT/PLAN    77 year old male with past medical history bph, anxiety, hypertension, hyperlipidemia, s/p spinal fusion in past admitted on 5/8 after a fall at assisted living;  TO [facility]  BLOOD CX AND URINE CX ECOL  PT TREATED WITH IV ABX CEFEPIME AND THEN TRANSITIONED TO ORAL ABX CEFTIN  PT NON TOXIC REPEAT CX NEGATIVE  WILL RECOMMEND TO CONTINUE TO COMLETE ONE WEEK   WILL FOLLOWUP        JANEL PECK MD

## 2018-05-16 NOTE — H&P ADULT - NSHPSOCIALHISTORY_GEN_ALL_CORE
Marital status: single, no kids  Lives with: alone at assisted living facility  Occupation: disabled d/t back pain    Tobacco use: 2 PPD x 15 yrs, quit 40 yrs ago  Alcohol use: denies  Illicit drug use: denies    Code status: full code  HCP: none

## 2018-05-17 DIAGNOSIS — R33.9 RETENTION OF URINE, UNSPECIFIED: ICD-10-CM

## 2018-05-17 DIAGNOSIS — M48.00 SPINAL STENOSIS, SITE UNSPECIFIED: ICD-10-CM

## 2018-05-17 LAB
CULTURE RESULTS: NO GROWTH — SIGNIFICANT CHANGE UP
CULTURE RESULTS: SIGNIFICANT CHANGE UP
CULTURE RESULTS: SIGNIFICANT CHANGE UP
SPECIMEN SOURCE: SIGNIFICANT CHANGE UP

## 2018-05-17 RX ORDER — ENOXAPARIN SODIUM 100 MG/ML
40 INJECTION SUBCUTANEOUS DAILY
Qty: 0 | Refills: 0 | Status: DISCONTINUED | OUTPATIENT
Start: 2018-05-17 | End: 2018-05-17

## 2018-05-17 RX ORDER — CLOPIDOGREL BISULFATE 75 MG/1
75 TABLET, FILM COATED ORAL DAILY
Qty: 0 | Refills: 0 | Status: DISCONTINUED | OUTPATIENT
Start: 2018-05-18 | End: 2018-05-23

## 2018-05-17 RX ORDER — ENOXAPARIN SODIUM 100 MG/ML
40 INJECTION SUBCUTANEOUS ONCE
Qty: 0 | Refills: 0 | Status: COMPLETED | OUTPATIENT
Start: 2018-05-17 | End: 2018-05-17

## 2018-05-17 RX ORDER — CLOPIDOGREL BISULFATE 75 MG/1
75 TABLET, FILM COATED ORAL DAILY
Qty: 0 | Refills: 0 | Status: DISCONTINUED | OUTPATIENT
Start: 2018-05-17 | End: 2018-05-17

## 2018-05-17 RX ORDER — CLOPIDOGREL BISULFATE 75 MG/1
600 TABLET, FILM COATED ORAL ONCE
Qty: 0 | Refills: 0 | Status: COMPLETED | OUTPATIENT
Start: 2018-05-17 | End: 2018-05-17

## 2018-05-17 RX ADMIN — MORPHINE SULFATE 15 MILLIGRAM(S): 50 CAPSULE, EXTENDED RELEASE ORAL at 23:00

## 2018-05-17 RX ADMIN — HYDROMORPHONE HYDROCHLORIDE 0.5 MILLIGRAM(S): 2 INJECTION INTRAMUSCULAR; INTRAVENOUS; SUBCUTANEOUS at 07:32

## 2018-05-17 RX ADMIN — MORPHINE SULFATE 15 MILLIGRAM(S): 50 CAPSULE, EXTENDED RELEASE ORAL at 10:39

## 2018-05-17 RX ADMIN — Medication 100 MILLIGRAM(S): at 22:02

## 2018-05-17 RX ADMIN — Medication 100 MILLIGRAM(S): at 06:19

## 2018-05-17 RX ADMIN — SODIUM CHLORIDE 3 MILLILITER(S): 9 INJECTION INTRAMUSCULAR; INTRAVENOUS; SUBCUTANEOUS at 11:37

## 2018-05-17 RX ADMIN — SODIUM CHLORIDE 3 MILLILITER(S): 9 INJECTION INTRAMUSCULAR; INTRAVENOUS; SUBCUTANEOUS at 06:15

## 2018-05-17 RX ADMIN — HYDROMORPHONE HYDROCHLORIDE 4 MILLIGRAM(S): 2 INJECTION INTRAMUSCULAR; INTRAVENOUS; SUBCUTANEOUS at 12:30

## 2018-05-17 RX ADMIN — CLOPIDOGREL BISULFATE 600 MILLIGRAM(S): 75 TABLET, FILM COATED ORAL at 10:39

## 2018-05-17 RX ADMIN — SENNA PLUS 2 TABLET(S): 8.6 TABLET ORAL at 22:02

## 2018-05-17 RX ADMIN — PANTOPRAZOLE SODIUM 40 MILLIGRAM(S): 20 TABLET, DELAYED RELEASE ORAL at 06:19

## 2018-05-17 RX ADMIN — Medication 1000 MILLIGRAM(S): at 00:28

## 2018-05-17 RX ADMIN — HYDROMORPHONE HYDROCHLORIDE 0.5 MILLIGRAM(S): 2 INJECTION INTRAMUSCULAR; INTRAVENOUS; SUBCUTANEOUS at 07:47

## 2018-05-17 RX ADMIN — ATORVASTATIN CALCIUM 80 MILLIGRAM(S): 80 TABLET, FILM COATED ORAL at 22:02

## 2018-05-17 RX ADMIN — Medication 25 MILLIGRAM(S): at 18:00

## 2018-05-17 RX ADMIN — LIDOCAINE 1 PATCH: 4 CREAM TOPICAL at 01:29

## 2018-05-17 RX ADMIN — TAMSULOSIN HYDROCHLORIDE 0.8 MILLIGRAM(S): 0.4 CAPSULE ORAL at 22:02

## 2018-05-17 RX ADMIN — Medication 100 MILLIGRAM(S): at 11:52

## 2018-05-17 RX ADMIN — SODIUM CHLORIDE 3 MILLILITER(S): 9 INJECTION INTRAMUSCULAR; INTRAVENOUS; SUBCUTANEOUS at 22:02

## 2018-05-17 RX ADMIN — HYDROMORPHONE HYDROCHLORIDE 4 MILLIGRAM(S): 2 INJECTION INTRAMUSCULAR; INTRAVENOUS; SUBCUTANEOUS at 11:51

## 2018-05-17 RX ADMIN — MORPHINE SULFATE 15 MILLIGRAM(S): 50 CAPSULE, EXTENDED RELEASE ORAL at 22:02

## 2018-05-17 RX ADMIN — Medication 250 MILLIGRAM(S): at 06:18

## 2018-05-17 RX ADMIN — Medication 25 MILLIGRAM(S): at 06:18

## 2018-05-17 RX ADMIN — MORPHINE SULFATE 15 MILLIGRAM(S): 50 CAPSULE, EXTENDED RELEASE ORAL at 09:35

## 2018-05-17 RX ADMIN — ONDANSETRON 4 MILLIGRAM(S): 8 TABLET, FILM COATED ORAL at 12:00

## 2018-05-17 RX ADMIN — Medication 325 MILLIGRAM(S): at 11:54

## 2018-05-17 RX ADMIN — ONDANSETRON 4 MILLIGRAM(S): 8 TABLET, FILM COATED ORAL at 20:10

## 2018-05-17 RX ADMIN — DULOXETINE HYDROCHLORIDE 60 MILLIGRAM(S): 30 CAPSULE, DELAYED RELEASE ORAL at 11:52

## 2018-05-17 RX ADMIN — ENOXAPARIN SODIUM 40 MILLIGRAM(S): 100 INJECTION SUBCUTANEOUS at 10:38

## 2018-05-17 RX ADMIN — Medication 250 MILLIGRAM(S): at 18:03

## 2018-05-17 NOTE — DIETITIAN INITIAL EVALUATION ADULT. - PERTINENT LABORATORY DATA
05-16 Na143 mmol/L Glu 108 mg/dL K+ 4.1 mmol/L Cr  0.94 mg/dL BUN 17.0 mg/dL Phos 2.8 mg/dL Alb 3.2 g/dL<L> PAB 14 mg/dL<L>

## 2018-05-17 NOTE — DIETITIAN INITIAL EVALUATION ADULT. - PROBLEM SELECTOR PLAN 4
bacteremia cleared on last set of BC at [facility]  ID consult appreciated  will continue PO cefuroxime x 1 week as recommended

## 2018-05-17 NOTE — DIETITIAN INITIAL EVALUATION ADULT. - OTHER INFO
Spoke to pt, pt reports #, bed scale: 72kg (indicates wt loss of 20#) Pt report having fair to poor appetite, however likes and drinks Ensure, NFPE: muscle wasting in temple, shoulder clavicle, Fat loss in orbit region.

## 2018-05-17 NOTE — PROGRESS NOTE ADULT - SUBJECTIVE AND OBJECTIVE BOX
SURGICAL PA NOTE: CTSP for consult for Urology: Urinary retention,     72 year old male with PMH chronic back pain, vertebral fx/collapse, spinal fusion, spinal stenosis, Castellanos rods, anemia, anxiety, BPH, depression, GERD, HTN, kyphosis, neuropathy, (A1c 6.2).  pt to [facility] from assisted living facility s/p syncopal episode. Pt reports no hx of CP, SOB, diaphoresis, dizziness, prior syncope, abd pain. Admitted to  with pulmonary edema, YUNIOR, E coli UTI and bacteremia, NSTEMI, urinary retention requiring periodic straight cath (pt with c/o having to "force urine out" but denies dysuria or frequency).   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    transferred to [facility], c/o back pain (chronic and unchanged from usual pain) but denies all other c/o at this time.   pt stable without continues to deny CP at this time.     Pt states had prostate seeds placed many years ago for dx of Prostate Ca.    At home, PTA to the hospital, pt has had increasing difficulty initiating urinary stream (has to push), nocturia 4-5 times per night, frequency of urination during the day, hesitancy, delayed micturition.     Moreno placed by nursing staff. No difficulties or obstruction met during placement of moreno.    STATUS POST:      POST OPERATIVE DAY #:     Vital Signs Last 24 Hrs  T(C): 37.2 (17 May 2018 11:35), Max: 37.2 (17 May 2018 11:35)  T(F): 98.9 (17 May 2018 11:35), Max: 98.9 (17 May 2018 11:35)  HR: 74 (17 May 2018 11:35) (57 - 86)  BP: 126/70 (17 May 2018 11:35) (104/55 - 166/71)  BP(mean): 90 (17 May 2018 09:00) (74 - 105)  RR: 18 (17 May 2018 11:35) (14 - 34)  SpO2: 97% (17 May 2018 11:35) (89% - 98%)    HPI:  72 year old male with PMH chronic back pain, vertebral fx/collapse, spinal fusion, spinal stenosis, Castellanos rods, anemia, anxiety, BPH, depression, GERD, HTN, kyphosis, neuropathy, (A1c 6.2).  pt to [facility] from assisted living facility s/p syncopal episode. Pt reports no hx of CP, SOB, diaphoresis, dizziness, prior syncope, abd pain. Admitted to  with pulmonary edema, YUNIOR, E coli UTI and bacteremia, NSTEMI, urinary retention requiring periodic straight cath (pt with c/o having to "force urine out" but denies dysuria or frequency).   5/15 s/p C with results as follows:    (< from: Cardiac Cath Lab - Adult (05.15.18 @ 15:28) >  LMCA: Small caliber vessel. There is moderate diffuse disease noted.  LAD: Medium caliber vessel. There is a 80% stenosis noted in the ostialportion of the vessel.  LCx: There is a 80% stenosis noted in the proximal portion of the vessel OM3  LPDA has 99% stenosis  RCA: Small caliber vessel. There is mild diffuse disease noted.  < end of copied text >   transferred to [facility], c/o back pain (chronic and unchanged from usual pain) but denies all other c/o at this time. (16 May 2018 08:05)      Non-ST elevation (NSTEMI) myocardial infarction  No h/o HF  No pertinent family history in first degree relatives  Handoff  MEWS Score  Coronary artery disease involving native coronary artery of native heart, angina presence unspecified  Syncope  Benign prostatic hypertrophy without urinary obstruction  Anxiety  Depression  Hypertension  Spinal stenosis  Kyphosis  Neuropathy  GERD (gastroesophageal reflux disease)  Anemia  No pertinent past medical history  No pertinent past medical history  No pertinent past medical history  No pertinent past medical history  No pertinent past medical history  Spinal stenosis  Urinary retention  Incontinence associated dermatitis  Prophylactic measure  Hypertension  Depression  Stenosis of right carotid artery  E coli infection  Chronic midline low back pain without sciatica  NSTEMI (non-ST elevated myocardial infarction)  Coronary artery disease involving native coronary artery of native heart, angina presence unspecified  S/P spinal fusion  S/P spinal fusion  NON-ST ELEVATION (NSTEMI) MYOC      SUBJECTIVE: Pt seen lying supine with HOB up    Diet:    Activity:     Fevers: [ ]Yes [ ]NO  Chills: [ ] Yes [ ] NO  SOB:  [ ] YES [ ] NO  Dyspnea: [ ]YES [ ]NO  Chest Discomfort: [ ] YES [ ] NO    Nausea: [ ] YES [ ] NO           Vomiting: [ ] YES [ ] NO  Flatus: [ ] YES [ ] NO             Bowel Movement: [ ] YES [ ] NO  Diarrhea: [ ] YES [ ] NO         Void: [ ]YES [ ]No  Constipation: [ ] YES [ ] NO       Pain (0-10):              Pain Control Adequate: [ ] YES [ ] NO    Moreno: to BSD - urine clear    NGT:      I&O's Detail    16 May 2018 07:  -  17 May 2018 07:00  --------------------------------------------------------  IN:    IV PiggyBack: 200 mL    Oral Fluid: 500 mL  Total IN: 700 mL    OUT:    Indwelling Catheter - Urethral: 900 mL  Total OUT: 900 mL    Total NET: -200 mL      17 May 2018 07:  -  17 May 2018 12:22  --------------------------------------------------------  IN:  Total IN: 0 mL    OUT:    Indwelling Catheter - Urethral: 130 mL  Total OUT: 130 mL    Total NET: -130 mL        I&O's Summary    16 May 2018 07:  -  17 May 2018 07:00  --------------------------------------------------------  IN: 700 mL / OUT: 900 mL / NET: -200 mL    17 May 2018 07:  -  17 May 2018 12:22  --------------------------------------------------------  IN: 0 mL / OUT: 130 mL / NET: -130 mL          MEDICATIONS  (STANDING):  aspirin 325 milliGRAM(s) Oral daily  atorvastatin 80 milliGRAM(s) Oral at bedtime  cefuroxime   Tablet 250 milliGRAM(s) Oral every 12 hours  docusate sodium 100 milliGRAM(s) Oral three times a day  DULoxetine 60 milliGRAM(s) Oral daily  lidocaine   Patch 1 Patch Transdermal daily  metoprolol tartrate 25 milliGRAM(s) Oral every 12 hours  morphine ER Tablet 15 milliGRAM(s) Oral two times a day  pantoprazole    Tablet 40 milliGRAM(s) Oral before breakfast  senna 2 Tablet(s) Oral at bedtime  sodium chloride 0.9% lock flush 3 milliLiter(s) IV Push every 8 hours  tamsulosin 0.8 milliGRAM(s) Oral at bedtime    MEDICATIONS  (PRN):  ALBUTerol/ipratropium for Nebulization. 3 milliLiter(s) Nebulizer once PRN Shortness of Breath  cyclobenzaprine 10 milliGRAM(s) Oral two times a day PRN Muscle Spasm  HYDROmorphone   Tablet 2 milliGRAM(s) Oral every 6 hours PRN Moderate Pain (4 - 6)  HYDROmorphone   Tablet 4 milliGRAM(s) Oral every 6 hours PRN Severe Pain (7 - 10)  HYDROmorphone  Injectable 0.5 milliGRAM(s) SubCutaneous every 6 hours PRN severe BTP persisting >60 min after PO admin  ondansetron   Disintegrating Tablet 4 milliGRAM(s) Oral every 6 hours PRN Nausea and/or Vomiting      LABS:                        10.3   7.1   )-----------( 320      ( 16 May 2018 10:12 )             34.0     05-16    143  |  105  |  17.0  ----------------------------<  108  4.1   |  23.0  |  0.94    Ca    8.8      16 May 2018 10:12  Phos  2.8     05-16  Mg     2.1     -16    TPro  6.9  /  Alb  3.2<L>  /  TBili  0.3<L>  /  DBili  0.1  /  AST  21  /  ALT  18  /  AlkPhos  70  05-16    PT/INR - ( 16 May 2018 10:12 )   PT: 14.9 sec;   INR: 1.35 ratio         PTT - ( 16 May 2018 10:12 )  PTT:29.6 sec  Urinalysis Basic - ( 16 May 2018 22:55 )    Color: Yellow / Appearance: Clear / S.015 / pH: x  Gluc: x / Ketone: Negative  / Bili: Negative / Urobili: Negative mg/dL   Blood: x / Protein: 30 mg/dL / Nitrite: Negative   Leuk Esterase: Trace / RBC: 3-5 /HPF / WBC 0-2   Sq Epi: x / Non Sq Epi: Occasional / Bacteria: Occasional          RADIOLOGY & ADDITIONAL STUDIES:

## 2018-05-17 NOTE — PROGRESS NOTE ADULT - PROBLEM SELECTOR PLAN 1
admitted to CTICU  continue work-up and evaluation for possible CABG vs PCI Per Dr. Burgess will have Dr. Morris review films to evaluate if pt is candidate for PCI since pt does not want surgery.   PT consulted   pulm consulted > Nebs PRN and keep O2 sats stable  social work consult for support  above plan to be discussed with CT surgery team and Dr. Burgess at Am rounds.

## 2018-05-17 NOTE — PROGRESS NOTE ADULT - PROBLEM SELECTOR PLAN 1
leave moreno indwelling for now, cont Flomax as ordered, plan per Cardiology/Cardiac surgery Re: stenting, plan per Urology MD, probable TOV post stenting

## 2018-05-17 NOTE — DIETITIAN INITIAL EVALUATION ADULT. - ETIOLOGY
Related to inadequate protein energy intake in setting of acute-illness(NSTEMI, CAD, ecoli bactaremia)

## 2018-05-17 NOTE — PROGRESS NOTE ADULT - SUBJECTIVE AND OBJECTIVE BOX
Chief Complaint:    Patient seen and examined at bedside. Patient sitting comfortably in bed, easily arousable in NAD.  Pt is a 77M with a reported history of HTN, "breathing problems", and chronic LBP who was transferred from [facility] w/ NSTEMI.  He is reporting severe, non radicular pain in the lumbar region. He reports having undergone a multilevel lumbar fusion in 2017 at [facility] for [facility]. Pain is worse with any movement. The only thing that makes pain better is lying flat in bed without movement.  He states that his pain has mildly improved since undergoing the lumbar fusion; however, pain still keeps him up most nights. Managed outpatient pain Dr. Springer. Pain is located diffusely across the lumbar region. Aggravated with all types of activity. He reports using MS Contin 30mg BID along with MS IR 15mg 2-3 x daily with moderate relief. No side effects. No radicular complaints. No weakness. No saddle anesthesia or bowel/bladder dysfunction.   States that MS Contin is making him nauseous, requesting IV medication only. States it is the "only thing that works." Patient has been managed chronically outpatient with MS Contin with no side effects.    No CP/SOB/fevers/chills/nausea/vomiting/diarrhea/headaches/dizziness.    PAST MEDICAL & SURGICAL HISTORY:  Coronary artery disease involving native coronary artery of native heart, angina presence unspecified  Syncope  Benign prostatic hypertrophy without urinary obstruction  Anxiety  Depression  Hypertension  Spinal stenosis  Kyphosis  Neuropathy  GERD (gastroesophageal reflux disease)  Anemia  S/P spinal fusion      SOCIAL HISTORY:  [ ] Denies Smoking, Alcohol, or Drug Use    Allergies    No Known Allergies    Intolerances    Vanilla Ensure TID (Unknown)      PAIN MEDICATIONS:  aspirin 325 milliGRAM(s) Oral daily  cyclobenzaprine 10 milliGRAM(s) Oral two times a day PRN  DULoxetine 60 milliGRAM(s) Oral daily  HYDROmorphone   Tablet 2 milliGRAM(s) Oral every 6 hours PRN  HYDROmorphone   Tablet 4 milliGRAM(s) Oral every 6 hours PRN  HYDROmorphone  Injectable 0.5 milliGRAM(s) SubCutaneous every 6 hours PRN  morphine ER Tablet 15 milliGRAM(s) Oral two times a day  ondansetron   Disintegrating Tablet 4 milliGRAM(s) Oral every 6 hours PRN    Heme:    Antibiotics:  cefuroxime   Tablet 250 milliGRAM(s) Oral every 12 hours    Cardiac:  metoprolol tartrate 25 milliGRAM(s) Oral every 12 hours  tamsulosin 0.8 milliGRAM(s) Oral at bedtime    Pulmonary:  ALBUTerol/ipratropium for Nebulization. 3 milliLiter(s) Nebulizer once PRN    Endocrine  atorvastatin 80 milliGRAM(s) Oral at bedtime    GI:  docusate sodium 100 milliGRAM(s) Oral three times a day  pantoprazole    Tablet 40 milliGRAM(s) Oral before breakfast  senna 2 Tablet(s) Oral at bedtime    All Other Meds:  lidocaine   Patch 1 Patch Transdermal daily  sodium chloride 0.9% lock flush 3 milliLiter(s) IV Push every 8 hours      LABS:                          10.3   7.1   )-----------( 320      ( 16 May 2018 10:12 )             34.0     05-16    143  |  105  |  17.0  ----------------------------<  108  4.1   |  23.0  |  0.94    Ca    8.8      16 May 2018 10:12  Phos  2.8     -  Mg     2.1     -    TPro  6.9  /  Alb  3.2<L>  /  TBili  0.3<L>  /  DBili  0.1  /  AST  21  /  ALT  18  /  AlkPhos  70  05-16    PT/INR - ( 16 May 2018 10:12 )   PT: 14.9 sec;   INR: 1.35 ratio         PTT - ( 16 May 2018 10:12 )  PTT:29.6 sec  Urinalysis Basic - ( 16 May 2018 22:55 )    Color: Yellow / Appearance: Clear / S.015 / pH: x  Gluc: x / Ketone: Negative  / Bili: Negative / Urobili: Negative mg/dL   Blood: x / Protein: 30 mg/dL / Nitrite: Negative   Leuk Esterase: Trace / RBC: 3-5 /HPF / WBC 0-2   Sq Epi: x / Non Sq Epi: Occasional / Bacteria: Occasional        Drug Screen:        RADIOLOGY:      Vital Signs Last 24 Hrs  T(C): 36.8 (17 May 2018 08:00), Max: 36.9 (16 May 2018 12:00)  T(F): 98.3 (17 May 2018 08:00), Max: 98.5 (16 May 2018 12:00)  HR: 60 (17 May 2018 09:00) (57 - 86)  BP: 132/63 (17 May 2018 09:00) (104/55 - 166/71)  BP(mean): 90 (17 May 2018 09:00) (74 - 105)  RR: 18 (17 May 2018 09:00) (14 - 34)  SpO2: 95% (17 May 2018 09:00) (89% - 98%)    PAIN SCORE:     8/10    SCALE USED: (1-10)        PHYSICAL EXAM:    GENERAL: NAD, well-groomed, well-developed  HEAD:  Atraumatic, Normocephalic  NECK: Supple, No JVD, Normal thyroid  NERVOUS SYSTEM:  Alert & Oriented X3, Good concentration; Motor Strength 5/5 B/L upper and lower extremities; DTRs 2+ intact and symmetric  CHEST/LUNG: Clear to percussion bilaterally nonlabored breathing  HEART: Regular rate and rhythm  ABDOMEN: Soft, Nontender, Nondistended; Bowel sounds present  EXTREMITIES:  2+ Peripheral Pulses, No clubbing, cyanosis, or edema  LYMPH: No lymphadenopathy noted  BACK: +Lumbar paraspinal tenderness bilaterally; -SLR bilat    [x ]  [place]  Reviewed and Copied to Chart    Others' Prescriptions  Patient Name:	Chucky Dove	YOB: 1940	  Address:	[address] [place] [address]	Sex:	Male	    Rx Written	Rx Dispensed	Drug	Quantity	Days Supply	Prescriber Name			  2018	morphine sulf er 15 mg tablet 	30	15	Doug Springer DO			  2018	morphine sulf er 30 mg tablet 	30	15	Doug Springer DO			  2018	morphine sulf er 15 mg tablet 	30	15	Doug Springer DO			  2018	lyrica 100 mg capsule 	30	30	Micaela Higuera A			  2018	morphine sulf er 30 mg tablet 	30	15	Doug Springer DO			  2018	hydromorphone 2 mg tablet 	30	15	Doug Springer DO			  2018	04/10/2018	lyrica 75 mg capsule 	14	14	Micaela Hgiuera A			  2018	morphine sulf er 30 mg tablet 	30	15	Doug Springer DO			  2018	hydromorphone 2 mg tablet 	90	15	Doug Springer DO			  2018	morphine sulf er 30 mg tablet 	60	30	Doug Springer DO			  2018	hydromorphone 2 mg tablet 	180	30	Doug Springer DO			  2018	morphine sulf er 30 mg tablet 	60	30	Doug Springer DO			  2018	hydromorphone 2 mg tablet 	180	30	Doug Springer DO			  2018	morphine sulf er 30 mg tablet 	60	30	Doug Springer DO			  2017	hydromorphone 2 mg tablet 	180	30	Doug Springer DO			  2017	morphine sulf er 30 mg tablet 	60	30	Doug Springer DO			  2017	alprazolam 0.25 mg tablet 	90	30	Kian Ramey MD			  2017	hydromorphone 2 mg tablet 	360	30	Doug Springer DO			  2017	alprazolam 0.25 mg tablet 	90	30	Doug Springer DO			  10/12/2017	10/13/2017	hydromorphone 2 mg tablet 	360	30	Doug Springer DO			  09/15/2017	09/15/2017	hydromorphone 2 mg tablet 	360	30	Doug Springer DO			  2017	alprazolam 0.25 mg tablet 	90	30	Doug Springer DO			  2017	hydromorphone 2 mg tablet 	180	15	Doug Springer DO			  08/15/2017	2017	oxycodone hcl 5 mg tablet 	60	30	Doug Springer DO			  08/15/2017	08/15/2017	hydromorphone 2 mg tablet 	180	15	Doug Springer DO			  2017	alprazolam 0.25 mg tablet 	90	30	Doug Springer DO			  2017	hydromorphone 2 mg tablet 	180	15	Doug Springer DO			  2017	oxycodone hcl 5 mg tablet 	60	30	Doug Springer DO			  2017	hydromorphone 2 mg tablet 	180	15	Doug Springer DO			  2017	hydromorphone 2 mg tablet 	180	15	Doug Springer DO

## 2018-05-17 NOTE — PROGRESS NOTE ADULT - PROBLEM SELECTOR PLAN 1
Patient reports inadequate pain control. Specifically requesting IV medication.   -I counseled patient extensively regarding use of IV/SQ meds. Goals will be to wean him off SQ medication. encouraged use of Oral medication, use of SQ meds only for severe BTP.  -He also continues to report intolerance to MS Contin, however upon review of  he has been managed with MS Contin chronically for at least the last year.  Meds: no changes at this time.  -Continue MS Contin 15mg [id number] as tolerated  -Dilaudid 2/4mg [id number] prn mod/severe pain  -Dilaudid 0.5mg SQ [id number] prn severe BTP if pain persists 1 hour after oral meds.  -Discussed reinitiation of lyrica 100mg qd, but pt refuses.  HOLD OPIATES FOR INCReASED SEDATION OR RESPIRATORY DEPRESSION. Reviewed increased risks of respiratory depression/sedation on narcotics.  Discussed above plan with patient. He is amenable to plan.    Will continue to follow. Call with any pain questions .

## 2018-05-17 NOTE — PROGRESS NOTE ADULT - SUBJECTIVE AND OBJECTIVE BOX
s/p: tx from  for NSTEMI     Subjective: 76 y/o male presents lying down in bed in NAD, denies SOB, CP, palpitations, dizziness, n/v/d. Pt c/o back pain, but admits this is his usual pain.     Significant recent/past 24 hr events: None    Patient is a 77y old  Male who presents with a chief complaint of transfer from [facility] for further workup and evaluation of CAD for possible CABG (16 May 2018 08:05)      PAST MEDICAL & SURGICAL HISTORY:  Coronary artery disease involving native coronary artery of native heart, angina presence unspecified  Syncope  Benign prostatic hypertrophy without urinary obstruction  Anxiety  Depression  Hypertension  Spinal stenosis  Kyphosis  Neuropathy  GERD (gastroesophageal reflux disease)  Anemia  S/P spinal fusion    FAMILY HISTORY:  No pertinent family history in first degree relatives      Vitals   ICU Vital Signs Last 24 Hrs  T(C): 36.9 (16 May 2018 21:00), Max: 37.2 (16 May 2018 07:30)  T(F): 98.4 (16 May 2018 21:00), Max: 98.9 (16 May 2018 07:30)  HR: 62 (17 May 2018 01:00) (57 - 90)  BP: 166/71 (17 May 2018 01:00) (104/55 - 166/71)  BP(mean): 102 (17 May 2018 01:00) (74 - 105)  ABP: --  ABP(mean): --  RR: 20 (17 May 2018 01:00) (14 - 28)  SpO2: 94% (17 May 2018 01:00) (89% - 100%)        I&O's Detail    16 May 2018 07:01  -  17 May 2018 02:16  --------------------------------------------------------  IN:    IV PiggyBack: 200 mL    Oral Fluid: 500 mL  Total IN: 700 mL    OUT:    Indwelling Catheter - Urethral: 680 mL  Total OUT: 680 mL    Total NET: 20 mL          LABS                        10.3   7.1   )-----------( 320      ( 16 May 2018 10:12 )             34.0     05-16    143  |  105  |  17.0  ----------------------------<  108  4.1   |  23.0  |  0.94    Ca    8.8      16 May 2018 10:12  Phos  2.8     05-16  Mg     2.1     05-16    TPro  6.9  /  Alb  3.2<L>  /  TBili  0.3<L>  /  DBili  0.1  /  AST  21  /  ALT  18  /  AlkPhos  70  05-16    LIVER FUNCTIONS - ( 16 May 2018 10:12 )  Alb: 3.2 g/dL / Pro: 6.9 g/dL / ALK PHOS: 70 U/L / ALT: 18 U/L / AST: 21 U/L / GGT: x           PT/INR - ( 16 May 2018 10:12 )   PT: 14.9 sec;   INR: 1.35 ratio         PTT - ( 16 May 2018 10:12 )  PTT:29.6 sec  CARDIAC MARKERS ( 16 May 2018 10:12 )  CK25 U/L / CKMBx     / Troponin T0.42 ng/mL /        Urinalysis Basic - ( 16 May 2018 22:55 )    Color: Yellow / Appearance: Clear / S.015 / pH: x  Gluc: x / Ketone: Negative  / Bili: Negative / Urobili: Negative mg/dL   Blood: x / Protein: 30 mg/dL / Nitrite: Negative   Leuk Esterase: Trace / RBC: 3-5 /HPF / WBC 0-2   Sq Epi: x / Non Sq Epi: Occasional / Bacteria: Occasional      CXR: Pending formal review        MEDICATIONS  (STANDING):  aspirin 325 milliGRAM(s) Oral daily  atorvastatin 80 milliGRAM(s) Oral at bedtime  cefuroxime   Tablet 250 milliGRAM(s) Oral every 12 hours  docusate sodium 100 milliGRAM(s) Oral three times a day  DULoxetine 60 milliGRAM(s) Oral daily  lidocaine   Patch 1 Patch Transdermal daily  metoprolol tartrate 25 milliGRAM(s) Oral every 12 hours  morphine ER Tablet 15 milliGRAM(s) Oral two times a day  pantoprazole    Tablet 40 milliGRAM(s) Oral before breakfast  senna 2 Tablet(s) Oral at bedtime  sodium chloride 0.9% lock flush 3 milliLiter(s) IV Push every 8 hours  tamsulosin 0.8 milliGRAM(s) Oral at bedtime    MEDICATIONS  (PRN):  ALBUTerol/ipratropium for Nebulization. 3 milliLiter(s) Nebulizer once PRN Shortness of Breath  cyclobenzaprine 10 milliGRAM(s) Oral two times a day PRN Muscle Spasm  HYDROmorphone   Tablet 2 milliGRAM(s) Oral every 6 hours PRN Moderate Pain (4 - 6)  HYDROmorphone   Tablet 4 milliGRAM(s) Oral every 6 hours PRN Severe Pain (7 - 10)  HYDROmorphone  Injectable 0.5 milliGRAM(s) SubCutaneous every 6 hours PRN severe BTP persisting >60 min after PO admin  ondansetron   Disintegrating Tablet 4 milliGRAM(s) Oral every 6 hours PRN Nausea and/or Vomiting      Allergies:  No Known Allergies  Vanilla Ensure TID (Unknown)        Physical Exam:   Constitutional: NAD, well-developed  ENT: Poor dentition noted, missing teeth and cavities.   Neck: No bruits;  No JVD  Respiratory: Breath Sounds equal & clear bilaterally to auscultation, no accessory muscle use noted  Cardiovascular: Regular rate, regular rhythm,  no murmurs or rub noted on auscultation   Gastrointestinal: Soft, non-tender, non distended, normal bowel sounds  Extremities: no peripheral edema, no cyanosis, no clubbing.  Vascular: Equal and normal pulses: 2+ peripheral pulses throughout  Neurological: A+O x 3; speech clear and intact; no sensory, motor  deficits, normal reflexes  Skin: warm, dry, well perfused, excoriations on abdomen,  + venous stasis on bilateral lower extremities     >>> <<<          Case including assessment/plan of care discussed with  CT-ICU attending.

## 2018-05-17 NOTE — CHART NOTE - NSCHARTNOTEFT_GEN_A_CORE
Upon Nutritional Assessment by the Registered Dietitian your patient was determined to meet criteria / has evidence of the following diagnosis/diagnoses:          [ ]  Mild Protein Calorie Malnutrition        [ ]  Moderate Protein Calorie Malnutrition        [ x] Severe Protein Calorie Malnutrition        [ ] Unspecified Protein Calorie Malnutrition        [ ] Underweight / BMI <19        [ ] Morbid Obesity / BMI > 40      Findings as based on:  •  Comprehensive nutrition assessment and consultation  •  Calorie counts (nutrient intake analysis)  •  Food acceptance and intake status from observations by staff  •  Follow up  •  Patient education  •  Intervention secondary to interdisciplinary rounds  •   concerns      Treatment:    The following diet has been recommended:    Ensure TID       PROVIDER Section:     By signing this assessment you are acknowledging and agree with the diagnosis/diagnoses assigned by the Registered Dietitian    Comments:

## 2018-05-17 NOTE — PROGRESS NOTE ADULT - SUBJECTIVE AND OBJECTIVE BOX
PULMONARY PROGRESS NOTE      SHAWNEE HERRERA-[id number]    Patient is a 77y old  Male who presents with a chief complaint of transfer from [facility] for further workup and evaluation of CAD for possible CABG (16 May 2018 08:05)      INTERVAL HPI/OVERNIGHT EVENTS: Main issue is back pain which is chronic. C/O some sob. No cough, wheeze, cp.     MEDICATIONS  (STANDING):  aspirin 325 milliGRAM(s) Oral daily  atorvastatin 80 milliGRAM(s) Oral at bedtime  cefuroxime   Tablet 250 milliGRAM(s) Oral every 12 hours  docusate sodium 100 milliGRAM(s) Oral three times a day  DULoxetine 60 milliGRAM(s) Oral daily  lidocaine   Patch 1 Patch Transdermal daily  metoprolol tartrate 25 milliGRAM(s) Oral every 12 hours  morphine ER Tablet 15 milliGRAM(s) Oral two times a day  pantoprazole    Tablet 40 milliGRAM(s) Oral before breakfast  senna 2 Tablet(s) Oral at bedtime  sodium chloride 0.9% lock flush 3 milliLiter(s) IV Push every 8 hours  tamsulosin 0.8 milliGRAM(s) Oral at bedtime      MEDICATIONS  (PRN):  ALBUTerol/ipratropium for Nebulization. 3 milliLiter(s) Nebulizer once PRN Shortness of Breath  cyclobenzaprine 10 milliGRAM(s) Oral two times a day PRN Muscle Spasm  HYDROmorphone   Tablet 2 milliGRAM(s) Oral every 6 hours PRN Moderate Pain (4 - 6)  HYDROmorphone   Tablet 4 milliGRAM(s) Oral every 6 hours PRN Severe Pain (7 - 10)  ondansetron   Disintegrating Tablet 4 milliGRAM(s) Oral every 6 hours PRN Nausea and/or Vomiting      Allergies    No Known Allergies    Intolerances    Vanilla Ensure TID (Unknown)      PAST MEDICAL & SURGICAL HISTORY:  Coronary artery disease involving native coronary artery of native heart, angina presence unspecified  Syncope  Benign prostatic hypertrophy without urinary obstruction  Anxiety  Depression  Hypertension  Spinal stenosis  Kyphosis  Neuropathy  GERD (gastroesophageal reflux disease)  Anemia  S/P spinal fusion      SOCIAL HISTORY  Smoking History: quit 40 y/a      REVIEW OF SYSTEMS:    CONSTITUTIONAL:  No distress    HEENT:  Eyes:  No diplopia or blurred vision. ENT:  No earache, sore throat or runny nose.    CARDIOVASCULAR:  No pressure, squeezing, tightness, heaviness or aching about the chest; no palpitations.    RESPIRATORY:  per HPI     GASTROINTESTINAL:  No nausea, vomiting or diarrhea.    GENITOURINARY:  No dysuria, frequency or urgency.    NEUROLOGIC:  No paresthesias, fasciculations, seizures or weakness.    PSYCHIATRIC:  No disorder of thought or mood.    Vital Signs Last 24 Hrs  T(C): 36.7 (17 May 2018 16:00), Max: 37.2 (17 May 2018 11:35)  T(F): 98 (17 May 2018 16:00), Max: 98.9 (17 May 2018 11:35)  HR: 99 (17 May 2018 16:00) (57 - 99)  BP: 141/58 (17 May 2018 16:00) (108/55 - 166/71)  BP(mean): 90 (17 May 2018 09:00) (78 - 105)  RR: 18 (17 May 2018 16:00) (14 - 34)  SpO2: 94% (17 May 2018 16:00) (89% - 98%)    PHYSICAL EXAMINATION:    GENERAL: The patient is awake and alert in no apparent distress.     HEENT:  Mucous membranes are moist.    NECK: Supple.    LUNGS: Clear to auscultation without wheezing, rales or rhonchi; respirations unlabored    HEART: Regular rate and rhythm     ABDOMEN: Soft, nontender, and nondistended.      EXTREMITIES: Without any cyanosis, clubbing, rash, lesions or edema.    NEUROLOGIC: Grossly intact.    LABS:                        10.3   7.1   )-----------( 320      ( 16 May 2018 10:12 )             34.0     05-16    143  |  105  |  17.0  ----------------------------<  108  4.1   |  23.0  |  0.94    Ca    8.8      16 May 2018 10:12  Phos  2.8     05-16  Mg     2.1     05-16    TPro  6.9  /  Alb  3.2<L>  /  TBili  0.3<L>  /  DBili  0.1  /  AST  21  /  ALT  18  /  AlkPhos  70  05-16    PT/INR - ( 16 May 2018 10:12 )   PT: 14.9 sec;   INR: 1.35 ratio         PTT - ( 16 May 2018 10:12 )  PTT:29.6 sec       CARDIAC MARKERS ( 16 May 2018 10:12 )  x     / 0.42 ng/mL / 25 U/L / x     / x            Serum Pro-Brain Natriuretic Peptide: 3318 pg/mL (05-16-18 @ 10:12)               RADIOLOGY & ADDITIONAL STUDIES:  Spirometry: not optimal study but no obstruction seen; possible restrictive changes

## 2018-05-18 ENCOUNTER — TRANSCRIPTION ENCOUNTER (OUTPATIENT)
Age: 78
End: 2018-05-18

## 2018-05-18 DIAGNOSIS — F41.9 ANXIETY DISORDER, UNSPECIFIED: ICD-10-CM

## 2018-05-18 DIAGNOSIS — J96.01 ACUTE RESPIRATORY FAILURE WITH HYPOXIA: ICD-10-CM

## 2018-05-18 DIAGNOSIS — I13.0 HYPERTENSIVE HEART AND CHRONIC KIDNEY DISEASE WITH HEART FAILURE AND STAGE 1 THROUGH STAGE 4 CHRONIC KIDNEY DISEASE, OR UNSPECIFIED CHRONIC KIDNEY DISEASE: ICD-10-CM

## 2018-05-18 DIAGNOSIS — N17.9 ACUTE KIDNEY FAILURE, UNSPECIFIED: ICD-10-CM

## 2018-05-18 DIAGNOSIS — I50.23 ACUTE ON CHRONIC SYSTOLIC (CONGESTIVE) HEART FAILURE: ICD-10-CM

## 2018-05-18 DIAGNOSIS — K21.9 GASTRO-ESOPHAGEAL REFLUX DISEASE WITHOUT ESOPHAGITIS: ICD-10-CM

## 2018-05-18 DIAGNOSIS — I21.4 NON-ST ELEVATION (NSTEMI) MYOCARDIAL INFARCTION: ICD-10-CM

## 2018-05-18 DIAGNOSIS — A41.9 SEPSIS, UNSPECIFIED ORGANISM: ICD-10-CM

## 2018-05-18 DIAGNOSIS — N39.0 URINARY TRACT INFECTION, SITE NOT SPECIFIED: ICD-10-CM

## 2018-05-18 DIAGNOSIS — E43 UNSPECIFIED SEVERE PROTEIN-CALORIE MALNUTRITION: ICD-10-CM

## 2018-05-18 DIAGNOSIS — I25.10 ATHEROSCLEROTIC HEART DISEASE OF NATIVE CORONARY ARTERY WITHOUT ANGINA PECTORIS: ICD-10-CM

## 2018-05-18 DIAGNOSIS — J18.9 PNEUMONIA, UNSPECIFIED ORGANISM: ICD-10-CM

## 2018-05-18 DIAGNOSIS — N40.0 BENIGN PROSTATIC HYPERPLASIA WITHOUT LOWER URINARY TRACT SYMPTOMS: ICD-10-CM

## 2018-05-18 DIAGNOSIS — N18.9 CHRONIC KIDNEY DISEASE, UNSPECIFIED: ICD-10-CM

## 2018-05-18 DIAGNOSIS — R65.20 SEVERE SEPSIS WITHOUT SEPTIC SHOCK: ICD-10-CM

## 2018-05-18 LAB
ANION GAP SERPL CALC-SCNC: 15 MMOL/L — SIGNIFICANT CHANGE UP (ref 5–17)
BLD GP AB SCN SERPL QL: SIGNIFICANT CHANGE UP
BUN SERPL-MCNC: 21 MG/DL — HIGH (ref 8–20)
CALCIUM SERPL-MCNC: 9.1 MG/DL — SIGNIFICANT CHANGE UP (ref 8.6–10.2)
CHLORIDE SERPL-SCNC: 103 MMOL/L — SIGNIFICANT CHANGE UP (ref 98–107)
CK SERPL-CCNC: 27 U/L — LOW (ref 30–200)
CO2 SERPL-SCNC: 23 MMOL/L — SIGNIFICANT CHANGE UP (ref 22–29)
CREAT SERPL-MCNC: 0.94 MG/DL — SIGNIFICANT CHANGE UP (ref 0.5–1.3)
GLUCOSE SERPL-MCNC: 134 MG/DL — HIGH (ref 70–115)
HCT VFR BLD CALC: 37.1 % — LOW (ref 42–52)
HGB BLD-MCNC: 11.1 G/DL — LOW (ref 14–18)
MAGNESIUM SERPL-MCNC: 2.1 MG/DL — SIGNIFICANT CHANGE UP (ref 1.6–2.6)
MCHC RBC-ENTMCNC: 23.2 PG — LOW (ref 27–31)
MCHC RBC-ENTMCNC: 29.9 G/DL — LOW (ref 32–36)
MCV RBC AUTO: 77.5 FL — LOW (ref 80–94)
PLATELET # BLD AUTO: 468 K/UL — HIGH (ref 150–400)
POTASSIUM SERPL-MCNC: 4.3 MMOL/L — SIGNIFICANT CHANGE UP (ref 3.5–5.3)
POTASSIUM SERPL-SCNC: 4.3 MMOL/L — SIGNIFICANT CHANGE UP (ref 3.5–5.3)
RBC # BLD: 4.79 M/UL — SIGNIFICANT CHANGE UP (ref 4.6–6.2)
RBC # FLD: 19.4 % — HIGH (ref 11–15.6)
SODIUM SERPL-SCNC: 141 MMOL/L — SIGNIFICANT CHANGE UP (ref 135–145)
TROPONIN T SERPL-MCNC: 0.08 NG/ML — HIGH (ref 0–0.06)
TYPE + AB SCN PNL BLD: SIGNIFICANT CHANGE UP
WBC # BLD: 11 K/UL — HIGH (ref 4.8–10.8)
WBC # FLD AUTO: 11 K/UL — HIGH (ref 4.8–10.8)

## 2018-05-18 PROCEDURE — 92929: CPT | Mod: LC

## 2018-05-18 PROCEDURE — 99232 SBSQ HOSP IP/OBS MODERATE 35: CPT

## 2018-05-18 PROCEDURE — 92928 PRQ TCAT PLMT NTRAC ST 1 LES: CPT | Mod: LD

## 2018-05-18 PROCEDURE — 93010 ELECTROCARDIOGRAM REPORT: CPT

## 2018-05-18 PROCEDURE — 33990 INSJ PERQ VAD L HRT ARTERIAL: CPT

## 2018-05-18 PROCEDURE — 99152 MOD SED SAME PHYS/QHP 5/>YRS: CPT

## 2018-05-18 PROCEDURE — 76937 US GUIDE VASCULAR ACCESS: CPT | Mod: 26

## 2018-05-18 RX ORDER — ACETAMINOPHEN 500 MG
1000 TABLET ORAL ONCE
Qty: 0 | Refills: 0 | Status: COMPLETED | OUTPATIENT
Start: 2018-05-18 | End: 2018-05-18

## 2018-05-18 RX ORDER — SODIUM CHLORIDE 9 MG/ML
600 INJECTION INTRAMUSCULAR; INTRAVENOUS; SUBCUTANEOUS
Qty: 0 | Refills: 0 | Status: DISCONTINUED | OUTPATIENT
Start: 2018-05-18 | End: 2018-05-21

## 2018-05-18 RX ORDER — SODIUM CHLORIDE 9 MG/ML
1000 INJECTION INTRAMUSCULAR; INTRAVENOUS; SUBCUTANEOUS
Qty: 0 | Refills: 0 | Status: DISCONTINUED | OUTPATIENT
Start: 2018-05-18 | End: 2018-05-18

## 2018-05-18 RX ORDER — HYDROMORPHONE HYDROCHLORIDE 2 MG/ML
2 INJECTION INTRAMUSCULAR; INTRAVENOUS; SUBCUTANEOUS ONCE
Qty: 0 | Refills: 0 | Status: DISCONTINUED | OUTPATIENT
Start: 2018-05-18 | End: 2018-05-18

## 2018-05-18 RX ORDER — HYDROMORPHONE HYDROCHLORIDE 2 MG/ML
1 INJECTION INTRAMUSCULAR; INTRAVENOUS; SUBCUTANEOUS ONCE
Qty: 0 | Refills: 0 | Status: DISCONTINUED | OUTPATIENT
Start: 2018-05-18 | End: 2018-05-18

## 2018-05-18 RX ADMIN — Medication 25 MILLIGRAM(S): at 05:09

## 2018-05-18 RX ADMIN — Medication 325 MILLIGRAM(S): at 09:16

## 2018-05-18 RX ADMIN — Medication 100 MILLIGRAM(S): at 21:23

## 2018-05-18 RX ADMIN — Medication 25 MILLIGRAM(S): at 18:38

## 2018-05-18 RX ADMIN — MORPHINE SULFATE 15 MILLIGRAM(S): 50 CAPSULE, EXTENDED RELEASE ORAL at 06:08

## 2018-05-18 RX ADMIN — HYDROMORPHONE HYDROCHLORIDE 4 MILLIGRAM(S): 2 INJECTION INTRAMUSCULAR; INTRAVENOUS; SUBCUTANEOUS at 01:41

## 2018-05-18 RX ADMIN — HYDROMORPHONE HYDROCHLORIDE 2 MILLIGRAM(S): 2 INJECTION INTRAMUSCULAR; INTRAVENOUS; SUBCUTANEOUS at 12:50

## 2018-05-18 RX ADMIN — Medication 100 MILLIGRAM(S): at 05:24

## 2018-05-18 RX ADMIN — SODIUM CHLORIDE 50 MILLILITER(S): 9 INJECTION INTRAMUSCULAR; INTRAVENOUS; SUBCUTANEOUS at 17:22

## 2018-05-18 RX ADMIN — HYDROMORPHONE HYDROCHLORIDE 4 MILLIGRAM(S): 2 INJECTION INTRAMUSCULAR; INTRAVENOUS; SUBCUTANEOUS at 22:10

## 2018-05-18 RX ADMIN — SODIUM CHLORIDE 3 MILLILITER(S): 9 INJECTION INTRAMUSCULAR; INTRAVENOUS; SUBCUTANEOUS at 05:25

## 2018-05-18 RX ADMIN — Medication 400 MILLIGRAM(S): at 05:23

## 2018-05-18 RX ADMIN — HYDROMORPHONE HYDROCHLORIDE 4 MILLIGRAM(S): 2 INJECTION INTRAMUSCULAR; INTRAVENOUS; SUBCUTANEOUS at 00:29

## 2018-05-18 RX ADMIN — TAMSULOSIN HYDROCHLORIDE 0.8 MILLIGRAM(S): 0.4 CAPSULE ORAL at 21:24

## 2018-05-18 RX ADMIN — Medication 250 MILLIGRAM(S): at 05:09

## 2018-05-18 RX ADMIN — CLOPIDOGREL BISULFATE 75 MILLIGRAM(S): 75 TABLET, FILM COATED ORAL at 09:16

## 2018-05-18 RX ADMIN — HYDROMORPHONE HYDROCHLORIDE 4 MILLIGRAM(S): 2 INJECTION INTRAMUSCULAR; INTRAVENOUS; SUBCUTANEOUS at 21:24

## 2018-05-18 RX ADMIN — HYDROMORPHONE HYDROCHLORIDE 1 MILLIGRAM(S): 2 INJECTION INTRAMUSCULAR; INTRAVENOUS; SUBCUTANEOUS at 17:03

## 2018-05-18 RX ADMIN — MORPHINE SULFATE 15 MILLIGRAM(S): 50 CAPSULE, EXTENDED RELEASE ORAL at 18:38

## 2018-05-18 RX ADMIN — ATORVASTATIN CALCIUM 80 MILLIGRAM(S): 80 TABLET, FILM COATED ORAL at 21:23

## 2018-05-18 RX ADMIN — SODIUM CHLORIDE 75 MILLILITER(S): 9 INJECTION INTRAMUSCULAR; INTRAVENOUS; SUBCUTANEOUS at 18:39

## 2018-05-18 RX ADMIN — PANTOPRAZOLE SODIUM 40 MILLIGRAM(S): 20 TABLET, DELAYED RELEASE ORAL at 05:25

## 2018-05-18 RX ADMIN — SENNA PLUS 2 TABLET(S): 8.6 TABLET ORAL at 21:23

## 2018-05-18 RX ADMIN — HYDROMORPHONE HYDROCHLORIDE 2 MILLIGRAM(S): 2 INJECTION INTRAMUSCULAR; INTRAVENOUS; SUBCUTANEOUS at 23:30

## 2018-05-18 RX ADMIN — Medication 1000 MILLIGRAM(S): at 06:09

## 2018-05-18 RX ADMIN — MORPHINE SULFATE 15 MILLIGRAM(S): 50 CAPSULE, EXTENDED RELEASE ORAL at 05:10

## 2018-05-18 RX ADMIN — MORPHINE SULFATE 15 MILLIGRAM(S): 50 CAPSULE, EXTENDED RELEASE ORAL at 19:30

## 2018-05-18 RX ADMIN — SODIUM CHLORIDE 75 MILLILITER(S): 9 INJECTION INTRAMUSCULAR; INTRAVENOUS; SUBCUTANEOUS at 17:22

## 2018-05-18 RX ADMIN — Medication 250 MILLIGRAM(S): at 18:38

## 2018-05-18 RX ADMIN — SODIUM CHLORIDE 3 MILLILITER(S): 9 INJECTION INTRAMUSCULAR; INTRAVENOUS; SUBCUTANEOUS at 08:40

## 2018-05-18 RX ADMIN — HYDROMORPHONE HYDROCHLORIDE 4 MILLIGRAM(S): 2 INJECTION INTRAMUSCULAR; INTRAVENOUS; SUBCUTANEOUS at 08:38

## 2018-05-18 RX ADMIN — SODIUM CHLORIDE 3 MILLILITER(S): 9 INJECTION INTRAMUSCULAR; INTRAVENOUS; SUBCUTANEOUS at 21:25

## 2018-05-18 NOTE — DISCHARGE NOTE ADULT - PLAN OF CARE
optimal cardiac function FU cardiology Please come to ED or Call Cardio thoracic office at [phone] if Chest pain, Shortness of Breath,  Nausea & vomiting, oozing from wounds, 2 lb increase in weight in 24 hours. Please come to ED or Call Cardio thoracic office at [phone] if Chest pain, Shortness of Breath,  Nausea & vomiting, oozing from wounds, 2 lb increase in weight in 24 hours.  No Ointments creams lotions to wounds   No swimming  Pain management   Shower Daily Please follow up with your cardiologist within the next 2-4 weeks.  Please come to ED or Call Cardio thoracic office at [phone] if Chest pain, Shortness of Breath,  Nausea & vomiting, oozing from wounds, 2 lb increase in weight in 24 hours.  No Ointments creams lotions to wounds   No swimming  Pain management   Shower Daily Maintain Moreno catheter to leg bag drainage.  Empty as needed.  Please follow up with urology (Dr. West) in 1 week.  Call for appt. See phone number below.  Continue to educate patient regarding moreno care.

## 2018-05-18 NOTE — PROGRESS NOTE ADULT - PROBLEM SELECTOR PLAN 1
AM labs/ECG  Continue prior meds including DAP, statin, BP meds  Kedar groin site care w/instructions to pt  Prior RRA site well healed  AM labs/ECG  FU Dr Morris outpt

## 2018-05-18 NOTE — DISCHARGE NOTE ADULT - CARE PLAN
Principal Discharge DX:	CAD (coronary artery disease)  Goal:	optimal cardiac function  Assessment and plan of treatment:	FU cardiology Principal Discharge DX:	CAD (coronary artery disease)  Goal:	optimal cardiac function  Assessment and plan of treatment:	Please come to ED or Call Cardio thoracic office at [phone] if Chest pain, Shortness of Breath,  Nausea & vomiting, oozing from wounds, 2 lb increase in weight in 24 hours. Principal Discharge DX:	CAD (coronary artery disease)  Goal:	optimal cardiac function  Assessment and plan of treatment:	Please come to ED or Call Cardio thoracic office at [phone] if Chest pain, Shortness of Breath,  Nausea & vomiting, oozing from wounds, 2 lb increase in weight in 24 hours.  No Ointments creams lotions to wounds   No swimming  Pain management   Shower Daily Principal Discharge DX:	CAD (coronary artery disease)  Goal:	optimal cardiac function  Assessment and plan of treatment:	Please follow up with your cardiologist within the next 2-4 weeks.  Please come to ED or Call Cardio thoracic office at [phone] if Chest pain, Shortness of Breath,  Nausea & vomiting, oozing from wounds, 2 lb increase in weight in 24 hours.  No Ointments creams lotions to wounds   No swimming  Pain management   Shower Daily Principal Discharge DX:	CAD (coronary artery disease)  Goal:	optimal cardiac function  Assessment and plan of treatment:	Please follow up with your cardiologist within the next 2-4 weeks.  Please come to ED or Call Cardio thoracic office at [phone] if Chest pain, Shortness of Breath,  Nausea & vomiting, oozing from wounds, 2 lb increase in weight in 24 hours.  No Ointments creams lotions to wounds   No swimming  Pain management   Shower Daily  Secondary Diagnosis:	Urinary retention  Assessment and plan of treatment:	Maintain Moreno catheter to leg bag drainage.  Empty as needed.  Please follow up with urology (Dr. West) in 1 week.  Call for appt. See phone number below.  Continue to educate patient regarding moreno care.

## 2018-05-18 NOTE — DISCHARGE NOTE ADULT - PROVIDER TOKENS
TOKYIN:'[id number]' TOKEN:'[id number]',TOKEN:'[id number]' TOKEN:'[id number]',TOKEN:'[id number]'

## 2018-05-18 NOTE — DISCHARGE NOTE ADULT - CARE PROVIDER_API CALL
Josue Morris), Cardiology; Internal Medicine; Interventional Cardiology  [address] [place] [address]  Phone: [phone]  Fax: [phone] Josue Morris), Cardiology; Internal Medicine; Interventional Cardiology  [address] [place] [address]  Phone: [phone]  Fax: [phone]    Dar Burgess), Surgery; Thoracic [place] [address]  Phone: [phone]  Fax: [phone] Josue Morris), Cardiology; Internal Medicine; Interventional Cardiology  [address] [place] [address]  Phone: [phone]  Fax: [phone] Josue Morris), Cardiology; Internal Medicine; Interventional Cardiology  [address] [place] [address]  Phone: [phone]  Fax: [phone]    Zach West), Urology  [address] [facility] [place] [address]  Phone: [phone]  Fax: [phone]

## 2018-05-18 NOTE — DISCHARGE NOTE ADULT - OTHER SIGNIFICANT FINDINGS
I have spent 35 minutes with the patient reviewing discharge medications and instructions Patient assessment, examination, discharge planning, coordination of care, patient and family education and counseling took me 50 minutes to complete.

## 2018-05-18 NOTE — CONSULT NOTE ADULT - SUBJECTIVE AND OBJECTIVE BOX
PCP:    REQUESTING PHYSICIAN:    REASON FOR CONSULT:    CHIEF COMPLAINT:    HPI:  72 year old male with PMH chronic back pain, vertebral fx/collapse, spinal fusion, spinal stenosis, Castellanos rods, anemia, anxiety, BPH, depression, GERD, HTN, kyphosis, neuropathy, (A1c 6.2). 5/8 pt to [facility] from assisted living facility s/p syncopal episode. Pt reports no hx of CP, SOB, diaphoresis, dizziness, prior syncope, abd pain. Admitted to  with pulmonary edema, YUNIOR, E coli UTI and bacteremia, NSTEMI, urinary retention requiring periodic straight cath (pt with c/o having to "force urine out" but denies dysuria or frequency).   5/15 s/p LHC with results as follows:  (< from: Cardiac Cath Lab - Adult (05.15.18 @ 15:28) >  LMCA: Small caliber vessel. There is moderate diffuse disease noted.  LAD: Medium caliber vessel. There is a 80% stenosis noted in the ostialportion of the vessel.  LCx: There is a 80% stenosis noted in the proximal portion of the vessel OM3  LPDA has 99% stenosis  RCA: Small caliber vessel. There is mild diffuse disease noted.  < end of copied text >  5/16 transferred to [facility], c/o back pain (chronic and unchanged from usual pain) but denies all other c/o at this time. (16 May 2018 08:05)  Patient has adamantly refused CABG and requestsd PCI in spite of being told that surgery would be the preferred revascularization for multivessel disease.      REVIEW OF SYSTEMS:    CONSTITUTIONAL: No weakness, fevers or chills  EYES/ENT: No visual changes;  No vertigo or throat pain   NECK: No pain or stiffness  RESPIRATORY: No cough, wheezing, hemoptysis; No shortness of breath  CARDIOVASCULAR: No chest pain or palpitations  GASTROINTESTINAL: No abdominal or epigastric pain. No nausea, vomiting, or hematemesis; No diarrhea or constipation. No melena or hematochezia.  GENITOURINARY: No dysuria, frequency or hematuria  NEUROLOGICAL: No numbness or weakness  SKIN: No itching, burning, rashes, or lesions   All other review of systems is negative unless indicated above    Vital Signs Last 24 Hrs  T(C): 37.1 (18 May 2018 09:02), Max: 37.2 (17 May 2018 11:35)  T(F): 98.7 (18 May 2018 09:02), Max: 98.9 (17 May 2018 11:35)  HR: 75 (18 May 2018 09:02) (74 - 112)  BP: 126/60 (18 May 2018 09:02) (87/48 - 141/58)  BP(mean): --  RR: 18 (18 May 2018 09:02) (18 - 23)  SpO2: 98% (18 May 2018 09:02) (94% - 100%)    I&O's Summary    17 May 2018 07:01  -  18 May 2018 07:00  --------------------------------------------------------  IN: 600 mL / OUT: 400 mL / NET: 200 mL        CAPILLARY BLOOD GLUCOSE          PHYSICAL EXAM:    Constitutional: NAD, awake and alert, well-developed  HEENT: PERR, EOMI, Normal Hearing, MMM  Neck: Soft and supple, No LAD, No JVD  Respiratory: Breath sounds are clear bilaterally, No wheezing, rales or rhonchi  Cardiovascular: S1 and S2, regular rate and rhythm, no Murmurs, gallops or rubs  Gastrointestinal: Bowel Sounds present, soft, nontender, nondistended, no guarding, no rebound  Extremities: No peripheral edema  Vascular: 2+ peripheral pulses  Neurological: A/O x 3, no focal deficits  Musculoskeletal: 5/5 strength b/l upper and lower extremities  Skin: No rashes    MEDICATIONS:  MEDICATIONS  (STANDING):  aspirin 325 milliGRAM(s) Oral daily  atorvastatin 80 milliGRAM(s) Oral at bedtime  cefuroxime   Tablet 250 milliGRAM(s) Oral every 12 hours  clopidogrel Tablet 75 milliGRAM(s) Oral daily  docusate sodium 100 milliGRAM(s) Oral three times a day  DULoxetine 60 milliGRAM(s) Oral daily  lidocaine   Patch 1 Patch Transdermal daily  metoprolol tartrate 25 milliGRAM(s) Oral every 12 hours  morphine ER Tablet 15 milliGRAM(s) Oral two times a day  pantoprazole    Tablet 40 milliGRAM(s) Oral before breakfast  senna 2 Tablet(s) Oral at bedtime  sodium chloride 0.9% lock flush 3 milliLiter(s) IV Push every 8 hours  sodium chloride 0.9%. 1000 milliLiter(s) (50 mL/Hr) IV Continuous <Continuous>  tamsulosin 0.8 milliGRAM(s) Oral at bedtime      LABS: All Labs Reviewed:                        11.1   11.0  )-----------( 468      ( 18 May 2018 05:47 )             37.1     05-18    141  |  103  |  21.0<H>  ----------------------------<  134<H>  4.3   |  23.0  |  0.94    Ca    9.1      18 May 2018 05:47  Phos  2.8     05-16  Mg     2.1     05-18    TPro  6.9  /  Alb  3.2<L>  /  TBili  0.3<L>  /  DBili  0.1  /  AST  21  /  ALT  18  /  AlkPhos  70  05-16    PT/INR - ( 16 May 2018 10:12 )   PT: 14.9 sec;   INR: 1.35 ratio         PTT - ( 16 May 2018 10:12 )  PTT:29.6 sec  CARDIAC MARKERS ( 18 May 2018 05:47 )  x     / 0.08 ng/mL / 27 U/L / x     / x      CARDIAC MARKERS ( 16 May 2018 10:12 )  x     / 0.42 ng/mL / 25 U/L / x     / x          Blood Culture: Organism --  Gram Stain Blood -- Gram Stain --  Specimen Source .Urine Clean Catch (Midstream)  Culture-Blood --        RADIOLOGY/EKG:    DVT PPX:    ADVANCED DIRECTIVE:    DISPOSITION:

## 2018-05-18 NOTE — DISCHARGE NOTE ADULT - INSTRUCTIONS
Activities as tolerated minimize stair climbing, heavy lifting greater than 10lbs, strenous house work, contact sports,No intercourse for 1 week. Site care no Bath tubs, Hot tubs , Pools for 1 week. Monitor site for infection such as warmth drainage or swelling of site. Monitor for bleeding call MD if continous bleeding occurs hold pressure report to nearest ER, Do not opperate heavy machinery or drive for 24hours.  REMOVE BILATERAL GROIN DRESSINGS WITHIN 24 HOURS OF DISCHARGE  Your right radial site has no dressing Activities as tolerated minimize stair climbing, heavy lifting greater than 10lbs, strenous house work, contact sports,No intercourse for 1 week. Site care no Bath tubs, Hot tubs , Pools for 1 week. Monitor site for infection such as warmth drainage or swelling of site. Monitor for bleeding call MD if continous bleeding occurs hold pressure report to nearest ER, Do not opperate heavy machinery or drive for 24hours.  REMOVE BILATERAL GROIN DRESSINGS WITHIN 24 HOURS OF DISCHARGE  Your right radial site has no dressing  Choose lean meats and poultry without skin and prepare them without added saturated and trans fat.  Eat fish at least twice a week. Recent research shows that eating oily fish containing omega-3 fatty acids (for example, salmon, trout and herring) may help lower your risk of death from coronary artery disease.  Select fat-free, 1 percent fat and low-fat dairy products.  Cut back on foods containing partially hydrogenated vegetable oils to reduce trans fat in your diet.   To lower cholesterol, reduce saturated fat to no more than 5 to 6 percent of total calories. For someone eating 2,000 calories a day, that’s about 13 grams of saturated fat.  Cut back on beverages and foods with added sugars.  Choose and prepare foods with little or no salt. To lower blood pressure, aim to eat no more than 2,400 milligrams of sodium per day. Reducing daily intake to 1,500 mg is desirable because it can lower blood pressure even further.  If you drink alcohol, drink in moderation. That means one drink per day if you’re a woman and two drinks  per day if you’re a man.  Follow the American Heart Association recommendations when you eat out, and keep an eye on your portion sizes.

## 2018-05-18 NOTE — CONSULT NOTE ADULT - ASSESSMENT
Patient presenting with syncope, ischemic heart failure and NSTEMI.  He has multivessel disease and global ischemia on his EKG at presentation  He has refused CABG.  Plan on high risk, complex multivessel PCI with circulatory support.  Patient aware of risk.

## 2018-05-18 NOTE — DISCHARGE NOTE ADULT - PATIENT PORTAL LINK FT
You can access the [email or web address] Portal, offered by [facility], by registering with the following website: [email or web address]

## 2018-05-18 NOTE — DISCHARGE NOTE ADULT - MEDICATION SUMMARY - MEDICATIONS TO CHANGE
I will SWITCH the dose or number of times a day I take the medications listed below when I get home from the hospital:    cephalexin 500 mg oral capsule  -- 1 cap(s) by mouth 4 times a day (with meals and at bedtime)   -- Finish all this medication unless otherwise directed by prescriber. I will SWITCH the dose or number of times a day I take the medications listed below when I get home from the hospital:    ondansetron 4 mg oral tablet  -- 1 tab(s) by mouth every 4 hours, As needed, Nausea and/or Vomiting    Dilaudid 2 mg oral tablet  -- 3 tab(s) by mouth every 4 hours, As Needed - for severe pain MDD = 18 MDD:18

## 2018-05-18 NOTE — PROGRESS NOTE ADULT - SUBJECTIVE AND OBJECTIVE BOX
NPO>4hours  ASA 2 Mallampati  Asprin/Plavix given  IVF 50 hr  GFR>60  To have Impella assisted PCI  Dr. Lucas present for consent NPO>4hours  ASA 2 Mallampati  Asprin/Plavix given  IVF 50 hr  GFR>60  To have Impella assisted PCI  Dr. Lucas present for consent  LUNGS CTA hortensia SaO2>97%  HEART RRR 90s SR nog/m  NEURO A&Ox3 ROTH/FROM CN II-XII intact  Chronic back pain is main c/o of pt

## 2018-05-18 NOTE — DISCHARGE NOTE ADULT - MEDICATION SUMMARY - MEDICATIONS TO TAKE
I will START or STAY ON the medications listed below when I get home from the hospital:    Dilaudid 2 mg oral tablet  -- 3 tab(s) by mouth every 4 hours, As Needed - for severe pain MDD = 18 MDD:18  -- Indication: For Pain    aspirin 325 mg oral tablet  -- 1 tab(s) by mouth once a day  -- Indication: For CAD (coronary artery disease)    Zestril 10 mg oral tablet  -- 1 tab(s) by mouth once a day  -- Indication: For HTN    Flomax 0.4 mg oral capsule  -- 2 cap(s) by mouth once a day (at bedtime)  -- Indication: For BPH    DULoxetine 60 mg oral delayed release capsule  -- 1 cap(s) by mouth once a day  -- Indication: For Depression    ondansetron 4 mg oral tablet  -- 1 tab(s) by mouth every 4 hours, As needed, Nausea and/or Vomiting  -- Indication: For N & V    Zofran ODT 4 mg oral tablet, disintegrating  -- 1 tab(s) by mouth every 8 hours PRN nausea  -- Indication: For N &V     atorvastatin 80 mg oral tablet  -- 1 tab(s) by mouth once a day (at bedtime)  -- Indication: For HLD    clopidogrel 75 mg oral tablet  -- 1 tab(s) by mouth once a day  -- Indication: For CAD (coronary artery disease)    ALPRAZolam 0.25 mg oral tablet  -- 1 tab(s) by mouth 2 times a day MDD =2 MDD:2  -- Indication: For anxiety    metoprolol tartrate 25 mg oral tablet  -- 1 tab(s) by mouth 2 times a day  -- Indication: For CAD (coronary artery disease)    cefuroxime 250 mg oral tablet  -- 1 tab(s) by mouth every 12 hours  -- Indication: For UTI    nystatin 100,000 units/g topical powder  -- 1 application on skin 2 times a day  -- Indication: For Skin care    docusate sodium 100 mg oral capsule  -- 1 cap(s) by mouth 3 times a day  -- Indication: For Constipation    senna oral tablet  -- 2 tab(s) by mouth once a day (at bedtime)  -- Indication: For Constipation    cyclobenzaprine 10 mg oral tablet  -- 1 tab(s) by mouth every 12 hours  -- Indication: For muscle relax    Protonix 40 mg oral delayed release tablet  -- 1 tab(s) by mouth once a day  -- It is very important that you take or use this exactly as directed.  Do not skip doses or discontinue unless directed by your doctor.  Obtain medical advice before taking any non-prescription drugs as some may affect the action of this medication.  Swallow whole.  Do not crush.    -- Indication: For GERD I will START or STAY ON the medications listed below when I get home from the hospital:    aspirin 325 mg oral tablet  -- 1 tab(s) by mouth once a day  -- Indication: For CAD (coronary artery disease)    acetaminophen 325 mg oral tablet  -- 3 tab(s) by mouth every 8 hours  -- Indication: For breakthrough pain    morphine 30 mg/12 hr oral tablet, extended release  -- 1 tab(s) by mouth 2 times a day  -- Indication: For Chronic midline low back pain without sciatica    Dilaudid 2 mg oral tablet  -- 3 tab(s) by mouth every 4 hours, As Needed - for severe pain MDD = 18 MDD:18  -- Indication: For Pain    Flomax 0.4 mg oral capsule  -- 2 cap(s) by mouth once a day (at bedtime)  -- Indication: For BPH    pregabalin 100 mg oral capsule  -- 1 cap(s) by mouth once a day  -- Indication: For Pain    DULoxetine 60 mg oral delayed release capsule  -- 1 cap(s) by mouth once a day  -- Indication: For Depression    Zofran ODT 4 mg oral tablet, disintegrating  -- 1 tab(s) by mouth every 8 hours PRN nausea  -- Indication: For N &V     atorvastatin 80 mg oral tablet  -- 1 tab(s) by mouth once a day (at bedtime)  -- Indication: For HLD    clopidogrel 75 mg oral tablet  -- 1 tab(s) by mouth once a day  -- Indication: For CAD (coronary artery disease)    ALPRAZolam 0.25 mg oral tablet  -- 1 tab(s) by mouth 2 times a day MDD =2 MDD:2  -- Indication: For anxiety    metoprolol tartrate 25 mg oral tablet  -- 0.5 tab(s) by mouth 2 times a day   -- Indication: For Hypertension    docusate sodium 100 mg oral capsule  -- 1 cap(s) by mouth 3 times a day  -- Indication: For Constipation    senna oral tablet  -- 2 tab(s) by mouth once a day (at bedtime)  -- Indication: For Constipation    cyclobenzaprine 10 mg oral tablet  -- 1 tab(s) by mouth every 12 hours  -- Indication: For muscle relax    Protonix 40 mg oral delayed release tablet  -- 1 tab(s) by mouth once a day  -- It is very important that you take or use this exactly as directed.  Do not skip doses or discontinue unless directed by your doctor.  Obtain medical advice before taking any non-prescription drugs as some may affect the action of this medication.  Swallow whole.  Do not crush.    -- Indication: For GERD I will START or STAY ON the medications listed below when I get home from the hospital:    Dilaudid 2 mg oral tablet  -- 3 tab(s) by mouth every 4 hours, As Needed - for severe pain MDD = 18 MDD:18  -- Indication: For Pain    aspirin 325 mg oral tablet  -- 1 tab(s) by mouth once a day  -- Indication: For CAD (coronary artery disease)    acetaminophen 325 mg oral tablet  -- 3 tab(s) by mouth every 8 hours  -- Indication: For breakthrough pain    morphine 30 mg/12 hr oral tablet, extended release  -- 1 tab(s) by mouth 2 times a day  -- Indication: For Chronic midline low back pain without sciatica    tamsulosin 0.4 mg oral capsule  -- 1 cap(s) by mouth once a day (at bedtime)  -- Indication: For Urinary retention    pregabalin 100 mg oral capsule  -- 1 cap(s) by mouth once a day  -- Indication: For Pain    DULoxetine 60 mg oral delayed release capsule  -- 1 cap(s) by mouth once a day  -- Indication: For Depression    Zofran ODT 4 mg oral tablet, disintegrating  -- 1 tab(s) by mouth every 8 hours PRN nausea  -- Indication: For N &V     atorvastatin 80 mg oral tablet  -- 1 tab(s) by mouth once a day (at bedtime)  -- Indication: For HLD    clopidogrel 75 mg oral tablet  -- 1 tab(s) by mouth once a day  -- Indication: For CAD (coronary artery disease)    ALPRAZolam 0.25 mg oral tablet  -- 1 tab(s) by mouth 2 times a day MDD =2 MDD:2  -- Indication: For anxiety    metoprolol tartrate 25 mg oral tablet  -- 0.5 tab(s) by mouth 2 times a day   -- Indication: For Hypertension    docusate sodium 100 mg oral capsule  -- 1 cap(s) by mouth 3 times a day  -- Indication: For Constipation    senna oral tablet  -- 2 tab(s) by mouth once a day (at bedtime)  -- Indication: For Constipation    cyclobenzaprine 10 mg oral tablet  -- 1 tab(s) by mouth every 12 hours  -- Indication: For muscle relax    Protonix 40 mg oral delayed release tablet  -- 1 tab(s) by mouth once a day  -- It is very important that you take or use this exactly as directed.  Do not skip doses or discontinue unless directed by your doctor.  Obtain medical advice before taking any non-prescription drugs as some may affect the action of this medication.  Swallow whole.  Do not crush.    -- Indication: For GERD

## 2018-05-18 NOTE — DISCHARGE NOTE ADULT - NS AS ACTIVITY OBS
Do not make important decisions/Showering allowed/No Heavy lifting/straining/Do not drive or operate machinery Do not make important decisions/Showering allowed/Stairs allowed/No Heavy lifting/straining/Walking-Indoors allowed/Do not drive or operate machinery/Walking-Outdoors allowed

## 2018-05-18 NOTE — PROGRESS NOTE ADULT - SUBJECTIVE AND OBJECTIVE BOX
s/p staged PCI via RFA w/angioseal  OM1 80% Synergy BUSHRA 2.5 x 20 mm  pCIRC 70% Synergy  BUSHRA 3.0x32 mm and 3.0 x 12   mLAD 100% cutting balloon  pLAD 100% 3.0 x 16mm Synergy BUSHRA  Impella support LFA w/Perclose  Tolerated procedure well w/o complications  Seen post procedure by Dr. Morris    REVIEW OF SYSTEMS:  Denies SOB, CP, NV, HA, dizziness, palpitations, site pain    PHYSICAL EXAM: A&Ox3 NAD Skin warm and dry  NEURO: Speech intact +gag +swallow Tongue midline ROTH  NECK: No JVD, trachea midline. Eupneic  HEART: RRR S1S2  PULMONARY:  CTA hortensia  ABDOMEN: Soft nontender X4 +BS Cunningham indwelling w/caitlin urine   EXTREMITIES: LFA/RFA sites: No bleed, hematoma, pain, ecchymosis or swelling  Lt&Rt DP/PT+

## 2018-05-18 NOTE — PROGRESS NOTE ADULT - SUBJECTIVE AND OBJECTIVE BOX
[facility]  INFECTIOUS DISEASES AND INTERNAL MEDICINE at [place]  =======================================================  Ambrose Sanchez MD  Diplomates American Board of Internal Medicine and Infectious Diseases  =======================================================    TOM HERRERA [id number]    Follow up: PT FOR CATH TODAY  PT ON TREATMENT FOR BACTEREMIA NOW ON ORAL ABX    Allergies:  No Known Allergies  Vanilla Ensure TID (Unknown)      Medications:  ALBUTerol/ipratropium for Nebulization. 3 milliLiter(s) Nebulizer once PRN  aspirin 325 milliGRAM(s) Oral daily  atorvastatin 80 milliGRAM(s) Oral at bedtime  cefuroxime   Tablet 250 milliGRAM(s) Oral every 12 hours  clopidogrel Tablet 75 milliGRAM(s) Oral daily  cyclobenzaprine 10 milliGRAM(s) Oral two times a day PRN  docusate sodium 100 milliGRAM(s) Oral three times a day  DULoxetine 60 milliGRAM(s) Oral daily  HYDROmorphone   Tablet 2 milliGRAM(s) Oral every 6 hours PRN  HYDROmorphone   Tablet 4 milliGRAM(s) Oral every 6 hours PRN  lidocaine   Patch 1 Patch Transdermal daily  metoprolol tartrate 25 milliGRAM(s) Oral every 12 hours  morphine ER Tablet 15 milliGRAM(s) Oral two times a day  ondansetron   Disintegrating Tablet 4 milliGRAM(s) Oral every 6 hours PRN  pantoprazole    Tablet 40 milliGRAM(s) Oral before breakfast  senna 2 Tablet(s) Oral at bedtime  sodium chloride 0.9% lock flush 3 milliLiter(s) IV Push every 8 hours  tamsulosin 0.8 milliGRAM(s) Oral at bedtime            REVIEW OF SYSTEMS:  CONSTITUTIONAL:  No Fever or chills  HEENT:   No diplopia or blurred vision.  No earache, sore throat or runny nose.  CARDIOVASCULAR:  No pressure, squeezing, strangling, tightness, heaviness or aching about the chest, neck, axilla or epigastrium.  RESPIRATORY:  No cough, shortness of breath, PND or orthopnea.  GASTROINTESTINAL:  No nausea, vomiting or diarrhea.  GENITOURINARY:  No dysuria, frequency or urgency. No Blood in urine  MUSCULOSKELETAL:   AS PER HPI  SKIN:  No change in skin, hair or nails.  NEUROLOGIC:  No paresthesias, fasciculations, seizures or weakness.  PSYCHIATRIC:  No disorder of thought or mood.  ENDOCRINE:  No heat or cold intolerance, polyuria or polydipsia.  HEMATOLOGICAL:  No easy bruising or bleeding.            Physical Exam:  ICU Vital Signs Last 24 Hrs  T(C): 36.4 (18 May 2018 04:52), Max: 37.2 (17 May 2018 11:35)  T(F): 97.6 (18 May 2018 04:52), Max: 98.9 (17 May 2018 11:35)  HR: 112 (18 May 2018 04:52) (74 - 112)  BP: 100/48 (18 May 2018 04:59) (87/48 - 141/58)  BP(mean): --  ABP: --  ABP(mean): --  RR: 23 (18 May 2018 04:52) (18 - 23)  SpO2: 100% (18 May 2018 04:52) (94% - 100%)    GEN: NAD, pleasant  HEENT: normocephalic and atraumatic. EOMI. AYAD.    NECK: Supple. No carotid bruits.  No lymphadenopathy or thyromegaly.  LUNGS: Clear to auscultation.  HEART: Regular rate and rhythm without murmur.  ABDOMEN: Soft, nontender, and nondistended.  Positive bowel sounds.    : No CVA tenderness  EXTREMITIES: Without any cyanosis, clubbing, rash, lesions or edema.  MSK: no joint swelling  NEUROLOGIC: Cranial nerves II through XII are grossly intact.  PSYCHIATRIC: Appropriate affect .  SKIN: No ulceration or induration present.        Labs:      141  |  103  |  21.0<H>  ----------------------------<  134<H>  4.3   |  23.0  |  0.94    Ca    9.1      18 May 2018 05:47  Phos  2.8     05-16  Mg     2.1     05-18    TPro  6.9  /  Alb  3.2<L>  /  TBili  0.3<L>  /  DBili  0.1  /  AST  21  /  ALT  18  /  AlkPhos  70  05-16                          11.1   11.0  )-----------( 468      ( 18 May 2018 05:47 )             37.1       PT/INR - ( 16 May 2018 10:12 )   PT: 14.9 sec;   INR: 1.35 ratio         PTT - ( 16 May 2018 10:12 )  PTT:29.6 sec  Urinalysis Basic - ( 16 May 2018 22:55 )    Color: Yellow / Appearance: Clear / S.015 / pH: x  Gluc: x / Ketone: Negative  / Bili: Negative / Urobili: Negative mg/dL   Blood: x / Protein: 30 mg/dL / Nitrite: Negative   Leuk Esterase: Trace / RBC: 3-5 /HPF / WBC 0-2   Sq Epi: x / Non Sq Epi: Occasional / Bacteria: Occasional      LIVER FUNCTIONS - ( 16 May 2018 10:12 )  Alb: 3.2 g/dL / Pro: 6.9 g/dL / ALK PHOS: 70 U/L / ALT: 18 U/L / AST: 21 U/L / GGT: x           CARDIAC MARKERS ( 18 May 2018 05:47 )  x     / 0.08 ng/mL / 27 U/L / x     / x      CARDIAC MARKERS ( 16 May 2018 10:12 )  x     / 0.42 ng/mL / 25 U/L / x     / x          CAPILLARY BLOOD GLUCOSE            RECENT CULTURES:   @ 22:56 .Urine Clean Catch (Midstream)     No growth        - @ 05:31 .Blood None     No growth at 5 days.        - @ 05:24 .Blood None     No growth at 5 days.         @ 17:30 .Urine Catheterized     No growth        - @ 10:04 .Blood None Blood Culture PCR  Escherichia coli    Growth in anaerobic bottle: Escherichia coli  See previous culture [id number]    Growth in anaerobic bottle: Gram Negative Rods

## 2018-05-18 NOTE — DISCHARGE NOTE ADULT - REASON FOR ADMISSION
transfer from [facility] for further workup and evaluation of CAD for possible CABG transfer from [facility] for further workup and evaluation of CAD for possible CABG  s/p BUSHRA to LAD, mLAD & OM1

## 2018-05-18 NOTE — DISCHARGE NOTE ADULT - MEDICATION SUMMARY - MEDICATIONS TO STOP TAKING
I will STOP taking the medications listed below when I get home from the hospital:    lisinopril 20 mg oral tablet  -- 1 tab(s) by mouth once a day  -- Do not take this drug if you are pregnant.  It is very important that you take or use this exactly as directed.  Do not skip doses or discontinue unless directed by your doctor.  Some non-prescription drugs may aggravate your condition.  Read all labels carefully.  If a warning appears, check with your doctor before taking.    cloNIDine 0.1 mg oral tablet  -- 1 tab(s) by mouth 2 times a day  -- It is very important that you take or use this exactly as directed.  Do not skip doses or discontinue unless directed by your doctor.  May cause drowsiness.  Alcohol may intensify this effect.  Use care when operating dangerous machinery.  Some non-prescription drugs may aggravate your condition.  Read all labels carefully.  If a warning appears, check with your doctor before taking.    amLODIPine 10 mg oral tablet  -- 1 tab(s) by mouth once a day I will STOP taking the medications listed below when I get home from the hospital:    lisinopril 20 mg oral tablet  -- 1 tab(s) by mouth once a day  -- Do not take this drug if you are pregnant.  It is very important that you take or use this exactly as directed.  Do not skip doses or discontinue unless directed by your doctor.  Some non-prescription drugs may aggravate your condition.  Read all labels carefully.  If a warning appears, check with your doctor before taking.    cloNIDine 0.1 mg oral tablet  -- 1 tab(s) by mouth 2 times a day  -- It is very important that you take or use this exactly as directed.  Do not skip doses or discontinue unless directed by your doctor.  May cause drowsiness.  Alcohol may intensify this effect.  Use care when operating dangerous machinery.  Some non-prescription drugs may aggravate your condition.  Read all labels carefully.  If a warning appears, check with your doctor before taking.    amLODIPine 10 mg oral tablet  -- 1 tab(s) by mouth once a day    Zofran ODT 4 mg oral tablet, disintegrating  -- 1 tab(s) by mouth every 8 hours PRN nausea    cephalexin 500 mg oral capsule  -- 1 cap(s) by mouth 4 times a day (with meals and at bedtime)   -- Finish all this medication unless otherwise directed by prescriber.

## 2018-05-18 NOTE — DISCHARGE NOTE ADULT - HOSPITAL COURSE
s/p staged PCI of mLAD, ostial circ, circ and OM1 w/Impella support s/p staged PCI via RFA w/angioseal  OM1 80% Synergy BUSHRA 2.5 x 20 mm  pCIRC 70% Synergy  BUSHRA 3.0x32 mm and 3.0 x 12   mLAD 100% cutting balloon  pLAD 100% 3.0 x 16mm Synergy BUSHRA  Impella support LFA w/Perclose s/p staged PCI via RFA w/angioseal  OM1 80% Synergy BUSHRA 2.5 x 20 mm  pCIRC 70% Synergy  BUSHRA 3.0x32 mm and 3.0 x 12   mLAD 100% cutting balloon  pLAD 100% 3.0 x 16mm Synergy BUSHRA  Impella support LFA w/Perclose    72 year old male with PMH chronic back pain, vertebral fx/collapse, spinal fusion, spinal stenosis, Castellanos rods, anemia, anxiety, BPH, depression, GERD, HTN, kyphosis, neuropathy, (A1c 6.2). 5/8 pt to [facility] from assisted living facility s/p syncopal episode. Pt reports no hx of CP, SOB, diaphoresis, dizziness, prior syncope, abd pain. Admitted to  with pulmonary edema, YUNIOR, E coli UTI and bacteremia, NSTEMI, urinary retention requiring periodic straight cath (pt with c/o having to "force urine out" but denies dysuria or frequency).   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   5/16 transferred to [facility], c/o back pain (chronic and unchanged from usual pain) but denies all other c/o at this time.  5/17 pt stable without continues to deny CP at this time.   5/18 BUSHRA to LAD, m LAD OM1    Intermittent hypotension requiring IVF, usually associated with dilaudid administration.

## 2018-05-18 NOTE — DISCHARGE NOTE ADULT - CARE PROVIDERS DIRECT ADDRESSES
[email or web address]

## 2018-05-18 NOTE — DISCHARGE NOTE ADULT - ADDITIONAL INSTRUCTIONS
Follow up appointment with Dr Morris in 2-4 weeks   Cardiac surgery office second floor at [facility] Follow up appointment with Dr Morris in 2-4 weeks   Cardiac surgery office second floor at [facility].

## 2018-05-18 NOTE — PROGRESS NOTE ADULT - SUBJECTIVE AND OBJECTIVE BOX
Subjective: Pt lying in bed.  Denies CP or SOB.  NAD noted. "When can I eat? What's going on here?"      Tele:   SR                       T(F): 98.7 (18 @ 09:02), Max: 98.9 (18 @ 11:35)  HR: 75 (18 @ 09:02) (74 - 112)  BP: 126/60 (18 @ 09:02) (87/48 - 141/58)  RR: 18 (18 @ 09:02) (18 - 23)  SpO2: 98% (18 @ 09:02) (94% - 100%)      Daily     Daily Weight in k.4 (18 May 2018 04:11)    LV EF: 40%    No Known Allergies            141  |  103  |  21.0<H>  ----------------------------<  134<H>  4.3   |  23.0  |  0.94    Ca    9.1      18 May 2018 05:47  Mg     2.1                                      11.1   11.0  )-----------( 468      ( 18 May 2018 05:47 )             37.1                 CXR: NA    I&O's Detail    17 May 2018 07:01  -  18 May 2018 07:00  --------------------------------------------------------  IN:    Oral Fluid: 600 mL  Total IN: 600 mL    OUT:    Indwelling Catheter - Urethral: 400 mL  Total OUT: 400 mL    Total NET: 200 mL        Active Medications:  ALBUTerol/ipratropium for Nebulization. 3 milliLiter(s) Nebulizer once PRN  aspirin 325 milliGRAM(s) Oral daily  atorvastatin 80 milliGRAM(s) Oral at bedtime  cefuroxime   Tablet 250 milliGRAM(s) Oral every 12 hours  clopidogrel Tablet 75 milliGRAM(s) Oral daily  cyclobenzaprine 10 milliGRAM(s) Oral two times a day PRN  docusate sodium 100 milliGRAM(s) Oral three times a day  DULoxetine 60 milliGRAM(s) Oral daily  HYDROmorphone   Tablet 2 milliGRAM(s) Oral every 6 hours PRN  HYDROmorphone   Tablet 4 milliGRAM(s) Oral every 6 hours PRN  lidocaine   Patch 1 Patch Transdermal daily  metoprolol tartrate 25 milliGRAM(s) Oral every 12 hours  morphine ER Tablet 15 milliGRAM(s) Oral two times a day  ondansetron   Disintegrating Tablet 4 milliGRAM(s) Oral every 6 hours PRN  pantoprazole    Tablet 40 milliGRAM(s) Oral before breakfast  senna 2 Tablet(s) Oral at bedtime  sodium chloride 0.9% lock flush 3 milliLiter(s) IV Push every 8 hours  sodium chloride 0.9%. 1000 milliLiter(s) IV Continuous <Continuous>  tamsulosin 0.8 milliGRAM(s) Oral at bedtime      Physical Exam:    Neuro: AAOX3.  No focal deficits.    Pulm: Diminished BLL.    CV: RRR.  +S1+S2.    Abd: Soft/NT/ND.  +BS.      Extremities: Trace edema BLE.  +pp.       PAST MEDICAL & SURGICAL HISTORY:  Coronary artery disease involving native coronary artery of native heart, angina presence unspecified  Syncope  Benign prostatic hypertrophy without urinary obstruction  Anxiety  Depression  Hypertension  Spinal stenosis  Kyphosis  Neuropathy  GERD (gastroesophageal reflux disease)  Anemia  S/P spinal fusion

## 2018-05-19 LAB
ANION GAP SERPL CALC-SCNC: 14 MMOL/L — SIGNIFICANT CHANGE UP (ref 5–17)
BUN SERPL-MCNC: 16 MG/DL — SIGNIFICANT CHANGE UP (ref 8–20)
CALCIUM SERPL-MCNC: 8.7 MG/DL — SIGNIFICANT CHANGE UP (ref 8.6–10.2)
CHLORIDE SERPL-SCNC: 104 MMOL/L — SIGNIFICANT CHANGE UP (ref 98–107)
CO2 SERPL-SCNC: 21 MMOL/L — LOW (ref 22–29)
CREAT SERPL-MCNC: 0.75 MG/DL — SIGNIFICANT CHANGE UP (ref 0.5–1.3)
GLUCOSE SERPL-MCNC: 111 MG/DL — SIGNIFICANT CHANGE UP (ref 70–115)
HCT VFR BLD CALC: 33.9 % — LOW (ref 42–52)
HGB BLD-MCNC: 10.4 G/DL — LOW (ref 14–18)
MAGNESIUM SERPL-MCNC: 1.9 MG/DL — SIGNIFICANT CHANGE UP (ref 1.6–2.6)
MCHC RBC-ENTMCNC: 23.9 PG — LOW (ref 27–31)
MCHC RBC-ENTMCNC: 30.7 G/DL — LOW (ref 32–36)
MCV RBC AUTO: 77.9 FL — LOW (ref 80–94)
PLATELET # BLD AUTO: 341 K/UL — SIGNIFICANT CHANGE UP (ref 150–400)
POTASSIUM SERPL-MCNC: 4.2 MMOL/L — SIGNIFICANT CHANGE UP (ref 3.5–5.3)
POTASSIUM SERPL-SCNC: 4.2 MMOL/L — SIGNIFICANT CHANGE UP (ref 3.5–5.3)
RBC # BLD: 4.35 M/UL — LOW (ref 4.6–6.2)
RBC # FLD: 19.2 % — HIGH (ref 11–15.6)
SODIUM SERPL-SCNC: 139 MMOL/L — SIGNIFICANT CHANGE UP (ref 135–145)
WBC # BLD: 9 K/UL — SIGNIFICANT CHANGE UP (ref 4.8–10.8)
WBC # FLD AUTO: 9 K/UL — SIGNIFICANT CHANGE UP (ref 4.8–10.8)

## 2018-05-19 PROCEDURE — 93010 ELECTROCARDIOGRAM REPORT: CPT

## 2018-05-19 PROCEDURE — 99232 SBSQ HOSP IP/OBS MODERATE 35: CPT

## 2018-05-19 RX ORDER — NYSTATIN CREAM 100000 [USP'U]/G
1 CREAM TOPICAL
Qty: 0 | Refills: 0 | Status: DISCONTINUED | OUTPATIENT
Start: 2018-05-19 | End: 2018-05-23

## 2018-05-19 RX ORDER — METOPROLOL TARTRATE 50 MG
50 TABLET ORAL EVERY 12 HOURS
Qty: 0 | Refills: 0 | Status: DISCONTINUED | OUTPATIENT
Start: 2018-05-19 | End: 2018-05-19

## 2018-05-19 RX ORDER — SODIUM CHLORIDE 9 MG/ML
500 INJECTION INTRAMUSCULAR; INTRAVENOUS; SUBCUTANEOUS ONCE
Qty: 0 | Refills: 0 | Status: COMPLETED | OUTPATIENT
Start: 2018-05-19 | End: 2018-05-19

## 2018-05-19 RX ORDER — LISINOPRIL 2.5 MG/1
10 TABLET ORAL DAILY
Qty: 0 | Refills: 0 | Status: DISCONTINUED | OUTPATIENT
Start: 2018-05-20 | End: 2018-05-20

## 2018-05-19 RX ORDER — METOPROLOL TARTRATE 50 MG
25 TABLET ORAL
Qty: 0 | Refills: 0 | Status: DISCONTINUED | OUTPATIENT
Start: 2018-05-19 | End: 2018-05-21

## 2018-05-19 RX ORDER — LISINOPRIL 2.5 MG/1
20 TABLET ORAL DAILY
Qty: 0 | Refills: 0 | Status: DISCONTINUED | OUTPATIENT
Start: 2018-05-19 | End: 2018-05-19

## 2018-05-19 RX ADMIN — Medication 25 MILLIGRAM(S): at 05:51

## 2018-05-19 RX ADMIN — HYDROMORPHONE HYDROCHLORIDE 4 MILLIGRAM(S): 2 INJECTION INTRAMUSCULAR; INTRAVENOUS; SUBCUTANEOUS at 03:25

## 2018-05-19 RX ADMIN — NYSTATIN CREAM 1 APPLICATION(S): 100000 CREAM TOPICAL at 05:51

## 2018-05-19 RX ADMIN — HYDROMORPHONE HYDROCHLORIDE 4 MILLIGRAM(S): 2 INJECTION INTRAMUSCULAR; INTRAVENOUS; SUBCUTANEOUS at 17:30

## 2018-05-19 RX ADMIN — Medication 100 MILLIGRAM(S): at 21:55

## 2018-05-19 RX ADMIN — PANTOPRAZOLE SODIUM 40 MILLIGRAM(S): 20 TABLET, DELAYED RELEASE ORAL at 05:50

## 2018-05-19 RX ADMIN — Medication 325 MILLIGRAM(S): at 12:52

## 2018-05-19 RX ADMIN — Medication 100 MILLIGRAM(S): at 12:54

## 2018-05-19 RX ADMIN — HYDROMORPHONE HYDROCHLORIDE 4 MILLIGRAM(S): 2 INJECTION INTRAMUSCULAR; INTRAVENOUS; SUBCUTANEOUS at 08:47

## 2018-05-19 RX ADMIN — SODIUM CHLORIDE 3 MILLILITER(S): 9 INJECTION INTRAMUSCULAR; INTRAVENOUS; SUBCUTANEOUS at 05:52

## 2018-05-19 RX ADMIN — ATORVASTATIN CALCIUM 80 MILLIGRAM(S): 80 TABLET, FILM COATED ORAL at 21:55

## 2018-05-19 RX ADMIN — SODIUM CHLORIDE 3 MILLILITER(S): 9 INJECTION INTRAMUSCULAR; INTRAVENOUS; SUBCUTANEOUS at 12:54

## 2018-05-19 RX ADMIN — MORPHINE SULFATE 15 MILLIGRAM(S): 50 CAPSULE, EXTENDED RELEASE ORAL at 05:51

## 2018-05-19 RX ADMIN — HYDROMORPHONE HYDROCHLORIDE 2 MILLIGRAM(S): 2 INJECTION INTRAMUSCULAR; INTRAVENOUS; SUBCUTANEOUS at 21:55

## 2018-05-19 RX ADMIN — HYDROMORPHONE HYDROCHLORIDE 2 MILLIGRAM(S): 2 INJECTION INTRAMUSCULAR; INTRAVENOUS; SUBCUTANEOUS at 22:42

## 2018-05-19 RX ADMIN — MORPHINE SULFATE 15 MILLIGRAM(S): 50 CAPSULE, EXTENDED RELEASE ORAL at 07:10

## 2018-05-19 RX ADMIN — Medication 100 MILLIGRAM(S): at 05:50

## 2018-05-19 RX ADMIN — HYDROMORPHONE HYDROCHLORIDE 4 MILLIGRAM(S): 2 INJECTION INTRAMUSCULAR; INTRAVENOUS; SUBCUTANEOUS at 04:10

## 2018-05-19 RX ADMIN — LIDOCAINE 1 PATCH: 4 CREAM TOPICAL at 12:52

## 2018-05-19 RX ADMIN — CLOPIDOGREL BISULFATE 75 MILLIGRAM(S): 75 TABLET, FILM COATED ORAL at 12:52

## 2018-05-19 RX ADMIN — HYDROMORPHONE HYDROCHLORIDE 2 MILLIGRAM(S): 2 INJECTION INTRAMUSCULAR; INTRAVENOUS; SUBCUTANEOUS at 00:18

## 2018-05-19 RX ADMIN — NYSTATIN CREAM 1 APPLICATION(S): 100000 CREAM TOPICAL at 18:22

## 2018-05-19 RX ADMIN — Medication 250 MILLIGRAM(S): at 05:50

## 2018-05-19 RX ADMIN — SODIUM CHLORIDE 3 MILLILITER(S): 9 INJECTION INTRAMUSCULAR; INTRAVENOUS; SUBCUTANEOUS at 21:56

## 2018-05-19 RX ADMIN — TAMSULOSIN HYDROCHLORIDE 0.8 MILLIGRAM(S): 0.4 CAPSULE ORAL at 21:55

## 2018-05-19 RX ADMIN — DULOXETINE HYDROCHLORIDE 60 MILLIGRAM(S): 30 CAPSULE, DELAYED RELEASE ORAL at 12:52

## 2018-05-19 RX ADMIN — SENNA PLUS 2 TABLET(S): 8.6 TABLET ORAL at 21:55

## 2018-05-19 RX ADMIN — ONDANSETRON 4 MILLIGRAM(S): 8 TABLET, FILM COATED ORAL at 09:33

## 2018-05-19 RX ADMIN — HYDROMORPHONE HYDROCHLORIDE 4 MILLIGRAM(S): 2 INJECTION INTRAMUSCULAR; INTRAVENOUS; SUBCUTANEOUS at 16:41

## 2018-05-19 RX ADMIN — Medication 250 MILLIGRAM(S): at 18:22

## 2018-05-19 RX ADMIN — HYDROMORPHONE HYDROCHLORIDE 4 MILLIGRAM(S): 2 INJECTION INTRAMUSCULAR; INTRAVENOUS; SUBCUTANEOUS at 09:32

## 2018-05-19 RX ADMIN — LISINOPRIL 20 MILLIGRAM(S): 2.5 TABLET ORAL at 16:41

## 2018-05-19 RX ADMIN — SODIUM CHLORIDE 500 MILLILITER(S): 9 INJECTION INTRAMUSCULAR; INTRAVENOUS; SUBCUTANEOUS at 18:30

## 2018-05-19 NOTE — CHART NOTE - NSCHARTNOTEFT_GEN_A_CORE
Source: Patient [ ]  Family [ ]   other [x ] EMR.  Pt is currently sleeping    Current Diet: Diet, Consistent Carbohydrate/No Snacks:   DASH/TLC {Sodium & Cholesteral Restricted} (05-17-18 @ 08:38)    PO intake:  Pt continues with decreased po intake at meals per RN flowsheets.  Pt seems to be consuming Ensure supplements as 2 empty bottles noted at bedside.    Current Weight:   (5/19) 165#  (5/17) 160#    % Weight Change - no wt loss noted    Pertinent Medications: MEDICATIONS  (STANDING):  aspirin 325 milliGRAM(s) Oral daily  atorvastatin 80 milliGRAM(s) Oral at bedtime  cefuroxime   Tablet 250 milliGRAM(s) Oral every 12 hours  clopidogrel Tablet 75 milliGRAM(s) Oral daily  docusate sodium 100 milliGRAM(s) Oral three times a day  DULoxetine 60 milliGRAM(s) Oral daily  lidocaine   Patch 1 Patch Transdermal daily  metoprolol tartrate 50 milliGRAM(s) Oral every 12 hours  morphine ER Tablet 15 milliGRAM(s) Oral two times a day  nystatin Powder 1 Application(s) Topical two times a day  pantoprazole    Tablet 40 milliGRAM(s) Oral before breakfast  senna 2 Tablet(s) Oral at bedtime  sodium chloride 0.9% lock flush 3 milliLiter(s) IV Push every 8 hours  sodium chloride 0.9%. 600 milliLiter(s) (75 mL/Hr) IV Continuous <Continuous>  tamsulosin 0.8 milliGRAM(s) Oral at bedtime    MEDICATIONS  (PRN):  ALBUTerol/ipratropium for Nebulization. 3 milliLiter(s) Nebulizer once PRN Shortness of Breath  cyclobenzaprine 10 milliGRAM(s) Oral two times a day PRN Muscle Spasm  HYDROmorphone   Tablet 2 milliGRAM(s) Oral every 6 hours PRN Moderate Pain (4 - 6)  HYDROmorphone   Tablet 4 milliGRAM(s) Oral every 6 hours PRN Severe Pain (7 - 10)  ondansetron   Disintegrating Tablet 4 milliGRAM(s) Oral every 6 hours PRN Nausea and/or Vomiting    Pertinent Labs: CBC Full  -  ( 19 May 2018 05:33 )  WBC Count : 9.0 K/uL  Hemoglobin : 10.4 g/dL  Hematocrit : 33.9 %  Platelet Count - Automated : 341 K/uL  Mean Cell Volume : 77.9 fl  Mean Cell Hemoglobin : 23.9 pg  Mean Cell Hemoglobin Concentration : 30.7 g/dL  05-19 Na139 mmol/L Glu 111 mg/dL K+ 4.2 mmol/L Cr  0.75 mg/dL BUN 16.0 mg/dL Phos n/a   Alb n/a   PAB n/a       Skin: no skin breakdown noted    Nutrition focused physical exam NOT conducted - found signs of malnutrition [ ]absent [x ]present  - visually observed and as per initial nutrition assessment  Subcutaneous fat loss: [x ] Orbital fat pads region, [ ]Buccal fat region, [ ]Triceps region,  [ ]Ribs region    Muscle wasting: [x ]Temples region, [x ]Clavicle region, [x ]Shoulder region, [ ]Scapula region, [ ]Interosseous region,  [ ]thigh region, [ ]Calf region    Estimated Needs:   [x] no change since previous assessment  [ ] recalculated:     Current Nutrition Diagnosis: Pt remains at nutrition risk secondary to severe protein calorie malnutrition (acute) related to inadequate protein energy intake in setting of acute-illness (NSTEMI, CAD, ecoli bactaremia) as evidenced by pt meeting <50% estimated nutrition needs >7 days, previously reported 20# unintentional wt loss (11%) over past 1 month (although possibly trending up), and physical signs of muscle wasting (temple, clavicle, shoulder) and subcutaneous fat loss (orbital).    Recommendations:   RX: Ensure tid -currently not in diet rx  RX: MVI and Vit C 500mg daily    Monitoring and Evaluation:   [x ] PO intake [x ] Tolerance to diet prescription [X] Weights  [X] Follow up per protocol [X] Labs:

## 2018-05-19 NOTE — PROGRESS NOTE ADULT - SUBJECTIVE AND OBJECTIVE BOX
SUBJECTIVE:  Complaining of back pain and requesting morphine and dilaudid  Cunningham just removed and HNV yet    ROS:  Cv no cp sob or palp  Gi no abd pain no raúl  Resp no cough no dyspena    OBJECTIVE:  	  MEDICATIONS:  metoprolol tartrate 25 milliGRAM(s) Oral every 12 hours  atorvastatin 80 milliGRAM(s) Oral at bedtime  aspirin 325 milliGRAM(s) Oral daily  clopidogrel Tablet 75 milliGRAM(s) Oral daily    Cyclobenzaprine 10 milliGRAM(s) Oral two times a day PRN  DULoxetine 60 milliGRAM(s) Oral daily  HYDROmorphone   Tablet 2 milliGRAM(s) Oral every 6 hours PRN  HYDROmorphone   Tablet 4 milliGRAM(s) Oral every 6 hours PRN  morphine ER Tablet 15 milliGRAM(s) Oral two times a day  ondansetron   Disintegrating Tablet 4 milliGRAM(s) Oral every 6 hours PRN  tamsulosin 0.8 milliGRAM(s) Oral at bedtime  docusate sodium 100 milliGRAM(s) Oral three times a day  pantoprazole    Tablet 40 milliGRAM(s) Oral before breakfast  senna 2 Tablet(s) Oral at bedtime  lidocaine   Patch 1 Patch Transdermal daily  nystatin Powder 1 Application(s) Topical two times a day  sodium chloride 0.9% lock flush 3 milliLiter(s) IV Push every 8 hours  sodium chloride 0.9%. 600 milliLiter(s) IV Continuous <Continuous>  cefuroxime   Tablet 250 milliGRAM(s) Oral every 12 hours  ALBUTerol/ipratropium for Nebulization. 3 milliLiter(s) Nebulizer once PRN    PHYSICAL EXAM:    T(C): 36.6 (18 @ 05:45), Max: 37.1 (18 @ 09:02)  HR: 84 (18 @ 05:45) (65 - 88)  BP: 156/72 (18 @ 05:45) (110/51 - 156/74)  RR: 16 (18 @ 05:45) (16 - 18)  SpO2: 95% (18 @ 05:45) (94% - 99%)  Wt(kg): --    I&O's Summary    18 May 2018 07:01  -  19 May 2018 07:00  --------------------------------------------------------  IN: 240 mL / OUT: 775 mL / NET: -535 mL        Daily     Daily Weight in k.1 (19 May 2018 06:02)    Appearance: Chronically ill appearing, pale  HEENT:   Normal oral mucosa, PERRL, EOMI	  Lymphatic: No lymphadenopathy  Cardiovascular: Normal S1 S2 regular  Respiratory:: Lungs clear to auscultation	Gastrointestinal:  Soft, Non-tender, + BS	  Skin: No rashes, No ecchymoses, No cyanosis  Neurologic: Non-focal  Extremities: Normal range of motion, No clubbing, cyanosis or edema  Vascular: Peripheral pulses warm no pulses palpated  Psychiatry: A & O x 3, Mood & affect appropriate    TELEMETRY: 	    ECG:  	  RADIOLOGY:   DIAGNOSTIC TESTING:  [ ] Echocardiogram:Summary:   1. Left ventricular ejection fraction, by visual estimation, is 55 to   60%. Mild apical hypokinesia   2. Normal global left ventricular systolic function.   3. Apical septal segment, apical inferior segment, and apex are abnormal   as described above.   4. Spectral Doppler shows impaired relaxation pattern of left   ventricular myocardial filling (Grade I diastolic dysfunction).   5. There is no evidence of pericardial effusion.   6. Sclerotic aortic valve with normal opening.   7. Mild aortic regurgitation.   8. Endocardial visualization was enhanced with intravenous echo contrast.  [ ]  Catheterization:  CORONARY VESSELS: The coronary circulation is left dominant.  LM:   --  Distal left main: There was a 50 % stenosis.  LAD:   --  Ostial LAD: There was a 99 % stenosis.  --  Mid LAD: There was a 100 % stenosis.  CX:   --  Ostial circumflex: There was a 50 % stenosis.  --  Mid circumflex: There was a 80 % stenosis.  --  OM1: There was a 80 % stenosis.  --  OM2: There was a 100 % stenosis.  --  LPL3: There was a 60 % stenosis.  RCA:   --  RCA: Normal.  COMPLICATIONS: No complications occurred during the cath lab visit.  INTERVENTIONAL IMPRESSIONS: PCI of ostial LAD required simultaneous kissing  stents extending from the left main ostium into both LAD and LCX. a 3.0 x  12mm stent was used for the LCX in order to not interfere with the kissing  stents in the proximal LCX. Post dilated with kissing balloons to 18 diego  PCI of OM1 and mid LCX required kissing stents extendiing from proximal  LCX into both branches. Final kissing balloon dilatation to 14 diego.    [ ] Stress Test:    OTHER: 	    LABS:	 	    CARDIAC MARKERS:                                  10.4   9.0   )-----------( 341      ( 19 May 2018 05:33 )             33.9         139  |  104  |  16.0  ----------------------------<  111  4.2   |  21.0<L>  |  0.75    Ca    8.7      19 May 2018 05:34  Mg     1.9           proBNP:   Lipid Profile:   HgA1c:   TSH:     ASSESSMENT/PLAN: SUBJECTIVE:  Complaining of back pain and requesting morphine and dilaudid  Moreno just removed and HNV yet    ROS:  Cv no cp sob or palp  Gi no abd pain no raúl  Resp no cough no dyspena    OBJECTIVE:  	  MEDICATIONS:  metoprolol tartrate 25 milliGRAM(s) Oral every 12 hours  atorvastatin 80 milliGRAM(s) Oral at bedtime  aspirin 325 milliGRAM(s) Oral daily  clopidogrel Tablet 75 milliGRAM(s) Oral daily    Cyclobenzaprine 10 milliGRAM(s) Oral two times a day PRN  DULoxetine 60 milliGRAM(s) Oral daily  HYDROmorphone   Tablet 2 milliGRAM(s) Oral every 6 hours PRN  HYDROmorphone   Tablet 4 milliGRAM(s) Oral every 6 hours PRN  morphine ER Tablet 15 milliGRAM(s) Oral two times a day  ondansetron   Disintegrating Tablet 4 milliGRAM(s) Oral every 6 hours PRN  tamsulosin 0.8 milliGRAM(s) Oral at bedtime  docusate sodium 100 milliGRAM(s) Oral three times a day  pantoprazole    Tablet 40 milliGRAM(s) Oral before breakfast  senna 2 Tablet(s) Oral at bedtime  lidocaine   Patch 1 Patch Transdermal daily  nystatin Powder 1 Application(s) Topical two times a day  cefuroxime   Tablet 250 milliGRAM(s) Oral every 12 hours  ALBUTerol/ipratropium for Nebulization. 3 milliLiter(s) Nebulizer once PRN    PHYSICAL EXAM:    T(C): 36.6 (18 @ 05:45), Max: 37.1 (18 @ 09:02)  HR: 84 (18 @ 05:45) (65 - 88)  BP: 156/72 (18 @ 05:45) (110/51 - 156/74)  RR: 16 (18 @ 05:45) (16 - 18)  SpO2: 95% (18 @ 05:45) (94% - 99%)  Wt(kg): --    I&O's Summary    18 May 2018 07:01  -  19 May 2018 07:00  --------------------------------------------------------  IN: 240 mL / OUT: 775 mL / NET: -535 mL    Daily Weight in k.1 (19 May 2018 06:02)    Appearance: Chronically ill appearing, pale  HEENT:   Normal oral mucosa, PERRL, EOMI	  Lymphatic: No lymphadenopathy  Cardiovascular: Normal S1 S2 regular  Respiratory:: Lungs clear to auscultation	Gastrointestinal:  Soft, Non-tender, + BS	  Skin: No rashes, No ecchymoses, No cyanosis  Neurologic: Non-focal  Extremities: Normal range of motion, No clubbing, cyanosis or edema  Vascular: Peripheral pulses warm no pulses palpated  Psychiatry: A & O x 3, Mood & affect appropriate    TELEMETRY: 	    ECG:  	Nsr@75 IWMI t wave inversion 1, avl v3-v6  unchanged when compared to   RADIOLOGY:     DIAGNOSTIC TESTING:  [ ] Echocardiogram:    1. Left ventricular ejection fraction, by visual estimation, is 55 to   60%. Mild apical hypokinesia   2. Normal global left ventricular systolic function.   3. Apical septal segment, apical inferior segment, and apex are abnormal   as described above.   4. Spectral Doppler shows impaired relaxation pattern of left   ventricular myocardial filling (Grade I diastolic dysfunction).   5. There is no evidence of pericardial effusion.   6. Sclerotic aortic valve with normal opening.   7. Mild aortic regurgitation.   8. Endocardial visualization was enhanced with intravenous echo contrast.  [ ]  Catheterization:  CORONARY VESSELS: The coronary circulation is left dominant.  LM:   --  Distal left main: There was a 50 % stenosis.  LAD:   --  Ostial LAD: There was a 99 % stenosis.  --  Mid LAD: There was a 100 % stenosis.  CX:   --  Ostial circumflex: There was a 50 % stenosis.  --  Mid circumflex: There was a 80 % stenosis.  --  OM1: There was a 80 % stenosis.  --  OM2: There was a 100 % stenosis.  --  LPL3: There was a 60 % stenosis.  RCA:   --  RCA: Normal.  COMPLICATIONS: No complications occurred during the cath lab visit.  INTERVENTIONAL IMPRESSIONS: PCI of ostial LAD required simultaneous kissing  stents extending from the left main ostium into both LAD and LCX. a 3.0 x  12mm stent was used for the LCX in order to not interfere with the kissing  stents in the proximal LCX. Post dilated with kissing balloons to 18 diego  PCI of OM1 and mid LCX required kissing stents extendiing from proximal  LCX into both branches. Final kissing balloon dilatation to 14 diego.    LABS:	 	    CARDIAC MARKERS:                       10.4   9.0   )-----------( 341      ( 19 May 2018 05:33 )             33.9         139  |  104  |  16.0  ----------------------------<  111  4.2   |  21.0<L>  |  0.75    Ca    8.7      19 May 2018 05:34  Mg     1.9           Telemetry  nsr one episode of bigemany    ASSESSMENT/PLAN: 	  73y/o male with cronic back pain, Anxiety, Bph, depression, HTN, Glucose intolerance, neuropathy s/p syncopal event s/p left heart cath with TVD, Cabg advised and patient refused  s/p impella assisted Stent to OM, Circ, Lad x2        MI/CAD s/p stents for multivessel cad  would increase metoprolol for beta blockage  hr 70-80  increase metorprolol to 50 q12h  Plavix asa  Lipitor 80 qd    Bigemany on montior EF 60%  increase metoprolol    BPH/Urinary retention/Cystitis on cefuroxime moreno d/nevin voiding trial    Cronic back pain  hx yaw nettles  on dilauded and morphine.  Followed by pain management in community    Glucose intolerance  diet controlled SUBJECTIVE:  Complaining of back pain and requesting morphine and dilaudid  Moreno just removed and HNV yet    ROS:  Cv no cp sob or palp  Gi no abd pain no raúl  Resp no cough no dyspena    OBJECTIVE:  	  MEDICATIONS:  metoprolol tartrate 25 milliGRAM(s) Oral every 12 hours  atorvastatin 80 milliGRAM(s) Oral at bedtime  aspirin 325 milliGRAM(s) Oral daily  clopidogrel Tablet 75 milliGRAM(s) Oral daily    Cyclobenzaprine 10 milliGRAM(s) Oral two times a day PRN  DULoxetine 60 milliGRAM(s) Oral daily  HYDROmorphone   Tablet 2 milliGRAM(s) Oral every 6 hours PRN  HYDROmorphone   Tablet 4 milliGRAM(s) Oral every 6 hours PRN  morphine ER Tablet 15 milliGRAM(s) Oral two times a day  ondansetron   Disintegrating Tablet 4 milliGRAM(s) Oral every 6 hours PRN  tamsulosin 0.8 milliGRAM(s) Oral at bedtime  docusate sodium 100 milliGRAM(s) Oral three times a day  pantoprazole    Tablet 40 milliGRAM(s) Oral before breakfast  senna 2 Tablet(s) Oral at bedtime  lidocaine   Patch 1 Patch Transdermal daily  nystatin Powder 1 Application(s) Topical two times a day  cefuroxime   Tablet 250 milliGRAM(s) Oral every 12 hours  ALBUTerol/ipratropium for Nebulization. 3 milliLiter(s) Nebulizer once PRN    PHYSICAL EXAM:    T(C): 36.6 (18 @ 05:45), Max: 37.1 (18 @ 09:02)  HR: 84 (18 @ 05:45) (65 - 88)  BP: 156/72 (18 @ 05:45) (110/51 - 156/74)  RR: 16 (18 @ 05:45) (16 - 18)  SpO2: 95% (18 @ 05:45) (94% - 99%)  Wt(kg): --    I&O's Summary    18 May 2018 07:01  -  19 May 2018 07:00  --------------------------------------------------------  IN: 240 mL / OUT: 775 mL / NET: -535 mL    Daily Weight in k.1 (19 May 2018 06:02)    Appearance: Chronically ill appearing, pale  HEENT:   Normal oral mucosa, PERRL, EOMI	  Lymphatic: No lymphadenopathy  Cardiovascular: Normal S1 S2 regular  Abd  bilateral groins no bleeding, no hematomas soft good pulse  Respiratory:: Lungs clear to auscultation	Gastrointestinal:  Soft, Non-tender, + BS	  Skin: No rashes, No ecchymoses, No cyanosis  Neurologic: Non-focal  Extremities: Normal range of motion, No clubbing, cyanosis or edema  Psychiatry: A & O x 3, Mood & affect appropriate    TELEMETRY: 	    ECG:  	Nsr@75 IWMI t wave inversion 1, avl v3-v6  unchanged when compared to   RADIOLOGY:     DIAGNOSTIC TESTING:  [ ] Echocardiogram:    1. Left ventricular ejection fraction, by visual estimation, is 55 to   60%. Mild apical hypokinesia   2. Normal global left ventricular systolic function.   3. Apical septal segment, apical inferior segment, and apex are abnormal   as described above.   4. Spectral Doppler shows impaired relaxation pattern of left   ventricular myocardial filling (Grade I diastolic dysfunction).   5. There is no evidence of pericardial effusion.   6. Sclerotic aortic valve with normal opening.   7. Mild aortic regurgitation.   8. Endocardial visualization was enhanced with intravenous echo contrast.  [ ]  Catheterization:  CORONARY VESSELS: The coronary circulation is left dominant.  LM:   --  Distal left main: There was a 50 % stenosis.  LAD:   --  Ostial LAD: There was a 99 % stenosis.  --  Mid LAD: There was a 100 % stenosis.  CX:   --  Ostial circumflex: There was a 50 % stenosis.  --  Mid circumflex: There was a 80 % stenosis.  --  OM1: There was a 80 % stenosis.  --  OM2: There was a 100 % stenosis.  --  LPL3: There was a 60 % stenosis.  RCA:   --  RCA: Normal.  COMPLICATIONS: No complications occurred during the cath lab visit.  INTERVENTIONAL IMPRESSIONS: PCI of ostial LAD required simultaneous kissing  stents extending from the left main ostium into both LAD and LCX. a 3.0 x  12mm stent was used for the LCX in order to not interfere with the kissing  stents in the proximal LCX. Post dilated with kissing balloons to 18 diego  PCI of OM1 and mid LCX required kissing stents extendiing from proximal  LCX into both branches. Final kissing balloon dilatation to 14 diego.    LABS:	 	    CARDIAC MARKERS:                       10.4   9.0   )-----------( 341      ( 19 May 2018 05:33 )             33.9         139  |  104  |  16.0  ----------------------------<  111  4.2   |  21.0<L>  |  0.75    Ca    8.7      19 May 2018 05:34  Mg     1.9           Telemetry  nsr one episode of bigemany    ASSESSMENT/PLAN: 	  71y/o male with cronic back pain, Anxiety, Bph, depression, HTN, Glucose intolerance, neuropathy s/p syncopal event s/p left heart cath with TVD, Cabg advised and patient refused  s/p impella assisted Stent to OM, Circ, Lad x2        MI/CAD s/p stents for multivessel cad  would increase metoprolol for beta blockage  hr 70-80  increase metorprolol to 50 q12h  Plavix asa  Lipitor 80 qd    Bigemany on montior EF 60%  increase metoprolol    BPH/Urinary retention/Cystitis on cefuroxime moreno d/nevin voiding trial    Cronic back pain  hx yaw nettles  on dilauded and morphine.  Followed by pain management in community    Glucose intolerance  diet controlled

## 2018-05-19 NOTE — PROGRESS NOTE ADULT - SUBJECTIVE AND OBJECTIVE BOX
Subjective: "  I'm having back pain"  Pt in bed NAD s/p BUSHRA yesterday    VITAL SIGNS  T(C): 36.6 (18 @ 05:45), Max: 36.6 (18 @ 18:00)  HR: 84 (18 @ 05:45) (65 - 88)  BP: 156/72 (18 @ 05:45) (110/51 - 156/72)  RR: 16 (18 @ 05:45) (16 - 18)  SpO2: 95% (18 @ 05:45) (94% - 99%) RA   Daily     Daily Weight in k.1 (19 May 2018 06:02)  Admit Wt: Drug Dosing Weight  Height (cm): 175.26 (17 May 2018 09:00)  Weight (kg): 72.7 (17 May 2018 09:00)    Telemetry:  SR   LVEF:  40%    MEDICATIONS  ALBUTerol/ipratropium for Nebulization. 3 milliLiter(s) Nebulizer once PRN  aspirin 325 milliGRAM(s) Oral daily  atorvastatin 80 milliGRAM(s) Oral at bedtime  cefuroxime   Tablet 250 milliGRAM(s) Oral every 12 hours  clopidogrel Tablet 75 milliGRAM(s) Oral daily  cyclobenzaprine 10 milliGRAM(s) Oral two times a day PRN  docusate sodium 100 milliGRAM(s) Oral three times a day  DULoxetine 60 milliGRAM(s) Oral daily  HYDROmorphone   Tablet 2 milliGRAM(s) Oral every 6 hours PRN  HYDROmorphone   Tablet 4 milliGRAM(s) Oral every 6 hours PRN  lidocaine   Patch 1 Patch Transdermal daily  lisinopril 20 milliGRAM(s) Oral daily  metoprolol tartrate 50 milliGRAM(s) Oral every 12 hours  morphine ER Tablet 15 milliGRAM(s) Oral two times a day  nystatin Powder 1 Application(s) Topical two times a day  ondansetron   Disintegrating Tablet 4 milliGRAM(s) Oral every 6 hours PRN  pantoprazole    Tablet 40 milliGRAM(s) Oral before breakfast  senna 2 Tablet(s) Oral at bedtime  sodium chloride 0.9% lock flush 3 milliLiter(s) IV Push every 8 hours  sodium chloride 0.9%. 600 milliLiter(s) IV Continuous <Continuous>  tamsulosin 0.8 milliGRAM(s) Oral at bedtime    MEDICATIONS  (PRN):  ALBUTerol/ipratropium for Nebulization. 3 milliLiter(s) Nebulizer once PRN Shortness of Breath  cyclobenzaprine 10 milliGRAM(s) Oral two times a day PRN Muscle Spasm  HYDROmorphone   Tablet 2 milliGRAM(s) Oral every 6 hours PRN Moderate Pain (4 - 6)  HYDROmorphone   Tablet 4 milliGRAM(s) Oral every 6 hours PRN Severe Pain (7 - 10)  ondansetron   Disintegrating Tablet 4 milliGRAM(s) Oral every 6 hours PRN Nausea and/or Vomiting        PHYSICAL EXAM  General: Alert Awake A & O x 3   Respiratory:  decreased at base b/l   CV: RRR S1 S2   Abdomen:  soft NT ND + BS   Extremities: trace edema b/l   LE     Rt Groin C/D/I no hematoma             I&O's Detail    18 May 2018 07:  -  19 May 2018 07:00  --------------------------------------------------------  IN:    Oral Fluid: 90 mL    sodium chloride 0.9%.: 150 mL  Total IN: 240 mL    OUT:    Indwelling Catheter - Urethral: 600 mL    Voided: 175 mL  Total OUT: 775 mL    Total NET: -535 mL      19 May 2018 07:  -  19 May 2018 12:31  --------------------------------------------------------  IN:    Oral Fluid: 240 mL  Total IN: 240 mL    OUT:    Voided: 300 mL  Total OUT: 300 mL    Total NET: -60 mL          LABS      139  |  104  |  16.0  ----------------------------<  111  4.2   |  21.0<L>  |  0.75    Ca    8.7      19 May 2018 05:34  Mg     1.9     05-19                                   10.4   9.0   )-----------( 341      ( 19 May 2018 05:33 )             33.9                     CAPILLARY BLOOD GLUCOSE    PAST MEDICAL & SURGICAL HISTORY:  Coronary artery disease involving native coronary artery of native heart, angina presence unspecified  Syncope  Benign prostatic hypertrophy without urinary obstruction  Anxiety  Depression  Hypertension  Spinal stenosis  Kyphosis  Neuropathy  GERD (gastroesophageal reflux disease)  Anemia  S/P spinal fusion

## 2018-05-19 NOTE — PROGRESS NOTE ADULT - PROBLEM SELECTOR PLAN 1
Pain is stable and well controlled at this time.  Meds:  -Continue MS Contin 15mg [id number]  -Continue dilaudid 2/4mg [id number] prn mod/severe pain    Continue to monitor for any adverse drug reactions. Hold opiates for increased sedation or respiratory depression.    Will sign off. Reconsult as needed.  [phone]

## 2018-05-19 NOTE — PROGRESS NOTE ADULT - SUBJECTIVE AND OBJECTIVE BOX
Chief Complaint:    Patient seen and examined at bedside. Patient is a 72 year old male with PMH chronic back pain, vertebral fx/collapse, spinal fusion, spinal stenosis, Castellanos rods, anemia, anxiety, BPH, depression, GERD, HTN, kyphosis, neuropathy, (A1c 6.2). 5/8 pt to [facility] from assisted living facility s/p syncopal episode. Pt reports no hx of CP, SOB, diaphoresis, dizziness, prior syncope, abd pain. Admitted to  with pulmonary edema, YUNIOR, E coli UTI and bacteremia, NSTEMI, urinary retention requiring periodic straight cath (pt with c/o having to "force urine out" but denies dysuria or frequency).   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   5/16 transferred to [facility], c/o back pain (chronic and unchanged from usual pain) but denies all other c/o at this time.  5/17 pt stable without continues to deny CP at this time.   5/18 BUSHRA to [facility], m LAD OM1    Patient doing well today. Back pain is minimal. Resting comfortably in bed in NAD. Reports pain is minimal, doing well with current medication regimen. Denies any adverse side effects. With pain meds, pain scores reduced to 5/10.   Denies CP/SOB/fevers/chills/n/v/d, abdominal pain  +urinary retention    PAST MEDICAL & SURGICAL HISTORY:  Coronary artery disease involving native coronary artery of native heart, angina presence unspecified  Syncope  Benign prostatic hypertrophy without urinary obstruction  Anxiety  Depression  Hypertension  Spinal stenosis  Kyphosis  Neuropathy  GERD (gastroesophageal reflux disease)  Anemia  S/P spinal fusion      SOCIAL HISTORY:  [ ] Denies Smoking, Alcohol, or Drug Use    Allergies    No Known Allergies    Intolerances    Vanilla Ensure TID (Unknown)      PAIN MEDICATIONS:  aspirin 325 milliGRAM(s) Oral daily  cyclobenzaprine 10 milliGRAM(s) Oral two times a day PRN  DULoxetine 60 milliGRAM(s) Oral daily  HYDROmorphone   Tablet 2 milliGRAM(s) Oral every 6 hours PRN  HYDROmorphone   Tablet 4 milliGRAM(s) Oral every 6 hours PRN  morphine ER Tablet 15 milliGRAM(s) Oral two times a day  ondansetron   Disintegrating Tablet 4 milliGRAM(s) Oral every 6 hours PRN    Heme:  clopidogrel Tablet 75 milliGRAM(s) Oral daily    Antibiotics:  cefuroxime   Tablet 250 milliGRAM(s) Oral every 12 hours    Cardiac:  lisinopril 20 milliGRAM(s) Oral daily  metoprolol tartrate 50 milliGRAM(s) Oral every 12 hours  tamsulosin 0.8 milliGRAM(s) Oral at bedtime    Pulmonary:  ALBUTerol/ipratropium for Nebulization. 3 milliLiter(s) Nebulizer once PRN    Endocrine  atorvastatin 80 milliGRAM(s) Oral at bedtime    GI:  docusate sodium 100 milliGRAM(s) Oral three times a day  pantoprazole    Tablet 40 milliGRAM(s) Oral before breakfast  senna 2 Tablet(s) Oral at bedtime    All Other Meds:  lidocaine   Patch 1 Patch Transdermal daily  nystatin Powder 1 Application(s) Topical two times a day  sodium chloride 0.9% lock flush 3 milliLiter(s) IV Push every 8 hours  sodium chloride 0.9%. 600 milliLiter(s) IV Continuous <Continuous>      LABS:                          10.4   9.0   )-----------( 341      ( 19 May 2018 05:33 )             33.9     05-19    139  |  104  |  16.0  ----------------------------<  111  4.2   |  21.0<L>  |  0.75    Ca    8.7      19 May 2018 05:34  Mg     1.9     05-19            Drug Screen:        RADIOLOGY:      Vital Signs Last 24 Hrs  T(C): 36.8 (19 May 2018 15:51), Max: 36.8 (19 May 2018 15:51)  T(F): 98.3 (19 May 2018 15:51), Max: 98.3 (19 May 2018 15:51)  HR: 83 (19 May 2018 15:51) (78 - 88)  BP: 136/64 (19 May 2018 15:51) (110/51 - 156/72)  BP(mean): --  RR: 18 (19 May 2018 15:51) (16 - 18)  SpO2: 94% (19 May 2018 15:51) (94% - 96%)    PAIN SCORE:    5/10     SCALE USED: (1-10)      PHYSICAL EXAM:    GENERAL: NAD, poorly groomed  NERVOUS SYSTEM:  Alert & Oriented X3, Good concentration; Motor Strength 5/5 B/L upper and lower extremities; DTRs 2+ intact and symmetric  CHEST/LUNG: Clear to percussion bilaterally; nonlabored breathing  HEART: Regular rate and rhythm  ABDOMEN: Soft, Nontender, Nondistended; Bowel sounds present  EXTREMITIES:  2+ Peripheral Pulses, No clubbing, cyanosis, or edema  LYMPH: No lymphadenopathy noted  SKIN: No rashes or lesions

## 2018-05-20 LAB
ANION GAP SERPL CALC-SCNC: 14 MMOL/L — SIGNIFICANT CHANGE UP (ref 5–17)
BUN SERPL-MCNC: 18 MG/DL — SIGNIFICANT CHANGE UP (ref 8–20)
CALCIUM SERPL-MCNC: 8.7 MG/DL — SIGNIFICANT CHANGE UP (ref 8.6–10.2)
CHLORIDE SERPL-SCNC: 103 MMOL/L — SIGNIFICANT CHANGE UP (ref 98–107)
CO2 SERPL-SCNC: 21 MMOL/L — LOW (ref 22–29)
CREAT SERPL-MCNC: 0.89 MG/DL — SIGNIFICANT CHANGE UP (ref 0.5–1.3)
GLUCOSE SERPL-MCNC: 106 MG/DL — SIGNIFICANT CHANGE UP (ref 70–115)
HCT VFR BLD CALC: 32.3 % — LOW (ref 42–52)
HGB BLD-MCNC: 9.9 G/DL — LOW (ref 14–18)
MAGNESIUM SERPL-MCNC: 1.9 MG/DL — SIGNIFICANT CHANGE UP (ref 1.6–2.6)
MCHC RBC-ENTMCNC: 23.8 PG — LOW (ref 27–31)
MCHC RBC-ENTMCNC: 30.7 G/DL — LOW (ref 32–36)
MCV RBC AUTO: 77.6 FL — LOW (ref 80–94)
PHOSPHATE SERPL-MCNC: 2.8 MG/DL — SIGNIFICANT CHANGE UP (ref 2.4–4.7)
PLATELET # BLD AUTO: 351 K/UL — SIGNIFICANT CHANGE UP (ref 150–400)
POTASSIUM SERPL-MCNC: 4.4 MMOL/L — SIGNIFICANT CHANGE UP (ref 3.5–5.3)
POTASSIUM SERPL-SCNC: 4.4 MMOL/L — SIGNIFICANT CHANGE UP (ref 3.5–5.3)
RBC # BLD: 4.16 M/UL — LOW (ref 4.6–6.2)
RBC # FLD: 19.5 % — HIGH (ref 11–15.6)
SODIUM SERPL-SCNC: 138 MMOL/L — SIGNIFICANT CHANGE UP (ref 135–145)
WBC # BLD: 7.2 K/UL — SIGNIFICANT CHANGE UP (ref 4.8–10.8)
WBC # FLD AUTO: 7.2 K/UL — SIGNIFICANT CHANGE UP (ref 4.8–10.8)

## 2018-05-20 PROCEDURE — 99232 SBSQ HOSP IP/OBS MODERATE 35: CPT

## 2018-05-20 RX ORDER — LISINOPRIL 2.5 MG/1
1 TABLET ORAL
Qty: 0 | Refills: 0 | COMMUNITY
Start: 2018-05-20

## 2018-05-20 RX ORDER — ATORVASTATIN CALCIUM 80 MG/1
1 TABLET, FILM COATED ORAL
Qty: 30 | Refills: 2 | OUTPATIENT
Start: 2018-05-20

## 2018-05-20 RX ORDER — SODIUM CHLORIDE 9 MG/ML
250 INJECTION INTRAMUSCULAR; INTRAVENOUS; SUBCUTANEOUS ONCE
Qty: 0 | Refills: 0 | Status: COMPLETED | OUTPATIENT
Start: 2018-05-20 | End: 2018-05-20

## 2018-05-20 RX ORDER — TAMSULOSIN HYDROCHLORIDE 0.4 MG/1
2 CAPSULE ORAL
Qty: 0 | Refills: 0 | COMMUNITY
Start: 2018-05-20

## 2018-05-20 RX ORDER — ACETAMINOPHEN 500 MG
1000 TABLET ORAL ONCE
Qty: 0 | Refills: 0 | Status: COMPLETED | OUTPATIENT
Start: 2018-05-20 | End: 2018-05-20

## 2018-05-20 RX ORDER — METOPROLOL TARTRATE 50 MG
1 TABLET ORAL
Qty: 60 | Refills: 2 | OUTPATIENT
Start: 2018-05-20

## 2018-05-20 RX ORDER — CEFUROXIME AXETIL 250 MG
1 TABLET ORAL
Qty: 4 | Refills: 0 | OUTPATIENT
Start: 2018-05-20 | End: 2018-05-21

## 2018-05-20 RX ORDER — MAGNESIUM SULFATE 500 MG/ML
2 VIAL (ML) INJECTION ONCE
Qty: 0 | Refills: 0 | Status: COMPLETED | OUTPATIENT
Start: 2018-05-20 | End: 2018-05-20

## 2018-05-20 RX ORDER — ASPIRIN/CALCIUM CARB/MAGNESIUM 324 MG
1 TABLET ORAL
Qty: 0 | Refills: 0 | COMMUNITY
Start: 2018-05-20

## 2018-05-20 RX ORDER — NYSTATIN CREAM 100000 [USP'U]/G
1 CREAM TOPICAL
Qty: 0 | Refills: 0 | COMMUNITY
Start: 2018-05-20

## 2018-05-20 RX ORDER — CLOPIDOGREL BISULFATE 75 MG/1
1 TABLET, FILM COATED ORAL
Qty: 30 | Refills: 2 | OUTPATIENT
Start: 2018-05-20

## 2018-05-20 RX ORDER — SORBITOL SOLUTION 70 %
30 SOLUTION, ORAL MISCELLANEOUS ONCE
Qty: 0 | Refills: 0 | Status: COMPLETED | OUTPATIENT
Start: 2018-05-20 | End: 2018-05-20

## 2018-05-20 RX ADMIN — NYSTATIN CREAM 1 APPLICATION(S): 100000 CREAM TOPICAL at 18:23

## 2018-05-20 RX ADMIN — SENNA PLUS 2 TABLET(S): 8.6 TABLET ORAL at 21:27

## 2018-05-20 RX ADMIN — HYDROMORPHONE HYDROCHLORIDE 4 MILLIGRAM(S): 2 INJECTION INTRAMUSCULAR; INTRAVENOUS; SUBCUTANEOUS at 12:40

## 2018-05-20 RX ADMIN — SODIUM CHLORIDE 250 MILLILITER(S): 9 INJECTION INTRAMUSCULAR; INTRAVENOUS; SUBCUTANEOUS at 17:20

## 2018-05-20 RX ADMIN — ONDANSETRON 4 MILLIGRAM(S): 8 TABLET, FILM COATED ORAL at 08:12

## 2018-05-20 RX ADMIN — SODIUM CHLORIDE 3 MILLILITER(S): 9 INJECTION INTRAMUSCULAR; INTRAVENOUS; SUBCUTANEOUS at 13:18

## 2018-05-20 RX ADMIN — Medication 250 MILLIGRAM(S): at 18:24

## 2018-05-20 RX ADMIN — LIDOCAINE 1 PATCH: 4 CREAM TOPICAL at 11:53

## 2018-05-20 RX ADMIN — MORPHINE SULFATE 15 MILLIGRAM(S): 50 CAPSULE, EXTENDED RELEASE ORAL at 06:57

## 2018-05-20 RX ADMIN — LISINOPRIL 10 MILLIGRAM(S): 2.5 TABLET ORAL at 06:06

## 2018-05-20 RX ADMIN — CLOPIDOGREL BISULFATE 75 MILLIGRAM(S): 75 TABLET, FILM COATED ORAL at 11:53

## 2018-05-20 RX ADMIN — LIDOCAINE 1 PATCH: 4 CREAM TOPICAL at 23:27

## 2018-05-20 RX ADMIN — Medication 325 MILLIGRAM(S): at 11:53

## 2018-05-20 RX ADMIN — SODIUM CHLORIDE 3 MILLILITER(S): 9 INJECTION INTRAMUSCULAR; INTRAVENOUS; SUBCUTANEOUS at 21:30

## 2018-05-20 RX ADMIN — TAMSULOSIN HYDROCHLORIDE 0.8 MILLIGRAM(S): 0.4 CAPSULE ORAL at 21:26

## 2018-05-20 RX ADMIN — Medication 25 MILLIGRAM(S): at 06:06

## 2018-05-20 RX ADMIN — HYDROMORPHONE HYDROCHLORIDE 4 MILLIGRAM(S): 2 INJECTION INTRAMUSCULAR; INTRAVENOUS; SUBCUTANEOUS at 21:26

## 2018-05-20 RX ADMIN — Medication 400 MILLIGRAM(S): at 17:20

## 2018-05-20 RX ADMIN — MORPHINE SULFATE 15 MILLIGRAM(S): 50 CAPSULE, EXTENDED RELEASE ORAL at 06:06

## 2018-05-20 RX ADMIN — Medication 250 MILLIGRAM(S): at 06:06

## 2018-05-20 RX ADMIN — HYDROMORPHONE HYDROCHLORIDE 4 MILLIGRAM(S): 2 INJECTION INTRAMUSCULAR; INTRAVENOUS; SUBCUTANEOUS at 01:11

## 2018-05-20 RX ADMIN — Medication 100 MILLIGRAM(S): at 06:06

## 2018-05-20 RX ADMIN — Medication 50 GRAM(S): at 07:55

## 2018-05-20 RX ADMIN — DULOXETINE HYDROCHLORIDE 60 MILLIGRAM(S): 30 CAPSULE, DELAYED RELEASE ORAL at 11:53

## 2018-05-20 RX ADMIN — Medication 1000 MILLIGRAM(S): at 18:22

## 2018-05-20 RX ADMIN — HYDROMORPHONE HYDROCHLORIDE 4 MILLIGRAM(S): 2 INJECTION INTRAMUSCULAR; INTRAVENOUS; SUBCUTANEOUS at 11:51

## 2018-05-20 RX ADMIN — LIDOCAINE 1 PATCH: 4 CREAM TOPICAL at 01:13

## 2018-05-20 RX ADMIN — Medication 100 MILLIGRAM(S): at 21:26

## 2018-05-20 RX ADMIN — PANTOPRAZOLE SODIUM 40 MILLIGRAM(S): 20 TABLET, DELAYED RELEASE ORAL at 06:06

## 2018-05-20 RX ADMIN — Medication 100 MILLIGRAM(S): at 16:23

## 2018-05-20 RX ADMIN — HYDROMORPHONE HYDROCHLORIDE 4 MILLIGRAM(S): 2 INJECTION INTRAMUSCULAR; INTRAVENOUS; SUBCUTANEOUS at 02:00

## 2018-05-20 RX ADMIN — Medication 30 MILLILITER(S): at 11:53

## 2018-05-20 RX ADMIN — SODIUM CHLORIDE 3 MILLILITER(S): 9 INJECTION INTRAMUSCULAR; INTRAVENOUS; SUBCUTANEOUS at 06:08

## 2018-05-20 RX ADMIN — NYSTATIN CREAM 1 APPLICATION(S): 100000 CREAM TOPICAL at 06:08

## 2018-05-20 RX ADMIN — HYDROMORPHONE HYDROCHLORIDE 4 MILLIGRAM(S): 2 INJECTION INTRAMUSCULAR; INTRAVENOUS; SUBCUTANEOUS at 22:15

## 2018-05-20 NOTE — CHART NOTE - NSCHARTNOTEFT_GEN_A_CORE
CTS    Spoke with Case Management.  The assisted living facility where Mr Dove lives states that they are unable to take him back because he is non compliant with his meds and has been in a hospital too long.

## 2018-05-20 NOTE — PROGRESS NOTE ADULT - SUBJECTIVE AND OBJECTIVE BOX
Subjective: " I'm going back home today?"  Pt in chair NAD, reports feeling better.  Pt voided yesterday without issue     VITAL SIGNS  T(C): 36.6 (18 @ 05:57), Max: 36.8 (18 @ 15:51)  HR: 88 (18 @ 05:57) (79 - 96)  BP: 108/54 (18 @ 05:57) (70/40 - 136/70)  RR: 18 (18 @ 05:57) (18 - 18)  SpO2: 96% (18 @ 05:57) (94% - 96%)RA       Daily     Daily Weight in k.6 (20 May 2018 00:29)  Admit Wt: Drug Dosing Weight  Height (cm): 175.26 (17 May 2018 09:00)  Weight (kg): 72.7 (17 May 2018 09:00)    Telemetry:   SR   LVEF:  40%     MEDICATIONS  ALBUTerol/ipratropium for Nebulization. 3 milliLiter(s) Nebulizer once PRN  aspirin 325 milliGRAM(s) Oral daily  atorvastatin 80 milliGRAM(s) Oral at bedtime  cefuroxime   Tablet 250 milliGRAM(s) Oral every 12 hours  clopidogrel Tablet 75 milliGRAM(s) Oral daily  cyclobenzaprine 10 milliGRAM(s) Oral two times a day PRN  docusate sodium 100 milliGRAM(s) Oral three times a day  DULoxetine 60 milliGRAM(s) Oral daily  HYDROmorphone   Tablet 2 milliGRAM(s) Oral every 6 hours PRN  HYDROmorphone   Tablet 4 milliGRAM(s) Oral every 6 hours PRN  lidocaine   Patch 1 Patch Transdermal daily  lisinopril 10 milliGRAM(s) Oral daily  metoprolol tartrate 25 milliGRAM(s) Oral two times a day  morphine ER Tablet 15 milliGRAM(s) Oral two times a day  nystatin Powder 1 Application(s) Topical two times a day  ondansetron   Disintegrating Tablet 4 milliGRAM(s) Oral every 6 hours PRN  pantoprazole    Tablet 40 milliGRAM(s) Oral before breakfast  senna 2 Tablet(s) Oral at bedtime  sodium chloride 0.9% lock flush 3 milliLiter(s) IV Push every 8 hours  sodium chloride 0.9%. 600 milliLiter(s) IV Continuous <Continuous>  sorbitol 70% Solution 30 milliLiter(s) Oral once  tamsulosin 0.8 milliGRAM(s) Oral at bedtime    MEDICATIONS  (PRN):  ALBUTerol/ipratropium for Nebulization. 3 milliLiter(s) Nebulizer once PRN Shortness of Breath  cyclobenzaprine 10 milliGRAM(s) Oral two times a day PRN Muscle Spasm  HYDROmorphone   Tablet 2 milliGRAM(s) Oral every 6 hours PRN Moderate Pain (4 - 6)  HYDROmorphone   Tablet 4 milliGRAM(s) Oral every 6 hours PRN Severe Pain (7 - 10)  ondansetron   Disintegrating Tablet 4 milliGRAM(s) Oral every 6 hours PRN Nausea and/or Vomiting        PHYSICAL EXAM  General: Alert Awake A & O x 3   Respiratory:  decreased at base b/l   CV: RRR S1 S2   Abdomen:  soft NT ND + BS   Extremities: trace edema b/l   LE     Rt Groin C/D/I no hematoma       I&O's Detail    19 May 2018 07:  -  20 May 2018 07:00  --------------------------------------------------------  IN:    0.9% NaCl: 500 mL    Oral Fluid: 720 mL  Total IN: 1220 mL    OUT:    Voided: 1050 mL  Total OUT: 1050 mL    Total NET: 170 mL      20 May 2018 07:01  -  20 May 2018 10:25  --------------------------------------------------------  IN:    Oral Fluid: 350 mL    Solution: 50 mL  Total IN: 400 mL    OUT:  Total OUT: 0 mL    Total NET: 400 mL          LABS      138  |  103  |  18.0  ----------------------------<  106  4.4   |  21.0<L>  |  0.89    Ca    8.7      20 May 2018 05:35  Phos  2.8     05-20  Mg     1.9     05-20                                   9.9    7.2   )-----------( 351      ( 20 May 2018 05:31 )             32.3                     CAPILLARY BLOOD GLUCOSE               Today's CXR:  < from: Xray Chest 1 View- PORTABLE-Urgent (18 @ 10:13) >  Extensive Castellanos rods are seen from the upper thoracic spine through   the lumbar spine.    IMPRESSION:   Mild interstitial edema.    < end of copied text >      PAST MEDICAL & SURGICAL HISTORY:  Coronary artery disease involving native coronary artery of native heart, angina presence unspecified  Syncope  Benign prostatic hypertrophy without urinary obstruction  Anxiety  Depression  Hypertension  Spinal stenosis  Kyphosis  Neuropathy  GERD (gastroesophageal reflux disease)  Anemia  S/P spinal fusion

## 2018-05-21 DIAGNOSIS — I95.9 HYPOTENSION, UNSPECIFIED: ICD-10-CM

## 2018-05-21 LAB
ALBUMIN SERPL ELPH-MCNC: 3 G/DL — LOW (ref 3.3–5.2)
ALP SERPL-CCNC: 68 U/L — SIGNIFICANT CHANGE UP (ref 40–120)
ALT FLD-CCNC: 13 U/L — SIGNIFICANT CHANGE UP
ANION GAP SERPL CALC-SCNC: 9 MMOL/L — SIGNIFICANT CHANGE UP (ref 5–17)
AST SERPL-CCNC: 14 U/L — SIGNIFICANT CHANGE UP
BILIRUB SERPL-MCNC: 0.4 MG/DL — SIGNIFICANT CHANGE UP (ref 0.4–2)
BUN SERPL-MCNC: 23 MG/DL — HIGH (ref 8–20)
CALCIUM SERPL-MCNC: 8.5 MG/DL — LOW (ref 8.6–10.2)
CHLORIDE SERPL-SCNC: 103 MMOL/L — SIGNIFICANT CHANGE UP (ref 98–107)
CO2 SERPL-SCNC: 25 MMOL/L — SIGNIFICANT CHANGE UP (ref 22–29)
CREAT SERPL-MCNC: 0.96 MG/DL — SIGNIFICANT CHANGE UP (ref 0.5–1.3)
CULTURE RESULTS: SIGNIFICANT CHANGE UP
GLUCOSE SERPL-MCNC: 108 MG/DL — SIGNIFICANT CHANGE UP (ref 70–115)
HCT VFR BLD CALC: 30.3 % — LOW (ref 42–52)
HGB BLD-MCNC: 9.1 G/DL — LOW (ref 14–18)
MAGNESIUM SERPL-MCNC: 2.2 MG/DL — SIGNIFICANT CHANGE UP (ref 1.6–2.6)
MCHC RBC-ENTMCNC: 23.3 PG — LOW (ref 27–31)
MCHC RBC-ENTMCNC: 30 G/DL — LOW (ref 32–36)
MCV RBC AUTO: 77.5 FL — LOW (ref 80–94)
PHOSPHATE SERPL-MCNC: 2.6 MG/DL — SIGNIFICANT CHANGE UP (ref 2.4–4.7)
PLATELET # BLD AUTO: 355 K/UL — SIGNIFICANT CHANGE UP (ref 150–400)
POTASSIUM SERPL-MCNC: 4.5 MMOL/L — SIGNIFICANT CHANGE UP (ref 3.5–5.3)
POTASSIUM SERPL-SCNC: 4.5 MMOL/L — SIGNIFICANT CHANGE UP (ref 3.5–5.3)
PROT SERPL-MCNC: 6.3 G/DL — LOW (ref 6.6–8.7)
RBC # BLD: 3.91 M/UL — LOW (ref 4.6–6.2)
RBC # FLD: 19.5 % — HIGH (ref 11–15.6)
SODIUM SERPL-SCNC: 137 MMOL/L — SIGNIFICANT CHANGE UP (ref 135–145)
SPECIMEN SOURCE: SIGNIFICANT CHANGE UP
WBC # BLD: 7.6 K/UL — SIGNIFICANT CHANGE UP (ref 4.8–10.8)
WBC # FLD AUTO: 7.6 K/UL — SIGNIFICANT CHANGE UP (ref 4.8–10.8)

## 2018-05-21 PROCEDURE — 99232 SBSQ HOSP IP/OBS MODERATE 35: CPT

## 2018-05-21 RX ORDER — METOPROLOL TARTRATE 50 MG
12.5 TABLET ORAL
Qty: 0 | Refills: 0 | Status: DISCONTINUED | OUTPATIENT
Start: 2018-05-21 | End: 2018-05-23

## 2018-05-21 RX ADMIN — SODIUM CHLORIDE 3 MILLILITER(S): 9 INJECTION INTRAMUSCULAR; INTRAVENOUS; SUBCUTANEOUS at 05:26

## 2018-05-21 RX ADMIN — Medication 250 MILLIGRAM(S): at 17:05

## 2018-05-21 RX ADMIN — Medication 325 MILLIGRAM(S): at 10:46

## 2018-05-21 RX ADMIN — Medication 12.5 MILLIGRAM(S): at 17:05

## 2018-05-21 RX ADMIN — Medication 100 MILLIGRAM(S): at 21:22

## 2018-05-21 RX ADMIN — NYSTATIN CREAM 1 APPLICATION(S): 100000 CREAM TOPICAL at 17:05

## 2018-05-21 RX ADMIN — PANTOPRAZOLE SODIUM 40 MILLIGRAM(S): 20 TABLET, DELAYED RELEASE ORAL at 05:23

## 2018-05-21 RX ADMIN — SODIUM CHLORIDE 3 MILLILITER(S): 9 INJECTION INTRAMUSCULAR; INTRAVENOUS; SUBCUTANEOUS at 21:26

## 2018-05-21 RX ADMIN — Medication 25 MILLIGRAM(S): at 05:23

## 2018-05-21 RX ADMIN — MORPHINE SULFATE 15 MILLIGRAM(S): 50 CAPSULE, EXTENDED RELEASE ORAL at 17:05

## 2018-05-21 RX ADMIN — SENNA PLUS 2 TABLET(S): 8.6 TABLET ORAL at 21:22

## 2018-05-21 RX ADMIN — MORPHINE SULFATE 15 MILLIGRAM(S): 50 CAPSULE, EXTENDED RELEASE ORAL at 05:26

## 2018-05-21 RX ADMIN — CLOPIDOGREL BISULFATE 75 MILLIGRAM(S): 75 TABLET, FILM COATED ORAL at 10:45

## 2018-05-21 RX ADMIN — ATORVASTATIN CALCIUM 80 MILLIGRAM(S): 80 TABLET, FILM COATED ORAL at 21:22

## 2018-05-21 RX ADMIN — Medication 250 MILLIGRAM(S): at 05:23

## 2018-05-21 RX ADMIN — TAMSULOSIN HYDROCHLORIDE 0.8 MILLIGRAM(S): 0.4 CAPSULE ORAL at 21:22

## 2018-05-21 RX ADMIN — SODIUM CHLORIDE 3 MILLILITER(S): 9 INJECTION INTRAMUSCULAR; INTRAVENOUS; SUBCUTANEOUS at 13:27

## 2018-05-21 RX ADMIN — DULOXETINE HYDROCHLORIDE 60 MILLIGRAM(S): 30 CAPSULE, DELAYED RELEASE ORAL at 10:46

## 2018-05-21 RX ADMIN — NYSTATIN CREAM 1 APPLICATION(S): 100000 CREAM TOPICAL at 05:23

## 2018-05-21 NOTE — PROGRESS NOTE ADULT - SUBJECTIVE AND OBJECTIVE BOX
Subjective:    VITAL SIGNS  Vital Signs Last 24 Hrs  T(C): 36.7 (18 @ 05:16), Max: 36.7 (18 @ 10:58)  T(F): 98.1 (18 @ 05:16), Max: 98.1 (18 @ 21:14)  HR: 78 (18 @ 09:46) (75 - 88)  BP: 92/50 (18 @ 09:46) (70/44 - 118/58)  RR: 20 (18 @ 09:46) (18 - 20)  SpO2: 97% (18 @ 09:46) (93% - 100%)  on (O2)              Telemetry:    LVEF:     MEDICATIONS  ALBUTerol/ipratropium for Nebulization. 3 milliLiter(s) Nebulizer once PRN  aspirin 325 milliGRAM(s) Oral daily  atorvastatin 80 milliGRAM(s) Oral at bedtime  cefuroxime   Tablet 250 milliGRAM(s) Oral every 12 hours  clopidogrel Tablet 75 milliGRAM(s) Oral daily  cyclobenzaprine 10 milliGRAM(s) Oral two times a day PRN  docusate sodium 100 milliGRAM(s) Oral three times a day  DULoxetine 60 milliGRAM(s) Oral daily  HYDROmorphone   Tablet 2 milliGRAM(s) Oral every 6 hours PRN  HYDROmorphone   Tablet 4 milliGRAM(s) Oral every 6 hours PRN  lidocaine   Patch 1 Patch Transdermal daily  metoprolol tartrate 25 milliGRAM(s) Oral two times a day  morphine ER Tablet 15 milliGRAM(s) Oral two times a day  nystatin Powder 1 Application(s) Topical two times a day  ondansetron   Disintegrating Tablet 4 milliGRAM(s) Oral every 6 hours PRN  pantoprazole    Tablet 40 milliGRAM(s) Oral before breakfast  senna 2 Tablet(s) Oral at bedtime  sodium chloride 0.9% lock flush 3 milliLiter(s) IV Push every 8 hours  tamsulosin 0.8 milliGRAM(s) Oral at bedtime      PHYSICAL EXAM  General: well nourished, well developed, no acute distress  Neurology: alert and oriented x 3, nonfocal, no gross deficits  Respiratory: clear to auscultation bilaterally  CV: regular rate and rhythm, normal S1, S2  Abdomen: soft, nontender, nondistended, positive bowel sounds, last bowel movement   Extremities: warm, well perfused. no edema. + DP pulses  Incisions: midline sternal incision, + mepilex, c/d/i. sternum stable.  Chest tubes:   Epicardial Wires:    > EPM (settings) / isolated    I&O's Detail    20 May 2018 07:01  -  21 May 2018 07:00  --------------------------------------------------------  IN:    0.9% NaCl: 250 mL    Oral Fluid: 775 mL    Solution: 100 mL    Solution: 50 mL  Total IN: 1175 mL    OUT:    Voided: 200 mL  Total OUT: 200 mL    Total NET: 975 mL          Weights:  Daily     Daily Weight in k.6 (21 May 2018 05:16)  Admit Wt: Drug Dosing Weight  Height (cm): 175.26 (17 May 2018 09:00)  Weight (kg): 72.7 (17 May 2018 09:00)  BMI (kg/m2): 23.7 (17 May 2018 09:00)  BSA (m2): 1.88 (17 May 2018 09:00)    LABS  05-    138  |  103  |  18.0  ----------------------------<  106  4.4   |  21.0<L>  |  0.89    Ca    8.7      20 May 2018 05:35  Phos  2.8     05-20  Mg     1.9     05-20                                   9.9    7.2   )-----------( 351      ( 20 May 2018 05:31 )             32.3                  CAPILLARY BLOOD GLUCOSE               Today's CXR:    Today's EKG:    PAST MEDICAL & SURGICAL HISTORY:  Coronary artery disease involving native coronary artery of native heart, angina presence unspecified  Syncope  Benign prostatic hypertrophy without urinary obstruction  Anxiety  Depression  Hypertension  Spinal stenosis  Kyphosis  Neuropathy  GERD (gastroesophageal reflux disease)  Anemia  S/P spinal fusion       ASSESSMENT  77y Male was admitted on (Date) from (home/other facility) with    Preop course:    On (Date), pt underwent .    Postoperative Course/Issues:     PLAN  Neuro: Pain management  Pulm: Encourage coughing, deep breathing and use of incentive spirometry. Wean off supplemental oxygen as able. Daily CXR.   Cardio: Continue Aspirin, Lipitor. (BB)  GI: Tolerating diet. Continue stool softeners.  Renal: Keep negative fluid balance. (Diuretics) Trend BUN/Cr. Supplement electrolytes prn.   :   Vasc: Lovenox/SCDs for DVT prophylaxis  Heme: Stable H/H. Trend CBC daily.   Endo:  ID: Off antibiotics. Stable.  Therapy: PT daily as tolerated.  Tubes/Wires:   Disposition: Aim to D/C to home on  Discussed with Cardiothoracic Team at AM rounds. Subjective: "I was feeling dizzy this morning."  Pt. lying in bed, denies any SOB or chest pain, NAD noted.    VITAL SIGNS  Vital Signs Last 24 Hrs  T(C): 36.7 (18 @ 05:16), Max: 36.7 (18 @ 10:58)  T(F): 98.1 (18 @ 05:16), Max: 98.1 (18 @ 21:14)  HR: 78 (18 @ 09:46) (75 - 88)  BP: 92/50 (18 @ 09:46) (70/44 - 118/58)  RR: 20 (18 @ 09:46) (18 - 20)  SpO2: 97% (18 @ 09:46) (93% - 100%)  on (O2)              Telemetry:  Afib  LVEF: 40%    MEDICATIONS  ALBUTerol/ipratropium for Nebulization. 3 milliLiter(s) Nebulizer once PRN  aspirin 325 milliGRAM(s) Oral daily  atorvastatin 80 milliGRAM(s) Oral at bedtime  cefuroxime   Tablet 250 milliGRAM(s) Oral every 12 hours  clopidogrel Tablet 75 milliGRAM(s) Oral daily  cyclobenzaprine 10 milliGRAM(s) Oral two times a day PRN  docusate sodium 100 milliGRAM(s) Oral three times a day  DULoxetine 60 milliGRAM(s) Oral daily  HYDROmorphone   Tablet 2 milliGRAM(s) Oral every 6 hours PRN  HYDROmorphone   Tablet 4 milliGRAM(s) Oral every 6 hours PRN  lidocaine   Patch 1 Patch Transdermal daily  metoprolol tartrate 25 milliGRAM(s) Oral two times a day  morphine ER Tablet 15 milliGRAM(s) Oral two times a day  nystatin Powder 1 Application(s) Topical two times a day  ondansetron   Disintegrating Tablet 4 milliGRAM(s) Oral every 6 hours PRN  pantoprazole    Tablet 40 milliGRAM(s) Oral before breakfast  senna 2 Tablet(s) Oral at bedtime  sodium chloride 0.9% lock flush 3 milliLiter(s) IV Push every 8 hours  tamsulosin 0.8 milliGRAM(s) Oral at bedtime      PHYSICAL EXAM  General: well nourished, well developed, no acute distress  Neurology: alert and oriented x 3, nonfocal, no gross deficits  Respiratory: Clear, diminished at the bases.    CV: Irregularly irregular, rate and rhythm, normal S1, S2  Abdomen: soft, nontender, nondistended, positive bowel sounds, last bowel movement 18.  Extremities: warm, well perfused. trace edema. + DP pulses      I&O's Detail    20 May 2018 07:01  -  21 May 2018 07:00  --------------------------------------------------------  IN:    0.9% NaCl: 250 mL    Oral Fluid: 775 mL    Solution: 100 mL    Solution: 50 mL  Total IN: 1175 mL    OUT:    Voided: 200 mL  Total OUT: 200 mL    Total NET: 975 mL          Weights:  Daily     Daily Weight in k.6 (21 May 2018 05:16)  Admit Wt: Drug Dosing Weight  Height (cm): 175.26 (17 May 2018 09:00)  Weight (kg): 72.7 (17 May 2018 09:00)  BMI (kg/m2): 23.7 (17 May 2018 09:00)  BSA (m2): 1.88 (17 May 2018 09:00)    LABS  05-    138  |  103  |  18.0  ----------------------------<  106  4.4   |  21.0<L>  |  0.89    Ca    8.7      20 May 2018 05:35  Phos  2.8     05-20  Mg     1.9     05-20                                   9.9    7.2   )-----------( 351      ( 20 May 2018 05:31 )             32.3                  CAPILLARY BLOOD GLUCOSE  PAST MEDICAL & SURGICAL HISTORY:  Coronary artery disease involving native coronary artery of native heart, angina presence unspecified  Syncope  Benign prostatic hypertrophy without urinary obstruction  Anxiety  Depression  Hypertension  Spinal stenosis  Kyphosis  Neuropathy  GERD (gastroesophageal reflux disease)  Anemia  S/P spinal fusion

## 2018-05-21 NOTE — PROGRESS NOTE ADULT - PROBLEM SELECTOR PLAN 1
s/p BUSHRA on 5/18.  Continue aspirin, plavix, lopressor, & Lipitor.  Encourage the use of I/S/CDBE, increase mobilization, daily PT, OOB to chair.  Discharge to s/p BUSHRA on 5/18.  Continue aspirin, plavix, lopressor, & Lipitor.  Encourage the use of I/S/CDBE, increase mobilization, daily PT, OOB to chair.  Awaiting discharge to [facility].  Discussed plan with Dr. Kelsey & CT surgery in morning rounds.

## 2018-05-21 NOTE — PROGRESS NOTE ADULT - SUBJECTIVE AND OBJECTIVE BOX
Chief Complaint:    HPI:  72 year old male with PMH chronic back pain, vertebral fx/collapse, spinal fusion, spinal stenosis, Castellanos rods, anemia, anxiety, BPH, depression, GERD, HTN, kyphosis, neuropathy, (A1c 6.2).  pt to [facility] from assisted living facility s/p syncopal episode. Pt reports no hx of CP, SOB, diaphoresis, dizziness, prior syncope, abd pain. Admitted to  with pulmonary edema, YUNIOR, E coli UTI and bacteremia, NSTEMI, urinary retention requiring periodic straight cath (pt with c/o having to "force urine out" but denies dysuria or frequency).   5/15 s/p [facility] with results as follows:  (< from: Cardiac Cath Lab - Adult (05.15.18 @ 15:28) >  LMCA: Small caliber vessel. There is moderate diffuse disease noted.  LAD: Medium caliber vessel. There is a 80% stenosis noted in the ostialportion of the vessel.  LCx: There is a 80% stenosis noted in the proximal portion of the vessel OM3  LPDA has 99% stenosis  RCA: Small caliber vessel. There is mild diffuse disease noted.  < end of copied text >   transferred to [facility] CTICU, c/o back pain (chronic and unchanged from usual pain) but denies all other c/o at this time. (16 May 2018 08:05)      PAST MEDICAL & SURGICAL HISTORY:  Coronary artery disease involving native coronary artery of native heart, angina presence unspecified  Syncope  Benign prostatic hypertrophy without urinary obstruction  Anxiety  Depression  Hypertension  Spinal stenosis  Kyphosis  Neuropathy  GERD (gastroesophageal reflux disease)  Anemia  S/P spinal fusion      FAMILY HISTORY:  No pertinent family history in first degree relatives      SOCIAL HISTORY:  Denies Smoking, Alcohol, or Drug Use    Allergies    No Known Allergies    Intolerances    Vanilla Ensure TID (Unknown)      PAIN MEDICATIONS:  aspirin 325 milliGRAM(s) Oral daily  cyclobenzaprine 10 milliGRAM(s) Oral two times a day PRN  DULoxetine 60 milliGRAM(s) Oral daily  HYDROmorphone   Tablet 2 milliGRAM(s) Oral every 6 hours PRN  HYDROmorphone   Tablet 4 milliGRAM(s) Oral every 6 hours PRN  morphine ER Tablet 15 milliGRAM(s) Oral two times a day  ondansetron   Disintegrating Tablet 4 milliGRAM(s) Oral every 6 hours PRN    Heme:  clopidogrel Tablet 75 milliGRAM(s) Oral daily    Antibiotics:  cefuroxime   Tablet 250 milliGRAM(s) Oral every 12 hours    Cardiovascular:  metoprolol tartrate 12.5 milliGRAM(s) Oral two times a day  tamsulosin 0.8 milliGRAM(s) Oral at bedtime    GI:  docusate sodium 100 milliGRAM(s) Oral three times a day  pantoprazole    Tablet 40 milliGRAM(s) Oral before breakfast  senna 2 Tablet(s) Oral at bedtime    Endocrine:  atorvastatin 80 milliGRAM(s) Oral at bedtime    All Other Medications:  lidocaine   Patch 1 Patch Transdermal daily  nystatin Powder 1 Application(s) Topical two times a day  sodium chloride 0.9% lock flush 3 milliLiter(s) IV Push every 8 hours      LABS:                          9.9    7.2   )-----------( 351      ( 20 May 2018 05:31 )             32.3     05-20    138  |  103  |  18.0  ----------------------------<  106  4.4   |  21.0<L>  |  0.89    Ca    8.7      20 May 2018 05:35  Phos  2.8     05-20  Mg     1.9     05-20      Vital Signs Last 24 Hrs  T(C): 36.9 (21 May 2018 10:42), Max: 36.9 (21 May 2018 10:42)  T(F): 98.5 (21 May 2018 10:42), Max: 98.5 (21 May 2018 10:42)  HR: 74 (21 May 2018 10:42) (74 - 88)  BP: 118/52 (21 May 2018 10:42) (70/44 - 118/58)  BP(mean): --  RR: 18 (21 May 2018 10:42) (18 - 20)  SpO2: 100% (21 May 2018 10:42) (93% - 100%)    PAIN SCORE:     8    SCALE USED: (1-10)    OTHER SYMPTOMS (0 = None;  1 = Mild; 2 = Moderate; 3 = Severe)  Anorexia: 0          Dyspnea:0         Pruritus:0         Nausea:0             Agitation:0        Anxiety:0    Vomitin          Drowsiness:0    Depression:0  Constipation:0    Diarrhea:0        Other:0               [place]  Reviewed and Copied to Chart: done

## 2018-05-22 LAB
ALBUMIN SERPL ELPH-MCNC: 3.1 G/DL — LOW (ref 3.3–5.2)
ALP SERPL-CCNC: 77 U/L — SIGNIFICANT CHANGE UP (ref 40–120)
ALT FLD-CCNC: 12 U/L — SIGNIFICANT CHANGE UP
ANION GAP SERPL CALC-SCNC: 15 MMOL/L — SIGNIFICANT CHANGE UP (ref 5–17)
AST SERPL-CCNC: 15 U/L — SIGNIFICANT CHANGE UP
BILIRUB SERPL-MCNC: 0.4 MG/DL — SIGNIFICANT CHANGE UP (ref 0.4–2)
BUN SERPL-MCNC: 19 MG/DL — SIGNIFICANT CHANGE UP (ref 8–20)
CALCIUM SERPL-MCNC: 9.1 MG/DL — SIGNIFICANT CHANGE UP (ref 8.6–10.2)
CHLORIDE SERPL-SCNC: 102 MMOL/L — SIGNIFICANT CHANGE UP (ref 98–107)
CO2 SERPL-SCNC: 21 MMOL/L — LOW (ref 22–29)
CREAT SERPL-MCNC: 0.82 MG/DL — SIGNIFICANT CHANGE UP (ref 0.5–1.3)
GLUCOSE SERPL-MCNC: 96 MG/DL — SIGNIFICANT CHANGE UP (ref 70–115)
HCT VFR BLD CALC: 31.9 % — LOW (ref 42–52)
HGB BLD-MCNC: 10 G/DL — LOW (ref 14–18)
MAGNESIUM SERPL-MCNC: 2.1 MG/DL — SIGNIFICANT CHANGE UP (ref 1.8–2.6)
MCHC RBC-ENTMCNC: 24.3 PG — LOW (ref 27–31)
MCHC RBC-ENTMCNC: 31.3 G/DL — LOW (ref 32–36)
MCV RBC AUTO: 77.6 FL — LOW (ref 80–94)
PHOSPHATE SERPL-MCNC: 3.1 MG/DL — SIGNIFICANT CHANGE UP (ref 2.4–4.7)
PLATELET # BLD AUTO: 428 K/UL — HIGH (ref 150–400)
POTASSIUM SERPL-MCNC: 4.3 MMOL/L — SIGNIFICANT CHANGE UP (ref 3.5–5.3)
POTASSIUM SERPL-SCNC: 4.3 MMOL/L — SIGNIFICANT CHANGE UP (ref 3.5–5.3)
PROT SERPL-MCNC: 6.8 G/DL — SIGNIFICANT CHANGE UP (ref 6.6–8.7)
RBC # BLD: 4.11 M/UL — LOW (ref 4.6–6.2)
RBC # FLD: 19.9 % — HIGH (ref 11–15.6)
SODIUM SERPL-SCNC: 138 MMOL/L — SIGNIFICANT CHANGE UP (ref 135–145)
WBC # BLD: 6.8 K/UL — SIGNIFICANT CHANGE UP (ref 4.8–10.8)
WBC # FLD AUTO: 6.8 K/UL — SIGNIFICANT CHANGE UP (ref 4.8–10.8)

## 2018-05-22 PROCEDURE — 99232 SBSQ HOSP IP/OBS MODERATE 35: CPT

## 2018-05-22 PROCEDURE — 71045 X-RAY EXAM CHEST 1 VIEW: CPT | Mod: 26

## 2018-05-22 RX ORDER — MORPHINE SULFATE 50 MG/1
30 CAPSULE, EXTENDED RELEASE ORAL
Qty: 0 | Refills: 0 | Status: DISCONTINUED | OUTPATIENT
Start: 2018-05-22 | End: 2018-05-23

## 2018-05-22 RX ORDER — METOPROLOL TARTRATE 50 MG
0.5 TABLET ORAL
Qty: 30 | Refills: 2 | OUTPATIENT
Start: 2018-05-22 | End: 2018-08-19

## 2018-05-22 RX ORDER — ALPRAZOLAM 0.25 MG
0.25 TABLET ORAL EVERY 8 HOURS
Qty: 0 | Refills: 0 | Status: DISCONTINUED | OUTPATIENT
Start: 2018-05-22 | End: 2018-05-23

## 2018-05-22 RX ORDER — MORPHINE SULFATE 50 MG/1
1 CAPSULE, EXTENDED RELEASE ORAL
Qty: 0 | Refills: 0 | COMMUNITY
Start: 2018-05-22

## 2018-05-22 RX ORDER — ACETAMINOPHEN 500 MG
3 TABLET ORAL
Qty: 0 | Refills: 0 | COMMUNITY
Start: 2018-05-22

## 2018-05-22 RX ORDER — ACETAMINOPHEN 500 MG
975 TABLET ORAL EVERY 8 HOURS
Qty: 0 | Refills: 0 | Status: DISCONTINUED | OUTPATIENT
Start: 2018-05-22 | End: 2018-05-23

## 2018-05-22 RX ADMIN — HYDROMORPHONE HYDROCHLORIDE 2 MILLIGRAM(S): 2 INJECTION INTRAMUSCULAR; INTRAVENOUS; SUBCUTANEOUS at 08:49

## 2018-05-22 RX ADMIN — HYDROMORPHONE HYDROCHLORIDE 2 MILLIGRAM(S): 2 INJECTION INTRAMUSCULAR; INTRAVENOUS; SUBCUTANEOUS at 09:19

## 2018-05-22 RX ADMIN — SODIUM CHLORIDE 3 MILLILITER(S): 9 INJECTION INTRAMUSCULAR; INTRAVENOUS; SUBCUTANEOUS at 05:15

## 2018-05-22 RX ADMIN — MORPHINE SULFATE 15 MILLIGRAM(S): 50 CAPSULE, EXTENDED RELEASE ORAL at 05:15

## 2018-05-22 RX ADMIN — Medication 100 MILLIGRAM(S): at 21:05

## 2018-05-22 RX ADMIN — PANTOPRAZOLE SODIUM 40 MILLIGRAM(S): 20 TABLET, DELAYED RELEASE ORAL at 05:15

## 2018-05-22 RX ADMIN — MORPHINE SULFATE 15 MILLIGRAM(S): 50 CAPSULE, EXTENDED RELEASE ORAL at 05:45

## 2018-05-22 RX ADMIN — SODIUM CHLORIDE 3 MILLILITER(S): 9 INJECTION INTRAMUSCULAR; INTRAVENOUS; SUBCUTANEOUS at 21:02

## 2018-05-22 RX ADMIN — NYSTATIN CREAM 1 APPLICATION(S): 100000 CREAM TOPICAL at 12:11

## 2018-05-22 RX ADMIN — Medication 12.5 MILLIGRAM(S): at 05:15

## 2018-05-22 RX ADMIN — Medication 975 MILLIGRAM(S): at 21:54

## 2018-05-22 RX ADMIN — Medication 0.25 MILLIGRAM(S): at 21:09

## 2018-05-22 RX ADMIN — Medication 100 MILLIGRAM(S): at 05:15

## 2018-05-22 RX ADMIN — Medication 975 MILLIGRAM(S): at 21:04

## 2018-05-22 RX ADMIN — Medication 250 MILLIGRAM(S): at 17:40

## 2018-05-22 RX ADMIN — SODIUM CHLORIDE 3 MILLILITER(S): 9 INJECTION INTRAMUSCULAR; INTRAVENOUS; SUBCUTANEOUS at 11:31

## 2018-05-22 RX ADMIN — Medication 325 MILLIGRAM(S): at 11:28

## 2018-05-22 RX ADMIN — MORPHINE SULFATE 30 MILLIGRAM(S): 50 CAPSULE, EXTENDED RELEASE ORAL at 17:43

## 2018-05-22 RX ADMIN — Medication 975 MILLIGRAM(S): at 11:53

## 2018-05-22 RX ADMIN — SENNA PLUS 2 TABLET(S): 8.6 TABLET ORAL at 21:03

## 2018-05-22 RX ADMIN — ATORVASTATIN CALCIUM 80 MILLIGRAM(S): 80 TABLET, FILM COATED ORAL at 21:03

## 2018-05-22 RX ADMIN — NYSTATIN CREAM 1 APPLICATION(S): 100000 CREAM TOPICAL at 21:04

## 2018-05-22 RX ADMIN — CLOPIDOGREL BISULFATE 75 MILLIGRAM(S): 75 TABLET, FILM COATED ORAL at 11:28

## 2018-05-22 RX ADMIN — Medication 100 MILLIGRAM(S): at 11:28

## 2018-05-22 RX ADMIN — DULOXETINE HYDROCHLORIDE 60 MILLIGRAM(S): 30 CAPSULE, DELAYED RELEASE ORAL at 11:25

## 2018-05-22 RX ADMIN — Medication 975 MILLIGRAM(S): at 11:23

## 2018-05-22 RX ADMIN — Medication 250 MILLIGRAM(S): at 05:15

## 2018-05-22 RX ADMIN — TAMSULOSIN HYDROCHLORIDE 0.8 MILLIGRAM(S): 0.4 CAPSULE ORAL at 21:04

## 2018-05-22 RX ADMIN — Medication 12.5 MILLIGRAM(S): at 17:40

## 2018-05-22 RX ADMIN — MORPHINE SULFATE 30 MILLIGRAM(S): 50 CAPSULE, EXTENDED RELEASE ORAL at 19:12

## 2018-05-22 NOTE — PROGRESS NOTE ADULT - SUBJECTIVE AND OBJECTIVE BOX
Chief Complaint: lower back pain    Patient seen and examined at bedside. 72 year old male with PMH chronic back pain, vertebral fx/collapse, spinal fusion, spinal stenosis, Castellanos rods, anemia, anxiety, BPH, depression, GERD, HTN, kyphosis, neuropathy, (A1c 6.2). He is reporting pain is not well controlled on current regimen. Dilaudid has been held for hours due to low BP with administration. BP now 82/50 per nurse. Pain is currently a 9/10 on VNRS. Denies side effects.       Hospital course:    5/8 pt to [facility] from assisted living facility s/p syncopal episode. Pt reports no hx of CP, SOB, diaphoresis, dizziness, prior syncope, abd pain. Admitted to  with pulmonary edema, YUNIOR, E coli UTI and bacteremia, NSTEMI, urinary retention requiring periodic straight cath (pt with c/o having to "force urine out" but denies dysuria or frequency).       PAST MEDICAL & SURGICAL HISTORY:  Coronary artery disease involving native coronary artery of native heart, angina presence unspecified  Syncope  Benign prostatic hypertrophy without urinary obstruction  Anxiety  Depression  Hypertension  Spinal stenosis  Kyphosis  Neuropathy  GERD (gastroesophageal reflux disease)  Anemia  S/P spinal fusion      SOCIAL HISTORY:  [ ] Denies Smoking, Alcohol, or Drug Use    Allergies    No Known Allergies    Intolerances    Vanilla Ensure TID (Unknown)      PAIN MEDICATIONS:  acetaminophen   Tablet. 975 milliGRAM(s) Oral every 8 hours  aspirin 325 milliGRAM(s) Oral daily  cyclobenzaprine 10 milliGRAM(s) Oral two times a day PRN  DULoxetine 60 milliGRAM(s) Oral daily  HYDROmorphone   Tablet 2 milliGRAM(s) Oral every 6 hours PRN  morphine ER Tablet 15 milliGRAM(s) Oral two times a day  ondansetron   Disintegrating Tablet 4 milliGRAM(s) Oral every 6 hours PRN  pregabalin 100 milliGRAM(s) Oral daily    Heme:  clopidogrel Tablet 75 milliGRAM(s) Oral daily    Antibiotics:  cefuroxime   Tablet 250 milliGRAM(s) Oral every 12 hours    Cardiac:  metoprolol tartrate 12.5 milliGRAM(s) Oral two times a day  tamsulosin 0.8 milliGRAM(s) Oral at bedtime    Pulmonary:  ALBUTerol/ipratropium for Nebulization. 3 milliLiter(s) Nebulizer once PRN    Endocrine  atorvastatin 80 milliGRAM(s) Oral at bedtime    GI:  docusate sodium 100 milliGRAM(s) Oral three times a day  pantoprazole    Tablet 40 milliGRAM(s) Oral before breakfast  senna 2 Tablet(s) Oral at bedtime    All Other Meds:  lidocaine   Patch 1 Patch Transdermal daily  nystatin Powder 1 Application(s) Topical two times a day  sodium chloride 0.9% lock flush 3 milliLiter(s) IV Push every 8 hours      REVIEW OF SYSTEMS:  CONSTITUTIONAL: Negative fever,chills  NECK: No pain or stiffness  RESPIRATORY: No cough, wheezing,  No shortness of breath  GASTROINTESTINAL: No abdominal, No nausea, vomiting; No diarrhea or constipation.   GENITOURINARY: No dysuria, frequency, or incontinence  NEUROLOGICAL: No headaches, memory loss, loss of strength, numbness, or  SKIN: No itching, burning, rashes, or lesions   MUSCULOSKELETAL: No joint pain or swelling; No muscle, back, or extremity pain    PHYSICAL EXAM:    Vital Signs Last 24 Hrs  T(C): 36.6 (22 May 2018 05:14), Max: 36.7 (21 May 2018 21:16)  T(F): 97.9 (22 May 2018 05:14), Max: 98 (21 May 2018 21:16)  HR: 84 (22 May 2018 08:47) (72 - 84)  BP: 82/50 (22 May 2018 10:10) (82/50 - 124/69)  BP(mean): --  RR: 16 (22 May 2018 08:47) (16 - 18)  SpO2: 95% (22 May 2018 08:47) (93% - 97%)    PAIN SCORE:    9     SCALE USED: (1-10 VNRS)       GENERAL: Comfortable, in no apparent distress  HEENT:  Atraumatic, Normocephalic, PERRL, EOMI  NECK: Supple  LUNG: Respiration even and unlabored, no distress noted  ABDOMEN: Soft, Nontender, Nondistended;  BACK: No midline bony tenderness to palpation, no step off or deformity noted.   EXTREMITIES:  ROTH X 4; 2+ Peripheral Pulses, No clubbing, cyanosis, or edema  NEUROLOGIC: Awake, alert and oriented X3;  No focal deficits. Motor strength 5/5. Sensation intact to light touch  SKIN: Warm and Dry    LABS:                          10.0   6.8   )-----------( 428      ( 22 May 2018 05:58 )             31.9     05-22    138  |  102  |  19.0  ----------------------------<  96  4.3   |  21.0<L>  |  0.82    Ca    9.1      22 May 2018 05:58  Phos  3.1     05-22  Mg     2.1     05-22    TPro  6.8  /  Alb  3.1<L>  /  TBili  0.4  /  DBili  x   /  AST  15  /  ALT  12  /  AlkPhos  77  05-22          Drug Screen:        RADIOLOGY:      [ ]  [place]  Reviewed and Copied to Chart

## 2018-05-22 NOTE — PROGRESS NOTE ADULT - SUBJECTIVE AND OBJECTIVE BOX
Subjective: "I got a little dizzy getting out of bed, but now I am going to stay in bed. I need more medicine for my pain." Patient lying in bed. NAD. C/O back pain.    VITAL SIGNS  Vital Signs Last 24 Hrs  T(C): 36.8 (18 @ 10:02), Max: 36.8 (18 @ 10:02)  T(F): 98.2 (18 @ 10:02), Max: 98.2 (18 @ 10:02)  HR: 85 (18 @ 10:02) (72 - 85)  BP: 82/50 (18 @ 10:10) (82/50 - 124/69)  RR: 18 (18 @ 10:02) (16 - 18)  SpO2: 95% (18 @ 08:47) (93% - 97%)  on RA            Telemetry/Alarms:  SR 60-80s, brief ST 130s, PVCs  LVEF: 40%    MEDICATIONS  acetaminophen   Tablet. 975 milliGRAM(s) Oral every 8 hours  ALBUTerol/ipratropium for Nebulization. 3 milliLiter(s) Nebulizer once PRN  aspirin 325 milliGRAM(s) Oral daily  atorvastatin 80 milliGRAM(s) Oral at bedtime  cefuroxime   Tablet 250 milliGRAM(s) Oral every 12 hours  clopidogrel Tablet 75 milliGRAM(s) Oral daily  cyclobenzaprine 10 milliGRAM(s) Oral two times a day PRN  docusate sodium 100 milliGRAM(s) Oral three times a day  DULoxetine 60 milliGRAM(s) Oral daily  HYDROmorphone   Tablet 2 milliGRAM(s) Oral every 6 hours PRN  lidocaine   Patch 1 Patch Transdermal daily  metoprolol tartrate 12.5 milliGRAM(s) Oral two times a day  morphine ER Tablet 30 milliGRAM(s) Oral two times a day  nystatin Powder 1 Application(s) Topical two times a day  ondansetron   Disintegrating Tablet 4 milliGRAM(s) Oral every 6 hours PRN  pantoprazole    Tablet 40 milliGRAM(s) Oral before breakfast  pregabalin 100 milliGRAM(s) Oral daily  senna 2 Tablet(s) Oral at bedtime  sodium chloride 0.9% lock flush 3 milliLiter(s) IV Push every 8 hours  tamsulosin 0.8 milliGRAM(s) Oral at bedtime      PHYSICAL EXAM  General: well nourished, well developed, no acute distress  Neurology: alert and oriented x 3, nonfocal, no gross deficits  Respiratory: clear to auscultation bilaterally  CV: regular rate and rhythm, normal S1, S2  Abdomen: soft, nontender, nondistended, positive bowel sounds, last bowel movement   Extremities: warm, well perfused. no edema. + DP pulses      - @ 07:01  -   @ 07:00  --------------------------------------------------------  IN: 420 mL / OUT: 600 mL / NET: -180 mL     @ 07:01  -   @ 12:54  --------------------------------------------------------  IN: 240 mL / OUT: 300 mL / NET: -60 mL        Weights:  Daily     Daily Weight in k.8 (22 May 2018 05:14)  Admit Wt: Drug Dosing Weight  Height (cm): 175.26 (17 May 2018 09:00)  Weight (kg): 72.7 (17 May 2018 09:00)  BMI (kg/m2): 23.7 (17 May 2018 09:00)  BSA (m2): 1.88 (17 May 2018 09:00)    LABS      138  |  102  |  19.0  ----------------------------<  96  4.3   |  21.0<L>  |  0.82    Ca    9.1      22 May 2018 05:58  Phos  3.1       Mg     2.1         TPro  6.8  /  Alb  3.1<L>  /  TBili  0.4  /  DBili  x   /  AST  15  /  ALT  12  /  AlkPhos  77                                   10.0   6.8   )-----------( 428      ( 22 May 2018 05:58 )             31.9            Bilirubin Total, Serum: 0.4 mg/dL ( @ 05:58)    CAPILLARY BLOOD GLUCOSE    < from: Xray Chest 1 View- PORTABLE-Routine (18 @ 05:10) >  FINDINGS:  Thoracolumbar Castellanos rods in place.  The lungs  are clear.  No pleural abnormality is seen.       Right hemidiaphragm elevated.  The heart and mediastinum are within normal limits.     Visualized osseous structures are intact.        IMPRESSION:   No evidence of active chest disease.          < end of copied text >           PAST MEDICAL & SURGICAL HISTORY:  Coronary artery disease involving native coronary artery of native heart, angina presence unspecified  Syncope  Benign prostatic hypertrophy without urinary obstruction  Anxiety  Depression  Hypertension  Spinal stenosis  Kyphosis  Neuropathy  GERD (gastroesophageal reflux disease)  Anemia  S/P spinal fusion

## 2018-05-22 NOTE — PROGRESS NOTE ADULT - PROBLEM SELECTOR PLAN 1
s/p BUSHRA x 3 on 5/18.  Continue aspirin, plavix, lopressor, & Lipitor.  Encourage the use of I/S/CDBE, increase mobilization, daily PT, OOB to chair.  Awaiting discharge to [facility].  Discussed plan with Dr. Burgess & CT surgery in morning rounds.

## 2018-05-22 NOTE — PROGRESS NOTE ADULT - PROBLEM SELECTOR PLAN 1
1. meds       - continue MS contin 15 mg BID- please monitor for respiratory depression, sedation, or low BP after administration and hold accordingly      - continue dilaudid 2 mg prn- HOLD FOR NOW UNTIL BP stabilized      - primary team already d/c 4 mg dilaudid due to BP      - start tylenol 975 mg ATC instead for pain control- please monitor LFTs       - continue flexeril, lidocaine patches and lyrica- please monitor for sedation  2. will continue to  monitor  3. call with questions

## 2018-05-23 VITALS
OXYGEN SATURATION: 96 % | DIASTOLIC BLOOD PRESSURE: 50 MMHG | SYSTOLIC BLOOD PRESSURE: 100 MMHG | TEMPERATURE: 97 F | HEART RATE: 73 BPM | RESPIRATION RATE: 18 BRPM

## 2018-05-23 DIAGNOSIS — I25.10 ATHEROSCLEROTIC HEART DISEASE OF NATIVE CORONARY ARTERY W/OUT ANGINA PECTORIS: ICD-10-CM

## 2018-05-23 DIAGNOSIS — Z86.39 PERSONAL HISTORY OF OTHER ENDOCRINE, NUTRITIONAL AND METABOLIC DISEASE: ICD-10-CM

## 2018-05-23 DIAGNOSIS — Z87.891 PERSONAL HISTORY OF NICOTINE DEPENDENCE: ICD-10-CM

## 2018-05-23 DIAGNOSIS — Z87.438 PERSONAL HISTORY OF OTHER DISEASES OF MALE GENITAL ORGANS: ICD-10-CM

## 2018-05-23 PROCEDURE — 84134 ASSAY OF PREALBUMIN: CPT

## 2018-05-23 PROCEDURE — 87040 BLOOD CULTURE FOR BACTERIA: CPT

## 2018-05-23 PROCEDURE — 97110 THERAPEUTIC EXERCISES: CPT

## 2018-05-23 PROCEDURE — 85730 THROMBOPLASTIN TIME PARTIAL: CPT

## 2018-05-23 PROCEDURE — C9601: CPT | Mod: LC

## 2018-05-23 PROCEDURE — 84443 ASSAY THYROID STIM HORMONE: CPT

## 2018-05-23 PROCEDURE — 83036 HEMOGLOBIN GLYCOSYLATED A1C: CPT

## 2018-05-23 PROCEDURE — 94010 BREATHING CAPACITY TEST: CPT

## 2018-05-23 PROCEDURE — 84436 ASSAY OF TOTAL THYROXINE: CPT

## 2018-05-23 PROCEDURE — 97163 PT EVAL HIGH COMPLEX 45 MIN: CPT

## 2018-05-23 PROCEDURE — 36415 COLL VENOUS BLD VENIPUNCTURE: CPT

## 2018-05-23 PROCEDURE — 87640 STAPH A DNA AMP PROBE: CPT

## 2018-05-23 PROCEDURE — 87086 URINE CULTURE/COLONY COUNT: CPT

## 2018-05-23 PROCEDURE — 81001 URINALYSIS AUTO W/SCOPE: CPT

## 2018-05-23 PROCEDURE — 87641 MR-STAPH DNA AMP PROBE: CPT

## 2018-05-23 PROCEDURE — 71045 X-RAY EXAM CHEST 1 VIEW: CPT

## 2018-05-23 PROCEDURE — 80053 COMPREHEN METABOLIC PANEL: CPT

## 2018-05-23 PROCEDURE — 97116 GAIT TRAINING THERAPY: CPT

## 2018-05-23 PROCEDURE — 99152 MOD SED SAME PHYS/QHP 5/>YRS: CPT

## 2018-05-23 PROCEDURE — 33990 INSJ PERQ VAD L HRT ARTERIAL: CPT

## 2018-05-23 PROCEDURE — 76937 US GUIDE VASCULAR ACCESS: CPT

## 2018-05-23 PROCEDURE — 83880 ASSAY OF NATRIURETIC PEPTIDE: CPT

## 2018-05-23 PROCEDURE — 93880 EXTRACRANIAL BILAT STUDY: CPT

## 2018-05-23 PROCEDURE — 80048 BASIC METABOLIC PNL TOTAL CA: CPT

## 2018-05-23 PROCEDURE — C1725: CPT

## 2018-05-23 PROCEDURE — C1874: CPT

## 2018-05-23 PROCEDURE — 93306 TTE W/DOPPLER COMPLETE: CPT

## 2018-05-23 PROCEDURE — 99239 HOSP IP/OBS DSCHRG MGMT >30: CPT

## 2018-05-23 PROCEDURE — 85610 PROTHROMBIN TIME: CPT

## 2018-05-23 PROCEDURE — C1894: CPT

## 2018-05-23 PROCEDURE — 99153 MOD SED SAME PHYS/QHP EA: CPT

## 2018-05-23 PROCEDURE — 85027 COMPLETE CBC AUTOMATED: CPT

## 2018-05-23 PROCEDURE — 80076 HEPATIC FUNCTION PANEL: CPT

## 2018-05-23 PROCEDURE — C1753: CPT

## 2018-05-23 PROCEDURE — 93005 ELECTROCARDIOGRAM TRACING: CPT

## 2018-05-23 PROCEDURE — 86900 BLOOD TYPING SEROLOGIC ABO: CPT

## 2018-05-23 PROCEDURE — 82550 ASSAY OF CK (CPK): CPT

## 2018-05-23 PROCEDURE — C1887: CPT

## 2018-05-23 PROCEDURE — C1769: CPT

## 2018-05-23 PROCEDURE — C1889: CPT

## 2018-05-23 PROCEDURE — 84480 ASSAY TRIIODOTHYRONINE (T3): CPT

## 2018-05-23 PROCEDURE — 86850 RBC ANTIBODY SCREEN: CPT

## 2018-05-23 PROCEDURE — 84100 ASSAY OF PHOSPHORUS: CPT

## 2018-05-23 PROCEDURE — 84484 ASSAY OF TROPONIN QUANT: CPT

## 2018-05-23 PROCEDURE — C9600: CPT | Mod: LC

## 2018-05-23 PROCEDURE — C1760: CPT

## 2018-05-23 PROCEDURE — 83735 ASSAY OF MAGNESIUM: CPT

## 2018-05-23 PROCEDURE — 97530 THERAPEUTIC ACTIVITIES: CPT

## 2018-05-23 PROCEDURE — 94760 N-INVAS EAR/PLS OXIMETRY 1: CPT

## 2018-05-23 PROCEDURE — 86901 BLOOD TYPING SEROLOGIC RH(D): CPT

## 2018-05-23 RX ORDER — HYDROMORPHONE HYDROCHLORIDE 2 MG/ML
2 INJECTION INTRAMUSCULAR; INTRAVENOUS; SUBCUTANEOUS EVERY 4 HOURS
Qty: 0 | Refills: 0 | Status: DISCONTINUED | OUTPATIENT
Start: 2018-05-23 | End: 2018-05-23

## 2018-05-23 RX ORDER — HYDROMORPHONE HYDROCHLORIDE 2 MG/ML
4 INJECTION INTRAMUSCULAR; INTRAVENOUS; SUBCUTANEOUS EVERY 4 HOURS
Qty: 0 | Refills: 0 | Status: DISCONTINUED | OUTPATIENT
Start: 2018-05-23 | End: 2018-05-23

## 2018-05-23 RX ORDER — SODIUM CHLORIDE 9 MG/ML
500 INJECTION INTRAMUSCULAR; INTRAVENOUS; SUBCUTANEOUS ONCE
Qty: 0 | Refills: 0 | Status: DISCONTINUED | OUTPATIENT
Start: 2018-05-23 | End: 2018-05-23

## 2018-05-23 RX ORDER — TAMSULOSIN HYDROCHLORIDE 0.4 MG/1
0.4 CAPSULE ORAL AT BEDTIME
Qty: 0 | Refills: 0 | Status: DISCONTINUED | OUTPATIENT
Start: 2018-05-23 | End: 2018-05-23

## 2018-05-23 RX ORDER — TAMSULOSIN HYDROCHLORIDE 0.4 MG/1
1 CAPSULE ORAL
Qty: 0 | Refills: 0 | COMMUNITY
Start: 2018-05-23

## 2018-05-23 RX ADMIN — Medication 100 MILLIGRAM(S): at 06:14

## 2018-05-23 RX ADMIN — Medication 250 MILLIGRAM(S): at 06:13

## 2018-05-23 RX ADMIN — Medication 975 MILLIGRAM(S): at 06:13

## 2018-05-23 RX ADMIN — DULOXETINE HYDROCHLORIDE 60 MILLIGRAM(S): 30 CAPSULE, DELAYED RELEASE ORAL at 11:42

## 2018-05-23 RX ADMIN — Medication 975 MILLIGRAM(S): at 07:00

## 2018-05-23 RX ADMIN — Medication 100 MILLIGRAM(S): at 13:39

## 2018-05-23 RX ADMIN — CLOPIDOGREL BISULFATE 75 MILLIGRAM(S): 75 TABLET, FILM COATED ORAL at 11:42

## 2018-05-23 RX ADMIN — Medication 12.5 MILLIGRAM(S): at 06:14

## 2018-05-23 RX ADMIN — Medication 325 MILLIGRAM(S): at 11:42

## 2018-05-23 RX ADMIN — PANTOPRAZOLE SODIUM 40 MILLIGRAM(S): 20 TABLET, DELAYED RELEASE ORAL at 06:14

## 2018-05-23 RX ADMIN — MORPHINE SULFATE 30 MILLIGRAM(S): 50 CAPSULE, EXTENDED RELEASE ORAL at 06:21

## 2018-05-23 RX ADMIN — SODIUM CHLORIDE 3 MILLILITER(S): 9 INJECTION INTRAMUSCULAR; INTRAVENOUS; SUBCUTANEOUS at 06:16

## 2018-05-23 RX ADMIN — NYSTATIN CREAM 1 APPLICATION(S): 100000 CREAM TOPICAL at 06:21

## 2018-05-23 NOTE — PROGRESS NOTE ADULT - PROBLEM SELECTOR PROBLEM 9
Depression
Depression
Incontinence associated dermatitis
Depression
Incontinence associated dermatitis

## 2018-05-23 NOTE — PROGRESS NOTE ADULT - PROBLEM SELECTOR PLAN 5
Bacteremia cleared on last set of BC at [facility].  ID consulted, will continue PO cefuroxime x 1 week as recommended until 5/22.
pt requiring multiple straight cath procedures for rentention > moreno inserted  Monitor Bun/Cr
pt requiring multiple straight cath procedures for rentention > moreno inserted yesterday.  as per urology TOV after stents  Monitor Bun/Cr  Pt had been voiding on his own
pt requiring multiple straight cath procedures for rentention > moreno inserted yesterday.  as per urology TOV after stents  Moreno d/c this am  Monitor Bun/Cr
Bacteremia cleared on last set of BC at [facility].  ID consulted, will continue PO cefuroxime x 1 week as recommended until 5/22.
pt requiring multiple straight cath procedures for rentention > moreno inserted yesterday.  Monitor Bun/Cr
Bacteremia cleared on last set of BC at [facility].  ID consulted, will continue PO cefuroxime x 1 week as recommended until 5/22.

## 2018-05-23 NOTE — PROGRESS NOTE ADULT - ASSESSMENT
72 year old male with PMH chronic back pain, vertebral fx/collapse, spinal fusion, spinal stenosis, Castellanos rods, anemia, anxiety, BPH, depression, GERD, HTN, kyphosis, neuropathy, (A1c 6.2). 5/8 pt to [facility] from assisted living facility s/p syncopal episode. Pt reports no hx of CP, SOB, diaphoresis, dizziness, prior syncope, abd pain. Admitted to  with pulmonary edema, YUNIOR, E coli UTI and bacteremia, NSTEMI, urinary retention requiring periodic straight cath (pt with c/o having to "force urine out" but denies dysuria or frequency).   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   5/16 transferred to [facility] CTICU, c/o back pain (chronic and unchanged from usual pain) but denies all other c/o at this time.  5/17 pt stable without continues to deny CP at this time.   5/18 BUSHRA to LAD, m LAD OM1
-CHF   -NSTEMI, CAD, recc CABG  -S/P hypoxic resp failure; see above; now on RA  -E coli bacteremia, urosepsis  -no hypercarbia  -no obstruction seen on spirometry  -per patient, very remote smoking hx  -preoperative pulm eval    RECC:    Abx for UTI.  Nebs prn. Diurese. Mgmt per CTS.    No pulmonary contraindications for cardiac surgery.
72 year old male with PMH chronic back pain, vertebral fx/collapse, spinal fusion, spinal stenosis, Castellanos rods, anemia, anxiety, BPH, depression, GERD, HTN, kyphosis, neuropathy, (A1c 6.2). 5/8 pt to [facility] from assisted living facility s/p syncopal episode. Pt reports no hx of CP, SOB, diaphoresis, dizziness, prior syncope, abd pain. Admitted to  with pulmonary edema, YUNIOR, E coli UTI and bacteremia, NSTEMI, urinary retention requiring periodic straight cath (pt with c/o having to "force urine out" but denies dysuria or frequency).   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   5/16 transferred to [facility] CTICU, c/o back pain (chronic and unchanged from usual pain) but denies all other c/o at this time.  5/17 pt stable without continues to deny CP at this time.   5/18 BUSHRA to LAD, m LAD OM1  5/22.  Urinary retention requiring straight cath.    Intermittent hypotension requiring IVF, usually associated with dilaudid administration.
Patient is 77 year old male, hx of NSTEMI, postlaminectomy syndrome. Pain is not under adequate control with current regimen. Pt requesting IV pain medication only.
-Patient seen and examined at bedside  -c/o 8/10 pain in the lower back  -last dose of MSER 15mf at 5am  -no Dilaudid due to low BP since yesterday  -last vitals taken show 118/52  -ok to give Dilaudid 2mg now-recheck BP in an 30 mins to an hour, if blood pressure lowers, hold Dilaudid for now   -
72 year old male with PMH chronic back pain, vertebral fx/collapse, spinal fusion, spinal stenosis, Castellanos rods, anemia, anxiety, BPH, depression, GERD, HTN, kyphosis, neuropathy, (A1c 6.2). 5/8 pt to [facility] from assisted living facility s/p syncopal episode. Pt reports no hx of CP, SOB, diaphoresis, dizziness, prior syncope, abd pain. Admitted to  with pulmonary edema, YUNIOR, E coli UTI and bacteremia, NSTEMI, urinary retention requiring periodic straight cath (pt with c/o having to "force urine out" but denies dysuria or frequency).   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   5/16 transferred to [facility] CTICU, c/o back pain (chronic and unchanged from usual pain) but denies all other c/o at this time.  5/17 pt stable without continues to deny CP at this time.   5/18 BUSHRA to LAD, m LAD OM1
72 year old male with PMH chronic back pain, vertebral fx/collapse, spinal fusion, spinal stenosis, Castellanos rods, anemia, anxiety, BPH, depression, GERD, HTN, kyphosis, neuropathy, (A1c 6.2). 5/8 pt to [facility] from assisted living facility s/p syncopal episode. Pt reports no hx of CP, SOB, diaphoresis, dizziness, prior syncope, abd pain. Admitted to  with pulmonary edema, YUNIOR, E coli UTI and bacteremia, NSTEMI, urinary retention requiring periodic straight cath (pt with c/o having to "force urine out" but denies dysuria or frequency).   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   5/16 transferred to [facility] CTICU, c/o back pain (chronic and unchanged from usual pain) but denies all other c/o at this time.  5/17 pt stable without continues to deny CP at this time.   5/18 BUSHRA to LAD, m LAD OM1    Plan to discharge back to Assisted living facility- [facility] - today
72 year old male with PMH chronic back pain, vertebral fx/collapse, spinal fusion, spinal stenosis, Castellanos rods, anemia, anxiety, BPH, depression, GERD, HTN, kyphosis, neuropathy, (A1c 6.2). 5/8 pt to [facility] from assisted living facility s/p syncopal episode. Pt reports no hx of CP, SOB, diaphoresis, dizziness, prior syncope, abd pain. Admitted to  with pulmonary edema, YUNIOR, E coli UTI and bacteremia, NSTEMI, urinary retention requiring periodic straight cath (pt with c/o having to "force urine out" but denies dysuria or frequency).   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   5/16 transferred to [facility] CTICU, c/o back pain (chronic and unchanged from usual pain) but denies all other c/o at this time.  5/17 pt stable without continues to deny CP at this time.   5/18 BUSHRA to LAD, m LAD OM1  Pain well controlled on current regimen at this time.
72 year old male with PMH chronic back pain, vertebral fx/collapse, spinal fusion, spinal stenosis, Castellanos rods, anemia, anxiety, BPH, depression, GERD, HTN, kyphosis, neuropathy, (A1c 6.2). 5/8 pt to [facility] from assisted living facility s/p syncopal episode. Pt reports no hx of CP, SOB, diaphoresis, dizziness, prior syncope, abd pain. Admitted to  with pulmonary edema, YUNIOR, E coli UTI and bacteremia, NSTEMI, urinary retention requiring periodic straight cath (pt with c/o having to "force urine out" but denies dysuria or frequency).   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   5/16 transferred to [facility] CTICU, c/o back pain (chronic and unchanged from usual pain) but denies all other c/o at this time.  5/17 pt stable without continues to deny CP at this time.
72 year old male with PMH chronic back pain, vertebral fx/collapse, spinal fusion, spinal stenosis, Castellanos rods, anemia, anxiety, BPH, depression, GERD, HTN, kyphosis, neuropathy, (A1c 6.2). He is reporting pain is not well controlled on current regimen. Dilaudid being held due to low BP.
77 year old male with past medical history bph, anxiety, hypertension, hyperlipidemia, s/p spinal fusion in past admitted on 5/8 after a fall at assisted living;  TO [facility]  BLOOD CX AND URINE CX ECOLI  PT TREATED WITH IV ABX CEFEPIME AND THEN TRANSITIONED TO ORAL ABX CEFTIN  PT NON TOXIC REPEAT CX NEGATIVE  WILL RECOMMEND TO CONTINUE THROUGH 5/21  THEN D/C ABX  WILL FOLLOW UP AS NEEDED PLEASE  CALL IF QUESTIONS
s/p staged PCI via RFA w/angioseal  OM1 80% Synergy BUSHRA 2.5 x 20 mm  pCIRC 70% Synergy  BUSHRA 3.0x32 mm and 3.0 x 12   mLAD 100% cutting balloon  pLAD 100% 3.0 x 16mm Synergy BUSHRA  Impella support LFA w/Perclose
72 year old male with PMH chronic back pain, vertebral fx/collapse, spinal fusion, spinal stenosis, Castellanos rods, anemia, anxiety, BPH, depression, GERD, HTN, kyphosis, neuropathy, (A1c 6.2). 5/8 pt to [facility] from assisted living facility s/p syncopal episode. Pt reports no hx of CP, SOB, diaphoresis, dizziness, prior syncope, abd pain. Admitted to  with pulmonary edema, YUNIOR, E coli UTI and bacteremia, NSTEMI, urinary retention requiring periodic straight cath (pt with c/o having to "force urine out" but denies dysuria or frequency).   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   5/16 transferred to [facility] CTICU, c/o back pain (chronic and unchanged from usual pain) but denies all other c/o at this time.  5/17 pt stable without continues to deny CP at this time.   5/18 BUSHRA to LAD, m LAD OM1    Intermittent hypotension requiring IVF, usually associated with dilaudid administration.    Plan  Hope to discharge to [facility] today or tomorrow.  D/W Dr Burgess in AM rounds.
72 year old male with PMH chronic back pain, vertebral fx/collapse, spinal fusion, spinal stenosis, Castellanos rods, anemia, anxiety, BPH, depression, GERD, HTN, kyphosis, neuropathy, (A1c 6.2). 5/8 pt to [facility] from assisted living facility s/p syncopal episode. Pt reports no hx of CP, SOB, diaphoresis, dizziness, prior syncope, abd pain. Admitted to  with pulmonary edema, YUNIOR, E coli UTI and bacteremia, NSTEMI, urinary retention requiring periodic straight cath (pt with c/o having to "force urine out" but denies dysuria or frequency).   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   5/16 transferred to [facility] CTICU, c/o back pain (chronic and unchanged from usual pain) but denies all other c/o at this time.  5/17 pt stable without continues to deny CP at this time.   5/18 plan for Impella assisted PCI per cardiology.
urinary retention possibly secondary to scar tissue related to prostate seed placement

## 2018-05-23 NOTE — PROGRESS NOTE ADULT - PROBLEM SELECTOR PLAN 9
Continue duloxetine
daily shower  interdry  barrier cream
Continue duloxetine
daily shower  interdry  barrier cream  Pt currently with moreno catheter
Continue duloxetine

## 2018-05-23 NOTE — PROGRESS NOTE ADULT - PROBLEM SELECTOR PLAN 3
Lopressor decreased to 12.5mg BID for intermittent hypotension  hypotension improved.  Continue DASH/TLC diet.
Continue beta-blocker  restart home dose amlodipine today for increased BP
Continue beta-blocker  restart home dose amlodipine today for increased BP
continue beta-blocker  no ACEI in case goes for [facility]  if needs further BP control can add home dose of amlodipine > stable at this time
Lopressor decreased to 12.5mg BID for intermittent hypotension  Consider Proamatine if continued hypotension.  Continue DASH/TLC diet.
Continue beta-blocker  Consider ACE now that pt is not planned for [facility].  if needs further BP control can add home dose of amlodipine > stable at this time
Lopressor decreased to 12.5mg BID  Continue DASH/TLC diet.

## 2018-05-23 NOTE — PROGRESS NOTE ADULT - PROBLEM SELECTOR PLAN 4
Results of carotid duplex reviewed with Dr Burgess, no further testing or vascular referral at this time as pt does not want surgery as d/w Dr Burgess.
bacteremia cleared on last set of BC at [facility]  ID consulted > will continue PO cefuroxime x 1 week as recommended until 5/22
bacteremia cleared on last set of BC at [facility]  ID consulted > will continue PO cefuroxime x 1 week as recommended
bacteremia cleared on last set of BC at [facility]  ID consulted > will continue PO cefuroxime x 1 week as recommended until 5/22
Results of carotid duplex reviewed with Dr Burgess, no further testing or vascular referral at this time as pt does not want surgery as d/w Dr Burgess.
bacteremia cleared on last set of BC at [facility]  ID consulted > will continue PO cefuroxime x 1 week as recommended
Results of carotid duplex reviewed with Dr Burgess, no further testing or vascular referral at this time as pt does not want surgery as d/w Dr Burgess.

## 2018-05-23 NOTE — PROGRESS NOTE ADULT - SUBJECTIVE AND OBJECTIVE BOX
Patient seen and examined at bedside. 72 year old male with PMH chronic back pain, vertebral fx/collapse, spinal fusion, spinal stenosis, Castellanos rods, anemia, anxiety, BPH, depression, GERD, HTN, kyphosis, neuropathy, (A1c 6.2). Pain is tolerable at this time. He does report moderate exacerbations but he has not required any additional Dilaudid PO PRN doses. Yesterday he was found to be hypotensive; however asymptomatic, which has persisted today.  BP this AM was 82/40. He was transferred to the floor and required IVF boluses.  He denies any side effects with current regimen. No Fever, chills, CP, Palpitations, SOB, N/V    Hospital course:  5/8 pt to [facility] from assisted living facility s/p syncopal episode. Pt reports no hx of CP, SOB, diaphoresis, dizziness, prior syncope, abd pain. Admitted to  with pulmonary edema, YUNIOR, E coli UTI and bacteremia, NSTEMI, urinary retention requiring periodic straight cath (pt with c/o having to "force urine out" but denies dysuria or frequency).     97.5. 66. 18. 94/42, 92%; RA  NAD; resting comfortably in bed  A&Ox3  S1S2, RRR  CTAB  +BS, Soft, NT/ND  ROTH; +2 pulses x4; acyanotic  Reg Diet     A/P: 72M; NSTEMI, Hypotension   -D/C MS Contin- Avoid ER opiates in setting of hypotension for now.   -Dilaudid 2/4mg Q4h PRN   -Tylenol 975mg Q8h PRn   -Will f/u   [phone]

## 2018-05-23 NOTE — PROGRESS NOTE ADULT - SUBJECTIVE AND OBJECTIVE BOX
Subjective: Pt lying in bed.  Denies CP or SOB.  "What do you want?"      Tele: SR                      T(F): 97.5 (18 @ 05:35), Max: 98.2 (18 @ 10:02)  HR: 79 (18 @ 05:35) (76 - 89)  BP: 132/66 (18 @ 05:35) (82/50 - 132/66)  RR: 16 (18 @ 05:35) (16 - 18)  SpO2: 97% (18 @ 05:35) (93% - 97%) on room air at rest.           Daily     Daily Weight in k (23 May 2018 00:00)    LV EF: 40%    No Known Allergies            138  |  102  |  19.0  ----------------------------<  96  4.3   |  21.0<L>  |  0.82    Ca    9.1      22 May 2018 05:58  Phos  3.1       Mg     2.1         TPro  6.8  /  Alb  3.1<L>  /  TBili  0.4  /  DBili  x   /  AST  15  /  ALT  12  /  AlkPhos  77                                 10.0   6.8   )-----------( 428      ( 22 May 2018 05:58 )             31.9                      CXR: < from: Xray Chest 1 View- PORTABLE-Routine (18 @ 05:10) >  EXAM:  XR CHEST PORTABLE ROUTINE 1V                          PROCEDURE DATE:  2018          INTERPRETATION:  Portable chest radiograph        CLINICAL INFORMATION: Shortness of breath    TECHNIQUE:  Portable  AP view of the chest was obtained.    COMPARISON: 2018 available for review.    FINDINGS:  Thoracolumbar Castellanos rods in place.  The lungs  are clear.  No pleural abnormality is seen.       Right hemidiaphragm elevated.  The heart and mediastinum are within normal limits.     Visualized osseous structures are intact.    IMPRESSION:   No evidence of active chest disease.          DEON PRUITT M.D., ATTENDING RADIOLOGIST  This document has been electronically signed. May 22 2018 12:22PM    < end of copied text >      I&O's Detail    22 May 2018 07:01  -  23 May 2018 07:00  --------------------------------------------------------  IN:    Oral Fluid: 240 mL  Total IN: 240 mL    OUT:    Post-Void Residual per Intermittent Catheterization: 550 mL    Voided: 300 mL  Total OUT: 850 mL    Total NET: -610 mL        Active Medications:  acetaminophen   Tablet. 975 milliGRAM(s) Oral every 8 hours  ALBUTerol/ipratropium for Nebulization. 3 milliLiter(s) Nebulizer once PRN  ALPRAZolam 0.25 milliGRAM(s) Oral every 8 hours PRN  aspirin 325 milliGRAM(s) Oral daily  atorvastatin 80 milliGRAM(s) Oral at bedtime  cefuroxime   Tablet 250 milliGRAM(s) Oral every 12 hours  clopidogrel Tablet 75 milliGRAM(s) Oral daily  cyclobenzaprine 10 milliGRAM(s) Oral two times a day PRN  docusate sodium 100 milliGRAM(s) Oral three times a day  DULoxetine 60 milliGRAM(s) Oral daily  HYDROmorphone   Tablet 2 milliGRAM(s) Oral every 6 hours PRN  lidocaine   Patch 1 Patch Transdermal daily  metoprolol tartrate 12.5 milliGRAM(s) Oral two times a day  morphine ER Tablet 30 milliGRAM(s) Oral two times a day  nystatin Powder 1 Application(s) Topical two times a day  ondansetron   Disintegrating Tablet 4 milliGRAM(s) Oral every 6 hours PRN  pantoprazole    Tablet 40 milliGRAM(s) Oral before breakfast  pregabalin 100 milliGRAM(s) Oral daily  senna 2 Tablet(s) Oral at bedtime  sodium chloride 0.9% lock flush 3 milliLiter(s) IV Push every 8 hours  tamsulosin 0.8 milliGRAM(s) Oral at bedtime      Physical Exam:    Neuro: AAOX3.    Respiratory: clear to auscultation bilaterally  CV: regular rate and rhythm, normal S1, S2  Abdomen: soft, nontender, nondistended, positive bowel sounds, last bowel movement   Extremities: warm, well perfused. no edema. + DP pulses                    PAST MEDICAL & SURGICAL HISTORY:  Coronary artery disease involving native coronary artery of native heart, angina presence unspecified  Syncope  Benign prostatic hypertrophy without urinary obstruction  Anxiety  Depression  Hypertension  Spinal stenosis  Kyphosis  Neuropathy  GERD (gastroesophageal reflux disease)  Anemia  S/P spinal fusion

## 2018-05-23 NOTE — PROGRESS NOTE ADULT - PROBLEM SELECTOR PLAN 6
Continue MS Contin & Dilaudid PRN.  Pain management following.  Tylenol for breakthrough pain.  Flexeril if needed. Add back Lyrica for neuropathic pain
results of carotid duplex reviewed with Dr Burgess  no further testing or vascular referral at this time as pt does not want surgery as d/w Dr Burgess
Continue MS Contin & Dilaudid PRN.  Pain management following.  Will increase Morphine to decrease Dilaudid requirement.  Tylenol for breakthrough pain.  Flexeril if needed. Add back Lyrica for neuropathic pain
results of carotid duplex reviewed with Dr Burgess  no further testing or vascular referral at this time as pt does not want surgery as d/w Dr Burgess
Continue Morphine PRN & Dilaudid PRN.  Pain management following.

## 2018-05-23 NOTE — PROGRESS NOTE ADULT - PROBLEM SELECTOR PLAN 2
Lopressor decreased yesterday to 12.5mg BID for hypotension.  BP better overnight and this AM.  Continue ASA, Plavix, & Lipitor.  DASH/TLC diet.
1. see above for management of pain
Continue aspirin, statin, and beta-blocker.
Continue aspirin, statin, and beta-blocker.
continue aspirin, statin, and beta-blocker  monitor daily EKGs and trend cardiac enzymes
Lopressor decreased to 12.5mg BID for hypotension.  Continue ASA, Plavix, & Lipitor.
Continue aspirin, statin, and beta-blocker.
Lopressor decreased to 12.5mg BID for hypotension.  Continue ASA, Plavix, & Lipitor.

## 2018-05-23 NOTE — PROGRESS NOTE ADULT - PROBLEM SELECTOR PLAN 1
s/p BUSHRA x 3 on 5/18.  Continue aspirin, plavix, lopressor, & Lipitor.  Encourage the use of I/S/CDBE, increase mobilization, daily PT, OOB to chair.  Awaiting discharge to [facility] today.

## 2018-05-23 NOTE — PROGRESS NOTE ADULT - PROBLEM SELECTOR PROBLEM 1
CAD (coronary artery disease)
CAD (coronary artery disease)
Spinal stenosis
CAD (coronary artery disease)
CAD (coronary artery disease)
Coronary artery disease involving native coronary artery of native heart, angina presence unspecified
Spinal stenosis
CAD (coronary artery disease)
Coronary artery disease involving native coronary artery of native heart, angina presence unspecified
Urinary retention

## 2018-05-23 NOTE — PROGRESS NOTE ADULT - PROBLEM SELECTOR PROBLEM 7
Prophylactic measure
Prophylactic measure
Chronic midline low back pain without sciatica
Prophylactic measure
Chronic midline low back pain without sciatica

## 2018-05-23 NOTE — PROGRESS NOTE ADULT - PROBLEM SELECTOR PROBLEM 5
E coli infection
E coli infection
Urinary retention
E coli infection
Urinary retention

## 2018-05-23 NOTE — PROGRESS NOTE ADULT - PROBLEM SELECTOR PLAN 7
Continue Protonix for GI prophylaxis.  Continue SCD's & increase ambulation.
pain medicine consulted > Follow regimen
pain medicine following.  Cont current management
pain medicine following.  Cont current management
Continue Protonix for GI prophylaxis.  Continue SCD's & increase ambulation.
pain medicine following.  IV tylenol received overnight for breakthrough pain.
Continue Protonix for GI prophylaxis.  Continue SCD's & increase ambulation.

## 2018-05-23 NOTE — PROGRESS NOTE ADULT - SUBJECTIVE AND OBJECTIVE BOX
Pt with episode of asymptomatic hypotension (SBP's 80's).  IV fluids 500ml NS bolus given.  SBP up to 94.  Pt ambulated to bathroom to void.  Post void residual bladder scan done for 400ml bladder volume noted.  Moreno catheter reinserted.  Pt to be discharge with moreno to leg bag drainage.  Discussed with Surgical PA.  Flomax decreased to 0.4mg for hypotension and given the fact that the 0.8mg was not helping anyway.  Pt to follow up with Dr. West (urology) in 1 week.

## 2018-05-23 NOTE — PROGRESS NOTE ADULT - PROBLEM SELECTOR PROBLEM 4
Stenosis of right carotid artery
Stenosis of right carotid artery
E coli infection
Stenosis of right carotid artery
E coli infection

## 2018-05-23 NOTE — PROGRESS NOTE ADULT - PROBLEM SELECTOR PROBLEM 6
Chronic midline low back pain without sciatica
Chronic midline low back pain without sciatica
Stenosis of right carotid artery
Chronic midline low back pain without sciatica
Stenosis of right carotid artery

## 2018-05-23 NOTE — PROGRESS NOTE ADULT - PROBLEM SELECTOR PROBLEM 10
Prophylactic measure
Hypotension
Hypotension
Prophylactic measure
Hypotension

## 2018-05-23 NOTE — PROGRESS NOTE ADULT - PROVIDER SPECIALTY LIST ADULT
CT Surgery
Cardiology
Infectious Disease
Pain Medicine
Urology
CT Surgery
Pulmonology
CT Surgery
Pain Medicine
CT Surgery
CT Surgery

## 2018-05-23 NOTE — PROGRESS NOTE ADULT - PROBLEM SELECTOR PLAN 8
pt requiring multiple straight cath procedures for rentention, as per urology TOV after stents.  Was voiding without difficulty but then had another episode of retention lastnight that required straight cath.     Monitor I/O's & Bun/Cr.  Bladder scan this AM.
Continue duloxetine.
Continue duloxetine.
continue duloxetine
pt requiring multiple straight cath procedures for rentention, as per urology TOV after stents.  Monitor I/O's & Bun/Cr.  Pt had been voiding on his own.
Continue duloxetine.
pt requiring multiple straight cath procedures for rentention, as per urology TOV after stents.  Monitor I/O's & Bun/Cr.  Pt had been voiding on his own.

## 2018-05-23 NOTE — PROGRESS NOTE ADULT - PROBLEM SELECTOR PROBLEM 2
NSTEMI (non-ST elevated myocardial infarction)
Spinal stenosis
NSTEMI (non-ST elevated myocardial infarction)
NSTEMI (non-ST elevated myocardial infarction)

## 2018-05-23 NOTE — PROGRESS NOTE ADULT - PROBLEM SELECTOR PLAN 10
Lopressor decreased to 12.5mg BID.
SCDs and Lovenox for DVT prophylaxis  Protonix for GI prophylaxis.    Discussed with CT surgery team in Am rounds.
Lopressor decreased to 12.5mg BID.  Improved.    Plan for discharge to [facility] today.  Discussed with Dr. Burgess and CT surgery team in AM rounds.
SCDs and Lovenox for DVT prophylaxis  Protonix for GI prophylaxis  Above plan to be discussed with CT surgery team and Dr. Burgess at Am rounds.
SCDs and Lovenox for DVT prophylaxis  Protonix for GI prophylaxis.  Plan to discharge back to [facility]  today  Discussed with CT surgery team in Am rounds.
SCDs and Lovenox for DVT prophylaxis  Protonix for GI prophylaxis.  Plan to discharge back to [facility]    Discussed with CT surgery team in Am rounds.
Lopressor decreased to 12.5mg BID.

## 2018-05-24 PROBLEM — Z87.891 FORMER SMOKER: Status: ACTIVE | Noted: 2018-05-24

## 2018-05-24 PROBLEM — Z86.39 HISTORY OF HYPERLIPIDEMIA: Status: RESOLVED | Noted: 2018-05-24 | Resolved: 2018-05-24

## 2018-05-24 PROBLEM — I25.10 CAD (CORONARY ARTERY DISEASE): Status: ACTIVE | Noted: 2018-05-24

## 2018-05-24 PROBLEM — Z87.438 HISTORY OF BPH: Status: RESOLVED | Noted: 2018-05-24 | Resolved: 2018-05-24

## 2018-05-24 RX ORDER — CYCLOBENZAPRINE HCL 10 MG
10 TABLET ORAL
Refills: 0 | Status: ACTIVE | COMMUNITY

## 2018-05-24 RX ORDER — HYDROMORPHONE HYDROCHLORIDE 2 MG/1
2 TABLET ORAL
Refills: 0 | Status: ACTIVE | COMMUNITY

## 2018-05-24 RX ORDER — PANTOPRAZOLE SODIUM 40 MG/1
40 GRANULE, DELAYED RELEASE ORAL
Refills: 0 | Status: ACTIVE | COMMUNITY

## 2018-05-24 RX ORDER — ASPIRIN/ACETAMINOPHEN/CAFFEINE 500-325-65
325 POWDER IN PACKET (EA) ORAL
Refills: 0 | Status: ACTIVE | COMMUNITY

## 2018-05-24 RX ORDER — PREGABALIN 100 MG/1
100 CAPSULE ORAL
Refills: 0 | Status: ACTIVE | COMMUNITY

## 2018-05-24 RX ORDER — TAMSULOSIN HYDROCHLORIDE 0.4 MG/1
0.4 CAPSULE ORAL
Refills: 0 | Status: ACTIVE | COMMUNITY

## 2018-05-24 RX ORDER — ATORVASTATIN CALCIUM 80 MG/1
80 TABLET, FILM COATED ORAL
Refills: 0 | Status: ACTIVE | COMMUNITY

## 2018-05-24 RX ORDER — DULOXETINE HYDROCHLORIDE 60 MG/1
60 CAPSULE, DELAYED RELEASE PELLETS ORAL
Refills: 0 | Status: ACTIVE | COMMUNITY

## 2018-05-24 RX ORDER — CLOPIDOGREL 75 MG/1
75 TABLET, FILM COATED ORAL
Refills: 0 | Status: ACTIVE | COMMUNITY

## 2018-05-24 RX ORDER — MORPHINE SULFATE 30 MG/1
30 CAPSULE, EXTENDED RELEASE ORAL
Refills: 0 | Status: ACTIVE | COMMUNITY

## 2018-05-29 ENCOUNTER — INPATIENT (INPATIENT)
Facility: HOSPITAL | Age: 78
LOS: 6 days | Discharge: NURSING, REHAB OR LONG-TERM CARE | End: 2018-06-05
Attending: FAMILY MEDICINE | Admitting: FAMILY MEDICINE
Payer: MEDICARE

## 2018-05-29 VITALS
SYSTOLIC BLOOD PRESSURE: 70 MMHG | HEIGHT: 66 IN | RESPIRATION RATE: 101 BRPM | OXYGEN SATURATION: 99 % | DIASTOLIC BLOOD PRESSURE: 38 MMHG | WEIGHT: 138.01 LBS | TEMPERATURE: 98 F | HEART RATE: 101 BPM

## 2018-05-29 DIAGNOSIS — D64.9 ANEMIA, UNSPECIFIED: ICD-10-CM

## 2018-05-29 DIAGNOSIS — G93.41 METABOLIC ENCEPHALOPATHY: ICD-10-CM

## 2018-05-29 DIAGNOSIS — Z98.1 ARTHRODESIS STATUS: Chronic | ICD-10-CM

## 2018-05-29 DIAGNOSIS — J18.9 PNEUMONIA, UNSPECIFIED ORGANISM: ICD-10-CM

## 2018-05-29 DIAGNOSIS — I25.10 ATHEROSCLEROTIC HEART DISEASE OF NATIVE CORONARY ARTERY WITHOUT ANGINA PECTORIS: ICD-10-CM

## 2018-05-29 DIAGNOSIS — E44.0 MODERATE PROTEIN-CALORIE MALNUTRITION: ICD-10-CM

## 2018-05-29 DIAGNOSIS — N17.9 ACUTE KIDNEY FAILURE, UNSPECIFIED: ICD-10-CM

## 2018-05-29 DIAGNOSIS — Z95.5 PRESENCE OF CORONARY ANGIOPLASTY IMPLANT AND GRAFT: Chronic | ICD-10-CM

## 2018-05-29 DIAGNOSIS — G62.9 POLYNEUROPATHY, UNSPECIFIED: ICD-10-CM

## 2018-05-29 DIAGNOSIS — M48.00 SPINAL STENOSIS, SITE UNSPECIFIED: ICD-10-CM

## 2018-05-29 DIAGNOSIS — F32.9 MAJOR DEPRESSIVE DISORDER, SINGLE EPISODE, UNSPECIFIED: ICD-10-CM

## 2018-05-29 DIAGNOSIS — I10 ESSENTIAL (PRIMARY) HYPERTENSION: ICD-10-CM

## 2018-05-29 PROBLEM — R55 SYNCOPE AND COLLAPSE: Chronic | Status: ACTIVE | Noted: 2018-05-16

## 2018-05-29 LAB
ALBUMIN SERPL ELPH-MCNC: 2.6 G/DL — LOW (ref 3.3–5)
ALP SERPL-CCNC: 136 U/L — HIGH (ref 40–120)
ALT FLD-CCNC: 22 U/L — SIGNIFICANT CHANGE UP (ref 12–78)
ANION GAP SERPL CALC-SCNC: 11 MMOL/L — SIGNIFICANT CHANGE UP (ref 5–17)
ANION GAP SERPL CALC-SCNC: 14 MMOL/L — SIGNIFICANT CHANGE UP (ref 5–17)
APPEARANCE UR: (no result)
AST SERPL-CCNC: 32 U/L — SIGNIFICANT CHANGE UP (ref 15–37)
BACTERIA # UR AUTO: (no result)
BASE EXCESS BLDV CALC-SCNC: -13.9 MMOL/L — LOW (ref -2–2)
BASOPHILS # BLD AUTO: 0.03 K/UL — SIGNIFICANT CHANGE UP (ref 0–0.2)
BASOPHILS NFR BLD AUTO: 0.2 % — SIGNIFICANT CHANGE UP (ref 0–2)
BILIRUB SERPL-MCNC: 0.4 MG/DL — SIGNIFICANT CHANGE UP (ref 0.2–1.2)
BILIRUB UR-MCNC: (no result)
BUN SERPL-MCNC: 54 MG/DL — HIGH (ref 7–23)
BUN SERPL-MCNC: 59 MG/DL — HIGH (ref 7–23)
CALCIUM SERPL-MCNC: 7.7 MG/DL — LOW (ref 8.5–10.1)
CALCIUM SERPL-MCNC: 8.6 MG/DL — SIGNIFICANT CHANGE UP (ref 8.5–10.1)
CHLORIDE SERPL-SCNC: 102 MMOL/L — SIGNIFICANT CHANGE UP (ref 96–108)
CHLORIDE SERPL-SCNC: 107 MMOL/L — SIGNIFICANT CHANGE UP (ref 96–108)
CK SERPL-CCNC: 25 U/L — LOW (ref 26–308)
CO2 SERPL-SCNC: 19 MMOL/L — LOW (ref 22–31)
CO2 SERPL-SCNC: 20 MMOL/L — LOW (ref 22–31)
COLOR SPEC: YELLOW — SIGNIFICANT CHANGE UP
COMMENT - URINE: SIGNIFICANT CHANGE UP
CREAT SERPL-MCNC: 2.65 MG/DL — HIGH (ref 0.5–1.3)
CREAT SERPL-MCNC: 3.35 MG/DL — HIGH (ref 0.5–1.3)
DIFF PNL FLD: (no result)
EOSINOPHIL # BLD AUTO: 0.24 K/UL — SIGNIFICANT CHANGE UP (ref 0–0.5)
EOSINOPHIL NFR BLD AUTO: 1.6 % — SIGNIFICANT CHANGE UP (ref 0–6)
EPI CELLS # UR: (no result)
GAS PNL BLDV: SIGNIFICANT CHANGE UP
GLUCOSE SERPL-MCNC: 100 MG/DL — HIGH (ref 70–99)
GLUCOSE SERPL-MCNC: 120 MG/DL — HIGH (ref 70–99)
GLUCOSE UR QL: NEGATIVE MG/DL — SIGNIFICANT CHANGE UP
HCO3 BLDV-SCNC: 12 MMOL/L — LOW (ref 21–29)
HCT VFR BLD CALC: 33.3 % — LOW (ref 39–50)
HGB BLD-MCNC: 10.2 G/DL — LOW (ref 13–17)
IMM GRANULOCYTES NFR BLD AUTO: 0.7 % — SIGNIFICANT CHANGE UP (ref 0–1.5)
KETONES UR-MCNC: (no result)
LACTATE SERPL-SCNC: 1.4 MMOL/L — SIGNIFICANT CHANGE UP (ref 0.7–2)
LEUKOCYTE ESTERASE UR-ACNC: (no result)
LYMPHOCYTES # BLD AUTO: 1.11 K/UL — SIGNIFICANT CHANGE UP (ref 1–3.3)
LYMPHOCYTES # BLD AUTO: 7.3 % — LOW (ref 13–44)
MCHC RBC-ENTMCNC: 24 PG — LOW (ref 27–34)
MCHC RBC-ENTMCNC: 30.6 GM/DL — LOW (ref 32–36)
MCV RBC AUTO: 78.4 FL — LOW (ref 80–100)
MONOCYTES # BLD AUTO: 1.17 K/UL — HIGH (ref 0–0.9)
MONOCYTES NFR BLD AUTO: 7.7 % — SIGNIFICANT CHANGE UP (ref 2–14)
NEUTROPHILS # BLD AUTO: 12.52 K/UL — HIGH (ref 1.8–7.4)
NEUTROPHILS NFR BLD AUTO: 82.5 % — HIGH (ref 43–77)
NITRITE UR-MCNC: NEGATIVE — SIGNIFICANT CHANGE UP
NRBC # BLD: 0 /100 WBCS — SIGNIFICANT CHANGE UP (ref 0–0)
PCO2 BLDV: 31 MMHG — LOW (ref 35–50)
PH BLDV: 7.22 — LOW (ref 7.35–7.45)
PH UR: 5 — SIGNIFICANT CHANGE UP (ref 5–8)
PLATELET # BLD AUTO: 297 K/UL — SIGNIFICANT CHANGE UP (ref 150–400)
PO2 BLDV: 213 MMHG — HIGH (ref 25–45)
POTASSIUM SERPL-MCNC: 4.4 MMOL/L — SIGNIFICANT CHANGE UP (ref 3.5–5.3)
POTASSIUM SERPL-MCNC: 5.2 MMOL/L — SIGNIFICANT CHANGE UP (ref 3.5–5.3)
POTASSIUM SERPL-SCNC: 4.4 MMOL/L — SIGNIFICANT CHANGE UP (ref 3.5–5.3)
POTASSIUM SERPL-SCNC: 5.2 MMOL/L — SIGNIFICANT CHANGE UP (ref 3.5–5.3)
PROT SERPL-MCNC: 7.6 GM/DL — SIGNIFICANT CHANGE UP (ref 6–8.3)
PROT UR-MCNC: 100 MG/DL
RBC # BLD: 4.25 M/UL — SIGNIFICANT CHANGE UP (ref 4.2–5.8)
RBC # FLD: 19.8 % — HIGH (ref 10.3–14.5)
RBC CASTS # UR COMP ASSIST: (no result) /HPF (ref 0–4)
SAO2 % BLDV: 99 % — HIGH (ref 67–88)
SODIUM SERPL-SCNC: 136 MMOL/L — SIGNIFICANT CHANGE UP (ref 135–145)
SODIUM SERPL-SCNC: 137 MMOL/L — SIGNIFICANT CHANGE UP (ref 135–145)
SP GR SPEC: 1.02 — SIGNIFICANT CHANGE UP (ref 1.01–1.02)
TROPONIN I SERPL-MCNC: 0.08 NG/ML — HIGH (ref 0.01–0.04)
TROPONIN I SERPL-MCNC: 0.1 NG/ML — HIGH (ref 0.01–0.04)
UROBILINOGEN FLD QL: 1 MG/DL
WBC # BLD: 15.18 K/UL — HIGH (ref 3.8–10.5)
WBC # FLD AUTO: 15.18 K/UL — HIGH (ref 3.8–10.5)
WBC UR QL: SIGNIFICANT CHANGE UP /HPF (ref 0–5)

## 2018-05-29 PROCEDURE — 71045 X-RAY EXAM CHEST 1 VIEW: CPT | Mod: 26

## 2018-05-29 PROCEDURE — 93010 ELECTROCARDIOGRAM REPORT: CPT

## 2018-05-29 PROCEDURE — 70450 CT HEAD/BRAIN W/O DYE: CPT | Mod: 26

## 2018-05-29 PROCEDURE — 99291 CRITICAL CARE FIRST HOUR: CPT

## 2018-05-29 RX ORDER — TAMSULOSIN HYDROCHLORIDE 0.4 MG/1
0.4 CAPSULE ORAL AT BEDTIME
Qty: 0 | Refills: 0 | Status: DISCONTINUED | OUTPATIENT
Start: 2018-05-29 | End: 2018-06-05

## 2018-05-29 RX ORDER — VANCOMYCIN HCL 1 G
1000 VIAL (EA) INTRAVENOUS EVERY 12 HOURS
Qty: 0 | Refills: 0 | Status: DISCONTINUED | OUTPATIENT
Start: 2018-05-29 | End: 2018-05-30

## 2018-05-29 RX ORDER — ASPIRIN/CALCIUM CARB/MAGNESIUM 324 MG
325 TABLET ORAL DAILY
Qty: 0 | Refills: 0 | Status: DISCONTINUED | OUTPATIENT
Start: 2018-05-29 | End: 2018-06-05

## 2018-05-29 RX ORDER — CEFEPIME 1 G/1
1000 INJECTION, POWDER, FOR SOLUTION INTRAMUSCULAR; INTRAVENOUS EVERY 24 HOURS
Qty: 0 | Refills: 0 | Status: DISCONTINUED | OUTPATIENT
Start: 2018-05-29 | End: 2018-05-31

## 2018-05-29 RX ORDER — DOCUSATE SODIUM 100 MG
100 CAPSULE ORAL THREE TIMES A DAY
Qty: 0 | Refills: 0 | Status: DISCONTINUED | OUTPATIENT
Start: 2018-05-29 | End: 2018-06-05

## 2018-05-29 RX ORDER — PANTOPRAZOLE SODIUM 20 MG/1
40 TABLET, DELAYED RELEASE ORAL
Qty: 0 | Refills: 0 | Status: DISCONTINUED | OUTPATIENT
Start: 2018-05-29 | End: 2018-06-05

## 2018-05-29 RX ORDER — ACETAMINOPHEN 500 MG
650 TABLET ORAL ONCE
Qty: 0 | Refills: 0 | Status: COMPLETED | OUTPATIENT
Start: 2018-05-29 | End: 2018-05-29

## 2018-05-29 RX ORDER — SODIUM CHLORIDE 9 MG/ML
2000 INJECTION INTRAMUSCULAR; INTRAVENOUS; SUBCUTANEOUS ONCE
Qty: 0 | Refills: 0 | Status: COMPLETED | OUTPATIENT
Start: 2018-05-29 | End: 2018-05-29

## 2018-05-29 RX ORDER — HEPARIN SODIUM 5000 [USP'U]/ML
5000 INJECTION INTRAVENOUS; SUBCUTANEOUS EVERY 8 HOURS
Qty: 0 | Refills: 0 | Status: DISCONTINUED | OUTPATIENT
Start: 2018-05-29 | End: 2018-06-05

## 2018-05-29 RX ORDER — CEFEPIME 1 G/1
1000 INJECTION, POWDER, FOR SOLUTION INTRAMUSCULAR; INTRAVENOUS EVERY 24 HOURS
Qty: 0 | Refills: 0 | Status: DISCONTINUED | OUTPATIENT
Start: 2018-05-29 | End: 2018-05-29

## 2018-05-29 RX ORDER — METOPROLOL TARTRATE 50 MG
12.5 TABLET ORAL
Qty: 0 | Refills: 0 | Status: DISCONTINUED | OUTPATIENT
Start: 2018-05-29 | End: 2018-06-05

## 2018-05-29 RX ORDER — CLOPIDOGREL BISULFATE 75 MG/1
75 TABLET, FILM COATED ORAL DAILY
Qty: 0 | Refills: 0 | Status: DISCONTINUED | OUTPATIENT
Start: 2018-05-29 | End: 2018-06-05

## 2018-05-29 RX ORDER — VANCOMYCIN HCL 1 G
1000 VIAL (EA) INTRAVENOUS ONCE
Qty: 0 | Refills: 0 | Status: COMPLETED | OUTPATIENT
Start: 2018-05-29 | End: 2018-05-29

## 2018-05-29 RX ORDER — CEFEPIME 1 G/1
2000 INJECTION, POWDER, FOR SOLUTION INTRAMUSCULAR; INTRAVENOUS ONCE
Qty: 0 | Refills: 0 | Status: COMPLETED | OUTPATIENT
Start: 2018-05-29 | End: 2018-05-29

## 2018-05-29 RX ORDER — ATORVASTATIN CALCIUM 80 MG/1
80 TABLET, FILM COATED ORAL AT BEDTIME
Qty: 0 | Refills: 0 | Status: DISCONTINUED | OUTPATIENT
Start: 2018-05-29 | End: 2018-06-05

## 2018-05-29 RX ORDER — SODIUM CHLORIDE 9 MG/ML
1000 INJECTION, SOLUTION INTRAVENOUS
Qty: 0 | Refills: 0 | Status: DISCONTINUED | OUTPATIENT
Start: 2018-05-29 | End: 2018-05-31

## 2018-05-29 RX ORDER — ACETAMINOPHEN 500 MG
650 TABLET ORAL EVERY 6 HOURS
Qty: 0 | Refills: 0 | Status: DISCONTINUED | OUTPATIENT
Start: 2018-05-29 | End: 2018-06-05

## 2018-05-29 RX ORDER — ACETAMINOPHEN 500 MG
650 TABLET ORAL ONCE
Qty: 0 | Refills: 0 | Status: DISCONTINUED | OUTPATIENT
Start: 2018-05-29 | End: 2018-05-29

## 2018-05-29 RX ADMIN — SODIUM CHLORIDE 1000 MILLILITER(S): 9 INJECTION INTRAMUSCULAR; INTRAVENOUS; SUBCUTANEOUS at 14:55

## 2018-05-29 RX ADMIN — Medication 650 MILLIGRAM(S): at 16:38

## 2018-05-29 RX ADMIN — HEPARIN SODIUM 5000 UNIT(S): 5000 INJECTION INTRAVENOUS; SUBCUTANEOUS at 23:37

## 2018-05-29 RX ADMIN — Medication 250 MILLIGRAM(S): at 18:00

## 2018-05-29 RX ADMIN — CEFEPIME 100 MILLIGRAM(S): 1 INJECTION, POWDER, FOR SOLUTION INTRAMUSCULAR; INTRAVENOUS at 15:30

## 2018-05-29 RX ADMIN — SODIUM CHLORIDE 1000 MILLILITER(S): 9 INJECTION, SOLUTION INTRAVENOUS at 19:43

## 2018-05-29 NOTE — H&P ADULT - ASSESSMENT
73 y/o male with multiple co-morbidities    sepsis 2/2 to hcap, improving.  hemodynamically stable  -admit to med-surg  -cont vanco/cefepime, renally dosed  -id consult   -check bcx, ucx, am labs   -strict i/o     encephalopathy, can be secondary to sepsis vs opioid intoxication du to acute renal failure  -cont abx   -hold pain meds until patient more awake  -get cth  -npo except meds    acute renal failure, can be secondary to dehydration vs sepsis, s/p 2L NS in ED  -check bmp now   -avoid nephrotoxic meds     hypoalbuminemia   -nutrition consult     elevated troponin, can be secondary to acute renal failure vs demand ischemia  -trend cardiac markers    urinary retention, indwelling moreno cath in place   -urology consult     chronic back pain   -hold pain meds for now until more awake    cad s/p stent placment 5/2018  -cont asa, plavix, bb, statin    anemia, chronic, no s/s of gross bleeding  -monitor    dvt prophylaxis   -heparin sc

## 2018-05-29 NOTE — ED ADULT NURSE NOTE - CHIEF COMPLAINT QUOTE
Sent from [facility] for fever, lethargy and decreased PO intake  CODE SEPSIS called on arrival

## 2018-05-29 NOTE — ED ADULT TRIAGE NOTE - CHIEF COMPLAINT QUOTE
Sent from [facility] for fever, lethargy and decreased PO intake  CODE SEPSIS called on arrival

## 2018-05-29 NOTE — H&P ADULT - HISTORY OF PRESENT ILLNESS
72 year old male with h/o chronic back pain, anemia, anxiety, BPH, depression, GERD, HTN, kyphosis, neuropathy,  E coli UTI and bacteremia, NSTEMI, urinary retention d'cd on moreno cath following previous admission was sent from nursing home due to hypoxia, hypotension and fever today.  much of history is from chart as patient is lethargic.      In ED, noted to have tracey, leukocytosis, hypotensive and bilateral atelectasis.  s/p 2L ns, vanco and cefepime. bp improved.    on exam , patient is lethargic, one word answers to questions, doesn't follow commands.    Earlier this month, patient had syncopal event, ecoli bacteremia and mi s/p left heart cath with TVD at , was transferred to [facility] for cabg , Cabg advised and patient refused therefore had pci impella assisted Stent to OM, Circ, Lad x2.  was also evaluated by pain management due to chronic back pain, was put on pain management regimen.      PAST MEDICAL HISTORY:  as below    PAST SURGICAL HISTORY: as below    FAMILY HISTORY:   non-contributory to the patient's current presentation

## 2018-05-29 NOTE — ED PROVIDER NOTE - OBJECTIVE STATEMENT
77M PMH CAD, HTN, anemia sent from nursing facility for fever. Pt endorses some vague lower abd discomfort, denies any other complaints. Alert, oriented to self/location. Pt is s/p stent placement on 5/18/18 after admission for syncope. Per discharge notes, pt had asymptomatic hypotension to 80's prior to discharge, d/c'd with Cunningham in place due to poor bladder emptying. Denies chest pain, cough or SOB. Code sepsis called upon arrival in ED.

## 2018-05-29 NOTE — ED PROVIDER NOTE - ATTENDING CONTRIBUTION TO CARE
I performed the initial face to face bedside interview with this patient regarding history of present illness, review of symptoms and past medical, social and family history.  I completed an independent physical examination.  I was the initial provider who evaluated this patient.  The history, review of symptoms and examination was documented by the scribe in my presence and I attest to the accuracy of the documentation.  I have signed out the follow up of any pending tests (i.e. labs, radiological studies) to the resident.  I have discussed the patient’s plan of care and disposition with the resident I performed the initial face to face bedside interview with this patient regarding history of present illness, review of symptoms and past medical, social and family history.  I completed an independent physical examination.  I was the initial provider who evaluated this patient.  The history, review of symptoms and examination was documented by the scribe in my presence and I attest to the accuracy of the documentation.  I have signed out the follow up of any pending tests (i.e. labs, radiological studies) to the resident.  I have discussed the patient’s plan of care and disposition with the resident  Pt febrile, responsive to noxious stimuli, crackles on resp exam. Pt notes to be hypotensive on arrival. Concern for septic shock. will do sepsis workup, fluids, iv abx, admit

## 2018-05-29 NOTE — ED ADULT NURSE REASSESSMENT NOTE - NS ED NURSE REASSESS COMMENT FT1
pt received from day RN. pt resting in stretcher at this time.  pt lethargic, but per day RN, no change in mental status since arrival to ER. antibiotic infusion complete. pt awaiting admission orders.

## 2018-05-29 NOTE — ED ADULT NURSE NOTE - OBJECTIVE STATEMENT
Pt sent by NH for fever of 100.2, hypotension 90/48, RR 24.  SNF communication states that pt has been weak with lethargy.  Pt has chronic moreno catheter which was changed upon arrival per md order.

## 2018-05-29 NOTE — PATIENT PROFILE ADULT. - HAS THE PATIENT HAD A SIGNIFICANT CHANGE IN FUNCTIONAL STATUS DUE TO CVA, HEAD TRAUMA, ORTHOPEDIC TRAUMA/SURGERY, OR FALL, WITH THE WEEK PRIOR TO ADMISSION
Health Maintenance Summary     Topic Due On Due Status Completed On Postpone Until Reason    IMMUNIZATION - IPV  Completed Sep 24, 2009      IMMUNIZATION - MMR  Completed Sep 24, 2009      IMMUNIZATION - VARICELLA  Completed Sep 24, 2009      IMMUNIZATION - Tdap/Td  Completed Aug 14, 2015      IMMUNIZATION - HEPATITIS B  Completed Aug 8, 2005      IMMUNIZATION - HEPATITIS A  Completed Sep 7, 2006      IMMUNIZATION - HPV Oct 14, 2016 Postponed Aug 19, 2016 Mar 1, 2018 Patient Refused    IMMUNIZATION - MENINGITIS Sep 6, 2020 Not Due Aug 19, 2016      Immunization - TDAP Pregnancy  Hidden       Immunization-Influenza Sep 1, 2016 Overdue Nov 13, 2015            Patient is due for topics as listed above, she wishes to decline at this time .       no

## 2018-05-29 NOTE — ED ADULT NURSE NOTE - ED STAT RN HANDOFF DETAILS
DAY OF SURGERY INSTRUCTIONS        YOUR SURGEON: ASHLEY    PROCEDURE: PARASTOMAL HERNIA REPAIR    DATE OF SURGERY: MARCH 17    ARRIVAL TIME: AS DIRECTED BY OFFICE    DAY OF SURGERY TAKE ONLY THESE MEDICATIONS: AMLODIPINE AND METOPROLOL            BEFORE YOU COME TO THE HOSPITAL  (Pre-op instructions)  • Do not eat, drink, smoke or chew gum after midnight the night before surgery.  This also includes no mints.  • Morning of surgery take only the medicines you have been instructed with a sip of water unless otherwise instructed  by your physician.  • Do not shave, wear makeup or dark nail polish.  • Remove all jewelry including rings.  • Leave anything you consider valuable at home.  • Leave your suitcase in the car until after your surgery.  • Bring the following with you if applicable:  o Picture ID and insurance, Medicare or Medicaid cards  o Co-pay/deductible required by insurance (cash, check, credit card)  o Medications (no narcotics) in original bottles (not a list) including all over-the-counter medications.  o Copy of advance directive, living will or power-of- documents if not brought to PAT  o CPAP or BIPAP mask and tubing  o Skin prep instruction sheet  o Relaxation aids (MP3 player, book, magazine)  • Confirm your arrival time with you surgeon the day before your surgery (surgery times are subject to change)  • On the day of surgery check in at registration located at the main entrance of the hospital.       Outpatient Surgery Guidelines, Adult  Outpatient procedures are those for which the person having the procedure is allowed to go home the same day as the procedure. Various procedures are done on an outpatient basis. You should follow some general guidelines if you will be having an outpatient procedure.  LET YOUR HEALTH CARE PROVIDER KNOW ABOUT:  · Any allergies you have.  · All medicines you are taking, including vitamins, herbs, eye drops, creams, and over-the-counter  medicines.  · Previous problems you or members of your family have had with the use of anesthetics.  · Any blood disorders you have.  · Previous surgeries you have had.  · Medical conditions you have.  RISKS AND COMPLICATIONS  Your health care provider will discuss possible risks and complications with you before surgery. Common risks and complications include:    · Problems due to the use of anesthetics.  · Blood loss and replacement (does not apply to minor surgical procedures).  · Temporary increase in pain due to surgery.  · Uncorrected pain or problems that the surgery was meant to correct.  · Infection.  · New damage.  BEFORE THE PROCEDURE  · Ask your health care provider about changing or stopping your regular medicines. You may need to stop taking certain medicines in the days or weeks before the procedure.  · Stop smoking at least 2 weeks before surgery. This lowers your risk for complications during and after surgery. Ask your health care provider for help with this if needed.  · Eat your usual meals and a light supper the day before surgery. Continue fluid intake. Do not drink alcohol.  · Do not eat or drink after midnight the night before your surgery.   · Arrange for someone to take you home and to stay with you for 24 hours after the procedure. Medicine given for your procedure may affect your ability to drive or to care for yourself.  · Call your health care provider's office if you develop an illness or problem that may prevent you from safely having your procedure.  AFTER THE PROCEDURE  After surgery, you will be taken to a recovery area, where your progress will be monitored. If there are no complications, you will be allowed to go home when you are awake, stable, and taking fluids well. You may have numbness around the surgical site. Healing will take some time. You will have tenderness at the surgical site and may have some swelling and bruising. You may also have some nausea.  HOME CARE  INSTRUCTIONS  · Do not drive for 24 hours, or as directed by your health care provider. Do not drive while taking prescription pain medicines.  · Do not drink alcohol for 24 hours.  · Do not make important decisions or sign legal documents for 24 hours.  · You may resume a normal diet and activities as directed.  · Do not lift anything heavier than 10 pounds (4.5 kg) or play contact sports until your health care provider says it is okay.  · Change your bandages (dressings) as directed.  · Only take over-the-counter or prescription medicines as directed by your health care provider.  · Follow up with your health care provider as directed.  SEEK MEDICAL CARE IF:  · You have increased bleeding (more than a small spot) from the surgical site.  · You have redness, swelling, or increasing pain in the wound.  · You see pus coming from the wound.  · You have a fever.  · You notice a bad smell coming from the wound or dressing.  · You feel lightheaded or faint.  · You develop a rash.  · You have trouble breathing.  · You develop allergies.  MAKE SURE YOU:  · Understand these instructions.  · Will watch your condition.  · Will get help right away if you are not doing well or get worse.     This information is not intended to replace advice given to you by your health care provider. Make sure you discuss any questions you have with your health care provider.     Document Released: 09/12/2002 Document Revised: 05/03/2016 Document Reviewed: 05/22/2014  [employer] Interactive Patient Education ©2016 [employer]       Fall Prevention in Hospitals, Adult  As a hospital patient, your condition and the treatments you receive can increase your risk for falls. Some additional risk factors for falls in a hospital include:  · Being in an unfamiliar environment.  · Being on bed rest.  · Your surgery.  · Taking certain medicines.  · Your tubing requirements, such as intravenous (IV) therapy or catheters.  It is important that you learn how  to decrease fall risks while at the hospital. Below are important tips that can help prevent falls.  SAFETY TIPS FOR PREVENTING FALLS  Talk about your risk of falling.  · Ask your health care provider why you are at risk for falling. Is it your medicine, illness, tubing placement, or something else?  · Make a plan with your health care provider to keep you safe from falls.  · Ask your health care provider or pharmacist about side effects of your medicines. Some medicines can make you dizzy or affect your coordination.  Ask for help.  · Ask for help before getting out of bed. You may need to press your call button.  · Ask for assistance in getting safely to the toilet.  · Ask for a walker or cane to be put at your bedside. Ask that most of the side rails on your bed be placed up before your health care provider leaves the room.  · Ask family or friends to sit with you.  · Ask for things that are out of your reach, such as your glasses, hearing aids, telephone, bedside table, or call button.  Follow these tips to avoid falling:  · Stay lying or seated, rather than standing, while waiting for help.  · Wear rubber-soled slippers or shoes whenever you walk in the hospital.  · Avoid quick, sudden movements.  ¨ Change positions slowly.  ¨ Sit on the side of your bed before standing.  ¨ Stand up slowly and wait before you start to walk.  · Let your health care provider know if there is a spill on the floor.  · Pay careful attention to the medical equipment, electrical cords, and tubes around you.  · When you need help, use your call button by your bed or in the bathroom. Wait for one of your health care providers to help you.  · If you feel dizzy or unsure of your footing, return to bed and wait for assistance.  · Avoid being distracted by the TV, telephone, or another person in your room.  · Do not lean or support yourself on rolling objects, such as IV poles or bedside tables.     This information is not intended to  replace advice given to you by your health care provider. Make sure you discuss any questions you have with your health care provider.     Document Released: 12/15/2001 Document Revised: 01/08/2016 Document Reviewed: 08/25/2013  [employer] Interactive Patient Education ©2016 [employer]       Surgical Site Infections FAQs  What is a Surgical Site Infection (SSI)?  A surgical site infection is an infection that occurs after surgery in the part of the body where the surgery took place. Most patients who have surgery do not develop an infection. However, infections develop in about 1 to 3 out of every 100 patients who have surgery.  Some of the common symptoms of a surgical site infection are:  · Redness and pain around the area where you had surgery  · Drainage of cloudy fluid from your surgical wound  · Fever  Can SSIs be treated?  Yes. Most surgical site infections can be treated with antibiotics. The antibiotic given to you depends on the bacteria (germs) causing the infection. Sometimes patients with SSIs also need another surgery to treat the infection.  What are some of the things that hospitals are doing to prevent SSIs?  To prevent SSIs, doctors, nurses, and other healthcare providers:  · Clean their hands and arms up to their elbows with an antiseptic agent just before the surgery.  · Clean their hands with soap and water or an alcohol-based hand rub before and after caring for each patient.  · May remove some of your hair immediately before your surgery using electric clippers if the hair is in the same area where the procedure will occur. They should not shave you with a razor.  · Wear special hair covers, masks, gowns, and gloves during surgery to keep the surgery area clean.  · Give you antibiotics before your surgery starts. In most cases, you should get antibiotics within 60 minutes before the surgery starts and the antibiotics should be stopped within 24 hours after surgery.  · Clean the skin at the  site of your surgery with a special soap that kills germs.  What can I do to help prevent SSIs?  Before your surgery:  · Tell your doctor about other medical problems you may have. Health problems such as allergies, diabetes, and obesity could affect your surgery and your treatment.  · Quit smoking. Patients who smoke get more infections. Talk to your doctor about how you can quit before your surgery.  · Do not shave near where you will have surgery. Shaving with a razor can irritate your skin and make it easier to develop an infection.  At the time of your surgery:  · Speak up if someone tries to shave you with a razor before surgery. Ask why you need to be shaved and talk with your surgeon if you have any concerns.  · Ask if you will get antibiotics before surgery.  After your surgery:  · Make sure that your healthcare providers clean their hands before examining you, either with soap and water or an alcohol-based hand rub.  · If you do not see your providers clean their hands, please ask them to do so.  · Family and friends who visit you should not touch the surgical wound or dressings.  · Family and friends should clean their hands with soap and water or an alcohol-based hand rub before and after visiting you. If you do not see them clean their hands, ask them to clean their hands.  What do I need to do when I go home from the hospital?  · Before you go home, your doctor or nurse should explain everything you need to know about taking care of your wound. Make sure you understand how to care for your wound before you leave the hospital.  · Always clean your hands before and after caring for your wound.  · Before you go home, make sure you know who to contact if you have questions or problems after you get home.  · If you have any symptoms of an infection, such as redness and pain at the surgery site, drainage, or fever, call your doctor immediately.  If you have additional questions, please ask your doctor or  nurse.  Developed and co-sponsored by The Society for Healthcare Epidemiology of [place] (SHEA); Infectious Diseases Society of [place] (IDSA); American Hospital Association; Association for Professionals in Infection Control and Epidemiology (APIC); Centers for Disease Control and Prevention (CDC); and The Joint Commission.     This information is not intended to replace advice given to you by your health care provider. Make sure you discuss any questions you have with your health care provider.     Document Released: 12/23/2014 Document Revised: 01/08/2016 Document Reviewed: 03/02/2016  [employer] Interactive Patient Education ©2016 [employer]     PATIENT/FAMILY/RESPONSIBLE PARTY VERBALIZES UNDERSTANDING OF ABOVE EDUCATION   report called 5 east  will monitor till transport

## 2018-05-29 NOTE — ED PROVIDER NOTE - MEDICAL DECISION MAKING DETAILS
77M with undifferentiated sepsis from facility, appears dehydrated, unclear source. labs, blood cx, ua, ucx, ct abd/pelvis, abx, ivf.

## 2018-05-29 NOTE — H&P ADULT - NSHPPHYSICALEXAM_GEN_ALL_CORE
PHYSICAL EXAM:    GENERAL: NAD, lethargic, chronically ill-appearing  HEAD:  NC/AT  EYES: EOMI, PERRLA, no scleral icterus  HEENT: Moist mucous membranes  NECK: Supple, No JVD  CNS:  moving freely B/L upper and lower extremities; DTRs 2+ intact   LUNG: coarse breath sounds bilaterally  HEART: RRR; No murmurs, rubs, or gallops  ABDOMEN: +BS, ST/ND  GENITOURINARY- moreno in place, Bladder not distended  EXTREMITIES:  2+ Peripheral Pulses, No clubbing, cyanosis, or edema  MUSCULOSKELTAL- Joints normal ROM, no Muscle or joint tenderness  SKIN: [place] on located extremity bilaterally

## 2018-05-30 LAB
ALBUMIN SERPL ELPH-MCNC: 2.2 G/DL — LOW (ref 3.3–5)
ALP SERPL-CCNC: 120 U/L — SIGNIFICANT CHANGE UP (ref 40–120)
ALT FLD-CCNC: 21 U/L — SIGNIFICANT CHANGE UP (ref 12–78)
ANION GAP SERPL CALC-SCNC: 11 MMOL/L — SIGNIFICANT CHANGE UP (ref 5–17)
AST SERPL-CCNC: 29 U/L — SIGNIFICANT CHANGE UP (ref 15–37)
BASOPHILS # BLD AUTO: 0.02 K/UL — SIGNIFICANT CHANGE UP (ref 0–0.2)
BASOPHILS NFR BLD AUTO: 0.2 % — SIGNIFICANT CHANGE UP (ref 0–2)
BILIRUB SERPL-MCNC: 0.3 MG/DL — SIGNIFICANT CHANGE UP (ref 0.2–1.2)
BUN SERPL-MCNC: 50 MG/DL — HIGH (ref 7–23)
CALCIUM SERPL-MCNC: 8.1 MG/DL — LOW (ref 8.5–10.1)
CHLORIDE SERPL-SCNC: 108 MMOL/L — SIGNIFICANT CHANGE UP (ref 96–108)
CK SERPL-CCNC: 23 U/L — LOW (ref 26–308)
CK SERPL-CCNC: 26 U/L — SIGNIFICANT CHANGE UP (ref 26–308)
CO2 SERPL-SCNC: 21 MMOL/L — LOW (ref 22–31)
CREAT SERPL-MCNC: 1.82 MG/DL — HIGH (ref 0.5–1.3)
CULTURE RESULTS: NO GROWTH — SIGNIFICANT CHANGE UP
EOSINOPHIL # BLD AUTO: 0.29 K/UL — SIGNIFICANT CHANGE UP (ref 0–0.5)
EOSINOPHIL NFR BLD AUTO: 2.3 % — SIGNIFICANT CHANGE UP (ref 0–6)
GLUCOSE SERPL-MCNC: 110 MG/DL — HIGH (ref 70–99)
HCT VFR BLD CALC: 29.3 % — LOW (ref 39–50)
HGB BLD-MCNC: 9 G/DL — LOW (ref 13–17)
IMM GRANULOCYTES NFR BLD AUTO: 0.6 % — SIGNIFICANT CHANGE UP (ref 0–1.5)
LACTATE SERPL-SCNC: 0.9 MMOL/L — SIGNIFICANT CHANGE UP (ref 0.7–2)
LYMPHOCYTES # BLD AUTO: 0.61 K/UL — LOW (ref 1–3.3)
LYMPHOCYTES # BLD AUTO: 4.9 % — LOW (ref 13–44)
MAGNESIUM SERPL-MCNC: 2.1 MG/DL — SIGNIFICANT CHANGE UP (ref 1.6–2.6)
MCHC RBC-ENTMCNC: 24.7 PG — LOW (ref 27–34)
MCHC RBC-ENTMCNC: 30.7 GM/DL — LOW (ref 32–36)
MCV RBC AUTO: 80.5 FL — SIGNIFICANT CHANGE UP (ref 80–100)
MONOCYTES # BLD AUTO: 0.92 K/UL — HIGH (ref 0–0.9)
MONOCYTES NFR BLD AUTO: 7.4 % — SIGNIFICANT CHANGE UP (ref 2–14)
NEUTROPHILS # BLD AUTO: 10.49 K/UL — HIGH (ref 1.8–7.4)
NEUTROPHILS NFR BLD AUTO: 84.6 % — HIGH (ref 43–77)
NRBC # BLD: 0 /100 WBCS — SIGNIFICANT CHANGE UP (ref 0–0)
PHOSPHATE SERPL-MCNC: 4.7 MG/DL — HIGH (ref 2.5–4.5)
PLATELET # BLD AUTO: 229 K/UL — SIGNIFICANT CHANGE UP (ref 150–400)
POTASSIUM SERPL-MCNC: 4.6 MMOL/L — SIGNIFICANT CHANGE UP (ref 3.5–5.3)
POTASSIUM SERPL-SCNC: 4.6 MMOL/L — SIGNIFICANT CHANGE UP (ref 3.5–5.3)
PROT SERPL-MCNC: 6.4 GM/DL — SIGNIFICANT CHANGE UP (ref 6–8.3)
RBC # BLD: 3.64 M/UL — LOW (ref 4.2–5.8)
RBC # FLD: 19.5 % — HIGH (ref 10.3–14.5)
SODIUM SERPL-SCNC: 140 MMOL/L — SIGNIFICANT CHANGE UP (ref 135–145)
SPECIMEN SOURCE: SIGNIFICANT CHANGE UP
TROPONIN I SERPL-MCNC: 0.09 NG/ML — HIGH (ref 0.01–0.04)
WBC # BLD: 12.4 K/UL — HIGH (ref 3.8–10.5)
WBC # FLD AUTO: 12.4 K/UL — HIGH (ref 3.8–10.5)

## 2018-05-30 PROCEDURE — 71250 CT THORAX DX C-: CPT | Mod: 26

## 2018-05-30 PROCEDURE — 99222 1ST HOSP IP/OBS MODERATE 55: CPT

## 2018-05-30 RX ORDER — VANCOMYCIN HCL 1 G
1000 VIAL (EA) INTRAVENOUS EVERY 24 HOURS
Qty: 0 | Refills: 0 | Status: DISCONTINUED | OUTPATIENT
Start: 2018-05-31 | End: 2018-05-31

## 2018-05-30 RX ORDER — SODIUM CHLORIDE 9 MG/ML
1000 INJECTION INTRAMUSCULAR; INTRAVENOUS; SUBCUTANEOUS
Qty: 0 | Refills: 0 | Status: DISCONTINUED | OUTPATIENT
Start: 2018-05-30 | End: 2018-06-02

## 2018-05-30 RX ORDER — VANCOMYCIN HCL 1 G
VIAL (EA) INTRAVENOUS
Qty: 0 | Refills: 0 | Status: DISCONTINUED | OUTPATIENT
Start: 2018-05-30 | End: 2018-05-31

## 2018-05-30 RX ORDER — SODIUM CHLORIDE 9 MG/ML
1000 INJECTION INTRAMUSCULAR; INTRAVENOUS; SUBCUTANEOUS
Qty: 0 | Refills: 0 | Status: DISCONTINUED | OUTPATIENT
Start: 2018-05-30 | End: 2018-05-30

## 2018-05-30 RX ORDER — VANCOMYCIN HCL 1 G
1000 VIAL (EA) INTRAVENOUS ONCE
Qty: 0 | Refills: 0 | Status: COMPLETED | OUTPATIENT
Start: 2018-05-30 | End: 2018-05-30

## 2018-05-30 RX ADMIN — HEPARIN SODIUM 5000 UNIT(S): 5000 INJECTION INTRAVENOUS; SUBCUTANEOUS at 21:16

## 2018-05-30 RX ADMIN — Medication 12.5 MILLIGRAM(S): at 11:25

## 2018-05-30 RX ADMIN — HEPARIN SODIUM 5000 UNIT(S): 5000 INJECTION INTRAVENOUS; SUBCUTANEOUS at 13:18

## 2018-05-30 RX ADMIN — CEFEPIME 1000 MILLIGRAM(S): 1 INJECTION, POWDER, FOR SOLUTION INTRAMUSCULAR; INTRAVENOUS at 14:12

## 2018-05-30 RX ADMIN — CLOPIDOGREL BISULFATE 75 MILLIGRAM(S): 75 TABLET, FILM COATED ORAL at 11:25

## 2018-05-30 RX ADMIN — TAMSULOSIN HYDROCHLORIDE 0.4 MILLIGRAM(S): 0.4 CAPSULE ORAL at 21:16

## 2018-05-30 RX ADMIN — Medication 650 MILLIGRAM(S): at 11:24

## 2018-05-30 RX ADMIN — HEPARIN SODIUM 5000 UNIT(S): 5000 INJECTION INTRAVENOUS; SUBCUTANEOUS at 05:11

## 2018-05-30 RX ADMIN — Medication 325 MILLIGRAM(S): at 11:24

## 2018-05-30 RX ADMIN — Medication 250 MILLIGRAM(S): at 05:11

## 2018-05-30 RX ADMIN — Medication 250 MILLIGRAM(S): at 12:16

## 2018-05-30 RX ADMIN — Medication 100 MILLIGRAM(S): at 21:16

## 2018-05-30 RX ADMIN — ATORVASTATIN CALCIUM 80 MILLIGRAM(S): 80 TABLET, FILM COATED ORAL at 21:16

## 2018-05-30 NOTE — SWALLOW BEDSIDE ASSESSMENT ADULT - SWALLOW EVAL: DIAGNOSIS
pt presents with functional mild oral dysphagia, with relatively intact pharyngeals stage, for modified texture diet at present.  Note that pt was reportedly consuming a regular consistency diet but it unclear how he can manage this with the condition of his dentition (what little he has).

## 2018-05-30 NOTE — SWALLOW BEDSIDE ASSESSMENT ADULT - SLP GENERAL OBSERVATIONS
On encounter, pt lying in bed, almost flat: asleep, mouth open and jaw skewed to right:  pt In NAD.   pt rousable and then slowly able to participate, but remained somnolent throughout the  session.

## 2018-05-30 NOTE — SWALLOW BEDSIDE ASSESSMENT ADULT - NS SPL SWALLOW CLINIC TRIAL FT
Rx; Start PT on puree and thin liquids.  Meds in puree.  Tell pt what is presented. Encourage some self-feeding.  pt upright for meals.   Disc with Nsg.  Service will follow for upgrade.

## 2018-05-30 NOTE — SWALLOW BEDSIDE ASSESSMENT ADULT - ORAL PHASE
slow bolus formation and pulsion but functional for thin liquid for puree..  pt had no oral response for chewable  but his dental profile  and hipresent menatal status indicate that he would best to start on puree consistency, despite regular consistency at the NH.  robbie

## 2018-05-30 NOTE — PROGRESS NOTE ADULT - ASSESSMENT
· Assessment		  71 y/o male with multiple co-morbidities    sepsis 2/2 to hcap, improving.  hemodynamically stable  -admit to med-surg  -cont/cefepime, renally dosed  -id consult   -pulm consult  -check bcx, ucx, am labs   -strict i/o   -Ct chest r/o PNA vs CHF    encephalopathy, can be secondary to sepsis vs opioid intoxication du to acute renal failure  -cont abx   -hold pain meds until patient more awake  -get cth  -npo except meds    acute renal failure, can be secondary to dehydration vs sepsis, s/p 2L NS in ED  -monitor BMP  -avoid nephrotoxic meds   -IVF continue slow  hypoalbuminemia   -nutrition consult     # hx chronic heart failure EF 45%/doubt acute decompensation   slow IV hydration for now  CT chest w/o con      elevated troponin, can be secondary to acute renal failure vs demand ischemia  -trend cardiac markers    urinary retention, indwelling moreno cath in place   -urology consult chronic back pain   -hold pain meds for now until more awake    cad s/p stent placment 5/2018  -cont asa, plavix, bb, statin    anemia, chronic, no s/s of gross bleeding  -monitor    dvt prophylaxis   -heparin sc · Assessment		  73 y/o male with multiple co-morbidities    sepsis 2/2 to hcap,suspected GNR/MRSA   hemodynamically stable  -admit to med-surg  -cont/cefepime, renally dosed, Iv vanco  -id consult   -pulm consult  -check bcx, ucx, am labs   -strict i/o   -Ct chest shows multifocal PNA  swallow eval    encephalopathy, can be secondary to sepsis vs opioid intoxication du to acute renal failure  -cont abx   -hold pain meds until patient more awake  -get cth  -npo except meds    acute renal failure, can be secondary to dehydration vs sepsis, s/p 2L NS in ED  -monitor BMP  -avoid nephrotoxic meds   -IVF continue slow  hypoalbuminemia   -nutrition consult     # hx chronic heart failure EF 45%/doubt acute decompensation   slow IV hydration for now        elevated troponin, can be secondary to acute renal failure vs demand ischemia  -trend cardiac markers    urinary retention, indwelling moreno cath in place   -urology consult chronic back pain   -hold pain meds for now until more awake    cad s/p stent placment 5/2018  -cont asa, plavix, bb, statin    anemia, chronic, no s/s of gross bleeding  -monitor    dvt prophylaxis   -heparin sc

## 2018-05-30 NOTE — SWALLOW BEDSIDE ASSESSMENT ADULT - SWALLOW EVAL: RECOMMENDED FEEDING/EATING TECHNIQUES
allow for swallow between intakes/check mouth frequently for oral residue/pocketing/alternate food with liquid

## 2018-05-30 NOTE — CONSULT NOTE ADULT - SUBJECTIVE AND OBJECTIVE BOX
72 year old male with h/o chronic back pain, anemia, anxiety, BPH, depression, GERD, HTN, kyphosis, neuropathy,  E coli UTI and bacteremia, NSTEMI, urinary retention d'cd on moreno cath following previous admission was sent from nursing home due to hypoxia, hypotension and fever today.  much of history is from chart as patient is lethargic.    In ED, noted to have tracey, leukocytosis, hypotensive and bilateral atelectasis.  s/p 2L ns, vanco and cefepime. bp improved.  Earlier this month, patient had syncopal event, ecoli bacteremia and mi s/p left heart cath with TVD at , was transferred to [facility] for cabg , Cabg advised and patient refused therefore had pci impella assisted Stent to OM, Circ, Lad x2.  was also evaluated by pain management due to chronic back pain, was put on pain management regimen.    Urology consulted for indwelling moreno catheter. Pt had post op urinary retention following CABG and was started on tamsulosin. It is unclear if pt followed up with urologist, as he claims to not have one.     PAST MEDICAL HISTORY:  as below    PAST SURGICAL HISTORY: as below    FAMILY HISTORY:   non-contributory to the patient's current presentation    Review of Systems:  Review of Systems: unable to assess due to patient mental status, lethargic	      Allergies and Intolerances:        Allergies:  	No Known Allergies:        Intolerances:  	Vanilla Ensure TID: Food, Unknown, Vanilla Ensure TID    Home Medications:   * Patient Currently Takes Medications as of 29-May-2018 19:07 documented in Structured Notes  · 	tamsulosin 0.4 mg oral capsule: 1 cap(s) orally once a day (at bedtime), Last Dose Taken:    · 	metoprolol tartrate 25 mg oral tablet: 0.5 tab(s) orally 2 times a day , Last Dose Taken:    · 	pregabalin 100 mg oral capsule: 1 cap(s) orally once a day, Last Dose Taken:    · 	morphine 30 mg/12 hr oral tablet, extended release: 1 tab(s) orally 2 times a day, Last Dose Taken:    · 	acetaminophen 325 mg oral tablet: 3 tab(s) orally every 8 hours, Last Dose Taken:    · 	clopidogrel 75 mg oral tablet: 1 tab(s) orally once a day, Last Dose Taken:    · 	atorvastatin 80 mg oral tablet: 1 tab(s) orally once a day (at bedtime), Last Dose Taken:    · 	aspirin 325 mg oral tablet: 1 tab(s) orally once a day, Last Dose Taken:    · 	Zofran ODT 4 mg oral tablet, disintegratin tab(s) orally every 8 hours PRN nausea, Last Dose Taken:    · 	Protonix 40 mg oral delayed release tablet: 1 tab(s) orally once a day, Last Dose Taken:    · 	ALPRAZolam 0.25 mg oral tablet: 1 tab(s) orally 2 times a day MDD =2 MDD:2, Last Dose Taken:    · 	DULoxetine 60 mg oral delayed release capsule: 1 cap(s) orally once a day, Last Dose Taken:    · 	docusate sodium 100 mg oral capsule: 1 cap(s) orally 3 times a day, Last Dose Taken:    · 	senna oral tablet: 2 tab(s) orally once a day (at bedtime), Last Dose Taken:      .    Patient History:   Past Medical History:  Anemia    Anxiety    Benign prostatic hypertrophy with urinary obstruction    Coronary artery disease involving native coronary artery of native heart, angina presence unspecified    Depression    GERD (gastroesophageal reflux disease)    Hypertension    Kyphosis    Neuropathy    Spinal stenosis    Syncope.    Past Surgical History:  H/O heart artery stent    S/P spinal fusion.      Physical Exam:  Physical Exam: PHYSICAL EXAM:   GENERAL: NAD, lethargic, chronically ill-appearing  HEAD:  NC/AT  EYES: EOMI, PERRLA, no scleral icterus  HEENT: Moist mucous membranes  NECK: Supple, No JVD  CNS:  moving freely B/L upper and lower extremities; DTRs 2+ intact   LUNG: coarse breath sounds bilaterally  HEART: RRR; No murmurs, rubs, or gallops  ABDOMEN: +BS, ST/ND  GENITOURINARY- moreno in place, Bladder not distended  EXTREMITIES:  2+ Peripheral Pulses, No clubbing, cyanosis, or edema  MUSCULOSKELTAL- Joints normal ROM, no Muscle or joint tenderness SKIN: petachie on located extremity bilaterally	          Electronic Signatures:  Chucky Woods)  (Signed 29-May-2018 20:38)  	Authored: History and Physical, History of Present Illness, Allergies/Medications, Patient History, Risk Assessment, Physical Exam, Labs and Results, Assessment and Plan, Attending Statement      Last Updated: 29-May-2018 20:38 by Chucky Woods)

## 2018-05-30 NOTE — PROGRESS NOTE ADULT - SUBJECTIVE AND OBJECTIVE BOX
Reason for Admission: fever	  History of Present Illness: 	  72 year old male with h/o chronic back pain, anemia, anxiety, BPH, depression, GERD, HTN, kyphosis, neuropathy,  E coli UTI and bacteremia, NSTEMI, urinary retention d'cd on moreno cath following previous admission was sent from nursing home due to hypoxia, hypotension and fever today.  much of history is from chart as patient is lethargic.      In ED, noted to have tracey, leukocytosis, hypotensive and bilateral atelectasis.  s/p 2L ns, vanco and cefepime. bp improved.    on exam , patient is lethargic, one word answers to questions, doesn't follow commands.    Earlier this month, patient had syncopal event, ecoli bacteremia and mi s/p left heart cath with TVD at , was transferred to [facility] for cabg , Cabg advised and patient refused therefore had pci impella assisted Stent to OM, Circ, Lad x2.  was also evaluated by pain management due to chronic back pain, was put on pain management regimen.  Physical Exam: PHYSICAL EXAM:    	GENERAL: NAD, lethargic, chronically ill-appearing  	HEAD:  NC/AT  	EYES: EOMI, PERRLA, no scleral icterus  	HEENT: Moist mucous membranes  	NECK: Supple, No JVDCNS:  moving freely B/L upper and lower extremities; DTRs 2+ intact   	LUNG: coarse breath sounds bilaterally  	HEART: RRR; No murmurs, rubs, or gallops  	ABDOMEN: +BS, ST/ND  	GENITOURINARY- moreno in place, Bladder not distended  	EXTREMITIES:  2+ Peripheral Pulses, No clubbing, cyanosis, or edema  	MUSCULOSKELTAL- Joints normal ROM, no Muscle or joint tenderness  SKIN: petachie on located extremity bilaterally Reason for Admission: fever	  History of Present Illness: 	  72 year old male with h/o chronic back pain, anemia, anxiety, BPH, depression, GERD, HTN, kyphosis, neuropathy,  E coli UTI and bacteremia, NSTEMI, urinary retention d'cd on moreno cath following previous admission was sent from nursing home due to hypoxia, hypotension and fever today.  much of history is from chart as patient is lethargic.      In ED, noted to have tracey, leukocytosis, hypotensive and bilateral atelectasis.  s/p 2L ns, vanco and cefepime. bp improved.    on exam , patient is lethargic, one word answers to questions, doesn't follow commands.    Earlier this month, patient had syncopal event, ecoli bacteremia and mi s/p left heart cath with TVD at , was transferred to [facility] for cabg , Cabg advised and patient refused therefore had pci impella assisted Stent to OM, Circ, Lad x2.  was also evaluated by pain management due to chronic back pain, was put on pain management regimen.    - patient more awake ,today, but still confused ,unsure of baseline, denies any complaints to me, denies CP or dyspnea or abdominal pain       Physical Exam: PHYSICAL EXAM:    	GENERAL: NAD,awake, confused, chronically ill-appearing  	HEAD:  NC/AT  	EYES: EOMI, PERRLA, no scleral icterus  	HEENT: Moist mucous membranes  	NECK: Supple, No JVDCNS:  moving freely B/L upper and lower extremities; DTRs 2+ intact   	LUNG: coarse breath sounds bilaterally  	HEART: RRR; No murmurs, rubs, or gallops  	ABDOMEN: +BS, ST/ND  	GENITOURINARY- moreno in place, Bladder not distended  	EXTREMITIES:  2+ Peripheral Pulses, No clubbing, cyanosis, or edema  	MUSCULOSKELTAL- Joints normal ROM, no Muscle or joint tenderness  SKIN: petachie on located extremity bilaterally      PHYSICAL EXAM:    Daily Height in cm: 167.64 (29 May 2018 14:49)    Daily     ICU Vital Signs Last 24 Hrs  T(C): 37.1 (30 May 2018 04:59), Max: 37.1 (30 May 2018 04:59)  T(F): 98.8 (30 May 2018 04:59), Max: 98.8 (30 May 2018 04:59)  HR: 108 (30 May 2018 04:59) (94 - 108)  BP: 98/57 (30 May 2018 04:59) (70/38 - 141/72)  BP(mean): 78 (29 May 2018 18:29) (78 - 78)  ABP: --  ABP(mean): --  RR: 16 (30 May 2018 04:59) (16 - 24)  SpO2: 97% (30 May 2018 04:59) (94% - 99%)                                9.0    12.40 )-----------( 229      ( 30 May 2018 03:42 )             29.3       CBC Full  -  ( 30 May 2018 03:42 )  WBC Count : 12.40 K/uL  Hemoglobin : 9.0 g/dL  Hematocrit : 29.3 %  Platelet Count - Automated : 229 K/uL  Mean Cell Volume : 80.5 fl  Mean Cell Hemoglobin : 24.7 pg  Mean Cell Hemoglobin Concentration : 30.7 gm/dL  Auto Neutrophil # : 10.49 K/uL  Auto Lymphocyte # : 0.61 K/uL  Auto Monocyte # : 0.92 K/uL  Auto Eosinophil # : 0.29 K/uL  Auto Basophil # : 0.02 K/uL  Auto Neutrophil % : 84.6 %  Auto Lymphocyte % : 4.9 %  Auto Monocyte % : 7.4 %  Auto Eosinophil % : 2.3 %  Auto Basophil % : 0.2 %      05-30    140  |  108  |  50<H>  ----------------------------<  110<H>  4.6   |  21<L>  |  1.82<H>    Ca    8.1<L>      30 May 2018 03:42  Phos  4.7     05-30  Mg     2.1     05-30    TPro  6.4  /  Alb  2.2<L>  /  TBili  0.3  /  DBili  x   /  AST  29  /  ALT  21  /  AlkPhos  120  05-30      LIVER FUNCTIONS - ( 30 May 2018 03:42 )  Alb: 2.2 g/dL / Pro: 6.4 gm/dL / ALK PHOS: 120 U/L / ALT: 21 U/L / AST: 29 U/L / GGT: x                 CARDIAC MARKERS ( 30 May 2018 03:42 )  x     / x     / 26 U/L / x     / x      CARDIAC MARKERS ( 29 May 2018 23:38 )  0.088 ng/mL / x     / x     / x     / x      CARDIAC MARKERS ( 29 May 2018 20:10 )  0.081 ng/mL / x     / 25 U/L / x     / x      CARDIAC MARKERS ( 29 May 2018 15:22 )  0.100 ng/mL / x     / x     / x     / x            Urinalysis Basic - ( 29 May 2018 15:22 )    Color: Yellow / Appearance: Slightly Turbid / S.025 / pH: x  Gluc: x / Ketone: Trace  / Bili: Moderate / Urobili: 1 mg/dL   Blood: x / Protein: 100 mg/dL / Nitrite: Negative   Leuk Esterase: Small / RBC: 11-25 /HPF / WBC 10-15 /HPF   Sq Epi: x / Non Sq Epi: Moderate / Bacteria: Many            MEDICATIONS  (STANDING):  aspirin 325 milliGRAM(s) Oral daily  atorvastatin 80 milliGRAM(s) Oral at bedtime  cefepime  Injectable. 1000 milliGRAM(s) IV Push every 24 hours  clopidogrel Tablet 75 milliGRAM(s) Oral daily  docusate sodium 100 milliGRAM(s) Oral three times a day  heparin  Injectable 5000 Unit(s) SubCutaneous every 8 hours  lactated ringers. 1000 milliLiter(s) (1000 mL/Hr) IV Continuous <Continuous>  metoprolol tartrate 12.5 milliGRAM(s) Oral two times a day  pantoprazole    Tablet 40 milliGRAM(s) Oral before breakfast  sodium chloride 0.9%. 1000 milliLiter(s) (50 mL/Hr) IV Continuous <Continuous>  tamsulosin 0.4 milliGRAM(s) Oral at bedtime  vancomycin  IVPB

## 2018-05-30 NOTE — SWALLOW BEDSIDE ASSESSMENT ADULT - COMMENTS
Pt, white male, age 72 with h/o chronic back pain, anemia, anxiety, BPH, depression, GERD, HTN, kyphosis, neuropathy,  E coli UTI and bacteremia, NSTEMI, urinary retention d'cd on moreno cath following previous admission was sent from nursing home due to hypoxia, hypotension and fever today.  much of history is from chart as patient is lethargic.    In ED, noted to have tracey, leukocytosis, hypotensive and bilateral atelectasis.  s/p 2L ns, vanco and cefepime. bp improved.    As per disc with nsg on 5e, pt has hx of frequent visits to ER for pain meds.   Sent from NH, for reduced po intake, though to have aspirated.

## 2018-05-30 NOTE — SWALLOW BEDSIDE ASSESSMENT ADULT - ASR SWALLOW DENTITION
oral cavity hard to visualize: pt appears to have ONLY ONE too th in the maxilla - perhaps the bicuspid? on the right:  only what appears to be the central incisors in ther mandible: but there also appears to be some gum erosion fro right to left.

## 2018-05-30 NOTE — CONSULT NOTE ADULT - SUBJECTIVE AND OBJECTIVE BOX
Patient is a 77y old  Male who presents with a chief complaint of pt st from nursing home for lethargy (29 May 2018 22:24)    HPI:  72 year old male with h/o chronic back pain, anemia, anxiety, BPH, depression, GERD, HTN, kyphosis, neuropathy,  E coli UTI and bacteremia, NSTEMI, urinary retention d'cd on moreno cath following previous admit sent here due to hypoxia, hypotension and fever . Earlier this month, patient had syncopal event, ecoli bacteremia and mi s/p left heart cath with TVD at , was transferred to [facility] for cabg , Cabg advised and patient refused therefore had pci impella assisted Stent to OM, Circ, Lad x2.  was also evaluated by pain management due to chronic back pain, was put on pain management regimen. Here, pt afebrile, hypotensive, wbc ct 15, CT chest with R multifocal consolidations with air bronchograms, was given IV vancomycin/cefepime.       PMH: as above  PSH: as above  Meds: per reconciliation sheet, noted below  MEDICATIONS  (STANDING):  aspirin 325 milliGRAM(s) Oral daily  atorvastatin 80 milliGRAM(s) Oral at bedtime  cefepime  Injectable. 1000 milliGRAM(s) IV Push every 24 hours  clopidogrel Tablet 75 milliGRAM(s) Oral daily  docusate sodium 100 milliGRAM(s) Oral three times a day  heparin  Injectable 5000 Unit(s) SubCutaneous every 8 hours  lactated ringers. 1000 milliLiter(s) (1000 mL/Hr) IV Continuous <Continuous>  metoprolol tartrate 12.5 milliGRAM(s) Oral two times a day  pantoprazole    Tablet 40 milliGRAM(s) Oral before breakfast  sodium chloride 0.9%. 1000 milliLiter(s) (50 mL/Hr) IV Continuous <Continuous>  tamsulosin 0.4 milliGRAM(s) Oral at bedtime  vancomycin  IVPB          Allergies    No Known Allergies    Intolerances    Vanilla Ensure TID (Unknown)    Social: no smoking, no alcohol, no illegal drugs; no recent travel, no exposure to TB  FAMILY HISTORY:  No pertinent family history in first degree relatives     no history of premature cardiovascular disease in first degree relatives    ROS: no HA, no dizziness, no sore throat, no blurry vision, no CP, no palpitations, no abdominal pain, no diarrhea, no N/V, no dysuria, no leg pain, no claudication, no rash, no joint aches, no rectal pain or bleeding, no night sweats  All other systems reviewed and are negative    Vital Signs Last 24 Hrs  T(C): 37.1 (30 May 2018 04:59), Max: 37.1 (30 May 2018 04:59)  T(F): 98.8 (30 May 2018 04:59), Max: 98.8 (30 May 2018 04:59)  HR: 108 (30 May 2018 04:59) (94 - 108)  BP: 98/57 (30 May 2018 04:59) (70/38 - 141/72)  BP(mean): 78 (29 May 2018 18:29) (78 - 78)  RR: 16 (30 May 2018 04:59) (16 - 24)  SpO2: 97% (30 May 2018 04:59) (94% - 99%)  Daily Height in cm: 167.64 (29 May 2018 14:49)        PE:    Constitutional: frail looking  HEENT: NC/AT, EOMI, PERRLA, conjunctivae clear; ears and nose atraumatic; pharynx benign  Neck: supple; thyroid not palpable  Back: no tenderness  Respiratory: decreased breath sounds, rhonchi   Cardiovascular: S1S2 regular, no murmurs  Abdomen: soft, not tender, not distended, positive BS; liver and spleen WNL  Genitourinary: no suprapubic tenderness  Lymphatic: no LN palpable  Musculoskeletal: no muscle tenderness, no joint swelling or tenderness  Extremities: no pedal edema  Neurological/ Psychiatric: moving all extremities  Skin: no rashes; no palpable lesions    Labs: all available labs reviewed                        9.0    12.40 )-----------( 229      ( 30 May 2018 03:42 )             29.3     05-30    140  |  108  |  50<H>  ----------------------------<  110<H>  4.6   |  21<L>  |  1.82<H>    Ca    8.1<L>      30 May 2018 03:42  Phos  4.7     05-30  Mg     2.1     05-30    TPro  6.4  /  Alb  2.2<L>  /  TBili  0.3  /  DBili  x   /  AST  29  /  ALT  21  /  AlkPhos  120  05-30     LIVER FUNCTIONS - ( 30 May 2018 03:42 )  Alb: 2.2 g/dL / Pro: 6.4 gm/dL / ALK PHOS: 120 U/L / ALT: 21 U/L / AST: 29 U/L / GGT: x           Urinalysis Basic - ( 29 May 2018 15:22 )    Color: Yellow / Appearance: Slightly Turbid / S.025 / pH: x  Gluc: x / Ketone: Trace  / Bili: Moderate / Urobili: 1 mg/dL   Blood: x / Protein: 100 mg/dL / Nitrite: Negative   Leuk Esterase: Small / RBC: 11-25 /HPF / WBC 10-15 /HPF   Sq Epi: x / Non Sq Epi: Moderate / Bacteria: Many    Culture - Urine (18 @ 22:56)    Specimen Source: .Urine Clean Catch (Midstream)    Culture Results:   No growth    Culture - Blood (18 @ 10:41)    Specimen Source: .Blood Blood-Venous    Culture Results:   No growth at 5 days.          Radiology: all available radiological tests reviewed  < from: CT Chest No Cont (18 @ 09:07) >    EXAM:  CT CHEST                            PROCEDURE DATE:  2018          INTERPRETATION:  Exam Date: 2018 9:07 AM  Clinical Information: Acute hypoxic respiratory.  Technique:       CT of the chest was performed with axial images obtained   from the thoracic to the inlet to the bilateral adrenal glands  without   IV contrast. Maximum intensity projection images were obtained.  Comparison:  CT abdomen 2017    FINDINGS:    Lungs, Airways and Pleura: Small amount mucosal debris. Bilateral lower   lobe consolidative changes new from prior exam. Right upper lobe patchy   consolidations with air bronchograms with additional interlobular septal   thickening. Subsegmental consolidation within the right middle lobe and   lingula with air bronchograms.    Heart and Great Vessels: Atherosclerotic changes of the aorta and   coronary vasculature. Heart is normal in size. No pericardial effusions.    Mediastinum and Gilda: within normal limits    Neck and Chest Wall: within normal limits    Bones: Degenerative changes. Castellanos rods.    Upper Abdomen:  within normal limits    IMPRESSION:         Bilateral lower lobe peripheral consolidative changes with multifocal   subsegmental and segmental consolidative changes in the right upper lobe   right middle lobe and lingula new from 2017 may represent multifocal   pneumonia and/or atelectasis differential including inflammatory   processes. Recommend clinical correlation and short-term follow-up in 4-6   weeks.      Advanced directives addressed: full resuscitation

## 2018-05-31 LAB
ANION GAP SERPL CALC-SCNC: 9 MMOL/L — SIGNIFICANT CHANGE UP (ref 5–17)
BASOPHILS # BLD AUTO: 0.03 K/UL — SIGNIFICANT CHANGE UP (ref 0–0.2)
BASOPHILS NFR BLD AUTO: 0.3 % — SIGNIFICANT CHANGE UP (ref 0–2)
BUN SERPL-MCNC: 31 MG/DL — HIGH (ref 7–23)
CALCIUM SERPL-MCNC: 8.5 MG/DL — SIGNIFICANT CHANGE UP (ref 8.5–10.1)
CHLORIDE SERPL-SCNC: 112 MMOL/L — HIGH (ref 96–108)
CO2 SERPL-SCNC: 21 MMOL/L — LOW (ref 22–31)
CREAT SERPL-MCNC: 0.8 MG/DL — SIGNIFICANT CHANGE UP (ref 0.5–1.3)
EOSINOPHIL # BLD AUTO: 0.28 K/UL — SIGNIFICANT CHANGE UP (ref 0–0.5)
EOSINOPHIL NFR BLD AUTO: 2.8 % — SIGNIFICANT CHANGE UP (ref 0–6)
GLUCOSE SERPL-MCNC: 90 MG/DL — SIGNIFICANT CHANGE UP (ref 70–99)
HCT VFR BLD CALC: 29.1 % — LOW (ref 39–50)
HGB BLD-MCNC: 9 G/DL — LOW (ref 13–17)
IMM GRANULOCYTES NFR BLD AUTO: 0.3 % — SIGNIFICANT CHANGE UP (ref 0–1.5)
LYMPHOCYTES # BLD AUTO: 0.63 K/UL — LOW (ref 1–3.3)
LYMPHOCYTES # BLD AUTO: 6.3 % — LOW (ref 13–44)
MCHC RBC-ENTMCNC: 24.5 PG — LOW (ref 27–34)
MCHC RBC-ENTMCNC: 30.9 GM/DL — LOW (ref 32–36)
MCV RBC AUTO: 79.1 FL — LOW (ref 80–100)
MONOCYTES # BLD AUTO: 0.72 K/UL — SIGNIFICANT CHANGE UP (ref 0–0.9)
MONOCYTES NFR BLD AUTO: 7.2 % — SIGNIFICANT CHANGE UP (ref 2–14)
NEUTROPHILS # BLD AUTO: 8.34 K/UL — HIGH (ref 1.8–7.4)
NEUTROPHILS NFR BLD AUTO: 83.1 % — HIGH (ref 43–77)
NRBC # BLD: 0 /100 WBCS — SIGNIFICANT CHANGE UP (ref 0–0)
PLATELET # BLD AUTO: 222 K/UL — SIGNIFICANT CHANGE UP (ref 150–400)
POTASSIUM SERPL-MCNC: 4.2 MMOL/L — SIGNIFICANT CHANGE UP (ref 3.5–5.3)
POTASSIUM SERPL-SCNC: 4.2 MMOL/L — SIGNIFICANT CHANGE UP (ref 3.5–5.3)
RBC # BLD: 3.68 M/UL — LOW (ref 4.2–5.8)
RBC # FLD: 19 % — HIGH (ref 10.3–14.5)
SODIUM SERPL-SCNC: 142 MMOL/L — SIGNIFICANT CHANGE UP (ref 135–145)
WBC # BLD: 10.03 K/UL — SIGNIFICANT CHANGE UP (ref 3.8–10.5)
WBC # FLD AUTO: 10.03 K/UL — SIGNIFICANT CHANGE UP (ref 3.8–10.5)

## 2018-05-31 RX ORDER — NYSTATIN CREAM 100000 [USP'U]/G
1 CREAM TOPICAL
Qty: 0 | Refills: 0 | Status: DISCONTINUED | OUTPATIENT
Start: 2018-05-31 | End: 2018-06-05

## 2018-05-31 RX ORDER — HYDROMORPHONE HYDROCHLORIDE 2 MG/ML
2 INJECTION INTRAMUSCULAR; INTRAVENOUS; SUBCUTANEOUS EVERY 8 HOURS
Qty: 0 | Refills: 0 | Status: DISCONTINUED | OUTPATIENT
Start: 2018-05-31 | End: 2018-06-02

## 2018-05-31 RX ORDER — MORPHINE SULFATE 50 MG/1
30 CAPSULE, EXTENDED RELEASE ORAL
Qty: 0 | Refills: 0 | Status: DISCONTINUED | OUTPATIENT
Start: 2018-05-31 | End: 2018-06-02

## 2018-05-31 RX ORDER — HYDROMORPHONE HYDROCHLORIDE 2 MG/ML
0.5 INJECTION INTRAMUSCULAR; INTRAVENOUS; SUBCUTANEOUS ONCE
Qty: 0 | Refills: 0 | Status: DISCONTINUED | OUTPATIENT
Start: 2018-05-31 | End: 2018-05-31

## 2018-05-31 RX ORDER — ONDANSETRON 8 MG/1
4 TABLET, FILM COATED ORAL EVERY 6 HOURS
Qty: 0 | Refills: 0 | Status: DISCONTINUED | OUTPATIENT
Start: 2018-05-31 | End: 2018-06-05

## 2018-05-31 RX ORDER — CEFEPIME 1 G/1
2000 INJECTION, POWDER, FOR SOLUTION INTRAMUSCULAR; INTRAVENOUS EVERY 12 HOURS
Qty: 0 | Refills: 0 | Status: DISCONTINUED | OUTPATIENT
Start: 2018-05-31 | End: 2018-05-31

## 2018-05-31 RX ORDER — CEFEPIME 1 G/1
2000 INJECTION, POWDER, FOR SOLUTION INTRAMUSCULAR; INTRAVENOUS EVERY 12 HOURS
Qty: 0 | Refills: 0 | Status: DISCONTINUED | OUTPATIENT
Start: 2018-05-31 | End: 2018-06-04

## 2018-05-31 RX ORDER — VANCOMYCIN HCL 1 G
750 VIAL (EA) INTRAVENOUS EVERY 12 HOURS
Qty: 0 | Refills: 0 | Status: DISCONTINUED | OUTPATIENT
Start: 2018-06-01 | End: 2018-06-04

## 2018-05-31 RX ADMIN — Medication 250 MILLIGRAM(S): at 09:35

## 2018-05-31 RX ADMIN — HYDROMORPHONE HYDROCHLORIDE 0.5 MILLIGRAM(S): 2 INJECTION INTRAMUSCULAR; INTRAVENOUS; SUBCUTANEOUS at 09:33

## 2018-05-31 RX ADMIN — HYDROMORPHONE HYDROCHLORIDE 2 MILLIGRAM(S): 2 INJECTION INTRAMUSCULAR; INTRAVENOUS; SUBCUTANEOUS at 21:12

## 2018-05-31 RX ADMIN — Medication 12.5 MILLIGRAM(S): at 17:28

## 2018-05-31 RX ADMIN — Medication 100 MILLIGRAM(S): at 14:46

## 2018-05-31 RX ADMIN — NYSTATIN CREAM 1 APPLICATION(S): 100000 CREAM TOPICAL at 18:38

## 2018-05-31 RX ADMIN — HEPARIN SODIUM 5000 UNIT(S): 5000 INJECTION INTRAVENOUS; SUBCUTANEOUS at 21:11

## 2018-05-31 RX ADMIN — ATORVASTATIN CALCIUM 80 MILLIGRAM(S): 80 TABLET, FILM COATED ORAL at 21:11

## 2018-05-31 RX ADMIN — Medication 100 MILLIGRAM(S): at 05:00

## 2018-05-31 RX ADMIN — HYDROMORPHONE HYDROCHLORIDE 0.5 MILLIGRAM(S): 2 INJECTION INTRAMUSCULAR; INTRAVENOUS; SUBCUTANEOUS at 14:00

## 2018-05-31 RX ADMIN — PANTOPRAZOLE SODIUM 40 MILLIGRAM(S): 20 TABLET, DELAYED RELEASE ORAL at 05:00

## 2018-05-31 RX ADMIN — Medication 650 MILLIGRAM(S): at 08:30

## 2018-05-31 RX ADMIN — CEFEPIME 100 MILLIGRAM(S): 1 INJECTION, POWDER, FOR SOLUTION INTRAMUSCULAR; INTRAVENOUS at 17:30

## 2018-05-31 RX ADMIN — HEPARIN SODIUM 5000 UNIT(S): 5000 INJECTION INTRAVENOUS; SUBCUTANEOUS at 04:59

## 2018-05-31 RX ADMIN — HYDROMORPHONE HYDROCHLORIDE 0.5 MILLIGRAM(S): 2 INJECTION INTRAMUSCULAR; INTRAVENOUS; SUBCUTANEOUS at 13:48

## 2018-05-31 RX ADMIN — HYDROMORPHONE HYDROCHLORIDE 0.5 MILLIGRAM(S): 2 INJECTION INTRAMUSCULAR; INTRAVENOUS; SUBCUTANEOUS at 10:00

## 2018-05-31 RX ADMIN — CLOPIDOGREL BISULFATE 75 MILLIGRAM(S): 75 TABLET, FILM COATED ORAL at 11:44

## 2018-05-31 RX ADMIN — TAMSULOSIN HYDROCHLORIDE 0.4 MILLIGRAM(S): 0.4 CAPSULE ORAL at 21:11

## 2018-05-31 RX ADMIN — Medication 325 MILLIGRAM(S): at 11:44

## 2018-05-31 RX ADMIN — HEPARIN SODIUM 5000 UNIT(S): 5000 INJECTION INTRAVENOUS; SUBCUTANEOUS at 14:46

## 2018-05-31 RX ADMIN — MORPHINE SULFATE 30 MILLIGRAM(S): 50 CAPSULE, EXTENDED RELEASE ORAL at 09:02

## 2018-05-31 RX ADMIN — Medication 650 MILLIGRAM(S): at 01:09

## 2018-05-31 RX ADMIN — MORPHINE SULFATE 30 MILLIGRAM(S): 50 CAPSULE, EXTENDED RELEASE ORAL at 17:28

## 2018-05-31 RX ADMIN — Medication 12.5 MILLIGRAM(S): at 05:00

## 2018-05-31 NOTE — PROGRESS NOTE ADULT - ASSESSMENT
· Assessment		  71 y/o male with multiple co-morbidities    sepsis 2/2 to hcap,suspected GNR/MRSA   hemodynamically stable  -admit to med-surg  -cont/cefepime, renally dosed, Iv vanco  -id consult   -pulm consult  -cblood cx and urine cx neg  -strict i/o   -Ct chest shows multifocal PNA  swallow eval    encephalopathy, improved  can be secondary to sepsis vs opioid intoxication du to acute renal failure  -cont abx   -hold pain meds until patient more awake  -get cth  -npo except meds    acute renal failure improving  can be secondary to dehydration vs sepsis, s/p 2L NS in ED and maintenance IVF inpatient  -avoid nephrotoxic meds   d/c IVF today  and start pure diet  check BMP in am      hypoalbuminemia   -nutrition consult     # hx chronic heart failure EF 45%/doubt acute decompensation   slow IV hydration for now  elevated troponin, can be secondary to acute renal failure vs demand ischemia  no chest pain     urinary retention, indwelling moreno cath in place   -urology consult     chronic back pain   resume pain meds   patient more awake    cad s/p stent placment 5/2018  -cont asa, plavix, bb, statin    anemia, chronic, no s/s of gross bleeding  -monitor    dvt prophylaxis   -heparin sc

## 2018-05-31 NOTE — PHYSICAL THERAPY INITIAL EVALUATION ADULT - PERTINENT HX OF CURRENT PROBLEM, REHAB EVAL
Pt. presents from [facility] due to hypoxia, hypotension and fever. In ED, pt. noted to have YUNIOR, leukocytosis, hypotension and b/l atelectasis

## 2018-05-31 NOTE — PHYSICAL THERAPY INITIAL EVALUATION ADULT - LEVEL OF INDEPENDENCE: SUPINE/SIT, REHAB EVAL
Pt. refuses to perform at present time due to feeling comfortable w/out pain at present time. Pt. educated on benefits of mobility and change in position, pt. continues to refuse.

## 2018-05-31 NOTE — PROGRESS NOTE ADULT - SUBJECTIVE AND OBJECTIVE BOX
Reason for Admission: fever	  History of Present Illness: 	  72 year old male with h/o chronic back pain, anemia, anxiety, BPH, depression, GERD, HTN, kyphosis, neuropathy,  E coli UTI and bacteremia, NSTEMI, urinary retention d'cd on moreno cath following previous admission was sent from nursing home due to hypoxia, hypotension and fever today.  much of history is from chart as patient is lethargic.      In ED, noted to have tracey, leukocytosis, hypotensive and bilateral atelectasis.  s/p 2L ns, vanco and cefepime. bp improved.    on exam , patient is lethargic, one word answers to questions, doesn't follow commands.    Earlier this month, patient had syncopal event, ecoli bacteremia and mi s/p left heart cath with TVD at , was transferred to [facility] for cabg , Cabg advised and patient refused therefore had pci impella assisted Stent to OM, Circ, Lad x2.  was also evaluated by pain management due to chronic back pain, was put on pain management regimen.    - patient more awake ,today, but still confused ,unsure of baseline, denies any complaints to me, denies CP or dyspnea or abdominal pain   -c/o sever chronic lower back pain   Physical Exam: PHYSICAL EXAM:    	GENERAL: NAD,awake,  chronically ill-appearing  	HEAD:  NC/AT  	EYES: EOMI, PERRLA, no scleral icterus  	HEENT: Moist mucous membranes  	NECK: Supple, No JVDCNS:  moving freely B/L upper and lower extremities; DTRs 2+ intact   	LUNG: coarse breath sounds bilaterally  	HEART: RRR; No murmurs, rubs, or gallops  	ABDOMEN: +BS, ST/ND  	GENITOURINARY- moreno in place, Bladder not distended  	EXTREMITIES:  2+ Peripheral Pulses, No clubbing, cyanosis, or edema  	MUSCULOSKELTAL- Joints normal ROM, no Muscle or joint tenderness  SKIN: petachie on located extremity bilaterally      PHYSICAL EXAM:    Daily     Daily     ICU Vital Signs Last 24 Hrs  T(C): 36.7 (31 May 2018 10:30), Max: 36.7 (30 May 2018 17:20)  T(F): 98.1 (31 May 2018 10:30), Max: 98.1 (30 May 2018 17:20)  HR: 79 (31 May 2018 10:30) (72 - 87)  BP: 141/65 (31 May 2018 10:30) (92/49 - 141/65)  BP(mean): --  ABP: --  ABP(mean): --  RR: 17 (31 May 2018 10:30) (16 - 17)  SpO2: 94% (31 May 2018 10:30) (94% - 97%)                                9.0    10.03 )-----------( 222      ( 31 May 2018 06:51 )             29.1       CBC Full  -  ( 31 May 2018 06:51 )  WBC Count : 10.03 K/uL  Hemoglobin : 9.0 g/dL  Hematocrit : 29.1 %  Platelet Count - Automated : 222 K/uL  Mean Cell Volume : 79.1 fl  Mean Cell Hemoglobin : 24.5 pg  Mean Cell Hemoglobin Concentration : 30.9 gm/dL  Auto Neutrophil # : 8.34 K/uL  Auto Lymphocyte # : 0.63 K/uL  Auto Monocyte # : 0.72 K/uL  Auto Eosinophil # : 0.28 K/uL  Auto Basophil # : 0.03 K/uL  Auto Neutrophil % : 83.1 %  Auto Lymphocyte % : 6.3 %  Auto Monocyte % : 7.2 %  Auto Eosinophil % : 2.8 %  Auto Basophil % : 0.3 %      0531    142  |  112<H>  |  31<H>  ----------------------------<  90  4.2   |  21<L>  |  0.80    Ca    8.5      31 May 2018 06:51  Phos  4.7     05-30  Mg     2.1     05-30    TPro  6.4  /  Alb  2.2<L>  /  TBili  0.3  /  DBili  x   /  AST  29  /  ALT  21  /  AlkPhos  120  05-30      LIVER FUNCTIONS - ( 30 May 2018 03:42 )  Alb: 2.2 g/dL / Pro: 6.4 gm/dL / ALK PHOS: 120 U/L / ALT: 21 U/L / AST: 29 U/L / GGT: x                 CARDIAC MARKERS ( 30 May 2018 12:44 )  x     / x     / 23 U/L / x     / x      CARDIAC MARKERS ( 30 May 2018 03:42 )  x     / x     / 26 U/L / x     / x      CARDIAC MARKERS ( 29 May 2018 23:38 )  0.088 ng/mL / x     / x     / x     / x      CARDIAC MARKERS ( 29 May 2018 20:10 )  0.081 ng/mL / x     / 25 U/L / x     / x      CARDIAC MARKERS ( 29 May 2018 15:22 )  0.100 ng/mL / x     / x     / x     / x            Urinalysis Basic - ( 29 May 2018 15:22 )    Color: Yellow / Appearance: Slightly Turbid / S.025 / pH: x  Gluc: x / Ketone: Trace  / Bili: Moderate / Urobili: 1 mg/dL   Blood: x / Protein: 100 mg/dL / Nitrite: Negative   Leuk Esterase: Small / RBC: 11-25 /HPF / WBC 10-15 /HPF   Sq Epi: x / Non Sq Epi: Moderate / Bacteria: Many            MEDICATIONS  (STANDING):  aspirin 325 milliGRAM(s) Oral daily  atorvastatin 80 milliGRAM(s) Oral at bedtime  cefepime  Injectable. 2000 milliGRAM(s) IV Push every 12 hours  clopidogrel Tablet 75 milliGRAM(s) Oral daily  docusate sodium 100 milliGRAM(s) Oral three times a day  heparin  Injectable 5000 Unit(s) SubCutaneous every 8 hours  lactated ringers. 1000 milliLiter(s) (1000 mL/Hr) IV Continuous <Continuous>  metoprolol tartrate 12.5 milliGRAM(s) Oral two times a day  morphine ER Tablet 30 milliGRAM(s) Oral two times a day  pantoprazole    Tablet 40 milliGRAM(s) Oral before breakfast  sodium chloride 0.9%. 1000 milliLiter(s) (50 mL/Hr) IV Continuous <Continuous>  tamsulosin 0.4 milliGRAM(s) Oral at bedtime

## 2018-05-31 NOTE — PHYSICAL THERAPY INITIAL EVALUATION ADULT - ADDITIONAL COMMENTS
Pt. resides at [facility], currently at [facility]. Pt. reports ambulating w/ a RW at baseline.

## 2018-05-31 NOTE — PROGRESS NOTE ADULT - ASSESSMENT
72 year old male with h/o chronic back pain, anemia, anxiety, BPH, depression, GERD, HTN, kyphosis, neuropathy,  E coli UTI and bacteremia, NSTEMI, urinary retention d'cd on moreno cath following previous admit sent here due to hypoxia, hypotension and fever 5/29. Earlier this month, patient had syncopal event, ecoli bacteremia and mi s/p left heart cath with TVD at , was transferred to [facility] for cabg , Cabg advised and patient refused therefore had pci impella assisted Stent to OM, Circ, Lad x2.  was also evaluated by pain management due to chronic back pain, was put on pain management regimen. Here, pt afebrile, hypotensive, wbc ct 15, CT chest with R multifocal consolidations with air bronchograms, was given IV vancomycin/cefepime.     1. sepsis/hypoxic resp failure/multifocal pna/pyuria w/ urinary retention  - improving  - on vancomycin increase to [id number] for mrsa coverage #2   - on cefepime increase to [id number] for gnr resistant coverage #2  - continue with antibiotic coverage  - urine cx/blood cx no growth  - check sputum cx  - urology eval appreciated  - moreno care  - f/u cbc  - monitor temps  - tolerating abx well so far; no side effects noted  - reason for abx use and side effects reviewed with patient  - supportive care    2. other issues - care per medicine

## 2018-05-31 NOTE — CONSULT NOTE ADULT - SUBJECTIVE AND OBJECTIVE BOX
Patient is a 77y old  Male who presents with a chief complaint of pt st from nursing home for lethargy (29 May 2018 22:24)    HPI:  72 year old male with h/o chronic back pain, anemia, anxiety, BPH, depression, GERD, HTN, kyphosis, neuropathy,  E coli UTI and bacteremia, NSTEMI, urinary retention d'cd on moreno cath following previous admit sent here due to hypoxia, hypotension and fever . Earlier this month, patient had syncopal event, ecoli bacteremia and mi s/p left heart cath with TVD at , was transferred to [facility] for cabg , Cabg advised and patient refused therefore had pci impella assisted Stent to OM, Circ, Lad x2.  was also evaluated by pain management due to chronic back pain, was put on pain management regimen. Here, pt afebrile, hypotensive, wbc ct 15, CT chest with R multifocal consolidations with air bronchograms, was given IV vancomycin/cefepime.   Today on the time of my examination patient is more awake and not interested to provide more history now says he in lot of pain and wants pain medication . no history of previous documented copd or asthma and says he is former smoker and wants first to be taken care pain . kept NPO and want to eat as he is more awake .       PMH: as above  PSH: as above  Meds: per reconciliation sheet, noted below  MEDICATIONS  (STANDING):  aspirin 325 milliGRAM(s) Oral daily  atorvastatin 80 milliGRAM(s) Oral at bedtime  cefepime  Injectable. 1000 milliGRAM(s) IV Push every 24 hours  clopidogrel Tablet 75 milliGRAM(s) Oral daily  docusate sodium 100 milliGRAM(s) Oral three times a day  heparin  Injectable 5000 Unit(s) SubCutaneous every 8 hours  lactated ringers. 1000 milliLiter(s) (1000 mL/Hr) IV Continuous <Continuous>  metoprolol tartrate 12.5 milliGRAM(s) Oral two times a day  pantoprazole    Tablet 40 milliGRAM(s) Oral before breakfast  sodium chloride 0.9%. 1000 milliLiter(s) (50 mL/Hr) IV Continuous <Continuous>  tamsulosin 0.4 milliGRAM(s) Oral at bedtime  vancomycin  IVPB          Allergies    No Known Allergies    Intolerances    Vanilla Ensure TID (Unknown)    Social: former smoker and lives in assisted living denies abuse of alcohol     FAMILY HISTORY:  No pertinent family history in first degree relatives reported        ROS:   postive for the change in the mental status and encephalopathic at the time of the examination . no current sob or cough or wheezing or current fever . imporved hypoxia and hypotension and not using the o2 .No reported nausea or vomiting. review of other symptoms are non contributory . does complains of back pain      Vital Signs Last 24 Hrs  Vital Signs Last 24 Hrs  T(C): 36.6 (31 May 2018 04:50), Max: 36.7 (30 May 2018 17:20)  T(F): 97.9 (31 May 2018 04:50), Max: 98.1 (30 May 2018 17:20)  HR: 87 (31 May 2018 04:50) (72 - 94)  BP: 129/56 (31 May 2018 04:50) (92/49 - 149/51)  BP(mean): --  RR: 17 (31 May 2018 04:50) (16 - 17)  SpO2: 97% (31 May 2018 04:50) (96% - 97%)      PE:    Constitutional: frail looking  HEENT: NC/AT, EOMI,   Neck: supple; thyroid not palpable  Back: no tenderness  Respiratory: decreased breath sounds in the right base with poor inspiratory effort with no wheeze and has occasional rales over the bases   Cardiovascular: S1S2 regular, no murmurs  Abdomen: soft, not tender, not distended, positive BS; liver and spleen WNL  Genitourinary: no suprapubic tenderness and has moreno catheter in place   Musculoskeletal: no muscle tenderness, no joint swelling or tenderness  Extremities: no pedal edema  Neurological/ Psychiatric: moving all extremities and poorly cooperative for the full exam   Skin: no rashes; no palpable lesions    Labs: all available labs reviewed                        9.0    12.40 )-----------( 229      ( 30 May 2018 03:42 )             29.3     05-30    140  |  108  |  50<H>  ----------------------------<  110<H>  4.6   |  21<L>  |  1.82<H>    Ca    8.1<L>      30 May 2018 03:42  Phos  4.7     05-30  Mg     2.1     05-30    TPro  6.4  /  Alb  2.2<L>  /  TBili  0.3  /  DBili  x   /  AST  29  /  ALT  21  /  AlkPhos  120  05-30     LIVER FUNCTIONS - ( 30 May 2018 03:42 )  Alb: 2.2 g/dL / Pro: 6.4 gm/dL / ALK PHOS: 120 U/L / ALT: 21 U/L / AST: 29 U/L / GGT: x           Urinalysis Basic - ( 29 May 2018 15:22 )    Color: Yellow / Appearance: Slightly Turbid / S.025 / pH: x  Gluc: x / Ketone: Trace  / Bili: Moderate / Urobili: 1 mg/dL   Blood: x / Protein: 100 mg/dL / Nitrite: Negative   Leuk Esterase: Small / RBC: 11-25 /HPF / WBC 10-15 /HPF   Sq Epi: x / Non Sq Epi: Moderate / Bacteria: Many    Culture - Urine (18 @ 22:56)    Specimen Source: .Urine Clean Catch (Midstream)    Culture Results:   No growth    Culture - Blood (18 @ 10:41)    Specimen Source: .Blood Blood-Venous    Culture Results:   No growth at 5 days.    MEDICATIONS  (STANDING):  aspirin 325 milliGRAM(s) Oral daily  atorvastatin 80 milliGRAM(s) Oral at bedtime  cefepime  Injectable. 1000 milliGRAM(s) IV Push every 24 hours  clopidogrel Tablet 75 milliGRAM(s) Oral daily  docusate sodium 100 milliGRAM(s) Oral three times a day  heparin  Injectable 5000 Unit(s) SubCutaneous every 8 hours  HYDROmorphone  Injectable 0.5 milliGRAM(s) IV Push once  lactated ringers. 1000 milliLiter(s) (1000 mL/Hr) IV Continuous <Continuous>  metoprolol tartrate 12.5 milliGRAM(s) Oral two times a day  morphine ER Tablet 30 milliGRAM(s) Oral two times a day  pantoprazole    Tablet 40 milliGRAM(s) Oral before breakfast  sodium chloride 0.9%. 1000 milliLiter(s) (50 mL/Hr) IV Continuous <Continuous>  tamsulosin 0.4 milliGRAM(s) Oral at bedtime  vancomycin  IVPB 1000 milliGRAM(s) IV Intermittent every 24 hours  vancomycin  IVPB            Radiology: all available radiological tests reviewed  < from: CT Chest No Cont (18 @ 09:07) >    EXAM:  CT CHEST                            PROCEDURE DATE:  2018          INTERPRETATION:  Exam Date: 2018 9:07 AM  Clinical Information: Acute hypoxic respiratory.  Technique:       CT of the chest was performed with axial images obtained   from the thoracic to the inlet to the bilateral adrenal glands  without   IV contrast. Maximum intensity projection images were obtained.  Comparison:  CT abdomen 2017    FINDINGS:    Lungs, Airways and Pleura: Small amount mucosal debris. Bilateral lower   lobe consolidative changes new from prior exam. Right upper lobe patchy   consolidations with air bronchograms with additional interlobular septal   thickening. Subsegmental consolidation within the right middle lobe and   lingula with air bronchograms.    Heart and Great Vessels: Atherosclerotic changes of the aorta and   coronary vasculature. Heart is normal in size. No pericardial effusions.    Mediastinum and Gilda: within normal limits    Neck and Chest Wall: within normal limits    Bones: Degenerative changes. Castellanos rods.    Upper Abdomen:  within normal limits    IMPRESSION:         Bilateral lower lobe peripheral consolidative changes with multifocal   subsegmental and segmental consolidative changes in the right upper lobe   right middle lobe and lingula new from 2017 may represent multifocal   pneumonia and/or atelectasis differential including inflammatory   processes. Recommend clinical correlation and short-term follow-up in 4-6   weeks.      Advanced directives addressed: full resuscitatio

## 2018-05-31 NOTE — PROGRESS NOTE ADULT - SUBJECTIVE AND OBJECTIVE BOX
Date of service: 18 @ 13:38    pt seen and examined  feels better  less cough  afebrile      ROS: no fever or chills; denies dizziness, no HA, no abdominal pain, no diarrhea or constipation; no dysuria, no urinary frequency, no legs pain, no rashes    MEDICATIONS  (STANDING):  aspirin 325 milliGRAM(s) Oral daily  atorvastatin 80 milliGRAM(s) Oral at bedtime  cefepime  Injectable. 1000 milliGRAM(s) IV Push every 24 hours  clopidogrel Tablet 75 milliGRAM(s) Oral daily  docusate sodium 100 milliGRAM(s) Oral three times a day  heparin  Injectable 5000 Unit(s) SubCutaneous every 8 hours  HYDROmorphone  Injectable 0.5 milliGRAM(s) IV Push once  lactated ringers. 1000 milliLiter(s) (1000 mL/Hr) IV Continuous <Continuous>  metoprolol tartrate 12.5 milliGRAM(s) Oral two times a day  morphine ER Tablet 30 milliGRAM(s) Oral two times a day  pantoprazole    Tablet 40 milliGRAM(s) Oral before breakfast  sodium chloride 0.9%. 1000 milliLiter(s) (50 mL/Hr) IV Continuous <Continuous>  tamsulosin 0.4 milliGRAM(s) Oral at bedtime  vancomycin  IVPB 1000 milliGRAM(s) IV Intermittent every 24 hours  vancomycin  IVPB        Vital Signs Last 24 Hrs  T(C): 36.7 (31 May 2018 10:30), Max: 36.7 (30 May 2018 17:20)  T(F): 98.1 (31 May 2018 10:30), Max: 98.1 (30 May 2018 17:20)  HR: 79 (31 May 2018 10:30) (72 - 87)  BP: 141/65 (31 May 2018 10:30) (92/49 - 141/65)  BP(mean): --  RR: 17 (31 May 2018 10:30) (16 - 17)  SpO2: 94% (31 May 2018 10:30) (94% - 97%)        PE:  Constitutional: frail looking  HEENT: NC/AT, EOMI, PERRLA, conjunctivae clear; ears and nose atraumatic; pharynx benign  Neck: supple; thyroid not palpable  Back: no tenderness  Respiratory: decreased breath sounds, rhonchi   Cardiovascular: S1S2 regular, no murmurs  Abdomen: soft, not tender, not distended, positive BS; liver and spleen WNL  Genitourinary: no suprapubic tenderness  Lymphatic: no LN palpable  Musculoskeletal: no muscle tenderness, no joint swelling or tenderness  Extremities: no pedal edema  Neurological/ Psychiatric: moving all extremities  Skin: no rashes; no palpable lesions    Labs: all available labs reviewed                                   9.0    10.03 )-----------( 222      ( 31 May 2018 06:51 )             29.1         142  |  112<H>  |  31<H>  ----------------------------<  90  4.2   |  21<L>  |  0.80    Ca    8.5      31 May 2018 06:51  Phos  4.7       Mg     2.1         TPro  6.4  /  Alb  2.2<L>  /  TBili  0.3  /  DBili  x   /  AST  29  /  ALT  21  /  AlkPhos  120          Urinalysis Basic - ( 29 May 2018 15:22 )    Color: Yellow / Appearance: Slightly Turbid / S.025 / pH: x  Gluc: x / Ketone: Trace  / Bili: Moderate / Urobili: 1 mg/dL   Blood: x / Protein: 100 mg/dL / Nitrite: Negative   Leuk Esterase: Small / RBC: 11-25 /HPF / WBC 10-15 /HPF   Sq Epi: x / Non Sq Epi: Moderate / Bacteria: Many    Culture - Urine (18 @ 22:56)    Specimen Source: .Urine Clean Catch (Midstream)    Culture Results:   No growth    Culture - Blood (18 @ 10:41)    Specimen Source: .Blood Blood-Venous    Culture Results:   No growth at 5 days.    Culture - Blood (18 @ 15:22)    Specimen Source: .Blood None    Culture Results:   No growth to date.    Culture - Urine (18 @ 15:22)    Specimen Source: .Urine Clean Catch (Midstream)    Culture Results:   No growth          Radiology: all available radiological tests reviewed  < from: CT Chest No Cont (18 @ 09:07) >    EXAM:  CT CHEST                            PROCEDURE DATE:  2018          INTERPRETATION:  Exam Date: 2018 9:07 AM  Clinical Information: Acute hypoxic respiratory.  Technique:       CT of the chest was performed with axial images obtained   from the thoracic to the inlet to the bilateral adrenal glands  without   IV contrast. Maximum intensity projection images were obtained.  Comparison:  CT abdomen 2017    FINDINGS:    Lungs, Airways and Pleura: Small amount mucosal debris. Bilateral lower   lobe consolidative changes new from prior exam. Right upper lobe patchy   consolidations with air bronchograms with additional interlobular septal   thickening. Subsegmental consolidation within the right middle lobe and   lingula with air bronchograms.    Heart and Great Vessels: Atherosclerotic changes of the aorta and   coronary vasculature. Heart is normal in size. No pericardial effusions.    Mediastinum and Gilda: within normal limits    Neck and Chest Wall: within normal limits    Bones: Degenerative changes. Castellanos rods.    Upper Abdomen:  within normal limits    IMPRESSION:         Bilateral lower lobe peripheral consolidative changes with multifocal   subsegmental and segmental consolidative changes in the right upper lobe   right middle lobe and lingula new from 2017 may represent multifocal   pneumonia and/or atelectasis differential including inflammatory   processes. Recommend clinical correlation and short-term follow-up in 4-6   weeks.      Advanced directives addressed: full resuscitation

## 2018-06-01 LAB
ANION GAP SERPL CALC-SCNC: 9 MMOL/L — SIGNIFICANT CHANGE UP (ref 5–17)
BASOPHILS # BLD AUTO: 0.04 K/UL — SIGNIFICANT CHANGE UP (ref 0–0.2)
BASOPHILS NFR BLD AUTO: 0.4 % — SIGNIFICANT CHANGE UP (ref 0–2)
BUN SERPL-MCNC: 16 MG/DL — SIGNIFICANT CHANGE UP (ref 7–23)
CALCIUM SERPL-MCNC: 8.5 MG/DL — SIGNIFICANT CHANGE UP (ref 8.5–10.1)
CHLORIDE SERPL-SCNC: 111 MMOL/L — HIGH (ref 96–108)
CO2 SERPL-SCNC: 22 MMOL/L — SIGNIFICANT CHANGE UP (ref 22–31)
CREAT SERPL-MCNC: 0.65 MG/DL — SIGNIFICANT CHANGE UP (ref 0.5–1.3)
EOSINOPHIL # BLD AUTO: 0.33 K/UL — SIGNIFICANT CHANGE UP (ref 0–0.5)
EOSINOPHIL NFR BLD AUTO: 3.5 % — SIGNIFICANT CHANGE UP (ref 0–6)
GLUCOSE SERPL-MCNC: 107 MG/DL — HIGH (ref 70–99)
HCT VFR BLD CALC: 31 % — LOW (ref 39–50)
HGB BLD-MCNC: 9.8 G/DL — LOW (ref 13–17)
IMM GRANULOCYTES NFR BLD AUTO: 0.5 % — SIGNIFICANT CHANGE UP (ref 0–1.5)
LYMPHOCYTES # BLD AUTO: 0.77 K/UL — LOW (ref 1–3.3)
LYMPHOCYTES # BLD AUTO: 8.3 % — LOW (ref 13–44)
MCHC RBC-ENTMCNC: 24.4 PG — LOW (ref 27–34)
MCHC RBC-ENTMCNC: 31.6 GM/DL — LOW (ref 32–36)
MCV RBC AUTO: 77.3 FL — LOW (ref 80–100)
MONOCYTES # BLD AUTO: 0.69 K/UL — SIGNIFICANT CHANGE UP (ref 0–0.9)
MONOCYTES NFR BLD AUTO: 7.4 % — SIGNIFICANT CHANGE UP (ref 2–14)
NEUTROPHILS # BLD AUTO: 7.44 K/UL — HIGH (ref 1.8–7.4)
NEUTROPHILS NFR BLD AUTO: 79.9 % — HIGH (ref 43–77)
NRBC # BLD: 0 /100 WBCS — SIGNIFICANT CHANGE UP (ref 0–0)
PLATELET # BLD AUTO: 236 K/UL — SIGNIFICANT CHANGE UP (ref 150–400)
POTASSIUM SERPL-MCNC: 3.5 MMOL/L — SIGNIFICANT CHANGE UP (ref 3.5–5.3)
POTASSIUM SERPL-SCNC: 3.5 MMOL/L — SIGNIFICANT CHANGE UP (ref 3.5–5.3)
RBC # BLD: 4.01 M/UL — LOW (ref 4.2–5.8)
RBC # FLD: 19.3 % — HIGH (ref 10.3–14.5)
SODIUM SERPL-SCNC: 142 MMOL/L — SIGNIFICANT CHANGE UP (ref 135–145)
VANCOMYCIN TROUGH SERPL-MCNC: 15 UG/ML — SIGNIFICANT CHANGE UP (ref 10–20)
WBC # BLD: 9.32 K/UL — SIGNIFICANT CHANGE UP (ref 3.8–10.5)
WBC # FLD AUTO: 9.32 K/UL — SIGNIFICANT CHANGE UP (ref 3.8–10.5)

## 2018-06-01 PROCEDURE — 99222 1ST HOSP IP/OBS MODERATE 55: CPT

## 2018-06-01 RX ADMIN — MORPHINE SULFATE 30 MILLIGRAM(S): 50 CAPSULE, EXTENDED RELEASE ORAL at 17:32

## 2018-06-01 RX ADMIN — CLOPIDOGREL BISULFATE 75 MILLIGRAM(S): 75 TABLET, FILM COATED ORAL at 12:52

## 2018-06-01 RX ADMIN — Medication 100 MILLIGRAM(S): at 05:44

## 2018-06-01 RX ADMIN — Medication 100 MILLIGRAM(S): at 15:11

## 2018-06-01 RX ADMIN — Medication 12.5 MILLIGRAM(S): at 17:31

## 2018-06-01 RX ADMIN — Medication 650 MILLIGRAM(S): at 22:22

## 2018-06-01 RX ADMIN — TAMSULOSIN HYDROCHLORIDE 0.4 MILLIGRAM(S): 0.4 CAPSULE ORAL at 21:00

## 2018-06-01 RX ADMIN — Medication 325 MILLIGRAM(S): at 12:52

## 2018-06-01 RX ADMIN — HEPARIN SODIUM 5000 UNIT(S): 5000 INJECTION INTRAVENOUS; SUBCUTANEOUS at 15:11

## 2018-06-01 RX ADMIN — ONDANSETRON 4 MILLIGRAM(S): 8 TABLET, FILM COATED ORAL at 06:19

## 2018-06-01 RX ADMIN — Medication 250 MILLIGRAM(S): at 18:51

## 2018-06-01 RX ADMIN — Medication 650 MILLIGRAM(S): at 13:24

## 2018-06-01 RX ADMIN — Medication 100 MILLIGRAM(S): at 21:00

## 2018-06-01 RX ADMIN — CEFEPIME 100 MILLIGRAM(S): 1 INJECTION, POWDER, FOR SOLUTION INTRAMUSCULAR; INTRAVENOUS at 17:31

## 2018-06-01 RX ADMIN — ATORVASTATIN CALCIUM 80 MILLIGRAM(S): 80 TABLET, FILM COATED ORAL at 21:00

## 2018-06-01 RX ADMIN — MORPHINE SULFATE 30 MILLIGRAM(S): 50 CAPSULE, EXTENDED RELEASE ORAL at 17:31

## 2018-06-01 RX ADMIN — HYDROMORPHONE HYDROCHLORIDE 2 MILLIGRAM(S): 2 INJECTION INTRAMUSCULAR; INTRAVENOUS; SUBCUTANEOUS at 21:01

## 2018-06-01 RX ADMIN — CEFEPIME 100 MILLIGRAM(S): 1 INJECTION, POWDER, FOR SOLUTION INTRAMUSCULAR; INTRAVENOUS at 05:44

## 2018-06-01 RX ADMIN — Medication 12.5 MILLIGRAM(S): at 05:44

## 2018-06-01 RX ADMIN — MORPHINE SULFATE 30 MILLIGRAM(S): 50 CAPSULE, EXTENDED RELEASE ORAL at 05:44

## 2018-06-01 RX ADMIN — HEPARIN SODIUM 5000 UNIT(S): 5000 INJECTION INTRAVENOUS; SUBCUTANEOUS at 21:01

## 2018-06-01 RX ADMIN — HEPARIN SODIUM 5000 UNIT(S): 5000 INJECTION INTRAVENOUS; SUBCUTANEOUS at 05:44

## 2018-06-01 RX ADMIN — Medication 250 MILLIGRAM(S): at 05:44

## 2018-06-01 RX ADMIN — HYDROMORPHONE HYDROCHLORIDE 2 MILLIGRAM(S): 2 INJECTION INTRAMUSCULAR; INTRAVENOUS; SUBCUTANEOUS at 13:25

## 2018-06-01 RX ADMIN — HYDROMORPHONE HYDROCHLORIDE 2 MILLIGRAM(S): 2 INJECTION INTRAMUSCULAR; INTRAVENOUS; SUBCUTANEOUS at 04:43

## 2018-06-01 RX ADMIN — NYSTATIN CREAM 1 APPLICATION(S): 100000 CREAM TOPICAL at 17:33

## 2018-06-01 RX ADMIN — ONDANSETRON 4 MILLIGRAM(S): 8 TABLET, FILM COATED ORAL at 19:21

## 2018-06-01 RX ADMIN — PANTOPRAZOLE SODIUM 40 MILLIGRAM(S): 20 TABLET, DELAYED RELEASE ORAL at 05:46

## 2018-06-01 RX ADMIN — NYSTATIN CREAM 1 APPLICATION(S): 100000 CREAM TOPICAL at 05:45

## 2018-06-01 RX ADMIN — HYDROMORPHONE HYDROCHLORIDE 2 MILLIGRAM(S): 2 INJECTION INTRAMUSCULAR; INTRAVENOUS; SUBCUTANEOUS at 12:52

## 2018-06-01 NOTE — PROGRESS NOTE ADULT - ASSESSMENT
72 year old male with h/o chronic back pain, anemia, anxiety, BPH, depression, GERD, HTN, kyphosis, neuropathy,  E coli UTI and bacteremia, NSTEMI, urinary retention d'cd on moreno cath following previous admit sent here due to hypoxia, hypotension and fever 5/29. Earlier this month, patient had syncopal event, ecoli bacteremia and mi s/p left heart cath with TVD at , was transferred to [facility] for cabg , Cabg advised and patient refused therefore had pci impella assisted Stent to OM, Circ, Lad x2.  was also evaluated by pain management due to chronic back pain, was put on pain management regimen. Here, pt afebrile, hypotensive, wbc ct 15, CT chest with R multifocal consolidations with air bronchograms, was given IV vancomycin/cefepime.     1. sepsis/hypoxic resp failure/multifocal pna/pyuria w/ urinary retention  - improving  - on vancomycin increase to [id number] for mrsa coverage #3  - on cefepime increase to [id number] for gnr resistant coverage #3  - continue with antibiotic coverage  - urine cx/blood cx no growth  - check sputum cx  - urology eval appreciated  - moreno care  - f/u cbc  - monitor temps  - tolerating abx well so far; no side effects noted  - reason for abx use and side effects reviewed with patient  - supportive care    2. other issues - care per medicine

## 2018-06-01 NOTE — PROGRESS NOTE ADULT - ASSESSMENT
· Assessment		  73 y/o male with multiple co-morbidities    sepsis 2/2 to hcap,suspected GNR/MRSA   hemodynamically stable  -admit to med-surg  -cont/cefepime, renally dosed, Iv vanco  -id consult appreciated   -pulm consult appreciated   -cblood cx and urine cx neg  -strict i/o   -Ct chest shows multifocal PNA  swallow eval- puree diet    encephalopathy, improved  can be secondary to sepsis vs opioid intoxication du to acute renal failure  -cont abx   resume paijn meds  CT head neg      acute renal failure improving  can be secondary to dehydration vs sepsis, s/p 2L NS in ED and maintenance IVF inpatient  -avoid nephrotoxic meds   s/p IVF   puree diet  check BMP in am      hypoalbuminemia   -nutrition consult     # hx chronic  diastolic heart failure EF 55-60% may /2018%/doubt acute decompensation     elevated troponin, can be secondary to acute renal failure vs demand ischemia  no chest pain   ischemic eval as outpatient per cardio- cardio consult appreciated     urinary retention, indwelling moreno cath in place   voiding trial prior to discharge  -urology consult appreciated      chronic back pain /clinically no signs of cord compression  resume pain meds -intractable pain on dilaudid and morphine ER  pain management consult    #cad s/p coronary stent placment 5/2018 (refused CABG at [facility])  -cont asa, plavix, bb, statin    anemia, chronic, no clinical signs  of gross bleeding  -monitor H/H    dvt prophylaxis   -heparin sc

## 2018-06-01 NOTE — PROVIDER CONTACT NOTE (OTHER) - SITUATION
notified Dr Gross office of consult notified Dr Sorto office of consult  6/1 1647 called dr schneider office to cancel consult, spoke with service and they stated they will notify dr sorto

## 2018-06-01 NOTE — PROGRESS NOTE ADULT - SUBJECTIVE AND OBJECTIVE BOX
Date of service: 18 @ 13:09    pt seen and examined  feels better  no o/n events  no resp distress  afebrile      ROS: no fever or chills; denies dizziness, no HA, no abdominal pain, no diarrhea or constipation; no dysuria, no urinary frequency, no legs pain, no rashes        MEDICATIONS  (STANDING):  aspirin 325 milliGRAM(s) Oral daily  atorvastatin 80 milliGRAM(s) Oral at bedtime  cefepime   IVPB 2000 milliGRAM(s) IV Intermittent every 12 hours  clopidogrel Tablet 75 milliGRAM(s) Oral daily  docusate sodium 100 milliGRAM(s) Oral three times a day  heparin  Injectable 5000 Unit(s) SubCutaneous every 8 hours  metoprolol tartrate 12.5 milliGRAM(s) Oral two times a day  morphine ER Tablet 30 milliGRAM(s) Oral two times a day  nystatin Powder 1 Application(s) Topical two times a day  pantoprazole    Tablet 40 milliGRAM(s) Oral before breakfast  sodium chloride 0.9%. 1000 milliLiter(s) (50 mL/Hr) IV Continuous <Continuous>  tamsulosin 0.4 milliGRAM(s) Oral at bedtime  vancomycin  IVPB 750 milliGRAM(s) IV Intermittent every 12 hours      Vital Signs Last 24 Hrs  T(C): 37.4 (2018 11:55), Max: 37.4 (2018 11:55)  T(F): 99.4 (2018 11:55), Max: 99.4 (2018 11:55)  HR: 92 (2018 11:55) (92 - 93)  BP: 159/80 (2018 11:55) (129/68 - 169/70)  BP(mean): --  RR: 18 (2018 11:55) (18 - 18)  SpO2: 94% (2018 11:55) (93% - 95%)              PE:  Constitutional: frail looking  HEENT: NC/AT, EOMI, PERRLA, conjunctivae clear; ears and nose atraumatic; pharynx benign  Neck: supple; thyroid not palpable  Back: no tenderness  Respiratory: decreased breath sounds, rhonchi   Cardiovascular: S1S2 regular, no murmurs  Abdomen: soft, not tender, not distended, positive BS; liver and spleen WNL  Genitourinary: no suprapubic tenderness  Lymphatic: no LN palpable  Musculoskeletal: no muscle tenderness, no joint swelling or tenderness  Extremities: no pedal edema  Neurological/ Psychiatric: moving all extremities  Skin: no rashes; no palpable lesions    Labs: all available labs reviewed                                              9.8    9.32  )-----------( 236      ( 2018 04:48 )             31.0         142  |  111<H>  |  16  ----------------------------<  107<H>  3.5   |  22  |  0.65    Ca    8.5      2018 04:48         Vancomycin Level, Trough: 15.0 ug/mL ( @ 04:48)          Urinalysis Basic - ( 29 May 2018 15:22 )    Color: Yellow / Appearance: Slightly Turbid / S.025 / pH: x  Gluc: x / Ketone: Trace  / Bili: Moderate / Urobili: 1 mg/dL   Blood: x / Protein: 100 mg/dL / Nitrite: Negative   Leuk Esterase: Small / RBC: 11-25 /HPF / WBC 10-15 /HPF   Sq Epi: x / Non Sq Epi: Moderate / Bacteria: Many    Culture - Urine (18 @ 22:56)    Specimen Source: .Urine Clean Catch (Midstream)    Culture Results:   No growth    Culture - Blood (18 @ 10:41)    Specimen Source: .Blood Blood-Venous    Culture Results:   No growth at 5 days.    Culture - Blood (18 @ 15:22)    Specimen Source: .Blood None    Culture Results:   No growth to date.    Culture - Urine (18 @ 15:22)    Specimen Source: .Urine Clean Catch (Midstream)    Culture Results:   No growth          Radiology: all available radiological tests reviewed  < from: CT Chest No Cont (18 @ 09:07) >    EXAM:  CT CHEST                            PROCEDURE DATE:  2018          INTERPRETATION:  Exam Date: 2018 9:07 AM  Clinical Information: Acute hypoxic respiratory.  Technique:       CT of the chest was performed with axial images obtained   from the thoracic to the inlet to the bilateral adrenal glands  without   IV contrast. Maximum intensity projection images were obtained.  Comparison:  CT abdomen 2017    FINDINGS:    Lungs, Airways and Pleura: Small amount mucosal debris. Bilateral lower   lobe consolidative changes new from prior exam. Right upper lobe patchy   consolidations with air bronchograms with additional interlobular septal   thickening. Subsegmental consolidation within the right middle lobe and   lingula with air bronchograms.    Heart and Great Vessels: Atherosclerotic changes of the aorta and   coronary vasculature. Heart is normal in size. No pericardial effusions.    Mediastinum and Gilda: within normal limits    Neck and Chest Wall: within normal limits    Bones: Degenerative changes. Castellanos rods.    Upper Abdomen:  within normal limits    IMPRESSION:         Bilateral lower lobe peripheral consolidative changes with multifocal   subsegmental and segmental consolidative changes in the right upper lobe   right middle lobe and lingula new from 2017 may represent multifocal   pneumonia and/or atelectasis differential including inflammatory   processes. Recommend clinical correlation and short-term follow-up in 4-6   weeks.      Advanced directives addressed: full resuscitation

## 2018-06-01 NOTE — PROGRESS NOTE ADULT - ASSESSMENT
- Multifocal lung consolidations with the component of segmental and subsegmental atelectasis acute verus chronic and can not exclude superimposed component of the infection with mild leucocytosis   - elevated right hemidiaphragm with component of restriction   - improved hypoxia as the hypotension and mental status improved   - AMS likely secondary to narcotics with the AKD and probable component of the infection  .  - CAD status post stent placement recently   - BPH with the chronic moreno catheter       PLAN     - continue with the antibiotics to cover for the probable pneumonia with the superimposed atelectasis and follow up of the cultures and adjust the antibiotics as per the cultures   - follow up the renal function   - cautious use multiple narcotics .  - pain management for the back pain as per the medicine

## 2018-06-01 NOTE — PROGRESS NOTE ADULT - SUBJECTIVE AND OBJECTIVE BOX
Patient is a 77y old  Male who presents with a chief complaint of pt st from nursing home for lethargy (29 May 2018 22:24)    HPI:  72 year old male with h/o chronic back pain, anemia, anxiety, BPH, depression, GERD, HTN, kyphosis, neuropathy,  E coli UTI and bacteremia, NSTEMI, urinary retention d'cd on moreno cath following previous admit sent here due to hypoxia, hypotension and fever 5/29. Earlier this month, patient had syncopal event, ecoli bacteremia and mi s/p left heart cath with TVD at , was transferred to [facility] for cabg , Cabg advised and patient refused therefore had pci impella assisted Stent to OM, Circ, Lad x2.  was also evaluated by pain management due to chronic back pain, was put on pain management regimen. Here, pt afebrile, hypotensive, wbc ct 15, CT chest with R multifocal consolidations with air bronchograms, was given IV vancomycin/cefepime.   Today on the time of my examination patient is more awake and not interested to provide more history now says he in lot of pain and wants pain medication . no history of previous documented copd or asthma and says he is former smoker and wants first to be taken care pain . kept NPO and want to eat as he is more awake .     interval history   patient continued to complaining of the pain in the back . He denies any new symptoms of sob or cough or wheezing or chest pain.       PMH: as above  PSH: as above  Meds: per reconciliation sheet, noted below  MEDICATIONS  (STANDING):  aspirin 325 milliGRAM(s) Oral daily  atorvastatin 80 milliGRAM(s) Oral at bedtime  cefepime  Injectable. 1000 milliGRAM(s) IV Push every 24 hours  clopidogrel Tablet 75 milliGRAM(s) Oral daily  docusate sodium 100 milliGRAM(s) Oral three times a day  heparin  Injectable 5000 Unit(s) SubCutaneous every 8 hours  lactated ringers. 1000 milliLiter(s) (1000 mL/Hr) IV Continuous <Continuous>  metoprolol tartrate 12.5 milliGRAM(s) Oral two times a day  pantoprazole    Tablet 40 milliGRAM(s) Oral before breakfast  sodium chloride 0.9%. 1000 milliLiter(s) (50 mL/Hr) IV Continuous <Continuous>  tamsulosin 0.4 milliGRAM(s) Oral at bedtime  vancomycin  IVPB            Vital Signs Last 24 Hrs  T(C): 36.7 (01 Jun 2018 18:00), Max: 37.4 (01 Jun 2018 11:55)  T(F): 98.1 (01 Jun 2018 18:00), Max: 99.4 (01 Jun 2018 11:55)  HR: 101 (01 Jun 2018 18:00) (92 - 101)  BP: 162/95 (01 Jun 2018 18:00) (159/80 - 169/70)  BP(mean): --  RR: 17 (01 Jun 2018 18:00) (17 - 18)  SpO2: 93% (01 Jun 2018 18:00) (93% - 95%)    PE:    Constitutional: frail looking  HEENT: NC/AT, EOMI,   Neck: supple; thyroid not palpable  Back: no tenderness  Respiratory: decreased breath sounds in the right base with poor inspiratory effort with no wheeze and has occasional rales over the bases   Cardiovascular: S1S2 regular, no murmurs  Abdomen: soft, not tender, not distended, positive BS; liver and spleen WNL  Genitourinary: no suprapubic tenderness and has moreno catheter in place   Musculoskeletal: no muscle tenderness, no joint swelling or tenderness  Extremities: no pedal edema    MEDICATIONS  (STANDING):  aspirin 325 milliGRAM(s) Oral daily  atorvastatin 80 milliGRAM(s) Oral at bedtime  cefepime   IVPB 2000 milliGRAM(s) IV Intermittent every 12 hours  clopidogrel Tablet 75 milliGRAM(s) Oral daily  docusate sodium 100 milliGRAM(s) Oral three times a day  heparin  Injectable 5000 Unit(s) SubCutaneous every 8 hours  metoprolol tartrate 12.5 milliGRAM(s) Oral two times a day  morphine ER Tablet 30 milliGRAM(s) Oral two times a day  nystatin Powder 1 Application(s) Topical two times a day  pantoprazole    Tablet 40 milliGRAM(s) Oral before breakfast  sodium chloride 0.9%. 1000 milliLiter(s) (50 mL/Hr) IV Continuous <Continuous>  tamsulosin 0.4 milliGRAM(s) Oral at bedtime  vancomycin  IVPB 750 milliGRAM(s) IV Intermittent every 12 hours        Radiology: all available radiological tests reviewed  < from: CT Chest No Cont (05.30.18 @ 09:07) >    EXAM:  CT CHEST                            PROCEDURE DATE:  05/30/2018          INTERPRETATION:  Exam Date: 5/30/2018 9:07 AM  Clinical Information: Acute hypoxic respiratory.  Technique:       CT of the chest was performed with axial images obtained   from the thoracic to the inlet to the bilateral adrenal glands  without   IV contrast. Maximum intensity projection images were obtained.  Comparison:  CT abdomen 6/23/2017    FINDINGS:    Lungs, Airways and Pleura: Small amount mucosal debris. Bilateral lower   lobe consolidative changes new from prior exam. Right upper lobe patchy   consolidations with air bronchograms with additional interlobular septal   thickening. Subsegmental consolidation within the right middle lobe and   lingula with air bronchograms.    Heart and Great Vessels: Atherosclerotic changes of the aorta and   coronary vasculature. Heart is normal in size. No pericardial effusions.    Mediastinum and Gilda: within normal limits    Neck and Chest Wall: within normal limits    Bones: Degenerative changes. Castellanos rods.    Upper Abdomen:  within normal limits    IMPRESSION:         Bilateral lower lobe peripheral consolidative changes with multifocal   subsegmental and segmental consolidative changes in the right upper lobe   right middle lobe and lingula new from 6/23/2017 may represent multifocal   pneumonia and/or atelectasis differential including inflammatory   processes. Recommend clinical correlation and short-term follow-up in 4-6   weeks.      Advanced directives addressed: full resuscitatio

## 2018-06-01 NOTE — PROGRESS NOTE ADULT - SUBJECTIVE AND OBJECTIVE BOX
Reason for Admission: fever	  History of Present Illness: 	  72 year old male with h/o chronic back pain, anemia, anxiety, BPH, depression, GERD, HTN, kyphosis, neuropathy,  E coli UTI and bacteremia, NSTEMI, urinary retention d'cd on moreno cath following previous admission was sent from nursing home due to hypoxia, hypotension and fever today.  much of history is from chart as patient is lethargic.      In ED, noted to have tracey, leukocytosis, hypotensive and bilateral atelectasis.  s/p 2L ns, vanco and cefepime. bp improved.    on exam , patient is lethargic, one word answers to questions, doesn't follow commands.    Earlier this month, patient had syncopal event, ecoli bacteremia and mi s/p left heart cath with TVD at , was transferred to [facility] for cabg , Cabg advised and patient refused therefore had pci impella assisted Stent to OM, Circ, Lad x2.  was also evaluated by pain management due to chronic back pain, was put on pain management regimen.    5/29- patient more awake ,today, but still confused ,unsure of baseline, denies any complaints to me, denies CP or dyspnea or abdominal pain  6/1 -c/o sever chronic lower back pain -intractable pain on dilaudid and morphine ER, no recent hx of fall prior to admission  GENERAL: NAD,awake,  chronically ill-appearing  	HEAD:  NC/AT  	EYES: EOMI, PERRLA, no scleral icterus  	HEENT: Moist mucous membranes  	NECK: Supple, No JVDCNS:  moving freely B/L upper and lower extremities; DTRs 2+ intact   	LUNG: coarse breath sounds bilaterally  	HEART: RRR; No murmurs, rubs, or gallops  	ABDOMEN: +BS, ST/ND  	GENITOURINARY- moreno in place, Bladder not distended  	EXTREMITIES:  2+ Peripheral Pulses, No clubbing, cyanosis, or edema  	MUSCULOSKELTAL- Joints normal ROM, no Muscle or joint tenderness  SKIN: petachie on located extremity bilaterally      PHYSICAL EXAM:    Daily     Daily     ICU Vital Signs Last 24 Hrs  T(C): 37.4 (01 Jun 2018 11:55), Max: 37.4 (01 Jun 2018 11:55)  T(F): 99.4 (01 Jun 2018 11:55), Max: 99.4 (01 Jun 2018 11:55)  HR: 92 (01 Jun 2018 11:55) (92 - 93)  BP: 159/80 (01 Jun 2018 11:55) (159/80 - 169/70)  BP(mean): --  ABP: --  ABP(mean): --  RR: 18 (01 Jun 2018 11:55) (18 - 18)  SpO2: 94% (01 Jun 2018 11:55) (94% - 95%)                                9.8    9.32  )-----------( 236      ( 01 Jun 2018 04:48 )             31.0       CBC Full  -  ( 01 Jun 2018 04:48 )  WBC Count : 9.32 K/uL  Hemoglobin : 9.8 g/dL  Hematocrit : 31.0 %  Platelet Count - Automated : 236 K/uL  Mean Cell Volume : 77.3 fl  Mean Cell Hemoglobin : 24.4 pg  Mean Cell Hemoglobin Concentration : 31.6 gm/dL  Auto Neutrophil # : 7.44 K/uL  Auto Lymphocyte # : 0.77 K/uL  Auto Monocyte # : 0.69 K/uL  Auto Eosinophil # : 0.33 K/uL  Auto Basophil # : 0.04 K/uL  Auto Neutrophil % : 79.9 %  Auto Lymphocyte % : 8.3 %  Auto Monocyte % : 7.4 %  Auto Eosinophil % : 3.5 %  Auto Basophil % : 0.4 %      06-01    142  |  111<H>  |  16  ----------------------------<  107<H>  3.5   |  22  |  0.65    Ca    8.5      01 Jun 2018 04:48                              MEDICATIONS  (STANDING):  aspirin 325 milliGRAM(s) Oral daily  atorvastatin 80 milliGRAM(s) Oral at bedtime  cefepime   IVPB 2000 milliGRAM(s) IV Intermittent every 12 hours  clopidogrel Tablet 75 milliGRAM(s) Oral daily  docusate sodium 100 milliGRAM(s) Oral three times a day  heparin  Injectable 5000 Unit(s) SubCutaneous every 8 hours  metoprolol tartrate 12.5 milliGRAM(s) Oral two times a day  morphine ER Tablet 30 milliGRAM(s) Oral two times a day  nystatin Powder 1 Application(s) Topical two times a day  pantoprazole    Tablet 40 milliGRAM(s) Oral before breakfast  sodium chloride 0.9%. 1000 milliLiter(s) (50 mL/Hr) IV Continuous <Continuous>  tamsulosin 0.4 milliGRAM(s) Oral at bedtime  vancomycin  IVPB 750 milliGRAM(s) IV Intermittent every 12 hours

## 2018-06-01 NOTE — PROGRESS NOTE ADULT - ASSESSMENT
73 y/o male with multiple co-morbidities- found to have mildly elevated troponins.    1. +Troponins- no CP, no dynamic EKG changes. Likely demand ischemia in setting of PNA. Cont medical mgmt. Recent echo with Normal LV fxn. Hold on   ischemic eval for now- can be done as outpt for now.     2. Sepsis 2/2 to HCAP- Cont abx, cx pending. ID following. Mgmt as per primary team.     3. YUNIOR- secondary to dehydration vs sepsis. Cr 0.8 to 3.3. Suggest renal eval. No ace/arb. Gentle IVF.     4. CAD s/p recent PCI- stable, cont asa/plavix/statin.     5. DVT proph.

## 2018-06-01 NOTE — PROGRESS NOTE ADULT - SUBJECTIVE AND OBJECTIVE BOX
Cardiology Consultation    HPI: 72 year old male with h/o chronic back pain, anemia, anxiety, BPH, depression, GERD, HTN, kyphosis, neuropathy,  E coli UTI and bacteremia, NSTEMI, urinary retention d'cd on moreno cath following previous admission was sent from nursing home due to hypoxia, hypotension and fever today.  much of history is from chart as patient is lethargic.    In ED, noted to have tracey, leukocytosis, hypotensive and bilateral atelectasis.  s/p 2L ns, vanco and cefepime. bp improved.  Earlier this month, patient had syncopal event, ecoli bacteremia and mi s/p left heart cath with TVD at , was transferred to [facility] for cabg , Cabg advised and patient refused therefore had pci impella assisted Stent to OM, Circ, Lad x2.  was also evaluated by pain management due to chronic back pain, was put on pain management regimen.    6/1- No CP/SOB. Complains of nonspecific body pain. No events last pm. Receiving ABX. Not on tele. Case d/w nursing.    PAST MEDICAL HISTORY:  as below    PAST SURGICAL HISTORY: as below    FAMILY HISTORY:   non-contributory to the patient's current presentation (29 May 2018 20:24)    PAST MEDICAL & SURGICAL HISTORY:  Coronary artery disease involving native coronary artery of native heart, angina presence unspecified  Syncope  Benign prostatic hypertrophy without urinary obstruction  Anxiety  Depression  Hypertension  Spinal stenosis  Kyphosis  Neuropathy  GERD (gastroesophageal reflux disease)  Anemia  H/O heart artery stent  S/P spinal fusion    Allergies  No Known Allergies    Intolerances  Vanilla Ensure TID (Unknown)    SOCIAL HISTORY: Denies tobacco, etoh abuse or illicit drug use    FAMILY HISTORY: No pertinent family history in first degree relatives    MEDICATIONS  (STANDING):  aspirin 325 milliGRAM(s) Oral daily  atorvastatin 80 milliGRAM(s) Oral at bedtime  cefepime   IVPB 2000 milliGRAM(s) IV Intermittent every 12 hours  clopidogrel Tablet 75 milliGRAM(s) Oral daily  docusate sodium 100 milliGRAM(s) Oral three times a day  heparin  Injectable 5000 Unit(s) SubCutaneous every 8 hours  metoprolol tartrate 12.5 milliGRAM(s) Oral two times a day  morphine ER Tablet 30 milliGRAM(s) Oral two times a day  nystatin Powder 1 Application(s) Topical two times a day  pantoprazole    Tablet 40 milliGRAM(s) Oral before breakfast  sodium chloride 0.9%. 1000 milliLiter(s) (50 mL/Hr) IV Continuous <Continuous>  tamsulosin 0.4 milliGRAM(s) Oral at bedtime  vancomycin  IVPB 750 milliGRAM(s) IV Intermittent every 12 hours    MEDICATIONS  (PRN):  acetaminophen   Tablet 650 milliGRAM(s) Oral every 6 hours PRN For Temp greater than 38 C (100.4 F)  HYDROmorphone   Tablet 2 milliGRAM(s) Oral every 8 hours PRN Moderate Pain (4 - 6)  ondansetron Injectable 4 milliGRAM(s) IV Push every 6 hours PRN Nausea and/or Vomiting      Vital Signs Last 24 Hrs  T(C): 36.7 (01 Jun 2018 04:52), Max: 36.8 (31 May 2018 16:40)  T(F): 98.1 (01 Jun 2018 04:52), Max: 98.2 (31 May 2018 16:40)  HR: 93 (01 Jun 2018 04:52) (79 - 93)  BP: 169/70 (01 Jun 2018 04:52) (129/68 - 169/70)  BP(mean): --  RR: 18 (01 Jun 2018 04:52) (17 - 18)  SpO2: 95% (01 Jun 2018 04:52) (93% - 95%)    REVIEW OF SYSTEMS:    CONSTITUTIONAL:  As per HPI.  HEENT:  Eyes:  No diplopia or blurred vision. ENT:  No earache, sore throat or runny nose.  CARDIOVASCULAR:  No pressure, squeezing, strangling, tightness, heaviness or aching about the chest, neck, axilla or epigastrium.  RESPIRATORY:  No cough, shortness of breath, PND or orthopnea.  GASTROINTESTINAL:  No nausea, vomiting or diarrhea.  GENITOURINARY:  No dysuria, frequency or urgency.  MUSCULOSKELETAL:  As per HPI.  SKIN:  No change in skin, hair or nails.  NEUROLOGIC:  No paresthesias, fasciculations, seizures or weakness.    PHYSICAL EXAMINATION:    GENERAL APPEARANCE:  Pt. is not currently dyspneic, in no distress. Pt. is alert, oriented, and pleasant.  HEENT:  Pupils are normal and react normally. No icterus. Mucous membranes well colored.  NECK:  Supple. No lymphadenopathy. Jugular venous pressure not elevated. Carotids equal.   HEART:   The cardiac impulse has a normal quality. There are no murmurs, rubs or gallops noted  CHEST:  Chest is clear to auscultation. Normal respiratory effort.  ABDOMEN:  Soft and nontender.   EXTREMITIES:  There is no edema.     I&O's Summary    31 May 2018 07:01  -  01 Jun 2018 07:00  --------------------------------------------------------  IN: 0 mL / OUT: 1450 mL / NET: -1450 mL    LABS:                        9.8    9.32  )-----------( 236      ( 01 Jun 2018 04:48 )             31.0     06-01    142  |  111<H>  |  16  ----------------------------<  107<H>  3.5   |  22  |  0.65    Ca    8.5      01 Jun 2018 04:48    CARDIAC MARKERS ( 30 May 2018 12:44 )  x     / x     / 23 U/L / x     / x        EKG: < from: 12 Lead ECG (05.29.18 @ 15:05) >  Sinus tachycardia  Right superior axis deviation  Inferior infarct (cited on or before 12-MAY-2018)  Abnormal ECG    TELEMETRY: Not on tele.     CARDIAC TESTS: < from: TTE Echo Complete w/Doppler (05.16.18 @ 09:49) >     1. Left ventricular ejection fraction, by visual estimation, is 55 to   60%. Mild apical hypokinesia   2. Normal global left ventricular systolic function.   3. Apical septal segment, apical inferior segment, and apex are abnormal   as described above.   4. Spectral Doppler shows impaired relaxation pattern of left   ventricular myocardial filling (Grade I diastolic dysfunction).   5. There is no evidence of pericardial effusion.   6. Sclerotic aortic valve with normal opening.   7. Mild aortic regurgitation.   8. Endocardial visualization was enhanced with intravenous echo contrast.    RADIOLOGY & ADDITIONAL STUDIES:< from: CT Chest No Cont (05.30.18 @ 09:07) >    Bilateral lower lobe peripheral consolidative changes with multifocal   subsegmental and segmental consolidative changes in the right upper lobe   right middle lobe and lingula new from 6/23/2017 may represent multifocal   pneumonia and/or atelectasis differential including inflammatory   processes. Recommend clinical correlation and short-term follow-up in 4-6   weeks.    ASSESSMENT & PLAN:

## 2018-06-01 NOTE — CONSULT NOTE ADULT - SUBJECTIVE AND OBJECTIVE BOX
From hospitalist note:    encephalopathy, improved  can be secondary to sepsis vs opioid intoxication du to acute renal [id number] year old male with h/o chronic back pain, anemia, anxiety, BPH, depression, GERD, HTN, kyphosis, neuropathy,  E coli UTI and bacteremia, NSTEMI, urinary retention d'cd on moreno cath following previous admission was sent from nursing home due to hypoxia, hypotension and fever In ED, noted to have tracey, leukocytosis, hypotensive and bilateral atelectasis.  s/p 2L ns, vanco and cefepime. bp improved.Earlier this month, patient had syncopal event, ecoli bacteremia and mi s/p left heart cath with TVD at , was transferred to [facility] for cabg , Cabg advised and patient refused therefore had pci impella assisted Stent to OM, Circ, Lad x2.  was also evaluated by pain management due to chronic back pain, was put on pain management regimen.      On exam patient is iritable when asked very basic questions- aoppears to have word finding difficulties  Cannot say what type of place he is in.  Knows the year but not the date.  Says he lives in some sort of facility but cannot say exactly what it is or the kind of help he receives.there.  He terminated the interview after about 10 minutes due to his apparent frustration about not being able to come up with specific answers to my questions    MEDICATIONS  (STANDING):  aspirin 325 milliGRAM(s) Oral daily  atorvastatin 80 milliGRAM(s) Oral at bedtime  cefepime   IVPB 2000 milliGRAM(s) IV Intermittent every 12 hours  clopidogrel Tablet 75 milliGRAM(s) Oral daily  docusate sodium 100 milliGRAM(s) Oral three times a day  heparin  Injectable 5000 Unit(s) SubCutaneous every 8 hours  metoprolol tartrate 12.5 milliGRAM(s) Oral two times a day  morphine ER Tablet 30 milliGRAM(s) Oral two times a day  nystatin Powder 1 Application(s) Topical two times a day  pantoprazole    Tablet 40 milliGRAM(s) Oral before breakfast  sodium chloride 0.9%. 1000 milliLiter(s) (50 mL/Hr) IV Continuous <Continuous>  tamsulosin 0.4 milliGRAM(s) Oral at bedtime  vancomycin  IVPB 750 milliGRAM(s) IV Intermittent every 12 hours    MEDICATIONS  (PRN):  acetaminophen   Tablet 650 milliGRAM(s) Oral every 6 hours PRN For Temp greater than 38 C (100.4 F)  HYDROmorphone   Tablet 2 milliGRAM(s) Oral every 8 hours PRN Moderate Pain (4 - 6)  ondansetron Injectable 4 milliGRAM(s) IV Push every 6 hours PRN Nausea and/or Vomiting      ICU Vital Signs Last 24 Hrs  T(C): 36.7 (01 Jun 2018 04:52), Max: 36.8 (31 May 2018 16:40)  T(F): 98.1 (01 Jun 2018 04:52), Max: 98.2 (31 May 2018 16:40)  HR: 93 (01 Jun 2018 04:52) (79 - 93)  BP: 169/70 (01 Jun 2018 04:52) (129/68 - 169/70)  BP(mean): --  ABP: --  ABP(mean): --  RR: 18 (01 Jun 2018 04:52) (17 - 18)  SpO2: 95% (01 Jun 2018 04:52) (93% - 95%)

## 2018-06-02 LAB
ANION GAP SERPL CALC-SCNC: 9 MMOL/L — SIGNIFICANT CHANGE UP (ref 5–17)
BASOPHILS # BLD AUTO: 0.04 K/UL — SIGNIFICANT CHANGE UP (ref 0–0.2)
BASOPHILS NFR BLD AUTO: 0.4 % — SIGNIFICANT CHANGE UP (ref 0–2)
BUN SERPL-MCNC: 11 MG/DL — SIGNIFICANT CHANGE UP (ref 7–23)
CALCIUM SERPL-MCNC: 8.3 MG/DL — LOW (ref 8.5–10.1)
CHLORIDE SERPL-SCNC: 106 MMOL/L — SIGNIFICANT CHANGE UP (ref 96–108)
CO2 SERPL-SCNC: 24 MMOL/L — SIGNIFICANT CHANGE UP (ref 22–31)
CREAT SERPL-MCNC: 0.65 MG/DL — SIGNIFICANT CHANGE UP (ref 0.5–1.3)
EOSINOPHIL # BLD AUTO: 0.35 K/UL — SIGNIFICANT CHANGE UP (ref 0–0.5)
EOSINOPHIL NFR BLD AUTO: 3.8 % — SIGNIFICANT CHANGE UP (ref 0–6)
GLUCOSE SERPL-MCNC: 130 MG/DL — HIGH (ref 70–99)
HCT VFR BLD CALC: 33.9 % — LOW (ref 39–50)
HGB BLD-MCNC: 10.9 G/DL — LOW (ref 13–17)
IMM GRANULOCYTES NFR BLD AUTO: 1.1 % — SIGNIFICANT CHANGE UP (ref 0–1.5)
LYMPHOCYTES # BLD AUTO: 0.76 K/UL — LOW (ref 1–3.3)
LYMPHOCYTES # BLD AUTO: 8.2 % — LOW (ref 13–44)
MCHC RBC-ENTMCNC: 24.4 PG — LOW (ref 27–34)
MCHC RBC-ENTMCNC: 32.2 GM/DL — SIGNIFICANT CHANGE UP (ref 32–36)
MCV RBC AUTO: 76 FL — LOW (ref 80–100)
MONOCYTES # BLD AUTO: 0.82 K/UL — SIGNIFICANT CHANGE UP (ref 0–0.9)
MONOCYTES NFR BLD AUTO: 8.9 % — SIGNIFICANT CHANGE UP (ref 2–14)
NEUTROPHILS # BLD AUTO: 7.17 K/UL — SIGNIFICANT CHANGE UP (ref 1.8–7.4)
NEUTROPHILS NFR BLD AUTO: 77.6 % — HIGH (ref 43–77)
NRBC # BLD: 0 /100 WBCS — SIGNIFICANT CHANGE UP (ref 0–0)
PLATELET # BLD AUTO: 259 K/UL — SIGNIFICANT CHANGE UP (ref 150–400)
POTASSIUM SERPL-MCNC: 3.4 MMOL/L — LOW (ref 3.5–5.3)
POTASSIUM SERPL-SCNC: 3.4 MMOL/L — LOW (ref 3.5–5.3)
RBC # BLD: 4.46 M/UL — SIGNIFICANT CHANGE UP (ref 4.2–5.8)
RBC # FLD: 19.4 % — HIGH (ref 10.3–14.5)
SODIUM SERPL-SCNC: 139 MMOL/L — SIGNIFICANT CHANGE UP (ref 135–145)
WBC # BLD: 9.24 K/UL — SIGNIFICANT CHANGE UP (ref 3.8–10.5)
WBC # FLD AUTO: 9.24 K/UL — SIGNIFICANT CHANGE UP (ref 3.8–10.5)

## 2018-06-02 PROCEDURE — 71045 X-RAY EXAM CHEST 1 VIEW: CPT | Mod: 26

## 2018-06-02 RX ORDER — MORPHINE SULFATE 50 MG/1
45 CAPSULE, EXTENDED RELEASE ORAL
Qty: 0 | Refills: 0 | Status: DISCONTINUED | OUTPATIENT
Start: 2018-06-02 | End: 2018-06-03

## 2018-06-02 RX ORDER — LIDOCAINE 4 G/100G
2 CREAM TOPICAL DAILY
Qty: 0 | Refills: 0 | Status: DISCONTINUED | OUTPATIENT
Start: 2018-06-02 | End: 2018-06-05

## 2018-06-02 RX ORDER — OXYCODONE HYDROCHLORIDE 5 MG/1
5 TABLET ORAL EVERY 4 HOURS
Qty: 0 | Refills: 0 | Status: DISCONTINUED | OUTPATIENT
Start: 2018-06-02 | End: 2018-06-05

## 2018-06-02 RX ORDER — POTASSIUM CHLORIDE 20 MEQ
40 PACKET (EA) ORAL ONCE
Qty: 0 | Refills: 0 | Status: COMPLETED | OUTPATIENT
Start: 2018-06-02 | End: 2018-06-02

## 2018-06-02 RX ADMIN — CLOPIDOGREL BISULFATE 75 MILLIGRAM(S): 75 TABLET, FILM COATED ORAL at 11:49

## 2018-06-02 RX ADMIN — CEFEPIME 100 MILLIGRAM(S): 1 INJECTION, POWDER, FOR SOLUTION INTRAMUSCULAR; INTRAVENOUS at 05:10

## 2018-06-02 RX ADMIN — NYSTATIN CREAM 1 APPLICATION(S): 100000 CREAM TOPICAL at 18:11

## 2018-06-02 RX ADMIN — MORPHINE SULFATE 30 MILLIGRAM(S): 50 CAPSULE, EXTENDED RELEASE ORAL at 05:11

## 2018-06-02 RX ADMIN — ATORVASTATIN CALCIUM 80 MILLIGRAM(S): 80 TABLET, FILM COATED ORAL at 22:09

## 2018-06-02 RX ADMIN — Medication 12.5 MILLIGRAM(S): at 05:10

## 2018-06-02 RX ADMIN — Medication 325 MILLIGRAM(S): at 11:49

## 2018-06-02 RX ADMIN — MORPHINE SULFATE 45 MILLIGRAM(S): 50 CAPSULE, EXTENDED RELEASE ORAL at 19:53

## 2018-06-02 RX ADMIN — Medication 40 MILLIEQUIVALENT(S): at 18:10

## 2018-06-02 RX ADMIN — NYSTATIN CREAM 1 APPLICATION(S): 100000 CREAM TOPICAL at 05:11

## 2018-06-02 RX ADMIN — Medication 100 MILLIGRAM(S): at 05:11

## 2018-06-02 RX ADMIN — HYDROMORPHONE HYDROCHLORIDE 2 MILLIGRAM(S): 2 INJECTION INTRAMUSCULAR; INTRAVENOUS; SUBCUTANEOUS at 08:09

## 2018-06-02 RX ADMIN — LIDOCAINE 2 PATCH: 4 CREAM TOPICAL at 13:53

## 2018-06-02 RX ADMIN — ONDANSETRON 4 MILLIGRAM(S): 8 TABLET, FILM COATED ORAL at 19:54

## 2018-06-02 RX ADMIN — TAMSULOSIN HYDROCHLORIDE 0.4 MILLIGRAM(S): 0.4 CAPSULE ORAL at 22:09

## 2018-06-02 RX ADMIN — ONDANSETRON 4 MILLIGRAM(S): 8 TABLET, FILM COATED ORAL at 05:32

## 2018-06-02 RX ADMIN — Medication 30 MILLILITER(S): at 11:51

## 2018-06-02 RX ADMIN — Medication 250 MILLIGRAM(S): at 18:10

## 2018-06-02 RX ADMIN — Medication 250 MILLIGRAM(S): at 05:10

## 2018-06-02 RX ADMIN — PANTOPRAZOLE SODIUM 40 MILLIGRAM(S): 20 TABLET, DELAYED RELEASE ORAL at 05:11

## 2018-06-02 RX ADMIN — HEPARIN SODIUM 5000 UNIT(S): 5000 INJECTION INTRAVENOUS; SUBCUTANEOUS at 13:54

## 2018-06-02 RX ADMIN — HEPARIN SODIUM 5000 UNIT(S): 5000 INJECTION INTRAVENOUS; SUBCUTANEOUS at 05:11

## 2018-06-02 RX ADMIN — MORPHINE SULFATE 45 MILLIGRAM(S): 50 CAPSULE, EXTENDED RELEASE ORAL at 18:10

## 2018-06-02 RX ADMIN — HYDROMORPHONE HYDROCHLORIDE 2 MILLIGRAM(S): 2 INJECTION INTRAMUSCULAR; INTRAVENOUS; SUBCUTANEOUS at 10:13

## 2018-06-02 RX ADMIN — CEFEPIME 100 MILLIGRAM(S): 1 INJECTION, POWDER, FOR SOLUTION INTRAMUSCULAR; INTRAVENOUS at 18:10

## 2018-06-02 RX ADMIN — HEPARIN SODIUM 5000 UNIT(S): 5000 INJECTION INTRAVENOUS; SUBCUTANEOUS at 22:09

## 2018-06-02 RX ADMIN — Medication 12.5 MILLIGRAM(S): at 18:09

## 2018-06-02 RX ADMIN — Medication 30 MILLILITER(S): at 00:56

## 2018-06-02 NOTE — PROGRESS NOTE ADULT - SUBJECTIVE AND OBJECTIVE BOX
SUBJECTIVE     Patient is some what better with the back pain and says has mild cough with no sob and not wearing the 02         PAST MEDICAL & SURGICAL HISTORY:  Coronary artery disease involving native coronary artery of native heart, angina presence unspecified  Syncope  Benign prostatic hypertrophy without urinary obstruction  Anxiety  Depression  Hypertension  Spinal stenosis  Kyphosis  Neuropathy  GERD (gastroesophageal reflux disease)  Anemia  H/O heart artery stent  S/P spinal fusion          OBJECTIVE       Vital Signs Last 24 Hrs  T(C): 36.9 (02 Jun 2018 04:48), Max: 37.4 (01 Jun 2018 11:55)  T(F): 98.5 (02 Jun 2018 04:48), Max: 99.4 (01 Jun 2018 11:55)  HR: 88 (02 Jun 2018 04:48) (88 - 101)  BP: 159/82 (02 Jun 2018 04:48) (128/83 - 162/95)  BP(mean): --  RR: 16 (02 Jun 2018 04:48) (16 - 18)  SpO2: 92% (02 Jun 2018 04:48) (92% - 96%)              PHYSICAL EXAM:    Constitutional: , awake and alert, not in distress not on 02      HEENT: Normo cephalic atraumatic    Neck: Soft and supple, No J.V.D     Respiratory: vesicular breathing  has few rales over the bases     Cardiovascular: S1 and S2, regular rate .     Gastrointestinal:  soft, nontender,     Extremities: No  edema or calf tenderness .    Neurological: No new  focal deficits.      Medications:  MEDICATIONS  (STANDING):  aspirin 325 milliGRAM(s) Oral daily  atorvastatin 80 milliGRAM(s) Oral at bedtime  cefepime   IVPB 2000 milliGRAM(s) IV Intermittent every 12 hours  clopidogrel Tablet 75 milliGRAM(s) Oral daily  docusate sodium 100 milliGRAM(s) Oral three times a day  heparin  Injectable 5000 Unit(s) SubCutaneous every 8 hours  lidocaine   Patch 2 Patch Transdermal daily  metoprolol tartrate 12.5 milliGRAM(s) Oral two times a day  morphine ER Tablet 45 milliGRAM(s) Oral two times a day  nystatin Powder 1 Application(s) Topical two times a day  pantoprazole    Tablet 40 milliGRAM(s) Oral before breakfast  sodium chloride 0.9%. 1000 milliLiter(s) (50 mL/Hr) IV Continuous <Continuous>  tamsulosin 0.4 milliGRAM(s) Oral at bedtime  vancomycin  IVPB 750 milliGRAM(s) IV Intermittent every 12 hours                                  10.9   9.24  )-----------( 259      ( 02 Jun 2018 07:11 )             33.9     06-02    139  |  106  |  11  ----------------------------<  130<H>  3.4<L>   |  24  |  0.65    Ca    8.3<L>      02 Jun 2018 07:11

## 2018-06-02 NOTE — CONSULT NOTE ADULT - ASSESSMENT
72 year old male with h/o chronic back pain, anemia, anxiety, BPH, depression, GERD, HTN, kyphosis, neuropathy,  E coli UTI and bacteremia, NSTEMI, urinary retention d'cd on moreno cath following previous admission was sent from nursing home due to hypoxia, hypotension and fever today.  much of history is from chart as patient is lethargic.      In ED, noted to have tracey, leukocytosis, hypotensive and bilateral atelectasis.  s/p 2L ns, vanco and cefepime. bp improved.    on exam , patient is lethargic, one word answers to questions, doesn't follow commands.    Earlier this month, patient had syncopal event, ecoli bacteremia and mi s/p left heart cath with TVD at , was transferred to [facility] for cabg , Cabg advised and patient refused therefore had pci impella assisted Stent to OM, Circ, Lad x2.  was also evaluated by pain management due to chronic back pain, was put on pain management regimen.    Patient admits to previous thoracolumbar fusion surgery 1 year ago in [place] without improvement of pain. Under care of Dr. Hiugera with LA Morphine and Lyrica. Patient presently on Plavix. States current regimen does not alleviate his pain.    IMP:  Chronic pain syndrome    Plan:  Increase MS Contin to 45 mg BID  Hold SA Dilaudid  Lidoderm patches to T & LS spine
- Multifocal lung consolidations with the component of segmental and subsegmental atelectasis acute verus chronic and can not exclude superimposed component of the infection with mild leucocytosis   - elevated right hemidiaphragm with component of restriction   - improved hypoxia as the hypotension and mental status improved   - AMS likely secondary to narcotics with the AKD and probable component of the infection  .  - CAD status post stent placement recently   - BPH with the chronic moreno catheter       PLAN     - continue with the antibiotics to cover for the probable pneumonia with the superimposed atelectasis and follow up of the cultures and adjust the antibiotics as per the cultures   - follow up the renal function   - cautious use multiple narcotics .  - patient sat are acceptable now with out the need for the 02   - start of oral feeding and use of incentive spirometry   - VBG results noted no gross pc02 retention   - urology follow up for the chronic moreno catheter   - dvt prophylaxis with SQ heparin .
72 year old male with h/o chronic back pain, anemia, anxiety, BPH, depression, GERD, HTN, kyphosis, neuropathy,  E coli UTI and bacteremia, NSTEMI, urinary retention d'cd on moreno cath following previous admit sent here due to hypoxia, hypotension and fever 5/29. Earlier this month, patient had syncopal event, ecoli bacteremia and mi s/p left heart cath with TVD at , was transferred to [facility] for cabg , Cabg advised and patient refused therefore had pci impella assisted Stent to OM, Circ, Lad x2.  was also evaluated by pain management due to chronic back pain, was put on pain management regimen. Here, pt afebrile, hypotensive, wbc ct 15, CT chest with R multifocal consolidations with air bronchograms, was given IV vancomycin/cefepime.     1. sepsis/hypoxic resp failure/multifocal pna/pyuria w/ urinary retention  - agree with vancomycin renally dose adjusted 1gm24h, check trough prior to 4th dose  - on cefepime 1gm daily for gnr resistant coverage  - f/u urine/blood cx  - check sputum cx  - urology eval appreciated  - moreno care  - f/u cbc  - monitor temps  - tolerating abx well so far; no side effects noted  - reason for abx use and side effects reviewed with patient  - supportive care    2. other issues - care per medicine
76 yo M with urinary retention following CABG, now on tamsulosin for several weeks. Pt should be ready for TOV when he returns to baseline after treatment of pneumonia.    Urinary Retention  - continue tamsulosin  - remove moreno for trial of void when pt is otherwise ready for discharge
Unable to determine baseline cognitive status to determine if he has a true dementia versus some cognitive impairment with superimposed encephalopathy.  Regardless, cognition is impaired to the extent that he does not have capacity to make appropriate decisions regarding his medical treatment..  Hospital is therefore planning to pursue guardianship as appropriate as there are no avaialbale contacts for substituted judgment.

## 2018-06-02 NOTE — PROGRESS NOTE ADULT - ASSESSMENT
- Multifocal lung consolidations with the component of segmental and subsegmental atelectasis with the pneumonia   - elevated right hemidiaphragm with component of restriction   - improved hypoxia as the hypotension and mental status improved   - CAD status post stent placement recently   - BPH with the chronic moreno catheter       PLAN     - continue with the antibiotics to cover for the probable pneumonia with the superimposed atelectasis and follow up of the cultures and adjust the antibiotics as per the cultures   - follow up the renal function   - cautious use multiple narcotics .  - pain management for the back pain as per the medicine  - would repeat cxr for the follow up pneumonia

## 2018-06-02 NOTE — CONSULT NOTE ADULT - SUBJECTIVE AND OBJECTIVE BOX
HPI:  72 year old male with h/o chronic back pain, anemia, anxiety, BPH, depression, GERD, HTN, kyphosis, neuropathy,  E coli UTI and bacteremia, NSTEMI, urinary retention d'cd on moreno cath following previous admission was sent from nursing home due to hypoxia, hypotension and fever today.  much of history is from chart as patient is lethargic.      In ED, noted to have tracey, leukocytosis, hypotensive and bilateral atelectasis.  s/p 2L ns, vanco and cefepime. bp improved.    on exam , patient is lethargic, one word answers to questions, doesn't follow commands.    Earlier this month, patient had syncopal event, ecoli bacteremia and mi s/p left heart cath with TVD at , was transferred to [facility] for cabg , Cabg advised and patient refused therefore had pci impella assisted Stent to OM, Circ, Lad x2.  was also evaluated by pain management due to chronic back pain, was put on pain management regimen.    Patient admits to previous thoracolumbar fusion surgery 1 year ago in [place] without improvement of pain. Under care of Dr. Higuera with LA Morphine and Lyrica. Patient presently on Plavix. States current regimen does not alleviate his pain.      PAST MEDICAL & SURGICAL HISTORY:  Coronary artery disease involving native coronary artery of native heart, angina presence unspecified  Syncope  Benign prostatic hypertrophy without urinary obstruction  Anxiety  Depression  Hypertension  Spinal stenosis  Kyphosis  Neuropathy  GERD (gastroesophageal reflux disease)  Anemia  H/O heart artery stent  S/P spinal fusion  MEDICATIONS  (STANDING):  aspirin 325 milliGRAM(s) Oral daily  atorvastatin 80 milliGRAM(s) Oral at bedtime  cefepime   IVPB 2000 milliGRAM(s) IV Intermittent every 12 hours  clopidogrel Tablet 75 milliGRAM(s) Oral daily  docusate sodium 100 milliGRAM(s) Oral three times a day  heparin  Injectable 5000 Unit(s) SubCutaneous every 8 hours  metoprolol tartrate 12.5 milliGRAM(s) Oral two times a day  morphine ER Tablet 30 milliGRAM(s) Oral two times a day  nystatin Powder 1 Application(s) Topical two times a day  pantoprazole    Tablet 40 milliGRAM(s) Oral before breakfast  sodium chloride 0.9%. 1000 milliLiter(s) (50 mL/Hr) IV Continuous <Continuous>  tamsulosin 0.4 milliGRAM(s) Oral at bedtime  vancomycin  IVPB 750 milliGRAM(s) IV Intermittent every 12 hours    MEDICATIONS  (STANDING):  aspirin 325 milliGRAM(s) Oral daily  atorvastatin 80 milliGRAM(s) Oral at bedtime  cefepime   IVPB 2000 milliGRAM(s) IV Intermittent every 12 hours  clopidogrel Tablet 75 milliGRAM(s) Oral daily  docusate sodium 100 milliGRAM(s) Oral three times a day  heparin  Injectable 5000 Unit(s) SubCutaneous every 8 hours  metoprolol tartrate 12.5 milliGRAM(s) Oral two times a day  morphine ER Tablet 30 milliGRAM(s) Oral two times a day  nystatin Powder 1 Application(s) Topical two times a day  pantoprazole    Tablet 40 milliGRAM(s) Oral before breakfast  sodium chloride 0.9%. 1000 milliLiter(s) (50 mL/Hr) IV Continuous <Continuous>  tamsulosin 0.4 milliGRAM(s) Oral at bedtime  vancomycin  IVPB 750 milliGRAM(s) IV Intermittent every 12 hours    MEDICATIONS  (PRN):  acetaminophen   Tablet 650 milliGRAM(s) Oral every 6 hours PRN For Temp greater than 38 C (100.4 F)  HYDROmorphone   Tablet 2 milliGRAM(s) Oral every 8 hours PRN Moderate Pain (4 - 6)  ondansetron Injectable 4 milliGRAM(s) IV Push every 6 hours PRN Nausea and/or Vomiting    Allergies    No Known Allergies    Intolerances    Vanilla Ensure TID (Unknown)    SH: retried, , no EtOH  FH: not contributory to present illness  ROS: as per HPI    PHYSICAL EXAM:    Vital Signs Last 24 Hrs  T(C): 36.9 (02 Jun 2018 04:48), Max: 37.4 (01 Jun 2018 11:55)  T(F): 98.5 (02 Jun 2018 04:48), Max: 99.4 (01 Jun 2018 11:55)  HR: 88 (02 Jun 2018 04:48) (88 - 101)  BP: 159/82 (02 Jun 2018 04:48) (128/83 - 162/95)  BP(mean): --  RR: 16 (02 Jun 2018 04:48) (16 - 18)  SpO2: 92% (02 Jun 2018 04:48) (92% - 96%)    Constitutional: patietn awake, answers appropriately, complaining of back pain  Eyes: PERRLA, EOM/I  ENMT: unremarkable  Neck: supple, no tenderness, thyroid nonpalpable  Back: well healed thoracolumbar surgical scar, tender lumbar spine with painful change in position  Respiratory: clear  Cardiovascular: RRR  Gastrointestinal: soft, NT, benign  Genitourinary: moreno  Extremities: no edema, cyanosis or clubbong  Vascular: normal  Neurological: A & O X2, no focal motor deficits LEs  Skin: clear  Lymph Nodes: negative  Musculoskeletal: no joint tenderness or swelling      STUDIES  CT chest from current admission with instrumented fusion to upper thoracic spine with compression deformity proximal vertebral body and loose hardware(T4or5)and screw in disc spaces  CT LS spine from 12/17 demonstrated instrumented fusin into pelvis without distal HPI:  72 year old male with h/o chronic back pain, anemia, anxiety, BPH, depression, GERD, HTN, kyphosis, neuropathy,  E coli UTI and bacteremia, NSTEMI, urinary retention d'cd on moreno cath following previous admission was sent from nursing home due to hypoxia, hypotension and fever today.  much of history is from chart as patient is lethargic.      In ED, noted to have tracey, leukocytosis, hypotensive and bilateral atelectasis.  s/p 2L ns, vanco and cefepime. bp improved.    on exam , patient is lethargic, one word answers to questions, doesn't follow commands.    Earlier this month, patient had syncopal event, ecoli bacteremia and mi s/p left heart cath with TVD at , was transferred to [facility] for cabg , Cabg advised and patient refused therefore had pci impella assisted Stent to OM, Circ, Lad x2.  was also evaluated by pain management due to chronic back pain, was put on pain management regimen.    Patient admits to previous thoracolumbar fusion surgery 1 year ago in [place] without improvement of pain. Under care of Dr. Higuera with LA Morphine and Lyrica. Patient presently on Plavix. States current regimen does not alleviate his pain.      PAST MEDICAL & SURGICAL HISTORY:  Coronary artery disease involving native coronary artery of native heart, angina presence unspecified  Syncope  Benign prostatic hypertrophy without urinary obstruction  Anxiety  Depression  Hypertension  Spinal stenosis  Kyphosis  Neuropathy  GERD (gastroesophageal reflux disease)  Anemia  H/O heart artery stent  S/P spinal fusion  MEDICATIONS  (STANDING):  aspirin 325 milliGRAM(s) Oral daily  atorvastatin 80 milliGRAM(s) Oral at bedtime  cefepime   IVPB 2000 milliGRAM(s) IV Intermittent every 12 hours  clopidogrel Tablet 75 milliGRAM(s) Oral daily  docusate sodium 100 milliGRAM(s) Oral three times a day  heparin  Injectable 5000 Unit(s) SubCutaneous every 8 hours  metoprolol tartrate 12.5 milliGRAM(s) Oral two times a day  morphine ER Tablet 30 milliGRAM(s) Oral two times a day  nystatin Powder 1 Application(s) Topical two times a day  pantoprazole    Tablet 40 milliGRAM(s) Oral before breakfast  sodium chloride 0.9%. 1000 milliLiter(s) (50 mL/Hr) IV Continuous <Continuous>  tamsulosin 0.4 milliGRAM(s) Oral at bedtime  vancomycin  IVPB 750 milliGRAM(s) IV Intermittent every 12 hours    MEDICATIONS  (STANDING):  aspirin 325 milliGRAM(s) Oral daily  atorvastatin 80 milliGRAM(s) Oral at bedtime  cefepime   IVPB 2000 milliGRAM(s) IV Intermittent every 12 hours  clopidogrel Tablet 75 milliGRAM(s) Oral daily  docusate sodium 100 milliGRAM(s) Oral three times a day  heparin  Injectable 5000 Unit(s) SubCutaneous every 8 hours  metoprolol tartrate 12.5 milliGRAM(s) Oral two times a day  morphine ER Tablet 30 milliGRAM(s) Oral two times a day  nystatin Powder 1 Application(s) Topical two times a day  pantoprazole    Tablet 40 milliGRAM(s) Oral before breakfast  sodium chloride 0.9%. 1000 milliLiter(s) (50 mL/Hr) IV Continuous <Continuous>  tamsulosin 0.4 milliGRAM(s) Oral at bedtime  vancomycin  IVPB 750 milliGRAM(s) IV Intermittent every 12 hours    MEDICATIONS  (PRN):  acetaminophen   Tablet 650 milliGRAM(s) Oral every 6 hours PRN For Temp greater than 38 C (100.4 F)  HYDROmorphone   Tablet 2 milliGRAM(s) Oral every 8 hours PRN Moderate Pain (4 - 6)  ondansetron Injectable 4 milliGRAM(s) IV Push every 6 hours PRN Nausea and/or Vomiting    Allergies    No Known Allergies    Intolerances    Vanilla Ensure TID (Unknown)    SH: retried, , no EtOH  FH: not contributory to present illness  ROS: as per HPI    PHYSICAL EXAM:    Vital Signs Last 24 Hrs  T(C): 36.9 (02 Jun 2018 04:48), Max: 37.4 (01 Jun 2018 11:55)  T(F): 98.5 (02 Jun 2018 04:48), Max: 99.4 (01 Jun 2018 11:55)  HR: 88 (02 Jun 2018 04:48) (88 - 101)  BP: 159/82 (02 Jun 2018 04:48) (128/83 - 162/95)  BP(mean): --  RR: 16 (02 Jun 2018 04:48) (16 - 18)  SpO2: 92% (02 Jun 2018 04:48) (92% - 96%)    Constitutional: patietn awake, answers appropriately, complaining of back pain  Eyes: PERRLA, EOM/I  ENMT: unremarkable  Neck: supple, no tenderness, thyroid nonpalpable  Back: well healed thoracolumbar surgical scar, tender lumbar spine with painful change in position  Respiratory: clear  Cardiovascular: RRR  Gastrointestinal: soft, NT, benign  Genitourinary: moreno  Extremities: no edema, cyanosis or clubbong  Vascular: normal  Neurological: A & O X2, chronic weakness L DF otherwise no focal motor deficits LEs  Skin: clear  Lymph Nodes: negative  Musculoskeletal: no joint tenderness or swelling      STUDIES  CT chest from current admission with instrumented fusion to upper thoracic spine with compression deformity proximal vertebral body and loose hardware(T4or5)and screw in disc spaces  CT LS spine from 12/17 demonstrated instrumented fusin into pelvis without distal

## 2018-06-02 NOTE — PROGRESS NOTE ADULT - ASSESSMENT
· Assessment		  73 y/o male with multiple co-morbidities    sepsis 2/2 to hcap/multifocal PNA,suspected GNR/MRSA   hemodynamically stable  -admit to med-surg  -cont/cefepime, renally dosed, Iv vanco  -id consult appreciated   -pulm consult appreciated   -cblood cx and urine cx neg  -strict i/o   -Ct chest shows multifocal PNA  swallow eval- puree diet  repeat CXR 6/2 ordered for f/u  pna    encephalopathy,appears resolved   can be secondary to sepsis vs opioid intoxication du to acute renal failure  -cont abx   continue pain meds readjusted as per pain medicine   CT head neg      acute renal failure resolved   can be secondary to dehydration vs sepsis, s/p 2L NS in ED and maintenance IVF inpatient  -avoid nephrotoxic meds   s/p IVF   puree diet  check BMP in am      hypoalbuminemia   -nutrition consult     # hx chronic  diastolic heart failure EF 55-60% may /2018%/doubt acute decompensation     elevated troponin, can be secondary to acute renal failure vs demand ischemia  no chest pain   ischemic eval as outpatient per cardio- cardio consult appreciated     #urinary retention, indwelling moreno cath in place   voiding trial prior to discharge  -urology consult appreciated      chronic back pain /clinically no signs of cord compression  med readjusted by pain management  pain management consult appreciated     #cad s/p coronary stent placment 5/2018 (refused CABG at [facility])  -cont asa, plavix, bb, statin    anemia, chronic, no clinical signs  of gross bleeding  -monitor H/H    dvt prophylaxis   -heparin sc

## 2018-06-02 NOTE — PROGRESS NOTE ADULT - SUBJECTIVE AND OBJECTIVE BOX
Reason for Admission: fever	  History of Present Illness: 	  72 year old male with h/o chronic back pain, anemia, anxiety, BPH, depression, GERD, HTN, kyphosis, neuropathy,  E coli UTI and bacteremia, NSTEMI, urinary retention d'cd on moreno cath following previous admission was sent from nursing home due to hypoxia, hypotension and fever today.  much of history is from chart as patient is lethargic.      In ED, noted to have tracey, leukocytosis, hypotensive and bilateral atelectasis.  s/p 2L ns, vanco and cefepime. bp improved.    on exam , patient is lethargic, one word answers to questions, doesn't follow commands.    Earlier this month, patient had syncopal event, ecoli bacteremia and mi s/p left heart cath with TVD at , was transferred to [facility] for cabg , Cabg advised and patient refused therefore had pci impella assisted Stent to OM, Circ, Lad x2.  was also evaluated by pain management due to chronic back pain, was put on pain management regimen.    5/29- patient more awake ,today, but still confused ,unsure of baseline, denies any complaints to me, denies CP or dyspnea or abdominal pain  6/1 -c/o sever chronic lower back pain -intractable pain on dilaudid and morphine ER, no recent hx of fall prior to admission  6/2- back pain better controlled today, denies any SOB or chest pain or abdominal pain ,reports feels better today  GENERAL: NAD,awake,alert  chronically ill-appearing  	HEAD:  NC/AT  	EYES: EOMI, PERRLA, no scleral icterus  	HEENT: Moist mucous membranes  	NECK: Supple, No JVDCNS:  moving freely B/L upper and lower extremities; DTRs 2+ intact   	LUNG: coarse breath sounds bilaterally improving  	HEART: RRR; No murmurs, rubs, or gallops  	ABDOMEN: +BS, ST/ND  	GENITOURINARY- moreno in place, Bladder not distended  	EXTREMITIES:  2+ Peripheral Pulses, No clubbing, cyanosis, or edema  	MUSCULOSKELTAL- Joints normal ROM, no Muscle or joint tenderness      PHYSICAL EXAM:    Daily     Daily     ICU Vital Signs Last 24 Hrs  T(C): 36.3 (02 Jun 2018 11:45), Max: 36.9 (02 Jun 2018 04:48)  T(F): 97.4 (02 Jun 2018 11:45), Max: 98.5 (02 Jun 2018 04:48)  HR: 93 (02 Jun 2018 11:45) (88 - 101)  BP: 148/84 (02 Jun 2018 11:45) (128/83 - 162/95)  BP(mean): --  ABP: --  ABP(mean): --  RR: 17 (02 Jun 2018 11:45) (16 - 18)  SpO2: 94% (02 Jun 2018 11:45) (92% - 96%)                                10.9   9.24  )-----------( 259      ( 02 Jun 2018 07:11 )             33.9       CBC Full  -  ( 02 Jun 2018 07:11 )  WBC Count : 9.24 K/uL  Hemoglobin : 10.9 g/dL  Hematocrit : 33.9 %  Platelet Count - Automated : 259 K/uL  Mean Cell Volume : 76.0 fl  Mean Cell Hemoglobin : 24.4 pg  Mean Cell Hemoglobin Concentration : 32.2 gm/dL  Auto Neutrophil # : 7.17 K/uL  Auto Lymphocyte # : 0.76 K/uL  Auto Monocyte # : 0.82 K/uL  Auto Eosinophil # : 0.35 K/uL  Auto Basophil # : 0.04 K/uL  Auto Neutrophil % : 77.6 %  Auto Lymphocyte % : 8.2 %  Auto Monocyte % : 8.9 %  Auto Eosinophil % : 3.8 %  Auto Basophil % : 0.4 %      06-02    139  |  106  |  11  ----------------------------<  130<H>  3.4<L>   |  24  |  0.65    Ca    8.3<L>      02 Jun 2018 07:11                              MEDICATIONS  (STANDING):  aspirin 325 milliGRAM(s) Oral daily  atorvastatin 80 milliGRAM(s) Oral at bedtime  cefepime   IVPB 2000 milliGRAM(s) IV Intermittent every 12 hours  clopidogrel Tablet 75 milliGRAM(s) Oral daily  docusate sodium 100 milliGRAM(s) Oral three times a day  heparin  Injectable 5000 Unit(s) SubCutaneous every 8 hours  lidocaine   Patch 2 Patch Transdermal daily  metoprolol tartrate 12.5 milliGRAM(s) Oral two times a day  morphine ER Tablet 45 milliGRAM(s) Oral two times a day  nystatin Powder 1 Application(s) Topical two times a day  pantoprazole    Tablet 40 milliGRAM(s) Oral before breakfast  potassium chloride   Powder 40 milliEquivalent(s) Oral once  sodium chloride 0.9%. 1000 milliLiter(s) (50 mL/Hr) IV Continuous <Continuous>  tamsulosin 0.4 milliGRAM(s) Oral at bedtime  vancomycin  IVPB 750 milliGRAM(s) IV Intermittent every 12 hours

## 2018-06-03 LAB
ANION GAP SERPL CALC-SCNC: 7 MMOL/L — SIGNIFICANT CHANGE UP (ref 5–17)
BASOPHILS # BLD AUTO: 0.05 K/UL — SIGNIFICANT CHANGE UP (ref 0–0.2)
BASOPHILS NFR BLD AUTO: 0.5 % — SIGNIFICANT CHANGE UP (ref 0–2)
BUN SERPL-MCNC: 12 MG/DL — SIGNIFICANT CHANGE UP (ref 7–23)
CALCIUM SERPL-MCNC: 8.5 MG/DL — SIGNIFICANT CHANGE UP (ref 8.5–10.1)
CHLORIDE SERPL-SCNC: 105 MMOL/L — SIGNIFICANT CHANGE UP (ref 96–108)
CK SERPL-CCNC: 18 U/L — LOW (ref 26–308)
CK SERPL-CCNC: 21 U/L — LOW (ref 26–308)
CO2 SERPL-SCNC: 26 MMOL/L — SIGNIFICANT CHANGE UP (ref 22–31)
CREAT SERPL-MCNC: 0.76 MG/DL — SIGNIFICANT CHANGE UP (ref 0.5–1.3)
CULTURE RESULTS: SIGNIFICANT CHANGE UP
CULTURE RESULTS: SIGNIFICANT CHANGE UP
EOSINOPHIL # BLD AUTO: 0.46 K/UL — SIGNIFICANT CHANGE UP (ref 0–0.5)
EOSINOPHIL NFR BLD AUTO: 4.3 % — SIGNIFICANT CHANGE UP (ref 0–6)
GLUCOSE SERPL-MCNC: 126 MG/DL — HIGH (ref 70–99)
HCT VFR BLD CALC: 34.2 % — LOW (ref 39–50)
HGB BLD-MCNC: 10.8 G/DL — LOW (ref 13–17)
IMM GRANULOCYTES NFR BLD AUTO: 1.5 % — SIGNIFICANT CHANGE UP (ref 0–1.5)
LYMPHOCYTES # BLD AUTO: 0.99 K/UL — LOW (ref 1–3.3)
LYMPHOCYTES # BLD AUTO: 9.3 % — LOW (ref 13–44)
MCHC RBC-ENTMCNC: 24.3 PG — LOW (ref 27–34)
MCHC RBC-ENTMCNC: 31.6 GM/DL — LOW (ref 32–36)
MCV RBC AUTO: 77 FL — LOW (ref 80–100)
MONOCYTES # BLD AUTO: 0.83 K/UL — SIGNIFICANT CHANGE UP (ref 0–0.9)
MONOCYTES NFR BLD AUTO: 7.8 % — SIGNIFICANT CHANGE UP (ref 2–14)
NEUTROPHILS # BLD AUTO: 8.19 K/UL — HIGH (ref 1.8–7.4)
NEUTROPHILS NFR BLD AUTO: 76.6 % — SIGNIFICANT CHANGE UP (ref 43–77)
NRBC # BLD: 0 /100 WBCS — SIGNIFICANT CHANGE UP (ref 0–0)
PLATELET # BLD AUTO: 249 K/UL — SIGNIFICANT CHANGE UP (ref 150–400)
POTASSIUM SERPL-MCNC: 3.9 MMOL/L — SIGNIFICANT CHANGE UP (ref 3.5–5.3)
POTASSIUM SERPL-SCNC: 3.9 MMOL/L — SIGNIFICANT CHANGE UP (ref 3.5–5.3)
RBC # BLD: 4.44 M/UL — SIGNIFICANT CHANGE UP (ref 4.2–5.8)
RBC # FLD: 19.9 % — HIGH (ref 10.3–14.5)
SODIUM SERPL-SCNC: 138 MMOL/L — SIGNIFICANT CHANGE UP (ref 135–145)
SPECIMEN SOURCE: SIGNIFICANT CHANGE UP
SPECIMEN SOURCE: SIGNIFICANT CHANGE UP
TROPONIN I SERPL-MCNC: 0.07 NG/ML — HIGH (ref 0.01–0.04)
TROPONIN I SERPL-MCNC: 0.08 NG/ML — HIGH (ref 0.01–0.04)
WBC # BLD: 10.68 K/UL — HIGH (ref 3.8–10.5)
WBC # FLD AUTO: 10.68 K/UL — HIGH (ref 3.8–10.5)

## 2018-06-03 PROCEDURE — 93010 ELECTROCARDIOGRAM REPORT: CPT

## 2018-06-03 RX ORDER — MORPHINE SULFATE 50 MG/1
30 CAPSULE, EXTENDED RELEASE ORAL
Qty: 0 | Refills: 0 | Status: DISCONTINUED | OUTPATIENT
Start: 2018-06-03 | End: 2018-06-05

## 2018-06-03 RX ORDER — MORPHINE SULFATE 50 MG/1
30 CAPSULE, EXTENDED RELEASE ORAL
Qty: 0 | Refills: 0 | Status: DISCONTINUED | OUTPATIENT
Start: 2018-06-03 | End: 2018-06-03

## 2018-06-03 RX ADMIN — ONDANSETRON 4 MILLIGRAM(S): 8 TABLET, FILM COATED ORAL at 10:10

## 2018-06-03 RX ADMIN — CLOPIDOGREL BISULFATE 75 MILLIGRAM(S): 75 TABLET, FILM COATED ORAL at 11:38

## 2018-06-03 RX ADMIN — Medication 30 MILLILITER(S): at 14:08

## 2018-06-03 RX ADMIN — ONDANSETRON 4 MILLIGRAM(S): 8 TABLET, FILM COATED ORAL at 19:22

## 2018-06-03 RX ADMIN — MORPHINE SULFATE 30 MILLIGRAM(S): 50 CAPSULE, EXTENDED RELEASE ORAL at 19:15

## 2018-06-03 RX ADMIN — Medication 12.5 MILLIGRAM(S): at 17:20

## 2018-06-03 RX ADMIN — OXYCODONE HYDROCHLORIDE 5 MILLIGRAM(S): 5 TABLET ORAL at 21:30

## 2018-06-03 RX ADMIN — OXYCODONE HYDROCHLORIDE 5 MILLIGRAM(S): 5 TABLET ORAL at 08:54

## 2018-06-03 RX ADMIN — CEFEPIME 100 MILLIGRAM(S): 1 INJECTION, POWDER, FOR SOLUTION INTRAMUSCULAR; INTRAVENOUS at 17:22

## 2018-06-03 RX ADMIN — OXYCODONE HYDROCHLORIDE 5 MILLIGRAM(S): 5 TABLET ORAL at 14:02

## 2018-06-03 RX ADMIN — MORPHINE SULFATE 30 MILLIGRAM(S): 50 CAPSULE, EXTENDED RELEASE ORAL at 17:20

## 2018-06-03 RX ADMIN — CEFEPIME 100 MILLIGRAM(S): 1 INJECTION, POWDER, FOR SOLUTION INTRAMUSCULAR; INTRAVENOUS at 05:18

## 2018-06-03 RX ADMIN — PANTOPRAZOLE SODIUM 40 MILLIGRAM(S): 20 TABLET, DELAYED RELEASE ORAL at 05:18

## 2018-06-03 RX ADMIN — Medication 325 MILLIGRAM(S): at 11:37

## 2018-06-03 RX ADMIN — Medication 250 MILLIGRAM(S): at 19:15

## 2018-06-03 RX ADMIN — Medication 250 MILLIGRAM(S): at 05:18

## 2018-06-03 RX ADMIN — ATORVASTATIN CALCIUM 80 MILLIGRAM(S): 80 TABLET, FILM COATED ORAL at 21:29

## 2018-06-03 RX ADMIN — NYSTATIN CREAM 1 APPLICATION(S): 100000 CREAM TOPICAL at 05:18

## 2018-06-03 RX ADMIN — Medication 12.5 MILLIGRAM(S): at 05:18

## 2018-06-03 RX ADMIN — TAMSULOSIN HYDROCHLORIDE 0.4 MILLIGRAM(S): 0.4 CAPSULE ORAL at 21:29

## 2018-06-03 RX ADMIN — HEPARIN SODIUM 5000 UNIT(S): 5000 INJECTION INTRAVENOUS; SUBCUTANEOUS at 13:24

## 2018-06-03 RX ADMIN — HEPARIN SODIUM 5000 UNIT(S): 5000 INJECTION INTRAVENOUS; SUBCUTANEOUS at 05:18

## 2018-06-03 RX ADMIN — LIDOCAINE 2 PATCH: 4 CREAM TOPICAL at 02:42

## 2018-06-03 RX ADMIN — HEPARIN SODIUM 5000 UNIT(S): 5000 INJECTION INTRAVENOUS; SUBCUTANEOUS at 21:29

## 2018-06-03 RX ADMIN — NYSTATIN CREAM 1 APPLICATION(S): 100000 CREAM TOPICAL at 17:22

## 2018-06-03 NOTE — PROGRESS NOTE ADULT - SUBJECTIVE AND OBJECTIVE BOX
RN called to report that pt was c/o chest pain for few minutes. JACKI panel and EKG ordered. Trop1: resulted as 0.078 which is trending down as compare to Troponin on 05/29. Chest pain resolved in few minutes.   C/w ASA/Plavix/BB/Statin  F/u next Troponin - If trending up or any further c/o chest pain,  consider to transfer patient to Telemetry.     Hospitalist to f/u in AM.

## 2018-06-03 NOTE — PROGRESS NOTE ADULT - SUBJECTIVE AND OBJECTIVE BOX
SUBJECTIVE   Patient has an episode of chest pain and sleeping now and wakes up and currently denies any sob of fever or cough   followup cxr noted findings some some improvement in the left lower lobe infiltrates         PAST MEDICAL & SURGICAL HISTORY:  Coronary artery disease involving native coronary artery of native heart, angina presence unspecified  Syncope  Benign prostatic hypertrophy without urinary obstruction  Anxiety  Depression  Hypertension  Spinal stenosis  Kyphosis  Neuropathy  GERD (gastroesophageal reflux disease)  Anemia  H/O heart artery stent  S/P spinal fusion          OBJECTIVE       Vital Signs Last 24 Hrs  T(C): 36.5 (03 Jun 2018 11:46), Max: 36.6 (02 Jun 2018 22:00)  T(F): 97.7 (03 Jun 2018 11:46), Max: 97.9 (03 Jun 2018 04:55)  HR: 89 (03 Jun 2018 11:46) (89 - 106)  BP: 140/74 (03 Jun 2018 11:46) (128/78 - 162/83)  BP(mean): --  RR: 18 (03 Jun 2018 11:46) (17 - 18)  SpO2: 96% (03 Jun 2018 11:46) (93% - 99%)        Daily     Daily       I&O's Summary    02 Jun 2018 07:01  -  03 Jun 2018 07:00  --------------------------------------------------------  IN: 100 mL / OUT: 900 mL / NET: -800 mL          PHYSICAL EXAM:    Constitutional: , awake and alert, not in distress.     HEENT: Normo cephalic atraumatic    Neck: Soft and supple, No J.V.D     Respiratory: vesicular breathing has poor respiratory effort with the rales over the bases    Cardiovascular: S1 and S2, regular rate .     Gastrointestinal:  soft, nontender,     Extremities: No  edema or calf tenderness .    Neurological: No new  focal deficits.      Medications:  MEDICATIONS  (STANDING):  aspirin 325 milliGRAM(s) Oral daily  atorvastatin 80 milliGRAM(s) Oral at bedtime  cefepime   IVPB 2000 milliGRAM(s) IV Intermittent every 12 hours  clopidogrel Tablet 75 milliGRAM(s) Oral daily  docusate sodium 100 milliGRAM(s) Oral three times a day  heparin  Injectable 5000 Unit(s) SubCutaneous every 8 hours  lidocaine   Patch 2 Patch Transdermal daily  metoprolol tartrate 12.5 milliGRAM(s) Oral two times a day  morphine ER Tablet 45 milliGRAM(s) Oral two times a day  nystatin Powder 1 Application(s) Topical two times a day  pantoprazole    Tablet 40 milliGRAM(s) Oral before breakfast  tamsulosin 0.4 milliGRAM(s) Oral at bedtime  vancomycin  IVPB 750 milliGRAM(s) IV Intermittent every 12 hours                                  10.8   10.68 )-----------( 249      ( 03 Jun 2018 07:07 )             34.2     06-03    138  |  105  |  12  ----------------------------<  126<H>  3.9   |  26  |  0.76    Ca    8.5      03 Jun 2018 07:07

## 2018-06-03 NOTE — PROGRESS NOTE ADULT - ASSESSMENT
- Multifocal lung consolidations with the component of segmental and subsegmental atelectasis with the pneumonia   - elevated right hemidiaphragm with component of restriction   - improved hypoxia as the hypotension and mental status improved   - CAD status post stent placement recently   - BPH with the chronic moreno catheter   - episode of chest pain       PLAN     - continue with the antibiotics currently on cefepime and vancomycin  and cxr noted some improvement in the left lung infiltrates and consider discontinue the vancomycin after the I.D follow up   - follow up the renal function which show improved cr   - cautious use multiple narcotics and cxr noted to have significant stool in the bowels   - pain management for the back pain including new episode of chest discomfort   - follow up of the official cxr with the radiology   - mobilization and might need laxatives if constipated

## 2018-06-03 NOTE — PROGRESS NOTE ADULT - ASSESSMENT
72 year old male with h/o chronic back pain, anemia, anxiety, BPH, depression, GERD, HTN, kyphosis, neuropathy,  E coli UTI and bacteremia, NSTEMI, urinary retention d'cd on moreno cath following previous admission was sent from nursing home due to hypoxia, hypotension and fever today.  much of history is from chart as patient is lethargic.      In ED, noted to have tracey, leukocytosis, hypotensive and bilateral atelectasis.  s/p 2L ns, vanco and cefepime. bp improved.    on exam , patient is lethargic, one word answers to questions, doesn't follow commands.    Earlier this month, patient had syncopal event, ecoli bacteremia and mi s/p left heart cath with TVD at , was transferred to [facility] for cabg , Cabg advised and patient refused therefore had pci impella assisted Stent to OM, Circ, Lad x2.  was also evaluated by pain management due to chronic back pain, was put on pain management regimen.    Patient admits to previous thoracolumbar fusion surgery 1 year ago in [place] without improvement of pain. Under care of Dr. Higuera with LA Morphine and Lyrica. Patient presently on Plavix. States current regimen does not alleviate his pain.    IMP:  Chronic pain syndrome    Plan:  Increase MS Contin to 45 mg BID  Hold SA Dilaudid  Lidoderm patches to T & LS spine

## 2018-06-03 NOTE — PROGRESS NOTE ADULT - ASSESSMENT
· Assessment		  71 y/o male with multiple co-morbidities    sepsis 2/2 to hcap/multifocal PNA,suspected GNR/MRSA   hemodynamically stable  -admit to med-surg  -cont/cefepime, renally dosed, Iv vanco  -to consider d/c IV vanco after ID f/u - per pulm  -cblood cx and urine cx neg  -strict i/o   -Ct chest shows multifocal PNA  swallow eval- puree diet  repeat CXR 6/2-slightly improved left lung infitrates   -id consult appreciated   -pulm consult appreciated     #encephalopathy,appears resolved   can be secondary to sepsis vs opioid intoxication  and du to acute renal failure  -cont abx   continue pain meds readjusted as per pain medicine   CT head neg    #elevated troponin upon admission , can be secondary to acute renal failure vs demand ischemia    further ischemic eval as outpatient per cardio- cardio consult appreciated   s/p chest pain resolved  6/3/18 unlikely cardiac /likely muculoskeletal and PNA  troponins continue to trend down     ##cad s/p coronary stent placment 5/2018 (refused CABG at [facility])  -cont asa, plavix, bb, statin      #acute renal failure resolved    secondary to dehydration vs sepsis, s/p 2L NS in ED and maintenance IVF inpatient  -avoid nephrotoxic meds   s/p IVF   puree diet  check BMP in am      #hypoalbuminemia   -nutrition consulted    # hx chronic  diastolic heart failure EF 55-60% May 2018/stable   continue statin ,asa, plavix, BB        #urinary retention, indwelling moreno cath in place   voiding trial prior to discharge  -urology consult appreciated  -     #chronic back pain /clinically no signs of cord compression  med readjusted by pain management  on morphine 45ER BID  percocet  prnfor breakthrough pain  lidoderm patch  pain management consult appreciated     # on CXR- stool in bowel- liekly constipation from opiates  will start bowel regimen          anemia, chronic, no clinical signs  of gross bleeding  -monitor H/H  if stable needs outpatient work up    dvt prophylaxis   -heparin sc    Dispostion- D/C to assisted living vs rehab pending switch to po abx by ID/Pulm, needs voiding trial prior to discharge · Assessment		  71 y/o male with multiple co-morbidities    sepsis 2/2 to hcap/multifocal PNA,suspected GNR/MRSA   hemodynamically stable  -admit to med-surg  -cont/cefepime, renally dosed, Iv vanco  -to consider d/c IV vanco after ID f/u - per pulm  -cblood cx and urine cx neg  -strict i/o   -Ct chest shows multifocal PNA  swallow eval- puree diet  repeat CXR 6/2-slightly improved left lung infitrates   -id consult appreciated   -pulm consult appreciated     #encephalopathy,appears resolved   can be secondary to sepsis vs opioid intoxication  and du to acute renal failure  -cont abx   continue pain meds readjusted as per pain medicine       #elevated troponin upon admission , can be secondary to acute renal failure vs demand ischemia    further ischemic eval as outpatient per cardio- cardio consult appreciated   s/p chest pain resolved  6/3/18 unlikely cardiac /likely muculoskeletal and PNA  troponins continue to trend down     ##cad s/p coronary stent placment 5/2018 (refused CABG at [facility])  -cont asa, plavix, bb, statin      #acute renal failure resolved    secondary to dehydration vs sepsis, s/p 2L NS in ED and maintenance IVF inpatient  -avoid nephrotoxic meds   s/p IVF   puree diet  check BMP in am      #hypoalbuminemia   -nutrition consulted    # hx chronic  diastolic heart failure EF 55-60% May 2018/stable   continue statin ,asa, plavix, BB        #urinary retention, indwelling moreno cath in place   voiding trial prior to discharge  -urology consult appreciated  -     #chronic back pain /clinically no signs of cord compression  med readjusted by pain management  on morphine 45ER BID  percocet  prnfor breakthrough pain  lidoderm patch  pain management consult appreciated     # 2 loose bowel movements 6/2/18, none on 6/3/18  will continue to monitor   start lactobacillus          anemia, chronic, no clinical signs  of gross bleeding  -monitor H/H  if stable needs outpatient work up    dvt prophylaxis   -heparin sc    Dispostion- D/C to assisted living vs rehab pending switch to po abx by ID/Pulm, needs voiding trial prior to discharge · Assessment		  73 y/o male with multiple co-morbidities    sepsis 2/2 to hcap/multifocal PNA,suspected GNR/MRSA   hemodynamically stable  -admit to med-surg  -cont/cefepime, renally dosed, Iv vanco  -to consider d/c IV vanco after ID f/u - per pulm  -cblood cx and urine cx neg  -strict i/o   -Ct chest shows multifocal PNA  swallow eval- puree diet  repeat CXR 6/2-slightly improved left lung infitrates   -id consult appreciated   -pulm consult appreciated     #encephalopathy,appears resolved   can be secondary to sepsis vs opioid intoxication  and du to acute renal failure  -cont abx   continue pain meds readjusted as per pain medicine       #elevated troponin upon admission , can be secondary to acute renal failure vs demand ischemia    further ischemic eval as outpatient per cardio- cardio consult appreciated   s/p chest pain resolved  6/3/18 unlikely cardiac /likely muculoskeletal and PNA  troponins continue to trend down     ##cad s/p coronary stent placment 5/2018 (refused CABG at [facility])  -cont asa, plavix, bb, statin      #acute renal failure resolved    secondary to dehydration vs sepsis, s/p 2L NS in ED and maintenance IVF inpatient  -avoid nephrotoxic meds   s/p IVF   puree diet  check BMP in am      #hypoalbuminemia   -nutrition consulted    # hx chronic  diastolic heart failure EF 55-60% May 2018/stable   continue statin ,asa, plavix, BB        #urinary retention, indwelling moreno cath in place   voiding trial prior to discharge  -urology consult appreciated  -     #chronic back pain /clinically no signs of cord compression  med readjusted by pain management  on morphine 45ER BID per pain managemnet- (but now I changed to morphine 30 ER BID(which is his home dose on patient's request)  percocet  prnfor breakthrough pain  lidoderm patch  pain management consult appreciated     # 2 loose bowel movements 6/2/18, none on 6/3/18  will continue to monitor   start lactobacillus          anemia, chronic, no clinical signs  of gross bleeding  -monitor H/H  if stable needs outpatient work up    dvt prophylaxis   -heparin sc    Dispostion- D/C to assisted living vs rehab pending switch to po abx by ID/Pulm, needs voiding trial prior to discharge

## 2018-06-03 NOTE — PROGRESS NOTE ADULT - SUBJECTIVE AND OBJECTIVE BOX
HPI:  72 year old male with h/o chronic back pain, anemia, anxiety, BPH, depression, GERD, HTN, kyphosis, neuropathy,  E coli UTI and bacteremia, NSTEMI, urinary retention d'cd on moreno cath following previous admission was sent from nursing home due to hypoxia, hypotension and fever today.  much of history is from chart as patient is lethargic.      In ED, noted to have tracey, leukocytosis, hypotensive and bilateral atelectasis.  s/p 2L ns, vanco and cefepime. bp improved.    on exam , patient is lethargic, one word answers to questions, doesn't follow commands.    Earlier this month, patient had syncopal event, ecoli bacteremia and mi s/p left heart cath with TVD at , was transferred to [facility] for cabg , Cabg advised and patient refused therefore had pci impella assisted Stent to OM, Circ, Lad x2.  was also evaluated by pain management due to chronic back pain, was put on pain management regimen.    Patient admits to previous thoracolumbar fusion surgery 1 year ago in [place] without improvement of pain. Under care of Dr. Higuera with LA Morphine and Lyrica. Patient presently on Plavix. States current regimen does not alleviate his pain.    6/3/18 Patient states no change in pain complaints.      PAST MEDICAL & SURGICAL HISTORY:  Coronary artery disease involving native coronary artery of native heart, angina presence unspecified  Syncope  Benign prostatic hypertrophy without urinary obstruction  Anxiety  Depression  Hypertension  Spinal stenosis  Kyphosis  Neuropathy  GERD (gastroesophageal reflux disease)  Anemia  H/O heart artery stent  S/P spinal fusion  MEDICATIONS  (STANDING):  aspirin 325 milliGRAM(s) Oral daily  atorvastatin 80 milliGRAM(s) Oral at bedtime  cefepime   IVPB 2000 milliGRAM(s) IV Intermittent every 12 hours  clopidogrel Tablet 75 milliGRAM(s) Oral daily  docusate sodium 100 milliGRAM(s) Oral three times a day  heparin  Injectable 5000 Unit(s) SubCutaneous every 8 hours  metoprolol tartrate 12.5 milliGRAM(s) Oral two times a day  morphine ER Tablet 30 milliGRAM(s) Oral two times a day  nystatin Powder 1 Application(s) Topical two times a day  pantoprazole    Tablet 40 milliGRAM(s) Oral before breakfast  sodium chloride 0.9%. 1000 milliLiter(s) (50 mL/Hr) IV Continuous <Continuous>  tamsulosin 0.4 milliGRAM(s) Oral at bedtime  vancomycin  IVPB 750 milliGRAM(s) IV Intermittent every 12 hours    MEDICATIONS  (STANDING):  aspirin 325 milliGRAM(s) Oral daily  atorvastatin 80 milliGRAM(s) Oral at bedtime  cefepime   IVPB 2000 milliGRAM(s) IV Intermittent every 12 hours  clopidogrel Tablet 75 milliGRAM(s) Oral daily  docusate sodium 100 milliGRAM(s) Oral three times a day  heparin  Injectable 5000 Unit(s) SubCutaneous every 8 hours  metoprolol tartrate 12.5 milliGRAM(s) Oral two times a day  morphine ER Tablet 30 milliGRAM(s) Oral two times a day  nystatin Powder 1 Application(s) Topical two times a day  pantoprazole    Tablet 40 milliGRAM(s) Oral before breakfast  sodium chloride 0.9%. 1000 milliLiter(s) (50 mL/Hr) IV Continuous <Continuous>  tamsulosin 0.4 milliGRAM(s) Oral at bedtime  vancomycin  IVPB 750 milliGRAM(s) IV Intermittent every 12 hours    MEDICATIONS  (PRN):  acetaminophen   Tablet 650 milliGRAM(s) Oral every 6 hours PRN For Temp greater than 38 C (100.4 F)  HYDROmorphone   Tablet 2 milliGRAM(s) Oral every 8 hours PRN Moderate Pain (4 - 6)  ondansetron Injectable 4 milliGRAM(s) IV Push every 6 hours PRN Nausea and/or Vomiting    Allergies    No Known Allergies    Intolerances    Vanilla Ensure TID (Unknown)    SH: retried, , no EtOH  FH: not contributory to present illness  ROS: as per HPI    PHYSICAL EXAM:    Vital Signs Last 24 Hrs  T(C): 36.6 (03 Jun 2018 04:55), Max: 36.6 (02 Jun 2018 22:00)  T(F): 97.9 (03 Jun 2018 04:55), Max: 97.9 (03 Jun 2018 04:55)  HR: 96 (03 Jun 2018 04:55) (89 - 106)  BP: 148/78 (03 Jun 2018 04:55) (128/78 - 162/83)  BP(mean): --  RR: 18 (03 Jun 2018 04:55) (17 - 18)  SpO2: 99% (03 Jun 2018 04:55) (93% - 99%)    Constitutional: patient awake, answers appropriately, complaining of back pain  Neck: supple, no tenderness, thyroid nonpalpable  Back: well healed thoracolumbar surgical scar, tender lumbar spine with painful change in position  Genitourinary: moreno  Extremities: no edema, cyanosis or clubbing  Neurological: A & O X2, chronic weakness L DF otherwise no focal motor deficits LEs  Musculoskeletal: no joint tenderness or swelling      STUDIES  CT chest from current admission with instrumented fusion to upper thoracic spine with compression deformity proximal vertebral body and loose hardware(T4or5)and screw in disc spaces  CT LS spine from 12/17 demonstrated instrumented fusin into pelvis without distal

## 2018-06-03 NOTE — PROGRESS NOTE ADULT - SUBJECTIVE AND OBJECTIVE BOX
Reason for Admission: fever	  History of Present Illness: 	  72 year old male with h/o chronic back pain, anemia, anxiety, BPH, depression, GERD, HTN, kyphosis, neuropathy,  E coli UTI and bacteremia, NSTEMI, urinary retention d'cd on moreno cath following previous admission was sent from nursing home due to hypoxia, hypotension and fever today.  much of history is from chart as patient is lethargic.      In ED, noted to have tracey, leukocytosis, hypotensive and bilateral atelectasis.  s/p 2L ns, vanco and cefepime. bp improved.    on exam , patient is lethargic, one word answers to questions, doesn't follow commands.    Earlier this month, patient had syncopal event, ecoli bacteremia and mi s/p left heart cath with TVD at , was transferred to [facility] for cabg , Cabg advised and patient refused therefore had pci impella assisted Stent to OM, Circ, Lad x2.  was also evaluated by pain management due to chronic back pain, was put on pain management regimen.    5/29- patient more awake ,today, but still confused ,unsure of baseline, denies any complaints to me, denies CP or dyspnea or abdominal pain  6/1 -c/o sever chronic lower back pain -intractable pain on dilaudid and morphine ER, no recent hx of fall prior to admission  6/2- back pain better controlled today, denies any SOB or chest pain or abdominal pain ,reports feels better today  6/3-denies any CP or SOB  or abdominal pain to me, report nausea with morphine and wants dilaudid    GENERAL: NAD,awake,alert  chronically ill-appearing  	HEAD:  NC/AT  	EYES: EOMI, PERRLA, no scleral icterus  	HEENT: Moist mucous membranes  	NECK: Supple, No JVDCNS:  moving freely B/L upper and lower extremities; DTRs 2+ intact   	LUNG: coarse breath sounds bilaterally improving  	HEART: RRR; No murmurs, rubs, or gallops  	ABDOMEN: +BS, ST/ND  	GENITOURINARY- moreno in place, Bladder not distended  	EXTREMITIES:  2+ Peripheral Pulses, No clubbing, cyanosis, or edema  	MUSCULOSKELTAL- Joints normal ROM, no Muscle or joint tenderness      PHYSICAL EXAM:    Daily     Daily     ICU Vital Signs Last 24 Hrs  T(C): 36.5 (03 Jun 2018 11:46), Max: 36.6 (02 Jun 2018 22:00)  T(F): 97.7 (03 Jun 2018 11:46), Max: 97.9 (03 Jun 2018 04:55)  HR: 89 (03 Jun 2018 11:46) (89 - 106)  BP: 140/74 (03 Jun 2018 11:46) (128/78 - 162/83)  BP(mean): --  ABP: --  ABP(mean): --  RR: 18 (03 Jun 2018 11:46) (17 - 18)  SpO2: 96% (03 Jun 2018 11:46) (93% - 99%)                              10.8   10.68 )-----------( 249      ( 03 Jun 2018 07:07 )             34.2       CBC Full  -  ( 03 Jun 2018 07:07 )  WBC Count : 10.68 K/uL  Hemoglobin : 10.8 g/dL  Hematocrit : 34.2 %  Platelet Count - Automated : 249 K/uL  Mean Cell Volume : 77.0 fl  Mean Cell Hemoglobin : 24.3 pg  Mean Cell Hemoglobin Concentration : 31.6 gm/dL  Auto Neutrophil # : 8.19 K/uL  Auto Lymphocyte # : 0.99 K/uL  Auto Monocyte # : 0.83 K/uL  Auto Eosinophil # : 0.46 K/uL  Auto Basophil # : 0.05 K/uL  Auto Neutrophil % : 76.6 %  Auto Lymphocyte % : 9.3 %  Auto Monocyte % : 7.8 %  Auto Eosinophil % : 4.3 %  Auto Basophil % : 0.5 %      06-03    138  |  105  |  12  ----------------------------<  126<H>  3.9   |  26  |  0.76    Ca    8.5      03 Jun 2018 07:07                CARDIAC MARKERS ( 03 Jun 2018 07:07 )  0.070 ng/mL / x     / 18 U/L / x     / x      CARDIAC MARKERS ( 03 Jun 2018 01:48 )  0.078 ng/mL / x     / 21 U/L / x     / x                    MEDICATIONS  (STANDING):  aspirin 325 milliGRAM(s) Oral daily  atorvastatin 80 milliGRAM(s) Oral at bedtime  cefepime   IVPB 2000 milliGRAM(s) IV Intermittent every 12 hours  clopidogrel Tablet 75 milliGRAM(s) Oral daily  docusate sodium 100 milliGRAM(s) Oral three times a day  heparin  Injectable 5000 Unit(s) SubCutaneous every 8 hours  lidocaine   Patch 2 Patch Transdermal daily  metoprolol tartrate 12.5 milliGRAM(s) Oral two times a day  morphine ER Tablet 45 milliGRAM(s) Oral two times a day  nystatin Powder 1 Application(s) Topical two times a day  pantoprazole    Tablet 40 milliGRAM(s) Oral before breakfast  tamsulosin 0.4 milliGRAM(s) Oral at bedtime  vancomycin  IVPB 750 milliGRAM(s) IV Intermittent every 12 hours Reason for Admission: fever	  History of Present Illness: 	  72 year old male with h/o chronic back pain, anemia, anxiety, BPH, depression, GERD, HTN, kyphosis, neuropathy,  E coli UTI and bacteremia, NSTEMI, urinary retention d'cd on moreno cath following previous admission was sent from nursing home due to hypoxia, hypotension and fever today.  much of history is from chart as patient is lethargic.      In ED, noted to have tracey, leukocytosis, hypotensive and bilateral atelectasis.  s/p 2L ns, vanco and cefepime. bp improved.    on exam , patient is lethargic, one word answers to questions, doesn't follow commands.    Earlier this month, patient had syncopal event, ecoli bacteremia and mi s/p left heart cath with TVD at , was transferred to [facility] for cabg , Cabg advised and patient refused therefore had pci impella assisted Stent to OM, Circ, Lad x2.  was also evaluated by pain management due to chronic back pain, was put on pain management regimen.    5/29- patient more awake ,today, but still confused ,unsure of baseline, denies any complaints to me, denies CP or dyspnea or abdominal pain  6/1 -c/o sever chronic lower back pain -intractable pain on dilaudid and morphine ER, no recent hx of fall prior to admission  6/2- back pain better controlled today, denies any SOB or chest pain or abdominal pain ,reports feels better today  6/3-denies any CP or SOB  or abdominal pain to me, report nausea with morphine and wants dilaudid, had 2 loose bowl movements yesterday, none today    GENERAL: NAD,awake,alert  chronically ill-appearing  	HEAD:  NC/AT  	EYES: EOMI, PERRLA, no scleral icterus  	HEENT: Moist mucous membranes  	NECK: Supple, No JVDCNS:  moving freely B/L upper and lower extremities; DTRs 2+ intact   	LUNG: coarse breath sounds bilaterally improving  	HEART: RRR; No murmurs, rubs, or gallops  	ABDOMEN: +BS, ST/ND  	GENITOURINARY- moreno in place, Bladder not distended  	EXTREMITIES:  2+ Peripheral Pulses, No clubbing, cyanosis, or edema  	MUSCULOSKELTAL- Joints normal ROM, no Muscle or joint tenderness      PHYSICAL EXAM:    Daily     Daily     ICU Vital Signs Last 24 Hrs  T(C): 36.5 (03 Jun 2018 11:46), Max: 36.6 (02 Jun 2018 22:00)  T(F): 97.7 (03 Jun 2018 11:46), Max: 97.9 (03 Jun 2018 04:55)  HR: 89 (03 Jun 2018 11:46) (89 - 106)  BP: 140/74 (03 Jun 2018 11:46) (128/78 - 162/83)  BP(mean): --  ABP: --  ABP(mean): --  RR: 18 (03 Jun 2018 11:46) (17 - 18)  SpO2: 96% (03 Jun 2018 11:46) (93% - 99%)                              10.8   10.68 )-----------( 249      ( 03 Jun 2018 07:07 )             34.2       CBC Full  -  ( 03 Jun 2018 07:07 )  WBC Count : 10.68 K/uL  Hemoglobin : 10.8 g/dL  Hematocrit : 34.2 %  Platelet Count - Automated : 249 K/uL  Mean Cell Volume : 77.0 fl  Mean Cell Hemoglobin : 24.3 pg  Mean Cell Hemoglobin Concentration : 31.6 gm/dL  Auto Neutrophil # : 8.19 K/uL  Auto Lymphocyte # : 0.99 K/uL  Auto Monocyte # : 0.83 K/uL  Auto Eosinophil # : 0.46 K/uL  Auto Basophil # : 0.05 K/uL  Auto Neutrophil % : 76.6 %  Auto Lymphocyte % : 9.3 %  Auto Monocyte % : 7.8 %  Auto Eosinophil % : 4.3 %  Auto Basophil % : 0.5 %      06-03    138  |  105  |  12  ----------------------------<  126<H>  3.9   |  26  |  0.76    Ca    8.5      03 Jun 2018 07:07                CARDIAC MARKERS ( 03 Jun 2018 07:07 )  0.070 ng/mL / x     / 18 U/L / x     / x      CARDIAC MARKERS ( 03 Jun 2018 01:48 )  0.078 ng/mL / x     / 21 U/L / x     / x                    MEDICATIONS  (STANDING):  aspirin 325 milliGRAM(s) Oral daily  atorvastatin 80 milliGRAM(s) Oral at bedtime  cefepime   IVPB 2000 milliGRAM(s) IV Intermittent every 12 hours  clopidogrel Tablet 75 milliGRAM(s) Oral daily  docusate sodium 100 milliGRAM(s) Oral three times a day  heparin  Injectable 5000 Unit(s) SubCutaneous every 8 hours  lidocaine   Patch 2 Patch Transdermal daily  metoprolol tartrate 12.5 milliGRAM(s) Oral two times a day  morphine ER Tablet 45 milliGRAM(s) Oral two times a day  nystatin Powder 1 Application(s) Topical two times a day  pantoprazole    Tablet 40 milliGRAM(s) Oral before breakfast  tamsulosin 0.4 milliGRAM(s) Oral at bedtime  vancomycin  IVPB 750 milliGRAM(s) IV Intermittent every 12 hours

## 2018-06-04 LAB
ACANTHOCYTES BLD QL SMEAR: SLIGHT — SIGNIFICANT CHANGE UP
ANION GAP SERPL CALC-SCNC: 7 MMOL/L — SIGNIFICANT CHANGE UP (ref 5–17)
ANISOCYTOSIS BLD QL: SLIGHT — SIGNIFICANT CHANGE UP
BASOPHILS # BLD AUTO: 0.04 K/UL — SIGNIFICANT CHANGE UP (ref 0–0.2)
BASOPHILS NFR BLD AUTO: 0.4 % — SIGNIFICANT CHANGE UP (ref 0–2)
BIZARRE PLATELETS BLD QL SMEAR: PRESENT — SIGNIFICANT CHANGE UP
BUN SERPL-MCNC: 13 MG/DL — SIGNIFICANT CHANGE UP (ref 7–23)
BURR CELLS BLD QL SMEAR: PRESENT — SIGNIFICANT CHANGE UP
CALCIUM SERPL-MCNC: 8.3 MG/DL — LOW (ref 8.5–10.1)
CHLORIDE SERPL-SCNC: 106 MMOL/L — SIGNIFICANT CHANGE UP (ref 96–108)
CO2 SERPL-SCNC: 25 MMOL/L — SIGNIFICANT CHANGE UP (ref 22–31)
CREAT SERPL-MCNC: 0.75 MG/DL — SIGNIFICANT CHANGE UP (ref 0.5–1.3)
ELLIPTOCYTES BLD QL SMEAR: SLIGHT — SIGNIFICANT CHANGE UP
EOSINOPHIL # BLD AUTO: 0.54 K/UL — HIGH (ref 0–0.5)
EOSINOPHIL NFR BLD AUTO: 5.3 % — SIGNIFICANT CHANGE UP (ref 0–6)
GIANT PLATELETS BLD QL SMEAR: PRESENT — SIGNIFICANT CHANGE UP
GLUCOSE SERPL-MCNC: 104 MG/DL — HIGH (ref 70–99)
HCT VFR BLD CALC: 33.4 % — LOW (ref 39–50)
HGB BLD-MCNC: 10.4 G/DL — LOW (ref 13–17)
HYPOCHROMIA BLD QL: SLIGHT — SIGNIFICANT CHANGE UP
IMM GRANULOCYTES NFR BLD AUTO: 2.8 % — HIGH (ref 0–1.5)
LG PLATELETS BLD QL AUTO: SLIGHT — SIGNIFICANT CHANGE UP
LYMPHOCYTES # BLD AUTO: 1.27 K/UL — SIGNIFICANT CHANGE UP (ref 1–3.3)
LYMPHOCYTES # BLD AUTO: 12.4 % — LOW (ref 13–44)
MACROCYTES BLD QL: SLIGHT — SIGNIFICANT CHANGE UP
MANUAL DIF COMMENT BLD-IMP: SIGNIFICANT CHANGE UP
MANUAL SMEAR VERIFICATION: SIGNIFICANT CHANGE UP
MCHC RBC-ENTMCNC: 23.8 PG — LOW (ref 27–34)
MCHC RBC-ENTMCNC: 31.1 GM/DL — LOW (ref 32–36)
MCV RBC AUTO: 76.4 FL — LOW (ref 80–100)
MICROCYTES BLD QL: SLIGHT — SIGNIFICANT CHANGE UP
MONOCYTES # BLD AUTO: 0.79 K/UL — SIGNIFICANT CHANGE UP (ref 0–0.9)
MONOCYTES NFR BLD AUTO: 7.7 % — SIGNIFICANT CHANGE UP (ref 2–14)
NEUTROPHILS # BLD AUTO: 7.3 K/UL — SIGNIFICANT CHANGE UP (ref 1.8–7.4)
NEUTROPHILS NFR BLD AUTO: 71.4 % — SIGNIFICANT CHANGE UP (ref 43–77)
NRBC # BLD: 0 /100 WBCS — SIGNIFICANT CHANGE UP (ref 0–0)
NRBC # BLD: 0 /100 — SIGNIFICANT CHANGE UP (ref 0–0)
OVALOCYTES BLD QL SMEAR: SLIGHT — SIGNIFICANT CHANGE UP
PLAT MORPH BLD: NORMAL — SIGNIFICANT CHANGE UP
PLATELET # BLD AUTO: 257 K/UL — SIGNIFICANT CHANGE UP (ref 150–400)
POIKILOCYTOSIS BLD QL AUTO: SLIGHT — SIGNIFICANT CHANGE UP
POLYCHROMASIA BLD QL SMEAR: SLIGHT — SIGNIFICANT CHANGE UP
POTASSIUM SERPL-MCNC: 4.1 MMOL/L — SIGNIFICANT CHANGE UP (ref 3.5–5.3)
POTASSIUM SERPL-SCNC: 4.1 MMOL/L — SIGNIFICANT CHANGE UP (ref 3.5–5.3)
RBC # BLD: 4.37 M/UL — SIGNIFICANT CHANGE UP (ref 4.2–5.8)
RBC # FLD: 19.9 % — HIGH (ref 10.3–14.5)
RBC BLD AUTO: ABNORMAL
SCHISTOCYTES BLD QL AUTO: SLIGHT — SIGNIFICANT CHANGE UP
SODIUM SERPL-SCNC: 138 MMOL/L — SIGNIFICANT CHANGE UP (ref 135–145)
WBC # BLD: 10.24 K/UL — SIGNIFICANT CHANGE UP (ref 3.8–10.5)
WBC # FLD AUTO: 10.24 K/UL — SIGNIFICANT CHANGE UP (ref 3.8–10.5)

## 2018-06-04 RX ORDER — CEFUROXIME AXETIL 250 MG
500 TABLET ORAL EVERY 12 HOURS
Qty: 0 | Refills: 0 | Status: DISCONTINUED | OUTPATIENT
Start: 2018-06-04 | End: 2018-06-05

## 2018-06-04 RX ADMIN — HEPARIN SODIUM 5000 UNIT(S): 5000 INJECTION INTRAVENOUS; SUBCUTANEOUS at 13:12

## 2018-06-04 RX ADMIN — Medication 500 MILLIGRAM(S): at 16:49

## 2018-06-04 RX ADMIN — ONDANSETRON 4 MILLIGRAM(S): 8 TABLET, FILM COATED ORAL at 07:54

## 2018-06-04 RX ADMIN — Medication 12.5 MILLIGRAM(S): at 16:49

## 2018-06-04 RX ADMIN — Medication 325 MILLIGRAM(S): at 10:55

## 2018-06-04 RX ADMIN — TAMSULOSIN HYDROCHLORIDE 0.4 MILLIGRAM(S): 0.4 CAPSULE ORAL at 20:48

## 2018-06-04 RX ADMIN — OXYCODONE HYDROCHLORIDE 5 MILLIGRAM(S): 5 TABLET ORAL at 20:49

## 2018-06-04 RX ADMIN — HEPARIN SODIUM 5000 UNIT(S): 5000 INJECTION INTRAVENOUS; SUBCUTANEOUS at 20:49

## 2018-06-04 RX ADMIN — ATORVASTATIN CALCIUM 80 MILLIGRAM(S): 80 TABLET, FILM COATED ORAL at 20:49

## 2018-06-04 RX ADMIN — MORPHINE SULFATE 30 MILLIGRAM(S): 50 CAPSULE, EXTENDED RELEASE ORAL at 18:52

## 2018-06-04 RX ADMIN — OXYCODONE HYDROCHLORIDE 5 MILLIGRAM(S): 5 TABLET ORAL at 16:52

## 2018-06-04 RX ADMIN — NYSTATIN CREAM 1 APPLICATION(S): 100000 CREAM TOPICAL at 05:23

## 2018-06-04 RX ADMIN — ONDANSETRON 4 MILLIGRAM(S): 8 TABLET, FILM COATED ORAL at 20:49

## 2018-06-04 RX ADMIN — MORPHINE SULFATE 30 MILLIGRAM(S): 50 CAPSULE, EXTENDED RELEASE ORAL at 05:32

## 2018-06-04 RX ADMIN — Medication 250 MILLIGRAM(S): at 05:31

## 2018-06-04 RX ADMIN — PANTOPRAZOLE SODIUM 40 MILLIGRAM(S): 20 TABLET, DELAYED RELEASE ORAL at 05:32

## 2018-06-04 RX ADMIN — OXYCODONE HYDROCHLORIDE 5 MILLIGRAM(S): 5 TABLET ORAL at 08:34

## 2018-06-04 RX ADMIN — CLOPIDOGREL BISULFATE 75 MILLIGRAM(S): 75 TABLET, FILM COATED ORAL at 10:57

## 2018-06-04 RX ADMIN — CEFEPIME 100 MILLIGRAM(S): 1 INJECTION, POWDER, FOR SOLUTION INTRAMUSCULAR; INTRAVENOUS at 05:31

## 2018-06-04 RX ADMIN — OXYCODONE HYDROCHLORIDE 5 MILLIGRAM(S): 5 TABLET ORAL at 01:56

## 2018-06-04 RX ADMIN — OXYCODONE HYDROCHLORIDE 5 MILLIGRAM(S): 5 TABLET ORAL at 02:44

## 2018-06-04 RX ADMIN — HEPARIN SODIUM 5000 UNIT(S): 5000 INJECTION INTRAVENOUS; SUBCUTANEOUS at 05:32

## 2018-06-04 RX ADMIN — NYSTATIN CREAM 1 APPLICATION(S): 100000 CREAM TOPICAL at 16:50

## 2018-06-04 NOTE — PROGRESS NOTE ADULT - SUBJECTIVE AND OBJECTIVE BOX
HPI:  72 year old male with h/o chronic back pain, anemia, anxiety, BPH, depression, GERD, HTN, kyphosis, neuropathy,  E coli UTI and bacteremia, NSTEMI, urinary retention d'cd on moreno cath following previous admission was sent from nursing home due to hypoxia, hypotension and fever today.  much of history is from chart as patient is lethargic.      In ED, noted to have tracey, leukocytosis, hypotensive and bilateral atelectasis.  s/p 2L ns, vanco and cefepime. bp improved.    on exam , patient is lethargic, one word answers to questions, doesn't follow commands.    Earlier this month, patient had syncopal event, ecoli bacteremia and mi s/p left heart cath with TVD at , was transferred to [facility] for cabg , Cabg advised and patient refused therefore had pci impella assisted Stent to OM, Circ, Lad x2.  was also evaluated by pain management due to chronic back pain, was put on pain management regimen.    Patient admits to previous thoracolumbar fusion surgery 1 year ago in [place] without improvement of pain. Under care of Dr. Higuera with [place] Morphine and Lyrica. Patient presently on Plavix. States current regimen does not alleviate his pain.    6/3/18 Patient states no change in pain complaints.  6/4/18 Patient offers multiple complaints today but did not mention back pain.      PAST MEDICAL & SURGICAL HISTORY:  Coronary artery disease involving native coronary artery of native heart, angina presence unspecified  Syncope  Benign prostatic hypertrophy without urinary obstruction  Anxiety  Depression  Hypertension  Spinal stenosis  Kyphosis  Neuropathy  GERD (gastroesophageal reflux disease)  Anemia  H/O heart artery stent  S/P spinal fusion  MEDICATIONS  (STANDING):  aspirin 325 milliGRAM(s) Oral daily  atorvastatin 80 milliGRAM(s) Oral at bedtime  cefepime   IVPB 2000 milliGRAM(s) IV Intermittent every 12 hours  clopidogrel Tablet 75 milliGRAM(s) Oral daily  docusate sodium 100 milliGRAM(s) Oral three times a day  heparin  Injectable 5000 Unit(s) SubCutaneous every 8 hours  metoprolol tartrate 12.5 milliGRAM(s) Oral two times a day  morphine ER Tablet 30 milliGRAM(s) Oral two times a day  nystatin Powder 1 Application(s) Topical two times a day  pantoprazole    Tablet 40 milliGRAM(s) Oral before breakfast  sodium chloride 0.9%. 1000 milliLiter(s) (50 mL/Hr) IV Continuous <Continuous>  tamsulosin 0.4 milliGRAM(s) Oral at bedtime  vancomycin  IVPB 750 milliGRAM(s) IV Intermittent every 12 hours    MEDICATIONS  (STANDING):  aspirin 325 milliGRAM(s) Oral daily  atorvastatin 80 milliGRAM(s) Oral at bedtime  cefepime   IVPB 2000 milliGRAM(s) IV Intermittent every 12 hours  clopidogrel Tablet 75 milliGRAM(s) Oral daily  docusate sodium 100 milliGRAM(s) Oral three times a day  heparin  Injectable 5000 Unit(s) SubCutaneous every 8 hours  metoprolol tartrate 12.5 milliGRAM(s) Oral two times a day  morphine ER Tablet 30 milliGRAM(s) Oral two times a day  nystatin Powder 1 Application(s) Topical two times a day  pantoprazole    Tablet 40 milliGRAM(s) Oral before breakfast  sodium chloride 0.9%. 1000 milliLiter(s) (50 mL/Hr) IV Continuous <Continuous>  tamsulosin 0.4 milliGRAM(s) Oral at bedtime  vancomycin  IVPB 750 milliGRAM(s) IV Intermittent every 12 hours    MEDICATIONS  (PRN):  acetaminophen   Tablet 650 milliGRAM(s) Oral every 6 hours PRN For Temp greater than 38 C (100.4 F)  HYDROmorphone   Tablet 2 milliGRAM(s) Oral every 8 hours PRN Moderate Pain (4 - 6)  ondansetron Injectable 4 milliGRAM(s) IV Push every 6 hours PRN Nausea and/or Vomiting    Allergies    No Known Allergies    Intolerances    Vanilla Ensure TID (Unknown)    SH: retried, , no EtOH  FH: not contributory to present illness  ROS: as per HPI    PHYSICAL EXAM:    Vital Signs Last 24 Hrs  T(C): 36.6 (04 Jun 2018 05:01), Max: 36.7 (03 Jun 2018 16:45)  T(F): 97.9 (04 Jun 2018 05:01), Max: 98 (03 Jun 2018 16:45)  HR: 78 (04 Jun 2018 05:01) (78 - 89)  BP: 93/53 (04 Jun 2018 05:01) (93/53 - 140/74)  BP(mean): --  RR: 18 (04 Jun 2018 05:01) (18 - 20)  SpO2: 95% (04 Jun 2018 05:01) (94% - 96%)    Constitutional: patient awake, answers appropriately, complaining of moreno/dizziness  Neck: supple, no tenderness, thyroid nonpalpable  Back: well healed thoracolumbar surgical scar, less tender lumbar spine with painful change in position, no thoracic tenderness  Genitourinary: moreno  Extremities: no edema, cyanosis or clubbing  Neurological: A & O X2, chronic weakness L DF otherwise no focal motor deficits LEs  Musculoskeletal: no joint tenderness or swelling      STUDIES  CT chest from current admission with instrumented fusion to upper thoracic spine with compression deformity proximal vertebral body and loose hardware(T4or5)and screw in disc spaces  CT LS spine from 12/17 demonstrated instrumented fusin into pelvis without distal

## 2018-06-04 NOTE — PROGRESS NOTE ADULT - SUBJECTIVE AND OBJECTIVE BOX
SUBJECTIVE     Patient has no reported events last night . He is breathing comfortably with out 02 . lying down in bed and being cleaned         PAST MEDICAL & SURGICAL HISTORY:  Coronary artery disease involving native coronary artery of native heart, angina presence unspecified  Syncope  Benign prostatic hypertrophy without urinary obstruction  Anxiety  Depression  Hypertension  Spinal stenosis  Kyphosis  Neuropathy  GERD (gastroesophageal reflux disease)  Anemia  H/O heart artery stent  S/P spinal fusion          OBJECTIVE       Vital Signs Last 24 Hrs  T(C): 36.6 (04 Jun 2018 05:01), Max: 36.7 (03 Jun 2018 16:45)  T(F): 97.9 (04 Jun 2018 05:01), Max: 98 (03 Jun 2018 16:45)  HR: 78 (04 Jun 2018 05:01) (78 - 89)  BP: 93/53 (04 Jun 2018 05:01) (93/53 - 140/74)  BP(mean): --  RR: 18 (04 Jun 2018 05:01) (18 - 20)  SpO2: 95% (04 Jun 2018 05:01) (94% - 96%)        Daily     Daily       I&O's Summary    03 Jun 2018 07:01  -  04 Jun 2018 07:00  --------------------------------------------------------  IN: 100 mL / OUT: 875 mL / NET: -775 mL          PHYSICAL EXAM:    Constitutional: , awake and alert, not in distress.     HEENT: Normo cephalic atraumatic    Neck: Soft and supple, No J.V.D     Respiratory: vesicular breathing ,has few rales over the bases     Cardiovascular: S1 and S2, regular rate .     Gastrointestinal:  soft, nontender,     Extremities: No  edema or calf tenderness .    Neurological: No new  focal deficits.      Medications:  MEDICATIONS  (STANDING):  aspirin 325 milliGRAM(s) Oral daily  atorvastatin 80 milliGRAM(s) Oral at bedtime  cefepime   IVPB 2000 milliGRAM(s) IV Intermittent every 12 hours  clopidogrel Tablet 75 milliGRAM(s) Oral daily  docusate sodium 100 milliGRAM(s) Oral three times a day  heparin  Injectable 5000 Unit(s) SubCutaneous every 8 hours  lidocaine   Patch 2 Patch Transdermal daily  metoprolol tartrate 12.5 milliGRAM(s) Oral two times a day  morphine ER Tablet 30 milliGRAM(s) Oral two times a day  nystatin Powder 1 Application(s) Topical two times a day  pantoprazole    Tablet 40 milliGRAM(s) Oral before breakfast  tamsulosin 0.4 milliGRAM(s) Oral at bedtime  vancomycin  IVPB 750 milliGRAM(s) IV Intermittent every 12 hours                                  10.4   10.24 )-----------( 257      ( 04 Jun 2018 08:20 )             33.4     06-04    138  |  106  |  13  ----------------------------<  104<H>  4.1   |  25  |  0.75    Ca    8.3<L>      04 Jun 2018 08:20              History: Cough    Technique: Single frontal portable view of the chest with comparison to    5/29/2018    Findings:    Heart is mildly enlarged. Elevation the right hemidiaphragm. Multifocal   atelectasis and/or resolving pneumonia.. Degenerative changes of the   visualized osseous structures. Castellanos rods. Apices unremarkable.        Impression:    Multifocal atelectasis and/or resolving pneumonia

## 2018-06-04 NOTE — PROGRESS NOTE ADULT - SUBJECTIVE AND OBJECTIVE BOX
Date of service: 06-04-18 @ 13:20    pt seen and examined  feels better  no resp distress  no cough      ROS: no fever or chills; denies dizziness, no HA, no abdominal pain, no diarrhea or constipation; no dysuria, no urinary frequency, no legs pain, no rashes      MEDICATIONS  (STANDING):  aspirin 325 milliGRAM(s) Oral daily  atorvastatin 80 milliGRAM(s) Oral at bedtime  clopidogrel Tablet 75 milliGRAM(s) Oral daily  docusate sodium 100 milliGRAM(s) Oral three times a day  heparin  Injectable 5000 Unit(s) SubCutaneous every 8 hours  lidocaine   Patch 2 Patch Transdermal daily  metoprolol tartrate 12.5 milliGRAM(s) Oral two times a day  morphine ER Tablet 30 milliGRAM(s) Oral two times a day  nystatin Powder 1 Application(s) Topical two times a day  pantoprazole    Tablet 40 milliGRAM(s) Oral before breakfast  tamsulosin 0.4 milliGRAM(s) Oral at bedtime      Vital Signs Last 24 Hrs  T(C): 36.7 (04 Jun 2018 11:55), Max: 36.7 (03 Jun 2018 16:45)  T(F): 98.1 (04 Jun 2018 11:55), Max: 98.1 (04 Jun 2018 11:55)  HR: 100 (04 Jun 2018 11:55) (78 - 100)  BP: 103/63 (04 Jun 2018 11:55) (93/53 - 130/58)  BP(mean): --  RR: 18 (04 Jun 2018 11:55) (18 - 20)  SpO2: 97% (04 Jun 2018 11:55) (94% - 97%)          PE:  Constitutional: frail looking  HEENT: NC/AT, EOMI, PERRLA, conjunctivae clear; ears and nose atraumatic; pharynx benign  Neck: supple; thyroid not palpable  Back: no tenderness  Respiratory: decreased breath sounds, rhonchi   Cardiovascular: S1S2 regular, no murmurs  Abdomen: soft, not tender, not distended, positive BS; liver and spleen WNL  Genitourinary: no suprapubic tenderness  Lymphatic: no LN palpable  Musculoskeletal: no muscle tenderness, no joint swelling or tenderness  Extremities: no pedal edema  Neurological/ Psychiatric: moving all extremities  Skin: no rashes; no palpable lesions    Labs: all available labs reviewed                                   10.4   10.24 )-----------( 257      ( 04 Jun 2018 08:20 )             33.4     06-04    138  |  106  |  13  ----------------------------<  104<H>  4.1   |  25  |  0.75    Ca    8.3<L>      04 Jun 2018 08:20           Vancomycin Level, Trough: 15.0 ug/mL (06-01 @ 04:48)    Culture - Blood (05.29.18 @ 15:22)    Specimen Source: .Blood None    Culture Results:   No growth at 5 days.    Culture - Blood (05.29.18 @ 15:22)    Specimen Source: .Blood None    Culture Results:   No growth at 5 days.            Radiology: all available radiological tests reviewed  < from: CT Chest No Cont (05.30.18 @ 09:07) >    EXAM:  CT CHEST                            PROCEDURE DATE:  05/30/2018          INTERPRETATION:  Exam Date: 5/30/2018 9:07 AM  Clinical Information: Acute hypoxic respiratory.  Technique:       CT of the chest was performed with axial images obtained   from the thoracic to the inlet to the bilateral adrenal glands  without   IV contrast. Maximum intensity projection images were obtained.  Comparison:  CT abdomen 6/23/2017    FINDINGS:    Lungs, Airways and Pleura: Small amount mucosal debris. Bilateral lower   lobe consolidative changes new from prior exam. Right upper lobe patchy   consolidations with air bronchograms with additional interlobular septal   thickening. Subsegmental consolidation within the right middle lobe and   lingula with air bronchograms.    Heart and Great Vessels: Atherosclerotic changes of the aorta and   coronary vasculature. Heart is normal in size. No pericardial effusions.    Mediastinum and Gilda: within normal limits    Neck and Chest Wall: within normal limits    Bones: Degenerative changes. Castellanos rods.    Upper Abdomen:  within normal limits    IMPRESSION:         Bilateral lower lobe peripheral consolidative changes with multifocal   subsegmental and segmental consolidative changes in the right upper lobe   right middle lobe and lingula new from 6/23/2017 may represent multifocal   pneumonia and/or atelectasis differential including inflammatory   processes. Recommend clinical correlation and short-term follow-up in 4-6   weeks.      Advanced directives addressed: full resuscitation

## 2018-06-04 NOTE — PROGRESS NOTE ADULT - ASSESSMENT
72 year old male with h/o chronic back pain, anemia, anxiety, BPH, depression, GERD, HTN, kyphosis, neuropathy,  E coli UTI and bacteremia, NSTEMI, urinary retention d'cd on moreno cath following previous admit sent here due to hypoxia, hypotension and fever 5/29. Earlier this month, patient had syncopal event, ecoli bacteremia and mi s/p left heart cath with TVD at , was transferred to [facility] for cabg , Cabg advised and patient refused therefore had pci impella assisted Stent to OM, Circ, Lad x2.  was also evaluated by pain management due to chronic back pain, was put on pain management regimen. Here, pt afebrile, hypotensive, wbc ct 15, CT chest with R multifocal consolidations with air bronchograms, was given IV vancomycin/cefepime.     1. sepsis/hypoxic resp failure/multifocal pna/pyuria w/ urinary retention  - improving  - completed 5 days of vanco/cefepime  - switch to po ceftin [id number] for 5 days  - continue with antibiotic coverage  - urine cx/blood cx no growth  - f/u cbc  - monitor temps  - tolerating abx well so far; no side effects noted  - reason for abx use and side effects reviewed with patient  - supportive care    2. other issues - care per medicine

## 2018-06-04 NOTE — PROGRESS NOTE ADULT - ASSESSMENT
- Multifocal lung consolidations with the component of segmental and subsegmental atelectasis with the pneumonia   - elevated right hemidiaphragm with component of restriction   - improved hypoxia as the hypotension and mental status improved   - CAD status post stent placement recently   - BPH with the chronic moreno catheter       PLAN     - consider changing the antibiotics to po after the ID follow up   - official cxr reported which shows improving consolidation and atelectasis   - management of pain as per the medicine   -dvt prophylaxis with the heparin

## 2018-06-04 NOTE — PROGRESS NOTE ADULT - SUBJECTIVE AND OBJECTIVE BOX
cc: fever  HPI: 72y male w/ PMH chronic back pain, anemia, anxiety, bph, depression, gerd, htn, neuropathy and recent admission w/ ecoli uti/bacteremia, urinary retention, NSTEMI s/p cath. CABG advised at that time but pt refused and therefore had PCI stent to OM, circ, LAD x 2.    Pt sent to ED 5/29 from nursing home w/ hypoxia, hypotension and fever and admitted w/ sepsis/hcap.   6/4-     ros-    Vital Signs Last 24 Hrs  T(C): 36.6 (04 Jun 2018 05:01), Max: 36.7 (03 Jun 2018 16:45)  T(F): 97.9 (04 Jun 2018 05:01), Max: 98 (03 Jun 2018 16:45)  HR: 78 (04 Jun 2018 05:01) (78 - 89)  BP: 93/53 (04 Jun 2018 05:01) (93/53 - 140/74)  BP(mean): --  RR: 18 (04 Jun 2018 05:01) (18 - 20)  SpO2: 95% (04 Jun 2018 05:01) (94% - 96%)    physical          LABS: All Labs Reviewed:                        10.4   10.24 )-----------( 257      ( 04 Jun 2018 08:20 )             33.4     06-03    138  |  105  |  12  ----------------------------<  126<H>  3.9   |  26  |  0.76    Ca    8.5      03 Jun 2018 07:07        CARDIAC MARKERS ( 03 Jun 2018 07:07 )  0.070 ng/mL / x     / 18 U/L / x     / x      CARDIAC MARKERS ( 03 Jun 2018 01:48 )  0.078 ng/mL / x     / 21 U/L / x     / x              < from: Xray Chest 1 View- PORTABLE-Routine (06.02.18 @ 13:01) >    Impression:    Multifocal atelectasis and/or resolving pneumonia    < end of copied text >    MEDICATIONS  (STANDING):  aspirin 325 milliGRAM(s) Oral daily  atorvastatin 80 milliGRAM(s) Oral at bedtime  cefepime   IVPB 2000 milliGRAM(s) IV Intermittent every 12 hours  clopidogrel Tablet 75 milliGRAM(s) Oral daily  docusate sodium 100 milliGRAM(s) Oral three times a day  heparin  Injectable 5000 Unit(s) SubCutaneous every 8 hours  lidocaine   Patch 2 Patch Transdermal daily  metoprolol tartrate 12.5 milliGRAM(s) Oral two times a day  morphine ER Tablet 30 milliGRAM(s) Oral two times a day  nystatin Powder 1 Application(s) Topical two times a day  pantoprazole    Tablet 40 milliGRAM(s) Oral before breakfast  tamsulosin 0.4 milliGRAM(s) Oral at bedtime  vancomycin  IVPB 750 milliGRAM(s) IV Intermittent every 12 hours    MEDICATIONS  (PRN):  acetaminophen   Tablet 650 milliGRAM(s) Oral every 6 hours PRN For Temp greater than 38 C (100.4 F)  aluminum hydroxide/magnesium hydroxide/simethicone Suspension 30 milliLiter(s) Oral every 6 hours PRN Dyspepsia  ondansetron Injectable 4 milliGRAM(s) IV Push every 6 hours PRN Nausea and/or Vomiting  oxyCODONE    IR 5 milliGRAM(s) Oral every 4 hours PRN Severe Pain (7 - 10)        Assessment and Plan:   72y male w/     *sepsis due to HCAP  - continue IV vanco, cefepime  - blood cultures no growth  - cxr 6/2- slightly improved infiltrates  - ID and Pulm f/u appreciated     *acute metabolic encephalopathy  - due to sepsis    *elevated troponin  - due to sepsis  - Cardio f/u appreciated- pt had recent cath, stents.  Outpatient f/u     *CAD s/p stents 5/2018  - refused CABG  - continue aspirin, plavix, statin, BB    *chronic diastolic CHF  - EF 55-60%, stable    *urinary retention  - w/ moreno cath placed May  - Urology f/u appreciated- voiding trial     *chronic back pain  - Pain management f/u appreciated- meds adjusted, now morphine ER 30mg bid  percocet for breakthrough  lidoderm patch    *dvt px  - heparin sc cc: fever  HPI: 72y male w/ PMH chronic back pain, anemia, anxiety, bph, depression, gerd, htn, neuropathy and recent admission w/ ecoli uti/bacteremia, urinary retention, NSTEMI s/p cath. CABG advised at that time but pt refused and therefore had PCI stent to OM, circ, LAD x 2.    Pt sent to ED 5/29 from nursing home w/ hypoxia, hypotension and fever and admitted w/ sepsis/hcap.   6/4- no complaints.  Walked w/ PT and walker this morning.     REVIEW OF SYSTEMS:  General: No weakness, fevers, chills  HEENT: No HA, neck pain, no visual changes, no hearing loss   Resp: No cough, wheezing, hemoptysis; No shortness of breath  CV: No chest pain or palpitations  GI: No abdominal pain, no n/v/d, no blood in stool  : No f/u/d  Neuro: No weakness or numbness  MSK: No myalgias, arthralgias  SKIN: No itching, rashes, lesions  All other ROS negative unless indicated above.      Vital Signs Last 24 Hrs  T(C): 36.6 (04 Jun 2018 05:01), Max: 36.7 (03 Jun 2018 16:45)  T(F): 97.9 (04 Jun 2018 05:01), Max: 98 (03 Jun 2018 16:45)  HR: 78 (04 Jun 2018 05:01) (78 - 89)  BP: 93/53 (04 Jun 2018 05:01) (93/53 - 140/74)  BP(mean): --  RR: 18 (04 Jun 2018 05:01) (18 - 20)  SpO2: 95% (04 Jun 2018 05:01) (94% - 96%) on room air       PHYSICAL EXAM:    General: NAD, comfortable, seated in chair  Neuro: AAOx3, no focal deficits  HEENT: NCAT, PERRLA, EOMI,   Neck: Soft and supple, No JVD  Respiratory: CTA b/l, no w/r/r, non labored breathing  Cardiovascular: S1 and S2, RRR  Gastrointestinal: +BS, soft, NTND, no rebound/guarding  Extremities: chronic venous stasis changes b/l ; no edema  Vascular: 2+ peripheral pulses  Musculoskeletal: 5/5 strength b/l UE and LE, sensation intact        LABS: All Labs Reviewed:                        10.4   10.24 )-----------( 257      ( 04 Jun 2018 08:20 )             33.4     06-03    138  |  105  |  12  ----------------------------<  126<H>  3.9   |  26  |  0.76    Ca    8.5      03 Jun 2018 07:07        CARDIAC MARKERS ( 03 Jun 2018 07:07 )  0.070 ng/mL / x     / 18 U/L / x     / x      CARDIAC MARKERS ( 03 Jun 2018 01:48 )  0.078 ng/mL / x     / 21 U/L / x     / x              < from: Xray Chest 1 View- PORTABLE-Routine (06.02.18 @ 13:01) >    Impression:    Multifocal atelectasis and/or resolving pneumonia    < end of copied text >    MEDICATIONS  (STANDING):  aspirin 325 milliGRAM(s) Oral daily  atorvastatin 80 milliGRAM(s) Oral at bedtime  cefepime   IVPB 2000 milliGRAM(s) IV Intermittent every 12 hours  clopidogrel Tablet 75 milliGRAM(s) Oral daily  docusate sodium 100 milliGRAM(s) Oral three times a day  heparin  Injectable 5000 Unit(s) SubCutaneous every 8 hours  lidocaine   Patch 2 Patch Transdermal daily  metoprolol tartrate 12.5 milliGRAM(s) Oral two times a day  morphine ER Tablet 30 milliGRAM(s) Oral two times a day  nystatin Powder 1 Application(s) Topical two times a day  pantoprazole    Tablet 40 milliGRAM(s) Oral before breakfast  tamsulosin 0.4 milliGRAM(s) Oral at bedtime  vancomycin  IVPB 750 milliGRAM(s) IV Intermittent every 12 hours    MEDICATIONS  (PRN):  acetaminophen   Tablet 650 milliGRAM(s) Oral every 6 hours PRN For Temp greater than 38 C (100.4 F)  aluminum hydroxide/magnesium hydroxide/simethicone Suspension 30 milliLiter(s) Oral every 6 hours PRN Dyspepsia  ondansetron Injectable 4 milliGRAM(s) IV Push every 6 hours PRN Nausea and/or Vomiting  oxyCODONE    IR 5 milliGRAM(s) Oral every 4 hours PRN Severe Pain (7 - 10)        Assessment and Plan:   72y male w/     *sepsis due to multifocal HCAP  - continue IV vanco, cefepime  --> change to po ceftin bid x 5 more days for d/c planning   - blood cultures no growth  - cxr 6/2- slightly improved infiltrates  - ID and Pulm f/u appreciated   - good O2 sats on room air     *acute metabolic encephalopathy  - due to sepsis    *elevated troponin  - due to sepsis  - Cardio f/u appreciated- pt had recent cath, stents.  Outpatient f/u     *CAD s/p stents 5/2018  - refused CABG  - continue aspirin, plavix, statin, BB    *chronic diastolic CHF  - EF 55-60%, stable    *urinary retention  - w/ moreno cath placed May  - Urology f/u appreciated- voiding trial today    *chronic back pain  - Pain management f/u appreciated- meds adjusted, now morphine ER 30mg bid  percocet for breakthrough  lidoderm patch- refusing      *dvt px  - heparin sc     void trial today.  d/c planning to rehab tomorrow.

## 2018-06-04 NOTE — PROGRESS NOTE ADULT - ASSESSMENT
72 year old male with h/o chronic back pain, anemia, anxiety, BPH, depression, GERD, HTN, kyphosis, neuropathy,  E coli UTI and bacteremia, NSTEMI, urinary retention d'cd on moreno cath following previous admission was sent from nursing home due to hypoxia, hypotension and fever today.  much of history is from chart as patient is lethargic.      In ED, noted to have tracey, leukocytosis, hypotensive and bilateral atelectasis.  s/p 2L ns, vanco and cefepime. bp improved.    on exam , patient is lethargic, one word answers to questions, doesn't follow commands.    Earlier this month, patient had syncopal event, ecoli bacteremia and mi s/p left heart cath with TVD at , was transferred to [facility] for cabg , Cabg advised and patient refused therefore had pci impella assisted Stent to OM, Circ, Lad x2.  was also evaluated by pain management due to chronic back pain, was put on pain management regimen.    Patient admits to previous thoracolumbar fusion surgery 1 year ago in [place] without improvement of pain. Under care of Dr. Higuera with [place] Morphine and Lyrica. Patient presently on Plavix. States current regimen does not alleviate his pain.    IMP:  Chronic pain syndrome    Plan:  Increase MS Contin to 45 mg BID  Hold SA Dilaudid/start Oxy IR 5mg q6hr prn  Lidoderm patches to T & LS spine

## 2018-06-05 ENCOUNTER — TRANSCRIPTION ENCOUNTER (OUTPATIENT)
Age: 78
End: 2018-06-05

## 2018-06-05 VITALS
HEART RATE: 86 BPM | OXYGEN SATURATION: 97 % | SYSTOLIC BLOOD PRESSURE: 98 MMHG | TEMPERATURE: 98 F | DIASTOLIC BLOOD PRESSURE: 62 MMHG | RESPIRATION RATE: 18 BRPM

## 2018-06-05 RX ORDER — ONDANSETRON 8 MG/1
4 TABLET, FILM COATED ORAL EVERY 6 HOURS
Qty: 0 | Refills: 0 | Status: DISCONTINUED | OUTPATIENT
Start: 2018-06-05 | End: 2018-06-05

## 2018-06-05 RX ORDER — OXYCODONE HYDROCHLORIDE 5 MG/1
1 TABLET ORAL
Qty: 0 | Refills: 0 | COMMUNITY
Start: 2018-06-05

## 2018-06-05 RX ORDER — CEFUROXIME AXETIL 250 MG
1 TABLET ORAL
Qty: 8 | Refills: 0 | OUTPATIENT
Start: 2018-06-05 | End: 2018-06-08

## 2018-06-05 RX ORDER — MORPHINE SULFATE 50 MG/1
1 CAPSULE, EXTENDED RELEASE ORAL
Qty: 0 | Refills: 0 | COMMUNITY
Start: 2018-06-05

## 2018-06-05 RX ORDER — DULOXETINE HYDROCHLORIDE 30 MG/1
1 CAPSULE, DELAYED RELEASE ORAL
Qty: 0 | Refills: 0 | COMMUNITY
Start: 2018-06-05

## 2018-06-05 RX ORDER — METOPROLOL TARTRATE 50 MG
0.5 TABLET ORAL
Qty: 30 | Refills: 2 | OUTPATIENT
Start: 2018-06-05 | End: 2018-09-02

## 2018-06-05 RX ADMIN — OXYCODONE HYDROCHLORIDE 5 MILLIGRAM(S): 5 TABLET ORAL at 06:10

## 2018-06-05 RX ADMIN — Medication 325 MILLIGRAM(S): at 11:05

## 2018-06-05 RX ADMIN — Medication 30 MILLILITER(S): at 00:26

## 2018-06-05 RX ADMIN — Medication 500 MILLIGRAM(S): at 17:26

## 2018-06-05 RX ADMIN — ONDANSETRON 4 MILLIGRAM(S): 8 TABLET, FILM COATED ORAL at 11:05

## 2018-06-05 RX ADMIN — CLOPIDOGREL BISULFATE 75 MILLIGRAM(S): 75 TABLET, FILM COATED ORAL at 11:05

## 2018-06-05 RX ADMIN — Medication 12.5 MILLIGRAM(S): at 06:02

## 2018-06-05 RX ADMIN — HEPARIN SODIUM 5000 UNIT(S): 5000 INJECTION INTRAVENOUS; SUBCUTANEOUS at 13:13

## 2018-06-05 RX ADMIN — PANTOPRAZOLE SODIUM 40 MILLIGRAM(S): 20 TABLET, DELAYED RELEASE ORAL at 06:01

## 2018-06-05 RX ADMIN — MORPHINE SULFATE 30 MILLIGRAM(S): 50 CAPSULE, EXTENDED RELEASE ORAL at 06:01

## 2018-06-05 RX ADMIN — Medication 12.5 MILLIGRAM(S): at 17:26

## 2018-06-05 RX ADMIN — NYSTATIN CREAM 1 APPLICATION(S): 100000 CREAM TOPICAL at 06:02

## 2018-06-05 RX ADMIN — OXYCODONE HYDROCHLORIDE 5 MILLIGRAM(S): 5 TABLET ORAL at 17:26

## 2018-06-05 RX ADMIN — ONDANSETRON 4 MILLIGRAM(S): 8 TABLET, FILM COATED ORAL at 06:01

## 2018-06-05 RX ADMIN — OXYCODONE HYDROCHLORIDE 5 MILLIGRAM(S): 5 TABLET ORAL at 00:26

## 2018-06-05 RX ADMIN — MORPHINE SULFATE 30 MILLIGRAM(S): 50 CAPSULE, EXTENDED RELEASE ORAL at 17:28

## 2018-06-05 RX ADMIN — HEPARIN SODIUM 5000 UNIT(S): 5000 INJECTION INTRAVENOUS; SUBCUTANEOUS at 06:01

## 2018-06-05 RX ADMIN — Medication 500 MILLIGRAM(S): at 06:22

## 2018-06-05 RX ADMIN — NYSTATIN CREAM 1 APPLICATION(S): 100000 CREAM TOPICAL at 17:29

## 2018-06-05 RX ADMIN — OXYCODONE HYDROCHLORIDE 5 MILLIGRAM(S): 5 TABLET ORAL at 18:39

## 2018-06-05 NOTE — PROGRESS NOTE ADULT - SUBJECTIVE AND OBJECTIVE BOX
cc: fever  HPI: 72y male w/ PMH chronic back pain, anemia, anxiety, bph, depression, gerd, htn, neuropathy and recent admission w/ ecoli uti/bacteremia, urinary retention, NSTEMI s/p cath. CABG advised at that time but pt refused and therefore had PCI stent to OM, circ, LAD x 2.    Pt sent to ED 5/29 from nursing home w/ hypoxia, hypotension and fever and admitted w/ sepsis/hcap.   6/4- no complaints.  Walked w/ PT and walker this morning.   6/5-     REVIEW OF SYSTEMS:  General: No weakness, fevers, chills  HEENT: No HA, neck pain, no visual changes, no hearing loss   Resp: No cough, wheezing, hemoptysis; No shortness of breath  CV: No chest pain or palpitations  GI: No abdominal pain, no n/v/d, no blood in stool  : No f/u/d  Neuro: No weakness or numbness  MSK: No myalgias, arthralgias  SKIN: No itching, rashes, lesions  All other ROS negative unless indicated above.      Vital Signs Last 24 Hrs  T(C): 36.3 (05 Jun 2018 04:32), Max: 36.7 (04 Jun 2018 11:55)  T(F): 97.4 (05 Jun 2018 04:32), Max: 98.1 (04 Jun 2018 11:55)  HR: 78 (05 Jun 2018 04:32) (78 - 100)  BP: 149/71 (05 Jun 2018 04:32) (102/49 - 149/71)  BP(mean): --  RR: 18 (05 Jun 2018 04:32) (18 - 18)  SpO2: 95% (05 Jun 2018 04:32) (93% - 97%) room air      PHYSICAL EXAM:    General: NAD, comfortable, seated in chair  Neuro: AAOx3, no focal deficits  HEENT: NCAT, PERRLA, EOMI,   Neck: Soft and supple, No JVD  Respiratory: CTA b/l, no w/r/r, non labored breathing  Cardiovascular: S1 and S2, RRR  Gastrointestinal: +BS, soft, NTND, no rebound/guarding  Extremities: chronic venous stasis changes b/l ; no edema  Vascular: 2+ peripheral pulses  Musculoskeletal: 5/5 strength b/l UE and LE, sensation intact        LABS: All Labs Reviewed:                           10.4   10.24 )-----------( 257      ( 04 Jun 2018 08:20 )             33.4     06-03    138  |  105  |  12  ----------------------------<  126<H>  3.9   |  26  |  0.76    Ca    8.5      03 Jun 2018 07:07        CARDIAC MARKERS ( 03 Jun 2018 07:07 )  0.070 ng/mL / x     / 18 U/L / x     / x      CARDIAC MARKERS ( 03 Jun 2018 01:48 )  0.078 ng/mL / x     / 21 U/L / x     / x              < from: Xray Chest 1 View- PORTABLE-Routine (06.02.18 @ 13:01) >    Impression:    Multifocal atelectasis and/or resolving pneumonia    < end of copied text >    MEDICATIONS  (STANDING):  aspirin 325 milliGRAM(s) Oral daily  atorvastatin 80 milliGRAM(s) Oral at bedtime  cefuroxime   Tablet 500 milliGRAM(s) Oral every 12 hours  clopidogrel Tablet 75 milliGRAM(s) Oral daily  docusate sodium 100 milliGRAM(s) Oral three times a day  heparin  Injectable 5000 Unit(s) SubCutaneous every 8 hours  lidocaine   Patch 2 Patch Transdermal daily  metoprolol tartrate 12.5 milliGRAM(s) Oral two times a day  morphine ER Tablet 30 milliGRAM(s) Oral two times a day  nystatin Powder 1 Application(s) Topical two times a day  pantoprazole    Tablet 40 milliGRAM(s) Oral before breakfast  tamsulosin 0.4 milliGRAM(s) Oral at bedtime    MEDICATIONS  (PRN):  acetaminophen   Tablet 650 milliGRAM(s) Oral every 6 hours PRN For Temp greater than 38 C (100.4 F)  aluminum hydroxide/magnesium hydroxide/simethicone Suspension 30 milliLiter(s) Oral every 6 hours PRN Dyspepsia  ondansetron Injectable 4 milliGRAM(s) IV Push every 6 hours PRN Nausea and/or Vomiting  oxyCODONE    IR 5 milliGRAM(s) Oral every 4 hours PRN Severe Pain (7 - 10)          Assessment and Plan:   72y male w/     *sepsis due to multifocal HCAP  - continue IV vanco, cefepime  --> change to po ceftin 500mg bid x 5 more days for d/c planning   - blood cultures no growth  - cxr 6/2- slightly improved infiltrates  - ID and Pulm f/u appreciated   - good O2 sats on room air     *acute metabolic encephalopathy  - due to sepsis    *elevated troponin  - due to sepsis  - Cardio f/u appreciated- pt had recent cath, stents.  Outpatient f/u     *CAD s/p stents 5/2018  - refused CABG  - continue aspirin, plavix, statin, BB    *chronic diastolic CHF  - EF 55-60%, stable    *urinary retention  - w/ moreno cath placed May  - Urology f/u appreciated- voiding trial  6/4    *chronic back pain  - Pain management f/u appreciated- meds adjusted, now morphine ER 30mg bid  percocet for breakthrough  lidoderm patch- refusing      *dvt px  - heparin sc     void trial today.  d/c planning to rehab tomorrow.      6/5- d/c to rehab. cc: fever  HPI: 72y male w/ PMH chronic back pain, anemia, anxiety, bph, depression, gerd, htn, neuropathy and recent admission w/ ecoli uti/bacteremia, urinary retention, NSTEMI s/p cath. CABG advised at that time but pt refused and therefore had PCI stent to OM, circ, LAD x 2.    Pt sent to ED 5/29 from nursing home w/ hypoxia, hypotension and fever and admitted w/ sepsis/hcap.   6/4- no complaints.  Walked w/ PT and walker this morning.   6/5- no overnight events, no complaints.     REVIEW OF SYSTEMS:  General: No weakness, fevers, chills  HEENT: No HA, neck pain, no visual changes, no hearing loss   Resp: No cough, wheezing, hemoptysis; No shortness of breath  CV: No chest pain or palpitations  GI: No abdominal pain, no n/v/d, no blood in stool  : No f/u/d  Neuro: No weakness or numbness  MSK: No myalgias, arthralgias  SKIN: No itching, rashes, lesions  All other ROS negative unless indicated above.      Vital Signs Last 24 Hrs  T(C): 36.3 (05 Jun 2018 04:32), Max: 36.7 (04 Jun 2018 11:55)  T(F): 97.4 (05 Jun 2018 04:32), Max: 98.1 (04 Jun 2018 11:55)  HR: 78 (05 Jun 2018 04:32) (78 - 100)  BP: 149/71 (05 Jun 2018 04:32) (102/49 - 149/71)  BP(mean): --  RR: 18 (05 Jun 2018 04:32) (18 - 18)  SpO2: 95% (05 Jun 2018 04:32) (93% - 97%) room air      PHYSICAL EXAM:    General: NAD, comfortable, seated in chair  Neuro: AAOx3, no focal deficits  HEENT: NCAT  Neck: Supple  Respiratory: CTA b/l, no w/r/r, non labored breathing  Cardiovascular: S1 and S2, RRR  Gastrointestinal: +BS, soft, NTND  Extremities: chronic venous stasis changes b/l ; no edema  Vascular: 2+ peripheral pulses  Musculoskeletal: moving all extremities        LABS: All Labs Reviewed:                           10.4   10.24 )-----------( 257      ( 04 Jun 2018 08:20 )             33.4     06-03    138  |  105  |  12  ----------------------------<  126<H>  3.9   |  26  |  0.76    Ca    8.5      03 Jun 2018 07:07        CARDIAC MARKERS ( 03 Jun 2018 07:07 )  0.070 ng/mL / x     / 18 U/L / x     / x      CARDIAC MARKERS ( 03 Jun 2018 01:48 )  0.078 ng/mL / x     / 21 U/L / x     / x              < from: Xray Chest 1 View- PORTABLE-Routine (06.02.18 @ 13:01) >    Impression:    Multifocal atelectasis and/or resolving pneumonia    < end of copied text >    MEDICATIONS  (STANDING):  aspirin 325 milliGRAM(s) Oral daily  atorvastatin 80 milliGRAM(s) Oral at bedtime  cefuroxime   Tablet 500 milliGRAM(s) Oral every 12 hours  clopidogrel Tablet 75 milliGRAM(s) Oral daily  docusate sodium 100 milliGRAM(s) Oral three times a day  heparin  Injectable 5000 Unit(s) SubCutaneous every 8 hours  lidocaine   Patch 2 Patch Transdermal daily  metoprolol tartrate 12.5 milliGRAM(s) Oral two times a day  morphine ER Tablet 30 milliGRAM(s) Oral two times a day  nystatin Powder 1 Application(s) Topical two times a day  pantoprazole    Tablet 40 milliGRAM(s) Oral before breakfast  tamsulosin 0.4 milliGRAM(s) Oral at bedtime    MEDICATIONS  (PRN):  acetaminophen   Tablet 650 milliGRAM(s) Oral every 6 hours PRN For Temp greater than 38 C (100.4 F)  aluminum hydroxide/magnesium hydroxide/simethicone Suspension 30 milliLiter(s) Oral every 6 hours PRN Dyspepsia  ondansetron Injectable 4 milliGRAM(s) IV Push every 6 hours PRN Nausea and/or Vomiting  oxyCODONE    IR 5 milliGRAM(s) Oral every 4 hours PRN Severe Pain (7 - 10)          Assessment and Plan:   72y male w/     *sepsis due to multifocal HCAP  - continue IV vanco, cefepime  --> change to po ceftin 500mg bid x 5 more days for d/c planning   - blood cultures no growth  - cxr 6/2- slightly improved infiltrates  - ID and Pulm f/u appreciated   - good O2 sats on room air       *acute metabolic encephalopathy  - due to sepsis    *elevated troponin  - due to sepsis  - Cardio f/u appreciated- pt had recent cath, stents.  Outpatient f/u     *CAD s/p stents 5/2018  - refused CABG  - continue aspirin, plavix, statin, BB    *chronic diastolic CHF  - EF 55-60%, stable    *urinary retention  - w/ moreno cath placed May  - Urology f/u appreciated- voiding trial  6/4--> voided    *chronic back pain  - Pain management f/u appreciated- meds adjusted, now morphine ER 30mg bid  percocet for breakthrough  lidoderm patch- refusing      *dvt px  - heparin sc     void trial today.  d/c planning to rehab tomorrow.      6/5- stable for d/c to rehab.  Complete antibiotics.  Repeat cxr in few weeks.

## 2018-06-05 NOTE — DISCHARGE NOTE ADULT - MEDICATION SUMMARY - MEDICATIONS TO TAKE
I will START or STAY ON the medications listed below when I get home from the hospital:    aspirin 325 mg oral tablet  -- 1 tab(s) by mouth once a day  -- Indication: For Home med    acetaminophen 325 mg oral tablet  -- 3 tab(s) by mouth every 8 hours  -- Indication: For Home med    morphine 30 mg/12 hr oral tablet, extended release  -- 1 tab(s) by mouth 2 times a day  -- Indication: For Home med    oxyCODONE 5 mg oral tablet  -- 1 tab(s) by mouth every 4 hours, As needed, Severe Pain (7 - 10)  -- Indication: For breakthrough pain    tamsulosin 0.4 mg oral capsule  -- 1 cap(s) by mouth once a day (at bedtime)  -- Indication: For Home med    pregabalin 100 mg oral capsule  -- 1 cap(s) by mouth once a day  -- Indication: For Home med    DULoxetine 60 mg oral delayed release capsule  -- 1 cap(s) by mouth once a day  -- Indication: For Home med    atorvastatin 80 mg oral tablet  -- 1 tab(s) by mouth once a day (at bedtime)  -- Indication: For Home med    clopidogrel 75 mg oral tablet  -- 1 tab(s) by mouth once a day  -- Indication: For Home med    metoprolol tartrate 25 mg oral tablet  -- 0.5 tab(s) by mouth 2 times a day   -- Indication: For Home med    cefuroxime 500 mg oral tablet  -- 1 tab(s) by mouth every 12 hours  -- Indication: For Pneumonia    docusate sodium 100 mg oral capsule  -- 1 cap(s) by mouth 3 times a day  -- Indication: For Home med    senna oral tablet  -- 2 tab(s) by mouth once a day (at bedtime)  -- Indication: For Home med    Protonix 40 mg oral delayed release tablet  -- 1 tab(s) by mouth once a day  -- It is very important that you take or use this exactly as directed.  Do not skip doses or discontinue unless directed by your doctor.  Obtain medical advice before taking any non-prescription drugs as some may affect the action of this medication.  Swallow whole.  Do not crush.    -- Indication: For Home med I will START or STAY ON the medications listed below when I get home from the hospital:    oxyCODONE 5 mg oral tablet  -- 1 tab(s) by mouth every 4 hours, As needed, Severe Pain (7 - 10)  -- Indication: For breakthrough pain    aspirin 325 mg oral tablet  -- 1 tab(s) by mouth once a day  -- Indication: For Home med    acetaminophen 325 mg oral tablet  -- 3 tab(s) by mouth every 8 hours  -- Indication: For Home med    morphine 30 mg/12 hr oral tablet, extended release  -- 1 tab(s) by mouth 2 times a day  -- Indication: For Home med    tamsulosin 0.4 mg oral capsule  -- 1 cap(s) by mouth once a day (at bedtime)  -- Indication: For Home med    pregabalin 100 mg oral capsule  -- 1 cap(s) by mouth once a day  -- Indication: For Home med    DULoxetine 60 mg oral delayed release capsule  -- 1 cap(s) by mouth once a day  -- Indication: For Home med    atorvastatin 80 mg oral tablet  -- 1 tab(s) by mouth once a day  -- Indication: For Home med    Plavix 75 mg oral tablet  -- 1 tab(s) by mouth once a day  -- Indication: For Home med    metoprolol tartrate 25 mg oral tablet  -- 0.5 tab(s) by mouth 2 times a day   -- Indication: For Home med    cefuroxime 500 mg oral tablet  -- 1 tab(s) by mouth every 12 hours  -- Indication: For Pneumonia    Colace 100 mg oral capsule  -- orally 3 times a day  -- Indication: For Home med    Senna 8.6 mg oral tablet  -- 1 tab(s) by mouth once a day (at bedtime)  -- Indication: For Home med    Protonix 40 mg oral delayed release tablet  -- 1 tab(s) by mouth once a day  -- Indication: For Home med

## 2018-06-05 NOTE — DISCHARGE NOTE ADULT - SECONDARY DIAGNOSIS.
Coronary artery disease involving native coronary artery of native heart, angina presence unspecified Chronic back pain Urinary retention

## 2018-06-05 NOTE — PROGRESS NOTE ADULT - PROVIDER SPECIALTY LIST ADULT
Cardiology
Hospitalist
Infectious Disease
Pain Medicine
Pulmonology
Infectious Disease
Hospitalist
Infectious Disease
Hospitalist
Pain Medicine
Pain Medicine

## 2018-06-05 NOTE — DISCHARGE NOTE ADULT - MEDICATION SUMMARY - MEDICATIONS TO STOP TAKING
I will STOP taking the medications listed below when I get home from the hospital:    ALPRAZolam 0.25 mg oral tablet  -- 1 tab(s) by mouth 2 times a day MDD =2 MDD:2

## 2018-06-05 NOTE — DISCHARGE NOTE ADULT - PATIENT PORTAL LINK FT
You can access the [facility] Patient Portal, offered by [facility], by registering with the following website: [email or web address]

## 2018-06-05 NOTE — DISCHARGE NOTE ADULT - HOSPITAL COURSE
Assessment and Plan:   72y male w/     *sepsis due to multifocal HCAP  - continue IV vanco, cefepime  --> change to po ceftin 500mg bid x 5 more days for d/c planning   - blood cultures no growth  - cxr 6/2- slightly improved infiltrates  - ID and Pulm f/u appreciated   - good O2 sats on room air     *acute metabolic encephalopathy  - due to sepsis    *elevated troponin  - due to sepsis  - Cardio f/u appreciated- pt had recent cath, stents.  Outpatient f/u     *CAD s/p stents 5/2018  - refused CABG  - continue aspirin, plavix, statin, BB    *chronic diastolic CHF  - EF 55-60%, stable    *urinary retention  - w/ moreno cath placed May  - Urology f/u appreciated- voiding trial  6/4    *chronic back pain  - Pain management f/u appreciated- meds adjusted, now morphine ER 30mg bid   and breakthrough prn meds Assessment and Plan:   72y male w/     *sepsis due to multifocal HCAP  - continue IV vanco, cefepime  --> change to po ceftin 500mg bid x 5 more days for d/c planning   - blood cultures no growth  - cxr 6/2- slightly improved infiltrates  - ID and Pulm f/u appreciated   - good O2 sats on room air --> repeat cxr in few weeks.     *acute metabolic encephalopathy  - due to sepsis    *elevated troponin  - due to sepsis  - Cardio f/u appreciated- pt had recent cath, stents.  Outpatient f/u     *CAD s/p stents 5/2018  - refused CABG  - continue aspirin, plavix, statin, BB    *chronic diastolic CHF  - EF 55-60%, stable    *urinary retention  - w/ moreno cath placed May  - Urology f/u appreciated- voiding trial  6/4--> voided and moreno removed     *chronic back pain  - Pain management f/u appreciated- meds adjusted, now morphine ER 30mg bid   and breakthrough prn meds    6/5- stable for d/c to rehab.  Complete antibiotics.  Repeat cxr in few weeks.

## 2018-06-05 NOTE — DISCHARGE NOTE ADULT - CONDITIONS AT DISCHARGE
[facility] accepted pt - [place] will initiate [employer] application on behalf of pt as per Raheem An.

## 2018-06-05 NOTE — PROGRESS NOTE ADULT - ASSESSMENT
- Multifocal lung consolidations with the component of segmental and subsegmental atelectasis with the pneumonia on po antibiotic ceftin for completion   - elevated right hemidiaphragm with component of restriction   - improved hypoxia as the hypotension and mental status improved   - CAD status post stent placement recently   - BPH       PLAN     - complete antibiotics with the po ceftin   - official cxr reported which shows improving consolidation and atelectasis   - management of pain as per the medicine   - patient was recommended to have baseline cxr with the PMD in the next few weeks   - suggested if possible to avoid use of large doses of narcotics with the increased risk of aspiration and possibility of recurrent pneumonia in the future  - Discharge planning today as per the medicine

## 2018-06-05 NOTE — PROGRESS NOTE ADULT - SUBJECTIVE AND OBJECTIVE BOX
SUBJECTIVE     Patient seen sitting in chair   He denies any sob or cough or chest pain   His moreno was discontinued   iv antibiotics were changed to po by the I.D         PAST MEDICAL & SURGICAL HISTORY:  Coronary artery disease involving native coronary artery of native heart, angina presence unspecified  Syncope  Benign prostatic hypertrophy without urinary obstruction  Anxiety  Depression  Hypertension  Spinal stenosis  Kyphosis  Neuropathy  GERD (gastroesophageal reflux disease)  Anemia  H/O heart artery stent  S/P spinal fusion          OBJECTIVE       Vital Signs Last 24 Hrs  T(C): 36.3 (05 Jun 2018 04:32), Max: 36.7 (04 Jun 2018 11:55)  T(F): 97.4 (05 Jun 2018 04:32), Max: 98.1 (04 Jun 2018 11:55)  HR: 78 (05 Jun 2018 04:32) (78 - 100)  BP: 149/71 (05 Jun 2018 04:32) (102/49 - 149/71)  BP(mean): --  RR: 18 (05 Jun 2018 04:32) (18 - 18)  SpO2: 95% (05 Jun 2018 04:32) (93% - 97%)          PHYSICAL EXAM:    Constitutional: , awake and alert, not in distress.     HEENT: Normo cephalic atraumatic    Neck: Soft and supple, No J.V.D     Respiratory: vesicular breathing few minimal rales with mildly decreased breath sounds in the right base .     Cardiovascular: S1 and S2, regular rate .     Gastrointestinal:  soft, nontender,     Extremities: No  edema or calf tenderness and has old statis dermatitis     Neurological: No new  focal deficits       Medications:  MEDICATIONS  (STANDING):  aspirin 325 milliGRAM(s) Oral daily  atorvastatin 80 milliGRAM(s) Oral at bedtime  cefuroxime   Tablet 500 milliGRAM(s) Oral every 12 hours  clopidogrel Tablet 75 milliGRAM(s) Oral daily  docusate sodium 100 milliGRAM(s) Oral three times a day  heparin  Injectable 5000 Unit(s) SubCutaneous every 8 hours  lidocaine   Patch 2 Patch Transdermal daily  metoprolol tartrate 12.5 milliGRAM(s) Oral two times a day  morphine ER Tablet 30 milliGRAM(s) Oral two times a day  nystatin Powder 1 Application(s) Topical two times a day  pantoprazole    Tablet 40 milliGRAM(s) Oral before breakfast  tamsulosin 0.4 milliGRAM(s) Oral at bedtime                                  10.4   10.24 )-----------( 257      ( 04 Jun 2018 08:20 )             33.4     06-04    138  |  106  |  13  ----------------------------<  104<H>  4.1   |  25  |  0.75    Ca    8.3<L>      04 Jun 2018 08:20

## 2018-06-05 NOTE — PROGRESS NOTE ADULT - SUBJECTIVE AND OBJECTIVE BOX
HPI:  72 year old male with h/o chronic back pain, anemia, anxiety, BPH, depression, GERD, HTN, kyphosis, neuropathy,  E coli UTI and bacteremia, NSTEMI, urinary retention d'cd on moreno cath following previous admission was sent from nursing home due to hypoxia, hypotension and fever today.  much of history is from chart as patient is lethargic.      In ED, noted to have tracey, leukocytosis, hypotensive and bilateral atelectasis.  s/p 2L ns, vanco and cefepime. bp improved.    on exam , patient is lethargic, one word answers to questions, doesn't follow commands.    Earlier this month, patient had syncopal event, ecoli bacteremia and mi s/p left heart cath with TVD at , was transferred to [facility] for cabg , Cabg advised and patient refused therefore had pci impella assisted Stent to OM, Circ, Lad x2.  was also evaluated by pain management due to chronic back pain, was put on pain management regimen.    Patient admits to previous thoracolumbar fusion surgery 1 year ago in [place] without improvement of pain. Under care of Dr. Higuera with [place] Morphine and Lyrica. Patient presently on Plavix. States current regimen does not alleviate his pain.    6/3/18 Patient states no change in pain complaints.  6/4/18 Patient offers multiple complaints today but did not mention back pain.  6/5/18 Patient offers no specific pain compaints      PAST MEDICAL & SURGICAL HISTORY:  Coronary artery disease involving native coronary artery of native heart, angina presence unspecified  Syncope  Benign prostatic hypertrophy without urinary obstruction  Anxiety  Depression  Hypertension  Spinal stenosis  Kyphosis  Neuropathy  GERD (gastroesophageal reflux disease)  Anemia  H/O heart artery stent  S/P spinal fusion  MEDICATIONS  (STANDING):  aspirin 325 milliGRAM(s) Oral daily  atorvastatin 80 milliGRAM(s) Oral at bedtime  cefepime   IVPB 2000 milliGRAM(s) IV Intermittent every 12 hours  clopidogrel Tablet 75 milliGRAM(s) Oral daily  docusate sodium 100 milliGRAM(s) Oral three times a day  heparin  Injectable 5000 Unit(s) SubCutaneous every 8 hours  metoprolol tartrate 12.5 milliGRAM(s) Oral two times a day  morphine ER Tablet 30 milliGRAM(s) Oral two times a day  nystatin Powder 1 Application(s) Topical two times a day  pantoprazole    Tablet 40 milliGRAM(s) Oral before breakfast  sodium chloride 0.9%. 1000 milliLiter(s) (50 mL/Hr) IV Continuous <Continuous>  tamsulosin 0.4 milliGRAM(s) Oral at bedtime  vancomycin  IVPB 750 milliGRAM(s) IV Intermittent every 12 hours    MEDICATIONS  (STANDING):  aspirin 325 milliGRAM(s) Oral daily  atorvastatin 80 milliGRAM(s) Oral at bedtime  cefepime   IVPB 2000 milliGRAM(s) IV Intermittent every 12 hours  clopidogrel Tablet 75 milliGRAM(s) Oral daily  docusate sodium 100 milliGRAM(s) Oral three times a day  heparin  Injectable 5000 Unit(s) SubCutaneous every 8 hours  metoprolol tartrate 12.5 milliGRAM(s) Oral two times a day  morphine ER Tablet 30 milliGRAM(s) Oral two times a day  nystatin Powder 1 Application(s) Topical two times a day  pantoprazole    Tablet 40 milliGRAM(s) Oral before breakfast  sodium chloride 0.9%. 1000 milliLiter(s) (50 mL/Hr) IV Continuous <Continuous>  tamsulosin 0.4 milliGRAM(s) Oral at bedtime  vancomycin  IVPB 750 milliGRAM(s) IV Intermittent every 12 hours    MEDICATIONS  (PRN):  acetaminophen   Tablet 650 milliGRAM(s) Oral every 6 hours PRN For Temp greater than 38 C (100.4 F)  HYDROmorphone   Tablet 2 milliGRAM(s) Oral every 8 hours PRN Moderate Pain (4 - 6)  ondansetron Injectable 4 milliGRAM(s) IV Push every 6 hours PRN Nausea and/or Vomiting    Allergies    No Known Allergies    Intolerances    Vanilla Ensure TID (Unknown)    SH: retried, , no EtOH  FH: not contributory to present illness  ROS: as per HPI    PHYSICAL EXAM:    Vital Signs Last 24 Hrs  T(C): 37.1 (05 Jun 2018 10:33), Max: 37.1 (05 Jun 2018 10:33)  T(F): 98.7 (05 Jun 2018 10:33), Max: 98.7 (05 Jun 2018 10:33)  HR: 89 (05 Jun 2018 10:33) (78 - 89)  BP: 103/49 (05 Jun 2018 10:33) (102/49 - 149/71)  BP(mean): --  RR: 17 (05 Jun 2018 10:33) (17 - 18)  SpO2: 95% (05 Jun 2018 10:33) (93% - 95%)    Constitutional: patient awake, answers appropriately, appears comfortable  Neck: supple, no tenderness, thyroid nonpalpable  Back: well healed thoracolumbar surgical scar, less tender lumbar spine with painful change in position, no thoracic tenderness  Genitourinary: moreno  Extremities: no edema, cyanosis or clubbing  Neurological: A & O X2, chronic weakness L DF otherwise no focal motor deficits LEs  Musculoskeletal: no joint tenderness or swelling      STUDIES  CT chest from current admission with instrumented fusion to upper thoracic spine with compression deformity proximal vertebral body and loose hardware(T4or5)and screw in disc spaces  CT LS spine from 12/17 demonstrated instrumented fusin into pelvis without distal

## 2018-06-05 NOTE — DISCHARGE NOTE ADULT - CARE PROVIDER_API CALL
Rodrigue Bailon (DO), Cardiology; Internal Medicine  [address] [place] [address]  Phone: [phone]  Fax: [phone]

## 2018-06-05 NOTE — DISCHARGE NOTE ADULT - CARE PLAN
Principal Discharge DX:	Pneumonia  Goal:	complete antibiotics  Assessment and plan of treatment:	Take ceftin as prescribed.  Secondary Diagnosis:	Coronary artery disease involving native coronary artery of native heart, angina presence unspecified  Goal:	follow up with your Cardiologist  Assessment and plan of treatment:	Continue your home meds   and follow up with your Cardiologist in 1-2 weeks.  Secondary Diagnosis:	Chronic back pain  Goal:	pain control  Assessment and plan of treatment:	continue pain regimen and follow up with your pain management physician  Secondary Diagnosis:	Urinary retention Principal Discharge DX:	Pneumonia  Goal:	complete antibiotics  Assessment and plan of treatment:	Take ceftin as prescribed.  Aspiration precautions.    Limit use narcotics.  Repeat chest x ray in few weeks.  Secondary Diagnosis:	Coronary artery disease involving native coronary artery of native heart, angina presence unspecified  Goal:	follow up with your Cardiologist  Assessment and plan of treatment:	Continue your home meds   and follow up with your Cardiologist in 1-2 weeks.  Secondary Diagnosis:	Chronic back pain  Goal:	pain control  Assessment and plan of treatment:	continue pain regimen and follow up with your pain management physician in 1- 2 weeks to review meds.

## 2018-06-05 NOTE — DISCHARGE NOTE ADULT - PLAN OF CARE
complete antibiotics Take ceftin as prescribed. follow up with your Cardiologist Continue your home meds   and follow up with your Cardiologist in 1-2 weeks. pain control continue pain regimen and follow up with your pain management physician Take ceftin as prescribed.  Aspiration precautions.    Limit use narcotics.  Repeat chest x ray in few weeks. continue pain regimen and follow up with your pain management physician in 1- 2 weeks to review meds.

## 2018-06-05 NOTE — PROGRESS NOTE ADULT - NSHPATTENDINGPLANDISCUSS_GEN_ALL_CORE
patient, team
patient, nursing, Dr. Higuera

## 2018-06-05 NOTE — PROGRESS NOTE ADULT - ASSESSMENT
72 year old male with h/o chronic back pain, anemia, anxiety, BPH, depression, GERD, HTN, kyphosis, neuropathy,  E coli UTI and bacteremia, NSTEMI, urinary retention d'cd on moreno cath following previous admission was sent from nursing home due to hypoxia, hypotension and fever today.  much of history is from chart as patient is lethargic.      In ED, noted to have tracey, leukocytosis, hypotensive and bilateral atelectasis.  s/p 2L ns, vanco and cefepime. bp improved.    on exam , patient is lethargic, one word answers to questions, doesn't follow commands.    Earlier this month, patient had syncopal event, ecoli bacteremia and mi s/p left heart cath with TVD at , was transferred to [facility] for cabg , Cabg advised and patient refused therefore had pci impella assisted Stent to OM, Circ, Lad x2.  was also evaluated by pain management due to chronic back pain, was put on pain management regimen.    Patient admits to previous thoracolumbar fusion surgery 1 year ago in [place] without improvement of pain. Under care of Dr. Higuera with LA Morphine and Lyrica. Patient presently on Plavix. States current regimen does not alleviate his pain.    IMP:  Chronic pain syndrome    Plan:  Increase MS Contin to 45 mg BID  Hold SA Dilaudid/start Oxy IR 5mg q6hr prn  Lidoderm patches to T & LS spine

## 2018-06-08 DIAGNOSIS — N40.1 BENIGN PROSTATIC HYPERPLASIA WITH LOWER URINARY TRACT SYMPTOMS: ICD-10-CM

## 2018-06-08 DIAGNOSIS — G93.41 METABOLIC ENCEPHALOPATHY: ICD-10-CM

## 2018-06-08 DIAGNOSIS — I25.10 ATHEROSCLEROTIC HEART DISEASE OF NATIVE CORONARY ARTERY WITHOUT ANGINA PECTORIS: ICD-10-CM

## 2018-06-08 DIAGNOSIS — A41.02 SEPSIS DUE TO METHICILLIN RESISTANT STAPHYLOCOCCUS AUREUS: ICD-10-CM

## 2018-06-08 DIAGNOSIS — J15.212 PNEUMONIA DUE TO METHICILLIN RESISTANT STAPHYLOCOCCUS AUREUS: ICD-10-CM

## 2018-06-08 DIAGNOSIS — J96.01 ACUTE RESPIRATORY FAILURE WITH HYPOXIA: ICD-10-CM

## 2018-06-08 DIAGNOSIS — I11.0 HYPERTENSIVE HEART DISEASE WITH HEART FAILURE: ICD-10-CM

## 2018-06-08 DIAGNOSIS — R50.9 FEVER, UNSPECIFIED: ICD-10-CM

## 2018-06-08 DIAGNOSIS — G89.4 CHRONIC PAIN SYNDROME: ICD-10-CM

## 2018-06-08 DIAGNOSIS — R33.9 RETENTION OF URINE, UNSPECIFIED: ICD-10-CM

## 2018-06-08 DIAGNOSIS — J98.11 ATELECTASIS: ICD-10-CM

## 2018-06-08 DIAGNOSIS — E44.0 MODERATE PROTEIN-CALORIE MALNUTRITION: ICD-10-CM

## 2018-06-08 DIAGNOSIS — G62.9 POLYNEUROPATHY, UNSPECIFIED: ICD-10-CM

## 2018-06-08 DIAGNOSIS — M48.00 SPINAL STENOSIS, SITE UNSPECIFIED: ICD-10-CM

## 2018-06-08 DIAGNOSIS — I50.32 CHRONIC DIASTOLIC (CONGESTIVE) HEART FAILURE: ICD-10-CM

## 2018-06-08 DIAGNOSIS — D64.9 ANEMIA, UNSPECIFIED: ICD-10-CM

## 2018-06-08 DIAGNOSIS — Z87.891 PERSONAL HISTORY OF NICOTINE DEPENDENCE: ICD-10-CM

## 2018-06-08 DIAGNOSIS — E88.09 OTHER DISORDERS OF PLASMA-PROTEIN METABOLISM, NOT ELSEWHERE CLASSIFIED: ICD-10-CM

## 2018-06-08 DIAGNOSIS — Z98.1 ARTHRODESIS STATUS: ICD-10-CM
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
## 2018-06-08 DIAGNOSIS — I24.8 OTHER FORMS OF ACUTE ISCHEMIC HEART DISEASE: ICD-10-CM

## 2018-06-08 DIAGNOSIS — N17.9 ACUTE KIDNEY FAILURE, UNSPECIFIED: ICD-10-CM

## 2018-06-08 DIAGNOSIS — I95.9 HYPOTENSION, UNSPECIFIED: ICD-10-CM

## 2018-06-08 DIAGNOSIS — M40.209 UNSPECIFIED KYPHOSIS, SITE UNSPECIFIED: ICD-10-CM

## 2018-06-08 DIAGNOSIS — F41.9 ANXIETY DISORDER, UNSPECIFIED: ICD-10-CM

## 2018-06-08 DIAGNOSIS — E86.0 DEHYDRATION: ICD-10-CM

## 2018-06-08 DIAGNOSIS — F32.9 MAJOR DEPRESSIVE DISORDER, SINGLE EPISODE, UNSPECIFIED: ICD-10-CM

## 2018-06-08 DIAGNOSIS — I25.2 OLD MYOCARDIAL INFARCTION: ICD-10-CM

## 2018-06-08 DIAGNOSIS — Z95.5 PRESENCE OF CORONARY ANGIOPLASTY IMPLANT AND GRAFT: ICD-10-CM

## 2018-06-08 DIAGNOSIS — T40.605A ADVERSE EFFECT OF UNSPECIFIED NARCOTICS, INITIAL ENCOUNTER: ICD-10-CM

## 2018-06-08 DIAGNOSIS — K21.9 GASTRO-ESOPHAGEAL REFLUX DISEASE WITHOUT ESOPHAGITIS: ICD-10-CM

## 2018-08-31 ENCOUNTER — EMERGENCY (EMERGENCY)
Facility: HOSPITAL | Age: 78
LOS: 0 days | Discharge: HOME | End: 2018-08-31
Attending: EMERGENCY MEDICINE
Payer: MEDICARE

## 2018-08-31 VITALS
SYSTOLIC BLOOD PRESSURE: 130 MMHG | TEMPERATURE: 98 F | OXYGEN SATURATION: 96 % | DIASTOLIC BLOOD PRESSURE: 72 MMHG | RESPIRATION RATE: 18 BRPM | HEART RATE: 90 BPM

## 2018-08-31 VITALS
HEART RATE: 99 BPM | RESPIRATION RATE: 19 BRPM | WEIGHT: 179.9 LBS | DIASTOLIC BLOOD PRESSURE: 54 MMHG | OXYGEN SATURATION: 95 % | HEIGHT: 72 IN | TEMPERATURE: 98 F | SYSTOLIC BLOOD PRESSURE: 133 MMHG

## 2018-08-31 DIAGNOSIS — I25.10 ATHEROSCLEROTIC HEART DISEASE OF NATIVE CORONARY ARTERY WITHOUT ANGINA PECTORIS: ICD-10-CM

## 2018-08-31 DIAGNOSIS — F32.9 MAJOR DEPRESSIVE DISORDER, SINGLE EPISODE, UNSPECIFIED: ICD-10-CM

## 2018-08-31 DIAGNOSIS — Z98.1 ARTHRODESIS STATUS: Chronic | ICD-10-CM

## 2018-08-31 DIAGNOSIS — Z88.5 ALLERGY STATUS TO NARCOTIC AGENT: ICD-10-CM

## 2018-08-31 DIAGNOSIS — M54.9 DORSALGIA, UNSPECIFIED: ICD-10-CM

## 2018-08-31 DIAGNOSIS — K21.9 GASTRO-ESOPHAGEAL REFLUX DISEASE WITHOUT ESOPHAGITIS: ICD-10-CM

## 2018-08-31 DIAGNOSIS — M48.00 SPINAL STENOSIS, SITE UNSPECIFIED: ICD-10-CM

## 2018-08-31 DIAGNOSIS — Z95.5 PRESENCE OF CORONARY ANGIOPLASTY IMPLANT AND GRAFT: Chronic | ICD-10-CM

## 2018-08-31 DIAGNOSIS — F41.9 ANXIETY DISORDER, UNSPECIFIED: ICD-10-CM

## 2018-08-31 DIAGNOSIS — Z79.01 LONG TERM (CURRENT) USE OF ANTICOAGULANTS: ICD-10-CM

## 2018-08-31 DIAGNOSIS — I11.0 HYPERTENSIVE HEART DISEASE WITH HEART FAILURE: ICD-10-CM

## 2018-08-31 LAB
ALBUMIN SERPL ELPH-MCNC: 3.8 G/DL — SIGNIFICANT CHANGE UP (ref 3.3–5)
ALP SERPL-CCNC: 90 U/L — SIGNIFICANT CHANGE UP (ref 40–120)
ALT FLD-CCNC: 14 U/L — SIGNIFICANT CHANGE UP (ref 12–78)
ANION GAP SERPL CALC-SCNC: 5 MMOL/L — SIGNIFICANT CHANGE UP (ref 5–17)
APPEARANCE UR: CLEAR — SIGNIFICANT CHANGE UP
APTT BLD: 36.3 SEC — SIGNIFICANT CHANGE UP (ref 27.5–37.4)
AST SERPL-CCNC: 16 U/L — SIGNIFICANT CHANGE UP (ref 15–37)
BASOPHILS # BLD AUTO: 0.01 K/UL — SIGNIFICANT CHANGE UP (ref 0–0.2)
BASOPHILS NFR BLD AUTO: 0.2 % — SIGNIFICANT CHANGE UP (ref 0–2)
BILIRUB SERPL-MCNC: 0.4 MG/DL — SIGNIFICANT CHANGE UP (ref 0.2–1.2)
BILIRUB UR-MCNC: NEGATIVE — SIGNIFICANT CHANGE UP
BUN SERPL-MCNC: 14 MG/DL — SIGNIFICANT CHANGE UP (ref 7–23)
CALCIUM SERPL-MCNC: 9 MG/DL — SIGNIFICANT CHANGE UP (ref 8.5–10.1)
CHLORIDE SERPL-SCNC: 106 MMOL/L — SIGNIFICANT CHANGE UP (ref 96–108)
CO2 SERPL-SCNC: 27 MMOL/L — SIGNIFICANT CHANGE UP (ref 22–31)
COLOR SPEC: YELLOW — SIGNIFICANT CHANGE UP
CREAT SERPL-MCNC: 0.98 MG/DL — SIGNIFICANT CHANGE UP (ref 0.5–1.3)
DIFF PNL FLD: ABNORMAL
EOSINOPHIL # BLD AUTO: 0.02 K/UL — SIGNIFICANT CHANGE UP (ref 0–0.5)
EOSINOPHIL NFR BLD AUTO: 0.4 % — SIGNIFICANT CHANGE UP (ref 0–6)
GLUCOSE SERPL-MCNC: 111 MG/DL — HIGH (ref 70–99)
GLUCOSE UR QL: NEGATIVE MG/DL — SIGNIFICANT CHANGE UP
HCT VFR BLD CALC: 34.9 % — LOW (ref 39–50)
HGB BLD-MCNC: 11.1 G/DL — LOW (ref 13–17)
IMM GRANULOCYTES NFR BLD AUTO: 0.4 % — SIGNIFICANT CHANGE UP (ref 0–1.5)
INR BLD: 1.42 RATIO — HIGH (ref 0.88–1.16)
KETONES UR-MCNC: ABNORMAL
LEUKOCYTE ESTERASE UR-ACNC: NEGATIVE — SIGNIFICANT CHANGE UP
LYMPHOCYTES # BLD AUTO: 0.86 K/UL — LOW (ref 1–3.3)
LYMPHOCYTES # BLD AUTO: 15.9 % — SIGNIFICANT CHANGE UP (ref 13–44)
MCHC RBC-ENTMCNC: 25.1 PG — LOW (ref 27–34)
MCHC RBC-ENTMCNC: 31.8 GM/DL — LOW (ref 32–36)
MCV RBC AUTO: 79 FL — LOW (ref 80–100)
MONOCYTES # BLD AUTO: 0.51 K/UL — SIGNIFICANT CHANGE UP (ref 0–0.9)
MONOCYTES NFR BLD AUTO: 9.4 % — SIGNIFICANT CHANGE UP (ref 2–14)
NEUTROPHILS # BLD AUTO: 4 K/UL — SIGNIFICANT CHANGE UP (ref 1.8–7.4)
NEUTROPHILS NFR BLD AUTO: 73.7 % — SIGNIFICANT CHANGE UP (ref 43–77)
NITRITE UR-MCNC: NEGATIVE — SIGNIFICANT CHANGE UP
PH UR: 7 — SIGNIFICANT CHANGE UP (ref 5–8)
PLATELET # BLD AUTO: 203 K/UL — SIGNIFICANT CHANGE UP (ref 150–400)
POTASSIUM SERPL-MCNC: 3.7 MMOL/L — SIGNIFICANT CHANGE UP (ref 3.5–5.3)
POTASSIUM SERPL-SCNC: 3.7 MMOL/L — SIGNIFICANT CHANGE UP (ref 3.5–5.3)
PROT SERPL-MCNC: 8 GM/DL — SIGNIFICANT CHANGE UP (ref 6–8.3)
PROT UR-MCNC: 15 MG/DL
PROTHROM AB SERPL-ACNC: 15.4 SEC — HIGH (ref 9.8–12.7)
RBC # BLD: 4.42 M/UL — SIGNIFICANT CHANGE UP (ref 4.2–5.8)
RBC # FLD: 16.8 % — HIGH (ref 10.3–14.5)
SODIUM SERPL-SCNC: 138 MMOL/L — SIGNIFICANT CHANGE UP (ref 135–145)
SP GR SPEC: 1.01 — SIGNIFICANT CHANGE UP (ref 1.01–1.02)
UROBILINOGEN FLD QL: NEGATIVE MG/DL — SIGNIFICANT CHANGE UP
WBC # BLD: 5.42 K/UL — SIGNIFICANT CHANGE UP (ref 3.8–10.5)
WBC # FLD AUTO: 5.42 K/UL — SIGNIFICANT CHANGE UP (ref 3.8–10.5)

## 2018-08-31 PROCEDURE — 74177 CT ABD & PELVIS W/CONTRAST: CPT | Mod: 26

## 2018-08-31 PROCEDURE — 99285 EMERGENCY DEPT VISIT HI MDM: CPT | Mod: 25

## 2018-08-31 RX ORDER — ONDANSETRON 8 MG/1
4 TABLET, FILM COATED ORAL ONCE
Qty: 0 | Refills: 0 | Status: COMPLETED | OUTPATIENT
Start: 2018-08-31 | End: 2018-08-31

## 2018-08-31 RX ORDER — MORPHINE SULFATE 50 MG/1
4 CAPSULE, EXTENDED RELEASE ORAL ONCE
Qty: 0 | Refills: 0 | Status: DISCONTINUED | OUTPATIENT
Start: 2018-08-31 | End: 2018-08-31

## 2018-08-31 RX ORDER — ATORVASTATIN CALCIUM 80 MG/1
1 TABLET, FILM COATED ORAL
Qty: 0 | Refills: 0 | COMMUNITY

## 2018-08-31 RX ORDER — ACETAMINOPHEN 500 MG
1000 TABLET ORAL ONCE
Qty: 0 | Refills: 0 | Status: COMPLETED | OUTPATIENT
Start: 2018-08-31 | End: 2018-08-31

## 2018-08-31 RX ORDER — PANTOPRAZOLE SODIUM 20 MG/1
1 TABLET, DELAYED RELEASE ORAL
Qty: 0 | Refills: 0 | COMMUNITY

## 2018-08-31 RX ADMIN — MORPHINE SULFATE 4 MILLIGRAM(S): 50 CAPSULE, EXTENDED RELEASE ORAL at 14:40

## 2018-08-31 RX ADMIN — MORPHINE SULFATE 4 MILLIGRAM(S): 50 CAPSULE, EXTENDED RELEASE ORAL at 15:10

## 2018-08-31 RX ADMIN — ONDANSETRON 4 MILLIGRAM(S): 8 TABLET, FILM COATED ORAL at 14:40

## 2018-08-31 NOTE — ED PROVIDER NOTE - CARE PLAN
Principal Discharge DX:	Back pain  Assessment and plan of treatment:	1. return for worsening symptoms or anything concerning to you  2. take all home meds as prescribed  3. follow up with your pmd call to make an appointment

## 2018-08-31 NOTE — ED PROVIDER NOTE - OBJECTIVE STATEMENT
78M hx htn CAD spinal stenosis presents to the ED with lower back pain. Pain located in lumbar region constant non-radiating. 9/10. associated nausea no vomiting. no dysuria, saddle anethesia, abd pain chest pain or sob. no change in ability to walk. Pt came from [facility] because pain became worse today. states it feels like previous back pain from spinal stenosis. no trauma

## 2018-08-31 NOTE — ED ADULT NURSE NOTE - NSIMPLEMENTINTERV_GEN_ALL_ED
Implemented All Fall with Harm Risk Interventions:  [place] to call system. Call bell, personal items and telephone within reach. Instruct patient to call for assistance. Room bathroom lighting operational. Non-slip footwear when patient is off stretcher. Physically safe environment: no spills, clutter or unnecessary equipment. Stretcher in lowest position, wheels locked, appropriate side rails in place. Provide visual cue, wrist band, yellow gown, etc. Monitor gait and stability. Monitor for mental status changes and reorient to person, place, and time. Review medications for side effects contributing to fall risk. Reinforce activity limits and safety measures with patient and family. Provide visual clues: red socks.

## 2018-08-31 NOTE — ED PROVIDER NOTE - NS ED ROS FT
Constitutional: No fever or chills  Eyes: No visual changes  HEENT: No throat pain  CV: No chest pain  Resp: No SOB no cough  GI: No abd pain, + nausea no vomiting  : No dysuria  MSK: + back pain  Skin: No rash  Neuro: No headache

## 2018-08-31 NOTE — ED PROVIDER NOTE - PHYSICAL EXAMINATION
Constitutional: mild distress  Eyes: PERRLA EOMI  Head: Normocephalic atraumatic  Mouth: MMM  Cardiac: regular rate  normal peripheral pulses  Resp: Lungs CTAB  GI: Abd s/nt/nd no cvat no pulsatile mass felt  Neuro: CN2-12 intact no saddle anesthesia 4/5 strength b/l LE  Skin: No rashes   msk: mild lumbar ttp

## 2018-08-31 NOTE — ED PROVIDER NOTE - PROGRESS NOTE DETAILS
patient feeling much better. ambulating without issue. vss. ct and labs unremarkable. WIll d/c with follow up. pt given copy of reports communicated incidentals and told for need for follow up. Esa Garcia M.D., Attending Physician

## 2018-08-31 NOTE — ED PROVIDER NOTE - MEDICAL DECISION MAKING DETAILS
78M hx htn CAD spinal stenosis presents to the ED with lower back pain. Pain located in lumbar region constant non-radiating. 9/10. associated nausea no vomiting. no dysuria, saddle anethesia, abd pain chest pain or sob. no change in ability to walk. Pt came from [facility] because pain became worse today. states it feels like previous back pain from spinal stenosis. no trauma. exam with mild lower lumbar ttp. no obvious evidence of AAA, dissection, rp bleed, or cauda equina. Given pts medical hx and age will obtain labs urine ct abd symptom control and reassess. Esa Garcia M.D., Attending Physician

## 2018-09-01 LAB
CULTURE RESULTS: NO GROWTH — SIGNIFICANT CHANGE UP
SPECIMEN SOURCE: SIGNIFICANT CHANGE UP

## 2018-09-02 ENCOUNTER — EMERGENCY (EMERGENCY)
Facility: HOSPITAL | Age: 78
LOS: 0 days | Discharge: HOME | End: 2018-09-02
Attending: EMERGENCY MEDICINE | Admitting: EMERGENCY MEDICINE
Payer: MEDICARE

## 2018-09-02 VITALS
WEIGHT: 115.08 LBS | OXYGEN SATURATION: 91 % | TEMPERATURE: 98 F | DIASTOLIC BLOOD PRESSURE: 92 MMHG | HEIGHT: 69 IN | SYSTOLIC BLOOD PRESSURE: 169 MMHG | RESPIRATION RATE: 16 BRPM | HEART RATE: 101 BPM

## 2018-09-02 VITALS
SYSTOLIC BLOOD PRESSURE: 151 MMHG | HEART RATE: 87 BPM | RESPIRATION RATE: 15 BRPM | TEMPERATURE: 98 F | DIASTOLIC BLOOD PRESSURE: 85 MMHG | OXYGEN SATURATION: 95 %

## 2018-09-02 DIAGNOSIS — I10 ESSENTIAL (PRIMARY) HYPERTENSION: ICD-10-CM

## 2018-09-02 DIAGNOSIS — Z79.01 LONG TERM (CURRENT) USE OF ANTICOAGULANTS: ICD-10-CM

## 2018-09-02 DIAGNOSIS — M54.9 DORSALGIA, UNSPECIFIED: ICD-10-CM

## 2018-09-02 DIAGNOSIS — Z98.1 ARTHRODESIS STATUS: Chronic | ICD-10-CM

## 2018-09-02 DIAGNOSIS — N40.0 BENIGN PROSTATIC HYPERPLASIA WITHOUT LOWER URINARY TRACT SYMPTOMS: ICD-10-CM

## 2018-09-02 DIAGNOSIS — G89.29 OTHER CHRONIC PAIN: ICD-10-CM

## 2018-09-02 DIAGNOSIS — F41.9 ANXIETY DISORDER, UNSPECIFIED: ICD-10-CM

## 2018-09-02 DIAGNOSIS — F32.9 MAJOR DEPRESSIVE DISORDER, SINGLE EPISODE, UNSPECIFIED: ICD-10-CM

## 2018-09-02 DIAGNOSIS — I25.10 ATHEROSCLEROTIC HEART DISEASE OF NATIVE CORONARY ARTERY WITHOUT ANGINA PECTORIS: ICD-10-CM

## 2018-09-02 DIAGNOSIS — K21.9 GASTRO-ESOPHAGEAL REFLUX DISEASE WITHOUT ESOPHAGITIS: ICD-10-CM

## 2018-09-02 DIAGNOSIS — Z95.5 PRESENCE OF CORONARY ANGIOPLASTY IMPLANT AND GRAFT: Chronic | ICD-10-CM

## 2018-09-02 DIAGNOSIS — Z79.82 LONG TERM (CURRENT) USE OF ASPIRIN: ICD-10-CM

## 2018-09-02 PROCEDURE — 99283 EMERGENCY DEPT VISIT LOW MDM: CPT

## 2018-09-02 RX ORDER — MORPHINE SULFATE 50 MG/1
30 CAPSULE, EXTENDED RELEASE ORAL ONCE
Qty: 0 | Refills: 0 | Status: DISCONTINUED | OUTPATIENT
Start: 2018-09-02 | End: 2018-09-02

## 2018-09-02 RX ORDER — HYDROMORPHONE HYDROCHLORIDE 2 MG/ML
4 INJECTION INTRAMUSCULAR; INTRAVENOUS; SUBCUTANEOUS ONCE
Qty: 0 | Refills: 0 | Status: DISCONTINUED | OUTPATIENT
Start: 2018-09-02 | End: 2018-09-02

## 2018-09-02 RX ADMIN — HYDROMORPHONE HYDROCHLORIDE 4 MILLIGRAM(S): 2 INJECTION INTRAMUSCULAR; INTRAVENOUS; SUBCUTANEOUS at 13:47

## 2018-09-02 NOTE — ED ADULT NURSE NOTE - NSSISCREENINGQ4_ED_A_ED
Pain not relieved by Medications/Swelling that continues/Bleeding that does not stop/Numbness, color, or temperature change to extremity No Excessive Diarrhea/Numbness, tingling/Swelling that continues/Pain not relieved by Medications/Fever greater than 101/Bleeding that does not stop/Persistent Nausea and Vomiting/Unable to Urinate/Increased Irritability or Sluggishness/Inability to Tolerate Liquids or Foods/Numbness, color, or temperature change to extremity

## 2018-09-02 NOTE — ED PROVIDER NOTE - MUSCULOSKELETAL, MLM
Spine appears normal, range of motion is not limited, no muscle or joint tenderness. No midline tenderness.

## 2018-09-02 NOTE — ED PROVIDER NOTE - OBJECTIVE STATEMENT
77 y/o male with a PMHx of neuropathy, BPH, CAD, HTN, depression, GERD, spinal stenosis presents to the ED c/o chronic back pain. Pt states he has a hx metal rods placed in spine for collapsed vertebrae, done at [facility] surgery. Pt notes he normally takes Morphine 30mg for back pain but reports a new doctor at his assisted living home took him off the Morphine. Denies numbness, problems with urination, fever, SOB, or CP. Pt reports some tingling in lower extremities, not deviated from baseline due to neuropathy. 77 y/o male with a PMHx of neuropathy, BPH, CAD, HTN, depression, GERD, spinal stenosis presents to the ED c/o chronic back pain. Pt states he has a hx metal rods placed in spine for collapsed vertebrae, done at [facility]. Pt notes he normally takes Morphine 30mg for back pain but reports a new doctor at his assisted living home took him off the Morphine. Denies numbness, problems with urination, fever, SOB, or CP. Pt reports some tingling in lower extremities, not deviated from baseline due to neuropathy.  s/p eval 2 days PTA in ED for similar, CT showing chronic fx.

## 2018-09-02 NOTE — ED ADULT NURSE NOTE - CHIEF COMPLAINT QUOTE
Patient presents with increasing back pain from nursing home. Patient has history of chronic back pain, denies injury

## 2018-09-02 NOTE — ED ADULT NURSE NOTE - NSIMPLEMENTINTERV_GEN_ALL_ED
Implemented All Fall with Harm Risk Interventions:  [place] to call system. Call bell, personal items and telephone within reach. Instruct patient to call for assistance. Room bathroom lighting operational. Non-slip footwear when patient is off stretcher. Physically safe environment: no spills, clutter or unnecessary equipment. Stretcher in lowest position, wheels locked, appropriate side rails in place. Provide visual cue, wrist band, yellow gown, etc. Monitor gait and stability. Monitor for mental status changes and reorient to person, place, and time. Review medications for side effects contributing to fall risk. Reinforce activity limits and safety measures with patient and family. Provide visual clues: red socks.

## 2018-09-02 NOTE — ED PROVIDER NOTE - NS_ ATTENDINGSCRIBEDETAILS _ED_A_ED_FT
The scribe's documentation has been prepared under my direction and personally reviewed by me in its entirety. I confirm that the note above accurately reflects all work, treatment, procedures, and medical decision-making performed by me.  Willis Johnson MD

## 2018-09-02 NOTE — ED ADULT NURSE NOTE - OBJECTIVE STATEMENT
Pt who is on multiple pain relievers for chronic back pain presents requesting medication for breakthrough pain.  Pt is not ambulatory due to pain at this time.

## 2018-09-05 ENCOUNTER — EMERGENCY (EMERGENCY)
Facility: HOSPITAL | Age: 78
LOS: 0 days | Discharge: HOME | End: 2018-09-05
Attending: STUDENT IN AN ORGANIZED HEALTH CARE EDUCATION/TRAINING PROGRAM | Admitting: STUDENT IN AN ORGANIZED HEALTH CARE EDUCATION/TRAINING PROGRAM
Payer: MEDICARE

## 2018-09-05 VITALS
OXYGEN SATURATION: 98 % | DIASTOLIC BLOOD PRESSURE: 85 MMHG | HEIGHT: 69 IN | SYSTOLIC BLOOD PRESSURE: 159 MMHG | RESPIRATION RATE: 18 BRPM | TEMPERATURE: 98 F | WEIGHT: 149.03 LBS | HEART RATE: 84 BPM

## 2018-09-05 VITALS
DIASTOLIC BLOOD PRESSURE: 94 MMHG | HEART RATE: 93 BPM | RESPIRATION RATE: 18 BRPM | OXYGEN SATURATION: 100 % | SYSTOLIC BLOOD PRESSURE: 171 MMHG

## 2018-09-05 DIAGNOSIS — Z95.5 PRESENCE OF CORONARY ANGIOPLASTY IMPLANT AND GRAFT: Chronic | ICD-10-CM

## 2018-09-05 DIAGNOSIS — S09.90XA UNSPECIFIED INJURY OF HEAD, INITIAL ENCOUNTER: ICD-10-CM

## 2018-09-05 DIAGNOSIS — Z98.1 ARTHRODESIS STATUS: Chronic | ICD-10-CM
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
## 2018-09-05 DIAGNOSIS — Z88.1 ALLERGY STATUS TO OTHER ANTIBIOTIC AGENTS STATUS: ICD-10-CM
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
## 2018-09-05 PROCEDURE — 72125 CT NECK SPINE W/O DYE: CPT | Mod: 26

## 2018-09-05 PROCEDURE — 70450 CT HEAD/BRAIN W/O DYE: CPT | Mod: 26

## 2018-09-05 PROCEDURE — 99284 EMERGENCY DEPT VISIT MOD MDM: CPT | Mod: 25

## 2018-09-05 RX ORDER — ACETAMINOPHEN 500 MG
650 TABLET ORAL ONCE
Qty: 0 | Refills: 0 | Status: COMPLETED | OUTPATIENT
Start: 2018-09-05 | End: 2018-09-05

## 2018-09-05 RX ORDER — TETANUS TOXOID, REDUCED DIPHTHERIA TOXOID AND ACELLULAR PERTUSSIS VACCINE, ADSORBED 5; 2.5; 8; 8; 2.5 [IU]/.5ML; [IU]/.5ML; UG/.5ML; UG/.5ML; UG/.5ML
0.5 SUSPENSION INTRAMUSCULAR ONCE
Qty: 0 | Refills: 0 | Status: COMPLETED | OUTPATIENT
Start: 2018-09-05 | End: 2018-09-05

## 2018-09-05 RX ADMIN — TETANUS TOXOID, REDUCED DIPHTHERIA TOXOID AND ACELLULAR PERTUSSIS VACCINE, ADSORBED 0.5 MILLILITER(S): 5; 2.5; 8; 8; 2.5 SUSPENSION INTRAMUSCULAR at 20:44

## 2018-09-05 NOTE — ED PROVIDER NOTE - OBJECTIVE STATEMENT
77 y/o male with a PMHx of CAD s/p stent, GERD, spinal stenosis presents to the ED from [facility] assisted [facility] s/p unwitnessed mechanical fall today. Pt states he "fell asleep while walking" and struck his head on a wheelchair during the fall. Pt then pressed the life alert button and EMS brought pt to hospital. Pt notes he was recently taken off Morphine and has had withdrawal symptoms recently with a lack of sleep. Pt now c/o back pain (at baseline). No extremity pain, chest pain, difficulty breathing, abd pain, extremity weakness. On ASA 81mg. Last tetanus shot is unknown.

## 2018-09-05 NOTE — ED ADULT NURSE REASSESSMENT NOTE - NS ED NURSE REASSESS COMMENT FT1
pt immediately asked me for pain meds when getting onto shift. pt is asking for 10 of oxycodone for his back pain, notified dr connelly. pt has a laceration to back of his head, wrapped head with kerlix and put bacitracin on lac. awaiting ct results

## 2018-09-05 NOTE — ED PROVIDER NOTE - PROGRESS NOTE DETAILS
Ethan DO: Patient feeling better at this time; ambulation trial; f/u with pmd in 1-2 days without fail.

## 2018-09-29 ENCOUNTER — EMERGENCY (EMERGENCY)
Facility: HOSPITAL | Age: 78
LOS: 0 days | Discharge: HOME | End: 2018-09-29
Attending: EMERGENCY MEDICINE
Payer: MEDICARE

## 2018-09-29 VITALS
OXYGEN SATURATION: 96 % | HEIGHT: 65 IN | RESPIRATION RATE: 18 BRPM | DIASTOLIC BLOOD PRESSURE: 89 MMHG | SYSTOLIC BLOOD PRESSURE: 174 MMHG | WEIGHT: 147.93 LBS | HEART RATE: 96 BPM | TEMPERATURE: 97 F

## 2018-09-29 VITALS
SYSTOLIC BLOOD PRESSURE: 180 MMHG | HEART RATE: 90 BPM | OXYGEN SATURATION: 97 % | DIASTOLIC BLOOD PRESSURE: 90 MMHG | TEMPERATURE: 98 F | RESPIRATION RATE: 18 BRPM

## 2018-09-29 DIAGNOSIS — Z88.5 ALLERGY STATUS TO NARCOTIC AGENT: ICD-10-CM

## 2018-09-29 DIAGNOSIS — M54.9 DORSALGIA, UNSPECIFIED: ICD-10-CM

## 2018-09-29 DIAGNOSIS — F41.9 ANXIETY DISORDER, UNSPECIFIED: ICD-10-CM

## 2018-09-29 DIAGNOSIS — D64.9 ANEMIA, UNSPECIFIED: ICD-10-CM

## 2018-09-29 DIAGNOSIS — Z98.1 ARTHRODESIS STATUS: ICD-10-CM

## 2018-09-29 DIAGNOSIS — N40.0 BENIGN PROSTATIC HYPERPLASIA WITHOUT LOWER URINARY TRACT SYMPTOMS: ICD-10-CM

## 2018-09-29 DIAGNOSIS — I25.119 ATHEROSCLEROTIC HEART DISEASE OF NATIVE CORONARY ARTERY WITH UNSPECIFIED ANGINA PECTORIS: ICD-10-CM

## 2018-09-29 DIAGNOSIS — Z79.02 LONG TERM (CURRENT) USE OF ANTITHROMBOTICS/ANTIPLATELETS: ICD-10-CM

## 2018-09-29 DIAGNOSIS — Z98.61 CORONARY ANGIOPLASTY STATUS: ICD-10-CM

## 2018-09-29 DIAGNOSIS — Z79.82 LONG TERM (CURRENT) USE OF ASPIRIN: ICD-10-CM

## 2018-09-29 DIAGNOSIS — Z98.1 ARTHRODESIS STATUS: Chronic | ICD-10-CM

## 2018-09-29 DIAGNOSIS — M48.00 SPINAL STENOSIS, SITE UNSPECIFIED: ICD-10-CM

## 2018-09-29 DIAGNOSIS — F32.9 MAJOR DEPRESSIVE DISORDER, SINGLE EPISODE, UNSPECIFIED: ICD-10-CM

## 2018-09-29 DIAGNOSIS — K21.9 GASTRO-ESOPHAGEAL REFLUX DISEASE WITHOUT ESOPHAGITIS: ICD-10-CM

## 2018-09-29 DIAGNOSIS — G89.29 OTHER CHRONIC PAIN: ICD-10-CM

## 2018-09-29 DIAGNOSIS — Z95.5 PRESENCE OF CORONARY ANGIOPLASTY IMPLANT AND GRAFT: Chronic | ICD-10-CM

## 2018-09-29 DIAGNOSIS — I10 ESSENTIAL (PRIMARY) HYPERTENSION: ICD-10-CM

## 2018-09-29 PROCEDURE — 99285 EMERGENCY DEPT VISIT HI MDM: CPT | Mod: 25

## 2018-09-29 RX ORDER — OXYCODONE HYDROCHLORIDE 5 MG/1
0 TABLET ORAL
Qty: 0 | Refills: 0 | COMMUNITY

## 2018-09-29 RX ORDER — DOCUSATE SODIUM 100 MG
0 CAPSULE ORAL
Qty: 0 | Refills: 0 | COMMUNITY

## 2018-09-29 RX ORDER — AMITRIPTYLINE HCL 25 MG
1 TABLET ORAL
Qty: 0 | Refills: 0 | COMMUNITY

## 2018-09-29 RX ORDER — BENZOYL PEROXIDE MICRONIZED 5.8 %
0 TOWELETTE (EA) TOPICAL
Qty: 0 | Refills: 0 | COMMUNITY

## 2018-09-29 RX ORDER — DULOXETINE HYDROCHLORIDE 30 MG/1
1 CAPSULE, DELAYED RELEASE ORAL
Qty: 0 | Refills: 0 | COMMUNITY

## 2018-09-29 RX ORDER — MORPHINE SULFATE 50 MG/1
30 CAPSULE, EXTENDED RELEASE ORAL ONCE
Qty: 0 | Refills: 0 | Status: DISCONTINUED | OUTPATIENT
Start: 2018-09-29 | End: 2018-09-29

## 2018-09-29 RX ORDER — CLOPIDOGREL BISULFATE 75 MG/1
1 TABLET, FILM COATED ORAL
Qty: 0 | Refills: 0 | COMMUNITY

## 2018-09-29 RX ORDER — SENNA PLUS 8.6 MG/1
1 TABLET ORAL
Qty: 0 | Refills: 0 | COMMUNITY

## 2018-09-29 RX ORDER — TAMSULOSIN HYDROCHLORIDE 0.4 MG/1
1 CAPSULE ORAL
Qty: 0 | Refills: 0 | COMMUNITY

## 2018-09-29 RX ORDER — GABAPENTIN 400 MG/1
0 CAPSULE ORAL
Qty: 0 | Refills: 0 | COMMUNITY

## 2018-09-29 RX ORDER — FAMOTIDINE 10 MG/ML
0 INJECTION INTRAVENOUS
Qty: 0 | Refills: 0 | COMMUNITY

## 2018-09-29 RX ADMIN — MORPHINE SULFATE 30 MILLIGRAM(S): 50 CAPSULE, EXTENDED RELEASE ORAL at 20:39

## 2018-09-29 NOTE — ED ADULT NURSE NOTE - NSIMPLEMENTINTERV_GEN_ALL_ED
Implemented All Fall with Harm Risk Interventions:  [place] to call system. Call bell, personal items and telephone within reach. Instruct patient to call for assistance. Room bathroom lighting operational. Non-slip footwear when patient is off stretcher. Physically safe environment: no spills, clutter or unnecessary equipment. Stretcher in lowest position, wheels locked, appropriate side rails in place. Provide visual cue, wrist band, yellow gown, etc. Monitor gait and stability. Monitor for mental status changes and reorient to person, place, and time. Review medications for side effects contributing to fall risk. Reinforce activity limits and safety measures with patient and family. Provide visual clues: red socks.

## 2018-09-29 NOTE — ED PROVIDER NOTE - OBJECTIVE STATEMENT
77 y/o male with PMHx of anemia, anxiety, CAD, depression, GERD, HTN, kyphosis, s/p spinal fusion done in 2016, presents to the ED c/o chronic lower back pain. Pt's Morphine has  and he took his last pill yesterday. Pt has an appointment with pain management Dr. Higuera on Monday who writes his prescriptions for Morphine. Lives at [facility]. On Morphine, 30mg PO BID.

## 2018-09-29 NOTE — ED ADULT NURSE NOTE - OBJECTIVE STATEMENT
Patient reports that he has chronic back pain related to an injury of April of 2016.  Patient requesting pain medication.  Patient states he normally takes Morphine but he has an allergy to Morphine noted on his transfer paperwork.  Patients paperwork from the [facility] indicates that he has no allergies and he is currently taking MS 30mg.  Patient states he needs pain medication. Patient reports that he is to follow up with pain management on Monday (MD Odonnell)

## 2018-09-29 NOTE — ED PROVIDER NOTE - MEDICAL DECISION MAKING DETAILS
77 y/o male with chronic back pain due to see pain management on Monday. They stopped his Morphine medication at [facility] where he lives as they ran out. Will give one dose of pain medication and call facility.

## 2018-09-29 NOTE — ED PROVIDER NOTE - PROGRESS NOTE DETAILS
Alfredo BLUNT for ED attending, Dr. Vazquez : Pt medicated and feeling better, ambulated to bathroom. Will discharge and follow up with pain management on Monday.

## 2018-09-29 NOTE — ED ADULT NURSE REASSESSMENT NOTE - NS ED NURSE REASSESS COMMENT FT1
Ambulance here at this time to transport patient back to [facility].  Discharge instructions reviewed with Ambulance EMT.  Patient stable to discharge and transfer to [facility].
Patient reports that he missed his night dose of blood pressure medication.  Blood pressure elevated at this time.  Medication to be given at [facility].
Patient ambulated to bathroom with assistance.  Patient normally ambulates with a walker.  Patient able to be ambulated with assistance.  Patient reports that he feels better and wants to return to the [facility].
Patient awaiting transfer back to [facility].  Will continue to monitor.  Patient aware of plan of care.

## 2018-09-30 ENCOUNTER — EMERGENCY (EMERGENCY)
Facility: HOSPITAL | Age: 78
LOS: 0 days | Discharge: HOME | End: 2018-09-30
Attending: EMERGENCY MEDICINE | Admitting: EMERGENCY MEDICINE
Payer: MEDICARE

## 2018-09-30 VITALS
OXYGEN SATURATION: 100 % | DIASTOLIC BLOOD PRESSURE: 80 MMHG | SYSTOLIC BLOOD PRESSURE: 170 MMHG | HEART RATE: 87 BPM | TEMPERATURE: 98 F | RESPIRATION RATE: 18 BRPM

## 2018-09-30 DIAGNOSIS — I10 ESSENTIAL (PRIMARY) HYPERTENSION: ICD-10-CM

## 2018-09-30 DIAGNOSIS — Z95.5 PRESENCE OF CORONARY ANGIOPLASTY IMPLANT AND GRAFT: Chronic | ICD-10-CM

## 2018-09-30 DIAGNOSIS — F41.9 ANXIETY DISORDER, UNSPECIFIED: ICD-10-CM

## 2018-09-30 DIAGNOSIS — D64.9 ANEMIA, UNSPECIFIED: ICD-10-CM

## 2018-09-30 DIAGNOSIS — Z88.5 ALLERGY STATUS TO NARCOTIC AGENT: ICD-10-CM

## 2018-09-30 DIAGNOSIS — F32.9 MAJOR DEPRESSIVE DISORDER, SINGLE EPISODE, UNSPECIFIED: ICD-10-CM

## 2018-09-30 DIAGNOSIS — M54.5 LOW BACK PAIN: ICD-10-CM

## 2018-09-30 DIAGNOSIS — K21.9 GASTRO-ESOPHAGEAL REFLUX DISEASE WITHOUT ESOPHAGITIS: ICD-10-CM

## 2018-09-30 DIAGNOSIS — G89.29 OTHER CHRONIC PAIN: ICD-10-CM

## 2018-09-30 DIAGNOSIS — Z98.1 ARTHRODESIS STATUS: Chronic | ICD-10-CM

## 2018-09-30 DIAGNOSIS — Z91.018 ALLERGY TO OTHER FOODS: ICD-10-CM

## 2018-09-30 PROCEDURE — 99283 EMERGENCY DEPT VISIT LOW MDM: CPT

## 2018-09-30 RX ORDER — MORPHINE SULFATE 50 MG/1
30 CAPSULE, EXTENDED RELEASE ORAL ONCE
Qty: 0 | Refills: 0 | Status: DISCONTINUED | OUTPATIENT
Start: 2018-09-30 | End: 2018-09-30

## 2018-09-30 RX ADMIN — MORPHINE SULFATE 30 MILLIGRAM(S): 50 CAPSULE, EXTENDED RELEASE ORAL at 21:28

## 2018-09-30 NOTE — ED PROVIDER NOTE - OBJECTIVE STATEMENT
77 yo male with a PMH of chronic back pain (on morphine and oxycodone) presents with back pain and refill of his medication. Pt states that he is due to see pain management tomorrow but ran out of both medication today and because he resides at [facility] they cannot prescribe or order medication. Denies incontinence of urine or stool, numbness or tingling to the lower extremities.

## 2018-09-30 NOTE — ED PROVIDER NOTE - ATTENDING CONTRIBUTION TO CARE
Attending Contribution to Care: I, Trish Robison, performed the initial face to face bedside interview with this patient regarding history of present illness, review of symptoms and relevant past medical, social and family history.  I completed an independent physical examination.  I was the initial provider who evaluated this patient. I have signed out the follow up of any pending tests (i.e. labs, radiological studies) to the ACP.  I have communicated the patient’s plan of care and disposition with the ACP.

## 2018-09-30 NOTE — ED PROVIDER NOTE - NS ED MD EM SELECTION
no dermatitis, no environmental allergies, no food allergies, no immunosuppressive disorder, and no pruritus. [address]84 Exp Problem Focused - Mod. Complex

## 2018-09-30 NOTE — ED ADULT NURSE NOTE - NSIMPLEMENTINTERV_GEN_ALL_ED
Implemented All Fall with Harm Risk Interventions:  [place] to call system. Call bell, personal items and telephone within reach. Instruct patient to call for assistance. Room bathroom lighting operational. Non-slip footwear when patient is off stretcher. Physically safe environment: no spills, clutter or unnecessary equipment. Stretcher in lowest position, wheels locked, appropriate side rails in place. Provide visual cue, wrist band, yellow gown, etc. Monitor gait and stability. Monitor for mental status changes and reorient to person, place, and time. Review medications for side effects contributing to fall risk. Reinforce activity limits and safety measures with patient and family. Provide visual clues: red socks.

## 2018-09-30 NOTE — ED PROVIDER NOTE - MEDICAL DECISION MAKING DETAILS
79 yo male with a PMH of chronic back pain, presents with medication refill. Pt states that he ran out of his medication ans is due to see pain management tomorrow. Will give him his one dose and d/c home. -Dev Gordon PA-C

## 2018-09-30 NOTE — ED ADULT NURSE NOTE - OBJECTIVE STATEMENT
Patient presents to ED complaining of chronic lower back pain. Patient states he sees pain management and doesn't have an appointment until tomorrow. Patient ran out of morphine and oxycodone prescriptions. Patient denies CP, SOB, fever, chills, NVD. Patient A&0x3.

## 2018-09-30 NOTE — ED ADULT NURSE NOTE - CHIEF COMPLAINT QUOTE
Patient reports that he has chronic back pain that he takes Morphine for. Patient states that his [facility] doctor stopped prescribing Morphine for him and "I need it for my back pain." Patient was seen in [facility] yesterday for same complaint/med refill and given a one time dose of Morphine. Patient has no change in complaints since previous visit.

## 2018-09-30 NOTE — ED PROVIDER NOTE - PROGRESS NOTE DETAILS
Alfredo BILLINGSLEY for Dr. Robison:   77 y/o male presents to the ED c/o chronic back pain requesting morphine. Pt states that his doctor at [facility] stopped prescribing Morphine for him and states "I need it for my back pain." Pt was in [facility] yesterday for same complaint and given one dose of morphine. States he has not had pain medication in 5 days. PE: normal, Poor dentition, Clear speech. MDM: Give one dose of chronic pain medication, confirmed by I stop report.

## 2018-09-30 NOTE — ED PROVIDER NOTE - ENMT, MLM
Airway patent, Nasal mucosa clear. Mouth with normal mucosa. Throat has no vesicles, no oropharyngeal exudates and uvula is midline. Poor dental hygiene.

## 2018-09-30 NOTE — ED ADULT TRIAGE NOTE - CHIEF COMPLAINT QUOTE
Patient reports that he has chronic back pain that he takes Morphine for. Patient states that his [facility] doctor stopped prescribing Morphine for him and "I need it for my back pain." Patient was seen in [facility] yesterday for same complaint/med refill and given a one time dose of Morphine. Patient has no change in complaints since previous visit.

## 2018-12-19 NOTE — ED ADULT TRIAGE NOTE - BSA (M2)
Has Your Skin Lesion Been Treated?: not been treated Is This A New Presentation, Or A Follow-Up?: Skin Lesion 1.84

## 2019-02-12 NOTE — ED ADULT NURSE NOTE - NSSISCREENINGQ2_ED_A_ED
No
Detail Level: Zone
Note Text (......Xxx Chief Complaint.): This diagnosis correlates with the
Other (Free Text): Given sample of Olux today in office. Encouraged patient to apply twice daily until rash has cleared. Encouraged to call for prescription if not resolved after using sample.

## 2019-05-03 NOTE — PROGRESS NOTE ADULT - PROBLEM SELECTOR PROBLEM 2
Case Management Discharge Note         Destination - Selection Complete      Service Provider Request Status Selected Services Address Phone Number Fax Number    [facility] Selected Skilled Nursing [address], [place] [address] [phone]      Durable Medical Equipment      No service has been selected for the patient.      Dialysis/Infusion      No service has been selected for the patient.      Home Medical Care      No service has been selected for the patient.      Therapy      No service has been selected for the patient.      Community Resources      No service has been selected for the patient.        Transportation Services  Ambulance: [facility]    Final Discharge Disposition Code: 03 - skilled nursing facility (SNF)  
Intractable back pain
Neuropathy
Intractable back pain
Nausea and vomiting, intractability of vomiting not specified, unspecified vomiting type
Neuropathy

## 2019-05-13 NOTE — ED ADULT NURSE NOTE - DISCHARGE DATE/TIME
Size Of Lesion In Cm (Optional): 0 Detail Level: Detailed Body Location Override (Optional - Billing Will Still Be Based On Selected Body Map Location If Applicable): R thigh 26-Jan-2018 14:58

## 2019-07-22 NOTE — PHYSICAL THERAPY INITIAL EVALUATION ADULT - AMBULATION SKILLS, REHAB EVAL
I have no opening today, offer UC  
Left VM for patient asking him to call the clinic back to discuss below.  Clinic number left.  
Onset: A week ago     Location / description: Patient c/o left sided abdominal discomfort that radiates to the left flank.     He also notes a swollen lymphnode in his left arm pit. He states he noticed this a week ago as well when the abdominal discomfort started.    He notes he has a hx of this discomfort and has been seen in the past for it.     No pain upon urination or any urinary problems. No changes in BM. No fevers.    He is concerned with the swollen lymphnode and would like to be seen sooner rather than later.     No openings today; ok to wait until Thursday? Please advise.     To nurse who calls back; Please call patient back on Mobile.     Reason for Disposition  • Abdominal pain is a chronic symptom (recurrent or ongoing AND present > 4 weeks)    Protocols used: ABDOMINAL PAIN - MALE-A-      
Patient has the following symptoms: lower left abdominal pain with back pain  The symptoms have been present for 1 week  Patient was not offered an appointment.      
Spoke to patient and informed him to be evaluated in UC. Patient will go now. Nothing further.   
needs device/independent

## 2019-08-19 NOTE — H&P ADULT - PSH
17F presented with fever, chills, and dysuria. On presentation, Temp(103.1), WBC(12.4), and HCG(11.7). Chest Xray was without acute pulmonary disease. Urinalysis was noted to be abnormal and empiric intravenous antibiotics were initiated for sepsis and urinary tract infection. The patient had improvement in the tachycardia and leukocytosis but had a repeat episode of fever. Repeat HCG was noted to be elevated and the patient was informed with recommendations to follow up with her gynecologist for further management. The leukocytosis and fever resolved. Urine culture grew ecoli and the patient completed the course of antibiotics.            35 minutes total time
H/O heart artery stent    S/P spinal fusion

## 2019-11-05 NOTE — ED ADULT NURSE NOTE - NS PRO PASSIVE SMOKE EXP
Chief Complaint   Patient presents with   • Medication Management   • Anxiety       HISTORY OF PRESENT ILLNESS:  Tima Culver is a 51 year old male who is here for followup of medical conditions and medications.  The patient states his blood sugars have been running about the same as before typically when he checks in the morning in the mid 100s.  The patient is having a lot of personal stress at this time and has some anxiety and occasionally some mild panic attacks.  This does affect his sleeping.  Not necessarily having a lot of depression or sadness.  He does have some sinus congestion and drainage and plugging at night which especially affects his breathing and perhaps not allowing him to use his CPAP properly.  He is going to be seeing a pulmonary specialist in December.  [place] no issues with dyspnea chest pain otherwise.  He does get some seasonal allergy type problems over time.    REVIEW OF SYSTEMS:  Otherwise negative.    ALLERGIES:   Allergen Reactions   • Allegra RASH     celluliits  After taking Allegra   • Dust Other (See Comments)   • Pollen Other (See Comments)     Trees, grass and ragweed     Current Outpatient Medications   Medication Sig Dispense Refill   • blood glucose test strip Test blood sugar 3 times daily as directed. Diagnosis: E11.9. Meter: Pharmacist choice 100 each 5   • zolpidem (AMBIEN) 10 MG tablet Take 1 tablet by mouth nightly as needed for Sleep. Do not start before October 18, 2019. 30 tablet 1   • VOLTAREN 1 % gel APPLY TOPICALLY 4 TIMES DAILY. APPLY TO THE RIGHT SHOULDER AND NECK. MAX 32 GRAMS PER DAY. 300 g 0   • testosterone cypionate (DEPO-TESTOSTERONE) 200 MG/ML injectable solution INJECT 150 MG (0.75 ML) INTO THE MUSCLE EVERY 7 DAYS. DISCARD MEDICATION REMAINING IN VIAL AFTER EACH DOSE. 4 mL 2   • sildenafil (REVATIO) 20 MG tablet Take 2-5 pills daily as needed for  tablet 0   • ULTICARE MICRO PEN NEEDLES 32G X 4 MM Misc USE 4 TIMES PER DAY WITH INSULIN 150 each 11    • metformin (GLUCOPHAGE) 1000 MG tablet TAKE 1 TABLET BY MOUTH 2 TIMES DAILY (WITH MEALS). 180 tablet 3   • HYDROcodone-acetaminophen (NORCO) 5-325 MG per tablet Take 1 tablet by mouth every 6 hours as needed for Pain. 12 tablet 0   • lisinopril (ZESTRIL) 20 MG tablet TAKE 1 TABLET BY MOUTH DAILY. 90 tablet 3   • fenofibrate micronized (LOFIBRA) 134 MG capsule TAKE 1 CAPSULE BY MOUTH DAILY (BEFORE BREAKFAST). 90 capsule 3   • atorvastatin (LIPITOR) 40 MG tablet TAKE 1 TABLET BY MOUTH DAILY. 90 tablet 3   • insulin detemir (LEVEMIR FLEXTOUCH) 100 UNIT/ML pen-injector Inject 50 Units into the skin nightly. 15 mL 11   • omega-3 acid ethyl esters (LOVAZA) 1 g capsule Take 2 capsules by mouth 2 times daily. 360 capsule 3   • NOVOLOG FLEXPEN 100 UNIT/ML pen-injector SLIDING-SCALE 3 TIMES DAILY BEFORE MEALS: BLOOD-SUGAR 150-200=2 UNIT. 201-250=4 UNITS. 251-300=6 UNIT. 301-350=8 UNITS. 351-400=10 UNITS. 4 15 mL 11   • Continuous Blood Gluc Sensor (FREESTYLE LORENZO SENSOR SYSTEM) [facility] System glucose monitor refill sensors as needed DX E11.9 1 each 11   • blood glucose meter Test blood sugar three  times daily as directed. Diagnosis: E11.9 Meter: Pharmacist choice 1 kit 0   • Syringe/Needle, Disp, 23G X 1-1/2\" 3 ML Misc Use one syringe every 14 days with testosterone injections 12 each 3   • Cholecalciferol (VITAMIN D) 2000 units capsule Take 1 capsule by mouth daily. 30 capsule    • aspirin (ASPIRIN) 81 MG EC tablet Take 81 mg by mouth daily.     • escitalopram (LEXAPRO) 5 MG tablet Take 1 tablet by mouth daily. 30 tablet 2   • montelukast (SINGULAIR) 10 MG tablet Take 1 tablet by mouth nightly. 30 tablet 5   • ipratropium (ATROVENT) 0.03 % nasal spray Spray 2 sprays in each nostril every 12 hours. 30 mL 2   • LORazepam (ATIVAN) 0.5 MG tablet Take 1 tablet by mouth daily as needed for Anxiety. 5 tablet 0     No current facility-administered medications for this visit.          PHYSICAL EXAMINATION:  Visit Vitals  BP  136/74 (BP Location: LUE - Left upper extremity, Patient Position: Sitting, Cuff Size: Regular)   Pulse 80   Ht 6' (1.829 m)   Wt 107.5 kg   SpO2 97%   BMI 32.14 kg/m²     GENERAL APPEARANCE:  Not in acute distress.   HEENT: Eyes: EOMI(Extraocular movements intact), sclerae clear.  Mouth: The oropharynx is clear without any erythema or exudative changes.  Nose: No lesions or drainage are seen.  Ears: The tympanic membranes and external ear canals are normal-appearing bilaterally.  LUNGS:  Clear bilaterally.  CARDIOVASCULAR:  Regular rate and rhythm.  No murmurs, rubs or gallops.  MOOD:  Calm and appropriate.  SKIN:  Dry and warm to touch.  NEUROLOGIC:  Non-focal.          ASSESSMENT AND PLAN:  Uncontrolled type 2 diabetes mellitus with microalbuminuria, with long-term current use of insulin (CMS/[facility])  (primary encounter diagnosis)  Plan:  Is due for A1 c and get that done soon.  Discussing diabetic educator, Endocrine again but due to a lot of stress and other issues, wanted to hold off on that.  He does need to check his blood sugars on a regular basis to get a better idea where they are at and recommend checking it t.i.d. and keeping a log book.    Anxiety  Plan:  Start Lexapro 5 mg daily.  Can call in the next 1 month if not improving or helping.  Will give lorazepam 0.5 mg daily as needed and only 5 tablets.  Understands that this is short-term only for panic attacks and not to use it if he has to drive and not to drink any alcohol with which he does not at as it is.  We discussed potential risk of medication if used on a regular basis but will only give him this short-term use only.    Allergic rhinitis, unspecified seasonality, unspecified trigger  Plan:  Can add Singulair 10 mg daily, Atrovent nasal spray and continuing with steroid nasal spray.  If does not improve will let us know.    Sleep apnea, unspecified type  Plan:  On CPAP and seen his pulmonary specialist in December.    Return in 3 months, sooner  if needed       No

## 2020-01-09 NOTE — ED ADULT NURSE NOTE - IN THE PAST 12 MONTHS HAVE YOU USED DRUGS OTHER THAN THOSE REQUIRED FOR MEDICAL REASON?
[General Appearance - Well Developed] : well developed [Normal Appearance] : normal appearance [Well Groomed] : well groomed [General Appearance - Well Nourished] : well nourished [No Deformities] : no deformities [General Appearance - In No Acute Distress] : no acute distress [Normal Conjunctiva] : the conjunctiva exhibited no abnormalities [Eyelids - No Xanthelasma] : the eyelids demonstrated no xanthelasmas [Normal Oral Mucosa] : normal oral mucosa [No Oral Pallor] : no oral pallor [No Oral Cyanosis] : no oral cyanosis [Normal Jugular Venous A Waves Present] : normal jugular venous A waves present [Normal Jugular Venous V Waves Present] : normal jugular venous V waves present [No Jugular Venous Baxter A Waves] : no jugular venous baxter A waves [Respiration, Rhythm And Depth] : normal respiratory rhythm and effort [Exaggerated Use Of Accessory Muscles For Inspiration] : no accessory muscle use [Auscultation Breath Sounds / Voice Sounds] : lungs were clear to auscultation bilaterally [Heart Rate And Rhythm] : heart rate and rhythm were normal No [Heart Sounds] : normal S1 and S2 [Murmurs] : no murmurs present [Arterial Pulses Normal] : the arterial pulses were normal [FreeTextEntry1] : 1-2+ pitting edema  [Bowel Sounds] : normal bowel sounds [Abdomen Soft] : soft [Abdomen Tenderness] : non-tender [Abdomen Mass (___ Cm)] : no abdominal mass palpated [Abnormal Walk] : normal gait [Gait - Sufficient For Exercise Testing] : the gait was sufficient for exercise testing [Nail Clubbing] : no clubbing of the fingernails [Cyanosis, Localized] : no localized cyanosis [Petechial Hemorrhages (___cm)] : no petechial hemorrhages [Nail Splinter Hemorrhages] : no splinter hemorrhages of the nails [Fingers Osler's Nodes] : Osler's nodes were not seenon the fingers [Skin Color & Pigmentation] : normal skin color and pigmentation [Skin Turgor] : normal skin turgor [] : no rash [No Venous Stasis] : no venous stasis [Skin Lesions] : no skin lesions [No Skin Ulcers] : no skin ulcer [No Xanthoma] : no  xanthoma was observed [Oriented To Time, Place, And Person] : oriented to person, place, and time [Impaired Insight] : insight and judgment were intact [Affect] : the affect was normal [Mood] : the mood was normal [Memory Recent] : recent memory was not impaired [Memory Remote] : remote memory was not impaired [No Anxiety] : not feeling anxious

## 2020-02-26 NOTE — ED PROVIDER NOTE - NS ED ATTENDING STATEMENT MOD
Department of Anesthesiology  Postprocedure Note    Patient: Mel Alcaraz  MRN: [id number]  YOB: 2011  Date of evaluation: 2/26/2020  Time:  10:52 AM     Procedure Summary     Date:  02/26/20 Room / Location:  [address]    Anesthesia Start:  9677 Anesthesia Stop:  8553    Procedure:  RIGHT MYRINGOTOMY TYMPANOSTOMY TUBE INSERTION (Right Ear) Diagnosis:  (DYSFUNCTION OF RIGHT EUSTACHIAN TUBE; RETRACTION OF TYMPANIE MEMBRANE OF RIGHT EAR)    Surgeon:  Rajan Murrell MD Responsible Provider:  Nirali Proctor DO    Anesthesia Type:  general ASA Status:  1          Anesthesia Type: general    Tricia Phase I: Tricia Score: 10    Tricia Phase II: Tricia Score: 10    Last vitals: Reviewed and per EMR flowsheets.        Anesthesia Post Evaluation    Patient location during evaluation: bedside  Patient participation: complete - patient participated  Level of consciousness: awake and alert  Pain score: 0  Airway patency: patent  Nausea & Vomiting: no nausea and no vomiting  Complications: no  Cardiovascular status: hemodynamically stable and blood pressure returned to baseline  Respiratory status: spontaneous ventilation, room air and acceptable  Hydration status: stable
Attending Only

## 2020-04-11 NOTE — PROGRESS NOTE ADULT - ASSESSMENT
76y Male complaining of pain, low back with history of chronic back pain, spinal fusion (approximately a month ago at [facility]) presents to the ED from [facility] c/o lower back pain, as well as nausea. Pt takes 6mg Dilaudid every 3 hours for pain.  Pt has left foot drop which is chronic.  Pt is able to walk with walker. No

## 2020-06-20 NOTE — ED ADULT NURSE NOTE - NSIMPLEMENTINTERV_GEN_ALL_ED
Subjective    Feeling well, no complaints.     Objective    Vital signs  T(F): , Max: 99.1 (06-20-20 @ 00:59)  HR: 75 (06-20-20 @ 08:07)  BP: 147/79 (06-20-20 @ 08:07)  SpO2: 92% (06-20-20 @ 08:07)  Wt(kg): --    Output     OUT:    Ureteral Catheter: 950 mL  Total OUT: 950 mL    Total NET: -950 mL      OUT:    Ureteral Catheter: 150 mL  Total OUT: 150 mL    Total NET: -150 mL          Physical Exam  Gen NAD  Abd soft NT ND   griffith draining clear yellow urine    Labs      06-20 @ 07:02    WBC 8.48  / Hct 31.0  / SCr --       06-20 @ 06:57    WBC --    / Hct --    / SCr 0.85       Urine Cx:   Culture - Urine (06.17.20 @ 16:58)    Specimen Source: .Urine Clean Catch (Midstream)    Culture Results:   No growth      Blood Cx:   Culture - Blood (06.17.20 @ 17:00)    Specimen Source: .Blood Blood-Peripheral    Culture Results:   No growth to date.        Imaging  < from: CT Abdomen and Pelvis w/ Oral Cont and w/ IV Cont (06.17.20 @ 15:10) >    EXAM:  CT CHEST OC IC                          EXAM:  CT ABDOMEN AND PELVIS OC IC                            PROCEDURE DATE:  06/17/2020            INTERPRETATION:  CLINICAL INFORMATION: Weight loss and dysuria.    COMPARISON: None available.    PROCEDURE:   CT of the Chest, Abdomen and Pelvis was performed with intravenous contrast.   Intravenous contrast: 90 ml Omnipaque 350. 10 ml discarded.  Oral contrast: Positive contrast was administered.  Sagittal and coronal reformats were performed.    FINDINGS:  CHEST:   LUNGS AND LARGE AIRWAYS: Patent central airways. Trace left pleural effusion and basilar atelectasis. Linear atelectasis or scarring left lower lobe.  PLEURA: No pleural effusion.  VESSELS: Within normal limits.  HEART: Heart size is normal. Status post stenting of the left anterior descending coronary artery. No pericardial effusion.  MEDIASTINUM AND NUZHAT: No lymphadenopathy.  CHEST WALL AND LOWER NECK: Within normal limits.    ABDOMEN AND PELVIS:  LIVER: Within normal limits.  BILE DUCTS: Normal caliber.  GALLBLADDER: Cholelithiasis.  SPLEEN: Within normal limits.  PANCREAS: Within normal limits.  ADRENALS: Within normal limits.  KIDNEYS/URETERS: There is an ill-defined hypodense area in the lower pole of the left kidney. There is associated left perirenal fat stranding and fluid within the Gerota's fascia    BLADDER: There is air within the bladder lumen and wall as well as mural thickening compatible with emphysematous cystitis  REPRODUCTIVE ORGANS: Prostate within normal limits.    BOWEL: There is a large colonic stool burden. The stomach is markedly distended. Appendix is normal.  PERITONEUM: No ascites.  VESSELS: Atherosclerotic changes.  RETROPERITONEUM/LYMPH NODES: No lymphadenopathy.    ABDOMINAL WALL: Within normal limits.  BONES: Old right third rib fracture. Osseous degenerative change.    IMPRESSION:   Emphysematous cystitis with left renal abscess.                ELISA MCNAMARA M.D., RADIOLOGY RESIDENT  This document has been electronically signed.  ORQUIDEA VERDIN M.D., ATTENDING RADIOLOGIST  This document has been electronically signed. Jun 17 2020  4:35PM                < end of copied text > Implemented All Fall with Harm Risk Interventions:  [place] to call system. Call bell, personal items and telephone within reach. Instruct patient to call for assistance. Room bathroom lighting operational. Non-slip footwear when patient is off stretcher. Physically safe environment: no spills, clutter or unnecessary equipment. Stretcher in lowest position, wheels locked, appropriate side rails in place. Provide visual cue, wrist band, yellow gown, etc. Monitor gait and stability. Monitor for mental status changes and reorient to person, place, and time. Review medications for side effects contributing to fall risk. Reinforce activity limits and safety measures with patient and family. Provide visual clues: red socks.

## 2020-06-23 NOTE — ED ADULT TRIAGE NOTE - NS ED NURSE DIRECT TO ROOM YN
[facility]

                             Created on: 2020



Darby Love

External Reference #: [id number]

: 1978

Sex: Female



Demographics





                          Address                   [address] [place]  [address]

 

                          Preferred Language        Unknown

 

                          Marital Status            Unknown

 

                          Rastafarian Affiliation     Unknown

 

                          Race                      Unknown

 

                          Ethnic Group              Unknown





Author





                          Author                    DANNY Darby ESPARZA

 

                          Organization              [facility]

 

                          Address                   [address] [place]  [address]



 

                          Phone                     [phone]







Care Team Providers





                    Care Team Member Name Role                Phone

 

                    VILMA DELGADO      Unavailable         [phone]







PROBLEMS





          Type      Condition ICD9-CM Code JBE23-XX Code Onset Dates Condition S

tatus SNOMED 

Code

 

          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             

 Active    27300824

 

          Problem   History of leukocytosis           Z86.2               Active

    653020525

 

          Problem   Anxiety             F41.9               Active    13229400

 

                          Problem                   Type 2 diabetes mellitus wit

hout complication, without long-term current

use of insulin              E11.9                     Active       [id number]

 

          Problem   Seasonal allergic rhinitis due to pollen           J30.1    

           Active    37892251

 

          Problem   Tubular adenoma of colon           D12.6               Activ

e    706595195

 

          Problem   GERD with esophagitis           K21.0               Active  

  909511293

 

          Problem   Mixed hyperlipidemia           E78.2               Active   

 185468302

 

           Problem    Seasonal allergic rhinitis due to other allergic trigger  

          J30.89                

Active                                  [id number]

 

          Problem   Dysthymic disorder           F34.1               Active    7

3374401

 

          Problem   Arthritis           M19.90              Active    3777033

 

          Problem   Non morbid obesity           E66.9               Active    4

[id number]







ALLERGIES

No Information



ENCOUNTERS





                Encounter       Location        Date            Diagnosis

 

                    [facility] [id number] [place] [address] 10 

2020                                

 

                    [facility] [address] [facility] [place] [address] 15 

2020                               Leg pain, left M79.605

 

                          [facility]  [address] [facility] [id number]

[address] [place] 

[address]                13 2020              Leg pain, left M79.605

 

                    [facility] [address] [facility] [place] [address] 11 

2019                               Type 2 diabetes mellitus without complic

ation, without long-term 

current use of insulin E11.9 and GERD with esophagitis K21.0

 

                    [place] [address] [address] 01 

Oct, 2019                               Encounter for immunization Z23

 

                    [place] [address] [address]                                Type 2 diabetes mellitus without complic

ation, without long-term 

current use of insulin E11.9

 

                    [place] [address] [address]                                 

 

                    [place] [address] [address]                                Type 2 diabetes mellitus without complic

ation, without long-term 

current use of insulin E11.9

 

                    [place] [address] [address]                                Weight loss R63.4

 

                    [place] [address] [address] 31 

May, 2019                               Type 2 diabetes mellitus without complic

ation, without long-term 

current use of insulin E11.9

 

                    [place] [address] [address] 31 

May, 2019                               Type 2 diabetes mellitus without complic

ation, without long-term 

current use of insulin E11.9 and Non morbid obesity E66.9

 

                          [facility] [place] [address] 

[address]                14 May, 2019              Seasonal allergic rhinitis d

ue to pollen J30.1 and Sore 

throat J02.9

 

                          [facility] [place] [address] [address]

[address] [place] [address] 

[address]                              Arthritis M19.90

 

                    [place] [address] [address]                                Seasonal allergic rhinitis due to other 

allergic trigger J30.89 and 

Dysthymic disorder F34.1

 

                    [place] [address] [address] 05 

Dec, 2018                               Bilateral otitis media with effusion H65

.93 and Anxiety F41.9

 

                    [place] [address] [address]                                Type 2 diabetes mellitus without complic

ation, without long-term 

current use of insulin E11.9

 

                    [place] [address] [address]                                Pharyngitis due to Streptococcus species

 J02.0

 

                    [place] [address] [address]                                 

 

                          [facility] [place] [address] 

[address]                10 Jul, 2018              Fever, unspecified fever cau

se R50.9 and Lymphadenopathy

R59.1

 

                          [facility] IN [address] 

[address]                              Pharyngitis due to other org

anism J02.8

 

                    [address] [address]                                 

 

                    [address] [address]                                Type 2 diabetes mellitus without complic

ation, without long-term 

current use of insulin E11.9 and Other eczema L30.8

 

                          [facility] [place] [address] 

[address]                              Acute pain of left shoulder 

M25.512

 

                    [address] [address]                                 

 

                    [address] [address] 15 

2018                               Type 2 diabetes mellitus without complic

ation, without long-term 

current use of insulin E11.9

 

                    [place] [address] [address]                                Type 2 diabetes mellitus without complic

ation, without long-term 

current use of insulin E11.9 ; Tubular adenoma of colon D12.6 ; Mixed 
hyperlipidemia E78.2 ; History of leukocytosis Z86.2 and Screening breast 
examination Z12.31

 

                    [place] [address] [address]                                Metatarsalgia of right foot M77.41 and T

ype 2 diabetes mellitus 

without complication, without long-term current use of insulin E11.9

 

                    [place] [address] [address]                                Capsulitis M77.9 and Metatarsalgia of ri

ght foot M77.41

 

                    [place] [address] [address] 08 

Sep, 2017                               Capsulitis M77.9

 

                    [place] [address] [address] 06 

Sep, 2017                                

 

                    [place] [address] [address] 03 

Aug, 2017                               Type 2 diabetes mellitus without complic

ation, without long-term 

current use of insulin E11.9 ; Tubular adenoma of colon D12.6 and Mixed 
hyperlipidemia E78.2

 

                    [place] [address] [address]                                Metatarsalgia of right foot M77.41 and C

apsulitis M77.9

 

                    [place] [address] [address]                                 

 

                          [facility]  [address] [facility] [id number]

[address] [place] 

[address]                              Duarte's neuroma of right fo

ot G57.61

 

                    [place] [address] [address]                                Mixed hyperlipidemia E78.2 and Type 2 di

abetes mellitus without 

complication, without long-term current use of insulin E11.9

 

                    [place] [address] [address] 10 

Brandon, 2017                               Type 2 diabetes mellitus without complic

ation, without long-term 

current use of insulin E11.9 ; Tubular adenoma of colon D12.6 and Mixed 
hyperlipidemia E78.2

 

                    [place] [address] [address] 22 

Dec, 2016                               Mixed hyperlipidemia E78.2

 

                    [place] [address] [address]                                 

 

                    [facility] [address] [address]                                Mixed hyperlipidemia E78.2

 

                    [place] [address] [address]                                Mixed hyperlipidemia E78.2

 

                    [place] [address] [address] 08 

Sep, 2016                                

 

                    [place] [address] [address] 10 

Aug, 2016                               Type 2 diabetes mellitus without complic

ation, without long-term 

current use of insulin E11.9 ; Helicobacter pylori (H. pylori) infection A04.8 
and Mixed hyperlipidemia E78.2

 

                    [place] [address] [address] 08 

Aug, 2016                               Type 2 diabetes mellitus without complic

ation, without long-term 

current use of insulin E11.9

 

                    [place] [address] [address] 03 

Aug, 2016                               Type 2 diabetes mellitus without complic

ation, without long-term 

current use of insulin E11.9

 

                    [place] [address] [address] 02 

Aug, 2016                               Dyspepsia R10.13 ; Upper abdominal pain 

R10.10 ; Bowel habit changes 

R19.4 ; History of leukocytosis Z86.2 and Helicobacter pylori (H. pylori) 
infection A04.8

 

                          [facility]  [address] [facility] [id number]

[address] [place] 

[address]                27 May, 2016              Environmental allergies Z91.

09

 

                    [facility] [address] [address]                                Left leg pain M79.605 ; Localized swelli

ng of lower leg R22.40 and 

Upper respiratory infection J06.9

 

                    [place] [address] [address] 28 

Dec, 2015                                

 

                    [place] [address] [address] 21 

Dec, 2015                               Left leg pain M79.605

 

                    [place] [address] [address] 10 

Dec, 2015                               Left leg pain M79.605 and Localized swel

ling of lower leg R22.40

 

                    [address] [address] 01 

Dec, 2015                               Left leg pain M79.605

 

                    [place] [address] [address]                                Encounter for immunization Z23

 

                    [address] [address] 12 

Oct, 2015                               Bronchitis J40

 

                    [address] [address] 30 

Sep, 2015                               Physical exam, pre-employment V70.5 ; En

counter for PPD test V74.1 and

Hyperlipidemia 272.4

 

                    [address] [address] 21 

Sep, 2015                                

 

                    [address] [address] 21 

Sep, 2015                                

 

                    [place] [address] [address] 09 

Sep, 2015                                

 

                    [place] [address] [address] 04 

Sep, 2015                               Elevated blood sugar 790.29

 

                    [place] [address] [address] 03 

Sep, 2015                               Elevated blood sugar 790.29

 

                    [address] [address] 03 

Sep, 2015                               Palpitations 785.1

 

                    [facility] [id number] [place] [address] 01 

Sep, 2015                               Palpitations 785.1

 

                    [facility] [id number] [place] [address]                                 

 

                    [facility] [id number] [place] [address] 28 

May, 2015                               Hand pain, right 729.5 and Depression wi

th anxiety 300.4

 

                    [facility] [id number] [place] [address]                                 

 

                    [facility] [address] [address]                                 

 

                    [facility] [id number] [place] [address] 05 

Mar, 2015                                

 

                    [facility] [address] [address] 05 

Mar, 2015                                

 

                    [facility] [id number] [place] [address]                                 

 

                    [facility] [address] [address]                                 

 

                    [facility] [id number] [place] [address] 10 

Feb, 2015                                

 

                    [facility] [id number] [place] [address] 10 

Feb, 2015                                

 

                    [facility] [id number] [place] [address]                                 

 

                    [facility] [id number] [place] [address]                                 

 

                    [facility] [id number] [place] [address]                                 

 

                    [facility] [id number] [place] [address]                                 

 

                    [facility] [address] [address]                                 

 

                    [facility] [place] [facility] [address] [facility] [id number] [place] [address]                                 

 

                    [facility] [place] [facility] [address] [facility] [id number] [place] [address]                                 

 

                    [facility] [place] [facility] [address] [facility] [place] [address]                                 

 

                    [facility] [place] [facility] [address] [facility] [place] [address]                                 

 

                    [facility] [place] [facility] [address] [facility] [place] [address]                                 

 

                    [facility] [place] [facility] [address] [facility] [place] [address]                                 

 

                    [facility] [place] [facility] [address] [facility] [place] [address]                                 

 

                    [facility] [place] [facility] [address] [facility] [place] [address] 15 

Brandon, 2015                                

 

                    [facility] [place] [facility] [address] [facility] [place] [address] 15 

Brandon, 2015                                

 

                    [facility] [place] [facility] [address] [facility] [place] [address]                                 

 

                    [facility] [place] [facility] [address] [facility] [place] [address]                                 

 

                    [facility] [place] [facility] [address] [facility] [place] [address] 15 

Oct, 2014                                

 

                    [facility] [place] [facility] [address] [facility] [place] [address] 15 

Oct, 2014                                

 

                    [facility] [place] [facility] [address] [facility] [place] [address] 14 

Oct, 2014                                

 

                    [facility] [place] [facility] [address] [facility] [place] [address] 14 

Oct, 2014                                

 

                    [facility] [place] [facility] [address] [facility] [place] [address] 07 

Oct, 2014                                

 

                    [facility] [place] [facility] [address] [id number] [place] [address] 07 

Oct, 2014                                

 

                    [facility] [place] [facility] [address] [facility] [id number] [place] [address] 12 

Sep, 2014                                

 

                    [facility] [place] [facility] [address] [facility] [id number] [place] [address] 12 

Sep, 2014                                

 

                    [facility] [place] [facility] [address] [facility] [place] [address] 04 

Sep,                                 

 

                    [facility] [place] [facility] [address] [facility] [id number] [place] [address] 03 

Sep, 2014                                

 

                    [facility] [place] [facility] [address] [facility] [id number] [place] [address] 03 

Sep, 2014                                

 

                    [facility] [place] [facility] [address] [facility] [place] [address] 02 

Sep, 2014                                

 

                    [facility] [place] [facility] [address] [facility] [place] [address] 02 

Sep, 2014                                

 

                    [facility] [place] [facility] [address] [facility] [place] [address] 30 

Aug, 2014                                

 

                    [facility] [place] [facility] [address] [facility] [place] [address] 30 

Aug, 2014                                

 

                    [facility] [place] [facility] [address] [facility] [place] [address] 19 

Aug, 2014                                

 

                    [facility] [place] [facility] [address] [facility] [place] [address] 19 

Aug, 2014                                

 

                    [facility] [place] [facility] [address] [facility] [place] [address] 14 

Aug, 2014                                

 

                    [facility] [place] [facility] [address] [facility] [id number] [place] [address] 14 

Aug, 2014                                

 

                    [facility] [place] [facility] [address] [facility] [place] [address] 13 

Aug, 2014                                

 

                    [facility] [place] [facility] [address] [facility] [place] [address] 13 

Aug, 2014                                

 

                    [facility] [place] [facility] [address] [facility] [place] [address]                                 

 

                    [facility] [place] [facility] [address] [id number] [place] [address] ,                                 

 

                    [facility] [place] [facility] [address] [id number] [place] [address] ,                                 

 

                    [facility] [place] [facility] [address] [facility] [id number] [place] [address] ,                                 

 

                    [facility] [place] [facility] [address] [id number] [place] [address] ,                                 

 

                    [facility] [place] [facility] [address] [facility] [id number] [place] [address] ,                                 

 

                    [facility] [place] [facility] [address] [id number] [place] [address] ,                                 

 

                    [facility] [place] [facility] [address] [facility] [id number] [place] [address] ,                                 

 

                    [facility] [place] [facility] [address] [id number] [place] [address] ,                                 

 

                    [facility] [place] [facility] [address] [facility] [id number] [place] [address] ,                                 

 

                    [facility] [place] [facility] [address] [facility] [id number] [place] [address] ,                                 

 

                    [facility] [place] [facility] [address] [facility] [id number] [place] [address] ,                                 

 

                    [facility] [place] [facility] [address] [id number] [place] [address] ,                                 

 

                    [facility] [place] [facility] [address] [facility] [id number] [place] [address] ,                                 

 

                    [facility] [place] [facility] [address] [id number] [place] [address] ,                                 

 

                    [facility] [place] [facility] [address] [facility] [id number] [place] [address] ,                                 

 

                    [facility] [place] [facility] [address] [id number] [place] [address] ,                                 

 

                    [facility] [place] [facility] [address] [id number] [place] [address]                                 

 

                    [facility] [place] [facility] [address] [facility] [id number] [facility] [place] [address]                                 

 

                    [facility] [place] [facility] [address] [id number] [place] [address]                                 

 

                    [facility] [place] [facility] [address] [id number] [place] [address]                                 

 

                    [facility] [place] [facility] [address] [id number] [place] [address]                                 

 

                    [facility] [place] [address] [id number] [place] [address]                                 

 

                    [facility] [place] [facility] [address] [facility] [id number] [place] [address]                                 

 

                    [facility] [place] [facility] [address] [id number] [place] [address]                                 

 

                    [facility] [place] [facility] [address] [facility] [id number] [place] [address]                                 

 

                    [facility] [place] [facility] [address] [id number] [place] [address]                                 

 

                    [facility] [place] [facility] [address] [id number] [place] [address] 27 

Mar, 2014                                

 

                    [facility] [place] [facility] [address] [id number] [place] [address] 27 

Mar, 2014                                

 

                    [facility] [place] [facility] [address] [id number] [place] [address]                                 

 

                    [facility] [place] [facility] [address] [facility] [id number] [place] [address]                                 

 

                    [facility] [place] [facility] [address] [facility] [id number] [place] [address]                                 

 

                    [facility] [place] [facility] [address] [facility] [facility] [id number] [place] [address]                                 

 

                    [facility] [place] [facility] [address] [facility] [id number] [place] [address] 08 

Oct, 2013                                

 

                    [facility] [place] [facility] [address] [facility] [facility] [place] [address] 04 

Oct, 2013                                

 

                    [facility] [place] [facility] [address] [facility] [facility] [address] [place] [address] 03 

Oct, 2013                                

 

                    [facility] [place] [facility] [address] [facility] [facility] [id number] [place] [address] 02 

Oct, 2013                                

 

                    [facility] [place] [facility] [id number] [place] [address] 01 

Oct, 2013                                

 

                    [facility] [place] [facility] [address] [facility] [id number] [place] [address] 20 

Sep, 2013                                

 

                    [facility] [place] [facility] [address] [facility] [id number] [place] [address] 08 

Aug, 2013                                

 

                    [facility] [place] [facility] [address] [facility] [id number] [place] [address] 24 

May, 2013                                

 

                    [facility] [place] [facility] [id number] [place] [address] 13 

May, 2013                                

 

                    [facility] [place] [facility] [id number] [place] [address]                                 

 

                    [facility] [place] [facility] [address] [facility] [id number] [place] [address]                                 

 

                    [facility] [place] [facility] [id number] [place] [address]                                 

 

                    [facility] [place] [id number] [place] [address]                                 

 

                    [facility] [place] [facility] [address] [facility] [id number] [place] [address] 02 

Oct, 2012                                

 

                    [facility] [place] [facility] [address] [facility] [facility] [place] [address] 02 

Oct, 2012                                

 

                    [facility] [place] [facility] [id number] [place] [address] 21 

Sep, 2012                                

 

                    [facility] [place] [facility] [address] [facility] [address] [place] [address] 06 

Aug, 2012                                

 

                    [facility] [place] [facility] [address] [place] [address] 02 

Aug, 2012                                

 

                    [facility] [address] [place] [address]                                 

 

                    [facility] [address] [place] [address]                                 

 

                    [facility] [address] [place] [address] 15 

Eusebio, 2012                                

 

                    [facility] [address] [address]                                 

 

                    [facility] [address] [address] 10 

Apr, 2012                                

 

                    [facility] [address] [address] 22 

Mar, 2012                                

 

                    [facility] [id number] [place] [address] 20 

Mar, 2012                                

 

                    [facility] [id number] [place] [address] 14 

Mar, 2012                                

 

                    [facility] [address] [facility] [address] [place] [address]                                 







IMMUNIZATIONS

No Known Immunizations



SOCIAL HISTORY

Never Assessed



REASON FOR VISIT





PLAN OF CARE





VITAL SIGNS





MEDICATIONS

Unknown Medications



RESULTS

No Results



PROCEDURES

No Known procedures



INSTRUCTIONS





MEDICATIONS ADMINISTERED

No Known Medications



MEDICAL (GENERAL) HISTORY





                    Type                Description         Date

 

                          Medical History           allergic rhinitis- rec's edwige donahue allergy injections from Dr Rojas office                            

 

                    Medical History     mood disorder        

 

                    Medical History     Leukocytosis- has been to hematology  

 

                    Medical History     Hyperlipidemia       

 

                          Medical History           Left leg pain- multiple imag

ing performed and ortho referral 

placed                                   

 

                          Medical History           TUBULAR ADENOMA AND GASTRITI

S ON COLONOSOCPY AUG 2016 -MULTIPLE 

POLYPS                                   

 

                    Medical History     Helicobacter pylori (H. pylori) infectio

n Hx  

 

                    Surgical History    cholecystectomy     2006

 

                    Surgical History     section    , 

 

                    Surgical History    tonsillectomy       2015

 

                    Surgical History    colonoscopy         2016

 

                    Surgical History    colonscopy          2017

 

                    Surgical History    Right Knee scope    2017 Yes

## 2020-07-07 NOTE — PROGRESS NOTE ADULT - PROBLEM/PLAN-7
DATE SEEN:  07/07/2020

 

REASON FOR ADMISSION:  Hypokalemia.

 

HISTORY OF PRESENT ILLNESS:  This is a 75-year-old admitted for hypokalemia.

Overnight, he was very agitated, showed marked decline in alteration of mental

status and also in vital signs.  There has been no fever or chills.

 

MEDICATIONS:  Reviewed.

 

PHYSICAL EXAMINATION:  GENERAL:  He is very sick appearing.  VITAL SIGNS:  Pulse

112, oxygenation 94% on 4 L of nasal cannula, respiratory rate is 35.  ENT:

Normal.  CHEST:  Increased work of breathing.  CARDIOVASCULAR:  Tachycardia.

EXTREMITIES:  Mottled, pallor.  MENTAL STATUS:  Barely arousable.

 

LABORATORY DATA:  Potassium is normal at 4.9, however, his chloride is 117 and

sodium is 133.  BNP is more than 35,000 and his alkaline phosphatase went up to

76, ALT to 637, and AST to 2022.

 

IMPRESSION:

1. Alteration of mental status.

2. Liver failure, fulminant.

3. Congestive heart failure.

4. Improved hypokalemia.

5. Dementia.

 

PLAN:  We had a long conversation at the bedside with the family.  They have

elected to withdraw care and place him on comfort measures.  As such, I will

change him to DNR/DNI and discontinue labs, IV fluids.  Give him morphine,

lorazepam, atropine, scopolamine patch as needed for comfort measures and end of

life.

 

I spent more than 30 minutes in coordinating his care this morning.

 

Job#: [id number]

DD: 07/07/2020 0836

DT: 07/07/2020 1034 TN/JUAQUIN
DISPLAY PLAN FREE TEXT

## 2020-10-28 NOTE — ED ADULT TRIAGE NOTE - CCCP TRG CHIEF CMPLNT
Received pt from cath lab. Pt AO4, in no distress. Right radial band intact, Mild oozing from site noted.   back pain general

## 2020-11-24 NOTE — ED PROVIDER NOTE - EYES, MLM
Clear bilaterally, pupils equal, round and reactive to light. Purse String (Intermediate) Text: Given the location of the defect and the characteristics of the surrounding skin a purse string intermediate closure was deemed most appropriate.  Undermining was performed circumfirentially around the surgical defect.  A purse string suture was then placed and tightened.

## 2021-01-21 NOTE — PROGRESS NOTE ADULT - EYES
detailed exam EOMI/conjunctiva clear

## 2021-01-25 NOTE — ED ADULT NURSE NOTE - CHIEF COMPLAINT QUOTE
back pain radiating down right leg to big toe. [facility] Rifampin Pregnancy And Lactation Text: This medication is Pregnancy Category C and it isn't know if it is safe during pregnancy. It is also excreted in breast milk and should not be used if you are breast feeding.

## 2021-02-03 NOTE — ED ADULT TRIAGE NOTE - NS ED NOTE AC HIGH RISK COUNTRIES
Impression: Vitreous degeneration, left eye: H43.812. Plan: Exam demonstrates a PVD without any RD or RT on exam.  Discussed R/B/A of PPV vs observation for the floaters. The floaters are not debilitating and so observation was recommended. Reassurance provided. RDW discussed. Precautions discussed with the patient. No

## 2021-02-15 NOTE — ED ADULT NURSE NOTE - TEMPLATE LIST FOR HEAD TO TOE ASSESSMENT
"Status: Pt presented to [place] ED with complaints of dizziness and lightheadedness, BUE and BLE numbness and ongoing chest pain. Admitted for further evaluation  Vitals: VSS on RA, CCM  Neuros: A&Ox4, forgetful. Numbness all extremities  IV: PIV SL  Resp/trach: Reports \"heaviness to chest\" that is not new  Diet: Regular  Bowel status: BS+, small BM this morning  : Voids in bathroom with some hesitancy, UA needed  Skin: intact  Pain: Shoulder pain and ongoing chest pain  Activity: SBA/GB  Plan: MRI today. Checklist reviewed with patient, but poor historian, needs verification with wife. Continue to follow POC    "
"Status: Pt presented to [place] ED with complaints of dizziness and lightheadedness, BUE and BLE numbness and ongoing chest pain. Admitted for further evaluation  Vitals: VSS on RA, on CCM  Neuros: A&Ox4. Slightly [place]. Numbness in bilateral hands and feet. 5/5 strengths throughout  IV: PIV SL  Resp/trach: Reporting a feeling of \"heaviness\" in his chest that is not new or worsening   Diet: Regular  Bowel status: BS+  : Voiding spontaneously  Skin: Intact  Pain: Reporting pain to R shoulder, ongoing chest pain  Activity: Up SBA/ GB  Plan: Plan for MRI tomorrow. MRI checklist needed. Continue to monitor and follow POC    "
[id number]  VSS. A&Ox4 at beginning of shift, around 10pm pt more confused. Disoriented to place and situation. Per pt wife, pt has dementia and has occasional confusion. Redirectable and cooperative. Neuros intact. Up SBA. Voiding. No BM this shift. Tolerating regular diet. R shoulder and hip pain baseline- tylenol available PRN. MRI and echo done today. Tele on. Pt av paced. Bed alarm on for safety.   
Occupational Therapy Discharge Summary    Reason for therapy discharge:    Discharged to home with home therapy.    Progress towards therapy goal(s). See goals on Care Plan in [facility] electronic health record for goal details.  Goals partially met.  Barriers to achieving goals:   discharge from facility.    Therapy recommendation(s):    Continued therapy is recommended.  Rationale/Recommendations:  to increase activity tolerance and independence with ADL/IADL completion.      
Physical Therapy Discharge Summary    Reason for therapy discharge:    Discharged to home with home therapy.    Progress towards therapy goal(s). See goals on Care Plan in [facility] electronic health record for goal details.  Goals partially met.  Barriers to achieving goals:   discharge from facility.    Therapy recommendation(s):    Continued therapy is recommended.  Rationale/Recommendations:  HHPT.      
Pt admitted for stroke work-up. VSS on RA. Neuros unchanged - numbness/tingling to bilateral hands and feet, mild [place], forgetful. Up with assist of 1, GB and walker, ambulated halls x 1. Stroke PLC completed 2/12. BM x 1 this AM. Discharge instructions explained to pt and wife with verbalization of understanding. Pt left to return home at 1345 with family and all belongings in hand.   
Pt admitted with dizziness, lightheadedness, BUE/BLE numbness, and chest pain. Admitted for stroke evaluation. AVSS. CCM in place, pt has pacemaker. A&Ox4, forgetful. Per wife, pt has dementia. Neuros intact besides numbness to BLE (feet). Pt states chest pain has improved, only c/o shoulder and right hip pain this shift. PRN tylenol given. IV SL. Regular diet with good PO intake, needs assistance with ordering meals. Up SBA. Voiding spon. BM x1. Echo, Xray, PT and OT completed today. Will go down for MRI around 1500. Sat in the chair x1 today. BA/CA on at all times. Pt does not use call light appropriately. Wife updated this AM. Continue with POC.  
SLP: Bedside swallow eval orders received. Pt passed nursing swallow screen and is tolerating a regular diet without difficulty. Pt's speech/language skills are at baseline function. Speech consult not indicated. Will defer and complete orders; paged MD and updated RN.   
Status: Admitted for possible stroke workup. Admission sx included dizziness, all extremities numbness and tingling and chest pain.   Vitals: VSS on RA. CCM AV paced.   Neuros: A&Ox4, forgetful. Numbness to hands and feet. Reported tingling as well at midnight assessment. Follows commands. Per wife, hx of dementia. Slightly [place]. 5/5 strengths. Does not call appropriately, alarms on.   IV: PIV SL   Labs/Electrolytes: BG checks   Resp/trach: RA, denies SOB and chest pain. LS clear   Diet: Regular  Bowel status: BS+. Had large BM overnight   : Voids spont   Skin: Bruising throughout  Pain: Chronic shoulder and hip pain   Activity: SBA, steady on feet   Plan: PT/OT recommending home with assist. Continue to follow POC    
Fall

## 2021-03-19 NOTE — ED PROVIDER NOTE - DETAILS:
-- DO NOT REPLY / DO NOT REPLY ALL --  -- Message is from the [facility]--    Patient is requesting a medication refill - medication is on active medication list    Patient is currently OUT of the requested medication.    Was Medication Pended?  Yes.    Rx Name and Dose:    B-D U/F PEN NEEDLE 31G X 5 MM Misc  losartan (COZAAR) 100 MG tablet    Duration: 90 days  With refills    Pharmacy  [id number] - [place], [place] - [address]    Patient confirmed the above pharmacy as correct?  Yes    Caller Information       Type Contact Phone    03/19/2021 12:43 PM CDT Phone (Incoming) Samantha Vazquez (Self) [phone] (M)        Turnaround time given to caller:   \"This message will be sent to [state Provider's name]. The clinical team will fulfill your request as soon as they review your message.\"  
Return to the ER immediately for any worsening symptoms, concerns, chest pain, fevers, shortness of breath, vomiting, abdominal pain, rashes, neck pain, back pain, numbness, paresthesias, pain or any difficulties at all.  Please follow up with your own private physician or our medical clinic at [phone] in the next 2-3 days.  Find a doctor at [phone].

## 2021-03-23 NOTE — ED ADULT NURSE NOTE - HARM RISK FACTORS
Patient p/w SOB, MICHAELS, orthopnea.   Continuous cardiac monitoring to r/o arrhythmias.   Check pro BNP 2170  Diuresis: Lasix 40 mg  IVP BID  Start Lasix gtt @ 5 mg/hr  Trend BMP 2/2 diuresis and replete as needed  Daily weight monitoring, Strict I/O, Low sodium DASH diet  Check ECHO to evaluate LV/RV function and valvular abnormalities  -Pt  titrated off BiPAP and nitro drip  with sufficient dose of diuretics no Patient p/w SOB, MICHAELS, orthopnea.   Check pro BNP 2170  Diuresis: Lasix 40 mg  IVP BID  Start Lasix gtt @ 5 mg/hr  Trend BMP 2/2 diuresis and replete as needed  Daily weight monitoring, Strict I/O, Low sodium DASH diet  Check ECHO to evaluate LV/RV function and valvular abnormalities  Titrated off BiPAP and nitro drip  with sufficient dose of diuretics  Continuous cardiac monitoring to r/o arrhythmias.

## 2021-03-31 NOTE — ED ADULT NURSE NOTE - NSFALLRSKPASTHIST_ED_ALL_ED
This is a recent snapshot of the patient's [facility] medical record.  For current drug dose and complete information and questions, call [phone] or In Basket pool, [facility] home infusion ([id number])  CSN Number:  [id number]       yes

## 2021-04-20 NOTE — ED ADULT NURSE NOTE - DURATION
PGY-1 Progress Note discussed with attending    PAGER #: [-----] TILL 5:00 PM  PLEASE CONTACT ON CALL TEAM:  - On Call Team (Please refer to Mehran) FROM 5:00 PM - 8:30PM  - Nightfloat Team FROM 8:30 -7:30 AM    CHIEF COMPLAINT & BRIEF HOSPITAL COURSE:  87 y/o Female from home, lives with daughter, walks with walker h/o DM, HTN, hard of hearing, chronic right hip pain, CKD (?), no significant PSH was diagnosed with COVID +ve 3/27, presented with SOB. As per daughter, pt speaks and understand English, however speaking is not clear as she lost denture. Pt has SOB for 3 weeks, has cough, and gradually worsening shortness of breath, with pulse ox 60's on room air prior to arrival.  No chest pain/fever, diarrhea  Pt's daughter says that she has no known dementia and has been alert and oriented x2, but has severe hard of hearing. Patient was saturating on 6L NC but desat after then then switched to NRB 12L saturating 94%. Patient is AAOx0-1. For hypernatremia, Patient was placed on 0.45% NS IV fluids.  Serum Na is stable around 153. V/Q scan showed low probability of PE.  Creatinine level trended down and normalized.      INTERVAL HPI/OVERNIGHT EVENTS: Patient was retaining urine 900cc, Pringle was placed.     MEDICATIONS:  acetaminophen   Tablet .. 650 milliGRAM(s) Oral every 6 hours PRN  amLODIPine   Tablet 5 milliGRAM(s) Oral daily  aspirin  chewable 81 milliGRAM(s) Oral daily  cholecalciferol 1000 Unit(s) Oral daily  cyanocobalamin 1000 MICROGram(s) Oral daily  dexAMETHasone  Injectable 6 milliGRAM(s) IV Push daily  dextrose 5% + sodium chloride 0.45%. 1000 milliLiter(s) IV Continuous <Continuous>  enoxaparin Injectable 40 milliGRAM(s) SubCutaneous daily  ferrous    sulfate 325 milliGRAM(s) Oral daily  insulin lispro (ADMELOG) corrective regimen sliding scale   SubCutaneous three times a day before meals  insulin lispro (ADMELOG) corrective regimen sliding scale   SubCutaneous at bedtime  losartan 50 milliGRAM(s) Oral daily  pantoprazole    Tablet 40 milliGRAM(s) Oral before breakfast  vitamin B complex with vitamin C 1 Tablet(s) Oral daily  zinc sulfate 220 milliGRAM(s) Oral daily      REVIEW OF SYSTEMS:  CONSTITUTIONAL: No fever, weight loss, or fatigue  RESPIRATORY: No cough, wheezing, chills or hemoptysis; No shortness of breath  CARDIOVASCULAR: No chest pain, palpitations, dizziness, or leg swelling  GASTROINTESTINAL: No abdominal pain. No nausea, vomiting, or hematemesis; No diarrhea or constipation. No melena or hematochezia.  GENITOURINARY: No dysuria or hematuria, urinary frequency  NEUROLOGICAL: No headaches, memory loss, loss of strength, numbness, or tremors  SKIN: No itching, burning, rashes, or lesions     Vital Signs Last 24 Hrs  T(C): 36.2 (20 Apr 2021 11:17), Max: 36.7 (19 Apr 2021 15:38)  T(F): 97.1 (20 Apr 2021 11:17), Max: 98 (19 Apr 2021 15:38)  HR: 99 (20 Apr 2021 11:17) (89 - 104)  BP: 141/79 (20 Apr 2021 11:17) (126/66 - 157/71)  BP(mean): 72 (19 Apr 2021 19:59) (72 - 72)  RR: 21 (20 Apr 2021 11:17) (18 - 21)  SpO2: 95% (20 Apr 2021 11:17) (93% - 96%)    PHYSICAL EXAMINATION:  CONSTITUTIONAL: Patient is agitated- AAOx 1-2  ENMT: Airway patent, Nasal mucosa clear. Mouth with normal mucosa. Throat has no vesicles, no oropharyngeal exudates and uvula is midline.  EYES: Clear bilaterally, pupils equal, round and reactive to light.  CARDIAC: Normal rate, regular rhythm.  Heart sounds S1, S2.  No murmurs, rubs or gallops.  RESPIRATORY: Breath sounds clear and equal bilaterally.  GASTROINTESTINAL: Abdomen soft, non-tender, no guarding.  MUSCULOSKELETAL: Spine appears normal, range of motion is not limited, no muscle or joint tenderness  NEUROLOGICAL: no focal deficits, no motor or sensory deficits.  SKIN: Skin normal color for race, warm, dry and intact. No evidence of rash.                          10.4   6.87  )-----------( 190      ( 20 Apr 2021 06:45 )             33.9     04-20    153<H>  |  123<H>  |  45<H>  ----------------------------<  178<H>  4.3   |  23  |  1.16    Ca    9.3      20 Apr 2021 06:45  Phos  3.2     04-20  Mg     2.1     04-20    TPro  7.4  /  Alb  2.7<L>  /  TBili  0.8  /  DBili  x   /  AST  26  /  ALT  26  /  AlkPhos  108  04-19    LIVER FUNCTIONS - ( 19 Apr 2021 06:36 )  Alb: 2.7 g/dL / Pro: 7.4 g/dL / ALK PHOS: 108 U/L / ALT: 26 U/L DA / AST: 26 U/L / GGT: x                   CAPILLARY BLOOD GLUCOSE      RADIOLOGY & ADDITIONAL TESTS:                   week(s)

## 2021-05-24 NOTE — ED PROVIDER NOTE - EYES NEGATIVE STATEMENT, MLM
no discharge, no irritation, no pain, no redness, and no visual changes. Is Cheilitis Present?: Yes - Normal Treatment

## 2021-09-19 NOTE — ED ADULT TRIAGE NOTE - RESPIRATORY RATE (BREATHS/MIN)
1. What PRN did patient receive? Atarax/Vistaril 10 mg, ibuprofen 400 mg.    2. What was the patient doing that led to the PRN medication? Anxiety and Pain    3. Did they require R/S? NO    4. Side effects to PRN medication? None    5. After 1 Hour, patient appeared: Calm, still rates pain 7/10       18

## 2021-10-25 NOTE — ED ADULT NURSE NOTE - TEMPLATE
General avoid stairs if dizzy, lightheaded, low blood pressure, in pain, or if increasing vaginal bleeding

## 2021-12-25 NOTE — ED ADULT TRIAGE NOTE - BP NONINVASIVE SYSTOLIC (MM HG)
Patient came out if room asking about his admit status; pt informed of no room here, unsure when would be available and they are checking bed availability at other hospitals; patient asked if he could go home, informed him that would be up to the MD and DEC  but would notify them of his request.   [phone]

## 2022-02-25 NOTE — ED ADULT NURSE NOTE - NSFALLRSKPSTHSTOCCUR_ED_ALL_ED
Patient seen Dr. Jeimy Lombardo. Advanced dementia. Patient is kind of reluctant to follow his recommendation. Hope you can help!
Single Mechanical/Accidental Fall

## 2022-06-24 NOTE — ED ADULT NURSE NOTE - NSFALLRSKHARMRISK_ED_ALL_ED
yes Klisyri Pregnancy And Lactation Text: It is unknown if this medication can harm a developing fetus or if it is excreted in breast milk.

## 2022-07-14 NOTE — ED ADULT TRIAGE NOTE - HEIGHT IN FEET
Written/documented by Hiral Cuevas, acting as a scribe in Dr. Tejeda's presence.    CC/HPI: The patient is a 39 year old female here today for follow-up visit s/p Right foot: Lapidus/Lapiplasty bunionectomy, excision of accessory bone/fracture fragment first MPJ  laterally, bursectomy first MPJ medially on 3/7/22.  She is reportedly doing well and notes improving swelling.  Her swelling occasionally worsens.  She experiences a pulling sensation along the plantar lateral aspect of her Right foot when she plantarflexes the foot and rotates the ankle.  She experienced worsened pain with a palpable bump of the Right plantar arch when she was wearing her orthotics, causing her to discontinue the orthotic.  She presents today in regular shoes.  She has not been icing.  Nature: Post-op, Swelling, Pulling ;  Pain scale: Not stated  Duration/Onset:  DOS: 3/7/2022  Location: Right foot  Course: Healing  Aggravating factors: plantarflexion, ankle rotation elicit pulling sensation, Right arch discomfort with Right orthotic  Treatments: FWB in regular shoes  PCP: Lisa Ga MD  Last seen by PCP: 3/3/22  Other: work: teacher  Shoe Size: 8      REVIEW OF SYSTEMS  Per HPI, remaining 12 point review of systems negative,     No past medical history on file.  Social History     Socioeconomic History   • Marital status: /Civil Union     Spouse name: Not on file   • Number of children: Not on file   • Years of education: Not on file   • Highest education level: Not on file   Occupational History   • Not on file   Tobacco Use   • Smoking status: Never Smoker   • Smokeless tobacco: Never Used   Vaping Use   • Vaping Use: never used   Substance and Sexual Activity   • Alcohol use: Yes     Comment: occasional wine   • Drug use: No   • Sexual activity: Not on file   Other Topics Concern   • Not on file   Social History Narrative   • Not on file     Social Determinants of Health     Financial Resource Strain: Not on file   Food  Insecurity: Not on file   Transportation Needs: Not on file   Physical Activity: Not on file   Stress: Not on file   Social Connections: Not on file   Intimate Partner Violence: Not on file     Family History   Problem Relation Age of Onset   • Atrial Fibrilliation Father    • Cancer, Breast Maternal Aunt    • Cancer Maternal Uncle         bone marrow   • Heart disease Maternal Grandfather    • Cancer Paternal Grandfather         prostate   • Cancer Paternal Grandmother         breast   • Patient is unaware of any medical problems Mother    • Patient is unaware of any medical problems Brother    • Asthma Son    • Atrial Fibrilliation Maternal Grandmother      No past surgical history on file.  Current Outpatient Medications   Medication Sig Dispense Refill   • Multiple Vitamins-Minerals (VITAMIN - THERAPEUTIC MULTIVITAMINS W/MINERALS) Tab Take 1 tablet by mouth daily.       No current facility-administered medications for this visit.     Facility-Administered Medications Ordered in Other Visits   Medication Dose Route Frequency Provider Last Rate Last Admin   • lidocaine 1 % injection 100 mg  10 mL Subcutaneous Once Paolo Murillo MD         ALLERGIES:  No Known Allergies  Social History     Tobacco Use   Smoking Status Never Smoker   Smokeless Tobacco Never Used       OBJECTIVE     Constitutional:  Patient alert and oriented x 3   Good body habitus and hygiene     RIGHT foot:  Vascular status to the foot intact with capillary refill to the toes of < 3 seconds.  Neuro: Intact to light touch with numbness noted ruby-incisionally  There is no  warmth or drainage.   Erythema: None  Edema: None   Ecchymosis: None  Incision intact.  No dehiscence or drainage.  No bleeding.  No SOI of operative site.  MSK: patient able to wiggle toes and plantarflex and dorsiflex the toes actively.   + pain on palpation plantar Right mid-arch with small solid sub dermal cyst like mass, non-motile and non-transilluminating, measuring 1 cm in  diam,    X-ray Findings: XR FOOT STANDING RIGHT MIN 3 VIEWS - 4/21/2022  FINDINGS:    3 views of the right foot are obtained. New postsurgical changes are identified from hallux tarsometatarsal joint arthrodesis. There is medial and dorsal plate and screw fixation. Mild hallux MTP joint degenerative changes are identified. Remainder of the bones appear intact. There is no evidence of an acute appearing fracture or dislocation. No focal soft tissue abnormality is identified.    IMPRESSION:   Postsurgical changes are seen from hallux tarsometatarsal joint arthrodesis. No evidence of an acute appearing osseous abnormality.    Electronically Signed by: Gordo Varma    ASSESSMENT (Global: 3/7/2022 to 6/7/2022)   Stable post-op, no infection  S/p Right foot: Lapidus/Lapiplasty bunionectomy, excision of accessory bone/fracture fragment first MPJ  laterally, bursectomy first MPJ medially  Fibroma, Right plantar mid-arch  1. S/P foot surgery    2. Hallux limitus of right foot    3. Floating ossicle of toe    4. Hallux valgus (acquired), right foot    5. Equinus deformity of both feet    6. Fibroma of right foot        PLAN   The patient was clinically examined.  She is 4 months post-op and doing well at the surgical site. On exam, she presents with a fibroma of the Right mid-arch measuring 1 cm in diameter which may be secondary to her previous walking boot.  The patient is instructed to ice and begin self-massage at the fibroma.  I recommend she begin a soft OTC orthotic, may transition back to her previous orthotics if pain improves.  For her Right plantar lateral foot pain I recommend she concentrate on heel-to-toe walking.  If no improvement, may consider cortisone injection or lastly surgical correction.  Contact with questions or concerns.    I discussed the plantar fibroma and how over stress on the affected ligament can lead to inflammation and pain and formation of the masses.  I discussed how chronic swelling  can weaken and damage the ligament, which may lead to possible rupture and/or calcium deposition.  I explained to the patient that we need to protect the ligament during the healing process to allow the micro-tearing that is causing the inflammation to heal and subside.  I discussed the treatments of icing, stretching, night splints, physical therapy, orthotic therapy (OTC and custom), cortisone injection therapy and finally surgical intervention/excission as needed.    Procedures: None    RX: Ice.  RX: Recommend OTC soft orthotic.  RX: Begin self-massage at Right plantar mid-arch.  RX: Concentrate on heel-to-toe walking.    FOLLOW UP  2 months X ray Right foot    Scribe: Electronically signed: Hiral Cuevas has scribed for Dr. Tejeda, 07/14/22  I have reviewed and edited the progress note and agree with what has been scribed.  Electronically signed by: Jairon Tejeda DPM  7/14/2022       5

## 2022-07-19 NOTE — ED ADULT NURSE NOTE - HARM RISK FACTORS
Airway    Date/Time: 7/19/2022 3:35 PM  Performed by: Matthieu Curtis M.D.  Authorized by: Matthieu Curtis M.D.     Location:  OR  Urgency:  Elective  Difficult Airway: No    Indications for Airway Management:  Anesthesia      Spontaneous Ventilation: absent    Sedation Level:  Deep  Preoxygenated: Yes    Patient Position:  Sniffing  Final Airway Type:  Endotracheal airway  Final Endotracheal Airway:  ETT  Cuffed: Yes    Technique Used for Successful ETT Placement:  Direct laryngoscopy    Insertion Site:  Oral  Blade Type:  Roni  Laryngoscope Blade/Videolaryngoscope Blade Size:  3  ETT Size (mm):  7.5  Measured from:  Gums  ETT to Gums (cm):  21  Placement Verified by: auscultation and capnometry    Cormack-Lehane Classification:  Grade I - full view of glottis  Number of Attempts at Approach:  1           no

## 2022-08-17 NOTE — ED ADULT TRIAGE NOTE - SPO2 (%)
negative...
96
FAMILY HISTORY:  Mother  Still living? Unknown  Family history of diabetes mellitus, Age at diagnosis: Age Unknown    Aunt  Still living? Unknown  Family history of thyroid disease, Age at diagnosis: Age Unknown

## 2023-05-11 ENCOUNTER — INPATIENT (INPATIENT)
Facility: HOSPITAL | Age: 83
LOS: 14 days | Discharge: NURSING, REHAB OR LONG-TERM CARE | DRG: 189 | End: 2023-05-26
Attending: INTERNAL MEDICINE | Admitting: INTERNAL MEDICINE
Payer: MEDICARE

## 2023-05-11 VITALS
DIASTOLIC BLOOD PRESSURE: 70 MMHG | HEART RATE: 70 BPM | OXYGEN SATURATION: 88 % | RESPIRATION RATE: 18 BRPM | TEMPERATURE: 99 F | SYSTOLIC BLOOD PRESSURE: 166 MMHG

## 2023-05-11 DIAGNOSIS — Z95.5 PRESENCE OF CORONARY ANGIOPLASTY IMPLANT AND GRAFT: Chronic | ICD-10-CM

## 2023-05-11 DIAGNOSIS — R09.02 HYPOXEMIA: ICD-10-CM

## 2023-05-11 DIAGNOSIS — J96.01 ACUTE RESPIRATORY FAILURE WITH HYPOXIA: ICD-10-CM

## 2023-05-11 DIAGNOSIS — Z98.1 ARTHRODESIS STATUS: Chronic | ICD-10-CM

## 2023-05-11 DIAGNOSIS — I25.10 ATHEROSCLEROTIC HEART DISEASE OF NATIVE CORONARY ARTERY WITHOUT ANGINA PECTORIS: ICD-10-CM

## 2023-05-11 DIAGNOSIS — I87.2 VENOUS INSUFFICIENCY (CHRONIC) (PERIPHERAL): ICD-10-CM

## 2023-05-11 DIAGNOSIS — R31.9 HEMATURIA, UNSPECIFIED: ICD-10-CM

## 2023-05-11 DIAGNOSIS — R33.9 RETENTION OF URINE, UNSPECIFIED: ICD-10-CM
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
## 2023-05-11 DIAGNOSIS — E78.5 HYPERLIPIDEMIA, UNSPECIFIED: ICD-10-CM

## 2023-05-11 DIAGNOSIS — Z29.9 ENCOUNTER FOR PROPHYLACTIC MEASURES, UNSPECIFIED: ICD-10-CM

## 2023-05-11 DIAGNOSIS — L03.116 CELLULITIS OF LEFT LOWER LIMB: ICD-10-CM

## 2023-05-11 DIAGNOSIS — M54.9 DORSALGIA, UNSPECIFIED: ICD-10-CM

## 2023-05-11 DIAGNOSIS — I10 ESSENTIAL (PRIMARY) HYPERTENSION: ICD-10-CM

## 2023-05-11 LAB
ALBUMIN SERPL ELPH-MCNC: 3 G/DL — LOW (ref 3.5–5)
ALP SERPL-CCNC: 76 U/L — SIGNIFICANT CHANGE UP (ref 40–120)
ALT FLD-CCNC: 41 U/L DA — SIGNIFICANT CHANGE UP (ref 10–60)
ANION GAP SERPL CALC-SCNC: 1 MMOL/L — LOW (ref 5–17)
APTT BLD: 41.3 SEC — HIGH (ref 27.5–35.5)
AST SERPL-CCNC: 48 U/L — HIGH (ref 10–40)
BASE EXCESS BLDV CALC-SCNC: -1.5 MMOL/L — SIGNIFICANT CHANGE UP
BASOPHILS # BLD AUTO: 0.02 K/UL — SIGNIFICANT CHANGE UP (ref 0–0.2)
BASOPHILS NFR BLD AUTO: 0.2 % — SIGNIFICANT CHANGE UP (ref 0–2)
BILIRUB SERPL-MCNC: 0.7 MG/DL — SIGNIFICANT CHANGE UP (ref 0.2–1.2)
BLOOD GAS COMMENTS, VENOUS: SIGNIFICANT CHANGE UP
BUN SERPL-MCNC: 21 MG/DL — HIGH (ref 7–18)
CALCIUM SERPL-MCNC: 8.4 MG/DL — SIGNIFICANT CHANGE UP (ref 8.4–10.5)
CHLORIDE SERPL-SCNC: 111 MMOL/L — HIGH (ref 96–108)
CO2 SERPL-SCNC: 24 MMOL/L — SIGNIFICANT CHANGE UP (ref 22–31)
CREAT SERPL-MCNC: 1.05 MG/DL — SIGNIFICANT CHANGE UP (ref 0.5–1.3)
EGFR: 70 ML/MIN/1.73M2 — SIGNIFICANT CHANGE UP
EOSINOPHIL # BLD AUTO: 0.03 K/UL — SIGNIFICANT CHANGE UP (ref 0–0.5)
EOSINOPHIL NFR BLD AUTO: 0.3 % — SIGNIFICANT CHANGE UP (ref 0–6)
FLUAV AG NPH QL: SIGNIFICANT CHANGE UP
FLUBV AG NPH QL: SIGNIFICANT CHANGE UP
GLUCOSE SERPL-MCNC: 113 MG/DL — HIGH (ref 70–99)
HCO3 BLDV-SCNC: 21 MMOL/L — LOW (ref 22–29)
HCT VFR BLD CALC: 39.5 % — SIGNIFICANT CHANGE UP (ref 39–50)
HGB BLD-MCNC: 12.3 G/DL — LOW (ref 13–17)
IMM GRANULOCYTES NFR BLD AUTO: 0.5 % — SIGNIFICANT CHANGE UP (ref 0–0.9)
INR BLD: 1.65 RATIO — HIGH (ref 0.88–1.16)
LYMPHOCYTES # BLD AUTO: 0.84 K/UL — LOW (ref 1–3.3)
LYMPHOCYTES # BLD AUTO: 8.1 % — LOW (ref 13–44)
MCHC RBC-ENTMCNC: 26.8 PG — LOW (ref 27–34)
MCHC RBC-ENTMCNC: 31.1 GM/DL — LOW (ref 32–36)
MCV RBC AUTO: 86.1 FL — SIGNIFICANT CHANGE UP (ref 80–100)
MONOCYTES # BLD AUTO: 0.91 K/UL — HIGH (ref 0–0.9)
MONOCYTES NFR BLD AUTO: 8.8 % — SIGNIFICANT CHANGE UP (ref 2–14)
NEUTROPHILS # BLD AUTO: 8.52 K/UL — HIGH (ref 1.8–7.4)
NEUTROPHILS NFR BLD AUTO: 82.1 % — HIGH (ref 43–77)
NRBC # BLD: 0 /100 WBCS — SIGNIFICANT CHANGE UP (ref 0–0)
NT-PROBNP SERPL-SCNC: 2325 PG/ML — HIGH (ref 0–450)
PCO2 BLDV: 30 MMHG — LOW (ref 42–55)
PH BLDV: 7.46 — HIGH (ref 7.32–7.43)
PLATELET # BLD AUTO: 112 K/UL — LOW (ref 150–400)
PO2 BLDV: 144 MMHG — SIGNIFICANT CHANGE UP
POTASSIUM SERPL-MCNC: 4.6 MMOL/L — SIGNIFICANT CHANGE UP (ref 3.5–5.3)
POTASSIUM SERPL-SCNC: 4.6 MMOL/L — SIGNIFICANT CHANGE UP (ref 3.5–5.3)
PROT SERPL-MCNC: 7.8 G/DL — SIGNIFICANT CHANGE UP (ref 6–8.3)
PROTHROM AB SERPL-ACNC: 19.7 SEC — HIGH (ref 10.5–13.4)
RBC # BLD: 4.59 M/UL — SIGNIFICANT CHANGE UP (ref 4.2–5.8)
RBC # FLD: 16.3 % — HIGH (ref 10.3–14.5)
SAO2 % BLDV: 97.3 % — SIGNIFICANT CHANGE UP
SARS-COV-2 RNA SPEC QL NAA+PROBE: SIGNIFICANT CHANGE UP
SODIUM SERPL-SCNC: 136 MMOL/L — SIGNIFICANT CHANGE UP (ref 135–145)
TROPONIN I, HIGH SENSITIVITY RESULT: 64.1 NG/L — SIGNIFICANT CHANGE UP
WBC # BLD: 10.37 K/UL — SIGNIFICANT CHANGE UP (ref 3.8–10.5)
WBC # FLD AUTO: 10.37 K/UL — SIGNIFICANT CHANGE UP (ref 3.8–10.5)

## 2023-05-11 PROCEDURE — 72125 CT NECK SPINE W/O DYE: CPT | Mod: 26,MA

## 2023-05-11 PROCEDURE — 70450 CT HEAD/BRAIN W/O DYE: CPT | Mod: 26,MA

## 2023-05-11 PROCEDURE — 93010 ELECTROCARDIOGRAM REPORT: CPT

## 2023-05-11 PROCEDURE — 71260 CT THORAX DX C+: CPT | Mod: 26,MA

## 2023-05-11 PROCEDURE — 99053 MED SERV 10PM-8AM 24 HR FAC: CPT

## 2023-05-11 PROCEDURE — 71045 X-RAY EXAM CHEST 1 VIEW: CPT | Mod: 26

## 2023-05-11 PROCEDURE — 99285 EMERGENCY DEPT VISIT HI MDM: CPT

## 2023-05-11 PROCEDURE — 74177 CT ABD & PELVIS W/CONTRAST: CPT | Mod: 26,MA

## 2023-05-11 RX ORDER — MORPHINE SULFATE 50 MG/1
4 CAPSULE, EXTENDED RELEASE ORAL ONCE
Refills: 0 | Status: DISCONTINUED | OUTPATIENT
Start: 2023-05-11 | End: 2023-05-11

## 2023-05-11 RX ORDER — ACETAMINOPHEN 500 MG
650 TABLET ORAL EVERY 6 HOURS
Refills: 0 | Status: DISCONTINUED | OUTPATIENT
Start: 2023-05-11 | End: 2023-05-15

## 2023-05-11 RX ORDER — OXYCODONE HYDROCHLORIDE 5 MG/1
15 TABLET ORAL EVERY 8 HOURS
Refills: 0 | Status: DISCONTINUED | OUTPATIENT
Start: 2023-05-11 | End: 2023-05-11

## 2023-05-11 RX ORDER — CLOPIDOGREL BISULFATE 75 MG/1
75 TABLET, FILM COATED ORAL DAILY
Refills: 0 | Status: DISCONTINUED | OUTPATIENT
Start: 2023-05-11 | End: 2023-05-21

## 2023-05-11 RX ORDER — CEFAZOLIN SODIUM 1 G
2000 VIAL (EA) INJECTION ONCE
Refills: 0 | Status: COMPLETED | OUTPATIENT
Start: 2023-05-11 | End: 2023-05-11

## 2023-05-11 RX ORDER — SENNA PLUS 8.6 MG/1
2 TABLET ORAL AT BEDTIME
Refills: 0 | Status: DISCONTINUED | OUTPATIENT
Start: 2023-05-11 | End: 2023-05-26

## 2023-05-11 RX ORDER — SODIUM CHLORIDE 9 MG/ML
3 INJECTION INTRAMUSCULAR; INTRAVENOUS; SUBCUTANEOUS EVERY 8 HOURS
Refills: 0 | Status: DISCONTINUED | OUTPATIENT
Start: 2023-05-11 | End: 2023-05-26

## 2023-05-11 RX ORDER — METOPROLOL TARTRATE 50 MG
25 TABLET ORAL
Refills: 0 | Status: DISCONTINUED | OUTPATIENT
Start: 2023-05-11 | End: 2023-05-26

## 2023-05-11 RX ORDER — ENOXAPARIN SODIUM 100 MG/ML
40 INJECTION SUBCUTANEOUS EVERY 24 HOURS
Refills: 0 | Status: DISCONTINUED | OUTPATIENT
Start: 2023-05-11 | End: 2023-05-11

## 2023-05-11 RX ORDER — FUROSEMIDE 40 MG
20 TABLET ORAL DAILY
Refills: 0 | Status: DISCONTINUED | OUTPATIENT
Start: 2023-05-11 | End: 2023-05-26

## 2023-05-11 RX ORDER — MORPHINE SULFATE 50 MG/1
1 CAPSULE, EXTENDED RELEASE ORAL EVERY 6 HOURS
Refills: 0 | Status: DISCONTINUED | OUTPATIENT
Start: 2023-05-11 | End: 2023-05-11

## 2023-05-11 RX ORDER — ASPIRIN/CALCIUM CARB/MAGNESIUM 324 MG
81 TABLET ORAL DAILY
Refills: 0 | Status: DISCONTINUED | OUTPATIENT
Start: 2023-05-11 | End: 2023-05-26

## 2023-05-11 RX ORDER — ATORVASTATIN CALCIUM 80 MG/1
80 TABLET, FILM COATED ORAL AT BEDTIME
Refills: 0 | Status: DISCONTINUED | OUTPATIENT
Start: 2023-05-11 | End: 2023-05-26

## 2023-05-11 RX ORDER — AMLODIPINE BESYLATE 2.5 MG/1
5 TABLET ORAL DAILY
Refills: 0 | Status: DISCONTINUED | OUTPATIENT
Start: 2023-05-11 | End: 2023-05-26

## 2023-05-11 RX ORDER — MORPHINE SULFATE 50 MG/1
1 CAPSULE, EXTENDED RELEASE ORAL EVERY 6 HOURS
Refills: 0 | Status: DISCONTINUED | OUTPATIENT
Start: 2023-05-11 | End: 2023-05-15

## 2023-05-11 RX ORDER — BACLOFEN 100 %
5 POWDER (GRAM) MISCELLANEOUS EVERY 12 HOURS
Refills: 0 | Status: DISCONTINUED | OUTPATIENT
Start: 2023-05-11 | End: 2023-05-13

## 2023-05-11 RX ORDER — CEFAZOLIN SODIUM 1 G
2000 VIAL (EA) INJECTION EVERY 8 HOURS
Refills: 0 | Status: COMPLETED | OUTPATIENT
Start: 2023-05-11 | End: 2023-05-16

## 2023-05-11 RX ORDER — TAMSULOSIN HYDROCHLORIDE 0.4 MG/1
0.4 CAPSULE ORAL AT BEDTIME
Refills: 0 | Status: DISCONTINUED | OUTPATIENT
Start: 2023-05-11 | End: 2023-05-26

## 2023-05-11 RX ORDER — DULOXETINE HYDROCHLORIDE 30 MG/1
60 CAPSULE, DELAYED RELEASE ORAL DAILY
Refills: 0 | Status: DISCONTINUED | OUTPATIENT
Start: 2023-05-11 | End: 2023-05-26

## 2023-05-11 RX ORDER — CEFAZOLIN SODIUM 1 G
VIAL (EA) INJECTION
Refills: 0 | Status: COMPLETED | OUTPATIENT
Start: 2023-05-11 | End: 2023-05-16

## 2023-05-11 RX ORDER — AZITHROMYCIN 500 MG/1
500 TABLET, FILM COATED ORAL ONCE
Refills: 0 | Status: COMPLETED | OUTPATIENT
Start: 2023-05-11 | End: 2023-05-11

## 2023-05-11 RX ORDER — LANOLIN ALCOHOL/MO/W.PET/CERES
3 CREAM (GRAM) TOPICAL AT BEDTIME
Refills: 0 | Status: DISCONTINUED | OUTPATIENT
Start: 2023-05-11 | End: 2023-05-26

## 2023-05-11 RX ORDER — OXYCODONE HYDROCHLORIDE 5 MG/1
10 TABLET ORAL ONCE
Refills: 0 | Status: DISCONTINUED | OUTPATIENT
Start: 2023-05-11 | End: 2023-05-11

## 2023-05-11 RX ORDER — OXYCODONE HYDROCHLORIDE 5 MG/1
15 TABLET ORAL EVERY 8 HOURS
Refills: 0 | Status: DISCONTINUED | OUTPATIENT
Start: 2023-05-11 | End: 2023-05-15

## 2023-05-11 RX ORDER — CEFTRIAXONE 500 MG/1
1000 INJECTION, POWDER, FOR SOLUTION INTRAMUSCULAR; INTRAVENOUS ONCE
Refills: 0 | Status: COMPLETED | OUTPATIENT
Start: 2023-05-11 | End: 2023-05-11

## 2023-05-11 RX ADMIN — Medication 100 MILLIGRAM(S): at 11:22

## 2023-05-11 RX ADMIN — TAMSULOSIN HYDROCHLORIDE 0.4 MILLIGRAM(S): 0.4 CAPSULE ORAL at 21:46

## 2023-05-11 RX ADMIN — Medication 75 MILLIGRAM(S): at 15:40

## 2023-05-11 RX ADMIN — Medication 25 MILLIGRAM(S): at 17:53

## 2023-05-11 RX ADMIN — SODIUM CHLORIDE 3 MILLILITER(S): 9 INJECTION INTRAMUSCULAR; INTRAVENOUS; SUBCUTANEOUS at 15:29

## 2023-05-11 RX ADMIN — DULOXETINE HYDROCHLORIDE 60 MILLIGRAM(S): 30 CAPSULE, DELAYED RELEASE ORAL at 15:41

## 2023-05-11 RX ADMIN — OXYCODONE HYDROCHLORIDE 15 MILLIGRAM(S): 5 TABLET ORAL at 22:30

## 2023-05-11 RX ADMIN — CEFTRIAXONE 100 MILLIGRAM(S): 500 INJECTION, POWDER, FOR SOLUTION INTRAMUSCULAR; INTRAVENOUS at 07:00

## 2023-05-11 RX ADMIN — Medication 81 MILLIGRAM(S): at 12:23

## 2023-05-11 RX ADMIN — SODIUM CHLORIDE 3 MILLILITER(S): 9 INJECTION INTRAMUSCULAR; INTRAVENOUS; SUBCUTANEOUS at 23:18

## 2023-05-11 RX ADMIN — AZITHROMYCIN 255 MILLIGRAM(S): 500 TABLET, FILM COATED ORAL at 09:37

## 2023-05-11 RX ADMIN — AMLODIPINE BESYLATE 5 MILLIGRAM(S): 2.5 TABLET ORAL at 12:23

## 2023-05-11 RX ADMIN — Medication 3 MILLIGRAM(S): at 21:46

## 2023-05-11 RX ADMIN — Medication 75 MILLIGRAM(S): at 21:46

## 2023-05-11 RX ADMIN — MORPHINE SULFATE 1 MILLIGRAM(S): 50 CAPSULE, EXTENDED RELEASE ORAL at 15:12

## 2023-05-11 RX ADMIN — ATORVASTATIN CALCIUM 80 MILLIGRAM(S): 80 TABLET, FILM COATED ORAL at 21:46

## 2023-05-11 RX ADMIN — OXYCODONE HYDROCHLORIDE 10 MILLIGRAM(S): 5 TABLET ORAL at 18:50

## 2023-05-11 RX ADMIN — MORPHINE SULFATE 1 MILLIGRAM(S): 50 CAPSULE, EXTENDED RELEASE ORAL at 19:57

## 2023-05-11 RX ADMIN — Medication 100 MILLIGRAM(S): at 20:03

## 2023-05-11 RX ADMIN — SENNA PLUS 2 TABLET(S): 8.6 TABLET ORAL at 21:46

## 2023-05-11 RX ADMIN — OXYCODONE HYDROCHLORIDE 10 MILLIGRAM(S): 5 TABLET ORAL at 17:53

## 2023-05-11 RX ADMIN — Medication 20 MILLIGRAM(S): at 17:53

## 2023-05-11 RX ADMIN — MORPHINE SULFATE 1 MILLIGRAM(S): 50 CAPSULE, EXTENDED RELEASE ORAL at 20:23

## 2023-05-11 RX ADMIN — MORPHINE SULFATE 1 MILLIGRAM(S): 50 CAPSULE, EXTENDED RELEASE ORAL at 13:41

## 2023-05-11 RX ADMIN — SODIUM CHLORIDE 3 MILLILITER(S): 9 INJECTION INTRAMUSCULAR; INTRAVENOUS; SUBCUTANEOUS at 06:25

## 2023-05-11 RX ADMIN — ENOXAPARIN SODIUM 40 MILLIGRAM(S): 100 INJECTION SUBCUTANEOUS at 17:53

## 2023-05-11 RX ADMIN — OXYCODONE HYDROCHLORIDE 15 MILLIGRAM(S): 5 TABLET ORAL at 15:12

## 2023-05-11 RX ADMIN — OXYCODONE HYDROCHLORIDE 15 MILLIGRAM(S): 5 TABLET ORAL at 13:41

## 2023-05-11 RX ADMIN — MORPHINE SULFATE 4 MILLIGRAM(S): 50 CAPSULE, EXTENDED RELEASE ORAL at 02:53

## 2023-05-11 RX ADMIN — OXYCODONE HYDROCHLORIDE 15 MILLIGRAM(S): 5 TABLET ORAL at 21:52

## 2023-05-11 NOTE — ED ADULT TRIAGE NOTE - CHIEF COMPLAINT QUOTE
Patient was sent from the nursing home for evaluation of fall.  Patient fell from bed on his back, c/o back pain

## 2023-05-11 NOTE — H&P ADULT - NSHPPHYSICALEXAM_GEN_ALL_CORE
PHYSICAL EXAM:  GENERAL: NAD, speaks in full sentences, no signs of respiratory distress, NC  HEAD:  Atraumatic, Normocephalic  EYES: EOMI, PERRLA, conjunctiva and sclera clear  NECK: Supple  CHEST/LUNG: Clear to auscultation bilaterally; No wheeze; No crackles; No accessory muscles used  HEART: Regular rate and rhythm; No murmurs;   ABDOMEN: Soft, suprapubic tenderness, Nondistended; Bowel sounds present; No guarding  EXTREMITIES:  2+ Peripheral Pulses, No cyanosis or edema  PSYCH: AAOx3  NEUROLOGY: non-focal  SKIN: No rashes or lesions PHYSICAL EXAM:  GENERAL: NAD, speaks in full sentences, no signs of respiratory distress, NC  HEAD:  Atraumatic, Normocephalic  EYES: EOMI, PERRLA, conjunctiva and sclera clear  NECK: Supple  CHEST/LUNG: Clear to auscultation bilaterally; No wheeze; No crackles; No accessory muscles used  HEART: Regular rate and rhythm; No murmurs;   ABDOMEN: Soft, suprapubic tenderness, Nondistended; Bowel sounds present; No guarding  EXTREMITIES:  2+ Peripheral Pulses, left medial thigh warm erythematous rash that is mildly tender to touch, severe bilateral venous dermatitis, b/l 1+ LE edema.   PSYCH: AAOx3  NEUROLOGY: non-focal  SKIN: No rashes or lesions

## 2023-05-11 NOTE — H&P ADULT - PROBLEM SELECTOR PLAN 5
Has bilateral edema with significant venous dermatitis.   C/w lasix 20mg - Patient developed hematuria a few hours after admission.   - Likely 2/2 to traumatic moreno insertion as multiple tries to place moreno was done.  - Monitor H/H. - Patient developed hematuria a few hours after admission.   - Likely 2/2 to traumatic moreno insertion as multiple tries to place moreno was done.  - Holding lovenox in setting of hematuria.   - Monitor H/H.

## 2023-05-11 NOTE — PATIENT PROFILE ADULT - FALL HARM RISK - HARM RISK INTERVENTIONS
Assistance with ambulation/Assistance OOB with selected safe patient handling equipment/Communicate Risk of Fall with Harm to all staff/Discuss with provider need for PT consult/Monitor gait and stability/Provide patient with walking aids - walker, cane, crutches/Reinforce activity limits and safety measures with patient and family/Tailored Fall Risk Interventions/Use of alarms - bed, chair and/or voice tab/Visual Cue: Yellow wristband and red socks/Bed in lowest position, wheels locked, appropriate side rails in place/Call bell, personal items and telephone in reach/Instruct patient to call for assistance before getting out of bed or chair/Non-slip footwear when patient is out of bed/[place] to call system/Physically safe environment - no spills, clutter or unnecessary equipment/Purposeful Proactive Rounding/Room/bathroom lighting operational, light cord in reach

## 2023-05-11 NOTE — H&P ADULT - NSHPSOCIALHISTORY_GEN_ALL_CORE
Patient reports he is a former smoker but he quit smoking 40 years ago.   Patient denies alcohol or illicit drug use.

## 2023-05-11 NOTE — ED ADULT NURSE NOTE - NSFALLRISKINTERV_ED_ALL_ED

## 2023-05-11 NOTE — H&P ADULT - PROBLEM SELECTOR PLAN 2
- Mechanical fall preceded by dizziness.   - No injury or head trauma.   - CT head: No evidence of intracranial injury or skull fracture.  - CT spine: No fracture or dislocation of the cervical spine.  - CT chest: No evidence of acute traumatic injury. Multilevel thoracic and lumbar vertebral compression fractures are difficult to definitively age though there are no visible fracture lines or adjacent edema to suggest acute injury.  - PT consulted.   - Tylenol for mild-moderate pain.   - Oxycodon for severe pain. - Mechanical fall preceded by dizziness.   - No injury or head trauma.   - CT head: No evidence of intracranial injury or skull fracture.  - CT spine: No fracture or dislocation of the cervical spine.  - CT chest: No evidence of acute traumatic injury. Multilevel thoracic and lumbar vertebral compression fractures are difficult to definitively age though there are no visible fracture lines or adjacent edema to suggest acute injury.  - PT consulted.   - Tylenol for mild-moderate pain.   - Oxycodon for moderate pain.  - Morphine for severe pain. - Mechanical fall preceded by dizziness.   - No injury or head trauma.   - CT head: No evidence of intracranial injury or skull fracture.  - CT spine: No fracture or dislocation of the cervical spine.  - CT chest: No evidence of acute traumatic injury. Multilevel thoracic and lumbar vertebral compression fractures are difficult to definitively age though there are no visible fracture lines or adjacent edema to suggest acute injury.  - PT consulted.   - F/U orthostatics.   - Tylenol for mild-moderate pain.   - Oxycodon for moderate pain.  - Morphine for severe pain.

## 2023-05-11 NOTE — H&P ADULT - PROBLEM SELECTOR PLAN 1
- On admission saturating 88% on room air as per ED note.   - Saturating 91% on 2L at time of evaluation.  - Denies SOB and cough.   - CXR - Small bibasilar infiltrates.  - CT chest - Dependent densities in the lower lungs most likely atelectasis given the low lung volumes. Superimposed pneumonia less likely.  - Does not need Abx at this time.   - Incentive spirometry.   - Titrate off O2 as tolerated. - Likely secondary to pain VS atelectasis.   - On admission saturating 88% on room air as per ED note.   - Saturating 91% on 2L at time of evaluation.  - Denies SOB and cough.   - CXR - Small bibasilar infiltrates.  - CT chest - Dependent densities in the lower lungs most likely atelectasis given the low lung volumes. Superimposed pneumonia less likely.  - Patient has history of CHF but is not in acute exacerbation.   - Does not need Abx at this time.   - Incentive spirometry.   - Titrate off O2 as tolerated.

## 2023-05-11 NOTE — H&P ADULT - PROBLEM SELECTOR PLAN 3
- Patient has left medial thigh warm, erythematous rash with is mildly tender to touch.   afebrile, no leukocytosis, hemodynamically stable.   - Start Cefazolin.   - F/U Bcx  - F/U UA  - F/U Ucx

## 2023-05-11 NOTE — ED PROVIDER NOTE - CLINICAL SUMMARY MEDICAL DECISION MAKING FREE TEXT BOX
Pt p/w SOB and sent from NH for a fall. Pt hypoxic on RA with coarse breath sounds and large opacity in R chest on CXR. Pt stable on 4L NC with nml WOB. Labs and CT pending. Pt stable. Will reassess. Pt p/w SOB and sent from NH for a fall. Pt hypoxic on RA with coarse breath sounds and opacity in R chest on CXR. Pt stable on 4L NC with nml WOB. Labs and CT pending. Pt stable. Will reassess.    CT concerning for R sided pneumonia given opacities in the setting of a new O2 requirement. Age indeterminate spinal fractures. Pt is persistent pain and stable on 2L NC. PMD Dr Herring. Pt stable and endorsed to MAR under Dr Herring's care.

## 2023-05-11 NOTE — ED PROVIDER NOTE - OBJECTIVE STATEMENT
82-year-old male with history of CHF, hypertension, hyperlipidemia, BPH presenting with shortness of breath though facility states that they sent him for back pain after a fall from his bed.  Patient states that he may have fallen but does not believe he was injured and is complaining of recent cough and shortness of breath with no other associated symptoms. Pt denies recent fever, N/V/ diarrhea, dysuria or frequency/hesitancy, chest pain, focal numbness/weakness, syncope, other recent illness or hospitalizations.

## 2023-05-11 NOTE — ED ADULT NURSE REASSESSMENT NOTE - GENERAL PATIENT STATE
urine output observed to be bloody with blood clots, MD Akbar made aware/resting/sleeping
comfortable appearance/resting/sleeping

## 2023-05-11 NOTE — H&P ADULT - PROBLEM SELECTOR PLAN 6
Takes Oxycodon 20 TID and oxycodon 10 OD.   Started on 15 TID PRN for severe pain. Has bilateral edema with significant venous dermatitis.   C/w lasix 20mg

## 2023-05-11 NOTE — PATIENT PROFILE ADULT - HAVE YOU RECENTLY LOST WEIGHT WITHOUT TRYING?
Girl Brain Guillory Patient Status:      2022 MRN [id number]   [facility] 1SW-B Attending Nirmal Vazquez MD    Day # 76 days   GA at birth: Gestational Age: 34w2d   Corrected GA: 37w 0d           Interval History:    Martin Olivares bowel sounds, no HSM  Neuro:  Arousable; normal tone for gestation. Ext:  Moves all extremities spontaneously. Skin: pink, warm, and well perfused, without rash.       Assessment and Plan:  Geni Gregorio is an ex-Gestational Age: 34w2d infant born by [facility] active and well-appearing with full belly (c/w CPAP belly and similar to previous exams). XRAY c/w classic CPAP belly. Stable on HFNC. Abdomen full 1/26 pm so feeds held overnight.   Infant stooled overnight and in am on 1/27 (no flecks of blood), abdomen Bili noted. 1/27 direct bili 0.9.  2/3 direct bili 0.8  2/10 direct bili 0.8  3/2 Bili 0.2, stop monitoring. Resolved spontaneously.      Plan:  Monitor jaundice clinically.      ID:   WBC 3.5 on admit, consistent with extreme prematurity, IUGR, and tox AGUILAN reported that AA are normal on 01/20 sample and needs no follow-up.  However, she reported low C-zero and therefore requested acyl carnitine and carnitine levels sent on 1/27- ok.        1/27/2020   CARNITINE, ESTERIFIED 14   Carnitine, Free 63 (H)   Ca No (0)

## 2023-05-11 NOTE — H&P ADULT - PROBLEM SELECTOR PLAN 7
Patient takes aspirin 325 daily.   Started Aspirin 81 in hopital.   C/w with statin and plavix, metoprolol. Takes Oxycodon 20 TID and oxycodon 10 OD.   Started on 15 TID PRN for severe pain.

## 2023-05-11 NOTE — H&P ADULT - HISTORY OF PRESENT ILLNESS
Patient is a 81 y/o M from [facility] [employer], [facility] who walks with a walker and has PMH of HTN, HLD, venous insufficiency, CAD, BPH, CHF who presented s/p mechanical fall. Patient reports that he was by his bed when he felt lightheaded and felt like he might pass out and had a fall. Patient denies hitting his head or any other bodily injury. Patient denies any loss of consciousness and reports he remembers the entire episode. Patient denies any pain at this time. Patient also reported that since being in the ED he is in a lot of discomfort as he has a very strong urge to urinate but he is not able to. Patient denies any history of fevers, chills, headache, prior lightheadedness, chest pain, palpitations, shortness of breath and cough (as reported by ED), nausea, vomiting, abdominal pain, diarrhea, constipation, melena, hematochezia, or other urinary symptoms.  Patient is a 83 y/o M from [facility] [employer], [facility] who walks with a walker and has PMH of HTN, HLD, venous insufficiency, CAD, BPH, CHF who presented s/p mechanical fall. Patient reports that he was by his bed when he felt lightheaded and felt like he might pass out and had a fall. Patient denies hitting his head or any other bodily injury. Patient denies any loss of consciousness and reports he remembers the entire episode. Patient denies any pain at this time. Patient also reported that since being in the ED he is in a lot of discomfort as he has a very strong urge to urinate but he is not able to. Patient denies any history of fevers, chills, headache, prior lightheadedness, chest pain, palpitations, shortness of breath and cough (contradictory to ED note), nausea, vomiting, abdominal pain, diarrhea, constipation, melena, hematochezia, or other urinary symptoms.

## 2023-05-11 NOTE — H&P ADULT - PROBLEM SELECTOR PLAN 4
- Likely 2/2 to BPH. Patient reports needing to 'stand and pumping his bladder' to urinate.  - Could not urinate in ED.   - s/p Cunningham placement in ED holding. - Likely 2/2 to BPH. Patient reports needing to 'stand and pumping his bladder' to urinate.  - Could not urinate in ED.   - s/p Cunningham placement in ED holding.  - C/w tamsulosin. - Likely 2/2 to BPH. Patient reports needing to 'stand and pump his bladder' to urinate.  - Could not urinate in ED.   - s/p Cunningham placement in ED holding.  - C/w tamsulosin.

## 2023-05-11 NOTE — ED PROVIDER NOTE - PHYSICAL EXAMINATION
Vital Signs Reviewed - 88% on RA  GEN: Comfortable, Speaking in full sentences, NAD, AAOx3  HEENT: NCAT, MMM, Neck Supple  RESP: Decreased breath sounds R>L, No wheezing  CV: RRR, S1S2, No murmurs  ABD: No TTP, Soft, ND, No masses, No CVA Tenderness  Extrem/Skin: No signs of trauma, Equal pulses bilat, No cyanosis/edema/rashes, Well healed spinal surgical incisions  Neuro: No focal deficits

## 2023-05-11 NOTE — H&P ADULT - PROBLEM SELECTOR PLAN 8
C/w home dose of amlodipine 5mg. Patient takes aspirin 325 daily.   Started Aspirin 81 in hopital.   C/w with statin and plavix, metoprolol.

## 2023-05-11 NOTE — H&P ADULT - ASSESSMENT
Patient is a 81 y/o M from [facility] [employer], [facility] who walks with a walker and has PMH of HTN, HLD, venous insufficiency, CAD, BPH, CHF who presented s/p mechanical fall. Patient is admitted to medicine for AHRF 2/2 to unknown cause and mechanical fall.

## 2023-05-12 LAB
ANION GAP SERPL CALC-SCNC: 4 MMOL/L — LOW (ref 5–17)
ANION GAP SERPL CALC-SCNC: 5 MMOL/L — SIGNIFICANT CHANGE UP (ref 5–17)
BUN SERPL-MCNC: 16 MG/DL — SIGNIFICANT CHANGE UP (ref 7–18)
BUN SERPL-MCNC: 17 MG/DL — SIGNIFICANT CHANGE UP (ref 7–18)
CALCIUM SERPL-MCNC: 8.3 MG/DL — LOW (ref 8.4–10.5)
CALCIUM SERPL-MCNC: SIGNIFICANT CHANGE UP MG/DL (ref 8.4–10.5)
CHLORIDE SERPL-SCNC: 103 MMOL/L — SIGNIFICANT CHANGE UP (ref 96–108)
CHLORIDE SERPL-SCNC: 107 MMOL/L — SIGNIFICANT CHANGE UP (ref 96–108)
CO2 SERPL-SCNC: 20 MMOL/L — LOW (ref 22–31)
CO2 SERPL-SCNC: 29 MMOL/L — SIGNIFICANT CHANGE UP (ref 22–31)
CREAT SERPL-MCNC: 0.8 MG/DL — SIGNIFICANT CHANGE UP (ref 0.5–1.3)
CREAT SERPL-MCNC: 0.87 MG/DL — SIGNIFICANT CHANGE UP (ref 0.5–1.3)
EGFR: 85 ML/MIN/1.73M2 — SIGNIFICANT CHANGE UP
EGFR: 87 ML/MIN/1.73M2 — SIGNIFICANT CHANGE UP
GLUCOSE SERPL-MCNC: 128 MG/DL — HIGH (ref 70–99)
GLUCOSE SERPL-MCNC: 96 MG/DL — SIGNIFICANT CHANGE UP (ref 70–99)
HCT VFR BLD CALC: 37.4 % — LOW (ref 39–50)
HGB BLD-MCNC: 11.9 G/DL — LOW (ref 13–17)
MAGNESIUM SERPL-MCNC: 1.4 MG/DL — LOW (ref 1.6–2.6)
MCHC RBC-ENTMCNC: 26.6 PG — LOW (ref 27–34)
MCHC RBC-ENTMCNC: 31.8 GM/DL — LOW (ref 32–36)
MCV RBC AUTO: 83.7 FL — SIGNIFICANT CHANGE UP (ref 80–100)
NRBC # BLD: 0 /100 WBCS — SIGNIFICANT CHANGE UP (ref 0–0)
PHOSPHATE SERPL-MCNC: 2.7 MG/DL — SIGNIFICANT CHANGE UP (ref 2.5–4.5)
PLATELET # BLD AUTO: 101 K/UL — LOW (ref 150–400)
POTASSIUM SERPL-MCNC: 3.5 MMOL/L — SIGNIFICANT CHANGE UP (ref 3.5–5.3)
POTASSIUM SERPL-MCNC: SIGNIFICANT CHANGE UP MMOL/L (ref 3.5–5.3)
POTASSIUM SERPL-SCNC: 3.5 MMOL/L — SIGNIFICANT CHANGE UP (ref 3.5–5.3)
POTASSIUM SERPL-SCNC: SIGNIFICANT CHANGE UP MMOL/L (ref 3.5–5.3)
RBC # BLD: 4.47 M/UL — SIGNIFICANT CHANGE UP (ref 4.2–5.8)
RBC # FLD: 16.2 % — HIGH (ref 10.3–14.5)
SODIUM SERPL-SCNC: 128 MMOL/L — LOW (ref 135–145)
SODIUM SERPL-SCNC: 140 MMOL/L — SIGNIFICANT CHANGE UP (ref 135–145)
WBC # BLD: 7.71 K/UL — SIGNIFICANT CHANGE UP (ref 3.8–10.5)
WBC # FLD AUTO: 7.71 K/UL — SIGNIFICANT CHANGE UP (ref 3.8–10.5)

## 2023-05-12 PROCEDURE — 99222 1ST HOSP IP/OBS MODERATE 55: CPT

## 2023-05-12 RX ORDER — ALBUTEROL 90 UG/1
2 AEROSOL, METERED ORAL EVERY 6 HOURS
Refills: 0 | Status: DISCONTINUED | OUTPATIENT
Start: 2023-05-12 | End: 2023-05-26

## 2023-05-12 RX ADMIN — MORPHINE SULFATE 1 MILLIGRAM(S): 50 CAPSULE, EXTENDED RELEASE ORAL at 08:52

## 2023-05-12 RX ADMIN — ATORVASTATIN CALCIUM 80 MILLIGRAM(S): 80 TABLET, FILM COATED ORAL at 22:25

## 2023-05-12 RX ADMIN — MORPHINE SULFATE 1 MILLIGRAM(S): 50 CAPSULE, EXTENDED RELEASE ORAL at 02:50

## 2023-05-12 RX ADMIN — CLOPIDOGREL BISULFATE 75 MILLIGRAM(S): 75 TABLET, FILM COATED ORAL at 12:07

## 2023-05-12 RX ADMIN — Medication 100 MILLIGRAM(S): at 22:22

## 2023-05-12 RX ADMIN — OXYCODONE HYDROCHLORIDE 15 MILLIGRAM(S): 5 TABLET ORAL at 22:25

## 2023-05-12 RX ADMIN — OXYCODONE HYDROCHLORIDE 15 MILLIGRAM(S): 5 TABLET ORAL at 06:50

## 2023-05-12 RX ADMIN — OXYCODONE HYDROCHLORIDE 15 MILLIGRAM(S): 5 TABLET ORAL at 23:25

## 2023-05-12 RX ADMIN — SODIUM CHLORIDE 3 MILLILITER(S): 9 INJECTION INTRAMUSCULAR; INTRAVENOUS; SUBCUTANEOUS at 05:58

## 2023-05-12 RX ADMIN — Medication 75 MILLIGRAM(S): at 22:27

## 2023-05-12 RX ADMIN — MORPHINE SULFATE 4 MILLIGRAM(S): 50 CAPSULE, EXTENDED RELEASE ORAL at 22:34

## 2023-05-12 RX ADMIN — Medication 25 MILLIGRAM(S): at 06:03

## 2023-05-12 RX ADMIN — Medication 20 MILLIGRAM(S): at 06:03

## 2023-05-12 RX ADMIN — MORPHINE SULFATE 1 MILLIGRAM(S): 50 CAPSULE, EXTENDED RELEASE ORAL at 18:48

## 2023-05-12 RX ADMIN — Medication 25 MILLIGRAM(S): at 17:19

## 2023-05-12 RX ADMIN — MORPHINE SULFATE 1 MILLIGRAM(S): 50 CAPSULE, EXTENDED RELEASE ORAL at 09:05

## 2023-05-12 RX ADMIN — ALBUTEROL 2 PUFF(S): 90 AEROSOL, METERED ORAL at 22:22

## 2023-05-12 RX ADMIN — OXYCODONE HYDROCHLORIDE 15 MILLIGRAM(S): 5 TABLET ORAL at 14:18

## 2023-05-12 RX ADMIN — Medication 100 MILLIGRAM(S): at 14:18

## 2023-05-12 RX ADMIN — AMLODIPINE BESYLATE 5 MILLIGRAM(S): 2.5 TABLET ORAL at 06:03

## 2023-05-12 RX ADMIN — Medication 81 MILLIGRAM(S): at 12:07

## 2023-05-12 RX ADMIN — Medication 75 MILLIGRAM(S): at 06:02

## 2023-05-12 RX ADMIN — Medication 75 MILLIGRAM(S): at 14:18

## 2023-05-12 RX ADMIN — OXYCODONE HYDROCHLORIDE 15 MILLIGRAM(S): 5 TABLET ORAL at 06:02

## 2023-05-12 RX ADMIN — SENNA PLUS 2 TABLET(S): 8.6 TABLET ORAL at 22:23

## 2023-05-12 RX ADMIN — TAMSULOSIN HYDROCHLORIDE 0.4 MILLIGRAM(S): 0.4 CAPSULE ORAL at 22:23

## 2023-05-12 RX ADMIN — DULOXETINE HYDROCHLORIDE 60 MILLIGRAM(S): 30 CAPSULE, DELAYED RELEASE ORAL at 12:06

## 2023-05-12 RX ADMIN — Medication 3 MILLIGRAM(S): at 22:24

## 2023-05-12 RX ADMIN — MORPHINE SULFATE 1 MILLIGRAM(S): 50 CAPSULE, EXTENDED RELEASE ORAL at 02:25

## 2023-05-12 RX ADMIN — Medication 100 MILLIGRAM(S): at 06:03

## 2023-05-12 RX ADMIN — MORPHINE SULFATE 1 MILLIGRAM(S): 50 CAPSULE, EXTENDED RELEASE ORAL at 19:28

## 2023-05-12 RX ADMIN — OXYCODONE HYDROCHLORIDE 15 MILLIGRAM(S): 5 TABLET ORAL at 15:30

## 2023-05-12 RX ADMIN — SODIUM CHLORIDE 3 MILLILITER(S): 9 INJECTION INTRAMUSCULAR; INTRAVENOUS; SUBCUTANEOUS at 22:14

## 2023-05-12 RX ADMIN — SODIUM CHLORIDE 3 MILLILITER(S): 9 INJECTION INTRAMUSCULAR; INTRAVENOUS; SUBCUTANEOUS at 14:19

## 2023-05-12 NOTE — PATIENT PROFILE ADULT. - CAREGIVER
Take clindamycin as prescribed for all 10 days. Cleanse around wound with antibacterial soap and water twice a day. Leave packing in place. Apply a clean dressing. Apply warm compresses to area for 20 minutes 4-6 times a day. Return to the ED in 2 days to have packing removed and wound checked. Return sooner for signs of worsening such as fever or nausea/vomiting.     Information could not be obtained

## 2023-05-12 NOTE — PROGRESS NOTE ADULT - SUBJECTIVE AND OBJECTIVE BOX
TOM HERRERA  MR# [id number]  82yMale        Patient is a 82y old  Male who presents with a chief complaint of Mechanical fall (12 May 2023 11:51)      INTERVAL HPI/OVERNIGHT EVENTS:  Patient seen and examined at bedside. No notations of chest pain, palpitation, SOB, orthopnea, nausea, vomiting or abdominal pain.    ALLERGIES  No Known Allergies      MEDICATIONS  acetaminophen     Tablet .. 650 milliGRAM(s) Oral every 6 hours PRN Temp greater or equal to 38C (100.4F), Mild Pain (1 - 3)  amLODIPine   Tablet 5 milliGRAM(s) Oral daily  aspirin  chewable 81 milliGRAM(s) Oral daily  atorvastatin 80 milliGRAM(s) Oral at bedtime  baclofen 5 milliGRAM(s) Oral every 12 hours PRN Muscle Spasm  ceFAZolin   IVPB 2000 milliGRAM(s) IV Intermittent every 8 hours  ceFAZolin   IVPB      clopidogrel Tablet 75 milliGRAM(s) Oral daily  DULoxetine 60 milliGRAM(s) Oral daily  furosemide    Tablet 20 milliGRAM(s) Oral daily  melatonin 3 milliGRAM(s) Oral at bedtime PRN Insomnia  metoprolol tartrate 25 milliGRAM(s) Oral two times a day  morphine  - Injectable 1 milliGRAM(s) IV Push every 6 hours PRN Severe Pain (7 - 10)  oxyCODONE    IR 15 milliGRAM(s) Oral every 8 hours PRN Moderate Pain (4 - 6)  pregabalin 75 milliGRAM(s) Oral three times a day  senna 2 Tablet(s) Oral at bedtime  sodium chloride 0.9% lock flush 3 milliLiter(s) IV Push every 8 hours  tamsulosin 0.4 milliGRAM(s) Oral at bedtime              REVIEW OF SYSTEMS:  CONSTITUTIONAL: No fever, weight loss, or fatigue  EYES: No eye pain, visual disturbances, or discharge  ENT:  No difficulty hearing, tinnitus, vertigo; No sinus or throat pain  NECK: No pain or stiffness  RESPIRATORY: No cough, wheezing, chills or hemoptysis; No Shortness of Breath  CARDIOVASCULAR: No chest pain, palpitations, passing out, dizziness, or leg swelling  GASTROINTESTINAL: No abdominal or epigastric pain. No nausea, vomiting, or hematemesis; No diarrhea or constipation. No melena or hematochezia.  GENITOURINARY: No dysuria, frequency, hematuria, or incontinence  NEUROLOGICAL: No headaches, memory loss, loss of strength, numbness, or tremors  SKIN: No itching, burning, rashes, or lesions   LYMPH Nodes: No enlarged glands  ENDOCRINE: No heat or cold intolerance; No hair loss  MUSCULOSKELETAL: No joint pain or swelling; No muscle, back, or extremity pain  PSYCHIATRIC: No depression, anxiety, mood swings, or difficulty sleeping  HEME/LYMPH: No easy bruising, or bleeding gums  ALLERGY AND IMMUNOLOGIC: No hives or eczema	    [ ] All others negative	  [ ] Unable to obtain      T(C): 36.8 (05-12-23 @ 05:03), Max: 36.8 (05-12-23 @ 05:03)  T(F): 98.2 (05-12-23 @ 05:03), Max: 98.2 (05-12-23 @ 05:03)  HR: 82 (05-12-23 @ 05:03) (59 - 82)  BP: 149/57 (05-12-23 @ 05:03) (127/53 - 149/57)  RR: 20 (05-12-23 @ 05:03) (20 - 20)  SpO2: 93% (05-12-23 @ 05:03) (91% - 93%)  Wt(kg): --    I&O's Summary    11 May 2023 07:01  -  12 May 2023 07:00  --------------------------------------------------------  IN: 0 mL / OUT: 2300 mL / NET: -2300 mL          PHYSICAL EXAM:  A X O x  HEAD:  Atraumatic, Normocephalic  EYES: EOMI, PERRLA, conjunctiva and sclera clear  NECK: Supple, No JVD, Normal thyroid  Resp: CTAB, No crackles, wheezing,   CVS: Regular rate and rhythm; No discernable murmurs, rubs, or gallops  ABD: Soft, Nontender, Nondistended; Bowel sounds present  EXTREMITIES:  2+ Peripheral Pulses, No edema  LYMPH: No dicernable lymphadenopathy noted  GENERAL: NAD, well-groomed, well-developed      LABS:                        11.9   7.71  )-----------( 101      ( 12 May 2023 07:32 )             37.4     05-12    140  |  107  |  17  ----------------------------<  128<H>  3.5   |  29  |  0.87    Ca    8.3<L>      12 May 2023 10:00  Phos  2.7     05-12  Mg     1.4     05-12    TPro  7.8  /  Alb  3.0<L>  /  TBili  0.7  /  DBili  x   /  AST  48<H>  /  ALT  41  /  AlkPhos  76  05-11    PT/INR - ( 11 May 2023 02:23 )   PT: 19.7 sec;   INR: 1.65 ratio         PTT - ( 11 May 2023 02:23 )  PTT:41.3 sec    CAPILLARY BLOOD GLUCOSE          Troponins:  ProBNP:  Lipid Profile:   HgA1c:  TSH:           RADIOLOGY & ADDITIONAL TESTS:    Imaging Personally Reviewed:  [ ] YES  [ ] NO      Consultant(s) Notes Reviewed:  [x ] YES  [ ] NO    Care Discussed with Consultants/Other Providers [ x] YES  [ ] NO          PAST MEDICAL & SURGICAL HISTORY:  Hypertension      Hyperlipidemia      Venous insufficiency            Hypoxemia    Handoff    MEWS Score    Hypertension    Hyperlipidemia    Venous insufficiency    Hypoxia    Acute respiratory failure with hypoxia    Fall, accidental    Hypertension    Urinary retention    Chronic back pain    Hyperlipidemia    CAD (coronary artery disease)    Venous insufficiency    Cellulitis of left thigh    Prophylactic measure    Hematuria    A) FALL, LOWER BACK PAIN    [email or web address]

## 2023-05-12 NOTE — PROGRESS NOTE ADULT - SUBJECTIVE AND OBJECTIVE BOX
NP Note discussed with  Primary Attending    INTERVAL HPI/OVERNIGHT EVENTS: seen at bedside, since replaced moreno this afternoon no more discomfort of pelvic area. feeling better per pt.     MEDICATIONS  (STANDING):  amLODIPine   Tablet 5 milliGRAM(s) Oral daily  aspirin  chewable 81 milliGRAM(s) Oral daily  atorvastatin 80 milliGRAM(s) Oral at bedtime  ceFAZolin   IVPB 2000 milliGRAM(s) IV Intermittent every 8 hours  ceFAZolin   IVPB      clopidogrel Tablet 75 milliGRAM(s) Oral daily  DULoxetine 60 milliGRAM(s) Oral daily  furosemide    Tablet 20 milliGRAM(s) Oral daily  metoprolol tartrate 25 milliGRAM(s) Oral two times a day  pregabalin 75 milliGRAM(s) Oral three times a day  senna 2 Tablet(s) Oral at bedtime  sodium chloride 0.9% lock flush 3 milliLiter(s) IV Push every 8 hours  tamsulosin 0.4 milliGRAM(s) Oral at bedtime    MEDICATIONS  (PRN):  acetaminophen     Tablet .. 650 milliGRAM(s) Oral every 6 hours PRN Temp greater or equal to 38C (100.4F), Mild Pain (1 - 3)  baclofen 5 milliGRAM(s) Oral every 12 hours PRN Muscle Spasm  melatonin 3 milliGRAM(s) Oral at bedtime PRN Insomnia  morphine  - Injectable 1 milliGRAM(s) IV Push every 6 hours PRN Severe Pain (7 - 10)  oxyCODONE    IR 15 milliGRAM(s) Oral every 8 hours PRN Moderate Pain (4 - 6)      __________________________________________________  REVIEW OF SYSTEMS:    CONSTITUTIONAL: No fever,   EYES: no acute visual disturbances  NECK: No pain or stiffness  RESPIRATORY: No cough; No shortness of breath  CARDIOVASCULAR: No chest pain, no palpitations  GASTROINTESTINAL: No pain. No nausea or vomiting; No diarrhea   NEUROLOGICAL: No headache or numbness, no tremors  MUSCULOSKELETAL: back pain, not new   GENITOURINARY: no dysuria, no frequency, no hesitancy  PSYCHIATRY: no depression , no anxiety  ALL OTHER  ROS negative        Vital Signs Last 24 Hrs  T(C): 36.9 (12 May 2023 14:13), Max: 36.9 (12 May 2023 14:13)  T(F): 98.4 (12 May 2023 14:13), Max: 98.4 (12 May 2023 14:13)  HR: 81 (12 May 2023 14:13) (59 - 82)  BP: 133/63 (12 May 2023 14:13) (127/53 - 149/57)  BP(mean): --  RR: 20 (12 May 2023 14:13) (20 - 20)  SpO2: 92% (12 May 2023 14:13) (91% - 93%)    Parameters below as of 12 May 2023 14:13  Patient On (Oxygen Delivery Method): nasal cannula  O2 Flow (L/min): 2      ________________________________________________  PHYSICAL EXAM:  GENERAL: NAD  HEENT: Normocephalic;  conjunctivae and sclerae clear; moist mucous membranes; [place]  NECK : supple  CHEST/LUNG: Clear to auscultation bilaterally with fair air entry poor expiratory effort. on 2L.   HEART: S1 S2  regular; no murmurs, gallops or rubs  ABDOMEN: Soft, Nontender, Nondistended; Bowel sounds present  EXTREMITIES: no cyanosis; trace leg edema b/l ; no calf tenderness  : Moreno in place + hematuria, no clots.   SKIN: warm and dry; left thigh redness/rash  NERVOUS SYSTEM:  Awake and alert; Oriented  to place, person and forgetful with time ; no new deficits    _________________________________________________  LABS:                        11.9   7.71  )-----------( 101      ( 12 May 2023 07:32 )             37.4     05-12    140  |  107  |  17  ----------------------------<  128<H>  3.5   |  29  |  0.87    Ca    8.3<L>      12 May 2023 10:00  Phos  2.7     05-12  Mg     1.4     05-12    TPro  7.8  /  Alb  3.0<L>  /  TBili  0.7  /  DBili  x   /  AST  48<H>  /  ALT  41  /  AlkPhos  76  05-11    PT/INR - ( 11 May 2023 02:23 )   PT: 19.7 sec;   INR: 1.65 ratio         PTT - ( 11 May 2023 02:23 )  PTT:41.3 sec    CAPILLARY BLOOD GLUCOSE    RADIOLOGY & ADDITIONAL TESTS:    Imaging  Reviewed:  YES    < from: CT Cervical Spine No Cont (05.11.23 @ 05:14) >    ACC: [id number] EXAM:  CT ABDOMEN AND PELVIS IC   ORDERED BY: FADUMO SANTOS     ACC: [id number] EXAM:  CT CHEST IC   ORDERED BY: FADUMO SANTOS     ACC: [id number] EXAM:  CT BRAIN   ORDERED BY: FADUMO SANTOS     ACC: [id number] EXAM:  CT CERVICALSPINE   ORDERED BY: FADUMO SANTOS     PROCEDURE DATE:  05/11/2023          INTERPRETATION:  CLINICAL INFORMATION: Back pain after fall. CHF,   hypertension, shortness of breath.    COMPARISON: Chest radiograph same date.    CONTRAST/COMPLICATIONS:  90 cc Omnipaque 350 administered intravenously for the chest abdomen   pelvis.    PROCEDURE:  CT head and cervical spine without intravenous contrast.  CT of the Chest, Abdomen and Pelvis with intravenous contrast.  Sagittal and coronal reformats were performed.    FINDINGS:    HEAD:  BRAIN: No apparent acute infarct, hemmorhage or mass. No hydrocephalus.   Chronic appearing white matter hypodensities and volume loss.  CALVARIUM: No skull fracture or agreesive osseous legions.  EXTRACRANIAL SOFTTISSUES: No acute findings.  VISUALIZED PORTIONS OF THE PARANASAL SINUSES AND MASTOID AIR CELLS: No   air fluid levels.    CERVICAL SPINE:  VERTEBRAE: No fracture or dislocation. Exaggeration of cervical lordosis.   Mild degenerative retrolisthesis of C3 on C4 and mild degenerative   anterolisthesis of C6 on C7 and C7 on T1.  SPINAL CANAL AND FORAMINA: Multilevel degenerative changes with no   apparent high grade spinal canal narrowing.  PARASPINAL SOFT TISSUES: No paravertebral hematoma or acutefinding.    CHEST:  LUNGS AND LARGE AIRWAYS: Low lung volumes with right greater than left   basilar opacities and mild elevation of the right hemidiaphragm. No   pulmonary nodules.  PLEURA: No pleural effusion or pneumothorax.  VESSELS: No evidence of injury to the heart and great vessels. No   thoracic aortic dissection or aneurysm.  HEART: Mild four-chamber cardiomegaly. No pericardial effusion.  MEDIASTINUM AND LUIS ALBERTO: No lymphadenopathy.  CHEST WALL AND LOWER NECK: Within normal limits.    ABDOMEN AND PELVIS:  LIVER: Within normal limits.  BILE DUCTS: Normal caliber.  GALLBLADDER: Within normal limits.  SPLEEN: No evidence of splenic injury or concerning finding. Small cystic   lesion upper spleen.  PANCREAS: Within normal limits.  ADRENALS: Within normal limits.  KIDNEYS/URETERS: No evidence of renal injury. No hydronephrosis or renal   stone or concerning mass. Small cysts bilaterally.    BLADDER: Within normal limits.  REPRODUCTIVE ORGANS: Radiation seeds in the prostate.    BOWEL: No bowel obstruction. Appendix is not visualized. No evidence of   inflammation in the pericecal region.  PERITONEUM: No ascites.  VESSELS: No abdominal aortic aneurysm. Calcific atherosclerosis   particularly of the distal abdominal aorta and iliac arteries.  RETROPERITONEUM/LYMPH NODES: No lymphadenopathy.  ABDOMINAL WALL: Large right lower quadrant anterior abdominal wall hernia   extending into the right inguinal region, not fully visible, contains the   cecum, much of the ascending colon, anddistal ileum. No evidence of   obstruction or other acute complication regarding this hernia.  BONES: Several thoracic and lumbar vertebral compression fractures but   with no discrete fracture line or adjacent edema. Mild depression of the   superior endplate of T1. Chronic moderate to severe compression T4   vertebral body. Mild compression T5, T6, T7, T8, T9, T10, T11 and T12   vertebral bodies. Chronic moderate compression of T3 vertebral body with   focal right scoliosis centered at this level. Status post posterior   fusion T2-sacroiliac. The hardware is intact. Bony demineralization.    IMPRESSION:  CT head: No evidence of intracranial injury or skull fracture.    CT cervical spine: No fracture or dislocation of the cervical spine.    CT Chest abdomen pelvis:  No evidence of acute traumatic injury.  Multilevel thoracic and lumbar vertebral compression fractures are   difficult to definitively age though there are no visible fracture lines   or adjacent edema to suggest acute injury. Comparison with priors would   be helpful.  Dependent densities in the lower lungs most likely atelectasis given the   low lung volumes. Superimposed pneumonia less likely.      --- End of Report ---             ALEX MACIEL MD; Attending Radiologist  This document has been electronically signed. May 11 2023  5:55AM    < end of copied text >    < from: Xray Chest 1 View-PORTABLE IMMEDIATE (Xray Chest 1 View-PORTABLE IMMEDIATE .) (05.11.23 @ 02:12) >    ACC: [id number] EXAM:  XR CHEST PORTABLE IMMED 1V   ORDERED BY: FADUMO SANTOS     PROCEDURE DATE:  05/11/2023          INTERPRETATION:  AP chest on May 11, 2023 at 1:40 AM. Patient is hypoxic.    COMPARISON: None available.    Shallow inspiration crowds the chest.    Heart magnified by technique.    Rather extensive thoracolumbar spinal hardware noted.    There are small bibasilar infiltrates.    IMPRESSION: Small bibasilar infiltrates. Spinal hardware.    --- End of Report ---    DEON MULLER MD; Attending Radiologist  This document has been electronically signed. May 11 2023  8:29AM    < end of copied text >  Consultant(s) Notes Reviewed:   YES      Plan of care was discussed with patient and /or primary care giver; all questions and concerns were addressed

## 2023-05-12 NOTE — PROGRESS NOTE ADULT - ASSESSMENT
Patient is a 83 y/o M from [facility] [employer], [facility] who walks with a walker and has PMH of HTN, HLD, venous insufficiency, CAD, BPH, CHF who presented s/p mechanical fall. Patient is admitted to medicine for AHRF 2/2 to unknown cause and mechanical fall.

## 2023-05-12 NOTE — PROGRESS NOTE ADULT - PROBLEM SELECTOR PLAN 4
- Likely 2/2 to BPH. Patient reports needing to 'stand and pump his bladder' to urinate.  - Could not urinate in ED.   - s/p Cunningham placement in ED holding w/ irrigation prn  - C/w tamsulosin.  - Urology consult noted and much appreciated

## 2023-05-12 NOTE — PROGRESS NOTE ADULT - PROBLEM SELECTOR PLAN 11
- Holding lovenox in setting of hematuria.   - SCDs    DC plan-from [facility] for adult [facility].   PT consult pending eval  Titrate oxygen, monitor O2 sat room air.

## 2023-05-12 NOTE — PROGRESS NOTE ADULT - PROBLEM SELECTOR PLAN 1
- Likely secondary to pain VS atelectasis.   - On admission saturating 88% on room air as per ED note.   - Saturating 91% on 2L at time of evaluation.  - Denies SOB and cough.   - CXR - Small bibasilar infiltrates.  - CT chest - Dependent densities in the lower lungs most likely atelectasis given the low lung volumes. Superimposed pneumonia less likely.  - Patient has history of CHF but is not in acute exacerbation.   - Incentive spirometry.   - Titrate off O2 as tolerated. OOB to chair - Likely secondary to pain VS atelectasis.   - On admission saturating 88% on room air as per ED note.   - Saturating 91% on 2L at time of evaluation.  - Denies SOB and cough.   - CXR - Small bibasilar infiltrates.  - CT chest - Dependent densities in the lower lungs most likely atelectasis given the low lung volumes. Superimposed pneumonia less likely.  - Patient has history of CHF but is not in acute exacerbation.   - Incentive spirometry.   - Titrate off O2 as tolerated. OOB to chair  - pulm dr. Bailey consulted  reccs appreciated:   - Duoneb q6h   - 2 D ECHO   - Out pat pulmo eval   - No steroids for now  - PT eval

## 2023-05-12 NOTE — PROGRESS NOTE ADULT - PROBLEM SELECTOR PLAN 2
- Mechanical fall preceded by dizziness.   - No injury or head trauma.   - CT head: No evidence of intracranial injury or skull fracture.  - CT spine: No fracture or dislocation of the cervical spine.  - CT chest: No evidence of acute traumatic injury. Multilevel thoracic and lumbar vertebral compression fractures are difficult to definitively age though there are no visible fracture lines or adjacent edema to suggest acute injury.  - PT consulted.   - F/U orthostatics.   - Tylenol for mild-moderate pain.   - Oxycodon for moderate pain.  - Morphine for severe pain.

## 2023-05-12 NOTE — CONSULT NOTE ADULT - ATTENDING COMMENTS
83 y/o male admitted s/p mechanical fall with urinary retention   VSS, afebrile, Cr WNL     Plan   - continue moreno, irrigate prn   - monitor urine output  - continue flomax   - hold anticoagulation if clinically safe  - urology will follow   - remainder of care as per primary team

## 2023-05-12 NOTE — PROGRESS NOTE ADULT - PROBLEM SELECTOR PLAN 3
- Patient has left medial thigh warm, erythematous rash with is mildly tender to touch.   afebrile, no leukocytosis, hemodynamically stable.   - Started Cefazolin.   - Bcx negative  - F/U UA

## 2023-05-12 NOTE — PROGRESS NOTE ADULT - PROBLEM SELECTOR PLAN 1
- Likely secondary to pain VS atelectasis.   - On admission saturating 88% on room air as per ED note.   - Saturating 91% on 2L at time of evaluation.  - Denies SOB and cough.   - CXR - Small bibasilar infiltrates.  - CT chest - Dependent densities in the lower lungs most likely atelectasis given the low lung volumes. Superimposed pneumonia less likely.  - Patient has history of CHF but is not in acute exacerbation.   - Does not need Abx at this time.   - Incentive spirometry.   - Titrate off O2 as tolerated.

## 2023-05-12 NOTE — PROGRESS NOTE ADULT - PROBLEM SELECTOR PLAN 4
- Likely 2/2 to BPH. Patient reports needing to 'stand and pump his bladder' to urinate.  - Could not urinate in ED.   - s/p Moreno placement in ED holding  -replaced 5/12 with 16 Fr per urology as dysfunction of moreno    - C/w tamsulosin.  - urology consulted.

## 2023-05-12 NOTE — PROGRESS NOTE ADULT - ASSESSMENT
Patient is a 81 y/o M from [facility] [place], [facility] who walks with a walker and has PMH of HTN, HLD, venous insufficiency, CAD, BPH, CHF who presented s/p mechanical fall. Patient is admitted to medicine for AHRF 2/2 to atelectasis and mechanical fall.   CT Head, chest, spine no fx. Chest CT shows atelectasis. CXR shows small bibasilar infiltrates.   on Oxygen therapy. PT consulted.   Pt with urinary retention, moreno placed, + hematuria. replaced today by urology.   Patient is a 83 y/o M from [facility] [employer], [facility] who walks with a walker and has PMH of HTN, HLD, venous insufficiency, CAD, BPH, CHF who presented s/p mechanical fall. Patient is admitted to medicine for AHRF 2/2 to atelectasis and mechanical fall.   CT Head, chest, spine no fx. Chest CT shows atelectasis. CXR shows small bibasilar infiltrates. pulmonary consulted.   on Oxygen therapy. PT consulted.   Pt with urinary retention, moreno placed, + hematuria. replaced today by urology.

## 2023-05-13 LAB
ANION GAP SERPL CALC-SCNC: 3 MMOL/L — LOW (ref 5–17)
BUN SERPL-MCNC: 12 MG/DL — SIGNIFICANT CHANGE UP (ref 7–18)
CALCIUM SERPL-MCNC: 8.6 MG/DL — SIGNIFICANT CHANGE UP (ref 8.4–10.5)
CHLORIDE SERPL-SCNC: 108 MMOL/L — SIGNIFICANT CHANGE UP (ref 96–108)
CO2 SERPL-SCNC: 27 MMOL/L — SIGNIFICANT CHANGE UP (ref 22–31)
CREAT SERPL-MCNC: 0.83 MG/DL — SIGNIFICANT CHANGE UP (ref 0.5–1.3)
EGFR: 86 ML/MIN/1.73M2 — SIGNIFICANT CHANGE UP
GLUCOSE SERPL-MCNC: 129 MG/DL — HIGH (ref 70–99)
HCT VFR BLD CALC: 39.2 % — SIGNIFICANT CHANGE UP (ref 39–50)
HGB BLD-MCNC: 12.5 G/DL — LOW (ref 13–17)
MCHC RBC-ENTMCNC: 26 PG — LOW (ref 27–34)
MCHC RBC-ENTMCNC: 31.9 GM/DL — LOW (ref 32–36)
MCV RBC AUTO: 81.7 FL — SIGNIFICANT CHANGE UP (ref 80–100)
NRBC # BLD: 0 /100 WBCS — SIGNIFICANT CHANGE UP (ref 0–0)
PLATELET # BLD AUTO: 128 K/UL — LOW (ref 150–400)
POTASSIUM SERPL-MCNC: 3.4 MMOL/L — LOW (ref 3.5–5.3)
POTASSIUM SERPL-SCNC: 3.4 MMOL/L — LOW (ref 3.5–5.3)
RBC # BLD: 4.8 M/UL — SIGNIFICANT CHANGE UP (ref 4.2–5.8)
RBC # FLD: 16.2 % — HIGH (ref 10.3–14.5)
SODIUM SERPL-SCNC: 138 MMOL/L — SIGNIFICANT CHANGE UP (ref 135–145)
WBC # BLD: 5.55 K/UL — SIGNIFICANT CHANGE UP (ref 3.8–10.5)
WBC # FLD AUTO: 5.55 K/UL — SIGNIFICANT CHANGE UP (ref 3.8–10.5)

## 2023-05-13 PROCEDURE — 99232 SBSQ HOSP IP/OBS MODERATE 35: CPT

## 2023-05-13 RX ORDER — ACETAMINOPHEN 500 MG
1000 TABLET ORAL ONCE
Refills: 0 | Status: COMPLETED | OUTPATIENT
Start: 2023-05-13 | End: 2023-05-13

## 2023-05-13 RX ORDER — BACLOFEN 100 %
10 POWDER (GRAM) MISCELLANEOUS EVERY 12 HOURS
Refills: 0 | Status: DISCONTINUED | OUTPATIENT
Start: 2023-05-13 | End: 2023-05-26

## 2023-05-13 RX ORDER — POTASSIUM CHLORIDE 20 MEQ
40 PACKET (EA) ORAL ONCE
Refills: 0 | Status: COMPLETED | OUTPATIENT
Start: 2023-05-13 | End: 2023-05-13

## 2023-05-13 RX ORDER — OXYCODONE HYDROCHLORIDE 5 MG/1
10 TABLET ORAL ONCE
Refills: 0 | Status: DISCONTINUED | OUTPATIENT
Start: 2023-05-13 | End: 2023-05-13

## 2023-05-13 RX ORDER — LIDOCAINE 4 G/100G
2 CREAM TOPICAL DAILY
Refills: 0 | Status: DISCONTINUED | OUTPATIENT
Start: 2023-05-13 | End: 2023-05-23

## 2023-05-13 RX ADMIN — Medication 100 MILLIGRAM(S): at 14:48

## 2023-05-13 RX ADMIN — ALBUTEROL 2 PUFF(S): 90 AEROSOL, METERED ORAL at 02:48

## 2023-05-13 RX ADMIN — MORPHINE SULFATE 1 MILLIGRAM(S): 50 CAPSULE, EXTENDED RELEASE ORAL at 11:34

## 2023-05-13 RX ADMIN — Medication 75 MILLIGRAM(S): at 14:48

## 2023-05-13 RX ADMIN — OXYCODONE HYDROCHLORIDE 15 MILLIGRAM(S): 5 TABLET ORAL at 22:38

## 2023-05-13 RX ADMIN — MORPHINE SULFATE 1 MILLIGRAM(S): 50 CAPSULE, EXTENDED RELEASE ORAL at 02:10

## 2023-05-13 RX ADMIN — SODIUM CHLORIDE 3 MILLILITER(S): 9 INJECTION INTRAMUSCULAR; INTRAVENOUS; SUBCUTANEOUS at 22:15

## 2023-05-13 RX ADMIN — Medication 20 MILLIGRAM(S): at 06:02

## 2023-05-13 RX ADMIN — Medication 75 MILLIGRAM(S): at 06:01

## 2023-05-13 RX ADMIN — OXYCODONE HYDROCHLORIDE 15 MILLIGRAM(S): 5 TABLET ORAL at 07:45

## 2023-05-13 RX ADMIN — MORPHINE SULFATE 1 MILLIGRAM(S): 50 CAPSULE, EXTENDED RELEASE ORAL at 01:40

## 2023-05-13 RX ADMIN — OXYCODONE HYDROCHLORIDE 10 MILLIGRAM(S): 5 TABLET ORAL at 17:30

## 2023-05-13 RX ADMIN — AMLODIPINE BESYLATE 5 MILLIGRAM(S): 2.5 TABLET ORAL at 06:01

## 2023-05-13 RX ADMIN — OXYCODONE HYDROCHLORIDE 15 MILLIGRAM(S): 5 TABLET ORAL at 15:23

## 2023-05-13 RX ADMIN — Medication 100 MILLIGRAM(S): at 06:01

## 2023-05-13 RX ADMIN — Medication 1000 MILLIGRAM(S): at 14:15

## 2023-05-13 RX ADMIN — MORPHINE SULFATE 1 MILLIGRAM(S): 50 CAPSULE, EXTENDED RELEASE ORAL at 21:15

## 2023-05-13 RX ADMIN — SODIUM CHLORIDE 3 MILLILITER(S): 9 INJECTION INTRAMUSCULAR; INTRAVENOUS; SUBCUTANEOUS at 06:03

## 2023-05-13 RX ADMIN — TAMSULOSIN HYDROCHLORIDE 0.4 MILLIGRAM(S): 0.4 CAPSULE ORAL at 21:51

## 2023-05-13 RX ADMIN — ALBUTEROL 2 PUFF(S): 90 AEROSOL, METERED ORAL at 09:59

## 2023-05-13 RX ADMIN — ALBUTEROL 2 PUFF(S): 90 AEROSOL, METERED ORAL at 21:52

## 2023-05-13 RX ADMIN — ALBUTEROL 2 PUFF(S): 90 AEROSOL, METERED ORAL at 14:51

## 2023-05-13 RX ADMIN — Medication 400 MILLIGRAM(S): at 13:46

## 2023-05-13 RX ADMIN — MORPHINE SULFATE 1 MILLIGRAM(S): 50 CAPSULE, EXTENDED RELEASE ORAL at 11:45

## 2023-05-13 RX ADMIN — SENNA PLUS 2 TABLET(S): 8.6 TABLET ORAL at 21:51

## 2023-05-13 RX ADMIN — Medication 25 MILLIGRAM(S): at 17:33

## 2023-05-13 RX ADMIN — OXYCODONE HYDROCHLORIDE 15 MILLIGRAM(S): 5 TABLET ORAL at 14:36

## 2023-05-13 RX ADMIN — Medication 10 MILLIGRAM(S): at 17:33

## 2023-05-13 RX ADMIN — OXYCODONE HYDROCHLORIDE 10 MILLIGRAM(S): 5 TABLET ORAL at 16:17

## 2023-05-13 RX ADMIN — OXYCODONE HYDROCHLORIDE 15 MILLIGRAM(S): 5 TABLET ORAL at 06:35

## 2023-05-13 RX ADMIN — ATORVASTATIN CALCIUM 80 MILLIGRAM(S): 80 TABLET, FILM COATED ORAL at 21:51

## 2023-05-13 RX ADMIN — MORPHINE SULFATE 1 MILLIGRAM(S): 50 CAPSULE, EXTENDED RELEASE ORAL at 20:58

## 2023-05-13 RX ADMIN — Medication 100 MILLIGRAM(S): at 21:51

## 2023-05-13 RX ADMIN — Medication 25 MILLIGRAM(S): at 06:01

## 2023-05-13 RX ADMIN — Medication 75 MILLIGRAM(S): at 23:27

## 2023-05-13 RX ADMIN — SODIUM CHLORIDE 3 MILLILITER(S): 9 INJECTION INTRAMUSCULAR; INTRAVENOUS; SUBCUTANEOUS at 15:23

## 2023-05-13 RX ADMIN — OXYCODONE HYDROCHLORIDE 15 MILLIGRAM(S): 5 TABLET ORAL at 23:28

## 2023-05-13 NOTE — CHART NOTE - NSCHARTNOTEFT_GEN_A_CORE
Pt moreno leaking, s/p reinsertion 5/12 by urology with 14 Fr.   on exam, pt refusing to reinsert moreno, wants TOV first then if failed, agreeable to reinsert.   Monitor urine output for TOV for now, Bladder scan prn.

## 2023-05-13 NOTE — PROGRESS NOTE ADULT - PROBLEM SELECTOR PLAN 11
- Holding lovenox in setting of hematuria.   - SCDs    DC plan-from [facility] for adult [facility].   PT consult pending eval  Titrate oxygen, monitor O2 sat room air.

## 2023-05-13 NOTE — PROGRESS NOTE ADULT - SUBJECTIVE AND OBJECTIVE BOX
INTERVAL HPI/OVERNIGHT EVENTS:    Pt seen and examined at bedside. No acute events overnight.     Vital Signs Last 24 Hrs  T(C): 36.4 (13 May 2023 05:16), Max: 36.9 (12 May 2023 14:13)  T(F): 97.6 (13 May 2023 05:16), Max: 98.4 (12 May 2023 14:13)  HR: 70 (13 May 2023 05:16) (67 - 81)  BP: 150/65 (13 May 2023 05:16) (133/63 - 150/65)  BP(mean): --  RR: 16 (13 May 2023 05:16) (16 - 20)  SpO2: 92% (13 May 2023 05:16) (92% - 92%)    Parameters below as of 13 May 2023 05:16  Patient On (Oxygen Delivery Method): nasal cannula  O2 Flow (L/min): 2    I&O's Detail    12 May 2023 07:01  -  13 May 2023 07:00  --------------------------------------------------------  IN:  Total IN: 0 mL    OUT:    Indwelling Catheter - Urethral (mL): 900 mL  Total OUT: 900 mL    Total NET: -900 mL        ceFAZolin   IVPB 2000 milliGRAM(s) IV Intermittent every 8 hours  ceFAZolin   IVPB      senna 2 Tablet(s) Oral at bedtime  tamsulosin 0.4 milliGRAM(s) Oral at bedtime      Physical Exam  General: Alert, No acute distress  Skin: No jaundice, no icterus  Abdomen: soft,  nondistended, nontender, no rebound tenderness, no guarding, no palpable masses  : Normal external genitalia, moreno catheter in place, UO dark       Labs:                        12.5   5.55  )-----------( 128      ( 13 May 2023 08:11 )             39.2     05-13    138  |  108  |  12  ----------------------------<  129<H>  3.4<L>   |  27  |  0.83    Ca    8.6      13 May 2023 08:11  Phos  2.7     05-12  Mg     1.4     05-12

## 2023-05-13 NOTE — PROGRESS NOTE ADULT - PROBLEM SELECTOR PLAN 1
- Likely secondary to pain VS atelectasis.   - On admission saturating 88% on room air as per ED note.   - Now stable on 2LPM on NC O2  - Denies SOB and cough.   - CXR - Small bibasilar infiltrates.  - CT chest - Dependent densities in the lower lungs most likely atelectasis given the low lung volumes. Superimposed pneumonia less likely.  - Patient has history of CHF but is not in acute exacerbation.   - Incentive spirometry.   - Titrate off O2 as tolerated. OOB to chair  - pulm dr. Bailey consulted  reccs appreciated:   - Duoneb q6h   - 2 D ECHO   - No steroids for now  - PT eval

## 2023-05-13 NOTE — PROGRESS NOTE ADULT - SUBJECTIVE AND OBJECTIVE BOX
TOM HERRERA  MR# [id number]  82yMale        Patient is a 82y old  Male who presents with a chief complaint of Mechanical fall (13 May 2023 13:44)      INTERVAL HPI/OVERNIGHT EVENTS:  Patient seen and examined at bedside. No notations of chest pain, palpitation, SOB, orthopnea, nausea, vomiting or abdominal pain.    ALLERGIES  No Known Allergies      MEDICATIONS  acetaminophen     Tablet .. 650 milliGRAM(s) Oral every 6 hours PRN Temp greater or equal to 38C (100.4F), Mild Pain (1 - 3)  albuterol    90 MICROgram(s) HFA Inhaler 2 Puff(s) Inhalation every 6 hours  amLODIPine   Tablet 5 milliGRAM(s) Oral daily  aspirin  chewable 81 milliGRAM(s) Oral daily  atorvastatin 80 milliGRAM(s) Oral at bedtime  baclofen 10 milliGRAM(s) Oral every 12 hours  ceFAZolin   IVPB 2000 milliGRAM(s) IV Intermittent every 8 hours  ceFAZolin   IVPB      clopidogrel Tablet 75 milliGRAM(s) Oral daily  DULoxetine 60 milliGRAM(s) Oral daily  furosemide    Tablet 20 milliGRAM(s) Oral daily  lidocaine   4% Patch 2 Patch Transdermal daily  melatonin 3 milliGRAM(s) Oral at bedtime PRN Insomnia  metoprolol tartrate 25 milliGRAM(s) Oral two times a day  morphine  - Injectable 1 milliGRAM(s) IV Push every 6 hours PRN Severe Pain (7 - 10)  oxyCODONE    IR 15 milliGRAM(s) Oral every 8 hours PRN Moderate Pain (4 - 6)  pregabalin 75 milliGRAM(s) Oral three times a day  senna 2 Tablet(s) Oral at bedtime  sodium chloride 0.9% lock flush 3 milliLiter(s) IV Push every 8 hours  tamsulosin 0.4 milliGRAM(s) Oral at bedtime              REVIEW OF SYSTEMS:  CONSTITUTIONAL: No fever, weight loss, or fatigue  EYES: No eye pain, visual disturbances, or discharge  ENT:  No difficulty hearing, tinnitus, vertigo; No sinus or throat pain  NECK: No pain or stiffness  RESPIRATORY: No cough, wheezing, chills or hemoptysis; No Shortness of Breath  CARDIOVASCULAR: No chest pain, palpitations, passing out, dizziness, or leg swelling  GASTROINTESTINAL: No abdominal or epigastric pain. No nausea, vomiting, or hematemesis; No diarrhea or constipation. No melena or hematochezia.  GENITOURINARY: No dysuria, frequency, hematuria, or incontinence  NEUROLOGICAL: No headaches, memory loss, loss of strength, numbness, or tremors  SKIN: No itching, burning, rashes, or lesions   LYMPH Nodes: No enlarged glands  ENDOCRINE: No heat or cold intolerance; No hair loss  MUSCULOSKELETAL: No joint pain or swelling; No muscle, back, or extremity pain  PSYCHIATRIC: No depression, anxiety, mood swings, or difficulty sleeping  HEME/LYMPH: No easy bruising, or bleeding gums  ALLERGY AND IMMUNOLOGIC: No hives or eczema	    [ ] All others negative	  [ ] Unable to obtain      T(C): 36.3 (05-13-23 @ 20:50), Max: 36.7 (05-13-23 @ 12:20)  T(F): 97.4 (05-13-23 @ 20:50), Max: 98.1 (05-13-23 @ 12:20)  HR: 67 (05-13-23 @ 20:50) (67 - 73)  BP: 161/78 (05-13-23 @ 20:50) (150/65 - 161/78)  RR: 17 (05-13-23 @ 20:50) (16 - 18)  SpO2: 92% (05-13-23 @ 20:50) (92% - 92%)  Wt(kg): --    I&O's Summary    12 May 2023 07:01  -  13 May 2023 07:00  --------------------------------------------------------  IN: 0 mL / OUT: 900 mL / NET: -900 mL          PHYSICAL EXAM:  A X O x  HEAD:  Atraumatic, Normocephalic  EYES: EOMI, PERRLA, conjunctiva and sclera clear  NECK: Supple, No JVD, Normal thyroid  Resp: CTAB, No crackles, wheezing,   CVS: Regular rate and rhythm; No discernable murmurs, rubs, or gallops  ABD: Soft, Nontender, Nondistended; Bowel sounds present  EXTREMITIES:  2+ Peripheral Pulses, No edema  LYMPH: No dicernable lymphadenopathy noted  GENERAL: NAD, well-groomed, well-developed      LABS:                        12.5   5.55  )-----------( 128      ( 13 May 2023 08:11 )             39.2     05-13    138  |  108  |  12  ----------------------------<  129<H>  3.4<L>   |  27  |  0.83    Ca    8.6      13 May 2023 08:11  Phos  2.7     05-12  Mg     1.4     05-12          CAPILLARY BLOOD GLUCOSE          Troponins:  ProBNP:  Lipid Profile:   HgA1c:  TSH:           RADIOLOGY & ADDITIONAL TESTS:    Imaging Personally Reviewed:  [ ] YES  [ ] NO      Consultant(s) Notes Reviewed:  [x ] YES  [ ] NO    Care Discussed with Consultants/Other Providers [ x] YES  [ ] NO          PAST MEDICAL & SURGICAL HISTORY:  Hypertension      Hyperlipidemia      Venous insufficiency            Hypoxemia    Handoff    MEWS Score    Hypertension    Hyperlipidemia    Venous insufficiency    Hypoxia    Acute respiratory failure with hypoxia    Fall, accidental    Hypertension    Urinary retention    Chronic back pain    Hyperlipidemia    CAD (coronary artery disease)    Venous insufficiency    Cellulitis of left thigh    Prophylactic measure    Hematuria    A) FALL, LOWER BACK PAIN    [email or web address]

## 2023-05-13 NOTE — PROGRESS NOTE ADULT - ASSESSMENT
81 y/o male admitted s/p mechanical fall with urinary retention     - continue moreno, irrigate prn   - monitor urine output  - continue flomax   - hold anticoagulation if clinically safe  - will follow   - care as per primary team

## 2023-05-13 NOTE — PROGRESS NOTE ADULT - ASSESSMENT
Patient is a 81 y/o M from [facility] For [facility] [facility] who walks with a walker and has PMH of HTN, HLD, venous insufficiency, CAD, BPH, CHF who presented s/p mechanical fall. Patient is admitted to medicine for AHRF 2/2 to atelectasis and mechanical fall.   CT Head, chest, spine no fx. Chest CT shows atelectasis. CXR shows small bibasilar infiltrates. pulmonary consulted.   on Oxygen therapy. PT consulted.   Pt with urinary retention, moreno placed, + hematuria. replaced today by urology.

## 2023-05-13 NOTE — PROGRESS NOTE ADULT - SUBJECTIVE AND OBJECTIVE BOX
Time of Visit:  Patient seen and examined. pat is layin gin bed  complaining of back pain .. took 20 mg oxycodone q4h      MEDICATIONS  (STANDING):  acetaminophen   IVPB .. 1000 milliGRAM(s) IV Intermittent once  albuterol    90 MICROgram(s) HFA Inhaler 2 Puff(s) Inhalation every 6 hours  amLODIPine   Tablet 5 milliGRAM(s) Oral daily  aspirin  chewable 81 milliGRAM(s) Oral daily  atorvastatin 80 milliGRAM(s) Oral at bedtime  baclofen 10 milliGRAM(s) Oral every 12 hours  ceFAZolin   IVPB      ceFAZolin   IVPB 2000 milliGRAM(s) IV Intermittent every 8 hours  clopidogrel Tablet 75 milliGRAM(s) Oral daily  DULoxetine 60 milliGRAM(s) Oral daily  furosemide    Tablet 20 milliGRAM(s) Oral daily  metoprolol tartrate 25 milliGRAM(s) Oral two times a day  oxyCODONE  ER Tablet 10 milliGRAM(s) Oral once  pregabalin 75 milliGRAM(s) Oral three times a day  senna 2 Tablet(s) Oral at bedtime  sodium chloride 0.9% lock flush 3 milliLiter(s) IV Push every 8 hours  tamsulosin 0.4 milliGRAM(s) Oral at bedtime      MEDICATIONS  (PRN):  acetaminophen     Tablet .. 650 milliGRAM(s) Oral every 6 hours PRN Temp greater or equal to 38C (100.4F), Mild Pain (1 - 3)  melatonin 3 milliGRAM(s) Oral at bedtime PRN Insomnia  morphine  - Injectable 1 milliGRAM(s) IV Push every 6 hours PRN Severe Pain (7 - 10)  oxyCODONE    IR 15 milliGRAM(s) Oral every 8 hours PRN Moderate Pain (4 - 6)       Medications up to date at time of exam.      PHYSICAL EXAMINATION:  Patient has no new complaints.  GENERAL: The patient is a well-developed, well-nourished, in no apparent distress.     Vital Signs Last 24 Hrs  T(C): 36.7 (13 May 2023 12:20), Max: 36.9 (12 May 2023 14:13)  T(F): 98.1 (13 May 2023 12:20), Max: 98.4 (12 May 2023 14:13)  HR: 73 (13 May 2023 12:20) (67 - 81)  BP: 157/78 (13 May 2023 12:20) (133/63 - 157/78)  BP(mean): --  RR: 18 (13 May 2023 12:20) (16 - 20)  SpO2: 92% (13 May 2023 12:20) (92% - 92%)    Parameters below as of 13 May 2023 12:20  Patient On (Oxygen Delivery Method): nasal cannula  O2 Flow (L/min): 2     (if applicable)    Chest Tube (if applicable)    HEENT: Head is normocephalic and atraumatic. Extraocular muscles are intact. Mucous membranes are moist.     NECK: Supple, no palpable adenopathy.    LUNGS: Clear to auscultation, no wheezing, rales, or rhonchi.    HEART: Regular rate and rhythm without murmur.    ABDOMEN: Soft, nontender, and nondistended.  No hepatosplenomegaly is noted.    : No painful voiding, no pelvic pain + moreno's     EXTREMITIES: b/l lower ext with cellulitis , chronic skin changes     NEUROLOGIC: Awake, alert, oriented, grossly intact    SKIN: Warm, dry, good turgor.      LABS:                        12.5   5.55  )-----------( 128      ( 13 May 2023 08:11 )             39.2     05-13    138  |  108  |  12  ----------------------------<  129<H>  3.4<L>   |  27  |  0.83    Ca    8.6      13 May 2023 08:11  Phos  2.7     05-12  Mg     1.4     05-12                          MICROBIOLOGY: (if applicable)    RADIOLOGY & ADDITIONAL STUDIES:  EKG:   CXR:  ECHO:    IMPRESSION: 82y Male PAST MEDICAL & SURGICAL HISTORY:  Hypertension      Hyperlipidemia      Venous insufficiency       p/w         IMP: This is an  82 yr old man former smoker  [facility] who walks with a walker and has  HTN, HLD, venous insufficiency, CAD, BPH, CHF who presented s/p mechanical fall. Patient reports that he was by his bed when he felt lightheaded and felt like he might pass out and had a fall.  Pat admitted for acute hypoxic resp failure requiring supp O2 due to b/l dependent atelectasis with multiple thoracic and lumbar vertebral fx.  . I doubt PNA or PE at this time . He may have undiagnosed COPD . Pat is using O2 2 L via NC and complaining of back pain . Moreno with hematuria     Sugg  - Continue O2 supp as needed   - Continue antibx for cellulitis   - F/U cultures   - Incentive spirometry   - Duoneb q6h   - 2 DECHO   - Out pat pulmo eval   - No steroids   - PT eval   - Fall precaution   - DVT GI prophy   - Lidocaine patch to back

## 2023-05-14 LAB
ANION GAP SERPL CALC-SCNC: 6 MMOL/L — SIGNIFICANT CHANGE UP (ref 5–17)
BUN SERPL-MCNC: 13 MG/DL — SIGNIFICANT CHANGE UP (ref 7–18)
CALCIUM SERPL-MCNC: 8.6 MG/DL — SIGNIFICANT CHANGE UP (ref 8.4–10.5)
CHLORIDE SERPL-SCNC: 107 MMOL/L — SIGNIFICANT CHANGE UP (ref 96–108)
CO2 SERPL-SCNC: 26 MMOL/L — SIGNIFICANT CHANGE UP (ref 22–31)
CREAT SERPL-MCNC: 0.93 MG/DL — SIGNIFICANT CHANGE UP (ref 0.5–1.3)
EGFR: 81 ML/MIN/1.73M2 — SIGNIFICANT CHANGE UP
GLUCOSE SERPL-MCNC: 119 MG/DL — HIGH (ref 70–99)
HCT VFR BLD CALC: 44 % — SIGNIFICANT CHANGE UP (ref 39–50)
HGB BLD-MCNC: 13.9 G/DL — SIGNIFICANT CHANGE UP (ref 13–17)
MCHC RBC-ENTMCNC: 26.2 PG — LOW (ref 27–34)
MCHC RBC-ENTMCNC: 31.6 GM/DL — LOW (ref 32–36)
MCV RBC AUTO: 83 FL — SIGNIFICANT CHANGE UP (ref 80–100)
NRBC # BLD: 0 /100 WBCS — SIGNIFICANT CHANGE UP (ref 0–0)
PLATELET # BLD AUTO: 171 K/UL — SIGNIFICANT CHANGE UP (ref 150–400)
POTASSIUM SERPL-MCNC: 3.5 MMOL/L — SIGNIFICANT CHANGE UP (ref 3.5–5.3)
POTASSIUM SERPL-SCNC: 3.5 MMOL/L — SIGNIFICANT CHANGE UP (ref 3.5–5.3)
RBC # BLD: 5.3 M/UL — SIGNIFICANT CHANGE UP (ref 4.2–5.8)
RBC # FLD: 15.8 % — HIGH (ref 10.3–14.5)
SODIUM SERPL-SCNC: 139 MMOL/L — SIGNIFICANT CHANGE UP (ref 135–145)
WBC # BLD: 5.67 K/UL — SIGNIFICANT CHANGE UP (ref 3.8–10.5)
WBC # FLD AUTO: 5.67 K/UL — SIGNIFICANT CHANGE UP (ref 3.8–10.5)

## 2023-05-14 RX ADMIN — OXYCODONE HYDROCHLORIDE 15 MILLIGRAM(S): 5 TABLET ORAL at 06:44

## 2023-05-14 RX ADMIN — Medication 10 MILLIGRAM(S): at 05:13

## 2023-05-14 RX ADMIN — MORPHINE SULFATE 1 MILLIGRAM(S): 50 CAPSULE, EXTENDED RELEASE ORAL at 05:46

## 2023-05-14 RX ADMIN — Medication 81 MILLIGRAM(S): at 11:12

## 2023-05-14 RX ADMIN — Medication 75 MILLIGRAM(S): at 05:14

## 2023-05-14 RX ADMIN — SODIUM CHLORIDE 3 MILLILITER(S): 9 INJECTION INTRAMUSCULAR; INTRAVENOUS; SUBCUTANEOUS at 21:59

## 2023-05-14 RX ADMIN — MORPHINE SULFATE 1 MILLIGRAM(S): 50 CAPSULE, EXTENDED RELEASE ORAL at 11:30

## 2023-05-14 RX ADMIN — Medication 75 MILLIGRAM(S): at 21:52

## 2023-05-14 RX ADMIN — LIDOCAINE 2 PATCH: 4 CREAM TOPICAL at 20:05

## 2023-05-14 RX ADMIN — AMLODIPINE BESYLATE 5 MILLIGRAM(S): 2.5 TABLET ORAL at 05:13

## 2023-05-14 RX ADMIN — MORPHINE SULFATE 1 MILLIGRAM(S): 50 CAPSULE, EXTENDED RELEASE ORAL at 18:22

## 2023-05-14 RX ADMIN — Medication 100 MILLIGRAM(S): at 21:52

## 2023-05-14 RX ADMIN — OXYCODONE HYDROCHLORIDE 15 MILLIGRAM(S): 5 TABLET ORAL at 14:47

## 2023-05-14 RX ADMIN — Medication 3 MILLIGRAM(S): at 21:52

## 2023-05-14 RX ADMIN — CLOPIDOGREL BISULFATE 75 MILLIGRAM(S): 75 TABLET, FILM COATED ORAL at 11:12

## 2023-05-14 RX ADMIN — Medication 25 MILLIGRAM(S): at 17:16

## 2023-05-14 RX ADMIN — DULOXETINE HYDROCHLORIDE 60 MILLIGRAM(S): 30 CAPSULE, DELAYED RELEASE ORAL at 11:12

## 2023-05-14 RX ADMIN — ALBUTEROL 2 PUFF(S): 90 AEROSOL, METERED ORAL at 11:12

## 2023-05-14 RX ADMIN — ALBUTEROL 2 PUFF(S): 90 AEROSOL, METERED ORAL at 21:53

## 2023-05-14 RX ADMIN — MORPHINE SULFATE 1 MILLIGRAM(S): 50 CAPSULE, EXTENDED RELEASE ORAL at 11:12

## 2023-05-14 RX ADMIN — ALBUTEROL 2 PUFF(S): 90 AEROSOL, METERED ORAL at 17:16

## 2023-05-14 RX ADMIN — Medication 100 MILLIGRAM(S): at 13:11

## 2023-05-14 RX ADMIN — OXYCODONE HYDROCHLORIDE 15 MILLIGRAM(S): 5 TABLET ORAL at 15:48

## 2023-05-14 RX ADMIN — LIDOCAINE 2 PATCH: 4 CREAM TOPICAL at 23:05

## 2023-05-14 RX ADMIN — OXYCODONE HYDROCHLORIDE 15 MILLIGRAM(S): 5 TABLET ORAL at 07:30

## 2023-05-14 RX ADMIN — Medication 75 MILLIGRAM(S): at 13:11

## 2023-05-14 RX ADMIN — MORPHINE SULFATE 1 MILLIGRAM(S): 50 CAPSULE, EXTENDED RELEASE ORAL at 05:24

## 2023-05-14 RX ADMIN — SODIUM CHLORIDE 3 MILLILITER(S): 9 INJECTION INTRAMUSCULAR; INTRAVENOUS; SUBCUTANEOUS at 13:20

## 2023-05-14 RX ADMIN — LIDOCAINE 2 PATCH: 4 CREAM TOPICAL at 11:14

## 2023-05-14 RX ADMIN — ATORVASTATIN CALCIUM 80 MILLIGRAM(S): 80 TABLET, FILM COATED ORAL at 21:53

## 2023-05-14 RX ADMIN — Medication 25 MILLIGRAM(S): at 05:14

## 2023-05-14 RX ADMIN — MORPHINE SULFATE 1 MILLIGRAM(S): 50 CAPSULE, EXTENDED RELEASE ORAL at 19:22

## 2023-05-14 RX ADMIN — SODIUM CHLORIDE 3 MILLILITER(S): 9 INJECTION INTRAMUSCULAR; INTRAVENOUS; SUBCUTANEOUS at 05:44

## 2023-05-14 RX ADMIN — SENNA PLUS 2 TABLET(S): 8.6 TABLET ORAL at 21:52

## 2023-05-14 RX ADMIN — Medication 100 MILLIGRAM(S): at 05:14

## 2023-05-14 RX ADMIN — TAMSULOSIN HYDROCHLORIDE 0.4 MILLIGRAM(S): 0.4 CAPSULE ORAL at 21:52

## 2023-05-14 RX ADMIN — Medication 20 MILLIGRAM(S): at 05:13

## 2023-05-14 RX ADMIN — Medication 10 MILLIGRAM(S): at 17:16

## 2023-05-14 NOTE — PROGRESS NOTE ADULT - PROBLEM SELECTOR PLAN 11
- Holding lovenox in setting of hematuria.   - SCDs    DC plan-from [facility] for [facility].   PT consult pending eval  Titrate oxygen, monitor O2 sat room air.

## 2023-05-14 NOTE — PHYSICAL THERAPY INITIAL EVALUATION ADULT - PERTINENT HX OF CURRENT PROBLEM, REHAB EVAL
82 yr old man with PMH of HTN, HLD, venous insufficiency, CAD, BPH, CHF who presented s/p mechanical fall. Pt was referred due to weakness and difficulty in walking.

## 2023-05-14 NOTE — PHYSICAL THERAPY INITIAL EVALUATION ADULT - GENERAL OBSERVATIONS, REHAB EVAL
pt seen supine in bed with IV line, denied any complaint, but drowsy. pt needs maximum encouragement during the evaluation

## 2023-05-14 NOTE — PROGRESS NOTE ADULT - SUBJECTIVE AND OBJECTIVE BOX
TOM HERRERA  MR# [id number]  82yMale        Patient is a 82y old  Male who presents with a chief complaint of Mechanical fall (13 May 2023 18:36)      INTERVAL HPI/OVERNIGHT EVENTS:  Patient seen and examined at bedside. No notations of chest pain, palpitation, SOB, orthopnea, nausea, vomiting or abdominal pain.    ALLERGIES  No Known Allergies      MEDICATIONS  acetaminophen     Tablet .. 650 milliGRAM(s) Oral every 6 hours PRN Temp greater or equal to 38C (100.4F), Mild Pain (1 - 3)  albuterol    90 MICROgram(s) HFA Inhaler 2 Puff(s) Inhalation every 6 hours  amLODIPine   Tablet 5 milliGRAM(s) Oral daily  aspirin  chewable 81 milliGRAM(s) Oral daily  atorvastatin 80 milliGRAM(s) Oral at bedtime  baclofen 10 milliGRAM(s) Oral every 12 hours  ceFAZolin   IVPB 2000 milliGRAM(s) IV Intermittent every 8 hours  ceFAZolin   IVPB      clopidogrel Tablet 75 milliGRAM(s) Oral daily  DULoxetine 60 milliGRAM(s) Oral daily  furosemide    Tablet 20 milliGRAM(s) Oral daily  lidocaine   4% Patch 2 Patch Transdermal daily  melatonin 3 milliGRAM(s) Oral at bedtime PRN Insomnia  metoprolol tartrate 25 milliGRAM(s) Oral two times a day  morphine  - Injectable 1 milliGRAM(s) IV Push every 6 hours PRN Severe Pain (7 - 10)  oxyCODONE    IR 15 milliGRAM(s) Oral every 8 hours PRN Moderate Pain (4 - 6)  pregabalin 75 milliGRAM(s) Oral three times a day  senna 2 Tablet(s) Oral at bedtime  sodium chloride 0.9% lock flush 3 milliLiter(s) IV Push every 8 hours  tamsulosin 0.4 milliGRAM(s) Oral at bedtime              REVIEW OF SYSTEMS:  CONSTITUTIONAL: No fever, weight loss, or fatigue  EYES: No eye pain, visual disturbances, or discharge  ENT:  No difficulty hearing, tinnitus, vertigo; No sinus or throat pain  NECK: No pain or stiffness  RESPIRATORY: No cough, wheezing, chills or hemoptysis; No Shortness of Breath  CARDIOVASCULAR: No chest pain, palpitations, passing out, dizziness, or leg swelling  GASTROINTESTINAL: No abdominal or epigastric pain. No nausea, vomiting, or hematemesis; No diarrhea or constipation. No melena or hematochezia.  GENITOURINARY: No dysuria, frequency, hematuria, or incontinence  NEUROLOGICAL: No headaches, memory loss, loss of strength, numbness, or tremors  SKIN: No itching, burning, rashes, or lesions   LYMPH Nodes: No enlarged glands  ENDOCRINE: No heat or cold intolerance; No hair loss  MUSCULOSKELETAL: No joint pain or swelling; No muscle, back, or extremity pain  PSYCHIATRIC: No depression, anxiety, mood swings, or difficulty sleeping  HEME/LYMPH: No easy bruising, or bleeding gums  ALLERGY AND IMMUNOLOGIC: No hives or eczema	    [ ] All others negative	  [ ] Unable to obtain      T(C): 36.4 (05-14-23 @ 05:17), Max: 36.7 (05-13-23 @ 12:20)  T(F): 97.6 (05-14-23 @ 05:17), Max: 98.1 (05-13-23 @ 12:20)  HR: 84 (05-14-23 @ 10:30) (67 - 86)  BP: 171/81 (05-14-23 @ 10:30) (151/78 - 196/71)  RR: 17 (05-14-23 @ 05:17) (17 - 18)  SpO2: 93% (05-14-23 @ 10:30) (91% - 93%)  Wt(kg): --    I&O's Summary        PHYSICAL EXAM:  A X O x  HEAD:  Atraumatic, Normocephalic  EYES: EOMI, PERRLA, conjunctiva and sclera clear  NECK: Supple, No JVD, Normal thyroid  Resp: CTAB, No crackles, wheezing,   CVS: Regular rate and rhythm; No discernable murmurs, rubs, or gallops  ABD: Soft, Nontender, Nondistended; Bowel sounds present  EXTREMITIES:  2+ Peripheral Pulses, No edema  LYMPH: No dicernable lymphadenopathy noted  GENERAL: NAD, well-groomed, well-developed      LABS:                        13.9   5.67  )-----------( 171      ( 14 May 2023 07:43 )             44.0     05-14    139  |  107  |  13  ----------------------------<  119<H>  3.5   |  26  |  0.93    Ca    8.6      14 May 2023 07:43          CAPILLARY BLOOD GLUCOSE          Troponins:  ProBNP:  Lipid Profile:   HgA1c:  TSH:           RADIOLOGY & ADDITIONAL TESTS:    Imaging Personally Reviewed:  [ ] YES  [ ] NO      Consultant(s) Notes Reviewed:  [x ] YES  [ ] NO    Care Discussed with Consultants/Other Providers [ x] YES  [ ] NO          PAST MEDICAL & SURGICAL HISTORY:  Hypertension      Hyperlipidemia      Venous insufficiency            Hypoxemia    Handoff    MEWS Score    Hypertension    Hyperlipidemia    Venous insufficiency    Hypoxia    Acute respiratory failure with hypoxia    Fall, accidental    Hypertension    Urinary retention    Chronic back pain    Hyperlipidemia    CAD (coronary artery disease)    Venous insufficiency    Cellulitis of left thigh    Prophylactic measure    Hematuria    A) FALL, LOWER BACK PAIN    [email or web address]

## 2023-05-14 NOTE — PHYSICAL THERAPY INITIAL EVALUATION ADULT - IMPAIRMENTS FOUND, PT EVAL
aerobic capacity/endurance/cognitive impairment/ergonomics and body mechanics/fine motor/gait, locomotion, and balance/gross motor/joint integrity and mobility/muscle strength/poor safety awareness/posture/ROM

## 2023-05-14 NOTE — PHYSICAL THERAPY INITIAL EVALUATION ADULT - LIVES WITH, PROFILE
per pt's chart, pt is from [facility] and ambulate with the walker/alone

## 2023-05-15 DIAGNOSIS — Z98.1 ARTHRODESIS STATUS: ICD-10-CM

## 2023-05-15 DIAGNOSIS — F11.20 OPIOID DEPENDENCE, UNCOMPLICATED: ICD-10-CM

## 2023-05-15 DIAGNOSIS — S22.000A WEDGE COMPRESSION FRACTURE OF UNSPECIFIED THORACIC VERTEBRA, INITIAL ENCOUNTER FOR CLOSED FRACTURE: ICD-10-CM

## 2023-05-15 DIAGNOSIS — S32.000A WEDGE COMPRESSION FRACTURE OF UNSPECIFIED LUMBAR VERTEBRA, INITIAL ENCOUNTER FOR CLOSED FRACTURE: ICD-10-CM

## 2023-05-15 LAB
ANION GAP SERPL CALC-SCNC: 4 MMOL/L — LOW (ref 5–17)
BUN SERPL-MCNC: 24 MG/DL — HIGH (ref 7–18)
CALCIUM SERPL-MCNC: 8.6 MG/DL — SIGNIFICANT CHANGE UP (ref 8.4–10.5)
CHLORIDE SERPL-SCNC: 108 MMOL/L — SIGNIFICANT CHANGE UP (ref 96–108)
CO2 SERPL-SCNC: 27 MMOL/L — SIGNIFICANT CHANGE UP (ref 22–31)
CREAT SERPL-MCNC: 1.03 MG/DL — SIGNIFICANT CHANGE UP (ref 0.5–1.3)
EGFR: 72 ML/MIN/1.73M2 — SIGNIFICANT CHANGE UP
GLUCOSE SERPL-MCNC: 128 MG/DL — HIGH (ref 70–99)
HCT VFR BLD CALC: 43.1 % — SIGNIFICANT CHANGE UP (ref 39–50)
HGB BLD-MCNC: 13.5 G/DL — SIGNIFICANT CHANGE UP (ref 13–17)
MCHC RBC-ENTMCNC: 26.4 PG — LOW (ref 27–34)
MCHC RBC-ENTMCNC: 31.3 GM/DL — LOW (ref 32–36)
MCV RBC AUTO: 84.2 FL — SIGNIFICANT CHANGE UP (ref 80–100)
NRBC # BLD: 0 /100 WBCS — SIGNIFICANT CHANGE UP (ref 0–0)
PLATELET # BLD AUTO: 169 K/UL — SIGNIFICANT CHANGE UP (ref 150–400)
POTASSIUM SERPL-MCNC: 3.5 MMOL/L — SIGNIFICANT CHANGE UP (ref 3.5–5.3)
POTASSIUM SERPL-SCNC: 3.5 MMOL/L — SIGNIFICANT CHANGE UP (ref 3.5–5.3)
RBC # BLD: 5.12 M/UL — SIGNIFICANT CHANGE UP (ref 4.2–5.8)
RBC # FLD: 16 % — HIGH (ref 10.3–14.5)
SODIUM SERPL-SCNC: 139 MMOL/L — SIGNIFICANT CHANGE UP (ref 135–145)
WBC # BLD: 5.84 K/UL — SIGNIFICANT CHANGE UP (ref 3.8–10.5)
WBC # FLD AUTO: 5.84 K/UL — SIGNIFICANT CHANGE UP (ref 3.8–10.5)

## 2023-05-15 PROCEDURE — 99231 SBSQ HOSP IP/OBS SF/LOW 25: CPT | Mod: GC

## 2023-05-15 PROCEDURE — 71045 X-RAY EXAM CHEST 1 VIEW: CPT | Mod: 26

## 2023-05-15 PROCEDURE — 93306 TTE W/DOPPLER COMPLETE: CPT | Mod: 26

## 2023-05-15 PROCEDURE — 99222 1ST HOSP IP/OBS MODERATE 55: CPT

## 2023-05-15 RX ORDER — ACETAMINOPHEN 500 MG
1000 TABLET ORAL EVERY 8 HOURS
Refills: 0 | Status: COMPLETED | OUTPATIENT
Start: 2023-05-15 | End: 2023-05-18

## 2023-05-15 RX ORDER — POLYETHYLENE GLYCOL 3350 17 G/17G
17 POWDER, FOR SOLUTION ORAL DAILY
Refills: 0 | Status: DISCONTINUED | OUTPATIENT
Start: 2023-05-15 | End: 2023-05-26

## 2023-05-15 RX ORDER — OXYCODONE HYDROCHLORIDE 5 MG/1
20 TABLET ORAL EVERY 6 HOURS
Refills: 0 | Status: DISCONTINUED | OUTPATIENT
Start: 2023-05-15 | End: 2023-05-22

## 2023-05-15 RX ADMIN — Medication 81 MILLIGRAM(S): at 11:05

## 2023-05-15 RX ADMIN — OXYCODONE HYDROCHLORIDE 20 MILLIGRAM(S): 5 TABLET ORAL at 17:54

## 2023-05-15 RX ADMIN — Medication 10 MILLIGRAM(S): at 05:52

## 2023-05-15 RX ADMIN — Medication 1000 MILLIGRAM(S): at 22:14

## 2023-05-15 RX ADMIN — OXYCODONE HYDROCHLORIDE 20 MILLIGRAM(S): 5 TABLET ORAL at 11:42

## 2023-05-15 RX ADMIN — SODIUM CHLORIDE 3 MILLILITER(S): 9 INJECTION INTRAMUSCULAR; INTRAVENOUS; SUBCUTANEOUS at 22:15

## 2023-05-15 RX ADMIN — OXYCODONE HYDROCHLORIDE 15 MILLIGRAM(S): 5 TABLET ORAL at 04:34

## 2023-05-15 RX ADMIN — OXYCODONE HYDROCHLORIDE 15 MILLIGRAM(S): 5 TABLET ORAL at 03:34

## 2023-05-15 RX ADMIN — Medication 25 MILLIGRAM(S): at 17:41

## 2023-05-15 RX ADMIN — OXYCODONE HYDROCHLORIDE 20 MILLIGRAM(S): 5 TABLET ORAL at 18:37

## 2023-05-15 RX ADMIN — Medication 1000 MILLIGRAM(S): at 11:05

## 2023-05-15 RX ADMIN — DULOXETINE HYDROCHLORIDE 60 MILLIGRAM(S): 30 CAPSULE, DELAYED RELEASE ORAL at 11:05

## 2023-05-15 RX ADMIN — Medication 100 MILLIGRAM(S): at 15:32

## 2023-05-15 RX ADMIN — POLYETHYLENE GLYCOL 3350 17 GRAM(S): 17 POWDER, FOR SOLUTION ORAL at 14:20

## 2023-05-15 RX ADMIN — Medication 25 MILLIGRAM(S): at 05:52

## 2023-05-15 RX ADMIN — Medication 20 MILLIGRAM(S): at 05:52

## 2023-05-15 RX ADMIN — TAMSULOSIN HYDROCHLORIDE 0.4 MILLIGRAM(S): 0.4 CAPSULE ORAL at 22:14

## 2023-05-15 RX ADMIN — SODIUM CHLORIDE 3 MILLILITER(S): 9 INJECTION INTRAMUSCULAR; INTRAVENOUS; SUBCUTANEOUS at 14:10

## 2023-05-15 RX ADMIN — Medication 10 MILLIGRAM(S): at 17:41

## 2023-05-15 RX ADMIN — MORPHINE SULFATE 1 MILLIGRAM(S): 50 CAPSULE, EXTENDED RELEASE ORAL at 05:53

## 2023-05-15 RX ADMIN — OXYCODONE HYDROCHLORIDE 20 MILLIGRAM(S): 5 TABLET ORAL at 10:52

## 2023-05-15 RX ADMIN — Medication 75 MILLIGRAM(S): at 05:52

## 2023-05-15 RX ADMIN — ATORVASTATIN CALCIUM 80 MILLIGRAM(S): 80 TABLET, FILM COATED ORAL at 22:14

## 2023-05-15 RX ADMIN — Medication 100 MILLIGRAM(S): at 05:52

## 2023-05-15 RX ADMIN — Medication 3 MILLIGRAM(S): at 23:52

## 2023-05-15 RX ADMIN — SODIUM CHLORIDE 3 MILLILITER(S): 9 INJECTION INTRAMUSCULAR; INTRAVENOUS; SUBCUTANEOUS at 06:26

## 2023-05-15 RX ADMIN — ALBUTEROL 2 PUFF(S): 90 AEROSOL, METERED ORAL at 22:15

## 2023-05-15 RX ADMIN — CLOPIDOGREL BISULFATE 75 MILLIGRAM(S): 75 TABLET, FILM COATED ORAL at 11:05

## 2023-05-15 RX ADMIN — Medication 75 MILLIGRAM(S): at 22:14

## 2023-05-15 RX ADMIN — MORPHINE SULFATE 1 MILLIGRAM(S): 50 CAPSULE, EXTENDED RELEASE ORAL at 06:20

## 2023-05-15 RX ADMIN — Medication 75 MILLIGRAM(S): at 14:23

## 2023-05-15 RX ADMIN — OXYCODONE HYDROCHLORIDE 20 MILLIGRAM(S): 5 TABLET ORAL at 23:54

## 2023-05-15 RX ADMIN — AMLODIPINE BESYLATE 5 MILLIGRAM(S): 2.5 TABLET ORAL at 05:52

## 2023-05-15 RX ADMIN — Medication 100 MILLIGRAM(S): at 22:15

## 2023-05-15 RX ADMIN — SENNA PLUS 2 TABLET(S): 8.6 TABLET ORAL at 22:15

## 2023-05-15 RX ADMIN — Medication 1000 MILLIGRAM(S): at 11:42

## 2023-05-15 RX ADMIN — ALBUTEROL 2 PUFF(S): 90 AEROSOL, METERED ORAL at 11:03

## 2023-05-15 NOTE — PROGRESS NOTE ADULT - SUBJECTIVE AND OBJECTIVE BOX
NP Note discussed with  Primary Attending    INTERVAL HPI/OVERNIGHT EVENTS: none noted     MEDICATIONS  (STANDING):  amLODIPine   Tablet 5 milliGRAM(s) Oral daily  aspirin  chewable 81 milliGRAM(s) Oral daily  atorvastatin 80 milliGRAM(s) Oral at bedtime  ceFAZolin   IVPB 2000 milliGRAM(s) IV Intermittent every 8 hours  ceFAZolin   IVPB      clopidogrel Tablet 75 milliGRAM(s) Oral daily  DULoxetine 60 milliGRAM(s) Oral daily  furosemide    Tablet 20 milliGRAM(s) Oral daily  metoprolol tartrate 25 milliGRAM(s) Oral two times a day  pregabalin 75 milliGRAM(s) Oral three times a day  senna 2 Tablet(s) Oral at bedtime  sodium chloride 0.9% lock flush 3 milliLiter(s) IV Push every 8 hours  tamsulosin 0.4 milliGRAM(s) Oral at bedtime    MEDICATIONS  (PRN):  acetaminophen     Tablet .. 650 milliGRAM(s) Oral every 6 hours PRN Temp greater or equal to 38C (100.4F), Mild Pain (1 - 3)  baclofen 5 milliGRAM(s) Oral every 12 hours PRN Muscle Spasm  melatonin 3 milliGRAM(s) Oral at bedtime PRN Insomnia  morphine  - Injectable 1 milliGRAM(s) IV Push every 6 hours PRN Severe Pain (7 - 10)  oxyCODONE    IR 15 milliGRAM(s) Oral every 8 hours PRN Moderate Pain (4 - 6)      __________________________________________________  REVIEW OF SYSTEMS:    CONSTITUTIONAL: No fever,   EYES: no acute visual disturbances  NECK: No pain or stiffness  RESPIRATORY: No cough; No shortness of breath  CARDIOVASCULAR: No chest pain, no palpitations  GASTROINTESTINAL: No pain. No nausea or vomiting; No diarrhea   NEUROLOGICAL: No headache or numbness, no tremors  MUSCULOSKELETAL: back pain, not new   GENITOURINARY: no dysuria, no frequency, no hesitancy  PSYCHIATRY: no depression , no anxiety  ALL OTHER  ROS negative        Vital Signs Last 24 Hrs  T(C): 36.9 (12 May 2023 14:13), Max: 36.9 (12 May 2023 14:13)  T(F): 98.4 (12 May 2023 14:13), Max: 98.4 (12 May 2023 14:13)  HR: 81 (12 May 2023 14:13) (59 - 82)  BP: 133/63 (12 May 2023 14:13) (127/53 - 149/57)  BP(mean): --  RR: 20 (12 May 2023 14:13) (20 - 20)  SpO2: 92% (12 May 2023 14:13) (91% - 93%)    Parameters below as of 12 May 2023 14:13  Patient On (Oxygen Delivery Method): nasal cannula  O2 Flow (L/min): 2      ________________________________________________  PHYSICAL EXAM:  GENERAL: NAD  HEENT: Normocephalic;  conjunctivae and sclerae clear; moist mucous membranes; [place]  NECK : supple  CHEST/LUNG: Clear to auscultation bilaterally with fair air entry poor expiratory effort. on 2L.   HEART: S1 S2  regular; no murmurs, gallops or rubs  ABDOMEN: Soft, Nontender, Nondistended; Bowel sounds present  EXTREMITIES: no cyanosis; trace leg edema b/l ; no calf tenderness  : Cunningham in place + hematuria, no clots.   SKIN: warm and dry; left thigh redness/rash  NERVOUS SYSTEM:  Awake and alert; Oriented  to place, person and forgetful with time ; no new deficits    _________________________________________________  LABS:                        11.9   7.71  )-----------( 101      ( 12 May 2023 07:32 )             37.4     05-12    140  |  107  |  17  ----------------------------<  128<H>  3.5   |  29  |  0.87    Ca    8.3<L>      12 May 2023 10:00  Phos  2.7     05-12  Mg     1.4     05-12    TPro  7.8  /  Alb  3.0<L>  /  TBili  0.7  /  DBili  x   /  AST  48<H>  /  ALT  41  /  AlkPhos  76  05-11    PT/INR - ( 11 May 2023 02:23 )   PT: 19.7 sec;   INR: 1.65 ratio         PTT - ( 11 May 2023 02:23 )  PTT:41.3 sec    CAPILLARY BLOOD GLUCOSE    RADIOLOGY & ADDITIONAL TESTS:    Imaging  Reviewed:  YES    < from: CT Cervical Spine No Cont (05.11.23 @ 05:14) >    ACC: [id number] EXAM:  CT ABDOMEN AND PELVIS IC   ORDERED BY: FADUMO SANTOS     ACC: [id number] EXAM:  CT CHEST IC   ORDERED BY: FADUMO SANTOS     ACC: [id number] EXAM:  CT BRAIN   ORDERED BY: FADUMO SANTOS     ACC: [id number] EXAM:  CT CERVICALSPINE   ORDERED BY: FADUMO SANTOS     PROCEDURE DATE:  05/11/2023          INTERPRETATION:  CLINICAL INFORMATION: Back pain after fall. CHF,   hypertension, shortness of breath.    COMPARISON: Chest radiograph same date.    CONTRAST/COMPLICATIONS:  90 cc Omnipaque 350 administered intravenously for the chest abdomen   pelvis.    PROCEDURE:  CT head and cervical spine without intravenous contrast.  CT of the Chest, Abdomen and Pelvis with intravenous contrast.  Sagittal and coronal reformats were performed.    FINDINGS:    HEAD:  BRAIN: No apparent acute infarct, hemmorhage or mass. No hydrocephalus.   Chronic appearing white matter hypodensities and volume loss.  CALVARIUM: No skull fracture or agreesive osseous legions.  EXTRACRANIAL SOFTTISSUES: No acute findings.  VISUALIZED PORTIONS OF THE PARANASAL SINUSES AND MASTOID AIR CELLS: No   air fluid levels.    CERVICAL SPINE:  VERTEBRAE: No fracture or dislocation. Exaggeration of cervical lordosis.   Mild degenerative retrolisthesis of C3 on C4 and mild degenerative   anterolisthesis of C6 on C7 and C7 on T1.  SPINAL CANAL AND FORAMINA: Multilevel degenerative changes with no   apparent high grade spinal canal narrowing.  PARASPINAL SOFT TISSUES: No paravertebral hematoma or acutefinding.    CHEST:  LUNGS AND LARGE AIRWAYS: Low lung volumes with right greater than left   basilar opacities and mild elevation of the right hemidiaphragm. No   pulmonary nodules.  PLEURA: No pleural effusion or pneumothorax.  VESSELS: No evidence of injury to the heart and great vessels. No   thoracic aortic dissection or aneurysm.  HEART: Mild four-chamber cardiomegaly. No pericardial effusion.  MEDIASTINUM AND LUIS ALBERTO: No lymphadenopathy.  CHEST WALL AND LOWER NECK: Within normal limits.    ABDOMEN AND PELVIS:  LIVER: Within normal limits.  BILE DUCTS: Normal caliber.  GALLBLADDER: Within normal limits.  SPLEEN: No evidence of splenic injury or concerning finding. Small cystic   lesion upper spleen.  PANCREAS: Within normal limits.  ADRENALS: Within normal limits.  KIDNEYS/URETERS: No evidence of renal injury. No hydronephrosis or renal   stone or concerning mass. Small cysts bilaterally.    BLADDER: Within normal limits.  REPRODUCTIVE ORGANS: Radiation seeds in the prostate.    BOWEL: No bowel obstruction. Appendix is not visualized. No evidence of   inflammation in the pericecal region.  PERITONEUM: No ascites.  VESSELS: No abdominal aortic aneurysm. Calcific atherosclerosis   particularly of the distal abdominal aorta and iliac arteries.  RETROPERITONEUM/LYMPH NODES: No lymphadenopathy.  ABDOMINAL WALL: Large right lower quadrant anterior abdominal wall hernia   extending into the right inguinal region, not fully visible, contains the   cecum, much of the ascending colon, anddistal ileum. No evidence of   obstruction or other acute complication regarding this hernia.  BONES: Several thoracic and lumbar vertebral compression fractures but   with no discrete fracture line or adjacent edema. Mild depression of the   superior endplate of T1. Chronic moderate to severe compression T4   vertebral body. Mild compression T5, T6, T7, T8, T9, T10, T11 and T12   vertebral bodies. Chronic moderate compression of T3 vertebral body with   focal right scoliosis centered at this level. Status post posterior   fusion T2-sacroiliac. The hardware is intact. Bony demineralization.    IMPRESSION:  CT head: No evidence of intracranial injury or skull fracture.    CT cervical spine: No fracture or dislocation of the cervical spine.    CT Chest abdomen pelvis:  No evidence of acute traumatic injury.  Multilevel thoracic and lumbar vertebral compression fractures are   difficult to definitively age though there are no visible fracture lines   or adjacent edema to suggest acute injury. Comparison with priors would   be helpful.  Dependent densities in the lower lungs most likely atelectasis given the   low lung volumes. Superimposed pneumonia less likely.      --- End of Report ---             ALEX MACIEL MD; Attending Radiologist  This document has been electronically signed. May 11 2023  5:55AM    < end of copied text >    < from: Xray Chest 1 View-PORTABLE IMMEDIATE (Xray Chest 1 View-PORTABLE IMMEDIATE .) (05.11.23 @ 02:12) >    ACC: [id number] EXAM:  XR CHEST PORTABLE IMMED 1V   ORDERED BY: FADUMO SANTOS     PROCEDURE DATE:  05/11/2023          INTERPRETATION:  AP chest on May 11, 2023 at 1:40 AM. Patient is hypoxic.    COMPARISON: None available.    Shallow inspiration crowds the chest.    Heart magnified by technique.    Rather extensive thoracolumbar spinal hardware noted.    There are small bibasilar infiltrates.    IMPRESSION: Small bibasilar infiltrates. Spinal hardware.    --- End of Report ---    DEON MULLER MD; Attending Radiologist  This document has been electronically signed. May 11 2023  8:29AM    < end of copied text >  Consultant(s) Notes Reviewed:   YES      Plan of care was discussed with patient and /or primary care giver; all questions and concerns were addressed

## 2023-05-15 NOTE — PROGRESS NOTE ADULT - SUBJECTIVE AND OBJECTIVE BOX
Subjective  No complaints. Urine at bedside clear.    Objective    Vital signs  T(F): , Max: 98.1 (05-14-23 @ 13:36)  HR: 66 (05-15-23 @ 12:23)  BP: 101/61 (05-15-23 @ 12:23)  SpO2: 93% (05-15-23 @ 12:23)  Wt(kg): --    Output       Gen: NAD  Abd: soft, nontender, nondistended    Labs      05-15 @ 06:58    WBC 5.84  / Hct 43.1  / SCr 1.03     05-14 @ 07:43    WBC 5.67  / Hct 44.0  / SCr 0.93         Culture - Blood (collected 05-11-23 @ 06:45)  Source: .Blood Blood-Peripheral  Preliminary Report (05-12-23 @ 12:01):    No growth to date.    Culture - Blood (collected 05-11-23 @ 06:40)  Source: .Blood Blood-Peripheral  Preliminary Report (05-12-23 @ 12:01):    No growth to date.

## 2023-05-15 NOTE — CONSULT NOTE ADULT - PROBLEM SELECTOR RECOMMENDATION 9
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 + opioid dependent.   High risk medications reviewed. Avoid polypharmacy. Avoid IV opioids. Avoid NSAIDs and benzodiazepines. Non-pharmacological sleep aides initiated. Non-opioid medications and non-pharmacological pain management measures initiated.   Opioid pain recommendations   - Resume home medication- oxycodone 20mg PO q 6 hours PRN severe pain. Monitor for sedation/ respiratory depression.   Non-opioid pain recommendations   - Acetaminophen 1 gram PO q 8 hours x 3 days. Monitor LFTs  - Continue home dose baclofen 10mg PO BID.   - Continue home dose cymbalta 60mg PO daily.   - Continue home dose lyrica 75mg po q 8 hours. Monitor renal function.   - Continue Lidoderm 4% patch 2 patches daily.   Bowel Regimen  - Miralax 17G PO daily  - Continue Senna 2 tablets at bedtime for constipation  Mild pain (score 1-3)  - Non-pharmacological pain treatment recommendations  - Warm/ Cool packs PRN   - Repositioning extremity, elevation, imagery, relaxation, distraction.  - Physical therapy OOB if no contraindications   Recommendations discussed with primary team and RN

## 2023-05-15 NOTE — PROGRESS NOTE ADULT - SUBJECTIVE AND OBJECTIVE BOX
Time of Visit:  Patient seen and examined.     MEDICATIONS  (STANDING):  acetaminophen     Tablet .. 1000 milliGRAM(s) Oral every 8 hours  albuterol    90 MICROgram(s) HFA Inhaler 2 Puff(s) Inhalation every 6 hours  amLODIPine   Tablet 5 milliGRAM(s) Oral daily  aspirin  chewable 81 milliGRAM(s) Oral daily  atorvastatin 80 milliGRAM(s) Oral at bedtime  baclofen 10 milliGRAM(s) Oral every 12 hours  ceFAZolin   IVPB 2000 milliGRAM(s) IV Intermittent every 8 hours  ceFAZolin   IVPB      clopidogrel Tablet 75 milliGRAM(s) Oral daily  DULoxetine 60 milliGRAM(s) Oral daily  furosemide    Tablet 20 milliGRAM(s) Oral daily  lidocaine   4% Patch 2 Patch Transdermal daily  metoprolol tartrate 25 milliGRAM(s) Oral two times a day  pregabalin 75 milliGRAM(s) Oral three times a day  senna 2 Tablet(s) Oral at bedtime  sodium chloride 0.9% lock flush 3 milliLiter(s) IV Push every 8 hours  tamsulosin 0.4 milliGRAM(s) Oral at bedtime      MEDICATIONS  (PRN):  melatonin 3 milliGRAM(s) Oral at bedtime PRN Insomnia  oxyCODONE    IR 20 milliGRAM(s) Oral every 6 hours PRN Severe Pain (7 - 10)       Medications up to date at time of exam.      PHYSICAL EXAMINATION:  Vital Signs Last 24 Hrs  T(C): 36.5 (15 May 2023 05:20), Max: 36.7 (14 May 2023 13:36)  T(F): 97.7 (15 May 2023 05:20), Max: 98.1 (14 May 2023 13:36)  HR: 66 (15 May 2023 05:20) (64 - 82)  BP: 132/77 (15 May 2023 05:20) (120/69 - 165/72)  BP(mean): --  RR: 16 (15 May 2023 05:20) (16 - 18)  SpO2: 90% (15 May 2023 05:20) (90% - 94%)    Parameters below as of 15 May 2023 05:20  Patient On (Oxygen Delivery Method): nasal cannula  O2 Flow (L/min): 2     (if applicable)    General : Alert and oriented. Able to answer question with no SOB. No acute distress .     HEENT: Head is normocephalic and atraumatic. [place] . Extraocular muscles are intact. Mucous membranes are moist.     NECK: Supple, no palpable adenopathy.    LUNGS: Fair air entrance. Non labored. No wheezing. No use of accessory muscle.     HEART: S1 S2 Regular rate and no click / rub.     ABDOMEN: Soft, nontender, and nondistended.  Active bowel sounds.     ; No bladder distention and tenderness.     NEUROLOGIC: Awake, alert, oriented.     SKIN: Warm and moist . Non diaphoretic.       LABS:                        13.5   5.84  )-----------( 169      ( 15 May 2023 06:58 )             43.1     05-15    139  |  108  |  24<H>  ----------------------------<  128<H>  3.5   |  27  |  1.03    Ca    8.6      15 May 2023 06:58    MICROBIOLOGY: (if applicable)    RADIOLOGY & ADDITIONAL STUDIES:  EKG:   CXR:  ECHO:    IMPRESSION: 82y Male PAST MEDICAL & SURGICAL HISTORY:  Hypertension      Hyperlipidemia      Venous insufficiency    Impression: This is an 81 Y/O Male from [facility] presented to ED with s/p Mechanical Fall. Former smoker. Patient admitted for acute hypoxic respiratory  failure requiring supp O2 due to b/l dependent atelectasis with multiple thoracic and lumbar vertebral fx.  . I doubt PNA or PE at this time . He may have undiagnosed COPD . 05-11-23 Negative swab for Covid 19 and Negative Blood Cx x2.     Suggestion:  O2 saturation 90% room air at rest , O2 saturation 94% with O2 supplement. Continue Oxygen supplementation 2L NC for now then will monitor O2 saturation trend room air.   Continue Albuterol 90 mcg 2 Puffs Q 6 Hours .   On Cefazolin 2,000 Mg IVPB Q 8 Hours for Cellulitis  .   Incentive spirometry .  2 D ECHO .    Out patient  pulmo eval    No steroids    PT eval    Fall precaution    DVT GI prophylactic .     Lidocaine patch to back on x 12 Hours .

## 2023-05-15 NOTE — PROGRESS NOTE ADULT - PROBLEM SELECTOR PLAN 1
- Likely secondary to pain VS atelectasis.   - On admission saturating 88% on room air as per ED note.   - Saturating 91% on 2L at time of evaluation.  - Denies SOB and cough.   - CXR - Small bibasilar infiltrates.  - CT chest - Dependent densities in the lower lungs most likely atelectasis given the low lung volumes. Superimposed pneumonia less likely.  - Patient has history of CHF but is not in acute exacerbation.   - Incentive spirometry.   - Titrate off O2 as tolerated. OOB to chair  - pulm dr. Bailey consulted  reccs appreciated:   - Duoneb q6h   - 2 D ECHO   - Out pat pulmo eval   - No steroids for now  - PT eval

## 2023-05-15 NOTE — PROGRESS NOTE ADULT - PROBLEM SELECTOR PLAN 11
- Holding lovenox in setting of hematuria.   - SCDs    DC plan-from [facility].   PT consult pending eval  Titrate oxygen, monitor O2 sat room air.

## 2023-05-15 NOTE — PROGRESS NOTE ADULT - SUBJECTIVE AND OBJECTIVE BOX
INTERVAL HPI/OVERNIGHT EVENTS:  OPatient seen,events noticed,stable,episodic back pain  VITAL SIGNS:  T(F): 97 (05-15-23 @ 12:23)  HR: 90 (05-15-23 @ 17:40)  BP: 141/68 (05-15-23 @ 17:40)  RR: 17 (05-15-23 @ 12:23)  SpO2: 91% (05-15-23 @ 17:40)  Wt(kg): --    PHYSICAL EXAM:  awake  Constitutional:  Eyes:  ENMT:perrla  Neck:  Respiratory:clear  Cardiovascular:s1s2,m-none  Gastrointestinal:soft,bs pos  Extremities:pain in back area reprod with palpation  Vascular:  Neurological:no focal deficit  Musculoskeletal:    MEDICATIONS  (STANDING):  acetaminophen     Tablet .. 1000 milliGRAM(s) Oral every 8 hours  albuterol    90 MICROgram(s) HFA Inhaler 2 Puff(s) Inhalation every 6 hours  amLODIPine   Tablet 5 milliGRAM(s) Oral daily  aspirin  chewable 81 milliGRAM(s) Oral daily  atorvastatin 80 milliGRAM(s) Oral at bedtime  baclofen 10 milliGRAM(s) Oral every 12 hours  ceFAZolin   IVPB      ceFAZolin   IVPB 2000 milliGRAM(s) IV Intermittent every 8 hours  clopidogrel Tablet 75 milliGRAM(s) Oral daily  DULoxetine 60 milliGRAM(s) Oral daily  furosemide    Tablet 20 milliGRAM(s) Oral daily  lidocaine   4% Patch 2 Patch Transdermal daily  metoprolol tartrate 25 milliGRAM(s) Oral two times a day  polyethylene glycol 3350 17 Gram(s) Oral daily  pregabalin 75 milliGRAM(s) Oral three times a day  senna 2 Tablet(s) Oral at bedtime  sodium chloride 0.9% lock flush 3 milliLiter(s) IV Push every 8 hours  tamsulosin 0.4 milliGRAM(s) Oral at bedtime    MEDICATIONS  (PRN):  melatonin 3 milliGRAM(s) Oral at bedtime PRN Insomnia  oxyCODONE    IR 20 milliGRAM(s) Oral every 6 hours PRN Severe Pain (7 - 10)      Allergies    No Known Allergies    Intolerances        LABS:                        13.5   5.84  )-----------( 169      ( 15 May 2023 06:58 )             43.1     05-15    139  |  108  |  24<H>  ----------------------------<  128<H>  3.5   |  27  |  1.03    Ca    8.6      15 May 2023 06:58            RADIOLOGY & ADDITIONAL TESTS:        Assessment and Plan:   · Assessment	  Patient is a 83 y/[facility] M [facility] who walks with a walker and has PMH of HTN, HLD, venous insufficiency, CAD, BPH, CHF who presented s/p mechanical fall. Patient is admitted to medicine for AHRF 2/2 to atelectasis and mechanical fall.   CT Head, chest, spine no fx. Chest CT shows atelectasis. CXR shows small bibasilar infiltrates. pulmonary consulted.   on Oxygen therapy. PT consulted.   Pt with urinary retention, moreno placed, + hematuria. replaced today by urology.         Problem/Plan - 1:  ·  Problem: Acute respiratory failure with hypoxia.   ·  Plan: - Likely secondary to pain VS atelectasis.   - Titrate off O2 as tolerated. OOB to chair  - pulm dr. Bailey consulted  reccs appreciated:   - Duoneb q6h   - 2 D ECHO   - No steroids for now  - PT eval.appret for STR     Problem/Plan - 2:  ·  Problem: Fall, accidental.   ·  Plan: - Mechanical fall preceded by dizziness.   - PT consulted.   - F/U orthostatics.   - Tylenol for mild-moderate pain.   - Oxycodon for moderate pain.  - Morphine for severe pain.     Problem/Plan - 3:  ·  Problem: Cellulitis of left thigh.   ·  Plan: - Patient has left medial thigh warm, erythematous rash with is mildly tender to touch.   afebrile, no leukocytosis, hemodynamically stable.   - Started Cefazolin.   - Bcx negative  - F/U UA.     Problem/Plan - 4:  ·  Problem: Urinary retention.   ·  Plan: - Likely 2/2 to BPH. Patient reports needing to 'stand and pump his bladder' to urinate.  - Could not urinate in ED.   - s/p Moreno placement in ED holding  -replaced 5/12 with 16 Fr per urology as dysfunction of moreno    - C/w tamsulosin.  - urology consulted.     Problem/Plan - 5:  ·  Problem: Hematuria.   ·  Plan: - Patient developed hematuria a few hours after admission.   - Likely 2/2 to traumatic moreno insertion as multiple tries to place moreno was done.  - Holding lovenox in setting of hematuria.   - Monitor H/H.     Problem/Plan - 6:  ·  Problem: Venous insufficiency.   ·  Plan: Has bilateral edema with significant venous dermatitis.   C/w lasix 20mg.     Problem/Plan - 7:  ·  Problem: Chronic back pain.   ·  Plan: Takes Oxycodon 20 TID and oxycodon 10 OD.   Started on 15 TID PRN for severe pain.     Problem/Plan - 8:  ·  Problem: CAD (coronary artery disease).   ·  Plan: Patient takes aspirin 325 daily.   Started Aspirin 81 in hopital.   C/w with statin and plavix, metoprolol.     Problem/Plan - 9:  ·  Problem: Hypertension.   ·  Plan: C/w home dose of amlodipine 5mg.     Problem/Plan - 10:  ·  Problem: Hyperlipidemia.   ·  Plan; C/w atorvastatin.     Problem/Plan - 11:  ·  Problem: Prophylactic measure.   ·  Plan: - Holding lovenox in setting of hematuria.   - SCDs    DC plan-from [facility].   PT consult appret

## 2023-05-15 NOTE — PROGRESS NOTE ADULT - ASSESSMENT
83 y/o male admitted s/p mechanical fall with urinary retention     - Please perform and document PVR to ensure patient is not retaining urine  - If in retention, catheter will need to be replaced and patient should follow up outpatient with urology  - Monitor urine output  - Monitor urine color  - Continue flomax   - Care as per primary team  - Urology to sign off, please contact with any questions

## 2023-05-15 NOTE — PROGRESS NOTE ADULT - ASSESSMENT
Patient is a 81 y/o M from [facility] [employer] who walks with a walker and has PMH of HTN, HLD, venous insufficiency, CAD, BPH, CHF who presented s/p mechanical fall. Patient is admitted to medicine for AHRF 2/2 to atelectasis and mechanical fall.   CT Head, chest, spine no fx. Chest CT shows atelectasis. CXR shows small bibasilar infiltrates. pulmonary consulted.   on Oxygen therapy. PT consulted.   Pt with urinary retention, moreno placed, + hematuria. replaced today by urology.  5/15- pt presents with urinary retention, bladder scan showed 315 cc, moreno reinserted by urology

## 2023-05-15 NOTE — PROGRESS NOTE ADULT - PROBLEM SELECTOR PLAN 3
- Patient has left medial thigh warm, erythematous rash with is mildly tender to touch.   afebrile, no leukocytosis, hemodynamically stable.   - Started Cefazolin.   - Bcx negative  - F/U UA - Patient has left medial thigh warm, erythematous rash with is mildly tender to touch.   afebrile, no leukocytosis, hemodynamically stable.   - Cefazolin completed   - Bcx negative  - F/U UA

## 2023-05-16 ENCOUNTER — TRANSCRIPTION ENCOUNTER (OUTPATIENT)
Age: 83
End: 2023-05-16

## 2023-05-16 DIAGNOSIS — Z02.9 ENCOUNTER FOR ADMINISTRATIVE EXAMINATIONS, UNSPECIFIED: ICD-10-CM

## 2023-05-16 LAB
ANION GAP SERPL CALC-SCNC: 1 MMOL/L — LOW (ref 5–17)
BUN SERPL-MCNC: 30 MG/DL — HIGH (ref 7–18)
CALCIUM SERPL-MCNC: 8.4 MG/DL — SIGNIFICANT CHANGE UP (ref 8.4–10.5)
CHLORIDE SERPL-SCNC: 110 MMOL/L — HIGH (ref 96–108)
CO2 SERPL-SCNC: 30 MMOL/L — SIGNIFICANT CHANGE UP (ref 22–31)
CREAT SERPL-MCNC: 1.12 MG/DL — SIGNIFICANT CHANGE UP (ref 0.5–1.3)
CULTURE RESULTS: SIGNIFICANT CHANGE UP
CULTURE RESULTS: SIGNIFICANT CHANGE UP
EGFR: 65 ML/MIN/1.73M2 — SIGNIFICANT CHANGE UP
GLUCOSE SERPL-MCNC: 106 MG/DL — HIGH (ref 70–99)
HCT VFR BLD CALC: 42.1 % — SIGNIFICANT CHANGE UP (ref 39–50)
HGB BLD-MCNC: 13 G/DL — SIGNIFICANT CHANGE UP (ref 13–17)
MCHC RBC-ENTMCNC: 26.6 PG — LOW (ref 27–34)
MCHC RBC-ENTMCNC: 30.9 GM/DL — LOW (ref 32–36)
MCV RBC AUTO: 86.3 FL — SIGNIFICANT CHANGE UP (ref 80–100)
NRBC # BLD: 0 /100 WBCS — SIGNIFICANT CHANGE UP (ref 0–0)
PLATELET # BLD AUTO: 154 K/UL — SIGNIFICANT CHANGE UP (ref 150–400)
POTASSIUM SERPL-MCNC: 3.8 MMOL/L — SIGNIFICANT CHANGE UP (ref 3.5–5.3)
POTASSIUM SERPL-SCNC: 3.8 MMOL/L — SIGNIFICANT CHANGE UP (ref 3.5–5.3)
RBC # BLD: 4.88 M/UL — SIGNIFICANT CHANGE UP (ref 4.2–5.8)
RBC # FLD: 16.3 % — HIGH (ref 10.3–14.5)
SARS-COV-2 RNA SPEC QL NAA+PROBE: SIGNIFICANT CHANGE UP
SODIUM SERPL-SCNC: 141 MMOL/L — SIGNIFICANT CHANGE UP (ref 135–145)
SPECIMEN SOURCE: SIGNIFICANT CHANGE UP
SPECIMEN SOURCE: SIGNIFICANT CHANGE UP
WBC # BLD: 5.41 K/UL — SIGNIFICANT CHANGE UP (ref 3.8–10.5)
WBC # FLD AUTO: 5.41 K/UL — SIGNIFICANT CHANGE UP (ref 3.8–10.5)

## 2023-05-16 RX ADMIN — Medication 1000 MILLIGRAM(S): at 21:23

## 2023-05-16 RX ADMIN — SODIUM CHLORIDE 3 MILLILITER(S): 9 INJECTION INTRAMUSCULAR; INTRAVENOUS; SUBCUTANEOUS at 05:51

## 2023-05-16 RX ADMIN — Medication 1000 MILLIGRAM(S): at 06:15

## 2023-05-16 RX ADMIN — Medication 3 MILLIGRAM(S): at 21:22

## 2023-05-16 RX ADMIN — OXYCODONE HYDROCHLORIDE 20 MILLIGRAM(S): 5 TABLET ORAL at 13:50

## 2023-05-16 RX ADMIN — OXYCODONE HYDROCHLORIDE 20 MILLIGRAM(S): 5 TABLET ORAL at 00:32

## 2023-05-16 RX ADMIN — OXYCODONE HYDROCHLORIDE 20 MILLIGRAM(S): 5 TABLET ORAL at 21:22

## 2023-05-16 RX ADMIN — OXYCODONE HYDROCHLORIDE 20 MILLIGRAM(S): 5 TABLET ORAL at 12:59

## 2023-05-16 RX ADMIN — SENNA PLUS 2 TABLET(S): 8.6 TABLET ORAL at 21:22

## 2023-05-16 RX ADMIN — Medication 81 MILLIGRAM(S): at 12:59

## 2023-05-16 RX ADMIN — POLYETHYLENE GLYCOL 3350 17 GRAM(S): 17 POWDER, FOR SOLUTION ORAL at 13:00

## 2023-05-16 RX ADMIN — Medication 75 MILLIGRAM(S): at 06:15

## 2023-05-16 RX ADMIN — Medication 75 MILLIGRAM(S): at 13:00

## 2023-05-16 RX ADMIN — Medication 25 MILLIGRAM(S): at 06:15

## 2023-05-16 RX ADMIN — OXYCODONE HYDROCHLORIDE 20 MILLIGRAM(S): 5 TABLET ORAL at 06:37

## 2023-05-16 RX ADMIN — Medication 20 MILLIGRAM(S): at 06:15

## 2023-05-16 RX ADMIN — Medication 100 MILLIGRAM(S): at 06:15

## 2023-05-16 RX ADMIN — OXYCODONE HYDROCHLORIDE 20 MILLIGRAM(S): 5 TABLET ORAL at 07:07

## 2023-05-16 RX ADMIN — Medication 10 MILLIGRAM(S): at 06:15

## 2023-05-16 RX ADMIN — ALBUTEROL 2 PUFF(S): 90 AEROSOL, METERED ORAL at 21:22

## 2023-05-16 RX ADMIN — ALBUTEROL 2 PUFF(S): 90 AEROSOL, METERED ORAL at 09:45

## 2023-05-16 RX ADMIN — Medication 75 MILLIGRAM(S): at 21:29

## 2023-05-16 RX ADMIN — TAMSULOSIN HYDROCHLORIDE 0.4 MILLIGRAM(S): 0.4 CAPSULE ORAL at 21:22

## 2023-05-16 RX ADMIN — Medication 1000 MILLIGRAM(S): at 13:50

## 2023-05-16 RX ADMIN — SODIUM CHLORIDE 3 MILLILITER(S): 9 INJECTION INTRAMUSCULAR; INTRAVENOUS; SUBCUTANEOUS at 13:19

## 2023-05-16 RX ADMIN — OXYCODONE HYDROCHLORIDE 20 MILLIGRAM(S): 5 TABLET ORAL at 22:22

## 2023-05-16 RX ADMIN — SODIUM CHLORIDE 3 MILLILITER(S): 9 INJECTION INTRAMUSCULAR; INTRAVENOUS; SUBCUTANEOUS at 21:46

## 2023-05-16 RX ADMIN — ATORVASTATIN CALCIUM 80 MILLIGRAM(S): 80 TABLET, FILM COATED ORAL at 21:23

## 2023-05-16 RX ADMIN — Medication 25 MILLIGRAM(S): at 17:12

## 2023-05-16 RX ADMIN — Medication 1000 MILLIGRAM(S): at 13:00

## 2023-05-16 RX ADMIN — AMLODIPINE BESYLATE 5 MILLIGRAM(S): 2.5 TABLET ORAL at 06:15

## 2023-05-16 RX ADMIN — ALBUTEROL 2 PUFF(S): 90 AEROSOL, METERED ORAL at 14:01

## 2023-05-16 RX ADMIN — Medication 10 MILLIGRAM(S): at 17:12

## 2023-05-16 RX ADMIN — CLOPIDOGREL BISULFATE 75 MILLIGRAM(S): 75 TABLET, FILM COATED ORAL at 13:00

## 2023-05-16 RX ADMIN — DULOXETINE HYDROCHLORIDE 60 MILLIGRAM(S): 30 CAPSULE, DELAYED RELEASE ORAL at 13:00

## 2023-05-16 NOTE — PROGRESS NOTE ADULT - PROBLEM SELECTOR PLAN 3
- Patient has left medial thigh warm, erythematous rash with is mildly tender to touch.   -afebrile, no leukocytosis, hemodynamically stable.   - Cefazolin completed   - Bcx negative  - F/U UA

## 2023-05-16 NOTE — PROGRESS NOTE ADULT - PROBLEM SELECTOR PLAN 11
- Holding lovenox in setting of hematuria.   - SCDs  -DC plan-from [facility].   -PT consult pending eval  -unable to titrate oxygen to room air. - Holding lovenox in setting of hematuria.   - SCDs

## 2023-05-16 NOTE — PROGRESS NOTE ADULT - PROBLEM SELECTOR PLAN 12
-pending auth to Margaret Tietz -pending auth to Margaret Tietz  -PT consult recs ALBERTA  -unable to titrate oxygen to room air, pt to be discharged with oxygen and moreno

## 2023-05-16 NOTE — PROGRESS NOTE ADULT - ASSESSMENT
Patient is a 83 y/o M from [facility] [place], [facility] who walks with a walker and has PMH of HTN, HLD, venous insufficiency, CAD, BPH, CHF who presented s/p mechanical fall. Patient is admitted to medicine for AHRF 2/2 to atelectasis and mechanical fall.   CT Head, chest, spine no fx. Chest CT shows atelectasis. CXR shows small bibasilar infiltrates. pulmonary consulted.   on Oxygen therapy. PT consulted.   Pt with urinary retention, moreno placed, + hematuria. replaced today by urology.  5/15- pt presents with urinary retention, bladder scan showed 315 cc, moreno reinserted by urology    5/16- medically clear to be [facility] to [facility], pending auth  Patient is a 81 y/o M from [facility] [employer], [facility] who walks with a walker and has PMH of HTN, HLD, venous insufficiency, CAD, BPH, CHF who presented s/p mechanical fall. Patient is admitted to medicine for AHRF 2/2 to atelectasis and mechanical fall.   CT Head, chest, spine no fx. Chest CT shows atelectasis. CXR shows small bibasilar infiltrates. pulmonary consulted.   on Oxygen therapy. PT consulted.   Pt with urinary retention, moreno placed, + hematuria. replaced today by urology during admission, Pt given TOV, failed, urinary retention, moreno placed on 5/15 by urology   5/15- pt presents with urinary retention, bladder scan showed 315 cc, moreno reinserted by urology    5/16- medically clear to be discharged to [facility], pending auth

## 2023-05-16 NOTE — PROGRESS NOTE ADULT - PROBLEM SELECTOR PLAN 1
- Likely secondary to pain VS atelectasis.   - On admission saturating 88% on room air as per ED note.   - CXR - Small bibasilar infiltrates.  - CT chest - Dependent densities in the lower lungs most likely atelectasis given the low lung volumes. Superimposed pneumonia less likely.  - Patient has history of CHF but is not in acute exacerbation.   - pulm dr. Bailey consulted  reccs appreciated:   - Out pt pulmo f/u

## 2023-05-16 NOTE — PROGRESS NOTE ADULT - SUBJECTIVE AND OBJECTIVE BOX
INTERVAL HPI/OVERNIGHT EVENTS:  Patient seen,events noticed for overnight,stable  VITAL SIGNS:  T(F): 97.4 (05-16-23 @ 05:09)  HR: 61 (05-16-23 @ 05:09)  BP: 132/66 (05-16-23 @ 05:09)  RR: 18 (05-16-23 @ 05:09)  SpO2: 93% (05-16-23 @ 05:09)  Wt(kg): --    PHYSICAL EXAM:  awake,alert  Constitutional:  Eyes:  ENMT:perrla  Neck:  Respiratory:clear  Cardiovascular:s1s2,m-none  Gastrointestinal:soft,bs pos  Extremities:  Vascular:  Neurological:no focal deficit  Musculoskeletal:    MEDICATIONS  (STANDING):  acetaminophen     Tablet .. 1000 milliGRAM(s) Oral every 8 hours  albuterol    90 MICROgram(s) HFA Inhaler 2 Puff(s) Inhalation every 6 hours  amLODIPine   Tablet 5 milliGRAM(s) Oral daily  aspirin  chewable 81 milliGRAM(s) Oral daily  atorvastatin 80 milliGRAM(s) Oral at bedtime  baclofen 10 milliGRAM(s) Oral every 12 hours  clopidogrel Tablet 75 milliGRAM(s) Oral daily  DULoxetine 60 milliGRAM(s) Oral daily  furosemide    Tablet 20 milliGRAM(s) Oral daily  lidocaine   4% Patch 2 Patch Transdermal daily  metoprolol tartrate 25 milliGRAM(s) Oral two times a day  polyethylene glycol 3350 17 Gram(s) Oral daily  pregabalin 75 milliGRAM(s) Oral three times a day  senna 2 Tablet(s) Oral at bedtime  sodium chloride 0.9% lock flush 3 milliLiter(s) IV Push every 8 hours  tamsulosin 0.4 milliGRAM(s) Oral at bedtime    MEDICATIONS  (PRN):  melatonin 3 milliGRAM(s) Oral at bedtime PRN Insomnia  oxyCODONE    IR 20 milliGRAM(s) Oral every 6 hours PRN Severe Pain (7 - 10)      Allergies    No Known Allergies    Intolerances        LABS:                        13.0   5.41  )-----------( 154      ( 16 May 2023 07:13 )             42.1     05-16    141  |  110<H>  |  30<H>  ----------------------------<  106<H>  3.8   |  30  |  1.12    Ca    8.4      16 May 2023 07:13            RADIOLOGY & ADDITIONAL TESTS:      Assessment and Plan:   · Assessment	  Patient is a 83 y/o M from [facility] [employer], [facility] who walks with a walker and has PMH of HTN, HLD, venous insufficiency, CAD, BPH, CHF who presented s/p mechanical fall. Patient is admitted to medicine for AHRF 2/2 to atelectasis and mechanical fall.   CT Head, chest, spine no fx. Chest CT shows atelectasis. CXR shows small bibasilar infiltrates. pulmonary consulted.   on Oxygen therapy. PT consulted.   Pt with urinary retention, moreno placed, + hematuria. replaced today by urology.         Problem/Plan - 1:  ·  Problem: Acute respiratory failure with hypoxia-stable clinically  ·  Plan: - Likely secondary to pain VS atelectasis.   - Titrate off O2 as tolerated. OOB to chair  - pulm dr. Bailey consulted  reccs appreciated:   - Duoneb q6h   - 2 D ECHO   - No steroids for now  - PT eval.[facility]ret for [facility]     Problem/Plan - 2:  ·  Problem: Fall, accidental.   ·  Plan: - Mechanical fall preceded by dizziness.   - PT consulted.   - F/U orthostatics.   - Tylenol for mild-moderate pain.   - Oxycodon for moderate pain.  - Morphine for severe pain.     Problem/Plan - 3:  ·  Problem: Cellulitis of left thigh.   ·-stablelclinically  - Started Cefazolin.   - Bcx negative  - F/U UA.     Problem/Plan - 4:  ·  Problem: Urinary retention.   ·  Plan: - Likely 2/2 to BPH. Patient reports needing to 'stand and pump his bladder' to urinate.  -replaced 5/12 with 16 Fr per urology as dysfunction of moreno    - C/w tamsulosin.  - urology consulted.     Problem/Plan - 5:  ·  Problem: Hematuria-resolved  -h/h stable     Problem/Plan - 6:  ·  Problem: Venous insufficiency.   ·  Plan: Has bilateral edema with significant venous dermatitis.   C/w lasix 20mg.     Problem/Plan - 7:  ·  Problem: Chronic back pain.   ·  Plan: Takes Oxycodon 20 TID and oxycodon 10 OD.   Started on 15 TID PRN for severe pain.     Problem/Plan - 8:  ·  Problem: CAD (coronary artery disease).   ·  Plan: Patient takes aspirin 325 daily.   Started Aspirin 81 in hopital.   C/w with statin and plavix, metoprolol.     Problem/Plan - 9:  ·  Problem: Hypertension.   ·  Plan: C/w home dose of amlodipine 5mg.     Problem/Plan - 10:  ·  Problem: Hyperlipidemia.   ·  Plan; C/w atorvastatin.     Problem/Plan - 11:  ·  Problem: Prophylactic measure.   ·  Plan: D/C planning to [facility] - [facility]

## 2023-05-16 NOTE — DISCHARGE NOTE PROVIDER - NSDCMRMEDTOKEN_GEN_ALL_CORE_FT
amLODIPine 5 mg oral tablet: 1 tab(s) orally once a day  aspirin 325 mg oral capsule: 1 tab(s) orally once a day  atorvastatin 80 mg oral tablet: 1 tab(s) orally once a day  baclofen 10 mg oral tablet: 1 tab(s) orally 2 times a day  Colace 100 mg oral capsule: 2 tab(s) orally once a day (at bedtime)  Cymbalta 60 mg oral delayed release capsule: 1 tab(s) orally once a day  famotidine 20 mg oral tablet: 1 tab(s) orally once a day  ferrous fumarate 350 mg (115 mg elemental iron) oral tablet: 1 tab(s) orally 2 times a day  furosemide 20 mg oral tablet: 1 tab(s) orally once a day  Lyrica 75 mg oral capsule: 1 tab(s) orally 3 times a day  metoprolol tartrate 25 mg oral tablet: 1 tab(s) orally 2 times a day  oxyCODONE 10 mg oral tablet: 1 tab(s) orally once a day Takes at 5pm  oxyCODONE 20 mg oral tablet: 1 tab(s) orally 3 times a day 7am, 12pm, 8pm  Plavix 75 mg oral tablet: 1 tab(s) orally once a day  tamsulosin 0.4 mg oral capsule: 1 cap(s) orally once a day (at bedtime)

## 2023-05-16 NOTE — DISCHARGE NOTE PROVIDER - CARE PROVIDER_API CALL
Esa Herring  INTERNAL MEDICINE  [address], [address]  [place] [address]  Phone: [phone]  Fax: [phone]  Follow Up Time: 1 week

## 2023-05-16 NOTE — DISCHARGE NOTE PROVIDER - NSDCCPCAREPLAN_GEN_ALL_CORE_FT
PRINCIPAL DISCHARGE DIAGNOSIS  Diagnosis: Acute respiratory failure with hypoxia  Assessment and Plan of Treatment: You presented with respiratory distress and were noted with low oxygenation.  Your chest X-ray showed right atelectasis and possible trace right pleural effusion.  You were followed by pulmonologist and treated with supplemental Oxygen and bronchodilation.      SECONDARY DISCHARGE DIAGNOSES  Diagnosis: YUNIOR (acute kidney injury)  Assessment and Plan of Treatment: Your kidney function noted impaired likely due to urinary retention/hematuria/dehydration.  You were started on intravenous fluids with serum creatinine..........  -Follow up with primary provider for continued kidney function monitoring  -Maintain adequate hydration  -Avoid nephrotoxic medications    Diagnosis: Hematuria  Assessment and Plan of Treatment: You required moreno catheter placement due to urinary retention after which you developed bloody urine with clots.  Your moreno was irrigated with resolution of bloody urine.  Your moreno was then removed for a trial of void but you failed and required replacement of moreno with recurrent bloody urine.  You were followed by urology and underwent continous bladder irrigation with clearing of hematuria.  As per urology....................................    Diagnosis: Opioid dependence  Assessment and Plan of Treatment: due to severe back pain due to thoracic and lumbar compression fractures.  You were followed by pain management with close monitoring of your pain level and adjustment of your analgesia per your requirements,  -Analgesia as prescribed  -Bowel regimen     PRINCIPAL DISCHARGE DIAGNOSIS  Diagnosis: Acute respiratory failure with hypoxia  Assessment and Plan of Treatment: You presented with respiratory distress and were noted with low oxygenation.  Your chest X-ray showed right atelectasis and possible trace right pleural effusion.  You were followed by pulmonologist and treated with supplemental Oxygen and bronchodilation.      SECONDARY DISCHARGE DIAGNOSES  Diagnosis: Hematuria  Assessment and Plan of Treatment: You required moreno catheter placement due to urinary retention after which you developed bloody urine with clots.  Your moreno was irrigated with resolution of bloody urine.  Your moreno was then removed for a trial of void but you failed and required replacement of moreno with recurrent bloody urine.  You were followed by urology and underwent continous bladder irrigation with clearing of hematuria.  As per urology patient to be discharge with indwelling catheter and urology follow up.    Diagnosis: YUNIOR (acute kidney injury)  Assessment and Plan of Treatment: Your kidney function noted impaired likely due to urinary retention/hematuria/dehydration.  You were started on intravenous fluids with serum creatinine monitoring  -Follow up with primary provider for continued kidney function monitoring  -Maintain adequate hydration  -Avoid nephrotoxic medications    Diagnosis: Opioid dependence  Assessment and Plan of Treatment: due to severe back pain due to thoracic and lumbar compression fractures.  You were followed by pain management with close monitoring of your pain level and adjustment of your analgesia per your requirements,  -Analgesia as prescribed  -Bowel regimen

## 2023-05-16 NOTE — PROGRESS NOTE ADULT - SUBJECTIVE AND OBJECTIVE BOX
Patient is a 82y old  Male who presents with a chief complaint of Mechanical fall (16 May 2023 09:07)      OVERNIGHT EVENTS: none noted     REVIEW OF SYSTEMS:  CONSTITUTIONAL: No fever, chills  RESPIRATORY: No cough, SOB  CARDIOVASCULAR: No chest pain, palpitations  GASTROINTESTINAL: No abdominal pain. No nausea, vomiting, or diarrhea  GENITOURINARY: No dysuria  NEUROLOGICAL: No HA      T(C): 36.3 (05-16-23 @ 05:09), Max: 36.6 (05-15-23 @ 20:52)  HR: 61 (05-16-23 @ 05:09) (61 - 90)  BP: 132/66 (05-16-23 @ 05:09) (132/66 - 147/65)  RR: 18 (05-16-23 @ 05:09) (17 - 18)  SpO2: 93% (05-16-23 @ 05:09) (91% - 93%)  Wt(kg): --Vital Signs Last 24 Hrs  T(C): 36.3 (16 May 2023 05:09), Max: 36.6 (15 May 2023 20:52)  T(F): 97.4 (16 May 2023 05:09), Max: 97.8 (15 May 2023 20:52)  HR: 61 (16 May 2023 05:09) (61 - 90)  BP: 132/66 (16 May 2023 05:09) (132/66 - 147/65)  BP(mean): --  RR: 18 (16 May 2023 05:09) (17 - 18)  SpO2: 93% (16 May 2023 05:09) (91% - 93%)    Parameters below as of 16 May 2023 05:09  Patient On (Oxygen Delivery Method): nasal cannula  O2 Flow (L/min): 2        PHYSICAL EXAM:  GENERAL: NAD  CHEST/LUNG: Clear to auscultation bilaterally; No rales, rhonchi, wheezing, or rubs  HEART: S1, S2, Regular rate and rhythm  ABDOMEN: Soft, Nontender, Nondistended; Bowel sounds present  NEURO: Alert & Oriented X3    Consultant(s) Notes Reviewed:  [x ] YES  [ ] NO  Care Discussed with Consultants/Other Providers [ x] YES  [ ] NO  LABS:                        13.0   5.41  )-----------( 154      ( 16 May 2023 07:13 )             42.1     05-16    141  |  110<H>  |  30<H>  ----------------------------<  106<H>  3.8   |  30  |  1.12    Ca    8.4      16 May 2023 07:13        CAPILLARY BLOOD GLUCOSE              RADIOLOGY & ADDITIONAL TESTS:  Imaging Personally Reviewed:  [ ] YES  [ ] NO

## 2023-05-16 NOTE — PROGRESS NOTE ADULT - PROBLEM SELECTOR PLAN 2
- Mechanical fall preceded by dizziness.   - CT head: No evidence of intracranial injury or skull fracture.  - CT spine: No fracture or dislocation of the cervical spine.  - CT chest: No evidence of acute traumatic injury. Multilevel thoracic and lumbar vertebral compression fractures are difficult to definitively age though there are no visible fracture lines or adjacent edema to suggest acute injury.  - PT recs ALBERTA

## 2023-05-16 NOTE — DISCHARGE NOTE PROVIDER - HOSPITAL COURSE
Patient is a 83 y/o M from [facility] [employer], [facility] who walks with a walker and has PMH of HTN, HLD, venous insufficiency, CAD, BPH, CHF who presented s/p mechanical fall. Patient is admitted to medicine for AHRF 2/2 to atelectasis and mechanical fall.   CT Head, chest, spine no fx. Chest CT shows atelectasis. CXR shows small bibasilar infiltrates. pulmonary consulted.   on Oxygen therapy. PT consulted.   Pt with urinary retention, moreno placed, + hematuria. replaced today by urology during admission, Pt given TOV, failed, urinary retention, moreno placed on 5/15 by urology   5/15- pt presents with urinary retention, bladder scan showed 315 cc, moreno reinserted by urology    5/16- medically clear to be [facility], pending auth Patient is a 81 y/o M from [facility] [employer], [facility] who walks with a walker and has PMH of HTN, HLD, venous insufficiency, CAD, BPH, CHF who presented s/p mechanical fall. Patient is admitted to medicine for AHRF 2/2 to atelectasis and mechanical fall.   CT Head, chest, spine no fx. Chest CT shows atelectasis. CXR shows small bibasilar infiltrates. pulmonary consulted.   on Oxygen therapy.   Hospital course complicated by urinary retention requiring moreno placement after which pt. developed hematuria. Urology consulted as pt failed TOV,  moreno replaced by urology.  Pt. with recurrent episodes of hematuria with clots resistant to irrigation, 6 eye moreno placed by urology with CBI successful CBI irrigation to clear.  CBI clamped 5/23, clear yellow urine draining.  6 eye moreno to be changed by urology, pt. may need to discharge with moreno in place.  Course of hospitalization further complicated by YUNIOR likely due to urinary retention/hematuria/dehydration, started on IVF with..............    INCOMPLETE Patient is a 81 y/o M from [facility] [employer], [facility] who walks with a walker and has PMH of HTN, HLD, venous insufficiency, CAD, BPH, CHF who presented s/p mechanical fall. Patient is admitted to medicine for AHRF 2/2 to atelectasis and mechanical fall.   CT Head, chest, spine no fx. Pulmonary consulted, chest CT shows atelectasis, CXR shows small bibasilar infiltrates.  Noted to have erythema to upper thigh and complete course of IV abxCT Head, chest, spine no fx. Chest CT  required nasal cannula, now tolerating room air.   Hospital course complicated by urinary retention requiring moreno placement after which pt. developed hematuria. Urology consulted as pt failed TOV,  Moreno replaced by urology.  Pt. with recurrent episodes of hematuria with clots resistant to irrigation, 6 eye Moreno placed by urology with CBI successful CBI irrigation to clear.  CBI clamped 5/23, clear yellow urine draining.  6 eye Moreno to be changed by urology, pt. may need to discharge with moreno in place.  Course of hospitalization further complicated by YUNIOR likely due to urinary retention/hematuria/dehydration. CBI clamped and Moreno with clear yellow urine.  Pt declined to have moreno downsize and no objection from Urology standpoint to d/c with current Moreno.  D/C planning ongoing and CM assisting, negative screening for Covid.      Given patient's improved clinical status and current hemodynamic stability, decision was made to discharge.  Please refer to patient's complete medical chart with documents for a full hospital course, for this is only a brief summary.

## 2023-05-16 NOTE — PROGRESS NOTE ADULT - PROBLEM SELECTOR PLAN 4
- Likely 2/2 to BPH. Patient reports needing to 'stand and pump his bladder' to urinate.  -moreno in place   - C/w tamsulosin.  - urology following - Likely 2/2 to BPH. Patient reports needing to 'stand and pump his bladder' to urinate.  -moreno in place by urology on 5/15  - C/w tamsulosin.  - urology following

## 2023-05-17 LAB
ANION GAP SERPL CALC-SCNC: 3 MMOL/L — LOW (ref 5–17)
BUN SERPL-MCNC: 25 MG/DL — HIGH (ref 7–18)
CALCIUM SERPL-MCNC: 8.4 MG/DL — SIGNIFICANT CHANGE UP (ref 8.4–10.5)
CHLORIDE SERPL-SCNC: 112 MMOL/L — HIGH (ref 96–108)
CO2 SERPL-SCNC: 28 MMOL/L — SIGNIFICANT CHANGE UP (ref 22–31)
CREAT SERPL-MCNC: 0.82 MG/DL — SIGNIFICANT CHANGE UP (ref 0.5–1.3)
EGFR: 87 ML/MIN/1.73M2 — SIGNIFICANT CHANGE UP
GLUCOSE SERPL-MCNC: 101 MG/DL — HIGH (ref 70–99)
HCT VFR BLD CALC: 41.1 % — SIGNIFICANT CHANGE UP (ref 39–50)
HGB BLD-MCNC: 12.8 G/DL — LOW (ref 13–17)
MCHC RBC-ENTMCNC: 26.2 PG — LOW (ref 27–34)
MCHC RBC-ENTMCNC: 31.1 GM/DL — LOW (ref 32–36)
MCV RBC AUTO: 84.2 FL — SIGNIFICANT CHANGE UP (ref 80–100)
NRBC # BLD: 0 /100 WBCS — SIGNIFICANT CHANGE UP (ref 0–0)
PLATELET # BLD AUTO: 168 K/UL — SIGNIFICANT CHANGE UP (ref 150–400)
POTASSIUM SERPL-MCNC: 4.1 MMOL/L — SIGNIFICANT CHANGE UP (ref 3.5–5.3)
POTASSIUM SERPL-SCNC: 4.1 MMOL/L — SIGNIFICANT CHANGE UP (ref 3.5–5.3)
RBC # BLD: 4.88 M/UL — SIGNIFICANT CHANGE UP (ref 4.2–5.8)
RBC # FLD: 16.6 % — HIGH (ref 10.3–14.5)
SODIUM SERPL-SCNC: 143 MMOL/L — SIGNIFICANT CHANGE UP (ref 135–145)
WBC # BLD: 5.82 K/UL — SIGNIFICANT CHANGE UP (ref 3.8–10.5)
WBC # FLD AUTO: 5.82 K/UL — SIGNIFICANT CHANGE UP (ref 3.8–10.5)

## 2023-05-17 PROCEDURE — 99232 SBSQ HOSP IP/OBS MODERATE 35: CPT

## 2023-05-17 RX ADMIN — Medication 1000 MILLIGRAM(S): at 21:37

## 2023-05-17 RX ADMIN — OXYCODONE HYDROCHLORIDE 20 MILLIGRAM(S): 5 TABLET ORAL at 23:45

## 2023-05-17 RX ADMIN — OXYCODONE HYDROCHLORIDE 20 MILLIGRAM(S): 5 TABLET ORAL at 11:58

## 2023-05-17 RX ADMIN — OXYCODONE HYDROCHLORIDE 20 MILLIGRAM(S): 5 TABLET ORAL at 18:00

## 2023-05-17 RX ADMIN — OXYCODONE HYDROCHLORIDE 20 MILLIGRAM(S): 5 TABLET ORAL at 18:30

## 2023-05-17 RX ADMIN — Medication 75 MILLIGRAM(S): at 13:11

## 2023-05-17 RX ADMIN — SENNA PLUS 2 TABLET(S): 8.6 TABLET ORAL at 21:38

## 2023-05-17 RX ADMIN — Medication 1000 MILLIGRAM(S): at 13:42

## 2023-05-17 RX ADMIN — AMLODIPINE BESYLATE 5 MILLIGRAM(S): 2.5 TABLET ORAL at 05:27

## 2023-05-17 RX ADMIN — Medication 1000 MILLIGRAM(S): at 22:37

## 2023-05-17 RX ADMIN — Medication 20 MILLIGRAM(S): at 05:28

## 2023-05-17 RX ADMIN — Medication 1000 MILLIGRAM(S): at 13:12

## 2023-05-17 RX ADMIN — Medication 10 MILLIGRAM(S): at 17:59

## 2023-05-17 RX ADMIN — Medication 25 MILLIGRAM(S): at 18:00

## 2023-05-17 RX ADMIN — Medication 10 MILLIGRAM(S): at 05:27

## 2023-05-17 RX ADMIN — OXYCODONE HYDROCHLORIDE 20 MILLIGRAM(S): 5 TABLET ORAL at 12:58

## 2023-05-17 RX ADMIN — SODIUM CHLORIDE 3 MILLILITER(S): 9 INJECTION INTRAMUSCULAR; INTRAVENOUS; SUBCUTANEOUS at 22:38

## 2023-05-17 RX ADMIN — Medication 75 MILLIGRAM(S): at 21:38

## 2023-05-17 RX ADMIN — CLOPIDOGREL BISULFATE 75 MILLIGRAM(S): 75 TABLET, FILM COATED ORAL at 13:12

## 2023-05-17 RX ADMIN — ATORVASTATIN CALCIUM 80 MILLIGRAM(S): 80 TABLET, FILM COATED ORAL at 21:38

## 2023-05-17 RX ADMIN — Medication 75 MILLIGRAM(S): at 05:27

## 2023-05-17 RX ADMIN — Medication 1000 MILLIGRAM(S): at 05:34

## 2023-05-17 RX ADMIN — ALBUTEROL 2 PUFF(S): 90 AEROSOL, METERED ORAL at 08:32

## 2023-05-17 RX ADMIN — SODIUM CHLORIDE 3 MILLILITER(S): 9 INJECTION INTRAMUSCULAR; INTRAVENOUS; SUBCUTANEOUS at 13:06

## 2023-05-17 RX ADMIN — Medication 25 MILLIGRAM(S): at 05:28

## 2023-05-17 RX ADMIN — TAMSULOSIN HYDROCHLORIDE 0.4 MILLIGRAM(S): 0.4 CAPSULE ORAL at 21:38

## 2023-05-17 RX ADMIN — SODIUM CHLORIDE 3 MILLILITER(S): 9 INJECTION INTRAMUSCULAR; INTRAVENOUS; SUBCUTANEOUS at 05:34

## 2023-05-17 RX ADMIN — ALBUTEROL 2 PUFF(S): 90 AEROSOL, METERED ORAL at 21:38

## 2023-05-17 RX ADMIN — Medication 81 MILLIGRAM(S): at 13:12

## 2023-05-17 RX ADMIN — OXYCODONE HYDROCHLORIDE 20 MILLIGRAM(S): 5 TABLET ORAL at 06:21

## 2023-05-17 RX ADMIN — DULOXETINE HYDROCHLORIDE 60 MILLIGRAM(S): 30 CAPSULE, DELAYED RELEASE ORAL at 13:12

## 2023-05-17 RX ADMIN — Medication 1000 MILLIGRAM(S): at 05:28

## 2023-05-17 RX ADMIN — OXYCODONE HYDROCHLORIDE 20 MILLIGRAM(S): 5 TABLET ORAL at 05:28

## 2023-05-17 NOTE — PROGRESS NOTE ADULT - PROBLEM SELECTOR PLAN 1
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 + opioid dependent.   High risk medications reviewed. Avoid polypharmacy. Avoid IV opioids. Avoid NSAIDs and benzodiazepines. Non-pharmacological sleep aides initiated. Non-opioid medications and non-pharmacological pain management measures initiated.   Opioid pain recommendations   - Continue home medication- oxycodone 20mg PO q 6 hours PRN severe pain. Monitor for sedation/ respiratory depression.   Non-opioid pain recommendations   - Continue Acetaminophen 1 gram PO q 8 hours x 3 days. Monitor LFTs  - Continue home dose baclofen 10mg PO BID.   - Continue home dose cymbalta 60mg PO daily.   - Continue home dose lyrica 75mg po q 8 hours. Monitor renal function.   - Continue Lidoderm 4% patch 2 patches daily.   Bowel Regimen  - Continue Miralax 17G PO daily  - Continue Senna 2 tablets at bedtime for constipation  Mild pain (score 1-3)  - Non-pharmacological pain treatment recommendations  - Warm/ Cool packs PRN   - Repositioning extremity, elevation, imagery, relaxation, distraction.  - Physical therapy OOB if no contraindications   Recommendations discussed with primary team and RN.

## 2023-05-17 NOTE — DIETITIAN INITIAL EVALUATION ADULT - PERTINENT LABORATORY DATA
05-17    143  |  112<H>  |  25<H>  ----------------------------<  101<H>  4.1   |  28  |  0.82    Ca    8.4      17 May 2023 07:35

## 2023-05-17 NOTE — DIETITIAN INITIAL EVALUATION ADULT - PROBLEM SELECTOR PLAN 5
- Patient developed hematuria a few hours after admission.   - Likely 2/2 to traumatic moreno insertion as multiple tries to place moreno was done.  - Holding lovenox in setting of hematuria.   - Monitor H/H.

## 2023-05-17 NOTE — DIETITIAN INITIAL EVALUATION ADULT - OTHER INFO
Pt visited. PT asleep. Observed  Lunch tray . PO intake Poor. Pt is from Adult Home. Bed scale 186 lb.  Labs noted. Pt with Pressure ulcer stage 1 @ L Buttocks. Pt may benefit from Nutritional Supplement. HT 66 inches, but Pt appears . estimated HT 5 feet 10 inches.

## 2023-05-17 NOTE — PROGRESS NOTE ADULT - PROBLEM SELECTOR PLAN 1
- Likely secondary to pain VS atelectasis.   - On admission saturating 88% on room air as per ED note.   - CXR - Small bibasilar infiltrates.  - CT chest - Dependent densities in the lower lungs most likely atelectasis given the low lung volumes. Superimposed pneumonia less likely.  - pulm dr. Bailey consulted  reccs appreciated: Continue Albuterol, oxygen, Incentive spirometry, Out pt pulmo f/u.

## 2023-05-17 NOTE — DIETITIAN INITIAL EVALUATION ADULT - PROBLEM SELECTOR PLAN 8
Patient takes aspirin 325 daily.   Started Aspirin 81 in hopital.   C/w with statin and plavix, metoprolol.

## 2023-05-17 NOTE — PROGRESS NOTE ADULT - ASSESSMENT
Patient is a 83 y/o M from [facility] [employer], [facility] who walks with a walker and has PMH of HTN, HLD, venous insufficiency, CAD, BPH, CHF who presented s/p mechanical fall. Patient is admitted to medicine for AHRF 2/2 to atelectasis and mechanical fall.   CT Head, chest, spine no fx. Chest CT shows atelectasis. CXR shows small bibasilar infiltrates. pulmonary consulted.   on Oxygen therapy. PT consulted.   Pt with urinary retention, moreno placed, + hematuria. replaced today by urology during admission, Pt given TOV, failed, urinary retention, moreno placed on 5/15 by urology   5/16- medically clear to be [facility], pending auth

## 2023-05-17 NOTE — DIETITIAN INITIAL EVALUATION ADULT - NSFNSPHYEXAMSKINFT_GEN_A_CORE
Pressure Injury 1: Left:, buttocks, Stage I  Pressure Injury 2: none, none  Pressure Injury 3: none, none  Pressure Injury 4: none, none  Pressure Injury 5: none, none  Pressure Injury 6: none, none  Pressure Injury 7: none, none  Pressure Injury 8: none, none  Pressure Injury 9: none, none  Pressure Injury 10: none, none  Pressure Injury 11: none, none

## 2023-05-17 NOTE — PROGRESS NOTE ADULT - SUBJECTIVE AND OBJECTIVE BOX
Patient is a 82y old  Male who presents with a chief complaint of Hypoxemia     (17 May 2023 14:30)      INTERVAL HPI/OVERNIGHT EVENTS: no overnight events    I&O's Summary    16 May 2023 07:01  -  17 May 2023 07:00  --------------------------------------------------------  IN: 0 mL / OUT: 1100 mL / NET: -1100 mL      Vital Signs Last 24 Hrs  T(C): 36.4 (17 May 2023 13:41), Max: 36.4 (17 May 2023 05:14)  T(F): 97.6 (17 May 2023 13:41), Max: 97.6 (17 May 2023 05:14)  HR: 64 (17 May 2023 13:41) (62 - 88)  BP: 136/54 (17 May 2023 13:41) (133/66 - 185/74)  BP(mean): --  RR: 18 (17 May 2023 13:41) (16 - 20)  SpO2: 94% (17 May 2023 13:41) (91% - 95%)    Parameters below as of 17 May 2023 13:41  Patient On (Oxygen Delivery Method): nasal cannula  O2 Flow (L/min): 2    PAST MEDICAL & SURGICAL HISTORY:  Hypertension      Hyperlipidemia      Venous insufficiency          SOCIAL HISTORY  Alcohol:  Tobacco:  Illicit substance use:      FAMILY HISTORY:      LABS:                        12.8   5.82  )-----------( 168      ( 17 May 2023 07:35 )             41.1     05-17    143  |  112<H>  |  25<H>  ----------------------------<  101<H>  4.1   |  28  |  0.82    Ca    8.4      17 May 2023 07:35          CAPILLARY BLOOD GLUCOSE                MEDICATIONS  (STANDING):  acetaminophen     Tablet .. 1000 milliGRAM(s) Oral every 8 hours  albuterol    90 MICROgram(s) HFA Inhaler 2 Puff(s) Inhalation every 6 hours  amLODIPine   Tablet 5 milliGRAM(s) Oral daily  aspirin  chewable 81 milliGRAM(s) Oral daily  atorvastatin 80 milliGRAM(s) Oral at bedtime  baclofen 10 milliGRAM(s) Oral every 12 hours  clopidogrel Tablet 75 milliGRAM(s) Oral daily  DULoxetine 60 milliGRAM(s) Oral daily  furosemide    Tablet 20 milliGRAM(s) Oral daily  lidocaine   4% Patch 2 Patch Transdermal daily  metoprolol tartrate 25 milliGRAM(s) Oral two times a day  polyethylene glycol 3350 17 Gram(s) Oral daily  pregabalin 75 milliGRAM(s) Oral three times a day  senna 2 Tablet(s) Oral at bedtime  sodium chloride 0.9% lock flush 3 milliLiter(s) IV Push every 8 hours  tamsulosin 0.4 milliGRAM(s) Oral at bedtime    MEDICATIONS  (PRN):  melatonin 3 milliGRAM(s) Oral at bedtime PRN Insomnia  oxyCODONE    IR 20 milliGRAM(s) Oral every 6 hours PRN Severe Pain (7 - 10)      REVIEW OF SYSTEMS:  CONSTITUTIONAL: No fever  EYES: No eye pain  ENMT:   No sinus or throat pain  NECK: No pain   RESPIRATORY: No shortness of breath  CARDIOVASCULAR: No chest pain, palpitations  GASTROINTESTINAL: No epigastric pain.   GENITOURINARY: No dysuria  NEUROLOGICAL: No headaches  SKIN: No rashes  MUSCULOSKELETAL: +, back, pain      RADIOLOGY & ADDITIONAL TESTS:  < from: Xray Chest 1 View- PORTABLE-Routine (Xray Chest 1 View- PORTABLE-Routine in AM.) (05.15.23 @ 09:33) >  ACC: [id number] EXAM:  XR CHEST PORTABLE ROUTINE 1V   ORDERED BY: TAVON ZULETA DATE:  05/15/2023          INTERPRETATION:  TIME OF EXAM: May 15, 2023 at 9:05 AM.    CLINICAL INFORMATION: Hypertension. Atelectasis.    COMPARISON:  APchest x-ray and CT scan of the chest from May 11, 2023.    TECHNIQUE:   AP Portable chest x-ray. Limited by rotation. Jewelry   projects over the left chest.    INTERPRETATION:    Heart size and the mediastinum cannot be accurately evaluated on this   projection.  Elevated right hemidiaphragm again seen.  There is right lower lung linear atelectasis with a possible trace right   pleural effusion.  The left lung is clear.  No left pleural effusion seen.  No pneumothorax.  Incompletely imaged orthopedic hardware projects over the thoracolumbar   spine.        IMPRESSION:  Limited by rotation.    Continued elevation of right hemidiaphragm.    Right lower lung linear atelectasis and possible trace right pleural   effusion.    --- End of Report ---    < end of copied text >    ACC: [id number] EXAM:  CT ABDOMEN AND PELVIS IC   ORDERED BY: FADUMO SANTOS     ACC: [id number] EXAM:  CT CHEST IC   ORDERED BY: FADUMO SANTOS     ACC: [id number] EXAM:  CT BRAIN   ORDERED BY: FADUMO SANTOS     ACC: [id number] EXAM:  CT CERVICALSPINE   ORDERED BY: FADUMO SANTOS     PROCEDURE DATE:  05/11/2023          INTERPRETATION:  CLINICAL INFORMATION: Back pain after fall. CHF,   hypertension, shortness of breath.    COMPARISON: Chest radiograph same date.    CONTRAST/COMPLICATIONS:  90 cc Omnipaque 350 administered intravenously for the chest abdomen   pelvis.    PROCEDURE:  CT head and cervical spine without intravenous contrast.  CT of the Chest, Abdomen and Pelvis with intravenous contrast.  Sagittal and coronal reformats were performed.    FINDINGS:    HEAD:  BRAIN: No apparent acute infarct, hemmorhage or mass. No hydrocephalus.   Chronic appearing white matter hypodensities and volume loss.  CALVARIUM: No skull fracture or agreesive osseous legions.  EXTRACRANIAL SOFTTISSUES: No acute findings.  VISUALIZED PORTIONS OF THE PARANASAL SINUSES AND MASTOID AIR CELLS: No   air fluid levels.    CERVICAL SPINE:  VERTEBRAE: No fracture or dislocation. Exaggeration of cervical lordosis.   Mild degenerative retrolisthesis of C3 on C4 and mild degenerative   anterolisthesis of C6 on C7 and C7 on T1.  SPINAL CANAL AND FORAMINA: Multilevel degenerative changes with no   apparent high grade spinal canal narrowing.  PARASPINAL SOFT TISSUES: No paravertebral hematoma or acutefinding.    CHEST:  LUNGS AND LARGE AIRWAYS: Low lung volumes with right greater than left   basilar opacities and mild elevation of the right hemidiaphragm. No   pulmonary nodules.  PLEURA: No pleural effusion or pneumothorax.  VESSELS: No evidence of injury to the heart and great vessels. No   thoracic aortic dissection or aneurysm.  HEART: Mild four-chamber cardiomegaly. No pericardial effusion.  MEDIASTINUM AND LUIS ALBERTO: No lymphadenopathy.  CHEST WALL AND LOWER NECK: Within normal limits.    ABDOMEN AND PELVIS:  LIVER: Within normal limits.  BILE DUCTS: Normal caliber.  GALLBLADDER: Within normal limits.  SPLEEN: No evidence of splenic injury or concerning finding. Small cystic   lesion upper spleen.  PANCREAS: Within normal limits.  ADRENALS: Within normal limits.  KIDNEYS/URETERS: No evidence of renal injury. No hydronephrosis or renal   stone or concerning mass. Small cysts bilaterally.    BLADDER: Within normal limits.  REPRODUCTIVE ORGANS: Radiation seeds in the prostate.    BOWEL: No bowel obstruction. Appendix is not visualized. No evidence of   inflammation in the pericecal region.  PERITONEUM: No ascites.  VESSELS: No abdominal aortic aneurysm. Calcific atherosclerosis   particularly of the distal abdominal aorta and iliac arteries.  RETROPERITONEUM/LYMPH NODES: No lymphadenopathy.  ABDOMINAL WALL: Large right lower quadrant anterior abdominal wall hernia   extending into the right inguinal region, not fully visible, contains the   cecum, much of the ascending colon, anddistal ileum. No evidence of   obstruction or other acute complication regarding this hernia.  BONES: Several thoracic and lumbar vertebral compression fractures but   with no discrete fracture line or adjacent edema. Mild depression of the   superior endplate of T1. Chronic moderate to severe compression T4   vertebral body. Mild compression T5, T6, T7, T8, T9, T10, T11 and T12   vertebral bodies. Chronic moderate compression of T3 vertebral body with   focal right scoliosis centered at this level. Status post posterior   fusion T2-sacroiliac. The hardware is intact. Bony demineralization.    IMPRESSION:  CT head: No evidence of intracranial injury or skull fracture.    CT cervical spine: No fracture or dislocation of the cervical spine.    CT Chest abdomen pelvis:  No evidence of acute traumatic injury.  Multilevel thoracic and lumbar vertebral compression fractures are   difficult to definitively age though there are no visible fracture lines   or adjacent edema to suggest acute injury. Comparison with priors would   be helpful.  Dependent densities in the lower lungs most likely atelectasis given the   low lung volumes. Superimposed pneumonia less likely.      --- End of Report ---     ALEX MACIEL MD; Attending Radiologist  This document has been electronically signed. May 11 2023  5:55AM    Imaging Personally Reviewed:  [x ] YES  [ ] NO    Consultant(s) Notes Reviewed:  [x ] YES  [ ] NO    PHYSICAL EXAM:  GENERAL: NAD  HEAD:  Atraumatic, Normocephalic  EYES: conjunctiva and sclera clear  ENMT:  Moist mucous membranes  NECK: Supple  NERVOUS SYSTEM:  Alert & Oriented X1-2  CHEST/LUNG: CTA bilaterally  HEART: Regular rate and rhythm  ABDOMEN: Soft, Nontender, Nondistended; Bowel sounds present  EXTREMITIES:  2+ Peripheral Pulses  SKIN: + hyperpigmented    Care Collaborated Discussed with Consultants/Other Providers [x ] YES  [ ] NO

## 2023-05-17 NOTE — PROGRESS NOTE ADULT - PROBLEM SELECTOR PLAN 3
- Patient has left medial thigh warm, erythematous rash with is mildly tender to touch.   -afebrile, no leukocytosis, hemodynamically stable.   - Cefazolin completed   - Bcx negative

## 2023-05-17 NOTE — DIETITIAN INITIAL EVALUATION ADULT - PERTINENT MEDS FT
MEDICATIONS  (STANDING):  acetaminophen     Tablet .. 1000 milliGRAM(s) Oral every 8 hours  albuterol    90 MICROgram(s) HFA Inhaler 2 Puff(s) Inhalation every 6 hours  amLODIPine   Tablet 5 milliGRAM(s) Oral daily  aspirin  chewable 81 milliGRAM(s) Oral daily  atorvastatin 80 milliGRAM(s) Oral at bedtime  baclofen 10 milliGRAM(s) Oral every 12 hours  clopidogrel Tablet 75 milliGRAM(s) Oral daily  DULoxetine 60 milliGRAM(s) Oral daily  furosemide    Tablet 20 milliGRAM(s) Oral daily  lidocaine   4% Patch 2 Patch Transdermal daily  metoprolol tartrate 25 milliGRAM(s) Oral two times a day  polyethylene glycol 3350 17 Gram(s) Oral daily  pregabalin 75 milliGRAM(s) Oral three times a day  senna 2 Tablet(s) Oral at bedtime  sodium chloride 0.9% lock flush 3 milliLiter(s) IV Push every 8 hours  tamsulosin 0.4 milliGRAM(s) Oral at bedtime    MEDICATIONS  (PRN):  melatonin 3 milliGRAM(s) Oral at bedtime PRN Insomnia  oxyCODONE    IR 20 milliGRAM(s) Oral every 6 hours PRN Severe Pain (7 - 10)

## 2023-05-17 NOTE — PROGRESS NOTE ADULT - SUBJECTIVE AND OBJECTIVE BOX
INTERVAL HPI/OVERNIGHT EVENTS:  Patient seen seen,no acute issues for overnight  VITAL SIGNS:  T(F): 97.6 (05-17-23 @ 05:14)  HR: 68 (05-17-23 @ 05:14)  BP: 185/74 (05-17-23 @ 05:14)  RR: 16 (05-17-23 @ 05:14)  SpO2: 91% (05-17-23 @ 05:14)  Wt(kg): --    PHYSICAL EXAM:  awake  Constitutional:  Eyes:  ENMT:perrla  Neck:  Respiratory:clear  Cardiovascular:s1s2,m-none  Gastrointestinal:soft,bs pos  Extremities:  Vascular:  Neurological:no focal deficit  Musculoskeletal:    MEDICATIONS  (STANDING):  acetaminophen     Tablet .. 1000 milliGRAM(s) Oral every 8 hours  albuterol    90 MICROgram(s) HFA Inhaler 2 Puff(s) Inhalation every 6 hours  amLODIPine   Tablet 5 milliGRAM(s) Oral daily  aspirin  chewable 81 milliGRAM(s) Oral daily  atorvastatin 80 milliGRAM(s) Oral at bedtime  baclofen 10 milliGRAM(s) Oral every 12 hours  clopidogrel Tablet 75 milliGRAM(s) Oral daily  DULoxetine 60 milliGRAM(s) Oral daily  furosemide    Tablet 20 milliGRAM(s) Oral daily  lidocaine   4% Patch 2 Patch Transdermal daily  metoprolol tartrate 25 milliGRAM(s) Oral two times a day  polyethylene glycol 3350 17 Gram(s) Oral daily  pregabalin 75 milliGRAM(s) Oral three times a day  senna 2 Tablet(s) Oral at bedtime  sodium chloride 0.9% lock flush 3 milliLiter(s) IV Push every 8 hours  tamsulosin 0.4 milliGRAM(s) Oral at bedtime    MEDICATIONS  (PRN):  melatonin 3 milliGRAM(s) Oral at bedtime PRN Insomnia  oxyCODONE    IR 20 milliGRAM(s) Oral every 6 hours PRN Severe Pain (7 - 10)      Allergies    No Known Allergies    Intolerances        LABS:                        12.8   5.82  )-----------( 168      ( 17 May 2023 07:35 )             41.1     05-17    143  |  112<H>  |  25<H>  ----------------------------<  101<H>  4.1   |  28  |  0.82    Ca    8.4      17 May 2023 07:35            RADIOLOGY & ADDITIONAL TESTS:      Assessment and Plan:   · Assessment	  Patient is a 83 y/o M from [facility] who walks with a walker and has PMH of HTN, HLD, venous insufficiency, CAD, BPH, CHF who presented s/p mechanical fall. Patient is admitted to medicine for AHRF 2/2 to atelectasis and mechanical fall.   CT Head, chest, spine no fx. Chest CT shows atelectasis. CXR shows small bibasilar infiltrates. pulmonary consulted.   on Oxygen therapy. PT consulted.   Pt with urinary retention, moreno placed, + hematuria. replaced today by urology.         Problem/Plan - 1:  ·  Problem: Acute respiratory failure with hypoxia-stable clinically  - Titrate off O2 as tolerated. OOB to chair  - pulm dr. Bailey consulted  reccs appreciated:   - Duoneb q6h   - PT eval.appret for [facility]     Problem/Plan - 2:  ·  Problem: Fall, accidental.   ·  Plan: - Mechanical fall preceded by dizziness.   - PT consulted.   - F/U orthostatics.   - Tylenol for mild-moderate pain.   - Oxycodon for moderate pain.  - Morphine for severe pain.     Problem/Plan - 3:  ·  Problem: Cellulitis of left thigh.   ·-stable clinically  - Started Cefazolin.   - Bcx negative  - F/U UA.     Problem/Plan - 4:  ·  Problem: Urinary retention.   ·  Plan: - Likely 2/2 to BPH. Patient reports needing to 'stand and pump his bladder' to urinate.  -replaced 5/12 with 16 Fr per urology as dysfunction of moreno    - C/w tamsulosin.  - urology consulted.     Problem/Plan - 5:  ·  Problem: Hematuria-resolved  -h/h stable     Problem/Plan - 6:  ·  Problem: Venous insufficiency.   ·  Plan: Has bilateral edema with significant venous dermatitis.   C/w lasix 20mg.     Problem/Plan - 7:  ·  Problem: Chronic back pain.   ·  Plan: Takes Oxycodon 20 TID and oxycodon 10 OD.   Started on 15 TID PRN for severe pain.     Problem/Plan - 8:  ·  Problem: CAD (coronary artery disease).   ·  Plan: Patient takes aspirin 325 daily.   Started Aspirin 81 in hopital.   C/w with statin and plavix, metoprolol.     Problem/Plan - 9:  ·  Problem: Hypertension.   ·  Plan: C/w home dose of amlodipine 5mg.     Problem/Plan - 10:  ·  Problem: Hyperlipidemia.   ·  Plan; C/w atorvastatin.     Problem/Plan - 11:  ·  Problem: Prophylactic measure.   ·  Plan: [facility] planning to [facility]

## 2023-05-17 NOTE — PROGRESS NOTE ADULT - ASSESSMENT
Confidential Drug Utilization Report  Search Terms: Chucky Dove, 1940Search Date: 05/15/2023 08:50:06 AM  The Drug Utilization Report below displays all of the controlled substance prescriptions, if any, that your patient has filled in the last twelve months. The information displayed on this report is compiled from pharmacy submissions to the Department, and accurately reflects the information as submitted by the pharmacies.    This report was requested by: Camilla Thompson | Reference #: [id number]    You have not added a THELMA number. Keeping your THELMA number(s) up to date on the My THELMA # tab will enable the separation of your prescriptions from others in the search results.    Practitioner Count: 1  Pharmacy Count: 1  Current Opioid Prescriptions: 1  Current Benzodiazepine Prescriptions: 0  Current Stimulant Prescriptions: 0      Patient Demographic Information (PDI)       PDI	First Name	Last Name	Birth Date	Gender	Street Address	[facility]	Zip Code  A	Chucky Dove	1940	Male	[address] [facility]	[address]    Prescription Information      PDI Filter:    PDI	Current Rx	Drug Type	Rx Written	Rx Dispensed	Drug	Quantity	Days Supply	Prescriber Name	Prescriber THELMA #	Payment Method	Dispenser  A	Y	O	05/04/2023	05/04/2023	oxycodone hcl (ir) 20 mg tab	120	30	Tavdy, Esa	[id number]	Medicare	[facility] [facility]  A	N	O	03/30/2023	03/30/2023	oxycodone hcl (ir) 20 mg tab	120	30	Tavdy, Esa	[id number]	Medicare	[facility]  A	N	O	02/14/2023	02/27/2023	oxycodone hcl (ir) 20 mg tab	120	30	Tavdy, Esa	[id number]	Medicare	[facility]  A	N	O	02/02/2023	02/02/2023	oxycodone hcl (ir) 20 mg tab	120	30	Tavdy, Esa	[id number]	Medicare	[facility]  A	N	O	01/13/2023	01/13/2023	oxycodone hcl (ir) 10 mg tab	30	30	Tavdy, Esa	[id number]	Medicare	[facility]  A	N	O	01/13/2023	01/13/2023	oxycodone hcl (ir) 20 mg tab	90	30	Tavdy, Esa	[id number]	Medicare	[facility]  A	N	O	12/06/2022	12/06/2022	oxycodone hcl (ir) 20 mg tab	90	30	Tavdy, Esa	[id number]	Medicare	[facility]  A	N	O	12/06/2022	12/06/2022	oxycodone hcl (ir) 10 mg tab	30	30	Tavdy, Esa	[id number]	Medicare	[facility]  A	N	O	10/27/2022	10/31/2022	oxycodone hcl (ir) 10 mg tab	30	30	Tavdy, Esa	[id number]	Medicare	[facility]  A	N	O	10/27/2022	10/31/2022	oxycodone hcl (ir) 20 mg tab	90	30	Tavdy, Esa	[id number]	Medicare	[facility]  A	N	O	10/06/2022	10/06/2022	oxycodone hcl (ir) 20 mg tab	90	30	Tavdy, Esa	[id number]	Medicare	[facility]  A	N	O	10/06/2022	10/06/2022	oxycodone hcl (ir) 10 mg tab	30	30	Tavdy, Esa	[id number]	Medicare	[facility]  A	N	O	08/23/2022	08/23/2022	oxycodone hcl (ir) 10 mg tab	30	30	Tavdy, Esa	[id number]	Medicare	[facility]  A	N	O	08/23/2022	08/23/2022	oxycodone hcl (ir) 20 mg tab	90	30	Tavdy, Esa	[id number]	Medicare	[facility]  A	N	O	07/21/2022	07/31/2022	oxycodone hcl (ir) 10 mg tab	30	30	Tavdy, Esa	[id number]	Medicare	[facility]  A	N	O	07/21/2022	07/31/2022	oxycodone hcl (ir) 20 mg tab	90	30	Tavdy, Esa	[id number]	Medicare	[employer] [facility]  A	N	O	07/05/2022	07/05/2022	oxycodone hcl (ir) 10 mg tab	30	30	Tavdy, Esa	[id number]	Medicare	[facility]  A	N	O	07/05/2022	07/05/2022	oxycodone hcl (ir) 20 mg tab	90	30	Tavdy, Esa	[id number]	Medicare	[facility]  A	N	O	05/27/2022	05/31/2022	oxycodone hcl (ir) 10 mg tab	30	30	Esa Herring	[id number]	Medicare	[facility]  A	N	O	05/27/2022	05/31/2022	oxycodone hcl (ir) 20 mg tab	90	30	[facility]	[id number]	Medicare	[facility]    * - Details of Drug Type : O = Opioid, B = Benzodiazepine, S = Stimulant    * - Drugs marked with an asterisk are compound drugs. If the compound drug is made up of more than one controlled substance, then each controlled substance will be a separate row in the table.

## 2023-05-17 NOTE — PROGRESS NOTE ADULT - PROBLEM SELECTOR PLAN 12
-pending auth to [facility]  -PT consult recs ALBERTA  -unable to titrate oxygen to room air, pt to be discharged with oxygen and moreno

## 2023-05-17 NOTE — DIETITIAN INITIAL EVALUATION ADULT - PROBLEM SELECTOR PLAN 4
- Likely 2/2 to BPH. Patient reports needing to 'stand and pump his bladder' to urinate.  - Could not urinate in ED.   - s/p Cunningham placement in ED holding.  - C/w tamsulosin.

## 2023-05-17 NOTE — PROGRESS NOTE ADULT - PROBLEM SELECTOR PLAN 2
- Mechanical fall preceded by dizziness.   - CT head: No evidence of intracranial injury or skull fracture.  - CT spine: No fracture or dislocation of the cervical spine.  - CT chest: No evidence of acute traumatic injury. Multilevel thoracic and lumbar vertebral compression fractures are difficult to definitively age though there are no visible fracture lines or adjacent edema to suggest acute injury.  - PT recs ALBERTA: pending auth

## 2023-05-17 NOTE — PROGRESS NOTE ADULT - SUBJECTIVE AND OBJECTIVE BOX
Source of information: TOM HERRERA, Chart review  Patient language: English  : n/a    HPI:  Patient is a 83 y/o M from [facility] who walks with a walker and has PMH of HTN, HLD, venous insufficiency, CAD, BPH, CHF who presented s/p mechanical fall. Patient reports that he was by his bed when he felt lightheaded and felt like he might pass out and had a fall. Patient denies hitting his head or any other bodily injury. Patient denies any loss of consciousness and reports he remembers the entire episode. Patient denies any pain at this time. Patient also reported that since being in the ED he is in a lot of discomfort as he has a very strong urge to urinate but he is not able to. Patient denies any history of fevers, chills, headache, prior lightheadedness, chest pain, palpitations, shortness of breath and cough (contradictory to ED note), nausea, vomiting, abdominal pain, diarrhea, constipation, melena, hematochezia, or other urinary symptoms.  (11 May 2023 09:20)    CTAP demonstrates- several thoracic and lumbar vertebral compression fractures but with no discrete fracture line or adjacent edema. Mild depression of the superior endplate of T1. Chronic moderate to severe compression T4 vertebral body. Mild compression T5, T6, T7, T8, T9, T10, T11 and T12 vertebral bodies. Chronic moderate compression of T3 vertebral body with focal right scoliosis centered at this level. Status post posterior fusion T2-sacroiliac. The hardware is intact. Bony demineralization.    CT head: No evidence of intracranial injury or skull fracture.    CT cervical spine: No fracture or dislocation of the cervical spine.    CT Chest abdomen pelvis:No evidence of acute traumatic injury. Multilevel thoracic and lumbar vertebral compression fractures are   difficult to definitively age though there are no visible fracture lines or adjacent edema to suggest acute injury. Comparison with priors would be helpful.  Dependent densities in the lower lungs most likely atelectasis given the low lung volumes. Superimposed pneumonia less likely.    Pt is admitted for hypoxemia, s/p fall.  CXR demonstrates- Continued elevation of right hemidiaphragm. Right lower lung linear atelectasis and possible trace right pleural effusion. Pain consulted for back pain, chronic on 5/15. Pt seen and examined at bedside. Pt laying in bed, reports lumbar back pain score 9/10  SCALE USED: (1-10 VNRS). Pt describes pain as dull, constant, non- radiating, not alleviated by current pain medication, exacerbated by movement. Denies numbness/ tingling. Reports generalized weakness. Reports taking oxycodone 20mg PO PRN pain at home. Per [facility], pt on chronic oxycodone 20mg PO 4x/day, last filled 5/4. Pt tolerating PO diet. Denies lethargy, chest pain, SOB, nausea, vomiting, constipation. Last BM 5/15. Patient stated goal for pain control: to be able to take deep breaths, get out of bed to chair and ambulate with tolerable pain control.     PAST MEDICAL & SURGICAL HISTORY:  Hypertension      Hyperlipidemia      Venous insufficiency          FAMILY HISTORY:      Social History:  Patient reports he is a former smoker but he quit smoking 40 years ago.   Patient denies alcohol or illicit drug use. (11 May 2023 09:20)    Allergies    No Known Allergies    MEDICATIONS  (STANDING):  acetaminophen     Tablet .. 1000 milliGRAM(s) Oral every 8 hours  albuterol    90 MICROgram(s) HFA Inhaler 2 Puff(s) Inhalation every 6 hours  amLODIPine   Tablet 5 milliGRAM(s) Oral daily  aspirin  chewable 81 milliGRAM(s) Oral daily  atorvastatin 80 milliGRAM(s) Oral at bedtime  baclofen 10 milliGRAM(s) Oral every 12 hours  clopidogrel Tablet 75 milliGRAM(s) Oral daily  DULoxetine 60 milliGRAM(s) Oral daily  furosemide    Tablet 20 milliGRAM(s) Oral daily  lidocaine   4% Patch 2 Patch Transdermal daily  metoprolol tartrate 25 milliGRAM(s) Oral two times a day  polyethylene glycol 3350 17 Gram(s) Oral daily  pregabalin 75 milliGRAM(s) Oral three times a day  senna 2 Tablet(s) Oral at bedtime  sodium chloride 0.9% lock flush 3 milliLiter(s) IV Push every 8 hours  tamsulosin 0.4 milliGRAM(s) Oral at bedtime    MEDICATIONS  (PRN):  melatonin 3 milliGRAM(s) Oral at bedtime PRN Insomnia  oxyCODONE    IR 20 milliGRAM(s) Oral every 6 hours PRN Severe Pain (7 - 10)      Vital Signs Last 24 Hrs  T(C): 36.4 (17 May 2023 05:14), Max: 36.7 (16 May 2023 14:28)  T(F): 97.6 (17 May 2023 05:14), Max: 98 (16 May 2023 14:28)  HR: 68 (17 May 2023 05:14) (60 - 88)  BP: 185/74 (17 May 2023 05:14) (127/60 - 185/74)  BP(mean): --  RR: 16 (17 May 2023 05:14) (16 - 20)  SpO2: 91% (17 May 2023 05:14) (91% - 95%)    Parameters below as of 17 May 2023 05:14  Patient On (Oxygen Delivery Method): nasal cannula  O2 Flow (L/min): 2    COVID-19 PCR: NotDetec (16 May 2023 09:14)    LABS: Reviewed                          12.8   5.82  )-----------( 168      ( 17 May 2023 07:35 )             41.1     05-17    143  |  112<H>  |  25<H>  ----------------------------<  101<H>  4.1   |  28  |  0.82    Ca    8.4      17 May 2023 07:35    COVID-19 PCR: NotDetec (16 May 2023 09:14)    Radiology: Reviewed.   < from: Xray Chest 1 View- PORTABLE-Routine (Xray Chest 1 View- PORTABLE-Routine in AM.) (05.15.23 @ 09:33) >    ACC: [id number] EXAM:  XR CHEST PORTABLE ROUTINE 1V   ORDERED BY: TAVON MCDERMOTT     PROCEDURE DATE:  05/15/2023          INTERPRETATION:  TIME OF EXAM: May 15, 2023 at 9:05 AM.    CLINICAL INFORMATION: Hypertension. Atelectasis.    COMPARISON:  APchest x-ray and CT scan of the chest from May 11, 2023.    TECHNIQUE:   AP Portable chest x-ray. Limited by rotation. Jewelry   projects over the left chest.    INTERPRETATION:    Heart size and the mediastinum cannot be accurately evaluated on this   projection.  Elevated right hemidiaphragm again seen.  There is right lower lung linear atelectasis with a possible trace right   pleural effusion.  The left lung is clear.  No left pleural effusion seen.  No pneumothorax.  Incompletely imaged orthopedic hardware projects over the thoracolumbar   spine.        IMPRESSION:  Limited by rotation.    Continued elevation of right hemidiaphragm.    Right lower lung linear atelectasis and possible trace right pleural   effusion.    --- End of Report ---            SKY GARZA MD; Attending Radiologist  This document has been electronically signed. May 16 2023  4:46PM    < end of copied text >    < from: CT Head No Cont (05.11.23 @ 05:14) >    ACC: [id number] EXAM:  CT ABDOMEN AND PELVIS IC   ORDERED BY: FADUMO SANTOS     ACC: [id number] EXAM:  CT CHEST IC   ORDERED BY: FADUMO SANTOS     ACC: [id number] EXAM:  CT BRAIN   ORDERED BY: FADUMO SANTOS     ACC: [id number] EXAM:  CT CERVICALSPINE   ORDERED BY: FADUMO SANTOS     PROCEDURE DATE:  05/11/2023          INTERPRETATION:  CLINICAL INFORMATION: Back pain after fall. CHF,   hypertension, shortness of breath.    COMPARISON: Chest radiograph same date.    CONTRAST/COMPLICATIONS:  90 cc Omnipaque 350 administered intravenously for the chest abdomen   pelvis.    PROCEDURE:  CT head and cervical spine without intravenous contrast.  CT of the Chest, Abdomen and Pelvis with intravenous contrast.  Sagittal and coronal reformats were performed.    FINDINGS:    HEAD:  BRAIN: No apparent acute infarct, hemmorhage or mass. No hydrocephalus.   Chronic appearing white matter hypodensities and volume loss.  CALVARIUM: No skull fracture or agreesive osseous legions.  EXTRACRANIAL SOFTTISSUES: No acute findings.  VISUALIZED PORTIONS OF THE PARANASAL SINUSES AND MASTOID AIR CELLS: No   air fluid levels.    CERVICAL SPINE:  VERTEBRAE: No fracture or dislocation. Exaggeration of cervical lordosis.   Mild degenerative retrolisthesis of C3 on C4 and mild degenerative   anterolisthesis of C6 on C7 and C7 on T1.  SPINAL CANAL AND FORAMINA: Multilevel degenerative changes with no   apparent high grade spinal canal narrowing.  PARASPINAL SOFT TISSUES: No paravertebral hematoma or acutefinding.    CHEST:  LUNGS AND LARGE AIRWAYS: Low lung volumes with right greater than left   basilar opacities and mild elevation of the right hemidiaphragm. No   pulmonary nodules.  PLEURA: No pleural effusion or pneumothorax.  VESSELS: No evidence of injury to the heart and great vessels. No   thoracic aortic dissection or aneurysm.  HEART: Mild four-chamber cardiomegaly. No pericardial effusion.  MEDIASTINUM AND LUIS ALBERTO: No lymphadenopathy.  CHEST WALL AND LOWER NECK: Within normal limits.    ABDOMEN AND PELVIS:  LIVER: Within normal limits.  BILE DUCTS: Normal caliber.  GALLBLADDER: Within normal limits.  SPLEEN: No evidence of splenic injury or concerning finding. Small cystic   lesion upper spleen.  PANCREAS: Within normal limits.  ADRENALS: Within normal limits.  KIDNEYS/URETERS: No evidence of renal injury. No hydronephrosis or renal   stone or concerning mass. Small cysts bilaterally.    BLADDER: Within normal limits.  REPRODUCTIVE ORGANS: Radiation seeds in the prostate.    BOWEL: No bowel obstruction. Appendix is not visualized. No evidence of   inflammation in the pericecal region.  PERITONEUM: No ascites.  VESSELS: No abdominal aortic aneurysm. Calcific atherosclerosis   particularly of the distal abdominal aorta and iliac arteries.  RETROPERITONEUM/LYMPH NODES: No lymphadenopathy.  ABDOMINAL WALL: Large right lower quadrant anterior abdominal wall hernia   extending into the right inguinal region, not fully visible, contains the   cecum, much of the ascending colon, anddistal ileum. No evidence of   obstruction or other acute complication regarding this hernia.  BONES: Several thoracic and lumbar vertebral compression fractures but   with no discrete fracture line or adjacent edema. Mild depression of the   superior endplate of T1. Chronic moderate to severe compression T4   vertebral body. Mild compression T5, T6, T7, T8, T9, T10, T11 and T12   vertebral bodies. Chronic moderate compression of T3 vertebral body with   focal right scoliosis centered at this level. Status post posterior   fusion T2-sacroiliac. The hardware is intact. Bony demineralization.    IMPRESSION:  CT head: No evidence of intracranial injury or skull fracture.    CT cervical spine: No fracture or dislocation of the cervical spine.    CT Chest abdomen pelvis:  No evidence of acute traumatic injury.  Multilevel thoracic and lumbar vertebral compression fractures are   difficult to definitively age though there are no visible fracture lines   or adjacent edema to suggest acute injury. Comparison with priors would   be helpful.  Dependent densities in the lower lungs most likely atelectasis given the   low lung volumes. Superimposed pneumonia less likely.      --- End of Report ---             ALEX MACIEL MD; Attending Radiologist  This document has been electronically signed. May 11 2023  5:55AM    < end of copied text >    < from: CT Abdomen and Pelvis w/ IV Cont (05.11.23 @ 05:13) >    ACC: [id number] EXAM:  CT ABDOMEN AND PELVIS IC   ORDERED BY: FADUMO SANTOS     ACC: [id number] EXAM:  CT CHEST IC   ORDERED BY: FADUMO SANTOS     ACC: [id number] EXAM:  CT BRAIN   ORDERED BY: FADUMO SANTOS     ACC: [id number] EXAM:  CT CERVICALSPINE   ORDERED BY: FADUMO SANTOS     PROCEDURE DATE:  05/11/2023          INTERPRETATION:  CLINICAL INFORMATION: Back pain after fall. CHF,   hypertension, shortness of breath.    COMPARISON: Chest radiograph same date.    CONTRAST/COMPLICATIONS:  90 cc Omnipaque 350 administered intravenously for the chest abdomen   pelvis.    PROCEDURE:  CT head and cervical spine without intravenous contrast.  CT of the Chest, Abdomen and Pelvis with intravenous contrast.  Sagittal and coronal reformats were performed.    FINDINGS:    HEAD:  BRAIN: No apparent acute infarct, hemmorhage or mass. No hydrocephalus.   Chronic appearing white matter hypodensities and volume loss.  CALVARIUM: No skull fracture or agreesive osseous legions.  EXTRACRANIAL SOFTTISSUES: No acute findings.  VISUALIZED PORTIONS OF THE PARANASAL SINUSES AND MASTOID AIR CELLS: No   air fluid levels.    CERVICAL SPINE:  VERTEBRAE: No fracture or dislocation. Exaggeration of cervical lordosis.   Mild degenerative retrolisthesis of C3 on C4 and mild degenerative   anterolisthesis of C6 on C7 and C7 on T1.  SPINAL CANAL AND FORAMINA: Multilevel degenerative changes with no   apparent high grade spinal canal narrowing.  PARASPINAL SOFT TISSUES: No paravertebral hematoma or acutefinding.    CHEST:  LUNGS AND LARGE AIRWAYS: Low lung volumes with right greater than left   basilar opacities and mild elevation of the right hemidiaphragm. No   pulmonary nodules.  PLEURA: No pleural effusion or pneumothorax.  VESSELS: No evidence of injury to the heart and great vessels. No   thoracic aortic dissection or aneurysm.  HEART: Mild four-chamber cardiomegaly. No pericardial effusion.  MEDIASTINUM AND LUIS ALBERTO: No lymphadenopathy.  CHEST WALL AND LOWER NECK: Within normal limits.    ABDOMEN AND PELVIS:  LIVER: Within normal limits.  BILE DUCTS: Normal caliber.  GALLBLADDER: Within normal limits.  SPLEEN: No evidence of splenic injury or concerning finding. Small cystic   lesion upper spleen.  PANCREAS: Within normal limits.  ADRENALS: Within normal limits.  KIDNEYS/URETERS: No evidence of renal injury. No hydronephrosis or renal   stone or concerning mass. Small cysts bilaterally.    BLADDER: Within normal limits.  REPRODUCTIVE ORGANS: Radiation seeds in the prostate.    BOWEL: No bowel obstruction. Appendix is not visualized. No evidence of   inflammation in the pericecal region.  PERITONEUM: No ascites.  VESSELS: No abdominal aortic aneurysm. Calcific atherosclerosis   particularly of the distal abdominal aorta and iliac arteries.  RETROPERITONEUM/LYMPH NODES: No lymphadenopathy.  ABDOMINAL WALL: Large right lower quadrant anterior abdominal wall hernia   extending into the right inguinal region, not fully visible, contains the   cecum, much of the ascending colon, anddistal ileum. No evidence of   obstruction or other acute complication regarding this hernia.  BONES: Several thoracic and lumbar vertebral compression fractures but   with no discrete fracture line or adjacent edema. Mild depression of the   superior endplate of T1. Chronic moderate to severe compression T4   vertebral body. Mild compression T5, T6, T7, T8, T9, T10, T11 and T12   vertebral bodies. Chronic moderate compression of T3 vertebral body with   focal right scoliosis centered at this level. Status post posterior   fusion T2-sacroiliac. The hardware is intact. Bony demineralization.    IMPRESSION:  CT head: No evidence of intracranial injury or skull fracture.    CT cervical spine: No fracture or dislocation of the cervical spine.    CT Chest abdomen pelvis:  No evidence of acute traumatic injury.  Multilevel thoracic and lumbar vertebral compression fractures are   difficult to definitively age though there are no visible fracture lines   or adjacent edema to suggest acute injury. Comparison with priors would   be helpful.  Dependent densities in the lower lungs most likely atelectasis given the   low lung volumes. Superimposed pneumonia less likely.      --- End of Report ---             ALEX MACIEL MD; Attending Radiologist  This document has been electronically signed. May 11 2023  5:55AM    < end of copied text >      ORT Score -   Family Hx of substance abuse	Female	      Male  Alcohol 	                                           1                     3  Illegal drugs	                                   2                     3  Rx drugs                                           4 	                  4  Personal Hx of substance abuse		  Alcohol 	                                          3	                  3  Illegal drugs                                     4	                  4  Rx drugs                                            5 	                  5  Age between 16- 45 years	           1                     1  hx preadolescent sexual abuse	   3 	                  0  Psychological disease		  ADD, OCD, bipolar, schizophrenia   2	          2  Depression                                           1 	          1  Total: 0    a score of 3 or lower indicates low risk for opioid abuse		  a score of 4-7 indicates moderate risk for opioid abuse		  a score of 8 or higher indicates high risk for opioid abuse    REVIEW OF SYSTEMS:  CONSTITUTIONAL: No fever + fatigue + [place]  RESPIRATORY: No cough, wheezing, chills or hemoptysis; No shortness of breath  CARDIOVASCULAR: No chest pain, palpitations, dizziness, or leg swelling  GASTROINTESTINAL: No loss of appetite, decreased PO intake. + right upper abdominal pain. No nausea, vomiting; No diarrhea or constipation.   GENITOURINARY: No dysuria, frequency, hematuria, or incontinence +urinary retention  MUSCULOSKELETAL: + chronic back pain. No joint swelling; + generalized motor strength weakness, no saddle anesthesia, bowel/bladder incontinence, +falls   NEURO: No headaches, No numbness/tingling b/l LE    PHYSICAL EXAM:  GENERAL: + fatigued Alert & Oriented X4, cooperative, NAD, Good concentration. Speech is clear. + [place] + disheveled malodorous, poor oral hygiene   RESPIRATORY: Respirations even and unlabored. Diminished to auscultation bilaterally; No rales, rhonchi, wheezing, or rubs + O2 2L NC.   CARDIOVASCULAR: Normal S1/S2, regular rate and rhythm; No murmurs, rubs, or gallops. No JVD.   GENITOURINARY: Urinary catheter draining clear yellow urine   GASTROINTESTINAL:  Soft, Nontender, Nondistended; Bowel sounds present  PERIPHERAL VASCULAR:  Extremities warm without edema. 2+ Peripheral Pulses, No cyanosis, No calf tenderness  MUSCULOSKELETAL: Motor Strength 4/5 B/L upper and 4/5 lower extremities; moves all extremities equally against gravity; ROM intact; negative SLR; + thoracic and lumbar back tenderness on palpation   SKIN: Warm, dry, intact. No rashes, lesions, scars or wounds. + lumbar and thoracic back lidoderm patches in place + hyperpigmentation b/l lower extremities + multiple scabs over back.    Risk factors associated with adverse outcomes related to opioid treatment  [ ]  Concurrent benzodiazepine use  [ ]  History/ Active substance use or alcohol use disorder  [ ] Psychiatric co-morbidity  [ ] Sleep apnea  [ ] COPD  [ ] BMI> 35  [ ] Liver dysfunction  [ ] Renal dysfunction  [ ] CHF  [X] Former Smoker  [X ]  Age > 60 years    [X ]  [facility]  Reviewed and Copied to Chart. See below.    Plan of care and goal oriented pain management treatment options were discussed with patient and /or primary care giver; all questions and concerns were addressed and care was aligned with patient's wishes.    Educated patient on goal oriented pain management treatment options     05-17-23 @ 12:28

## 2023-05-18 RX ORDER — ACETAMINOPHEN 500 MG
1000 TABLET ORAL EVERY 8 HOURS
Refills: 0 | Status: DISCONTINUED | OUTPATIENT
Start: 2023-05-18 | End: 2023-05-26

## 2023-05-18 RX ADMIN — ALBUTEROL 2 PUFF(S): 90 AEROSOL, METERED ORAL at 11:46

## 2023-05-18 RX ADMIN — OXYCODONE HYDROCHLORIDE 20 MILLIGRAM(S): 5 TABLET ORAL at 07:30

## 2023-05-18 RX ADMIN — ATORVASTATIN CALCIUM 80 MILLIGRAM(S): 80 TABLET, FILM COATED ORAL at 22:37

## 2023-05-18 RX ADMIN — DULOXETINE HYDROCHLORIDE 60 MILLIGRAM(S): 30 CAPSULE, DELAYED RELEASE ORAL at 11:01

## 2023-05-18 RX ADMIN — SODIUM CHLORIDE 3 MILLILITER(S): 9 INJECTION INTRAMUSCULAR; INTRAVENOUS; SUBCUTANEOUS at 22:44

## 2023-05-18 RX ADMIN — Medication 25 MILLIGRAM(S): at 19:04

## 2023-05-18 RX ADMIN — OXYCODONE HYDROCHLORIDE 20 MILLIGRAM(S): 5 TABLET ORAL at 19:04

## 2023-05-18 RX ADMIN — SODIUM CHLORIDE 3 MILLILITER(S): 9 INJECTION INTRAMUSCULAR; INTRAVENOUS; SUBCUTANEOUS at 13:06

## 2023-05-18 RX ADMIN — SODIUM CHLORIDE 3 MILLILITER(S): 9 INJECTION INTRAMUSCULAR; INTRAVENOUS; SUBCUTANEOUS at 06:52

## 2023-05-18 RX ADMIN — OXYCODONE HYDROCHLORIDE 20 MILLIGRAM(S): 5 TABLET ORAL at 00:45

## 2023-05-18 RX ADMIN — TAMSULOSIN HYDROCHLORIDE 0.4 MILLIGRAM(S): 0.4 CAPSULE ORAL at 22:35

## 2023-05-18 RX ADMIN — Medication 75 MILLIGRAM(S): at 13:03

## 2023-05-18 RX ADMIN — Medication 75 MILLIGRAM(S): at 22:39

## 2023-05-18 RX ADMIN — POLYETHYLENE GLYCOL 3350 17 GRAM(S): 17 POWDER, FOR SOLUTION ORAL at 11:00

## 2023-05-18 RX ADMIN — Medication 10 MILLIGRAM(S): at 06:29

## 2023-05-18 RX ADMIN — Medication 1000 MILLIGRAM(S): at 06:52

## 2023-05-18 RX ADMIN — OXYCODONE HYDROCHLORIDE 20 MILLIGRAM(S): 5 TABLET ORAL at 14:03

## 2023-05-18 RX ADMIN — Medication 25 MILLIGRAM(S): at 06:31

## 2023-05-18 RX ADMIN — OXYCODONE HYDROCHLORIDE 20 MILLIGRAM(S): 5 TABLET ORAL at 06:30

## 2023-05-18 RX ADMIN — Medication 1000 MILLIGRAM(S): at 06:30

## 2023-05-18 RX ADMIN — Medication 20 MILLIGRAM(S): at 06:31

## 2023-05-18 RX ADMIN — Medication 10 MILLIGRAM(S): at 19:03

## 2023-05-18 RX ADMIN — OXYCODONE HYDROCHLORIDE 20 MILLIGRAM(S): 5 TABLET ORAL at 13:03

## 2023-05-18 RX ADMIN — ALBUTEROL 2 PUFF(S): 90 AEROSOL, METERED ORAL at 15:34

## 2023-05-18 RX ADMIN — Medication 81 MILLIGRAM(S): at 11:01

## 2023-05-18 RX ADMIN — SENNA PLUS 2 TABLET(S): 8.6 TABLET ORAL at 22:35

## 2023-05-18 RX ADMIN — AMLODIPINE BESYLATE 5 MILLIGRAM(S): 2.5 TABLET ORAL at 06:31

## 2023-05-18 RX ADMIN — CLOPIDOGREL BISULFATE 75 MILLIGRAM(S): 75 TABLET, FILM COATED ORAL at 11:06

## 2023-05-18 RX ADMIN — Medication 75 MILLIGRAM(S): at 06:30

## 2023-05-18 NOTE — ADVANCED PRACTICE NURSE CONSULT - RECOMMEDATIONS
-Clean the Bilateral Lower Extremities with warm water, mild soap, and pat dry  -Moisturize the legs b.i.d. PRN  -Elevate/float the patients heels using heel protectors and reposition the patient Q 2hrs using wedges or pillows

## 2023-05-18 NOTE — PROGRESS NOTE ADULT - PROBLEM SELECTOR PLAN 1
Likely secondary to pain VS atelectasis.    On admission saturating 88% on room air as per ED note.   CXR - Small bibasilar infiltrates.  CT chest - Dependent densities in the lower lungs most likely atelectasis given the low lung volumes. Superimposed pneumonia less likely.  Pulm Dr. Bailey consulted  recs appreciated: Continue Albuterol, oxygen, Incentive spirometry, Out pt pulmo f/u.

## 2023-05-18 NOTE — PROGRESS NOTE ADULT - SUBJECTIVE AND OBJECTIVE BOX
Time of Visit:  Patient seen and examined.     MEDICATIONS  (STANDING):  albuterol    90 MICROgram(s) HFA Inhaler 2 Puff(s) Inhalation every 6 hours  amLODIPine   Tablet 5 milliGRAM(s) Oral daily  aspirin  chewable 81 milliGRAM(s) Oral daily  atorvastatin 80 milliGRAM(s) Oral at bedtime  baclofen 10 milliGRAM(s) Oral every 12 hours  clopidogrel Tablet 75 milliGRAM(s) Oral daily  DULoxetine 60 milliGRAM(s) Oral daily  furosemide    Tablet 20 milliGRAM(s) Oral daily  lidocaine   4% Patch 2 Patch Transdermal daily  metoprolol tartrate 25 milliGRAM(s) Oral two times a day  polyethylene glycol 3350 17 Gram(s) Oral daily  pregabalin 75 milliGRAM(s) Oral three times a day  senna 2 Tablet(s) Oral at bedtime  sodium chloride 0.9% lock flush 3 milliLiter(s) IV Push every 8 hours  tamsulosin 0.4 milliGRAM(s) Oral at bedtime      MEDICATIONS  (PRN):  acetaminophen     Tablet .. 1000 milliGRAM(s) Oral every 8 hours PRN Moderate Pain (4 - 6)  melatonin 3 milliGRAM(s) Oral at bedtime PRN Insomnia  oxyCODONE    IR 20 milliGRAM(s) Oral every 6 hours PRN Severe Pain (7 - 10)       Medications up to date at time of exam.    ROS; No fever, chills, cough, nasal congestion on exam.   PHYSICAL EXAMINATION:    Vital Signs Last 24 Hrs  T(C): 36.1 (18 May 2023 12:09), Max: 36.4 (17 May 2023 22:09)  T(F): 97 (18 May 2023 12:09), Max: 97.6 (17 May 2023 22:09)  HR: 68 (18 May 2023 12:09) (60 - 68)  BP: 149/76 (18 May 2023 12:09) (149/76 - 168/64)  BP(mean): --  RR: 18 (18 May 2023 12:09) (17 - 18)  SpO2: 92% (18 May 2023 12:09) (91% - 92%)    Parameters below as of 18 May 2023 12:09  Patient On (Oxygen Delivery Method): nasal cannula  O2 Flow (L/min): 2    General : Alert and oriented. Able to answer question with no SOB. No acute distress .     HEENT: Head is normocephalic and atraumatic. [place] . Extraocular muscles are intact. Mucous membranes are moist.     NECK: Supple, no palpable adenopathy.    LUNGS: Fair air entrance. Non labored. No wheezing. No use of accessory muscle.     HEART: S1 S2 Regular rate and no click / rub.     ABDOMEN: Soft, nontender, and nondistended.  Active bowel sounds.     ; No bladder distention and tenderness.     NEUROLOGIC: Awake, alert, oriented.     SKIN: Warm and moist . Non diaphoretic.       LABS:                        12.8   5.82  )-----------( 168      ( 17 May 2023 07:35 )             41.1     05-17    143  |  112<H>  |  25<H>  ----------------------------<  101<H>  4.1   |  28  |  0.82    Ca    8.4      17 May 2023 07:35                          MICROBIOLOGY: (if applicable)    RADIOLOGY & ADDITIONAL STUDIES:  EKG:   CXR:  ECHO:    IMPRESSION: 82y Male PAST MEDICAL & SURGICAL HISTORY:  Hypertension      Hyperlipidemia      Venous insufficiency    Impression: This is an 81 Y/O Male from [facility] presented to ED with s/p Mechanical Fall. Former smoker. Patient admitted for acute hypoxic respiratory  failure requiring supp O2 due to b/l dependent atelectasis with multiple thoracic and lumbar vertebral fx.  . I doubt PNA or PE at this time . He may have undiagnosed COPD . 05-15-23 CXR with trace Right Pleural Effusion.  05-16-23 Negative PCR for Covid 19. 05-11-23 Negative swab for Covid 19 and Negative Blood Cx x2.     Suggestion:  O2 saturation 91% room air at rest , O2 saturation 94% with O2 supplement. Continue Oxygen supplementation 2L NC for now then will monitor O2 saturation trend room air.   Continue Albuterol 90 mcg 2 Puffs Q 6 Hours .   No need to drain Trace Right Lung Effusion .On Lasix 20 mg Oral Daily   .   Incentive spirometry .  Out patient  pulmonary follow up.     No steroids .   PT eval    Fall precaution    DVT GI prophylactic .     Lidocaine patch to back on x 12 Hours .

## 2023-05-18 NOTE — ADVANCED PRACTICE NURSE CONSULT - ASSESSMENT
This is a 82yr old male patient admitted for Hypoxemia, to which upon assessment, the patient is without pressure injury. The patient is with dry, scabbed skin to the Bilateral Lower Extremities with hemisiderin staining present

## 2023-05-18 NOTE — PROGRESS NOTE ADULT - SUBJECTIVE AND OBJECTIVE BOX
NP Note discussed with primary attending.      Patient is a 82y old  Male who presents with a chief complaint of Mechanical fall (18 May 2023 11:20)      INTERVAL HPI/OVERNIGHT EVENTS: no acute events overnight.   Pt seen and examined this morning.   Pt sitting up in bed, endorses back pain some relief . Tolerating po. Pt denies SOb, chest discomfort, N/V/abdominal discomfort. BM x 1  yesterday.     MEDICATIONS  (STANDING):  albuterol    90 MICROgram(s) HFA Inhaler 2 Puff(s) Inhalation every 6 hours  amLODIPine   Tablet 5 milliGRAM(s) Oral daily  aspirin  chewable 81 milliGRAM(s) Oral daily  atorvastatin 80 milliGRAM(s) Oral at bedtime  baclofen 10 milliGRAM(s) Oral every 12 hours  clopidogrel Tablet 75 milliGRAM(s) Oral daily  DULoxetine 60 milliGRAM(s) Oral daily  furosemide    Tablet 20 milliGRAM(s) Oral daily  lidocaine   4% Patch 2 Patch Transdermal daily  metoprolol tartrate 25 milliGRAM(s) Oral two times a day  polyethylene glycol 3350 17 Gram(s) Oral daily  pregabalin 75 milliGRAM(s) Oral three times a day  senna 2 Tablet(s) Oral at bedtime  sodium chloride 0.9% lock flush 3 milliLiter(s) IV Push every 8 hours  tamsulosin 0.4 milliGRAM(s) Oral at bedtime    MEDICATIONS  (PRN):  acetaminophen     Tablet .. 1000 milliGRAM(s) Oral every 8 hours PRN Moderate Pain (4 - 6)  melatonin 3 milliGRAM(s) Oral at bedtime PRN Insomnia  oxyCODONE    IR 20 milliGRAM(s) Oral every 6 hours PRN Severe Pain (7 - 10)      __________________________________________________  REVIEW OF SYSTEMS:    CONSTITUTIONAL: No fever,   EYES: no acute visual disturbances  NECK: No pain or stiffness  RESPIRATORY: No cough; No shortness of breath  CARDIOVASCULAR: No chest pain, no palpitations  GASTROINTESTINAL: No pain. No nausea or vomiting; No diarrhea   NEUROLOGICAL: No headache or numbness, no tremors  MUSCULOSKELETAL: back  pain some relief.   GENITOURINARY: no dysuria, no frequency, no hesitancy  PSYCHIATRY: no depression , no anxiety  ALL OTHER  ROS negative        Vital Signs Last 24 Hrs  T(C): 36.1 (18 May 2023 12:09), Max: 36.4 (17 May 2023 22:09)  T(F): 97 (18 May 2023 12:09), Max: 97.6 (17 May 2023 22:09)  HR: 68 (18 May 2023 12:09) (60 - 68)  BP: 149/76 (18 May 2023 12:09) (149/76 - 168/64)  BP(mean): --  RR: 18 (18 May 2023 12:09) (17 - 18)  SpO2: 92% (18 May 2023 12:09) (91% - 92%)    Parameters below as of 18 May 2023 12:09  Patient On (Oxygen Delivery Method): nasal cannula  O2 Flow (L/min): 2      ________________________________________________  PHYSICAL EXAM:  GENERAL: NAD  HEENT: Normocephalic;  conjunctivae and sclerae clear; moist mucous membranes;   NECK : supple  CHEST/LUNG: Clear to auscultation bilaterally with good air entry   HEART: S1 S2  regular; no murmurs, gallops or rubs  ABDOMEN: Soft, Nontender, Nondistended; Bowel sounds present   : indwelling moreno  EXTREMITIES: no cyanosis; no edema; no calf tenderness  SKIN: warm and dry; no rash  NERVOUS SYSTEM:  Awake and alert; no new deficits    _________________________________________________  LABS:                        12.8   5.82  )-----------( 168      ( 17 May 2023 07:35 )             41.1     05-17    143  |  112<H>  |  25<H>  ----------------------------<  101<H>  4.1   |  28  |  0.82    Ca    8.4      17 May 2023 07:35    CAPILLARY BLOOD GLUCOSE    RADIOLOGY & ADDITIONAL TESTS:    < from: Xray Chest 1 View- PORTABLE-Routine (Xray Chest 1 View- PORTABLE-Routine in AM.) (05.15.23 @ 09:33) >  IMPRESSION:  Limited by rotation.    Continued elevation of right hemidiaphragm.    Right lower lung linear atelectasis and possible trace right pleural   effusion.    < end of copied text >  < from: CT Cervical Spine No Cont (05.11.23 @ 05:14) >  IMPRESSION:  CT head: No evidence of intracranial injury or skull fracture.    CT cervical spine: No fracture or dislocation of the cervical spine.    CT Chest abdomen pelvis:  No evidence of acute traumatic injury.  Multilevel thoracic and lumbar vertebral compression fractures are   difficult to definitively age though there are no visible fracture lines   or adjacent edema to suggest acute injury. Comparison with priors would   be helpful.  Dependent densities in the lower lungs most likely atelectasis given the   low lung volumes. Superimposed pneumonia less likely.    < end of copied text >  < from: Xray Chest 1 View-PORTABLE IMMEDIATE (Xray Chest 1 View-PORTABLE IMMEDIATE .) (05.11.23 @ 02:12) >  IMPRESSION: Small bibasilar infiltrates. Spinal hardware.    < end of copied text >      Consultant(s) Notes Reviewed:   YES    Care Discussed with Consultants :     Plan of care was discussed with patient and /or primary care giver; all questions and concerns were addressed and care was aligned with patient's wishes.

## 2023-05-18 NOTE — PROGRESS NOTE ADULT - ASSESSMENT
Patient is a 83 y/o M from [facility], [facility] who walks with a walker and has PMH of HTN, HLD, venous insufficiency, CAD, BPH, CHF who presented s/p mechanical fall. Patient is admitted to medicine for AHRF 2/2 to atelectasis and mechanical fall.   CT Head, chest, spine no fx. Chest CT shows atelectasis. CXR shows small bibasilar infiltrates. pulmonary consulted.   on Oxygen therapy. PT consulted.   Pt with urinary retention, moreno placed, + hematuria. replaced today by urology during admission, Pt given TOV, failed, urinary retention, moreno placed on 5/15 by urology   5/16- medically clear to be discharged to [facility], pending auth

## 2023-05-18 NOTE — PROGRESS NOTE ADULT - PROBLEM SELECTOR PLAN 2
Mechanical fall preceded by dizziness.   CT head: No evidence of intracranial injury or skull fracture.  CT spine: No fracture or dislocation of the cervical spine.  CT chest: No evidence of acute traumatic injury. Multilevel thoracic and lumbar vertebral compression fractures are difficult to definitively age though there are no visible fracture lines or adjacent edema to suggest acute injury.  PT recs ALBERT[facility]: pending auth.

## 2023-05-18 NOTE — PROGRESS NOTE ADULT - PROBLEM SELECTOR PLAN 4
2/2 BPH  Continue tamsulosin  Continue indwelling moreno catheter (placed by  5/15)  Discharge with moreno   following

## 2023-05-19 LAB
ANION GAP SERPL CALC-SCNC: 2 MMOL/L — LOW (ref 5–17)
BUN SERPL-MCNC: 22 MG/DL — HIGH (ref 7–18)
CALCIUM SERPL-MCNC: 9.3 MG/DL — SIGNIFICANT CHANGE UP (ref 8.4–10.5)
CHLORIDE SERPL-SCNC: 110 MMOL/L — HIGH (ref 96–108)
CO2 SERPL-SCNC: 27 MMOL/L — SIGNIFICANT CHANGE UP (ref 22–31)
CREAT SERPL-MCNC: 0.78 MG/DL — SIGNIFICANT CHANGE UP (ref 0.5–1.3)
EGFR: 88 ML/MIN/1.73M2 — SIGNIFICANT CHANGE UP
GLUCOSE SERPL-MCNC: 108 MG/DL — HIGH (ref 70–99)
HCT VFR BLD CALC: 45 % — SIGNIFICANT CHANGE UP (ref 39–50)
HGB BLD-MCNC: 13.9 G/DL — SIGNIFICANT CHANGE UP (ref 13–17)
MCHC RBC-ENTMCNC: 26.4 PG — LOW (ref 27–34)
MCHC RBC-ENTMCNC: 30.9 GM/DL — LOW (ref 32–36)
MCV RBC AUTO: 85.4 FL — SIGNIFICANT CHANGE UP (ref 80–100)
NRBC # BLD: 0 /100 WBCS — SIGNIFICANT CHANGE UP (ref 0–0)
PLATELET # BLD AUTO: 200 K/UL — SIGNIFICANT CHANGE UP (ref 150–400)
POTASSIUM SERPL-MCNC: 4.2 MMOL/L — SIGNIFICANT CHANGE UP (ref 3.5–5.3)
POTASSIUM SERPL-SCNC: 4.2 MMOL/L — SIGNIFICANT CHANGE UP (ref 3.5–5.3)
RBC # BLD: 5.27 M/UL — SIGNIFICANT CHANGE UP (ref 4.2–5.8)
RBC # FLD: 16.3 % — HIGH (ref 10.3–14.5)
SODIUM SERPL-SCNC: 139 MMOL/L — SIGNIFICANT CHANGE UP (ref 135–145)
WBC # BLD: 6.12 K/UL — SIGNIFICANT CHANGE UP (ref 3.8–10.5)
WBC # FLD AUTO: 6.12 K/UL — SIGNIFICANT CHANGE UP (ref 3.8–10.5)

## 2023-05-19 RX ADMIN — Medication 1000 MILLIGRAM(S): at 12:15

## 2023-05-19 RX ADMIN — Medication 25 MILLIGRAM(S): at 06:57

## 2023-05-19 RX ADMIN — OXYCODONE HYDROCHLORIDE 20 MILLIGRAM(S): 5 TABLET ORAL at 01:00

## 2023-05-19 RX ADMIN — SODIUM CHLORIDE 3 MILLILITER(S): 9 INJECTION INTRAMUSCULAR; INTRAVENOUS; SUBCUTANEOUS at 14:24

## 2023-05-19 RX ADMIN — OXYCODONE HYDROCHLORIDE 20 MILLIGRAM(S): 5 TABLET ORAL at 06:59

## 2023-05-19 RX ADMIN — Medication 20 MILLIGRAM(S): at 06:57

## 2023-05-19 RX ADMIN — ALBUTEROL 2 PUFF(S): 90 AEROSOL, METERED ORAL at 08:30

## 2023-05-19 RX ADMIN — Medication 81 MILLIGRAM(S): at 11:48

## 2023-05-19 RX ADMIN — CLOPIDOGREL BISULFATE 75 MILLIGRAM(S): 75 TABLET, FILM COATED ORAL at 11:49

## 2023-05-19 RX ADMIN — POLYETHYLENE GLYCOL 3350 17 GRAM(S): 17 POWDER, FOR SOLUTION ORAL at 11:49

## 2023-05-19 RX ADMIN — OXYCODONE HYDROCHLORIDE 20 MILLIGRAM(S): 5 TABLET ORAL at 13:04

## 2023-05-19 RX ADMIN — OXYCODONE HYDROCHLORIDE 20 MILLIGRAM(S): 5 TABLET ORAL at 14:00

## 2023-05-19 RX ADMIN — Medication 1000 MILLIGRAM(S): at 11:48

## 2023-05-19 RX ADMIN — OXYCODONE HYDROCHLORIDE 20 MILLIGRAM(S): 5 TABLET ORAL at 07:00

## 2023-05-19 RX ADMIN — OXYCODONE HYDROCHLORIDE 20 MILLIGRAM(S): 5 TABLET ORAL at 20:25

## 2023-05-19 RX ADMIN — DULOXETINE HYDROCHLORIDE 60 MILLIGRAM(S): 30 CAPSULE, DELAYED RELEASE ORAL at 11:48

## 2023-05-19 RX ADMIN — Medication 25 MILLIGRAM(S): at 18:23

## 2023-05-19 RX ADMIN — Medication 10 MILLIGRAM(S): at 18:23

## 2023-05-19 RX ADMIN — SODIUM CHLORIDE 3 MILLILITER(S): 9 INJECTION INTRAMUSCULAR; INTRAVENOUS; SUBCUTANEOUS at 07:09

## 2023-05-19 RX ADMIN — Medication 10 MILLIGRAM(S): at 06:57

## 2023-05-19 RX ADMIN — AMLODIPINE BESYLATE 5 MILLIGRAM(S): 2.5 TABLET ORAL at 06:57

## 2023-05-19 NOTE — PROGRESS NOTE ADULT - PROBLEM SELECTOR PLAN 4
- likely 2/2 BPH  -Continue tamsulosin  -Continue indwelling moreno catheter (replaced by  5/15)  - Discharge with moreno, will need to follow up with Urology outpatient on d/c  - following

## 2023-05-19 NOTE — PROGRESS NOTE ADULT - SUBJECTIVE AND OBJECTIVE BOX
Time of Visit:  Patient seen and examined.     MEDICATIONS  (STANDING):  albuterol    90 MICROgram(s) HFA Inhaler 2 Puff(s) Inhalation every 6 hours  amLODIPine   Tablet 5 milliGRAM(s) Oral daily  aspirin  chewable 81 milliGRAM(s) Oral daily  atorvastatin 80 milliGRAM(s) Oral at bedtime  baclofen 10 milliGRAM(s) Oral every 12 hours  clopidogrel Tablet 75 milliGRAM(s) Oral daily  DULoxetine 60 milliGRAM(s) Oral daily  furosemide    Tablet 20 milliGRAM(s) Oral daily  lidocaine   4% Patch 2 Patch Transdermal daily  metoprolol tartrate 25 milliGRAM(s) Oral two times a day  polyethylene glycol 3350 17 Gram(s) Oral daily  senna 2 Tablet(s) Oral at bedtime  sodium chloride 0.9% lock flush 3 milliLiter(s) IV Push every 8 hours  tamsulosin 0.4 milliGRAM(s) Oral at bedtime      MEDICATIONS  (PRN):  acetaminophen     Tablet .. 1000 milliGRAM(s) Oral every 8 hours PRN Moderate Pain (4 - 6)  melatonin 3 milliGRAM(s) Oral at bedtime PRN Insomnia  oxyCODONE    IR 20 milliGRAM(s) Oral every 6 hours PRN Severe Pain (7 - 10)       Medications up to date at time of exam.      PHYSICAL EXAMINATION:  Patient has no new complaints.  GENERAL: The patient is a well-developed, well-nourished, in no apparent distress.     Vital Signs Last 24 Hrs  T(C): 36.4 (19 May 2023 12:17), Max: 36.4 (19 May 2023 05:25)  T(F): 97.5 (19 May 2023 12:17), Max: 97.5 (19 May 2023 05:25)  HR: 65 (19 May 2023 12:17) (60 - 67)  BP: 132/87 (19 May 2023 12:17) (132/87 - 158/78)  BP(mean): --  RR: 17 (19 May 2023 12:17) (16 - 18)  SpO2: 96% (19 May 2023 12:17) (93% - 96%)    Parameters below as of 19 May 2023 12:17  Patient On (Oxygen Delivery Method): nasal cannula  O2 Flow (L/min): 2     (if applicable)    Chest Tube (if applicable)    HEENT: Head is normocephalic and atraumatic. Extraocular muscles are intact. Mucous membranes are moist.     NECK: Supple, no palpable adenopathy.    LUNGS: Clear to auscultation, no wheezing, rales, or rhonchi.    HEART: Regular rate and rhythm without murmur.    ABDOMEN: Soft, nontender, and nondistended.  No hepatosplenomegaly is noted.    : No painful voiding, no pelvic pain    EXTREMITIES: Without any cyanosis, clubbing, rash, lesions or edema.    NEUROLOGIC: Awake, alert, oriented, grossly intact    SKIN: Warm, dry, good turgor.      LABS:                        13.9   6.12  )-----------( 200      ( 19 May 2023 07:24 )             45.0     05-19    139  |  110<H>  |  22<H>  ----------------------------<  108<H>  4.2   |  27  |  0.78    Ca    9.3      19 May 2023 07:24                          MICROBIOLOGY: (if applicable)    RADIOLOGY & ADDITIONAL STUDIES:  EKG:   CXR:  ECHO:    IMPRESSION: 82y Male PAST MEDICAL & SURGICAL HISTORY:  Hypertension      Hyperlipidemia      Venous insufficiency       p/w           RECOMMENDATIONS:   Time of Visit:  Patient seen and examined.  c/c of back pain     MEDICATIONS  (STANDING):  albuterol    90 MICROgram(s) HFA Inhaler 2 Puff(s) Inhalation every 6 hours  amLODIPine   Tablet 5 milliGRAM(s) Oral daily  aspirin  chewable 81 milliGRAM(s) Oral daily  atorvastatin 80 milliGRAM(s) Oral at bedtime  baclofen 10 milliGRAM(s) Oral every 12 hours  clopidogrel Tablet 75 milliGRAM(s) Oral daily  DULoxetine 60 milliGRAM(s) Oral daily  furosemide    Tablet 20 milliGRAM(s) Oral daily  lidocaine   4% Patch 2 Patch Transdermal daily  metoprolol tartrate 25 milliGRAM(s) Oral two times a day  polyethylene glycol 3350 17 Gram(s) Oral daily  senna 2 Tablet(s) Oral at bedtime  sodium chloride 0.9% lock flush 3 milliLiter(s) IV Push every 8 hours  tamsulosin 0.4 milliGRAM(s) Oral at bedtime      MEDICATIONS  (PRN):  acetaminophen     Tablet .. 1000 milliGRAM(s) Oral every 8 hours PRN Moderate Pain (4 - 6)  melatonin 3 milliGRAM(s) Oral at bedtime PRN Insomnia  oxyCODONE    IR 20 milliGRAM(s) Oral every 6 hours PRN Severe Pain (7 - 10)       Medications up to date at time of exam.      PHYSICAL EXAMINATION:  Patient has no new complaints.  GENERAL: The patient is a well-developed, well-nourished, in no apparent distress.     Vital Signs Last 24 Hrs  T(C): 36.4 (19 May 2023 12:17), Max: 36.4 (19 May 2023 05:25)  T(F): 97.5 (19 May 2023 12:17), Max: 97.5 (19 May 2023 05:25)  HR: 65 (19 May 2023 12:17) (60 - 67)  BP: 132/87 (19 May 2023 12:17) (132/87 - 158/78)  BP(mean): --  RR: 17 (19 May 2023 12:17) (16 - 18)  SpO2: 96% (19 May 2023 12:17) (93% - 96%)    Parameters below as of 19 May 2023 12:17  Patient On (Oxygen Delivery Method): nasal cannula  O2 Flow (L/min): 2     (if applicable)    Chest Tube (if applicable)    HEENT: Head is normocephalic and atraumatic. Extraocular muscles are intact. Mucous membranes are moist.     NECK: Supple, no palpable adenopathy.    LUNGS: Clear to auscultation, no wheezing, rales, or rhonchi.    HEART: Regular rate and rhythm without murmur.    ABDOMEN: Soft, nontender, and nondistended.  No hepatosplenomegaly is noted.    : No painful voiding, no pelvic pain    EXTREMITIES: Without any cyanosis, clubbing, rash, lesions or edema.    NEUROLOGIC: Awake, alert, oriented, grossly intact    SKIN: Warm, dry, good turgor.      LABS:                        13.9   6.12  )-----------( 200      ( 19 May 2023 07:24 )             45.0     05-19    139  |  110<H>  |  22<H>  ----------------------------<  108<H>  4.2   |  27  |  0.78    Ca    9.3      19 May 2023 07:24                          MICROBIOLOGY: (if applicable)    RADIOLOGY & ADDITIONAL STUDIES:  EKG:   CXR:  ECHO:    IMPRESSION: 82y Male PAST MEDICAL & SURGICAL HISTORY:  Hypertension      Hyperlipidemia      Venous insufficiency       p/w         Impression: This is an 83 Y/O Male from [facility] presented to ED with s/p Mechanical Fall. Former smoker. Patient admitted for acute hypoxic respiratory  failure requiring supp O2 due to b/l dependent atelectasis with multiple thoracic and lumbar vertebral fx.  . I doubt PNA or PE at this time . He may have undiagnosed COPD . 05-15-23 CXR with trace Right Pleural Effusion.  05-16-23 Negative PCR for Covid 19. 05-11-23 Negative swab for Covid 19 and Negative Blood Cx x2.     Suggestion:  O2 saturation 91% room air at rest , O2 saturation 94% with O2 supplement. Continue Oxygen supplementation 2L NC for now then will monitor O2 saturation trend room air.   Continue Albuterol 90 mcg 2 Puffs Q 6 Hours .   No need to drain Trace Right Lung Effusion .On Lasix 20 mg Oral Daily   .   Incentive spirometry .  Out patient  pulmonary follow up.     No steroids .   PT eval    Fall precaution    DVT GI prophylactic .     Lidocaine patch to back on x 12 Hours .  Spoke with RN regarding pain meds

## 2023-05-19 NOTE — PROGRESS NOTE ADULT - PROBLEM SELECTOR PLAN 12
[facility], awaiting auth.   Discharge with moreno   CM following [facility], [facility], awaiting auth.   Discharge with moreno , will need outpatient follow-up with UROLOGY  CM following

## 2023-05-19 NOTE — PROGRESS NOTE ADULT - SUBJECTIVE AND OBJECTIVE BOX
NP Note discussed with  Primary Attending    Patient is a 82y old  Male who presents with a chief complaint of Mechanical fall (19 May 2023 12:38)      INTERVAL HPI/OVERNIGHT EVENTS: no acute events overnight    MEDICATIONS  (STANDING):  albuterol    90 MICROgram(s) HFA Inhaler 2 Puff(s) Inhalation every 6 hours  amLODIPine   Tablet 5 milliGRAM(s) Oral daily  aspirin  chewable 81 milliGRAM(s) Oral daily  atorvastatin 80 milliGRAM(s) Oral at bedtime  baclofen 10 milliGRAM(s) Oral every 12 hours  clopidogrel Tablet 75 milliGRAM(s) Oral daily  DULoxetine 60 milliGRAM(s) Oral daily  furosemide    Tablet 20 milliGRAM(s) Oral daily  lidocaine   4% Patch 2 Patch Transdermal daily  metoprolol tartrate 25 milliGRAM(s) Oral two times a day  polyethylene glycol 3350 17 Gram(s) Oral daily  senna 2 Tablet(s) Oral at bedtime  sodium chloride 0.9% lock flush 3 milliLiter(s) IV Push every 8 hours  tamsulosin 0.4 milliGRAM(s) Oral at bedtime    MEDICATIONS  (PRN):  acetaminophen     Tablet .. 1000 milliGRAM(s) Oral every 8 hours PRN Moderate Pain (4 - 6)  melatonin 3 milliGRAM(s) Oral at bedtime PRN Insomnia  oxyCODONE    IR 20 milliGRAM(s) Oral every 6 hours PRN Severe Pain (7 - 10)      __________________________________________________  REVIEW OF SYSTEMS:    CONSTITUTIONAL: No fever,   EYES: no acute visual disturbances  NECK: No pain or stiffness  RESPIRATORY: No cough; No shortness of breath  CARDIOVASCULAR: No chest pain, no palpitations  GASTROINTESTINAL: No pain. No nausea or vomiting; No diarrhea   NEUROLOGICAL: No headache or numbness, no tremors  MUSCULOSKELETAL: + lower back pain  GENITOURINARY: no dysuria, no frequency, no hesitancy  PSYCHIATRY: no depression , no anxiety  ALL OTHER  ROS negative        Vital Signs Last 24 Hrs  T(C): 36.4 (19 May 2023 12:17), Max: 36.4 (19 May 2023 05:25)  T(F): 97.5 (19 May 2023 12:17), Max: 97.5 (19 May 2023 05:25)  HR: 65 (19 May 2023 12:17) (60 - 67)  BP: 132/87 (19 May 2023 12:17) (132/87 - 158/78)  BP(mean): --  RR: 17 (19 May 2023 12:17) (16 - 18)  SpO2: 96% (19 May 2023 12:17) (93% - 96%)    Parameters below as of 19 May 2023 12:17  Patient On (Oxygen Delivery Method): nasal cannula  O2 Flow (L/min): 2      ________________________________________________  PHYSICAL EXAM:  GENERAL: NAD  HEENT: Normocephalic  CHEST/LUNG: Clear to auscultation bilaterally  HEART: S1 S2 RRR  ABDOMEN: Soft, Nontender, Nondistended; Bowel sounds present  EXTREMITIES:  no edema; +discoloration to b/l LE  SKIN: warm and dry; no rash  NERVOUS SYSTEM:  Awake and alert; Oriented  to place, person and time ; no new deficits    _________________________________________________  LABS:                        13.9   6.12  )-----------( 200      ( 19 May 2023 07:24 )             45.0     05-19    139  |  110<H>  |  22<H>  ----------------------------<  108<H>  4.2   |  27  |  0.78    Ca    9.3      19 May 2023 07:24          CAPILLARY BLOOD GLUCOSE            RADIOLOGY & ADDITIONAL TESTS:  < from: Xray Chest 1 View- PORTABLE-Routine (Xray Chest 1 View- PORTABLE-Routine in AM.) (05.15.23 @ 09:33) >  INTERPRETATION:    Heart size and the mediastinum cannot be accurately evaluated on this   projection.  Elevated right hemidiaphragm again seen.  There is right lower lung linear atelectasis with a possible trace right   pleural effusion.  The left lung is clear.  No left pleural effusion seen.  No pneumothorax.  Incompletely imaged orthopedic hardware projects over the thoracolumbar   spine.        IMPRESSION:  Limited by rotation.    Continued elevation of right hemidiaphragm.    Right lower lung linear atelectasis and possible trace right pleural   effusion.    --- End of Report ---    < end of copied text >    < from: CT Cervical Spine No Cont (05.11.23 @ 05:14) >    IMPRESSION:  CT head: No evidence of intracranial injury or skull fracture.    CT cervical spine: No fracture or dislocation of the cervical spine.    CT Chest abdomen pelvis:  No evidence of acute traumatic injury.  Multilevel thoracic and lumbar vertebral compression fractures are   difficult to definitively age though there are no visible fracture lines   or adjacent edema to suggest acute injury. Comparison with priors would   be helpful.  Dependent densities in the lower lungs most likely atelectasis given the   low lung volumes. Superimposed pneumonia less likely.      --- End of Report ---    < end of copied text >    < from: CT Head No Cont (05.11.23 @ 05:14) >  FINDINGS:    HEAD:  BRAIN: No apparent acute infarct, hemmorhage or mass. No hydrocephalus.   Chronic appearing white matter hypodensities and volume loss.  CALVARIUM: No skull fracture or agreesive osseous legions.  EXTRACRANIAL SOFTTISSUES: No acute findings.  VISUALIZED PORTIONS OF THE PARANASAL SINUSES AND MASTOID AIR CELLS: No   air fluid levels.    CERVICAL SPINE:  VERTEBRAE: No fracture or dislocation. Exaggeration of cervical lordosis.   Mild degenerative retrolisthesis of C3 on C4 and mild degenerative   anterolisthesis of C6 on C7 and C7 on T1.  SPINAL CANAL AND FORAMINA: Multilevel degenerative changes with no   apparent high grade spinal canal narrowing.  PARASPINAL SOFT TISSUES: No paravertebral hematoma or acutefinding.    CHEST:  LUNGS AND LARGE AIRWAYS: Low lung volumes with right greater than left   basilar opacities and mild elevation of the right hemidiaphragm. No   pulmonary nodules.  PLEURA: No pleural effusion or pneumothorax.  VESSELS: No evidence of injury to the heart and great vessels. No   thoracic aortic dissection or aneurysm.  HEART: Mild four-chamber cardiomegaly. No pericardial effusion.  MEDIASTINUM AND LUIS ALBERTO: No lymphadenopathy.  CHEST WALL AND LOWER NECK: Within normal limits.    ABDOMEN AND PELVIS:  LIVER: Within normal limits.  BILE DUCTS: Normal caliber.  GALLBLADDER: Within normal limits.  SPLEEN: No evidence of splenic injury or concerning finding. Small cystic   lesion upper spleen.  PANCREAS: Within normal limits.  ADRENALS: Within normal limits.  KIDNEYS/URETERS: No evidence of renal injury. No hydronephrosis or renal   stone or concerning mass. Small cysts bilaterally.    BLADDER: Within normal limits.  REPRODUCTIVE ORGANS: Radiation seeds in the prostate.    BOWEL: No bowel obstruction. Appendix is not visualized. No evidence of   inflammation in the pericecal region.  PERITONEUM: No ascites.  VESSELS: No abdominal aortic aneurysm. Calcific atherosclerosis   particularly of the distal abdominal aorta and iliac arteries.  RETROPERITONEUM/LYMPH NODES: No lymphadenopathy.  ABDOMINAL WALL: Large right lower quadrant anterior abdominal wall hernia   extending into the right inguinal region, not fully visible, contains the   cecum, much of the ascending colon, anddistal ileum. No evidence of   obstruction or other acute complication regarding this hernia.  BONES: Several thoracic and lumbar vertebral compression fractures but   with no discrete fracture line or adjacent edema. Mild depression of the   superior endplate of T1. Chronic moderate to severe compression T4   vertebral body. Mild compression T5, T6, T7, T8, T9, T10, T11 and T12   vertebral bodies. Chronic moderate compression of T3 vertebral body with   focal right scoliosis centered at this level. Status post posterior   fusion T2-sacroiliac. The hardware is intact. Bony demineralization.    IMPRESSION:  CT head: No evidence of intracranial injury or skull fracture.    CT cervical spine: No fracture or dislocation of the cervical spine.    CT Chest abdomen pelvis:  No evidence of acute traumatic injury.  Multilevel thoracic and lumbar vertebral compression fractures are   difficult to definitively age though there are no visible fracture lines   or adjacent edema to suggest acute injury. Comparison with priors would   be helpful.  Dependent densities in the lower lungs most likely atelectasis given the   low lung volumes. Superimposed pneumonia less likely.      --- End of Report ---    < end of copied text >      Imaging Personally Reviewed:  YES    Consultant(s) Notes Reviewed:   YES      Plan of care was discussed with patient and /or primary care giver; all questions and concerns were addressed and care was aligned with patient's wishes.

## 2023-05-19 NOTE — PROGRESS NOTE ADULT - SUBJECTIVE AND OBJECTIVE BOX
INTERVAL HPI/OVERNIGHT EVENTS:  Patient seen,stable ,no new events  VITAL SIGNS:  T(F): 97.5 (05-19-23 @ 12:17)  HR: 65 (05-19-23 @ 12:17)  BP: 132/87 (05-19-23 @ 12:17)  RR: 17 (05-19-23 @ 12:17)  SpO2: 96% (05-19-23 @ 12:17)  Wt(kg): --    PHYSICAL EXAM:  awake  Constitutional:  Eyes:  ENMT:perrla  Neck:  Respiratory:clear  Cardiovascular:s1s2,m-none  Gastrointestinal:soft,bs pos  Extremities:  Vascular:  Neurological:no focal deficit  Musculoskeletal:    MEDICATIONS  (STANDING):  albuterol    90 MICROgram(s) HFA Inhaler 2 Puff(s) Inhalation every 6 hours  amLODIPine   Tablet 5 milliGRAM(s) Oral daily  aspirin  chewable 81 milliGRAM(s) Oral daily  atorvastatin 80 milliGRAM(s) Oral at bedtime  baclofen 10 milliGRAM(s) Oral every 12 hours  clopidogrel Tablet 75 milliGRAM(s) Oral daily  DULoxetine 60 milliGRAM(s) Oral daily  furosemide    Tablet 20 milliGRAM(s) Oral daily  lidocaine   4% Patch 2 Patch Transdermal daily  metoprolol tartrate 25 milliGRAM(s) Oral two times a day  polyethylene glycol 3350 17 Gram(s) Oral daily  senna 2 Tablet(s) Oral at bedtime  sodium chloride 0.9% lock flush 3 milliLiter(s) IV Push every 8 hours  tamsulosin 0.4 milliGRAM(s) Oral at bedtime    MEDICATIONS  (PRN):  acetaminophen     Tablet .. 1000 milliGRAM(s) Oral every 8 hours PRN Moderate Pain (4 - 6)  melatonin 3 milliGRAM(s) Oral at bedtime PRN Insomnia  oxyCODONE    IR 20 milliGRAM(s) Oral every 6 hours PRN Severe Pain (7 - 10)      Allergies    No Known Allergies    Intolerances        LABS:                        13.9   6.12  )-----------( 200      ( 19 May 2023 07:24 )             45.0     05-19    139  |  110<H>  |  22<H>  ----------------------------<  108<H>  4.2   |  27  |  0.78    Ca    9.3      19 May 2023 07:24            RADIOLOGY & ADDITIONAL TESTS:      Assessment and Plan:   · Assessment	  Patient is a 83 y/o M from [facility] [employer], [facility] who walks with a walker and has PMH of HTN, HLD, venous insufficiency, CAD, BPH, CHF who presented s/p mechanical fall. Patient is admitted to medicine for AHRF 2/2 to atelectasis and mechanical fall.   CT Head, chest, spine no fx. Chest CT shows atelectasis. CXR shows small bibasilar infiltrates. pulmonary consulted.   on Oxygen therapy. PT consulted.   Pt with urinary retention, moreno placed, + hematuria. replaced today by urology during admission, Pt given TOV, failed, urinary retention, moreno placed on 5/15 by urology   5/16- medically clear to be [facility] to [facility], pending auth          Problem/Plan - 1:  ·  Problem: Acute respiratory failure with hypoxia-stable clinically  ·  Plan: Likely secondary to pain VS atelectasis.   CT chest - Dependent densities in the lower lungs most likely atelectasis given the low lung volumes. Superimposed pneumonia less likely.  Pulm Dr. Bailey consulted  recs appreciated: Continue Albuterol, oxygen, Incentive spirometry, Out pt pulmo f/u.     Problem/Plan - 2:  ·  Problem: Fall, accidental.   ·  Plan: Mechanical fall preceded by dizziness.   CT head: No evidence of intracranial injury or skull fracture.  CT spine: No fracture or dislocation of the cervical spine.  CT chest: No evidence of acute traumatic injury. Multilevel thoracic and lumbar vertebral compression fractures are difficult to definitively age though there are no visible fracture lines or adjacent edema to suggest acute injury.  PT recs ALBERTA: pending auth.     Problem/Plan - 3:  ·  Problem: Cellulitis of left thigh.   ·  Plan: completed abx  BC NGTD.     Problem/Plan - 4:  ·  Problem: Urinary retention.   ·  Plan: 2/2 BPH  Continue tamsulosin  Continue indwelling moreno catheter (placed by  5/15)  Discharge with moreno   following.     Problem/Plan - 5:  ·  Problem: Hematuria.   ·  Plan: Patient developed hematuria a few hours after admission.    Likely 2/2 to traumatic moreno insertion as multiple tries to place moreno was done.  Resolved.   Holding lovenox in setting of hematuria.   Monitor H/H.     Problem/Plan - 6:  ·  Problem: Venous insufficiency.   ·  Plan: Has bilateral edema with significant venous dermatitis.   Continue lasix 20mg.     Problem/Plan - 7:  ·  Problem: Chronic back pain.   ·  Plan: home meds Oxycodon 20 TID and oxycodone 10 OD.   c/w pain regimen per Pain Mgmt : oxycodone 20mg PO q 6 hours PRN severe pain.   Continue home dose baclofen 10mg PO BID.   - Continue home dose cymbalta 60mg PO daily.   - Continue home dose lyrica 75mg po q 8 hours. Monitor renal function.   - Continue Lidoderm 4% patch 2 patches daily.   Continue bowel regimen   Pain Mgmt team following.     Problem/Plan - 8:  ·  Problem: CAD (coronary artery disease).   ·  Plan: Continue with  statin and plavix, metoprolol.     Problem/Plan - 9:  ·  Problem: Hypertension.   ·  Plan: Improved control  Continue amlodipine  Continue furosemide.     Problem/Plan - 10:  ·  Problem: Hyperlipidemia.   ·  Plan; Continue statin.     Problem/Plan - 11:  ·  Problem: Prophylactic measure.   ·  Plan: DVT ppx: SCDs. Hold lovenox in setting of hematuria.     Problem/Plan - 12:  ·  Problem: Discharge planning issues.   ·  Plan: [facility] to [facility], awaiting auth.   Discharge with josh ROMERO following.

## 2023-05-19 NOTE — PROGRESS NOTE ADULT - PROBLEM SELECTOR PLAN 2
-Mechanical fall preceded by dizziness.   -CT head: No evidence of intracranial injury or skull fracture.  -CT spine: No fracture or dislocation of the cervical spine.  -CT chest: No evidence of acute traumatic injury. Multilevel thoracic and lumbar vertebral compression fractures are difficult to definitively age though there are no visible fracture lines or adjacent edema to suggest acute injury.  -PT recs ALBERTA: pending auth.

## 2023-05-19 NOTE — PROGRESS NOTE ADULT - PROBLEM SELECTOR PLAN 1
- Likely secondary to pain VS atelectasis.   -On admission saturating 88% on room air as per ED note.   -CXR - Small bibasilar infiltrates.  -CT chest - Dependent densities in the lower lungs most likely atelectasis given the low lung volumes. Superimposed pneumonia less likely.  -Pulm Dr. Bailey consulted  recs appreciated: Continue Albuterol, oxygen, Incentive spirometry, Out pt pulmo f/u.

## 2023-05-19 NOTE — PROGRESS NOTE ADULT - ASSESSMENT
Patient is a 81 y/o M from [facility] [employer], [facility] who walks with a walker and has PMH of HTN, HLD, venous insufficiency, CAD, BPH, CHF who presented s/p mechanical fall. Admitted to medicine for AHRF 2/2 to atelectasis and mechanical fall. Noted to have erythema to upper thigh and complte course of IV abxCT Head, chest, spine no fx. Chest CT shows atelectasis. CXR shows small bibasilar infiltrates, pulmonary consulted, required nasal cannula now tolerating room air. Also found to have urinary retention, moreno placed, + hematuria, Urology consulted, failed TOV and re placed on 5/15 by Urology. Noted with hematuria today, Hgb stable, moreno catheter irrigated and noted with improvement in hematuria. Per discussion with Urology will continue to monitor. PT reccs ALBERTA, pending auth to [facility]

## 2023-05-20 LAB
ANION GAP SERPL CALC-SCNC: 4 MMOL/L — LOW (ref 5–17)
BUN SERPL-MCNC: 27 MG/DL — HIGH (ref 7–18)
CALCIUM SERPL-MCNC: 8.9 MG/DL — SIGNIFICANT CHANGE UP (ref 8.4–10.5)
CHLORIDE SERPL-SCNC: 111 MMOL/L — HIGH (ref 96–108)
CO2 SERPL-SCNC: 24 MMOL/L — SIGNIFICANT CHANGE UP (ref 22–31)
CREAT SERPL-MCNC: 0.86 MG/DL — SIGNIFICANT CHANGE UP (ref 0.5–1.3)
EGFR: 85 ML/MIN/1.73M2 — SIGNIFICANT CHANGE UP
GLUCOSE SERPL-MCNC: 104 MG/DL — HIGH (ref 70–99)
HCT VFR BLD CALC: 43.7 % — SIGNIFICANT CHANGE UP (ref 39–50)
HGB BLD-MCNC: 14 G/DL — SIGNIFICANT CHANGE UP (ref 13–17)
MCHC RBC-ENTMCNC: 26.3 PG — LOW (ref 27–34)
MCHC RBC-ENTMCNC: 32 GM/DL — SIGNIFICANT CHANGE UP (ref 32–36)
MCV RBC AUTO: 82 FL — SIGNIFICANT CHANGE UP (ref 80–100)
NRBC # BLD: 0 /100 WBCS — SIGNIFICANT CHANGE UP (ref 0–0)
PLATELET # BLD AUTO: 196 K/UL — SIGNIFICANT CHANGE UP (ref 150–400)
POTASSIUM SERPL-MCNC: 3.7 MMOL/L — SIGNIFICANT CHANGE UP (ref 3.5–5.3)
POTASSIUM SERPL-SCNC: 3.7 MMOL/L — SIGNIFICANT CHANGE UP (ref 3.5–5.3)
RBC # BLD: 5.33 M/UL — SIGNIFICANT CHANGE UP (ref 4.2–5.8)
RBC # FLD: 16.4 % — HIGH (ref 10.3–14.5)
SODIUM SERPL-SCNC: 139 MMOL/L — SIGNIFICANT CHANGE UP (ref 135–145)
WBC # BLD: 8.08 K/UL — SIGNIFICANT CHANGE UP (ref 3.8–10.5)
WBC # FLD AUTO: 8.08 K/UL — SIGNIFICANT CHANGE UP (ref 3.8–10.5)

## 2023-05-20 RX ORDER — HYDROMORPHONE HYDROCHLORIDE 2 MG/ML
2 INJECTION INTRAMUSCULAR; INTRAVENOUS; SUBCUTANEOUS ONCE
Refills: 0 | Status: DISCONTINUED | OUTPATIENT
Start: 2023-05-20 | End: 2023-05-20

## 2023-05-20 RX ORDER — ONDANSETRON 8 MG/1
4 TABLET, FILM COATED ORAL ONCE
Refills: 0 | Status: COMPLETED | OUTPATIENT
Start: 2023-05-20 | End: 2023-05-20

## 2023-05-20 RX ORDER — MORPHINE SULFATE 50 MG/1
4 CAPSULE, EXTENDED RELEASE ORAL ONCE
Refills: 0 | Status: DISCONTINUED | OUTPATIENT
Start: 2023-05-20 | End: 2023-05-20

## 2023-05-20 RX ADMIN — MORPHINE SULFATE 4 MILLIGRAM(S): 50 CAPSULE, EXTENDED RELEASE ORAL at 12:45

## 2023-05-20 RX ADMIN — OXYCODONE HYDROCHLORIDE 20 MILLIGRAM(S): 5 TABLET ORAL at 02:06

## 2023-05-20 RX ADMIN — ATORVASTATIN CALCIUM 80 MILLIGRAM(S): 80 TABLET, FILM COATED ORAL at 22:21

## 2023-05-20 RX ADMIN — Medication 3 MILLIGRAM(S): at 22:54

## 2023-05-20 RX ADMIN — Medication 1000 MILLIGRAM(S): at 10:27

## 2023-05-20 RX ADMIN — Medication 10 MILLIGRAM(S): at 17:52

## 2023-05-20 RX ADMIN — SODIUM CHLORIDE 3 MILLILITER(S): 9 INJECTION INTRAMUSCULAR; INTRAVENOUS; SUBCUTANEOUS at 22:25

## 2023-05-20 RX ADMIN — OXYCODONE HYDROCHLORIDE 20 MILLIGRAM(S): 5 TABLET ORAL at 15:27

## 2023-05-20 RX ADMIN — HYDROMORPHONE HYDROCHLORIDE 2 MILLIGRAM(S): 2 INJECTION INTRAMUSCULAR; INTRAVENOUS; SUBCUTANEOUS at 17:47

## 2023-05-20 RX ADMIN — ALBUTEROL 2 PUFF(S): 90 AEROSOL, METERED ORAL at 22:21

## 2023-05-20 RX ADMIN — DULOXETINE HYDROCHLORIDE 60 MILLIGRAM(S): 30 CAPSULE, DELAYED RELEASE ORAL at 13:18

## 2023-05-20 RX ADMIN — ALBUTEROL 2 PUFF(S): 90 AEROSOL, METERED ORAL at 14:02

## 2023-05-20 RX ADMIN — HYDROMORPHONE HYDROCHLORIDE 2 MILLIGRAM(S): 2 INJECTION INTRAMUSCULAR; INTRAVENOUS; SUBCUTANEOUS at 18:05

## 2023-05-20 RX ADMIN — OXYCODONE HYDROCHLORIDE 20 MILLIGRAM(S): 5 TABLET ORAL at 21:34

## 2023-05-20 RX ADMIN — OXYCODONE HYDROCHLORIDE 20 MILLIGRAM(S): 5 TABLET ORAL at 09:14

## 2023-05-20 RX ADMIN — OXYCODONE HYDROCHLORIDE 20 MILLIGRAM(S): 5 TABLET ORAL at 14:02

## 2023-05-20 RX ADMIN — Medication 25 MILLIGRAM(S): at 17:52

## 2023-05-20 RX ADMIN — MORPHINE SULFATE 4 MILLIGRAM(S): 50 CAPSULE, EXTENDED RELEASE ORAL at 12:15

## 2023-05-20 RX ADMIN — Medication 81 MILLIGRAM(S): at 13:18

## 2023-05-20 RX ADMIN — TAMSULOSIN HYDROCHLORIDE 0.4 MILLIGRAM(S): 0.4 CAPSULE ORAL at 22:21

## 2023-05-20 RX ADMIN — OXYCODONE HYDROCHLORIDE 20 MILLIGRAM(S): 5 TABLET ORAL at 20:29

## 2023-05-20 RX ADMIN — ALBUTEROL 2 PUFF(S): 90 AEROSOL, METERED ORAL at 08:15

## 2023-05-20 RX ADMIN — SODIUM CHLORIDE 3 MILLILITER(S): 9 INJECTION INTRAMUSCULAR; INTRAVENOUS; SUBCUTANEOUS at 16:27

## 2023-05-20 RX ADMIN — Medication 1000 MILLIGRAM(S): at 23:43

## 2023-05-20 RX ADMIN — CLOPIDOGREL BISULFATE 75 MILLIGRAM(S): 75 TABLET, FILM COATED ORAL at 14:51

## 2023-05-20 RX ADMIN — ONDANSETRON 4 MILLIGRAM(S): 8 TABLET, FILM COATED ORAL at 12:15

## 2023-05-20 RX ADMIN — LIDOCAINE 2 PATCH: 4 CREAM TOPICAL at 13:17

## 2023-05-20 RX ADMIN — SENNA PLUS 2 TABLET(S): 8.6 TABLET ORAL at 22:21

## 2023-05-20 RX ADMIN — POLYETHYLENE GLYCOL 3350 17 GRAM(S): 17 POWDER, FOR SOLUTION ORAL at 13:16

## 2023-05-20 RX ADMIN — OXYCODONE HYDROCHLORIDE 20 MILLIGRAM(S): 5 TABLET ORAL at 08:14

## 2023-05-20 RX ADMIN — Medication 1000 MILLIGRAM(S): at 11:27

## 2023-05-20 RX ADMIN — LIDOCAINE 2 PATCH: 4 CREAM TOPICAL at 20:08

## 2023-05-20 NOTE — PROGRESS NOTE ADULT - SUBJECTIVE AND OBJECTIVE BOX
Time of Visit:  Patient seen and examined. pat is laying supine in bed c/c back pain and nausea     MEDICATIONS  (STANDING):  albuterol    90 MICROgram(s) HFA Inhaler 2 Puff(s) Inhalation every 6 hours  amLODIPine   Tablet 5 milliGRAM(s) Oral daily  aspirin  chewable 81 milliGRAM(s) Oral daily  atorvastatin 80 milliGRAM(s) Oral at bedtime  baclofen 10 milliGRAM(s) Oral every 12 hours  clopidogrel Tablet 75 milliGRAM(s) Oral daily  DULoxetine 60 milliGRAM(s) Oral daily  furosemide    Tablet 20 milliGRAM(s) Oral daily  HYDROmorphone  Injectable 2 milliGRAM(s) IV Push once  lidocaine   4% Patch 2 Patch Transdermal daily  metoprolol tartrate 25 milliGRAM(s) Oral two times a day  polyethylene glycol 3350 17 Gram(s) Oral daily  senna 2 Tablet(s) Oral at bedtime  sodium chloride 0.9% lock flush 3 milliLiter(s) IV Push every 8 hours  tamsulosin 0.4 milliGRAM(s) Oral at bedtime      MEDICATIONS  (PRN):  acetaminophen     Tablet .. 1000 milliGRAM(s) Oral every 8 hours PRN Moderate Pain (4 - 6)  melatonin 3 milliGRAM(s) Oral at bedtime PRN Insomnia  oxyCODONE    IR 20 milliGRAM(s) Oral every 6 hours PRN Severe Pain (7 - 10)       Medications up to date at time of exam.      PHYSICAL EXAMINATION:  Patient has no new complaints.  GENERAL: The patient is a well-developed, well-nourished, in no apparent distress.     Vital Signs Last 24 Hrs  T(C): 36.6 (20 May 2023 13:28), Max: 36.7 (19 May 2023 21:17)  T(F): 97.8 (20 May 2023 13:28), Max: 98.1 (19 May 2023 21:17)  HR: 88 (20 May 2023 13:28) (70 - 101)  BP: 149/88 (20 May 2023 13:28) (148/80 - 166/75)  BP(mean): --  RR: 18 (20 May 2023 13:28) (17 - 18)  SpO2: 93% (20 May 2023 13:28) (90% - 95%)    Parameters below as of 20 May 2023 13:28  Patient On (Oxygen Delivery Method): nasal cannula  O2 Flow (L/min): 2     (if applicable)    Chest Tube (if applicable)    HEENT: Head is normocephalic and atraumatic. Extraocular muscles are intact. Mucous membranes are moist.     NECK: Supple, no palpable adenopathy.    LUNGS: Clear to auscultation, no wheezing, rales, or rhonchi.    HEART: Regular rate and rhythm without murmur.    ABDOMEN: Soft, nontender, and nondistended.  No hepatosplenomegaly is noted.    : No painful voiding, no pelvic pain + moreno with hematuria     EXTREMITIES: Without any cyanosis, clubbing, rash, lesions or edema.    NEUROLOGIC: Awake, alert, oriented, grossly intact    SKIN: Warm, dry, good turgor.      LABS:                        14.0   8.08  )-----------( 196      ( 20 May 2023 07:28 )             43.7     05-20    139  |  111<H>  |  27<H>  ----------------------------<  104<H>  3.7   |  24  |  0.86    Ca    8.9      20 May 2023 07:28                          MICROBIOLOGY: (if applicable)    RADIOLOGY & ADDITIONAL STUDIES:  EKG:   CXR:  ECHO:    IMPRESSION: 82y Male PAST MEDICAL & SURGICAL HISTORY:  Hypertension      Hyperlipidemia      Venous insufficiency       p/w         Impression: This is an 81 Y/O man  from [facility] presented to ED with s/p Mechanical Fall. Former smoker. Patient admitted for acute hypoxic respiratory  failure requiring supp O2 due to b/l dependent atelectasis with multiple thoracic and lumbar vertebral fx.  . I doubt PNA or PE at this time . He may have undiagnosed COPD . 05-15-23 CXR with trace Right Pleural Effusion.  05-16-23 Negative PCR for Covid 19. 05-11-23 Negative swab for Covid 19 and Negative Blood Cx x2.  He is complaining of back pain from prior surgery . Hematuria due to traumatic moreno  no change in H/H.     Sugg  - Continue O2 supp to keep sat >90%  - Incentive spirometry   - Duonebs   - Pain control   - Anticoag held due to hematuria   - Nausea due to morphine , asked NP covering to Rx Zofran     spoke with covering NP and RN

## 2023-05-20 NOTE — CHART NOTE - NSCHARTNOTEFT_GEN_A_CORE
Called by bedside RN for two issues.  1) Persistent hematuria despite bladder irrigations through night, last done at 7AM when RN came on.  No clots.  2) Persistent low back pain despite current pharmacologic measures and change of body position.    Pt seen at bedside.  No acute distress.  When asked what's wrong, he says nothing. But he was moaning.  I asked why he was moaning, he stated he has chronic LBP after back surgery many years ago.  No lower abd pain.  No paresthesias.    1) Hematuria secondary to traumatic moreno insertion: Cont copious irrigation q4hr. Lovenox on hold.  On ASA/plavix. Can consider holding plavix, will need to obtain more information on indication for plavix.  2) LBP: Pt requests something stronger for breakthrough, will give trial of morphine 4mg IV x 1 and reassess. If pain is persistent and uncontrolled, then will c/s pain management.    Ryan Hurt NP

## 2023-05-20 NOTE — PROGRESS NOTE ADULT - SUBJECTIVE AND OBJECTIVE BOX
INTERVAL HPI/OVERNIGHT EVENTS:  Patient seen,no acute issues  VITAL SIGNS:  T(F): 97.7 (05-20-23 @ 05:23)  HR: 70 (05-20-23 @ 05:23)  BP: 166/75 (05-20-23 @ 05:23)  RR: 18 (05-20-23 @ 05:23)  SpO2: 94% (05-20-23 @ 05:23)  Wt(kg): --    PHYSICAL EXAM:  awake  Constitutional:  Eyes:  ENMT:perrla  Neck:  Respiratory:clear  Cardiovascular:s1s2,m-none  Gastrointestinal:soft,bs pos  Extremities:pain in back area reprod  Vascular:  Neurological:no focal deficit  Musculoskeletal:    MEDICATIONS  (STANDING):  albuterol    90 MICROgram(s) HFA Inhaler 2 Puff(s) Inhalation every 6 hours  amLODIPine   Tablet 5 milliGRAM(s) Oral daily  aspirin  chewable 81 milliGRAM(s) Oral daily  atorvastatin 80 milliGRAM(s) Oral at bedtime  baclofen 10 milliGRAM(s) Oral every 12 hours  clopidogrel Tablet 75 milliGRAM(s) Oral daily  DULoxetine 60 milliGRAM(s) Oral daily  furosemide    Tablet 20 milliGRAM(s) Oral daily  lidocaine   4% Patch 2 Patch Transdermal daily  metoprolol tartrate 25 milliGRAM(s) Oral two times a day  polyethylene glycol 3350 17 Gram(s) Oral daily  senna 2 Tablet(s) Oral at bedtime  sodium chloride 0.9% lock flush 3 milliLiter(s) IV Push every 8 hours  tamsulosin 0.4 milliGRAM(s) Oral at bedtime    MEDICATIONS  (PRN):  acetaminophen     Tablet .. 1000 milliGRAM(s) Oral every 8 hours PRN Moderate Pain (4 - 6)  melatonin 3 milliGRAM(s) Oral at bedtime PRN Insomnia  oxyCODONE    IR 20 milliGRAM(s) Oral every 6 hours PRN Severe Pain (7 - 10)      Allergies    No Known Allergies    Intolerances        LABS:                        14.0   8.08  )-----------( 196      ( 20 May 2023 07:28 )             43.7     05-20    139  |  111<H>  |  27<H>  ----------------------------<  104<H>  3.7   |  24  |  0.86    Ca    8.9      20 May 2023 07:28            RADIOLOGY & ADDITIONAL TESTS:      Assessment and Plan:   · Assessment	  Patient is a 83 y/o M from [facility] [employer], [facility] who walks with a walker and has PMH of HTN, HLD, venous insufficiency, CAD, BPH, CHF who presented s/p mechanical fall. Patient is admitted to medicine for AHRF 2/2 to atelectasis and mechanical fall.   CT Head, chest, spine no fx. Chest CT shows atelectasis. CXR shows small bibasilar infiltrates. pulmonary consulted.   on Oxygen therapy. PT consulted.   Pt with urinary retention, moreno placed, + hematuria. replaced today by urology during admission, Pt given TOV, failed, urinary retention, moreno placed on 5/15 by urology   5/16- medically clear to be [facility] to [facility], pending auth          Problem/Plan - 1:  ·  Problem: Acute respiratory failure with hypoxia-stable clinically  ·  Plan: Likely secondary to pain VS atelectasis.   CT chest - Dependent densities in the lower lungs most likely atelectasis given the low lung volumes. Superimposed pneumonia less likely.  Pulm Dr. Bailey consulted  recs appreciated: Continue Albuterol, oxygen, Incentive spirometry, Out pt pulmo f/u.     Problem/Plan - 2:  ·  Problem: Fall, accidental.   ·  Plan: Mechanical fall preceded by dizziness.   CT head: No evidence of intracranial injury or skull fracture.  CT spine: No fracture or dislocation of the cervical spine.  CT chest: No evidence of acute traumatic injury. Multilevel thoracic and lumbar vertebral compression fractures are difficult to definitively age though there are no visible fracture lines or adjacent edema to suggest acute injury.  PT recs ALBERTA: pending auth.     Problem/Plan - 3:  ·  Problem: Cellulitis of left thigh.   ·  Plan: completed abx  BC NGTD.     Problem/Plan - 4:  ·  Problem: Urinary retention.   ·  Plan: 2/2 BPH  Continue tamsulosin  Continue indwelling moreno catheter (placed by  5/15)  Discharge with moreno   following.     Problem/Plan - 5:  ·  Problem: Hematuria.   ·  Plan: Patient developed hematuria a few hours after admission.    Likely 2/2 to traumatic moreno insertion as multiple tries to place moreno was done.  Resolved.   Holding lovenox in setting of hematuria.   Monitor H/H.     Problem/Plan - 6:  ·  Problem: Venous insufficiency.   ·  Plan: Has bilateral edema with significant venous dermatitis.   Continue lasix 20mg.     Problem/Plan - 7:  ·  Problem: Chronic back pain.   ·  Plan: home meds Oxycodon 20 TID and oxycodone 10 OD.   c/w pain regimen per Pain Mgmt : oxycodone 20mg PO q 6 hours PRN severe pain.   Continue home dose baclofen 10mg PO BID.   - Continue home dose cymbalta 60mg PO daily.   - Continue home dose lyrica 75mg po q 8 hours. Monitor renal function.   - Continue Lidoderm 4% patch 2 patches daily.   Continue bowel regimen   Pain Mgmt team following.     Problem/Plan - 8:  ·  Problem: CAD (coronary artery disease).   ·  Plan: Continue with  statin and plavix, metoprolol.     Problem/Plan - 9:  ·  Problem: Hypertension.   ·  Plan: Improved control  Continue amlodipine  Continue furosemide.     Problem/Plan - 10:  ·  Problem: Hyperlipidemia.   ·  Plan; Continue statin.     Problem/Plan - 11:  ·  Problem: Prophylactic measure.   ·  Plan: DVT ppx: SCDs. Hold lovenox in setting of hematuria.     Problem/Plan - 12:  ·  Problem: Discharge planning issues.   ·  Plan: [facility], awaiting auth.   Discharge with josh ROMERO following.

## 2023-05-21 LAB
ANION GAP SERPL CALC-SCNC: 2 MMOL/L — LOW (ref 5–17)
BUN SERPL-MCNC: 24 MG/DL — HIGH (ref 7–18)
CALCIUM SERPL-MCNC: 9.1 MG/DL — SIGNIFICANT CHANGE UP (ref 8.4–10.5)
CHLORIDE SERPL-SCNC: 108 MMOL/L — SIGNIFICANT CHANGE UP (ref 96–108)
CO2 SERPL-SCNC: 28 MMOL/L — SIGNIFICANT CHANGE UP (ref 22–31)
CREAT SERPL-MCNC: 0.84 MG/DL — SIGNIFICANT CHANGE UP (ref 0.5–1.3)
EGFR: 86 ML/MIN/1.73M2 — SIGNIFICANT CHANGE UP
GLUCOSE SERPL-MCNC: 114 MG/DL — HIGH (ref 70–99)
HCT VFR BLD CALC: 44.3 % — SIGNIFICANT CHANGE UP (ref 39–50)
HCT VFR BLD CALC: 45.7 % — SIGNIFICANT CHANGE UP (ref 39–50)
HGB BLD-MCNC: 13.6 G/DL — SIGNIFICANT CHANGE UP (ref 13–17)
HGB BLD-MCNC: 14.3 G/DL — SIGNIFICANT CHANGE UP (ref 13–17)
MCHC RBC-ENTMCNC: 25.7 PG — LOW (ref 27–34)
MCHC RBC-ENTMCNC: 26.1 PG — LOW (ref 27–34)
MCHC RBC-ENTMCNC: 30.7 GM/DL — LOW (ref 32–36)
MCHC RBC-ENTMCNC: 31.3 GM/DL — LOW (ref 32–36)
MCV RBC AUTO: 83.5 FL — SIGNIFICANT CHANGE UP (ref 80–100)
MCV RBC AUTO: 83.6 FL — SIGNIFICANT CHANGE UP (ref 80–100)
NRBC # BLD: 0 /100 WBCS — SIGNIFICANT CHANGE UP (ref 0–0)
NRBC # BLD: 0 /100 WBCS — SIGNIFICANT CHANGE UP (ref 0–0)
PLATELET # BLD AUTO: 192 K/UL — SIGNIFICANT CHANGE UP (ref 150–400)
PLATELET # BLD AUTO: 204 K/UL — SIGNIFICANT CHANGE UP (ref 150–400)
POTASSIUM SERPL-MCNC: 4 MMOL/L — SIGNIFICANT CHANGE UP (ref 3.5–5.3)
POTASSIUM SERPL-SCNC: 4 MMOL/L — SIGNIFICANT CHANGE UP (ref 3.5–5.3)
RBC # BLD: 5.3 M/UL — SIGNIFICANT CHANGE UP (ref 4.2–5.8)
RBC # BLD: 5.47 M/UL — SIGNIFICANT CHANGE UP (ref 4.2–5.8)
RBC # FLD: 16.6 % — HIGH (ref 10.3–14.5)
RBC # FLD: 16.9 % — HIGH (ref 10.3–14.5)
SODIUM SERPL-SCNC: 138 MMOL/L — SIGNIFICANT CHANGE UP (ref 135–145)
WBC # BLD: 5.99 K/UL — SIGNIFICANT CHANGE UP (ref 3.8–10.5)
WBC # BLD: 7.91 K/UL — SIGNIFICANT CHANGE UP (ref 3.8–10.5)
WBC # FLD AUTO: 5.99 K/UL — SIGNIFICANT CHANGE UP (ref 3.8–10.5)
WBC # FLD AUTO: 7.91 K/UL — SIGNIFICANT CHANGE UP (ref 3.8–10.5)

## 2023-05-21 PROCEDURE — 51700 IRRIGATION OF BLADDER: CPT

## 2023-05-21 RX ORDER — MORPHINE SULFATE 50 MG/1
4 CAPSULE, EXTENDED RELEASE ORAL ONCE
Refills: 0 | Status: DISCONTINUED | OUTPATIENT
Start: 2023-05-21 | End: 2023-05-21

## 2023-05-21 RX ORDER — MORPHINE SULFATE 50 MG/1
8 CAPSULE, EXTENDED RELEASE ORAL ONCE
Refills: 0 | Status: DISCONTINUED | OUTPATIENT
Start: 2023-05-21 | End: 2023-05-21

## 2023-05-21 RX ORDER — ONDANSETRON 8 MG/1
4 TABLET, FILM COATED ORAL EVERY 6 HOURS
Refills: 0 | Status: DISCONTINUED | OUTPATIENT
Start: 2023-05-21 | End: 2023-05-26

## 2023-05-21 RX ADMIN — ATORVASTATIN CALCIUM 80 MILLIGRAM(S): 80 TABLET, FILM COATED ORAL at 21:31

## 2023-05-21 RX ADMIN — ALBUTEROL 2 PUFF(S): 90 AEROSOL, METERED ORAL at 02:48

## 2023-05-21 RX ADMIN — SODIUM CHLORIDE 3 MILLILITER(S): 9 INJECTION INTRAMUSCULAR; INTRAVENOUS; SUBCUTANEOUS at 06:45

## 2023-05-21 RX ADMIN — ALBUTEROL 2 PUFF(S): 90 AEROSOL, METERED ORAL at 08:30

## 2023-05-21 RX ADMIN — Medication 10 MILLIGRAM(S): at 17:25

## 2023-05-21 RX ADMIN — MORPHINE SULFATE 8 MILLIGRAM(S): 50 CAPSULE, EXTENDED RELEASE ORAL at 17:24

## 2023-05-21 RX ADMIN — CLOPIDOGREL BISULFATE 75 MILLIGRAM(S): 75 TABLET, FILM COATED ORAL at 11:32

## 2023-05-21 RX ADMIN — POLYETHYLENE GLYCOL 3350 17 GRAM(S): 17 POWDER, FOR SOLUTION ORAL at 11:31

## 2023-05-21 RX ADMIN — OXYCODONE HYDROCHLORIDE 20 MILLIGRAM(S): 5 TABLET ORAL at 08:44

## 2023-05-21 RX ADMIN — OXYCODONE HYDROCHLORIDE 20 MILLIGRAM(S): 5 TABLET ORAL at 15:30

## 2023-05-21 RX ADMIN — OXYCODONE HYDROCHLORIDE 20 MILLIGRAM(S): 5 TABLET ORAL at 21:30

## 2023-05-21 RX ADMIN — Medication 25 MILLIGRAM(S): at 06:47

## 2023-05-21 RX ADMIN — TAMSULOSIN HYDROCHLORIDE 0.4 MILLIGRAM(S): 0.4 CAPSULE ORAL at 21:31

## 2023-05-21 RX ADMIN — AMLODIPINE BESYLATE 5 MILLIGRAM(S): 2.5 TABLET ORAL at 06:47

## 2023-05-21 RX ADMIN — Medication 81 MILLIGRAM(S): at 11:31

## 2023-05-21 RX ADMIN — MORPHINE SULFATE 8 MILLIGRAM(S): 50 CAPSULE, EXTENDED RELEASE ORAL at 17:57

## 2023-05-21 RX ADMIN — Medication 10 MILLIGRAM(S): at 06:46

## 2023-05-21 RX ADMIN — OXYCODONE HYDROCHLORIDE 20 MILLIGRAM(S): 5 TABLET ORAL at 09:30

## 2023-05-21 RX ADMIN — SENNA PLUS 2 TABLET(S): 8.6 TABLET ORAL at 21:31

## 2023-05-21 RX ADMIN — OXYCODONE HYDROCHLORIDE 20 MILLIGRAM(S): 5 TABLET ORAL at 03:42

## 2023-05-21 RX ADMIN — OXYCODONE HYDROCHLORIDE 20 MILLIGRAM(S): 5 TABLET ORAL at 02:42

## 2023-05-21 RX ADMIN — ALBUTEROL 2 PUFF(S): 90 AEROSOL, METERED ORAL at 21:30

## 2023-05-21 RX ADMIN — OXYCODONE HYDROCHLORIDE 20 MILLIGRAM(S): 5 TABLET ORAL at 14:46

## 2023-05-21 RX ADMIN — LIDOCAINE 2 PATCH: 4 CREAM TOPICAL at 02:48

## 2023-05-21 RX ADMIN — ONDANSETRON 4 MILLIGRAM(S): 8 TABLET, FILM COATED ORAL at 12:36

## 2023-05-21 RX ADMIN — OXYCODONE HYDROCHLORIDE 20 MILLIGRAM(S): 5 TABLET ORAL at 22:27

## 2023-05-21 RX ADMIN — SODIUM CHLORIDE 3 MILLILITER(S): 9 INJECTION INTRAMUSCULAR; INTRAVENOUS; SUBCUTANEOUS at 13:35

## 2023-05-21 RX ADMIN — Medication 1000 MILLIGRAM(S): at 00:48

## 2023-05-21 RX ADMIN — SODIUM CHLORIDE 3 MILLILITER(S): 9 INJECTION INTRAMUSCULAR; INTRAVENOUS; SUBCUTANEOUS at 21:46

## 2023-05-21 RX ADMIN — DULOXETINE HYDROCHLORIDE 60 MILLIGRAM(S): 30 CAPSULE, DELAYED RELEASE ORAL at 11:31

## 2023-05-21 RX ADMIN — Medication 20 MILLIGRAM(S): at 06:47

## 2023-05-21 NOTE — CONSULT NOTE ADULT - ATTENDING COMMENTS
83 yo male with gross hematuria and urinary retention. He developed worsening hematuria with clot retention requiring multiple irrigations. He likely had a displaced moreno after pulling on catheter causing prostatic bleeding. He has required catheter exchange with 6-eye catheter and irrigation of ~200 cc of clot. He has been restarted on CBI and can continue to wean if output remains clear.    Plan  - Continue CBI and wean with clear output  - Irrigate catheter PRN  - Trend H/H  - Hold AC

## 2023-05-21 NOTE — PROGRESS NOTE ADULT - SUBJECTIVE AND OBJECTIVE BOX
Time of Visit:  Patient seen and examined.     MEDICATIONS  (STANDING):  albuterol    90 MICROgram(s) HFA Inhaler 2 Puff(s) Inhalation every 6 hours  amLODIPine   Tablet 5 milliGRAM(s) Oral daily  aspirin  chewable 81 milliGRAM(s) Oral daily  atorvastatin 80 milliGRAM(s) Oral at bedtime  baclofen 10 milliGRAM(s) Oral every 12 hours  DULoxetine 60 milliGRAM(s) Oral daily  furosemide    Tablet 20 milliGRAM(s) Oral daily  lidocaine   4% Patch 2 Patch Transdermal daily  metoprolol tartrate 25 milliGRAM(s) Oral two times a day  polyethylene glycol 3350 17 Gram(s) Oral daily  senna 2 Tablet(s) Oral at bedtime  sodium chloride 0.9% lock flush 3 milliLiter(s) IV Push every 8 hours  tamsulosin 0.4 milliGRAM(s) Oral at bedtime      MEDICATIONS  (PRN):  acetaminophen     Tablet .. 1000 milliGRAM(s) Oral every 8 hours PRN Moderate Pain (4 - 6)  melatonin 3 milliGRAM(s) Oral at bedtime PRN Insomnia  ondansetron Injectable 4 milliGRAM(s) IV Push every 6 hours PRN Nausea and/or Vomiting  oxyCODONE    IR 20 milliGRAM(s) Oral every 6 hours PRN Severe Pain (7 - 10)       Medications up to date at time of exam.      PHYSICAL EXAMINATION:  Patient has no new complaints.  GENERAL: The patient is a well-developed, well-nourished, in no apparent distress.     Vital Signs Last 24 Hrs  T(C): 36.6 (21 May 2023 13:51), Max: 36.6 (21 May 2023 13:51)  T(F): 97.9 (21 May 2023 13:51), Max: 97.9 (21 May 2023 13:51)  HR: 89 (21 May 2023 17:20) (65 - 96)  BP: 117/73 (21 May 2023 17:20) (117/73 - 167/94)  BP(mean): --  RR: 18 (21 May 2023 13:51) (18 - 20)  SpO2: 90% (21 May 2023 13:51) (90% - 93%)    Parameters below as of 21 May 2023 13:51  Patient On (Oxygen Delivery Method): room air       (if applicable)    Chest Tube (if applicable)    HEENT: Head is normocephalic and atraumatic. Extraocular muscles are intact. Mucous membranes are moist.     NECK: Supple, no palpable adenopathy.    LUNGS: Clear to auscultation, no wheezing, rales, or rhonchi.    HEART: Regular rate and rhythm without murmur.    ABDOMEN: Soft, nontender, and nondistended.  No hepatosplenomegaly is noted.    EXTREMITIES: Without any cyanosis, clubbing, rash, lesions or edema.    NEUROLOGIC: Awake, alert,     SKIN: Warm, dry, good turgor.      LABS:                        14.3   5.99  )-----------( 192      ( 21 May 2023 06:50 )             45.7     05-21    138  |  108  |  24<H>  ----------------------------<  114<H>  4.0   |  28  |  0.84    Ca    9.1      21 May 2023 06:50                          MICROBIOLOGY: (if applicable)    RADIOLOGY & ADDITIONAL STUDIES:  EKG:   CXR:  ECHO:    IMPRESSION: 82y Male PAST MEDICAL & SURGICAL HISTORY:  Hypertension      Hyperlipidemia      Venous insufficiency       p/w           Impression: This is an 83 Y/O man  from [facility] presented to ED with s/p Mechanical Fall. Former smoker. Patient admitted for acute hypoxic respiratory  failure requiring supp O2 due to b/l dependent atelectasis with multiple thoracic and lumbar vertebral fx.  . I doubt PNA or PE at this time . He may have undiagnosed COPD . 05-15-23 CXR with trace Right Pleural Effusion.  05-16-23 Negative PCR for Covid 19. 05-11-23 Negative swab for Covid 19 and Negative Blood Cx x2.  He is complaining of back pain from prior surgery . Hematuria due to traumatic moreno  no change in H/H.     Sugg  - Continue O2 supp to keep sat >90%  - Incentive spirometry   - Duonebs   - Pain control   - Anticoag held due to hematuria   - Flush moreno   - Consider CBI   - Nausea due to morphine , asked NP covering to Rx Zofran

## 2023-05-21 NOTE — CHART NOTE - NSCHARTNOTEFT_GEN_A_CORE
Called by bedside RN for frequent pain at moreno catheter site, frequently trying to get up, and increased hematuria with clots on irrigation.    RN has been irrigating frequently for past 2 hours.  Multiple clots seen from irrigation.  Blood leaking from penis to blood.  Catheter appears to be pulled back, with balloon deflated and inserted moreno further, punch color urine started draining.  Appears to have scrotal swelling (chronic?)    ICU Vital Signs Last 24 Hrs  T(C): 36.6 (21 May 2023 13:51), Max: 36.6 (21 May 2023 13:51)  T(F): 97.9 (21 May 2023 13:51), Max: 97.9 (21 May 2023 13:51)  HR: 74 (21 May 2023 13:51) (65 - 96)  BP: 156/83 (21 May 2023 13:51) (155/72 - 167/94)  RR: 18 (21 May 2023 13:51) (18 - 20)  SpO2: 90% (21 May 2023 13:51) (90% - 93%)    O2 Parameters below as of 21 May 2023 13:51  Patient On (Oxygen Delivery Method): room air      1) Urinary retention with moreno with hematuria: This AM urine was clear yellow, pt confused and on enhanced supervision.  Frequently adjusts himself in bed.  -Hemodynamically stable  -Called urology/surgery to assist.  -Stop plavix given recurrent hematuria (yesterday and today).  -Review of chart show last PCI in 2018, patient states last cardiac stents a long time ago.  -Cont ASA 81mg qday.  -No DVT prophylaxis for above hematuria.  -Check CBC tonight at 9pm, suspect blood loss is minimal but will trend.    Ryan Hurt NP

## 2023-05-21 NOTE — CHART NOTE - NSCHARTNOTEFT_GEN_A_CORE
EVENT:  Notified by nurse regarding patient with hematuria and clots     OBJECTIVE:  Vital Signs Last 24 Hrs  T(C): 36.7 (21 May 2023 21:00), Max: 36.7 (21 May 2023 21:00)  T(F): 98 (21 May 2023 21:00), Max: 98 (21 May 2023 21:00)  HR: 90 (21 May 2023 21:00) (65 - 90)  BP: 120/70 (21 May 2023 21:00) (117/73 - 167/94)  BP(mean): --  RR: 18 (21 May 2023 21:00) (18 - 19)  SpO2: 93% (21 May 2023 21:00) (90% - 93%)    Parameters below as of 21 May 2023 21:00  Patient On (Oxygen Delivery Method): nasal cannula  O2 Flow (L/min): 2      acetaminophen     Tablet .. 1000 milliGRAM(s) Oral every 8 hours PRN  albuterol    90 MICROgram(s) HFA Inhaler 2 Puff(s) Inhalation every 6 hours  amLODIPine   Tablet 5 milliGRAM(s) Oral daily  aspirin  chewable 81 milliGRAM(s) Oral daily  atorvastatin 80 milliGRAM(s) Oral at bedtime  baclofen 10 milliGRAM(s) Oral every 12 hours  DULoxetine 60 milliGRAM(s) Oral daily  furosemide    Tablet 20 milliGRAM(s) Oral daily  lidocaine   4% Patch 2 Patch Transdermal daily  melatonin 3 milliGRAM(s) Oral at bedtime PRN  metoprolol tartrate 25 milliGRAM(s) Oral two times a day  ondansetron Injectable 4 milliGRAM(s) IV Push every 6 hours PRN  oxyCODONE    IR 20 milliGRAM(s) Oral every 6 hours PRN  polyethylene glycol 3350 17 Gram(s) Oral daily  senna 2 Tablet(s) Oral at bedtime  sodium chloride 0.9% lock flush 3 milliLiter(s) IV Push every 8 hours  tamsulosin 0.4 milliGRAM(s) Oral at bedtime      FOCUSED PHYSICAL EXAM:    LABS:                        13.6   7.91  )-----------( 204      ( 21 May 2023 21:30 )             44.3     05-21    138  |  108  |  24<H>  ----------------------------<  114<H>  4.0   |  28  |  0.84    Ca    9.1      21 May 2023 06:50        EKG:   IMGAGING:    ASSESSMENT:  HPI:  Patient is a 81 y/o M from [facility] who walks with a walker and has PMH of HTN, HLD, venous insufficiency, CAD, BPH, CHF who presented s/p mechanical fall. Patient reports that he was by his bed when he felt lightheaded and felt like he might pass out and had a fall. Patient denies hitting his head or any other bodily injury. Patient denies any loss of consciousness and reports he remembers the entire episode. Patient denies any pain at this time. Patient also reported that since being in the ED he is in a lot of discomfort as he has a very strong urge to urinate but he is not able to. Patient denies any history of fevers, chills, headache, prior lightheadedness, chest pain, palpitations, shortness of breath and cough (contradictory to ED note), nausea, vomiting, abdominal pain, diarrhea, constipation, melena, hematochezia, or other urinary symptoms.  (11 May 2023 09:20)      PROBLEM:   PLAN:     FOLLOW UP / RESULT: EVENT:  Notified by nurse regarding patient with gross hematuria and clots and penial pain     HPI: 81 y/o male from NH with past medical history of HTN, HLD, BPH, CHF admitted to medicine s/p mechanical fall and found to be in urinary retention s/p moreno placement with subsequent failed TOV, moreno replaced 4/16 now with recurrent hematuria, likely to trauma.    Patient noted with gross hematuria with multiple clots from Moreno irrigation. Patient also c/o penial pain and requesting for moreno to be removed. Clots also noted a meatus.     OBJECTIVE:  Vital Signs Last 24 Hrs  T(C): 36.7 (21 May 2023 21:00), Max: 36.7 (21 May 2023 21:00)  T(F): 98 (21 May 2023 21:00), Max: 98 (21 May 2023 21:00)  HR: 90 (21 May 2023 21:00) (65 - 90)  BP: 120/70 (21 May 2023 21:00) (117/73 - 167/94)  BP(mean): --  RR: 18 (21 May 2023 21:00) (18 - 19)  SpO2: 93% (21 May 2023 21:00) (90% - 93%)    Parameters below as of 21 May 2023 21:00  Patient On (Oxygen Delivery Method): nasal cannula  O2 Flow (L/min): 2      acetaminophen     Tablet .. 1000 milliGRAM(s) Oral every 8 hours PRN  albuterol    90 MICROgram(s) HFA Inhaler 2 Puff(s) Inhalation every 6 hours  amLODIPine   Tablet 5 milliGRAM(s) Oral daily  aspirin  chewable 81 milliGRAM(s) Oral daily  atorvastatin 80 milliGRAM(s) Oral at bedtime  baclofen 10 milliGRAM(s) Oral every 12 hours  DULoxetine 60 milliGRAM(s) Oral daily  furosemide    Tablet 20 milliGRAM(s) Oral daily  lidocaine   4% Patch 2 Patch Transdermal daily  melatonin 3 milliGRAM(s) Oral at bedtime PRN  metoprolol tartrate 25 milliGRAM(s) Oral two times a day  ondansetron Injectable 4 milliGRAM(s) IV Push every 6 hours PRN  oxyCODONE    IR 20 milliGRAM(s) Oral every 6 hours PRN  polyethylene glycol 3350 17 Gram(s) Oral daily  senna 2 Tablet(s) Oral at bedtime  sodium chloride 0.9% lock flush 3 milliLiter(s) IV Push every 8 hours        FOCUSED PHYSICAL EXAM:  General - agitated  Neuro - awake and alert  Lungs - breathing comfortably on room air  Abdomen - soft, nontender, nondistended  : Moreno in place gross hematuria with clots; blood clot at the meatus; scrotum swollen, soft and tender, no bruising      LABS:                        13.6   7.91  )-----------( 204      ( 21 May 2023 21:30 )             44.3     05-21    138  |  108  |  24<H>  ----------------------------<  114<H>  4.0   |  28  |  0.84    Ca    9.1      21 May 2023 06:50      PROBLEM: Gross hematuria 2/2 urinary retention   PLAN:   Bladder scan - zero (0) ml noted   CBC - hgb 13.6  Morphine 4 mg IVP x 1 dose ordered   Continue pain management  Continue tamsulosin 0.4 milliGRAM(s) Oral at bedtime  Surg consulted to consider CBI  Followed by urologist - urology to f/u in am   continue to hold Plavix   monitor patient to prevent pulling on catheter  Monitor I&O  Monitor CBC in AM EVENT:  Notified by nurse regarding patient with gross hematuria and clots and penial pain     HPI: 83 y/o male from NH with past medical history of HTN, HLD, BPH, CHF admitted to medicine s/p mechanical fall and found to be in urinary retention s/p moreno placement with subsequent failed TOV, moreno replaced 4/16 now with recurrent hematuria, likely to trauma.    Patient noted with gross hematuria with multiple clots from Moreno irrigation. Patient also c/o penial pain and requesting for moreno to be removed. Clots also noted a meatus.     OBJECTIVE:  Vital Signs Last 24 Hrs  T(C): 36.7 (21 May 2023 21:00), Max: 36.7 (21 May 2023 21:00)  T(F): 98 (21 May 2023 21:00), Max: 98 (21 May 2023 21:00)  HR: 90 (21 May 2023 21:00) (65 - 90)  BP: 120/70 (21 May 2023 21:00) (117/73 - 167/94)  BP(mean): --  RR: 18 (21 May 2023 21:00) (18 - 19)  SpO2: 93% (21 May 2023 21:00) (90% - 93%)    Parameters below as of 21 May 2023 21:00  Patient On (Oxygen Delivery Method): nasal cannula  O2 Flow (L/min): 2      FOCUSED PHYSICAL EXAM:  General - agitated  Neuro - awake and alert  Lungs - breathing comfortably on room air  Abdomen - soft, nontender, nondistended  : Moreno in place gross hematuria with clots; blood clot at the meatus; scrotum swollen, soft and tender, no bruising      LABS:                        13.6   7.91  )-----------( 204      ( 21 May 2023 21:30 )             44.3     05-21    138  |  108  |  24<H>  ----------------------------<  114<H>  4.0   |  28  |  0.84    Ca    9.1      21 May 2023 06:50      PROBLEM: Gross hematuria 2/2 urinary retention likely due to Trauma (including traumatic catheterization  PLAN:   Bladder scan - zero (0) ml noted   CBC - hgb 13.6  Morphine 4 mg IVP x 1 dose ordered   Continue pain management  Continue tamsulosin 0.4 milliGRAM(s) Oral at bedtime  Surg consulted to consider CBI  Followed by urologist - urology to f/u in am   continue to hold Plavix   monitor patient to prevent pulling on catheter  Monitor I&O  Monitor CBC in AM

## 2023-05-21 NOTE — CONSULT NOTE ADULT - SUBJECTIVE AND OBJECTIVE BOX
History of Present Illness  83 y/o male from NH with past medical history of HTN, HLD, BPH, CHF admitted to medicine s/p mechanical fall and found to be in urinary retention. Urology consulted to see and examine pt at bedside for evaluation of hematuria and urinary retention. As per medicine, pt began complaining of bladder pain and found to have >400cc on bladder scan. Pt is poor historian, admits to coming to hospital due to fall, denies head trauma. Pt admits to current discomfort in the bladder, feels urge to urinate but is unable to. States he has to push hard to get little urine out. Pt does not know who placed current moreno catheter or how long it has been placed for. As per chart, moreno placed in ED holding upon arrival to hospital 2/2 urinary retention. Pt with 10fr moreno catheter. Pt denies history of urologic procedures, history of hematuria or urinary retention. Denies fever, chills, no other urinary complaints.     Hematuria resolved during admission and moreno was removed. Patient failed TOV 5/16 and 14F moreno was replaced. He has had intermittent hematuria since then, however today he started complaining of pain with onset of gross bloody urine. Nursing staff reports frequent irrigation with clot evacuation. Upon evaluation, patient agitated complaining of suprapubic pain and pain at the meatus. Moreno in place draining bloody urine without clots. Bladder scan demonstrated 50cc urine. Moreno length noted to be longer than usual, balloon deflated and reinserted with 15cc water with increased drainage, again without clots. Bedside ultrasound done demonstrating balloon in place with collapsed bladder.    PAST MEDICAL HISTORY: Hypertension    Hyperlipidemia    Venous insufficiency        PAST SURGICAL HISTORY:     HOME MEDICATIONS:    ALLERGIES: No Known Allergies      FAMILY HISTORY:     SOCIAL HISTORY:    REVIEW OF SYSTEMS:    VITAL SIGNS:  ICU Vital Signs Last 24 Hrs  T(C): 36.6 (21 May 2023 13:51), Max: 36.6 (21 May 2023 13:51)  T(F): 97.9 (21 May 2023 13:51), Max: 97.9 (21 May 2023 13:51)  HR: 89 (21 May 2023 17:20) (65 - 96)  BP: 117/73 (21 May 2023 17:20) (117/73 - 167/94)  BP(mean): --  ABP: --  ABP(mean): --  RR: 18 (21 May 2023 13:51) (18 - 20)  SpO2: 90% (21 May 2023 13:51) (90% - 93%)    O2 Parameters below as of 21 May 2023 13:51  Patient On (Oxygen Delivery Method): room air            PHYSICAL EXAMINATION:  General - agitated  Neuro - awake and alert  Lungs - breathing comfortably on room air  Abdomen - soft, nontender, nondistended  Moreno in place draining dark red urine without clots; blood clot at the meatus; scrotum swollen, soft, nontender, no bruising    LABS:                          14.3   5.99  )-----------( 192      ( 21 May 2023 06:50 )             45.7       05-21    138  |  108  |  24<H>  ----------------------------<  114<H>  4.0   |  28  |  0.84    Ca    9.1      21 May 2023 06:50    IMAGING STUDIES:  < from: CT Abdomen and Pelvis w/ IV Cont (05.11.23 @ 05:13) >  PROCEDURE DATE:  05/11/2023          INTERPRETATION:  CLINICAL INFORMATION: Back pain after fall. CHF,   hypertension, shortness of breath.    COMPARISON: Chest radiograph same date.    CONTRAST/COMPLICATIONS:  90 cc Omnipaque 350 administered intravenously for the chest abdomen   pelvis.    PROCEDURE:  CT head and cervical spine without intravenous contrast.  CT of the Chest, Abdomen and Pelvis with intravenous contrast.  Sagittal and coronal reformats were performed.    FINDINGS:    HEAD:  BRAIN: No apparent acute infarct, hemmorhage or mass. No hydrocephalus.   Chronic appearing white matter hypodensities and volume loss.  CALVARIUM: No skull fracture or agreesive osseous legions.  EXTRACRANIAL SOFTTISSUES: No acute findings.  VISUALIZED PORTIONS OF THE PARANASAL SINUSES AND MASTOID AIR CELLS: No   air fluid levels.    CERVICAL SPINE:  VERTEBRAE: No fracture or dislocation. Exaggeration of cervical lordosis.   Mild degenerative retrolisthesis of C3 on C4 and mild degenerative   anterolisthesis of C6 on C7 and C7 on T1.  SPINAL CANAL AND FORAMINA: Multilevel degenerative changes with no   apparent high grade spinal canal narrowing.  PARASPINAL SOFT TISSUES: No paravertebral hematoma or acutefinding.    CHEST:  LUNGS AND LARGE AIRWAYS: Low lung volumes with right greater than left   basilar opacities and mild elevation of the right hemidiaphragm. No   pulmonary nodules.  PLEURA: No pleural effusion or pneumothorax.  VESSELS: No evidence of injury to the heart and great vessels. No   thoracic aortic dissection or aneurysm.  HEART: Mild four-chamber cardiomegaly. No pericardial effusion.  MEDIASTINUM AND LUIS ALBERTO: No lymphadenopathy.  CHEST WALL AND LOWER NECK: Within normal limits.    ABDOMEN AND PELVIS:  LIVER: Within normal limits.  BILE DUCTS: Normal caliber.  GALLBLADDER: Within normal limits.  SPLEEN: No evidence of splenic injury or concerning finding. Small cystic   lesion upper spleen.  PANCREAS: Within normal limits.  ADRENALS: Within normal limits.  KIDNEYS/URETERS: No evidence of renal injury. No hydronephrosis or renal   stone or concerning mass. Small cysts bilaterally.    BLADDER: Within normal limits.  REPRODUCTIVE ORGANS: Radiation seeds in the prostate.    BOWEL: No bowel obstruction. Appendix is not visualized. No evidence of   inflammation in the pericecal region.  PERITONEUM: No ascites.  VESSELS: No abdominal aortic aneurysm. Calcific atherosclerosis   particularly of the distal abdominal aorta and iliac arteries.  RETROPERITONEUM/LYMPH NODES: No lymphadenopathy.  ABDOMINAL WALL: Large right lower quadrant anterior abdominal wall hernia   extending into the right inguinal region, not fully visible, contains the   cecum, much of the ascending colon, anddistal ileum. No evidence of   obstruction or other acute complication regarding this hernia.  BONES: Several thoracic and lumbar vertebral compression fractures but   with no discrete fracture line or adjacent edema. Mild depression of the   superior endplate of T1. Chronic moderate to severe compression T4   vertebral body. Mild compression T5, T6, T7, T8, T9, T10, T11 and T12   vertebral bodies. Chronic moderate compression of T3 vertebral body with   focal right scoliosis centered at this level. Status post posterior   fusion T2-sacroiliac. The hardware is intact. Bony demineralization.    IMPRESSION:  CT head: No evidence of intracranial injury or skull fracture.    CT cervical spine: No fracture or dislocation of the cervical spine.    CT Chest abdomen pelvis:  No evidence of acute traumatic injury.  Multilevel thoracic and lumbar vertebral compression fractures are   difficult to definitively age though there are no visible fracture lines   or adjacent edema to suggest acute injury. Comparison with priors would   be helpful.  Dependent densities in the lower lungs most likely atelectasis given the   low lung volumes. Superimposed pneumonia less likely.    < end of copied text >  
UROLOGY CONSULT   81 y/o male from NH with past medical history of HTN, HLD, BPH, CHF admitted to medicine s/p mechanical fall and found to be in urinary retention. Urology consulted to see and examine pt at bedside for evaluation of hematuria and urinary retention. As per medicine, pt began complaining of bladder pain and found to have >400cc on bladder scan. Pt is poor historian, admits to coming to hospital due to fall, denies head trauma. Pt admits to current discomfort in the bladder, feels urge to urinate but is unable to. States he has to push hard to get little urine out. Pt does not know who placed current moreno catheter or how long it has been placed for. As per chart, moreno placed in ED holding upon arrival to hospital 2/2 urinary retention. Pt with 10fr moreno catheter. Pt denies history of urologic procedures, history of hematuria or urinary retention. Denies fever, chills, no other urinary complaints.     As per chart, pt is currently taking ASA and plavix.     PAST MEDICAL & SURGICAL HISTORY:  Hypertension  Hyperlipidemia  Venous insufficiency    MEDICATIONS  (STANDING):  amLODIPine   Tablet 5 milliGRAM(s) Oral daily  aspirin  chewable 81 milliGRAM(s) Oral daily  atorvastatin 80 milliGRAM(s) Oral at bedtime  ceFAZolin   IVPB 2000 milliGRAM(s) IV Intermittent every 8 hours  ceFAZolin   IVPB      clopidogrel Tablet 75 milliGRAM(s) Oral daily  DULoxetine 60 milliGRAM(s) Oral daily  furosemide    Tablet 20 milliGRAM(s) Oral daily  metoprolol tartrate 25 milliGRAM(s) Oral two times a day  pregabalin 75 milliGRAM(s) Oral three times a day  senna 2 Tablet(s) Oral at bedtime  sodium chloride 0.9% lock flush 3 milliLiter(s) IV Push every 8 hours  tamsulosin 0.4 milliGRAM(s) Oral at bedtime    MEDICATIONS  (PRN):  acetaminophen     Tablet .. 650 milliGRAM(s) Oral every 6 hours PRN Temp greater or equal to 38C (100.4F), Mild Pain (1 - 3)  baclofen 5 milliGRAM(s) Oral every 12 hours PRN Muscle Spasm  melatonin 3 milliGRAM(s) Oral at bedtime PRN Insomnia  morphine  - Injectable 1 milliGRAM(s) IV Push every 6 hours PRN Severe Pain (7 - 10)  oxyCODONE    IR 15 milliGRAM(s) Oral every 8 hours PRN Moderate Pain (4 - 6)      Allergies    No Known Allergies    Intolerances        Vital Signs Last 24 Hrs  T(C): 36.8 (12 May 2023 05:03), Max: 37 (11 May 2023 13:18)  T(F): 98.2 (12 May 2023 05:03), Max: 98.6 (11 May 2023 13:18)  HR: 82 (12 May 2023 05:03) (59 - 82)  BP: 149/57 (12 May 2023 05:03) (127/53 - 175/65)  BP(mean): --  RR: 20 (12 May 2023 05:03) (20 - 20)  SpO2: 93% (12 May 2023 05:03) (91% - 94%)    Parameters below as of 12 May 2023 05:03  Patient On (Oxygen Delivery Method): nasal cannula  O2 Flow (L/min): 2      Physical:  Gen: NAD. Minimal discomfort 2/2 retention.  Resp: Unlabored breathing. Equal chest rise bilaterally.  : Mild suprapubic tenderness. 10fr moreno in place, minimal dark brown urine draining. Able to evacuate 500cc of urine with syringe. Moreno replaced with 16fr moreno and irrigated well. No clots noted. Dark urine draining, ~100cc following moreno change. Scrotal edema.   LE: PVD changes noted. Erythema, scaling b/l.   Skin: Warm and dry.         I&O's Detail    11 May 2023 07:01  -  12 May 2023 07:00  --------------------------------------------------------  IN:  Total IN: 0 mL    OUT:    Indwelling Catheter - Urethral (mL): 2300 mL  Total OUT: 2300 mL    Total NET: -2300 mL          LABS:                        11.9   7.71  )-----------( 101      ( 12 May 2023 07:32 )             37.4              05-12    140  |  107  |  17  ----------------------------<  128<H>  3.5   |  29  |  0.87    Ca    8.3<L>      12 May 2023 10:00  Phos  2.7     05-12  Mg     1.4     05-12    TPro  7.8  /  Alb  3.0<L>  /  TBili  0.7  /  DBili  x   /  AST  48<H>  /  ALT  41  /  AlkPhos  76  05-11            PT/INR - ( 11 May 2023 02:23 )   PT: 19.7 sec;   INR: 1.65 ratio         PTT - ( 11 May 2023 02:23 )  PTT:41.3 sec        
Time of visit:    CHIEF COMPLAINT: Patient is a 82y old  Male who presents with a chief complaint of Mechanical fall (12 May 2023 16:17)      HPI:  Patient is a 82 yr old man former smoker   from [facility] [facility] who walks with a walker and has PMH of HTN, HLD, venous insufficiency, CAD, BPH, CHF who presented s/p mechanical fall. Patient reports that he was by his bed when he felt lightheaded and felt like he might pass out and had a fall. Patient denies hitting his head or any other bodily injury. Patient denies any loss of consciousness and reports he remembers the entire episode. Patient denies any pain at this time. Patient also reported that since being in the ED he is in a lot of discomfort as he has a very strong urge to urinate but he is not able to. Patient denies any history of fevers, chills, headache, prior lightheadedness, chest pain, palpitations, shortness of breath and cough (contradictory to ED note), nausea, vomiting, abdominal pain, diarrhea, constipation, melena, hematochezia, or other urinary symptoms.  (11 May 2023 09:20)   Patient seen and examined.     PAST MEDICAL & SURGICAL HISTORY:  Hypertension      Hyperlipidemia      Venous insufficiency          Allergies    No Known Allergies    Intolerances        MEDICATIONS  (STANDING):  amLODIPine   Tablet 5 milliGRAM(s) Oral daily  aspirin  chewable 81 milliGRAM(s) Oral daily  atorvastatin 80 milliGRAM(s) Oral at bedtime  ceFAZolin   IVPB 2000 milliGRAM(s) IV Intermittent every 8 hours  ceFAZolin   IVPB      clopidogrel Tablet 75 milliGRAM(s) Oral daily  DULoxetine 60 milliGRAM(s) Oral daily  furosemide    Tablet 20 milliGRAM(s) Oral daily  metoprolol tartrate 25 milliGRAM(s) Oral two times a day  pregabalin 75 milliGRAM(s) Oral three times a day  senna 2 Tablet(s) Oral at bedtime  sodium chloride 0.9% lock flush 3 milliLiter(s) IV Push every 8 hours  tamsulosin 0.4 milliGRAM(s) Oral at bedtime      MEDICATIONS  (PRN):  acetaminophen     Tablet .. 650 milliGRAM(s) Oral every 6 hours PRN Temp greater or equal to 38C (100.4F), Mild Pain (1 - 3)  baclofen 5 milliGRAM(s) Oral every 12 hours PRN Muscle Spasm  melatonin 3 milliGRAM(s) Oral at bedtime PRN Insomnia  morphine  - Injectable 1 milliGRAM(s) IV Push every 6 hours PRN Severe Pain (7 - 10)  oxyCODONE    IR 15 milliGRAM(s) Oral every 8 hours PRN Moderate Pain (4 - 6)   Medications up to date at time of exam.    Medications up to date at time of exam.    FAMILY HISTORY:      SOCIAL HISTORY  Smoking History: [   ] smoking/smoke exposure, [x   ] former smoker 1 PPD quit >20 yrs ago   Living Condition: [   ] apartment, [   ] private house  Work History: retired    Travel History: denies recent travel  Illicit Substance Use: denies  Alcohol Use: denies    REVIEW OF SYSTEMS:    CONSTITUTIONAL:  denies fevers, chills, sweats, weight loss    HEENT:  denies diplopia or blurred vision, sore throat or runny nose.    CARDIOVASCULAR:  denies pressure, squeezing, tightness, or heaviness about the chest; no palpitations.    RESPIRATORY:  denies SOB, cough, BOYCE, wheezing.    GASTROINTESTINAL:  denies abdominal pain, nausea, vomiting or diarrhea.    GENITOURINARY: denies dysuria, frequency or urgency.    NEUROLOGIC:  denies numbness, tingling, seizures or weakness.    PSYCHIATRIC:  denies disorder of thought or mood.    MSK: denies swelling, redness      PHYSICAL EXAMINATION:    GENERAL: The patient is a well-developed, well-nourished, in no apparent distress.     Vital Signs Last 24 Hrs  T(C): 36.9 (12 May 2023 14:13), Max: 36.9 (12 May 2023 14:13)  T(F): 98.4 (12 May 2023 14:13), Max: 98.4 (12 May 2023 14:13)  HR: 81 (12 May 2023 14:13) (59 - 82)  BP: 133/63 (12 May 2023 14:13) (127/53 - 149/57)  BP(mean): --  RR: 20 (12 May 2023 14:13) (20 - 20)  SpO2: 92% (12 May 2023 14:13) (91% - 93%)    Parameters below as of 12 May 2023 14:13  Patient On (Oxygen Delivery Method): nasal cannula  O2 Flow (L/min): 2     (if applicable)    Chest Tube (if applicable)    HEENT: Head is normocephalic and atraumatic. Extraocular muscles are intact. Mucous membranes are moist.     NECK: Supple, no palpable adenopathy.    LUNGS: Clear to auscultation, no wheezing, rales, or rhonchi.    HEART: Regular rate and rhythm without murmur.    ABDOMEN: Soft, nontender, and nondistended.  No hepatosplenomegaly is noted.    RENAL: No difficulty voiding, no pelvic pain    EXTREMITIES: 1+ b/l LE edema with erythema     NEUROLOGIC: Awake, alert, oriented, grossly intact    SKIN: Warm, dry, good turgor.      LABS:                        11.9   7.71  )-----------( 101      ( 12 May 2023 07:32 )             37.4     05-12    140  |  107  |  17  ----------------------------<  128<H>  3.5   |  29  |  0.87    Ca    8.3<L>      12 May 2023 10:00  Phos  2.7     05-12  Mg     1.4     05-12    TPro  7.8  /  Alb  3.0<L>  /  TBili  0.7  /  DBili  x   /  AST  48<H>  /  ALT  41  /  AlkPhos  76  05-11    PT/INR - ( 11 May 2023 02:23 )   PT: 19.7 sec;   INR: 1.65 ratio         PTT - ( 11 May 2023 02:23 )  PTT:41.3 sec                    MICROBIOLOGY: (if applicable)    RADIOLOGY & ADDITIONAL STUDIES:  EKG:   CXR:< from: CT Chest w/ IV Cont (05.11.23 @ 05:14) >    ACC: [id number] EXAM:  CT ABDOMEN AND PELVIS IC   ORDERED BY: FADUMO SANTOS     ACC: [id number] EXAM:  CT CHEST IC   ORDERED BY: FADUMO SANTOS     ACC: [id number] EXAM:  CT BRAIN   ORDERED BY: FADUMO SANTOS     ACC: [id number] EXAM:  CT CERVICALSPINE   ORDERED BY: FADUMO SANTOS     PROCEDURE DATE:  05/11/2023          INTERPRETATION:  CLINICAL INFORMATION: Back pain after fall. CHF,   hypertension, shortness of breath.    COMPARISON: Chest radiograph same date.    CONTRAST/COMPLICATIONS:  90 cc Omnipaque 350 administered intravenously for the chest abdomen   pelvis.    PROCEDURE:  CT head and cervical spine without intravenous contrast.  CT of the Chest, Abdomen and Pelvis with intravenous contrast.  Sagittal and coronal reformats were performed.    FINDINGS:    HEAD:  BRAIN: No apparent acute infarct, hemmorhage or mass. No hydrocephalus.   Chronic appearing white matter hypodensities and volume loss.  CALVARIUM: No skull fracture or agreesive osseous legions.  EXTRACRANIAL SOFTTISSUES: No acute findings.  VISUALIZED PORTIONS OF THE PARANASAL SINUSES AND MASTOID AIR CELLS: No   air fluid levels.    CERVICAL SPINE:  VERTEBRAE: No fracture or dislocation. Exaggeration of cervical lordosis.   Mild degenerative retrolisthesis of C3 on C4 and mild degenerative   anterolisthesis of C6 on C7 and C7 on T1.  SPINAL CANAL AND FORAMINA: Multilevel degenerative changes with no   apparent high grade spinal canal narrowing.  PARASPINAL SOFT TISSUES: No paravertebral hematoma or acutefinding.    CHEST:  LUNGS AND LARGE AIRWAYS: Low lung volumes with right greater than left   basilar opacities and mild elevation of the right hemidiaphragm. No   pulmonary nodules.  PLEURA: No pleural effusion or pneumothorax.  VESSELS: No evidence of injury to the heart and great vessels. No   thoracic aortic dissection or aneurysm.  HEART: Mild four-chamber cardiomegaly. No pericardial effusion.  MEDIASTINUM AND LUIS ALBERTO: No lymphadenopathy.  CHEST WALL AND LOWER NECK: Within normal limits.    ABDOMEN AND PELVIS:  LIVER: Within normal limits.  BILE DUCTS: Normal caliber.  GALLBLADDER: Within normal limits.  SPLEEN: No evidence of splenic injury or concerning finding. Small cystic   lesion upper spleen.  PANCREAS: Within normal limits.  ADRENALS: Within normal limits.  KIDNEYS/URETERS: No evidence of renal injury. No hydronephrosis or renal   stone or concerning mass. Small cysts bilaterally.    BLADDER: Within normal limits.  REPRODUCTIVE ORGANS: Radiation seeds in the prostate.    BOWEL: No bowel obstruction. Appendix is not visualized. No evidence of   inflammation in the pericecal region.  PERITONEUM: No ascites.  VESSELS: No abdominal aortic aneurysm. Calcific atherosclerosis   particularly of the distal abdominal aorta and iliac arteries.  RETROPERITONEUM/LYMPH NODES: No lymphadenopathy.  ABDOMINAL WALL: Large right lower quadrant anterior abdominal wall hernia   extending into the right inguinal region, not fully visible, contains the   cecum, much of the ascending colon, anddistal ileum. No evidence of   obstruction or other acute complication regarding this hernia.  BONES: Several thoracic and lumbar vertebral compression fractures but   with no discrete fracture line or adjacent edema. Mild depression of the   superior endplate of T1. Chronic moderate to severe compression T4   vertebral body. Mild compression T5, T6, T7, T8, T9, T10, T11 and T12   vertebral bodies. Chronic moderate compression of T3 vertebral body with   focal right scoliosis centered at this level. Status post posterior   fusion T2-sacroiliac. The hardware is intact. Bony demineralization.    IMPRESSION:  CT head: No evidence of intracranial injury or skull fracture.    CT cervical spine: No fracture or dislocation of the cervical spine.    CT Chest abdomen pelvis:  No evidence of acute traumatic injury.  Multilevel thoracic and lumbar vertebral compression fractures are   difficult to definitively age though there are no visible fracture lines   or adjacent edema to suggest acute injury. Comparison with priors would   be helpful.  Dependent densities in the lower lungs most likely atelectasis given the   low lung volumes. Superimposed pneumonia less likely.      --- End of Report ---             ALEX MACIEL MD; Attending Radiologist  This document has been electronically signed. May 11 2023  5:55AM    < end of copied text >    ECHO:    IMPRESSION: 82y Male PAST MEDICAL & SURGICAL HISTORY:  Hypertension      Hyperlipidemia      Venous insufficiency       p/w       IMP: This is an  82 yr old man former smoker  from [place] who walks with a walker and has  HTN, HLD, venous insufficiency, CAD, BPH, CHF who presented s/p mechanical fall. Patient reports that he was by his bed when he felt lightheaded and felt like he might pass out and had a fall.  Pat admitted for acute hypoxic resp failure requiring supp O2 due to b/l dependent atelectasis with multiple thoracic and lumbar vertebral fx.  . I doubt PNA or PE at this time . He may have undiagnosed COPD .  At time of my encounter with pat , he was comfortable on room air . He does not use inhaler for SOB .    Sugg    - Continue antibx for cellulitis   - F/U cultures   - Incentive spirometry   - Duoneb q6h   - 2 DECHO   - Out pat pulmo eval   - No steroids   - PT eval   - Fall precaution   - DVT GI prophy           
  Source of information: TOM HERRERA, Chart review  Patient language: English  : n/a    HPI:  Patient is a 83 y/o M from [facility] who walks with a walker and has PMH of HTN, HLD, venous insufficiency, CAD, BPH, CHF who presented s/p mechanical fall. Patient reports that he was by his bed when he felt lightheaded and felt like he might pass out and had a fall. Patient denies hitting his head or any other bodily injury. Patient denies any loss of consciousness and reports he remembers the entire episode. Patient denies any pain at this time. Patient also reported that since being in the ED he is in a lot of discomfort as he has a very strong urge to urinate but he is not able to. Patient denies any history of fevers, chills, headache, prior lightheadedness, chest pain, palpitations, shortness of breath and cough (contradictory to ED note), nausea, vomiting, abdominal pain, diarrhea, constipation, melena, hematochezia, or other urinary symptoms.  (11 May 2023 09:20)    CTAP demonstrates- several thoracic and lumbar vertebral compression fractures but with no discrete fracture line or adjacent edema. Mild depression of the superior endplate of T1. Chronic moderate to severe compression T4 vertebral body. Mild compression T5, T6, T7, T8, T9, T10, T11 and T12 vertebral bodies. Chronic moderate compression of T3 vertebral body with focal right scoliosis centered at this level. Status post posterior fusion T2-sacroiliac. The hardware is intact. Bony demineralization.    CT head: No evidence of intracranial injury or skull fracture.    CT cervical spine: No fracture or dislocation of the cervical spine.    CT Chest abdomen pelvis:No evidence of acute traumatic injury. Multilevel thoracic and lumbar vertebral compression fractures are   difficult to definitively age though there are no visible fracture lines or adjacent edema to suggest acute injury. Comparison with priors would be helpful.  Dependent densities in the lower lungs most likely atelectasis given the low lung volumes. Superimposed pneumonia less likely.    Pt is admitted for hypoxemia, s/p fall. Pain consulted for back pain, chronic on 5/15. Pt seen and examined at bedside. Pt laying in bed, reports lumbar back pain score 9/10  SCALE USED: (1-10 VNRS). Pt describes pain as dull, constant, non- radiating, not alleviated by current pain medication, exacerbated by movement. Denies numbness/ tingling. Reports generalized weakness. Reports taking oxycodone 20mg PO PRN pain at home. Per [facility], pt on chronic oxycodone 20mg PO 4x/day, last filled 5/4. Pt also reports right upper abdominal pain, aching, non-radiating, rating pain score 7/10. Pt tolerating PO diet. Denies lethargy, chest pain, SOB, nausea, vomiting, constipation. Patient stated goal for pain control: to be able to take deep breaths, get out of bed to chair and ambulate with tolerable pain control. Pending ECHO.     PAST MEDICAL & SURGICAL HISTORY:  Hypertension      Hyperlipidemia      Venous insufficiency          FAMILY HISTORY:      Social History:  Patient reports he is a former smoker but he quit smoking 40 years ago.   Patient denies alcohol or illicit drug use. (11 May 2023 09:20)    Allergies    No Known Allergies    MEDICATIONS  (STANDING):  acetaminophen     Tablet .. 1000 milliGRAM(s) Oral every 8 hours  albuterol    90 MICROgram(s) HFA Inhaler 2 Puff(s) Inhalation every 6 hours  amLODIPine   Tablet 5 milliGRAM(s) Oral daily  aspirin  chewable 81 milliGRAM(s) Oral daily  atorvastatin 80 milliGRAM(s) Oral at bedtime  baclofen 10 milliGRAM(s) Oral every 12 hours  ceFAZolin   IVPB 2000 milliGRAM(s) IV Intermittent every 8 hours  ceFAZolin   IVPB      clopidogrel Tablet 75 milliGRAM(s) Oral daily  DULoxetine 60 milliGRAM(s) Oral daily  furosemide    Tablet 20 milliGRAM(s) Oral daily  lidocaine   4% Patch 2 Patch Transdermal daily  metoprolol tartrate 25 milliGRAM(s) Oral two times a day  pregabalin 75 milliGRAM(s) Oral three times a day  senna 2 Tablet(s) Oral at bedtime  sodium chloride 0.9% lock flush 3 milliLiter(s) IV Push every 8 hours  tamsulosin 0.4 milliGRAM(s) Oral at bedtime    MEDICATIONS  (PRN):  melatonin 3 milliGRAM(s) Oral at bedtime PRN Insomnia  oxyCODONE    IR 20 milliGRAM(s) Oral every 6 hours PRN Severe Pain (7 - 10)      Vital Signs Last 24 Hrs  T(C): 36.1 (15 May 2023 12:23), Max: 36.7 (14 May 2023 13:36)  T(F): 97 (15 May 2023 12:23), Max: 98.1 (14 May 2023 13:36)  HR: 66 (15 May 2023 12:23) (64 - 82)  BP: 101/61 (15 May 2023 12:23) (101/61 - 165/72)  BP(mean): --  RR: 17 (15 May 2023 12:23) (16 - 18)  SpO2: 93% (15 May 2023 12:23) (90% - 94%)    Parameters below as of 15 May 2023 12:23  Patient On (Oxygen Delivery Method): nasal cannula  O2 Flow (L/min): 2      LABS: Reviewed.                          13.5   5.84  )-----------( 169      ( 15 May 2023 06:58 )             43.1     05-15    139  |  108  |  24<H>  ----------------------------<  128<H>  3.5   |  27  |  1.03    Ca    8.6      15 May 2023 06:58    Radiology: Reviewed.   < from: CT Head No Cont (05.11.23 @ 05:14) >    ACC: [id number] EXAM:  CT ABDOMEN AND PELVIS IC   ORDERED BY: FADUMO SANTOS     ACC: [id number] EXAM:  CT CHEST IC   ORDERED BY: FADUMO SANTOS     ACC: [id number] EXAM:  CT BRAIN   ORDERED BY: FADUMO SANTOS     ACC: [id number] EXAM:  CT CERVICALSPINE   ORDERED BY: FADUMO SANTOS     PROCEDURE DATE:  05/11/2023          INTERPRETATION:  CLINICAL INFORMATION: Back pain after fall. CHF,   hypertension, shortness of breath.    COMPARISON: Chest radiograph same date.    CONTRAST/COMPLICATIONS:  90 cc Omnipaque 350 administered intravenously for the chest abdomen   pelvis.    PROCEDURE:  CT head and cervical spine without intravenous contrast.  CT of the Chest, Abdomen and Pelvis with intravenous contrast.  Sagittal and coronal reformats were performed.    FINDINGS:    HEAD:  BRAIN: No apparent acute infarct, hemmorhage or mass. No hydrocephalus.   Chronic appearing white matter hypodensities and volume loss.  CALVARIUM: No skull fracture or agreesive osseous legions.  EXTRACRANIAL SOFTTISSUES: No acute findings.  VISUALIZED PORTIONS OF THE PARANASAL SINUSES AND MASTOID AIR CELLS: No   air fluid levels.    CERVICAL SPINE:  VERTEBRAE: No fracture or dislocation. Exaggeration of cervical lordosis.   Mild degenerative retrolisthesis of C3 on C4 and mild degenerative   anterolisthesis of C6 on C7 and C7 on T1.  SPINAL CANAL AND FORAMINA: Multilevel degenerative changes with no   apparent high grade spinal canal narrowing.  PARASPINAL SOFT TISSUES: No paravertebral hematoma or acutefinding.    CHEST:  LUNGS AND LARGE AIRWAYS: Low lung volumes with right greater than left   basilar opacities and mild elevation of the right hemidiaphragm. No   pulmonary nodules.  PLEURA: No pleural effusion or pneumothorax.  VESSELS: No evidence of injury to the heart and great vessels. No   thoracic aortic dissection or aneurysm.  HEART: Mild four-chamber cardiomegaly. No pericardial effusion.  MEDIASTINUM AND LUIS ALBERTO: No lymphadenopathy.  CHEST WALL AND LOWER NECK: Within normal limits.    ABDOMEN AND PELVIS:  LIVER: Within normal limits.  BILE DUCTS: Normal caliber.  GALLBLADDER: Within normal limits.  SPLEEN: No evidence of splenic injury or concerning finding. Small cystic   lesion upper spleen.  PANCREAS: Within normal limits.  ADRENALS: Within normal limits.  KIDNEYS/URETERS: No evidence of renal injury. No hydronephrosis or renal   stone or concerning mass. Small cysts bilaterally.    BLADDER: Within normal limits.  REPRODUCTIVE ORGANS: Radiation seeds in the prostate.    BOWEL: No bowel obstruction. Appendix is not visualized. No evidence of   inflammation in the pericecal region.  PERITONEUM: No ascites.  VESSELS: No abdominal aortic aneurysm. Calcific atherosclerosis   particularly of the distal abdominal aorta and iliac arteries.  RETROPERITONEUM/LYMPH NODES: No lymphadenopathy.  ABDOMINAL WALL: Large right lower quadrant anterior abdominal wall hernia   extending into the right inguinal region, not fully visible, contains the   cecum, much of the ascending colon, anddistal ileum. No evidence of   obstruction or other acute complication regarding this hernia.  BONES: Several thoracic and lumbar vertebral compression fractures but   with no discrete fracture line or adjacent edema. Mild depression of the   superior endplate of T1. Chronic moderate to severe compression T4   vertebral body. Mild compression T5, T6, T7, T8, T9, T10, T11 and T12   vertebral bodies. Chronic moderate compression of T3 vertebral body with   focal right scoliosis centered at this level. Status post posterior   fusion T2-sacroiliac. The hardware is intact. Bony demineralization.    IMPRESSION:  CT head: No evidence of intracranial injury or skull fracture.    CT cervical spine: No fracture or dislocation of the cervical spine.    CT Chest abdomen pelvis:  No evidence of acute traumatic injury.  Multilevel thoracic and lumbar vertebral compression fractures are   difficult to definitively age though there are no visible fracture lines   or adjacent edema to suggest acute injury. Comparison with priors would   be helpful.  Dependent densities in the lower lungs most likely atelectasis given the   low lung volumes. Superimposed pneumonia less likely.      --- End of Report ---             ALEX MACIEL MD; Attending Radiologist  This document has been electronically signed. May 11 2023  5:55AM    < end of copied text >    < from: CT Abdomen and Pelvis w/ IV Cont (05.11.23 @ 05:13) >    ACC: [id number] EXAM:  CT ABDOMEN AND PELVIS IC   ORDERED BY: FADUMO SANTOS     ACC: [id number] EXAM:  CT CHEST IC   ORDERED BY: FADUMO SANTOS     ACC: [id number] EXAM:  CT BRAIN   ORDERED BY: FADUMO SANTOS     ACC: [id number] EXAM:  CT CERVICALSPINE   ORDERED BY: FADUMO SANTOS     PROCEDURE DATE:  05/11/2023          INTERPRETATION:  CLINICAL INFORMATION: Back pain after fall. CHF,   hypertension, shortness of breath.    COMPARISON: Chest radiograph same date.    CONTRAST/COMPLICATIONS:  90 cc Omnipaque 350 administered intravenously for the chest abdomen   pelvis.    PROCEDURE:  CT head and cervical spine without intravenous contrast.  CT of the Chest, Abdomen and Pelvis with intravenous contrast.  Sagittal and coronal reformats were performed.    FINDINGS:    HEAD:  BRAIN: No apparent acute infarct, hemmorhage or mass. No hydrocephalus.   Chronic appearing white matter hypodensities and volume loss.  CALVARIUM: No skull fracture or agreesive osseous legions.  EXTRACRANIAL SOFTTISSUES: No acute findings.  VISUALIZED PORTIONS OF THE PARANASAL SINUSES AND MASTOID AIR CELLS: No   air fluid levels.    CERVICAL SPINE:  VERTEBRAE: No fracture or dislocation. Exaggeration of cervical lordosis.   Mild degenerative retrolisthesis of C3 on C4 and mild degenerative   anterolisthesis of C6 on C7 and C7 on T1.  SPINAL CANAL AND FORAMINA: Multilevel degenerative changes with no   apparent high grade spinal canal narrowing.  PARASPINAL SOFT TISSUES: No paravertebral hematoma or acutefinding.    CHEST:  LUNGS AND LARGE AIRWAYS: Low lung volumes with right greater than left   basilar opacities and mild elevation of the right hemidiaphragm. No   pulmonary nodules.  PLEURA: No pleural effusion or pneumothorax.  VESSELS: No evidence of injury to the heart and great vessels. No   thoracic aortic dissection or aneurysm.  HEART: Mild four-chamber cardiomegaly. No pericardial effusion.  MEDIASTINUM AND LUIS ALBERTO: No lymphadenopathy.  CHEST WALL AND LOWER NECK: Within normal limits.    ABDOMEN AND PELVIS:  LIVER: Within normal limits.  BILE DUCTS: Normal caliber.  GALLBLADDER: Within normal limits.  SPLEEN: No evidence of splenic injury or concerning finding. Small cystic   lesion upper spleen.  PANCREAS: Within normal limits.  ADRENALS: Within normal limits.  KIDNEYS/URETERS: No evidence of renal injury. No hydronephrosis or renal   stone or concerning mass. Small cysts bilaterally.    BLADDER: Within normal limits.  REPRODUCTIVE ORGANS: Radiation seeds in the prostate.    BOWEL: No bowel obstruction. Appendix is not visualized. No evidence of   inflammation in the pericecal region.  PERITONEUM: No ascites.  VESSELS: No abdominal aortic aneurysm. Calcific atherosclerosis   particularly of the distal abdominal aorta and iliac arteries.  RETROPERITONEUM/LYMPH NODES: No lymphadenopathy.  ABDOMINAL WALL: Large right lower quadrant anterior abdominal wall hernia   extending into the right inguinal region, not fully visible, contains the   cecum, much of the ascending colon, anddistal ileum. No evidence of   obstruction or other acute complication regarding this hernia.  BONES: Several thoracic and lumbar vertebral compression fractures but   with no discrete fracture line or adjacent edema. Mild depression of the   superior endplate of T1. Chronic moderate to severe compression T4   vertebral body. Mild compression T5, T6, T7, T8, T9, T10, T11 and T12   vertebral bodies. Chronic moderate compression of T3 vertebral body with   focal right scoliosis centered at this level. Status post posterior   fusion T2-sacroiliac. The hardware is intact. Bony demineralization.    IMPRESSION:  CT head: No evidence of intracranial injury or skull fracture.    CT cervical spine: No fracture or dislocation of the cervical spine.    CT Chest abdomen pelvis:  No evidence of acute traumatic injury.  Multilevel thoracic and lumbar vertebral compression fractures are   difficult to definitively age though there are no visible fracture lines   or adjacent edema to suggest acute injury. Comparison with priors would   be helpful.  Dependent densities in the lower lungs most likely atelectasis given the   low lung volumes. Superimposed pneumonia less likely.      --- End of Report ---             ALEX MACIEL MD; Attending Radiologist  This document has been electronically signed. May 11 2023  5:55AM    < end of copied text >      ORT Score -   Family Hx of substance abuse	Female	      Male  Alcohol 	                                           1                     3  Illegal drugs	                                   2                     3  Rx drugs                                           4 	                  4  Personal Hx of substance abuse		  Alcohol 	                                          3	                  3  Illegal drugs                                     4	                  4  Rx drugs                                            5 	                  5  Age between 16- 45 years	           1                     1  hx preadolescent sexual abuse	   3 	                  0  Psychological disease		  ADD, OCD, bipolar, schizophrenia   2	          2  Depression                                           1 	          1  Total: 0    a score of 3 or lower indicates low risk for opioid abuse		  a score of 4-7 indicates moderate risk for opioid abuse		  a score of 8 or higher indicates high risk for opioid abuse    REVIEW OF SYSTEMS:  CONSTITUTIONAL: No fever or fatigue + [place]  RESPIRATORY: No cough, wheezing, chills or hemoptysis; No shortness of breath  CARDIOVASCULAR: No chest pain, palpitations, dizziness, or leg swelling  GASTROINTESTINAL: No loss of appetite, decreased PO intake. + right upper abdominal pain. No nausea, vomiting; No diarrhea or constipation.   GENITOURINARY: No dysuria, frequency, hematuria, retention or incontinence  MUSCULOSKELETAL: + chronic back pain. No joint swelling; + generalized motor strength weakness, no saddle anesthesia, bowel/bladder incontinence, +falls   NEURO: No headaches, No numbness/tingling b/l LE    PHYSICAL EXAM:  GENERAL:  Alert & Oriented X4, cooperative, NAD, Good concentration. Speech is clear. + [place] + disheveled malodorous, poor oral hygiene   RESPIRATORY: Respirations even and unlabored. Diminished to auscultation bilaterally; No rales, rhonchi, wheezing, or rubs + O2 2L NC.   CARDIOVASCULAR: Normal S1/S2, regular rate and rhythm; No murmurs, rubs, or gallops. No JVD.   GASTROINTESTINAL:  Soft, + right upper quadrant tenderness, Nondistended; Bowel sounds present  PERIPHERAL VASCULAR:  Extremities warm without edema. 2+ Peripheral Pulses, No cyanosis, No calf tenderness  MUSCULOSKELETAL: Motor Strength 4/5 B/L upper and 4/5 lower extremities; moves all extremities equally against gravity; ROM intact; negative SLR; + thoracic and lumbar back tenderness on palpation   SKIN: Warm, dry, intact. No rashes, lesions, scars or wounds. + lumbar and thoracic back lidoderm patches in place + hyperpigmentation b/l lower extremities    Risk factors associated with adverse outcomes related to opioid treatment  [ ]  Concurrent benzodiazepine use  [ ]  History/ Active substance use or alcohol use disorder  [ ] Psychiatric co-morbidity  [ ] Sleep apnea  [ ] COPD  [ ] BMI> 35  [ ] Liver dysfunction  [ ] Renal dysfunction  [ ] CHF  [X] Former Smoker  [X ]  Age > 60 years    [X ]  [place]  Reviewed and Copied to Chart. See below.    Plan of care and goal oriented pain management treatment options were discussed with patient and /or primary care giver; all questions and concerns were addressed and care was aligned with patient's wishes.    Educated patient on goal oriented pain management treatment options

## 2023-05-21 NOTE — PROGRESS NOTE ADULT - SUBJECTIVE AND OBJECTIVE BOX
INTERVAL HPI/OVERNIGHT EVENTS:  Patient seen,events noticed  VITAL SIGNS:  T(F): 97.9 (05-21-23 @ 13:51)  HR: 74 (05-21-23 @ 13:51)  BP: 156/83 (05-21-23 @ 13:51)  RR: 18 (05-21-23 @ 13:51)  SpO2: 90% (05-21-23 @ 13:51)  Wt(kg): --    PHYSICAL EXAM:  awake  Constitutional:  Eyes:  ENMT:perrla  Neck:  Respiratory:clear  Cardiovascular:s1s2,m-none  Gastrointestinal:soft,bs pos  Extremities:  Vascular:  Neurological:no focal dewficit  Musculoskeletal:    MEDICATIONS  (STANDING):  albuterol    90 MICROgram(s) HFA Inhaler 2 Puff(s) Inhalation every 6 hours  amLODIPine   Tablet 5 milliGRAM(s) Oral daily  aspirin  chewable 81 milliGRAM(s) Oral daily  atorvastatin 80 milliGRAM(s) Oral at bedtime  baclofen 10 milliGRAM(s) Oral every 12 hours  clopidogrel Tablet 75 milliGRAM(s) Oral daily  DULoxetine 60 milliGRAM(s) Oral daily  furosemide    Tablet 20 milliGRAM(s) Oral daily  lidocaine   4% Patch 2 Patch Transdermal daily  metoprolol tartrate 25 milliGRAM(s) Oral two times a day  polyethylene glycol 3350 17 Gram(s) Oral daily  senna 2 Tablet(s) Oral at bedtime  sodium chloride 0.9% lock flush 3 milliLiter(s) IV Push every 8 hours  tamsulosin 0.4 milliGRAM(s) Oral at bedtime    MEDICATIONS  (PRN):  acetaminophen     Tablet .. 1000 milliGRAM(s) Oral every 8 hours PRN Moderate Pain (4 - 6)  melatonin 3 milliGRAM(s) Oral at bedtime PRN Insomnia  ondansetron Injectable 4 milliGRAM(s) IV Push every 6 hours PRN Nausea and/or Vomiting  oxyCODONE    IR 20 milliGRAM(s) Oral every 6 hours PRN Severe Pain (7 - 10)      Allergies    No Known Allergies    Intolerances        LABS:                        14.3   5.99  )-----------( 192      ( 21 May 2023 06:50 )             45.7     05-21    138  |  108  |  24<H>  ----------------------------<  114<H>  4.0   |  28  |  0.84    Ca    9.1      21 May 2023 06:50            RADIOLOGY & ADDITIONAL TESTS:      Assessment and Plan:   · Assessment	  [facility] 81 [facility] with [facility] walker and has PMH of HTN, HLD, venous insufficiency, CAD, BPH, CHF who presented s/p mechanical fall. Patient is admitted to medicine for AHRF 2/2 to atelectasis and mechanical fall.   CT Head, chest, spine no fx. Chest CT shows atelectasis. CXR shows small bibasilar infiltrates. pulmonary consulted.   on Oxygen therapy. PT consulted.   Pt with urinary retention, moreno placed, + hematuria. replaced today by urology during admission, Pt given TOV, failed, urinary retention, moreno placed on 5/15 by urology   5/16- medically clear to be [facility] to [facility], pending auth          Problem/Plan - 1:  ·  Problem: Acute respiratory failure with hypoxia-stable clinically  ·  Plan: Likely secondary to pain VS atelectasis.   CT chest - Dependent densities in the lower lungs most likely atelectasis given the low lung volumes. Superimposed pneumonia less likely.  Pulm Dr. Bailey consulted  recs appreciated: Continue Albuterol, oxygen, Incentive spirometry, Out pt pulmo f/u.     Problem/Plan - 2:  ·  Problem: Fall, accidental.   ·  Plan: Mechanical fall preceded by dizziness.   CT head: No evidence of intracranial injury or skull fracture.  CT spine: No fracture or dislocation of the cervical spine.  CT chest: No evidence of acute traumatic injury. Multilevel thoracic and lumbar vertebral compression fractures are difficult to definitively age though there are no visible fracture lines or adjacent edema to suggest acute injury.  PT recs [facility]BERT[facility]: pending auth.     Problem/Plan - 3:  ·  Problem: Cellulitis of left thigh.   ·  Plan: completed abx  BC NGTD.     Problem/Plan - 4:  ·  Problem: Urinary retention.   ·  Plan: 2/2 BPH  Continue tamsulosin  Continue indwelling moreno catheter (placed by  5/15)  Discharge with moreno   following.     Problem/Plan - 5:  ·  Problem: Hematuria.   ·  Plan: Patient developed hematuria a few hours after admission.    Likely 2/2 to traumatic moreno insertion as multiple tries to place moreno was done.  Resolved.   Holding lovenox in setting of hematuria.   Monitor H/H.     Problem/Plan - 6:  ·  Problem: Venous insufficiency.   ·  Plan: Has bilateral edema with significant venous dermatitis.   Continue lasix 20mg.     Problem/Plan - 7:  ·  Problem: Chronic back pain.   ·  Plan: home meds Oxycodon 20 TID and oxycodone 10 OD.   c/w pain regimen per Pain Mgmt : oxycodone 20mg PO q 6 hours PRN severe pain.   Continue home dose baclofen 10mg PO BID.   - Continue home dose cymbalta 60mg PO daily.   - Continue home dose lyrica 75mg po q 8 hours. Monitor renal function.   - Continue Lidoderm 4% patch 2 patches daily.   Continue bowel regimen   Pain Mgmt team following.     Problem/Plan - 8:  ·  Problem: CAD (coronary artery disease).   ·  Plan: Continue with  statin and plavix, metoprolol.     Problem/Plan - 9:  ·  Problem: Hypertension.   ·  Plan: Improved control  Continue amlodipine  Continue furosemide.     Problem/Plan - 10:  ·  Problem: Hyperlipidemia.   ·  Plan; Continue statin.     Problem/Plan - 11:  ·  Problem: Prophylactic measure.   ·  Plan: DVT ppx: SCDs. Hold lovenox in setting of hematuria.     Problem/Plan - 12:  ·  Problem: Discharge planning issues.   ·  Plan: [facility], awaiting auth.   Discharge with josh ROMERO following.

## 2023-05-21 NOTE — CONSULT NOTE ADULT - ASSESSMENT
81 y/o male admitted s/p mechanical fall with urinary retention   VSS, afebrile, Cr WNL     Plan   - continue moreno, irrigate prn   - monitor urine output  - continue flomax   - hold anticoagulation if clinically safe  - urology will follow   - remainder of care as per primary team   
81 y/o male from NH with past medical history of HTN, HLD, BPH, CHF admitted to medicine s/p mechanical fall and found to be in urinary retention s/p moreno placement with subsequent failed TOV, moreno replaced 4/16 now with recurrent hematuria, likely to trauma.    Recommendations:  - moreno advanced with adequate drainage (15cc in balloon), no clots noted  - bladder decompressed on bedside ultrasound  - continue to hold plavix if medically safe  - monitor patient to prevent pulling on catheter  - will follow    discussed with Dr. Killian
Confidential Drug Utilization Report  Search Terms: Chucky Dove, 1940Search Date: 05/15/2023 08:50:06 AM  The Drug Utilization Report below displays all of the controlled substance prescriptions, if any, that your patient has filled in the last twelve months. The information displayed on this report is compiled from pharmacy submissions to the Department, and accurately reflects the information as submitted by the pharmacies.    This report was requested by: Camilla Thompson | Reference #: [id number]    You have not added a THEMLA number. Keeping your THELMA number(s) up to date on the My THELMA # tab will enable the separation of your prescriptions from others in the search results.    Practitioner Count: 1  Pharmacy Count: 1  Current Opioid Prescriptions: 1  Current Benzodiazepine Prescriptions: 0  Current Stimulant Prescriptions: 0      Patient Demographic Information (PDI)       PDI	First Name	Last Name	Birth Date	Gender	Street Address	[facility]	Zip Code  [place]	Chucky Dove	1940	Male	[address] [facility]	[address]    Prescription Information      PDI Filter:    PDI	Current Rx	Drug Type	Rx Written	Rx Dispensed	Drug	Quantity	Days Supply	Prescriber Name	Prescriber THELMA #	Payment Method	Dispenser  A	Y	O	05/04/2023	05/04/2023	oxycodone hcl (ir) 20 mg tab	120	30	Tavdy, Esa	[id number]	Medicare	[facility]  A	N	O	03/30/2023	03/30/2023	oxycodone hcl (ir) 20 mg tab	120	30	Tavdy, Esa	[id number]	Medicare	[facility]  A	N	O	02/14/2023	02/27/2023	oxycodone hcl (ir) 20 mg tab	120	30	Tavdy, Esa	[id number]	Medicare	[facility]  A	N	O	02/02/2023	02/02/2023	oxycodone hcl (ir) 20 mg tab	120	30	Tavdy, Esa	[id number]	Medicare	[facility]  A	N	O	01/13/2023	01/13/2023	oxycodone hcl (ir) 10 mg tab	30	30	Tavdy, Esa	[id number]	Medicare	[facility]  A	N	O	01/13/2023	01/13/2023	oxycodone hcl (ir) 20 mg tab	90	30	Tavdy, Esa	[id number]	Medicare	[facility]  A	N	O	12/06/2022	12/06/2022	oxycodone hcl (ir) 20 mg tab	90	30	Tavdy, Esa	[id number]	Medicare	[facility]  A	N	O	12/06/2022	12/06/2022	oxycodone hcl (ir) 10 mg tab	30	30	Tavdy, Esa	[id number]	Medicare	[facility]  A	N	O	10/27/2022	10/31/2022	oxycodone hcl (ir) 10 mg tab	30	30	Tavdy, Esa	[id number]	Medicare	[facility]  A	N	O	10/27/2022	10/31/2022	oxycodone hcl (ir) 20 mg tab	90	30	Tavdy, Esa	[id number]	Medicare	[facility]  A	N	O	10/06/2022	10/06/2022	oxycodone hcl (ir) 20 mg tab	90	30	Tavdy, Esa	[id number]	Medicare	[facility]  A	N	O	10/06/2022	10/06/2022	oxycodone hcl (ir) 10 mg tab	30	30	Tavdy, Esa	[id number]	Medicare	[facility]  A	N	O	08/23/2022	08/23/2022	oxycodone hcl (ir) 10 mg tab	30	30	Tavdy, Esa	[id number]	Medicare	[facility]  A	N	O	08/23/2022	08/23/2022	oxycodone hcl (ir) 20 mg tab	90	30	Tavdy, Esa	[id number]	Medicare	[facility]  A	N	O	07/21/2022	07/31/2022	oxycodone hcl (ir) 10 mg tab	30	30	Tavdy, Esa	[id number]	Medicare	[facility]  A	N	O	07/21/2022	07/31/2022	oxycodone hcl (ir) 20 mg tab	90	30	Tavdy, Esa	[id number]	Medicare	[facility]  A	N	O	07/05/2022	07/05/2022	oxycodone hcl (ir) 10 mg tab	30	30	Tavdy, Esa	[id number]	Medicare	[facility]  A	N	O	07/05/2022	07/05/2022	oxycodone hcl (ir) 20 mg tab	90	30	Tavdy, Esa	[id number]	Medicare	[facility]  A	N	O	05/27/2022	05/31/2022	oxycodone hcl (ir) 10 mg tab	30	30	Esa Herring	[id number]	Medicare	[facility]  A	N	O	05/27/2022	05/31/2022	oxycodone hcl (ir) 20 mg tab	90	30	[facility]	[id number]	Medicare	[facility]    * - Details of Drug Type : O = Opioid, B = Benzodiazepine, S = Stimulant    * - Drugs marked with an asterisk are compound drugs. If the compound drug is made up of more than one controlled substance, then each controlled substance will be a separate row in the table.

## 2023-05-22 DIAGNOSIS — N17.9 ACUTE KIDNEY FAILURE, UNSPECIFIED: ICD-10-CM

## 2023-05-22 LAB
ANION GAP SERPL CALC-SCNC: 9 MMOL/L — SIGNIFICANT CHANGE UP (ref 5–17)
BUN SERPL-MCNC: 38 MG/DL — HIGH (ref 7–18)
CALCIUM SERPL-MCNC: 8.7 MG/DL — SIGNIFICANT CHANGE UP (ref 8.4–10.5)
CHLORIDE SERPL-SCNC: 109 MMOL/L — HIGH (ref 96–108)
CO2 SERPL-SCNC: 21 MMOL/L — LOW (ref 22–31)
CREAT SERPL-MCNC: 2.06 MG/DL — HIGH (ref 0.5–1.3)
EGFR: 31 ML/MIN/1.73M2 — LOW
GLUCOSE SERPL-MCNC: 113 MG/DL — HIGH (ref 70–99)
HCT VFR BLD CALC: 39.7 % — SIGNIFICANT CHANGE UP (ref 39–50)
HGB BLD-MCNC: 12.6 G/DL — LOW (ref 13–17)
MCHC RBC-ENTMCNC: 26.9 PG — LOW (ref 27–34)
MCHC RBC-ENTMCNC: 31.7 GM/DL — LOW (ref 32–36)
MCV RBC AUTO: 84.8 FL — SIGNIFICANT CHANGE UP (ref 80–100)
NRBC # BLD: 0 /100 WBCS — SIGNIFICANT CHANGE UP (ref 0–0)
PLATELET # BLD AUTO: 207 K/UL — SIGNIFICANT CHANGE UP (ref 150–400)
POTASSIUM SERPL-MCNC: 4 MMOL/L — SIGNIFICANT CHANGE UP (ref 3.5–5.3)
POTASSIUM SERPL-SCNC: 4 MMOL/L — SIGNIFICANT CHANGE UP (ref 3.5–5.3)
RBC # BLD: 4.68 M/UL — SIGNIFICANT CHANGE UP (ref 4.2–5.8)
RBC # FLD: 16.8 % — HIGH (ref 10.3–14.5)
SODIUM SERPL-SCNC: 139 MMOL/L — SIGNIFICANT CHANGE UP (ref 135–145)
WBC # BLD: 9.54 K/UL — SIGNIFICANT CHANGE UP (ref 3.8–10.5)
WBC # FLD AUTO: 9.54 K/UL — SIGNIFICANT CHANGE UP (ref 3.8–10.5)

## 2023-05-22 PROCEDURE — 99232 SBSQ HOSP IP/OBS MODERATE 35: CPT

## 2023-05-22 RX ORDER — SODIUM CHLORIDE 9 MG/ML
250 INJECTION INTRAMUSCULAR; INTRAVENOUS; SUBCUTANEOUS ONCE
Refills: 0 | Status: COMPLETED | OUTPATIENT
Start: 2023-05-22 | End: 2023-05-22

## 2023-05-22 RX ORDER — OXYBUTYNIN CHLORIDE 5 MG
5 TABLET ORAL EVERY 8 HOURS
Refills: 0 | Status: DISCONTINUED | OUTPATIENT
Start: 2023-05-22 | End: 2023-05-26

## 2023-05-22 RX ADMIN — SENNA PLUS 2 TABLET(S): 8.6 TABLET ORAL at 22:21

## 2023-05-22 RX ADMIN — MORPHINE SULFATE 4 MILLIGRAM(S): 50 CAPSULE, EXTENDED RELEASE ORAL at 01:09

## 2023-05-22 RX ADMIN — ALBUTEROL 2 PUFF(S): 90 AEROSOL, METERED ORAL at 22:20

## 2023-05-22 RX ADMIN — OXYCODONE HYDROCHLORIDE 20 MILLIGRAM(S): 5 TABLET ORAL at 22:20

## 2023-05-22 RX ADMIN — Medication 5 MILLIGRAM(S): at 16:35

## 2023-05-22 RX ADMIN — Medication 75 MILLIGRAM(S): at 16:35

## 2023-05-22 RX ADMIN — Medication 81 MILLIGRAM(S): at 13:29

## 2023-05-22 RX ADMIN — Medication 5 MILLIGRAM(S): at 22:21

## 2023-05-22 RX ADMIN — OXYCODONE HYDROCHLORIDE 20 MILLIGRAM(S): 5 TABLET ORAL at 08:48

## 2023-05-22 RX ADMIN — SODIUM CHLORIDE 3 MILLILITER(S): 9 INJECTION INTRAMUSCULAR; INTRAVENOUS; SUBCUTANEOUS at 15:48

## 2023-05-22 RX ADMIN — Medication 25 MILLIGRAM(S): at 18:04

## 2023-05-22 RX ADMIN — ATORVASTATIN CALCIUM 80 MILLIGRAM(S): 80 TABLET, FILM COATED ORAL at 22:20

## 2023-05-22 RX ADMIN — Medication 10 MILLIGRAM(S): at 06:51

## 2023-05-22 RX ADMIN — Medication 75 MILLIGRAM(S): at 23:12

## 2023-05-22 RX ADMIN — SODIUM CHLORIDE 500 MILLILITER(S): 9 INJECTION INTRAMUSCULAR; INTRAVENOUS; SUBCUTANEOUS at 06:33

## 2023-05-22 RX ADMIN — OXYCODONE HYDROCHLORIDE 20 MILLIGRAM(S): 5 TABLET ORAL at 23:20

## 2023-05-22 RX ADMIN — SODIUM CHLORIDE 3 MILLILITER(S): 9 INJECTION INTRAMUSCULAR; INTRAVENOUS; SUBCUTANEOUS at 22:28

## 2023-05-22 RX ADMIN — OXYCODONE HYDROCHLORIDE 20 MILLIGRAM(S): 5 TABLET ORAL at 09:40

## 2023-05-22 RX ADMIN — MORPHINE SULFATE 4 MILLIGRAM(S): 50 CAPSULE, EXTENDED RELEASE ORAL at 01:30

## 2023-05-22 RX ADMIN — DULOXETINE HYDROCHLORIDE 60 MILLIGRAM(S): 30 CAPSULE, DELAYED RELEASE ORAL at 13:29

## 2023-05-22 RX ADMIN — ALBUTEROL 2 PUFF(S): 90 AEROSOL, METERED ORAL at 09:56

## 2023-05-22 RX ADMIN — Medication 10 MILLIGRAM(S): at 18:04

## 2023-05-22 RX ADMIN — Medication 5 MILLIGRAM(S): at 07:57

## 2023-05-22 RX ADMIN — SODIUM CHLORIDE 3 MILLILITER(S): 9 INJECTION INTRAMUSCULAR; INTRAVENOUS; SUBCUTANEOUS at 06:31

## 2023-05-22 RX ADMIN — TAMSULOSIN HYDROCHLORIDE 0.4 MILLIGRAM(S): 0.4 CAPSULE ORAL at 22:21

## 2023-05-22 NOTE — PROGRESS NOTE ADULT - PROBLEM SELECTOR PLAN 3
- Likely secondary to pain VS atelectasis.   -On admission saturating 88% on room air as per ED note.   -CXR - Small bibasilar infiltrates.  -CT chest - Dependent densities in the lower lungs most likely atelectasis given the low lung volumes. Superimposed pneumonia less likely.  -Pulm Dr. Bailey consulted  recs appreciated  -Cont Albuterol, oxygen, Incentive spirometry, Out pt pulmo f/u.

## 2023-05-22 NOTE — PROGRESS NOTE ADULT - SUBJECTIVE AND OBJECTIVE BOX
Subjective  Overnight, CBI had poor drainage and catheter needed to be irrigated multiple times. Patient reporting bladder and penile pain.    Objective    Vital signs  T(F): , Max: 98 (05-21-23 @ 21:00)  HR: 65 (05-22-23 @ 08:49)  BP: 143/77 (05-22-23 @ 08:49)  SpO2: 98% (05-22-23 @ 08:49)  Wt(kg): --    Output       Gen: NAD  Abd: soft, nontender, nondistended  : moreno secured in place    Labs      05-22 @ 06:37    WBC 9.54  / Hct 39.7  / SCr 2.06     05-21 @ 21:30    WBC 7.91  / Hct 44.3  / SCr --

## 2023-05-22 NOTE — PROGRESS NOTE ADULT - ASSESSMENT
no weight-bearing restrictions Patient is a 81 y/o M from [facility] who walks with a walker and has PMH of HTN, HLD, venous insufficiency, CAD, BPH, CHF who presented s/p mechanical fall. Admitted to medicine for AHRF 2/2 to atelectasis and mechanical fall. Noted to have erythema to upper thigh and complte course of IV abxCT Head, chest, spine no fx. Chest CT shows atelectasis. CXR shows small bibasilar infiltrates, pulmonary consulted, required nasal cannula now tolerating room air. Also found to have urinary retention, moreno placed, + hematuria, Urology consulted, failed TOV and re placed on 5/15 by Urology. Noted with hematuria today, Hgb stable, moreno catheter irrigated and noted with improvement in hematuria. Per discussion with Urology will continue to monitor. PT reccs ALBERTA, pending auth to [facility]  5/22-Pt. with gross hematuria over night, required moreno replacement by uro, 6 eye moreno placed, CBI continues with clear drainage at this time.

## 2023-05-22 NOTE — PROGRESS NOTE ADULT - ASSESSMENT
81 y/o male from NH with past medical history of HTN, HLD, BPH, CHF admitted to medicine s/p mechanical fall and found to be in urinary retention s/p moreno placement with subsequent failed TOV, moreno replaced now with recurrent hematuria, likely to trauma. CBI started.    - Previous catheter removed and 6 eye placed with removal of large clot burden  - 24 Fr 3-way placed and CBI started on fast/moderate drip with clear output  - Continue CBI  - Monitor urine color  - Trend H/H  - Continue to hold plavix if medically safe  - Monitor patient to prevent pulling on catheter 83 y/o male from NH with past medical history of HTN, HLD, BPH, CHF admitted to medicine s/p mechanical fall and found to be in urinary retention s/p moreno placement with subsequent failed TOV, moreno replaced now with recurrent hematuria, likely to trauma. CBI started.    - Previous catheter removed and 6 eye placed with removal of large clot burden  - 24 Fr 3-way placed and CBI started on fast/moderate drip with clear output  - Continue CBI  - Monitor urine color  - Trend H/H  - Continue to hold plavix if medically safe  - Monitor patient to prevent pulling on catheter  - Recommend oxybutynin for bladder spasms PRN  - Trend Cr. Elevated this morning likely in setting of clot retention.

## 2023-05-22 NOTE — PROGRESS NOTE ADULT - SUBJECTIVE AND OBJECTIVE BOX
Time of Visit:  Patient seen and examined.     MEDICATIONS  (STANDING):  albuterol    90 MICROgram(s) HFA Inhaler 2 Puff(s) Inhalation every 6 hours  amLODIPine   Tablet 5 milliGRAM(s) Oral daily  aspirin  chewable 81 milliGRAM(s) Oral daily  atorvastatin 80 milliGRAM(s) Oral at bedtime  baclofen 10 milliGRAM(s) Oral every 12 hours  DULoxetine 60 milliGRAM(s) Oral daily  furosemide    Tablet 20 milliGRAM(s) Oral daily  lidocaine   4% Patch 2 Patch Transdermal daily  metoprolol tartrate 25 milliGRAM(s) Oral two times a day  oxybutynin 5 milliGRAM(s) Oral every 8 hours  polyethylene glycol 3350 17 Gram(s) Oral daily  pregabalin 75 milliGRAM(s) Oral three times a day  senna 2 Tablet(s) Oral at bedtime  sodium chloride 0.9% lock flush 3 milliLiter(s) IV Push every 8 hours  tamsulosin 0.4 milliGRAM(s) Oral at bedtime      MEDICATIONS  (PRN):  acetaminophen     Tablet .. 1000 milliGRAM(s) Oral every 8 hours PRN Moderate Pain (4 - 6)  melatonin 3 milliGRAM(s) Oral at bedtime PRN Insomnia  ondansetron Injectable 4 milliGRAM(s) IV Push every 6 hours PRN Nausea and/or Vomiting  oxyCODONE    IR 20 milliGRAM(s) Oral every 6 hours PRN Severe Pain (7 - 10)       Medications up to date at time of exam.      PHYSICAL EXAMINATION:    Vital Signs Last 24 Hrs  T(C): 36.1 (22 May 2023 13:32), Max: 36.7 (21 May 2023 21:00)  T(F): 97 (22 May 2023 13:32), Max: 98 (21 May 2023 21:00)  HR: 90 (22 May 2023 13:32) (65 - 90)  BP: 133/62 (22 May 2023 13:32) (85/45 - 143/77)  BP(mean): --  RR: 17 (22 May 2023 13:32) (17 - 20)  SpO2: 96% (22 May 2023 13:32) (93% - 98%)    Parameters below as of 22 May 2023 13:32  Patient On (Oxygen Delivery Method): nasal cannula  O2 Flow (L/min): 2     (if applicable)    General : Alert and oriented. No acute distress.     HEENT: Head is normocephalic and atraumatic. No nasal tenderness. Mucous membranes are moist.     NECK: Supple, no palpable adenopathy.    LUNGS: Clear to auscultation bilaterally with no wheezing, rales, or rhonchi. No use of accessory muscle.     HEART: S1 S2 Regular rate and no click / rub.     ABDOMEN: Soft, nontender, and nondistended.  Active bowel sounds .     EXTREMITIES: Without any cyanosis, clubbing, rash, lesions .    NEUROLOGIC: Awake, alert, oriented.     SKIN: Warm and moist . Non diaphoretic.       LABS:                        12.6   9.54  )-----------( 207      ( 22 May 2023 06:37 )             39.7     05-22    139  |  109<H>  |  38<H>  ----------------------------<  113<H>  4.0   |  21<L>  |  2.06<H>    Ca    8.7      22 May 2023 06:37      MICROBIOLOGY: (if applicable)    RADIOLOGY & ADDITIONAL STUDIES:  EKG:   CXR:  ECHO:    IMPRESSION: 82y Male PAST MEDICAL & SURGICAL HISTORY:  Hypertension      Hyperlipidemia      Venous insufficiency       Impression: This is an 81 Y/O Male  from [facility] presented to ED with s/p Mechanical Fall. Former smoker. Patient admitted for acute hypoxic respiratory  failure requiring supp O2 due to b/l dependent atelectasis with multiple thoracic and lumbar vertebral fx.  . I doubt PNA or PE at this time . He may have undiagnosed COPD . 05-15-23 CXR with trace Right Pleural Effusion.  05-16-23 Negative PCR for Covid 19. 05-11-23 Negative swab for Covid 19 and Negative Blood Cx x2.  He is complaining of back pain from prior surgery . Hematuria due to traumatic moreno  no change in H/H.     Sugg  O2 saturation 96% with o2 supplement. Continue Oxygen supplementation 2L NC.   Continue Albuterol 90 mcg 2 Puffs Q 6 Hours.     Incentive spirometry .   Anticoagulation on hold  due to hematuria   On Lasix 20 mg Oral daily.    Urology following , moreno care . On Flomax 0.4 mg Oral Daily.

## 2023-05-22 NOTE — PROGRESS NOTE ADULT - PROBLEM SELECTOR PLAN 11
PT recc ALBERTA  Pt. for discharge to [facility]  Discharge pending resolution of urological issues  CM following

## 2023-05-22 NOTE — PROGRESS NOTE ADULT - PROBLEM SELECTOR PLAN 1
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 + opioid dependent.   High risk medications reviewed. Avoid polypharmacy. Avoid IV opioids. Avoid NSAIDs and benzodiazepines. Non-pharmacological sleep aides initiated. Non-opioid medications and non-pharmacological pain management measures initiated.   Opioid pain recommendations   - Continue home medication- oxycodone 20mg PO q 6 hours PRN severe pain. Monitor for sedation/ respiratory depression.   Non-opioid pain recommendations   - Continue Acetaminophen 1 gram PO q 8 hours PRN. Monitor LFTs  - Continue home dose baclofen 10mg PO BID.   - Continue home dose cymbalta 60mg PO daily.   - Reorder home dose lyrica 75mg po q 8 hours. Monitor renal function.   - Continue Lidoderm 4% patch 2 patches daily.   Bowel Regimen  - Continue Miralax 17G PO daily  - Continue Senna 2 tablets at bedtime for constipation  Mild pain (score 1-3)  - Non-pharmacological pain treatment recommendations  - Warm/ Cool packs PRN   - Repositioning extremity, elevation, imagery, relaxation, distraction.  - Physical therapy OOB if no contraindications   Recommendations discussed with primary team and RN.

## 2023-05-22 NOTE — PROGRESS NOTE ADULT - PROBLEM SELECTOR PLAN 2
likely due to urinary retention/hematuria  -SCr 0.84-->2.06  -Cont CBI  -Cont Flomax  -Avoid nephrotoxic meds  -f/u am BMP

## 2023-05-22 NOTE — PROGRESS NOTE ADULT - PROBLEM SELECTOR PLAN 1
- Patient developed hematuria a few hours after admission.   - Likely 2/2 to traumatic moreno insertion as multiple tries to place moreno was done.  - noted with hematuria  5/19, moreno irrigated by nursing and Hematuria improved by afternoon  - Holding lovenox in setting of hematuria.   - Recurrent gross hematuria over night 5/21  - H/H stable  - Uro interventions and note appreciated  - Cont CBI for now  - f/u uro further reccs  - Monitor CBC

## 2023-05-22 NOTE — CHART NOTE - NSCHARTNOTEFT_GEN_A_CORE
UROLOGY EVENT NOTE    Surgical house-officer called to bedside again this AM as CBI had stopped draining. Further hand-irrigation performed and significant clot evacuated and further flushed to pink color. CBI restarted and draining appropriately. Case d/w urology attending Dr. Killian who recommends continued irrigation at this time. Will c/w CBI w/strict precautions to stop CBI and call urology if no drainage noted.

## 2023-05-22 NOTE — PROGRESS NOTE ADULT - SUBJECTIVE AND OBJECTIVE BOX
NP Note discussed with  Primary Attending    Patient is a 82y old  Male who presents with a chief complaint of Mechanical fall (22 May 2023 09:27).  Had eventful night with gross hematuria resistant to frequent irrigations, required moreno change by urology with persisting CBI, now clear drainage.  Pt. was in pain in am, responded well to analgesia.        INTERVAL HPI/OVERNIGHT EVENTS: no new complaints    MEDICATIONS  (STANDING):  albuterol    90 MICROgram(s) HFA Inhaler 2 Puff(s) Inhalation every 6 hours  amLODIPine   Tablet 5 milliGRAM(s) Oral daily  aspirin  chewable 81 milliGRAM(s) Oral daily  atorvastatin 80 milliGRAM(s) Oral at bedtime  baclofen 10 milliGRAM(s) Oral every 12 hours  DULoxetine 60 milliGRAM(s) Oral daily  furosemide    Tablet 20 milliGRAM(s) Oral daily  lidocaine   4% Patch 2 Patch Transdermal daily  metoprolol tartrate 25 milliGRAM(s) Oral two times a day  oxybutynin 5 milliGRAM(s) Oral every 8 hours  polyethylene glycol 3350 17 Gram(s) Oral daily  senna 2 Tablet(s) Oral at bedtime  sodium chloride 0.9% lock flush 3 milliLiter(s) IV Push every 8 hours  tamsulosin 0.4 milliGRAM(s) Oral at bedtime    MEDICATIONS  (PRN):  acetaminophen     Tablet .. 1000 milliGRAM(s) Oral every 8 hours PRN Moderate Pain (4 - 6)  melatonin 3 milliGRAM(s) Oral at bedtime PRN Insomnia  ondansetron Injectable 4 milliGRAM(s) IV Push every 6 hours PRN Nausea and/or Vomiting  oxyCODONE    IR 20 milliGRAM(s) Oral every 6 hours PRN Severe Pain (7 - 10)      __________________________________________________  REVIEW OF SYSTEMS:    CONSTITUTIONAL: No fever,   EYES: no acute visual disturbances  NECK: No pain or stiffness  RESPIRATORY: No cough; No shortness of breath  CARDIOVASCULAR: No chest pain, no palpitations  GASTROINTESTINAL: No pain. No nausea or vomiting; No diarrhea   NEUROLOGICAL: No headache or numbness, no tremors  MUSCULOSKELETAL: No joint pain, no muscle pain  GENITOURINARY: no dysuria, no frequency, no hesitancy  PSYCHIATRY: no depression , no anxiety  ALL OTHER  ROS negative        Vital Signs Last 24 Hrs  T(C): 36.7 (22 May 2023 08:49), Max: 36.7 (21 May 2023 21:00)  T(F): 98 (22 May 2023 08:49), Max: 98 (21 May 2023 21:00)  HR: 65 (22 May 2023 08:49) (65 - 90)  BP: 143/77 (22 May 2023 08:49) (85/45 - 156/83)  BP(mean): --  RR: 18 (22 May 2023 08:49) (18 - 20)  SpO2: 98% (22 May 2023 08:49) (90% - 98%)    Parameters below as of 22 May 2023 08:49  Patient On (Oxygen Delivery Method): nasal cannula  O2 Flow (L/min): 2      ________________________________________________  PHYSICAL EXAM:  Appropriate for age  GENERAL: NAD  HEENT: Normocephalic;  conjunctivae and sclerae clear; moist mucous membranes;   NECK : supple  CHEST/LUNG: Clear to auscultation bilaterally with good air entry   HEART: S1 S2  regular; no murmurs, gallops or rubs  ABDOMEN: Soft, Nontender, Nondistended; Bowel sounds present  EXTREMITIES: no cyanosis; no edema; no calf tenderness  SKIN: warm and dry; no rash  NERVOUS SYSTEM:  Awake and alert; Oriented  to place, person and time ; no new deficits  URO:  6 eye moreno/CBI  _________________________________________________  LABS:                        12.6   9.54  )-----------( 207      ( 22 May 2023 06:37 )             39.7     05-22    139  |  109<H>  |  38<H>  ----------------------------<  113<H>  4.0   |  21<L>  |  2.06<H>    Ca    8.7      22 May 2023 06:37    CAPILLARY BLOOD GLUCOSE      RADIOLOGY & ADDITIONAL TESTS:      < from: Xray Chest 1 View- PORTABLE-Routine (Xray Chest 1 View- PORTABLE-Routine in AM.) (05.15.23 @ 09:33) >    ACC: [id number] EXAM:  XR CHEST PORTABLE ROUTINE 1V   ORDERED BY: TAVON MCDERMOTT     PROCEDURE DATE:  05/15/2023          INTERPRETATION:  TIME OF EXAM: May 15, 2023 at 9:05 AM.    CLINICAL INFORMATION: Hypertension. Atelectasis.    COMPARISON:  APchest x-ray and CT scan of the chest from May 11, 2023.    TECHNIQUE:   AP Portable chest x-ray. Limited by rotation. Jewelry   projects over the left chest.    INTERPRETATION:    Heart size and the mediastinum cannot be accurately evaluated on this   projection.  Elevated right hemidiaphragm again seen.  There is right lower lung linear atelectasis with a possible trace right   pleural effusion.  The left lung is clear.  No left pleural effusion seen.  No pneumothorax.  Incompletely imaged orthopedic hardware projects over the thoracolumbar   spine.        IMPRESSION:  Limited by rotation.    Continued elevation of right hemidiaphragm.    Right lower lung linear atelectasis and possible trace right pleural   effusion.    --- End of Report ---    < end of copied text >    < from: CT Cervical Spine No Cont (05.11.23 @ 05:14) >    ACC: [id number] EXAM:  CT ABDOMEN AND PELVIS IC   ORDERED BY: FADUMO SANTOS     ACC: [id number] EXAM:  CT CHEST IC   ORDERED BY: FADUMO SANTOS     ACC: [id number] EXAM:  CT BRAIN   ORDERED BY: FADUMO SANTOS     ACC: [id number] EXAM:  CT CERVICALSPINE   ORDERED BY: FADUMO SANTOS     PROCEDURE DATE:  05/11/2023          INTERPRETATION:  CLINICAL INFORMATION: Back pain after fall. CHF,   hypertension, shortness of breath.    COMPARISON: Chest radiograph same date.    CONTRAST/COMPLICATIONS:  90 cc Omnipaque 350 administered intravenously for the chest abdomen   pelvis.    PROCEDURE:  CT head and cervical spine without intravenous contrast.  CT of the Chest, Abdomen and Pelvis with intravenous contrast.  Sagittal and coronal reformats were performed.    FINDINGS:    HEAD:  BRAIN: No apparent acute infarct, hemmorhage or mass. No hydrocephalus.   Chronic appearing white matter hypodensities and volume loss.  CALVARIUM: No skull fracture or agreesive osseous legions.  EXTRACRANIAL SOFTTISSUES: No acute findings.  VISUALIZED PORTIONS OF THE PARANASAL SINUSES AND MASTOID AIR CELLS: No   air fluid levels.    CERVICAL SPINE:  VERTEBRAE: No fracture or dislocation. Exaggeration of cervical lordosis.   Mild degenerative retrolisthesis of C3 on C4 and mild degenerative   anterolisthesis of C6 on C7 and C7 on T1.  SPINAL CANAL AND FORAMINA: Multilevel degenerative changes with no   apparent high grade spinal canal narrowing.  PARASPINAL SOFT TISSUES: No paravertebral hematoma or acutefinding.    CHEST:  LUNGS AND LARGE AIRWAYS: Low lung volumes with right greater than left   basilar opacities and mild elevation of the right hemidiaphragm. No   pulmonary nodules.  PLEURA: No pleural effusion or pneumothorax.  VESSELS: No evidence of injury to the heart and great vessels. No   thoracic aortic dissection or aneurysm.  HEART: Mild four-chamber cardiomegaly. No pericardial effusion.  MEDIASTINUM AND LUIS ALBERTO: No lymphadenopathy.  CHEST WALL AND LOWER NECK: Within normal limits.    ABDOMEN AND PELVIS:  LIVER: Within normal limits.  BILE DUCTS: Normal caliber.  GALLBLADDER: Within normal limits.  SPLEEN: No evidence of splenic injury or concerning finding. Small cystic   lesion upper spleen.  PANCREAS: Within normal limits.  ADRENALS: Within normal limits.  KIDNEYS/URETERS: No evidence of renal injury. No hydronephrosis or renal   stone or concerning mass. Small cysts bilaterally.    BLADDER: Within normal limits.  REPRODUCTIVE ORGANS: Radiation seeds in the prostate.    BOWEL: No bowel obstruction. Appendix is not visualized. No evidence of   inflammation in the pericecal region.  PERITONEUM: No ascites.  VESSELS: No abdominal aortic aneurysm. Calcific atherosclerosis   particularly of the distal abdominal aorta and iliac arteries.  RETROPERITONEUM/LYMPH NODES: No lymphadenopathy.  ABDOMINAL WALL: Large right lower quadrant anterior abdominal wall hernia   extending into the right inguinal region, not fully visible, contains the   cecum, much of the ascending colon, anddistal ileum. No evidence of   obstruction or other acute complication regarding this hernia.  BONES: Several thoracic and lumbar vertebral compression fractures but   with no discrete fracture line or adjacent edema. Mild depression of the   superior endplate of T1. Chronic moderate to severe compression T4   vertebral body. Mild compression T5, T6, T7, T8, T9, T10, T11 and T12   vertebral bodies. Chronic moderate compression of T3 vertebral body with   focal right scoliosis centered at this level. Status post posterior   fusion T2-sacroiliac. The hardware is intact. Bony demineralization.    IMPRESSION:  CT head: No evidence of intracranial injury or skull fracture.    CT cervical spine: No fracture or dislocation of the cervical spine.    CT Chest abdomen pelvis:  No evidence of acute traumatic injury.  Multilevel thoracic and lumbar vertebral compression fractures are   difficult to definitively age though there are no visible fracture lines   or adjacent edema to suggest acute injury. Comparison with priors would   be helpful.  Dependent densities in the lower lungs most likely atelectasis given the   low lung volumes. Superimposed pneumonia less likely.      --- End of Report ---    < end of copied text >    Imaging Personally Reviewed:  YES/NO    Consultant(s) Notes Reviewed:   YES/ No    Care Discussed with Consultants :     Plan of care was discussed with patient and /or primary care giver; all questions and concerns were addressed and care was aligned with patient's wishes.

## 2023-05-22 NOTE — CHART NOTE - NSCHARTNOTEFT_GEN_A_CORE
EVENT: Hypotension B/P 84/45    Informed by nurse regarding patient with  BP 84/45. Patient denies lightheadedness, dizziness, chest pain, palpitations, SOB, N/V/D, blurred vision.    Vital Signs Last 24 Hrs  T(C): 36.7 (21 May 2023 21:00), Max: 36.7 (21 May 2023 21:00)  T(F): 98 (21 May 2023 21:00), Max: 98 (21 May 2023 21:00)  HR: 90 (21 May 2023 21:00) (65 - 90)  BP: 120/70 (21 May 2023 21:00) (117/73 - 167/94)  BP(mean): --  RR: 18 (21 May 2023 21:00) (18 - 19)  SpO2: 93% (21 May 2023 21:00) (90% - 93%)    Parameters below as of 21 May 2023 21:00  Patient On (Oxygen Delivery Method): nasal cannula  O2 Flow (L/min): 2    Plan:  #Hypotension  250 cc NS bolus  Will continue to monitor patient closely.

## 2023-05-22 NOTE — CHART NOTE - NSCHARTNOTEFT_GEN_A_CORE
UROLOGY EVENT NOTE    Surgical house-officer called to bedside overnight as floor RN noted CBI was no longer draining and patient was complaining of pain. CBI immediately stopped and patient was seen and examined at bedside. At bedside, hand-irrigation of moreno was performed with return of red urine and significant clot. 250cc NS hand-irrigated with complete resolution of clot and pink urine return and improvement in suprapubic pain. CBI was restarted and currently draining appropriately with pink urine return.

## 2023-05-22 NOTE — CHART NOTE - NSCHARTNOTEFT_GEN_A_CORE
UROLOGY EVENT NOTE    Surgical house-officer called overnight for continued hematuria w/some clots via 16Fr 2-way moreno. Per nursing staff pt draining some clot w/red urine via moreno despite intermittent hand-irrigation. At the bedside red urine noted in bag w/moderate clot on irrigation/aspiration. Bladder scan at the time was 0cc. Although clinically not obstructed, 16Fr 2-way moreno was replaced with 22Fr 3-way moreno for initiation of CBI to prevent acute retention in the future. 3-way moreno was placed and hand-irrigated until pink urine return. CBI started. Strict precautions given to nursing to alert surgical resident and stop CBI if pt becomes obstructed while CBI running.

## 2023-05-22 NOTE — PROGRESS NOTE ADULT - ASSESSMENT
Confidential Drug Utilization Report  Search Terms: Chucky Dove, 1940Search Date: 05/15/2023 08:50:06 AM  The Drug Utilization Report below displays all of the controlled substance prescriptions, if any, that your patient has filled in the last twelve months. The information displayed on this report is compiled from pharmacy submissions to the Department, and accurately reflects the information as submitted by the pharmacies.    This report was requested by: Camilla Thompson | Reference #: [id number]    You have not added a THELMA number. Keeping your THELMA number(s) up to date on the My THELMA # tab will enable the separation of your prescriptions from others in the search results.    Practitioner Count: 1  Pharmacy Count: 1  Current Opioid Prescriptions: 1  Current Benzodiazepine Prescriptions: 0  Current Stimulant Prescriptions: 0      Patient Demographic Information (PDI)       PDI	First Name	Last Name	Birth Date	Gender	Street Address	[facility]	Zip Code  A	Chucky Dove	1940	Male	[phone] [facility]	[address]    Prescription Information      PDI Filter:    PDI	Current Rx	Drug Type	Rx Written	Rx Dispensed	Drug	Quantity	Days Supply	Prescriber Name	Prescriber THELMA #	Payment Method	Dispenser  A	Y	O	05/04/2023	05/04/2023	oxycodone hcl (ir) 20 mg tab	120	30	Tavdy, Esa	[id number]	Medicare	[facility]  A	N	O	03/30/2023	03/30/2023	oxycodone hcl (ir) 20 mg tab	120	30	Tavdy, Esa	[id number]	Medicare	[facility]  A	N	O	02/14/2023	02/27/2023	oxycodone hcl (ir) 20 mg tab	120	30	Tavdy, Esa	[id number]	Medicare	[facility]  A	N	O	02/02/2023	02/02/2023	oxycodone hcl (ir) 20 mg tab	120	30	Tavdy, Esa	[id number]	Medicare	[facility]  A	N	O	01/13/2023	01/13/2023	oxycodone hcl (ir) 10 mg tab	30	30	Tavdy, Esa	[id number]	Medicare	[facility]  A	N	O	01/13/2023	01/13/2023	oxycodone hcl (ir) 20 mg tab	90	30	Tavdy, Esa	[id number]	Medicare	[facility]  A	N	O	12/06/2022	12/06/2022	oxycodone hcl (ir) 20 mg tab	90	30	Tavdy, Esa	[id number]	Medicare	[facility]  A	N	O	12/06/2022	12/06/2022	oxycodone hcl (ir) 10 mg tab	30	30	Tavdy, Esa	[id number]	Medicare	[facility]  A	N	O	10/27/2022	10/31/2022	oxycodone hcl (ir) 10 mg tab	30	30	Tavdy, Esa	[id number]	Medicare	[facility]  A	N	O	10/27/2022	10/31/2022	oxycodone hcl (ir) 20 mg tab	90	30	Tavdy, Esa	[id number]	Medicare	[facility]  A	N	O	10/06/2022	10/06/2022	oxycodone hcl (ir) 20 mg tab	90	30	Tavdy, Esa	[id number]	Medicare	[facility]  A	N	O	10/06/2022	10/06/2022	oxycodone hcl (ir) 10 mg tab	30	30	Tavdy, Esa	[id number]	Medicare	[facility]  A	N	O	08/23/2022	08/23/2022	oxycodone hcl (ir) 10 mg tab	30	30	Tavdy, Esa	[id number]	Medicare	[facility]  A	N	O	08/23/2022	08/23/2022	oxycodone hcl (ir) 20 mg tab	90	30	Tavdy, Esa	[id number]	Medicare	[facility]  A	N	O	07/21/2022	07/31/2022	oxycodone hcl (ir) 10 mg tab	30	30	Tavdy, Esa	[id number]	Medicare	[facility]  A	N	O	07/21/2022	07/31/2022	oxycodone hcl (ir) 20 mg tab	90	30	Tavdy, Esa	[id number]	Medicare	[facility]  A	N	O	07/05/2022	07/05/2022	oxycodone hcl (ir) 10 mg tab	30	30	Tavdy, Esa	[id number]	Medicare	[facility]  A	N	O	07/05/2022	07/05/2022	oxycodone hcl (ir) 20 mg tab	90	30	Tavdy, Esa	[id number]	Medicare	[facility]  A	N	O	05/27/2022	05/31/2022	oxycodone hcl (ir) 10 mg tab	30	30	Esa Herring	[id number]	Medicare	[facility]  A	N	O	05/27/2022	05/31/2022	oxycodone hcl (ir) 20 mg tab	90	30	[facility]	[id number]	Medicare	[facility]    * - Details of Drug Type : O = Opioid, B = Benzodiazepine, S = Stimulant    * - Drugs marked with an asterisk are compound drugs. If the compound drug is made up of more than one controlled substance, then each controlled substance will be a separate row in the table.

## 2023-05-23 LAB
ANION GAP SERPL CALC-SCNC: 7 MMOL/L — SIGNIFICANT CHANGE UP (ref 5–17)
BUN SERPL-MCNC: 54 MG/DL — HIGH (ref 7–18)
CALCIUM SERPL-MCNC: 8.6 MG/DL — SIGNIFICANT CHANGE UP (ref 8.4–10.5)
CHLORIDE SERPL-SCNC: 107 MMOL/L — SIGNIFICANT CHANGE UP (ref 96–108)
CO2 SERPL-SCNC: 24 MMOL/L — SIGNIFICANT CHANGE UP (ref 22–31)
CREAT SERPL-MCNC: 1.96 MG/DL — HIGH (ref 0.5–1.3)
EGFR: 33 ML/MIN/1.73M2 — LOW
GLUCOSE SERPL-MCNC: 94 MG/DL — SIGNIFICANT CHANGE UP (ref 70–99)
HCT VFR BLD CALC: 40.9 % — SIGNIFICANT CHANGE UP (ref 39–50)
HGB BLD-MCNC: 12.4 G/DL — LOW (ref 13–17)
MCHC RBC-ENTMCNC: 26.3 PG — LOW (ref 27–34)
MCHC RBC-ENTMCNC: 30.3 GM/DL — LOW (ref 32–36)
MCV RBC AUTO: 86.8 FL — SIGNIFICANT CHANGE UP (ref 80–100)
NRBC # BLD: 0 /100 WBCS — SIGNIFICANT CHANGE UP (ref 0–0)
PLATELET # BLD AUTO: 203 K/UL — SIGNIFICANT CHANGE UP (ref 150–400)
POTASSIUM SERPL-MCNC: 4.1 MMOL/L — SIGNIFICANT CHANGE UP (ref 3.5–5.3)
POTASSIUM SERPL-SCNC: 4.1 MMOL/L — SIGNIFICANT CHANGE UP (ref 3.5–5.3)
RBC # BLD: 4.71 M/UL — SIGNIFICANT CHANGE UP (ref 4.2–5.8)
RBC # FLD: 16.9 % — HIGH (ref 10.3–14.5)
SODIUM SERPL-SCNC: 138 MMOL/L — SIGNIFICANT CHANGE UP (ref 135–145)
WBC # BLD: 7.85 K/UL — SIGNIFICANT CHANGE UP (ref 3.8–10.5)
WBC # FLD AUTO: 7.85 K/UL — SIGNIFICANT CHANGE UP (ref 3.8–10.5)

## 2023-05-23 PROCEDURE — 99232 SBSQ HOSP IP/OBS MODERATE 35: CPT

## 2023-05-23 RX ORDER — SODIUM CHLORIDE 9 MG/ML
1000 INJECTION INTRAMUSCULAR; INTRAVENOUS; SUBCUTANEOUS
Refills: 0 | Status: DISCONTINUED | OUTPATIENT
Start: 2023-05-23 | End: 2023-05-26

## 2023-05-23 RX ORDER — SODIUM CHLORIDE 9 MG/ML
1000 INJECTION INTRAMUSCULAR; INTRAVENOUS; SUBCUTANEOUS
Refills: 0 | Status: DISCONTINUED | OUTPATIENT
Start: 2023-05-23 | End: 2023-05-23

## 2023-05-23 RX ORDER — OXYCODONE HYDROCHLORIDE 5 MG/1
20 TABLET ORAL EVERY 6 HOURS
Refills: 0 | Status: DISCONTINUED | OUTPATIENT
Start: 2023-05-23 | End: 2023-05-23

## 2023-05-23 RX ORDER — OXYCODONE HYDROCHLORIDE 5 MG/1
15 TABLET ORAL EVERY 6 HOURS
Refills: 0 | Status: DISCONTINUED | OUTPATIENT
Start: 2023-05-23 | End: 2023-05-25

## 2023-05-23 RX ADMIN — TAMSULOSIN HYDROCHLORIDE 0.4 MILLIGRAM(S): 0.4 CAPSULE ORAL at 22:23

## 2023-05-23 RX ADMIN — ATORVASTATIN CALCIUM 80 MILLIGRAM(S): 80 TABLET, FILM COATED ORAL at 22:24

## 2023-05-23 RX ADMIN — SENNA PLUS 2 TABLET(S): 8.6 TABLET ORAL at 22:23

## 2023-05-23 RX ADMIN — Medication 5 MILLIGRAM(S): at 13:22

## 2023-05-23 RX ADMIN — SODIUM CHLORIDE 3 MILLILITER(S): 9 INJECTION INTRAMUSCULAR; INTRAVENOUS; SUBCUTANEOUS at 13:12

## 2023-05-23 RX ADMIN — Medication 20 MILLIGRAM(S): at 05:59

## 2023-05-23 RX ADMIN — Medication 75 MILLIGRAM(S): at 05:59

## 2023-05-23 RX ADMIN — OXYCODONE HYDROCHLORIDE 20 MILLIGRAM(S): 5 TABLET ORAL at 12:46

## 2023-05-23 RX ADMIN — OXYCODONE HYDROCHLORIDE 20 MILLIGRAM(S): 5 TABLET ORAL at 12:01

## 2023-05-23 RX ADMIN — AMLODIPINE BESYLATE 5 MILLIGRAM(S): 2.5 TABLET ORAL at 05:59

## 2023-05-23 RX ADMIN — ALBUTEROL 2 PUFF(S): 90 AEROSOL, METERED ORAL at 22:22

## 2023-05-23 RX ADMIN — Medication 81 MILLIGRAM(S): at 11:55

## 2023-05-23 RX ADMIN — SODIUM CHLORIDE 3 MILLILITER(S): 9 INJECTION INTRAMUSCULAR; INTRAVENOUS; SUBCUTANEOUS at 22:05

## 2023-05-23 RX ADMIN — Medication 10 MILLIGRAM(S): at 05:59

## 2023-05-23 RX ADMIN — SODIUM CHLORIDE 100 MILLILITER(S): 9 INJECTION INTRAMUSCULAR; INTRAVENOUS; SUBCUTANEOUS at 16:28

## 2023-05-23 RX ADMIN — Medication 75 MILLIGRAM(S): at 13:22

## 2023-05-23 RX ADMIN — ALBUTEROL 2 PUFF(S): 90 AEROSOL, METERED ORAL at 14:57

## 2023-05-23 RX ADMIN — SODIUM CHLORIDE 80 MILLILITER(S): 9 INJECTION INTRAMUSCULAR; INTRAVENOUS; SUBCUTANEOUS at 16:39

## 2023-05-23 RX ADMIN — Medication 5 MILLIGRAM(S): at 05:59

## 2023-05-23 RX ADMIN — Medication 25 MILLIGRAM(S): at 05:59

## 2023-05-23 RX ADMIN — Medication 5 MILLIGRAM(S): at 22:23

## 2023-05-23 RX ADMIN — SODIUM CHLORIDE 80 MILLILITER(S): 9 INJECTION INTRAMUSCULAR; INTRAVENOUS; SUBCUTANEOUS at 22:26

## 2023-05-23 RX ADMIN — SODIUM CHLORIDE 3 MILLILITER(S): 9 INJECTION INTRAMUSCULAR; INTRAVENOUS; SUBCUTANEOUS at 06:23

## 2023-05-23 RX ADMIN — DULOXETINE HYDROCHLORIDE 60 MILLIGRAM(S): 30 CAPSULE, DELAYED RELEASE ORAL at 11:56

## 2023-05-23 NOTE — PROGRESS NOTE ADULT - PROBLEM SELECTOR PLAN 6
home meds Oxycodon 20 TID and oxycodone 10 OD.   c/w pain regimen per Pain Mgmt : oxycodone 20mg PO q 6 hours PRN severe pain.   Continue home dose baclofen 10mg PO BID.   - Continue home dose cymbalta 60mg PO daily.   - Continue home dose lyrica 75mg po q 8 hours. Monitor renal function.   - Continue Lidoderm 4% patch 2 patches daily.   Continue bowel regimen   Pain Mgmt team following. home meds Oxycodon 20 TID and oxycodone 10 OD.   c/w pain regimen per Pain Mgmt : oxycodone 20mg PO q 6 hours PRN severe pain.     - Continue home dose baclofen 10mg PO BID.   - Continue home dose cymbalta 60mg PO daily.   - Continue home dose lyrica 75mg po q 8 hours. Monitor renal function.   - Continue Lidoderm 4% patch 2 patches daily.   - Continue bowel regimen   - Pain Mgmt team following.

## 2023-05-23 NOTE — PROGRESS NOTE ADULT - SUBJECTIVE AND OBJECTIVE BOX
Time of Visit:  Patient seen and examined. pat is lying in bed comfortable     MEDICATIONS  (STANDING):  albuterol    90 MICROgram(s) HFA Inhaler 2 Puff(s) Inhalation every 6 hours  amLODIPine   Tablet 5 milliGRAM(s) Oral daily  aspirin  chewable 81 milliGRAM(s) Oral daily  atorvastatin 80 milliGRAM(s) Oral at bedtime  baclofen 10 milliGRAM(s) Oral every 12 hours  DULoxetine 60 milliGRAM(s) Oral daily  furosemide    Tablet 20 milliGRAM(s) Oral daily  metoprolol tartrate 25 milliGRAM(s) Oral two times a day  oxybutynin 5 milliGRAM(s) Oral every 8 hours  polyethylene glycol 3350 17 Gram(s) Oral daily  senna 2 Tablet(s) Oral at bedtime  sodium chloride 0.9% lock flush 3 milliLiter(s) IV Push every 8 hours  sodium chloride 0.9%. 1000 milliLiter(s) (80 mL/Hr) IV Continuous <Continuous>  tamsulosin 0.4 milliGRAM(s) Oral at bedtime      MEDICATIONS  (PRN):  acetaminophen     Tablet .. 1000 milliGRAM(s) Oral every 8 hours PRN Moderate Pain (4 - 6)  melatonin 3 milliGRAM(s) Oral at bedtime PRN Insomnia  ondansetron Injectable 4 milliGRAM(s) IV Push every 6 hours PRN Nausea and/or Vomiting  oxyCODONE    IR 15 milliGRAM(s) Oral every 6 hours PRN Severe Pain (7 - 10)       Medications up to date at time of exam.      PHYSICAL EXAMINATION:  Patient has no new complaints.  GENERAL: The patient is a well-developed, well-nourished, in no apparent distress.     Vital Signs Last 24 Hrs  T(C): 36.3 (23 May 2023 11:54), Max: 36.4 (23 May 2023 05:45)  T(F): 97.3 (23 May 2023 11:54), Max: 97.6 (23 May 2023 05:45)  HR: 88 (23 May 2023 17:01) (61 - 89)  BP: 108/62 (23 May 2023 17:01) (105/57 - 122/79)  BP(mean): 77 (23 May 2023 17:01) (77 - 77)  RR: 17 (23 May 2023 11:54) (17 - 18)  SpO2: 95% (23 May 2023 11:54) (95% - 96%)    Parameters below as of 23 May 2023 11:54  Patient On (Oxygen Delivery Method): nasal cannula  O2 Flow (L/min): 2     (if applicable)    Chest Tube (if applicable)    HEENT: Head is normocephalic and atraumatic. Extraocular muscles are intact. Mucous membranes are moist.     NECK: Supple, no palpable adenopathy.    LUNGS: Clear to auscultation, no wheezing, rales, or rhonchi.    HEART: Regular rate and rhythm without murmur.    ABDOMEN: Soft, nontender, and nondistended.  No hepatosplenomegaly is noted.    : + moreno     EXTREMITIES: Without any cyanosis, clubbing, rash, lesions or edema.    NEUROLOGIC: Awake, alert, oriented, grossly intact    SKIN: Warm, dry, good turgor.      LABS:                        12.4   7.85  )-----------( 203      ( 23 May 2023 05:40 )             40.9     05-23    138  |  107  |  54<H>  ----------------------------<  94  4.1   |  24  |  1.96<H>    Ca    8.6      23 May 2023 05:40                          MICROBIOLOGY: (if applicable)    RADIOLOGY & ADDITIONAL STUDIES:  EKG:   CXR:  ECHO:    IMPRESSION: 82y Male PAST MEDICAL & SURGICAL HISTORY:  Hypertension      Hyperlipidemia      Venous insufficiency       p/w          Impression: This is an 81 Y/O Male  from [facility] presented to ED with s/p Mechanical Fall. Former smoker. Patient admitted for acute hypoxic respiratory  failure requiring supp O2 due to b/l dependent atelectasis with multiple thoracic and lumbar vertebral fx.  . I doubt PNA or PE at this time . He may have undiagnosed COPD . 05-15-23 CXR with trace Right Pleural Effusion.  05-16-23 Negative PCR for Covid 19. 05-11-23 Negative swab for Covid 19 and Negative Blood Cx x2.  He is complaining of back pain from prior surgery . Hematuria due to traumatic moreno  no change in H/H. CBI as per  urology     Sugg  - O2 saturation 96% with o2 supplement. Continue Oxygen supplementation 2L NC.   - Continue Albuterol 90 mcg 2 Puffs Q 6 Hours.    - Incentive spirometry .  - Anticoagulation on hold  due to hematuria   - On Lasix 20 mg Oral daily.    - Urology following , moreno care . On Flomax 0.4 mg Oral Daily. oxybutynin

## 2023-05-23 NOTE — PROGRESS NOTE ADULT - PROBLEM SELECTOR PLAN 3
- Likely secondary to pain VS atelectasis.   -On admission saturating 88% on room air as per ED note.   -CXR - Small bibasilar infiltrates.  -CT chest - Dependent densities in the lower lungs most likely atelectasis given the low lung volumes. Superimposed pneumonia less likely.  -Pulm Dr. Bailey consulted  recs appreciated  -Cont Albuterol, oxygen, Incentive spirometry, Out pt pulmo f/u. Likely secondary to pain VS atelectasis.   -On admission saturating 88% on room air as per ED note.   -CXR - Small bibasilar infiltrates.  -CT chest - Dependent densities in the lower lungs most likely atelectasis given the low lung volumes. Superimposed pneumonia less likely.  -Pulm Dr. Bailey consulted  recs appreciated  -Cont Albuterol, oxygen, Incentive spirometry, Out pt pulmo f/u.

## 2023-05-23 NOTE — PROGRESS NOTE ADULT - PROBLEM SELECTOR PLAN 1
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 + opioid dependent.   High risk medications reviewed. Avoid polypharmacy. Avoid IV opioids. Avoid NSAIDs and benzodiazepines. Non-pharmacological sleep aides initiated. Non-opioid medications and non-pharmacological pain management measures initiated.   Opioid pain recommendations   - Continue home medication- oxycodone 20mg PO q 6 hours PRN severe pain. Monitor for sedation/ respiratory depression.   Non-opioid pain recommendations   - Continue Acetaminophen 1 gram PO q 8 hours PRN moderate pain. Monitor LFTs  - Continue home dose baclofen 10mg PO BID.   - Continue home dose cymbalta 60mg PO daily.   - Continue home dose lyrica 75mg po q 8 hours. Monitor renal function.   - Continue Lidoderm 4% patch 2 patches daily.   Bowel Regimen  - Continue Miralax 17G PO daily  - Continue Senna 2 tablets at bedtime for constipation  Mild pain (score 1-3)  - Non-pharmacological pain treatment recommendations  - Warm/ Cool packs PRN   - Repositioning extremity, elevation, imagery, relaxation, distraction.  - Physical therapy OOB if no contraindications   Recommendations discussed with primary team and RN. 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 + opioid dependent.   High risk medications reviewed. Avoid polypharmacy. Avoid IV opioids. Avoid NSAIDs and benzodiazepines. Non-pharmacological sleep aides initiated. Non-opioid medications and non-pharmacological pain management measures initiated.   Opioid pain recommendations   - Decrease home medication- oxycodone 15mg PO q 6 hours PRN severe pain. Monitor for sedation/ respiratory depression.   Non-opioid pain recommendations   - Continue Acetaminophen 1 gram PO q 8 hours PRN moderate pain. Monitor LFTs  - Continue home dose baclofen 10mg PO BID.   - Continue home dose cymbalta 60mg PO daily.   - Discontinue lyrica 75mg po q 8 hours. Monitor renal function.   - Continue Lidoderm 4% patch 2 patches daily.   Bowel Regimen  - Continue Miralax 17G PO daily  - Continue Senna 2 tablets at bedtime for constipation  Mild pain (score 1-3)  - Non-pharmacological pain treatment recommendations  - Warm/ Cool packs PRN   - Repositioning extremity, elevation, imagery, relaxation, distraction.  - Physical therapy OOB if no contraindications   Recommendations discussed with primary team and RN. 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 + opioid dependent.   High risk medications reviewed. Avoid polypharmacy. Avoid IV opioids. Avoid NSAIDs and benzodiazepines. Non-pharmacological sleep aides initiated. Non-opioid medications and non-pharmacological pain management measures initiated.   Opioid pain recommendations   - Decrease home medication- oxycodone 15mg PO q 6 hours PRN severe pain due to lethargy. Monitor for sedation/ respiratory depression.   Non-opioid pain recommendations   - Continue Acetaminophen 1 gram PO q 8 hours PRN moderate pain. Monitor LFTs  - Continue home dose baclofen 10mg PO BID.   - Continue home dose cymbalta 60mg PO daily.   - Discontinued lyrica 75mg po q 8 hours due to dizziness/ lethargy. Monitor renal function.   - Discontinued Lidoderm 4% patch 2 patches daily- pt refused.   Bowel Regimen  - Continue Miralax 17G PO daily  - Continue Senna 2 tablets at bedtime for constipation  Mild pain (score 1-3)  - Non-pharmacological pain treatment recommendations  - Warm/ Cool packs PRN   - Repositioning extremity, elevation, imagery, relaxation, distraction.  - Physical therapy OOB if no contraindications   Recommendations discussed with primary team and RN.

## 2023-05-23 NOTE — CHART NOTE - NSCHARTNOTEFT_GEN_A_CORE
Called by NP for increased lethargy and review of pain medications. Pt reassessed at bedside. Laying in bed, lethargic, oriented to self. Denies pain. Reports dizziness. Vitals stable.  Vital Signs Last 24 Hrs  T(C): 36.3 (23 May 2023 11:54), Max: 36.4 (23 May 2023 05:45)  T(F): 97.3 (23 May 2023 11:54), Max: 97.6 (23 May 2023 05:45)  HR: 88 (23 May 2023 17:01) (61 - 89)  BP: 108/62 (23 May 2023 17:01) (105/57 - 143/77)  BP(mean): 77 (23 May 2023 17:01) (77 - 77)  RR: 17 (23 May 2023 11:54) (17 - 18)  SpO2: 95% (23 May 2023 11:54) (95% - 98%)    Parameters below as of 23 May 2023 11:54  Patient On (Oxygen Delivery Method): nasal cannula  O2 Flow (L/min): 2   Istop reviewed.   Discontinued lyrica. Decreased PRN oxycodone to 15mg PO q6h PRN severe pain. Discontinued lidoderm patches, pt refusing. Discussed with medicine NP.

## 2023-05-23 NOTE — PROGRESS NOTE ADULT - PROBLEM SELECTOR PLAN 2
likely due to urinary retention/hematuria  -SCr 0.84-->2.06  -Cont CBI  -Cont Flomax  -Avoid nephrotoxic meds  -f/u am BMP likely due to urinary retention/hematuria/dehydration  -SCr 0.84-->2.06-->1.96  -CBI clamped at this time  -Cont Flomax  -Avoid nephrotoxic meds  -Started IVF   -f/u am BMP

## 2023-05-23 NOTE — PROGRESS NOTE ADULT - ATTENDING COMMENTS
Urology consult noted and much appreciated.   Will cont on Cunningham cath with probable TOV once OOB to chair and ambulatory with PT.   Repeat CXR on Monday given basilar infiltrates (on cefazolin also for thigh cellulitis).
3 y/o male admitted s/p mechanical fall with urinary retention     - Please perform and document PVR to ensure patient is not retaining urine  - If in retention, catheter will need to be replaced and patient should follow up outpatient with urology  - Monitor urine output  - Monitor urine color  - Continue flomax   - Care as per primary team
81 y/o male admitted s/p mechanical fall with urinary retention     - continue moreno, irrigate prn   - monitor urine output  - continue flomax   - hold anticoagulation if clinically safe  - will follow   - care as per primary team
Urology consult noted and much appreciated.   Will cont on Cunningham cath with probable TOV once OOB to chair and ambulatory with PT.   Repeat CXR on Monday given basilar infiltrates (on cefazolin also for thigh cellulitis).
d/w patient and staff plan of care

## 2023-05-23 NOTE — PROGRESS NOTE ADULT - PROBLEM SELECTOR PLAN 1
- Patient developed hematuria a few hours after admission.   - Likely 2/2 to traumatic moreno insertion as multiple tries to place moreno was done.  - noted with hematuria  5/19, moreno irrigated by nursing and Hematuria improved by afternoon  - Holding lovenox in setting of hematuria.   - Recurrent gross hematuria over night 5/21  - H/H stable  - Uro interventions and note appreciated  - Cont CBI for now  - f/u uro further reccs  - Monitor CBC - Patient developed hematuria a few hours after admission.   - Likely 2/2 to traumatic moreno insertion as multiple tries to place moreno was done.  - noted with hematuria  5/19, moreno irrigated by nursing and Hematuria improved by afternoon  - Holding lovenox in setting of hematuria.   - Recurrent gross hematuria over night 5/21  - H/H stable  - Uro interventions and note appreciated  - CBI via 6eye moreno initiated with clearing hematuria  - 5/23 am CBI clamped bu Uro  - f/u U/O  - 6 eye moreno will be changed by uro

## 2023-05-23 NOTE — PROGRESS NOTE ADULT - SUBJECTIVE AND OBJECTIVE BOX
Subjective  Resting comfortably. Urine clear on slow drip CBI.    Objective    Vital signs  T(F): , Max: 98 (05-22-23 @ 08:49)  HR: 86 (05-23-23 @ 05:45)  BP: 122/79 (05-23-23 @ 05:45)  SpO2: 95% (05-23-23 @ 05:45)  Wt(kg): --    Output     OUT:    Indwelling Catheter - Urethral (mL): 14140 mL    Ureteral Catheter (mL): 2750 mL  Total OUT: 27749 mL    Total NET: -21573 mL          Gen: NAD  Abd: soft, nontender, nondistended  : moreno secured in place, draining CYU    Labs      05-23 @ 05:40    WBC 7.85  / Hct 40.9  / SCr 1.96     05-22 @ 06:37    WBC 9.54  / Hct 39.7  / SCr 2.06

## 2023-05-23 NOTE — PROGRESS NOTE ADULT - ASSESSMENT
Confidential Drug Utilization Report  Search Terms: Chucky Dove, 1940Search Date: 05/15/2023 08:50:06 AM  The Drug Utilization Report below displays all of the controlled substance prescriptions, if any, that your patient has filled in the last twelve months. The information displayed on this report is compiled from pharmacy submissions to the Department, and accurately reflects the information as submitted by the pharmacies.    This report was requested by: Camilla Thompson | Reference #: [id number]    You have not added a THELMA number. Keeping your THELMA number(s) up to date on the My THELMA # tab will enable the separation of your prescriptions from others in the search results.    Practitioner Count: 1  Pharmacy Count: 1  Current Opioid Prescriptions: 1  Current Benzodiazepine Prescriptions: 0  Current Stimulant Prescriptions: 0      Patient Demographic Information (PDI)       PDI	First Name	Last Name	Birth Date	Gender	Street Address	[facility]	Zip Code  [place]	Chucky Dove	1940	Male	[phone] [facility]	[address]    Prescription Information      PDI Filter:    PDI	Current Rx	Drug Type	Rx Written	Rx Dispensed	Drug	Quantity	Days Supply	Prescriber Name	Prescriber THELMA #	Payment Method	Dispenser  A	Y	O	05/04/2023	05/04/2023	oxycodone hcl (ir) 20 mg tab	120	30	Tavdy, Esa	[id number]	Medicare	[facility] [facility]  A	N	O	03/30/2023	03/30/2023	oxycodone hcl (ir) 20 mg tab	120	30	Tavdy, Esa	[id number]	Medicare	[facility] [facility]  A	N	O	02/14/2023	02/27/2023	oxycodone hcl (ir) 20 mg tab	120	30	Tavdy, Esa	[id number]	Medicare	[facility] [facility]  A	N	O	02/02/2023	02/02/2023	oxycodone hcl (ir) 20 mg tab	120	30	Tavdy, Esa	[id number]	Medicare	[facility] [facility]  A	N	O	01/13/2023	01/13/2023	oxycodone hcl (ir) 10 mg tab	30	30	Tavdy, Esa	[id number]	Medicare	[facility] [facility]  A	N	O	01/13/2023	01/13/2023	oxycodone hcl (ir) 20 mg tab	90	30	Tavdy, Esa	[id number]	Medicare	[facility] [facility]  A	N	O	12/06/2022	12/06/2022	oxycodone hcl (ir) 20 mg tab	90	30	Tavdy, Esa	[id number]	Medicare	[facility]  A	N	O	12/06/2022	12/06/2022	oxycodone hcl (ir) 10 mg tab	30	30	Tavdy, Esa	[id number]	Medicare	[facility]  A	N	O	10/27/2022	10/31/2022	oxycodone hcl (ir) 10 mg tab	30	30	Tavdy, Esa	[id number]	Medicare	[facility]  A	N	O	10/27/2022	10/31/2022	oxycodone hcl (ir) 20 mg tab	90	30	Tavdy, Esa	[id number]	Medicare	[facility]  A	N	O	10/06/2022	10/06/2022	oxycodone hcl (ir) 20 mg tab	90	30	Tavdy, Esa	[id number]	Medicare	[facility]  A	N	O	10/06/2022	10/06/2022	oxycodone hcl (ir) 10 mg tab	30	30	Tavdy, Esa	[id number]	Medicare	[facility]  A	N	O	08/23/2022	08/23/2022	oxycodone hcl (ir) 10 mg tab	30	30	Tavdy, Esa	[id number]	Medicare	[facility]  A	N	O	08/23/2022	08/23/2022	oxycodone hcl (ir) 20 mg tab	90	30	Tavdy, Esa	[id number]	Medicare	[facility]  A	N	O	07/21/2022	07/31/2022	oxycodone hcl (ir) 10 mg tab	30	30	Tavdy, Esa	[id number]	Medicare	[facility]  A	N	O	07/21/2022	07/31/2022	oxycodone hcl (ir) 20 mg tab	90	30	Tavdy, Esa	[id number]	Medicare	[email or web address]  A	N	O	07/05/2022	07/05/2022	oxycodone hcl (ir) 10 mg tab	30	30	Tavdy, Esa	[id number]	Medicare	[facility]  A	N	O	07/05/2022	07/05/2022	oxycodone hcl (ir) 20 mg tab	90	30	Tavdy, Esa	[id number]	Medicare	[facility]  A	N	O	05/27/2022	05/31/2022	oxycodone hcl (ir) 10 mg tab	30	30	Esa Herring	[id number]	Medicare	[facility]  A	N	O	05/27/2022	05/31/2022	oxycodone hcl (ir) 20 mg tab	90	30	hospitalsEsa riggins	[id number]	Medicare	[facility]    * - Details of Drug Type : O = Opioid, B = Benzodiazepine, S = Stimulant    * - Drugs marked with an asterisk are compound drugs. If the compound drug is made up of more than one controlled substance, then each controlled substance will be a separate row in the table.

## 2023-05-23 NOTE — PROGRESS NOTE ADULT - SUBJECTIVE AND OBJECTIVE BOX
NP Note discussed with  Primary Attending    Patient is a 82y old  Male who presents with a chief complaint of Mechanical fall (23 May 2023 11:43)      INTERVAL HPI/OVERNIGHT EVENTS: no new complaints    MEDICATIONS  (STANDING):  albuterol    90 MICROgram(s) HFA Inhaler 2 Puff(s) Inhalation every 6 hours  amLODIPine   Tablet 5 milliGRAM(s) Oral daily  aspirin  chewable 81 milliGRAM(s) Oral daily  atorvastatin 80 milliGRAM(s) Oral at bedtime  baclofen 10 milliGRAM(s) Oral every 12 hours  DULoxetine 60 milliGRAM(s) Oral daily  furosemide    Tablet 20 milliGRAM(s) Oral daily  lidocaine   4% Patch 2 Patch Transdermal daily  metoprolol tartrate 25 milliGRAM(s) Oral two times a day  oxybutynin 5 milliGRAM(s) Oral every 8 hours  polyethylene glycol 3350 17 Gram(s) Oral daily  pregabalin 75 milliGRAM(s) Oral three times a day  senna 2 Tablet(s) Oral at bedtime  sodium chloride 0.9% lock flush 3 milliLiter(s) IV Push every 8 hours  sodium chloride 0.9%. 1000 milliLiter(s) (100 mL/Hr) IV Continuous <Continuous>  tamsulosin 0.4 milliGRAM(s) Oral at bedtime    MEDICATIONS  (PRN):  acetaminophen     Tablet .. 1000 milliGRAM(s) Oral every 8 hours PRN Moderate Pain (4 - 6)  melatonin 3 milliGRAM(s) Oral at bedtime PRN Insomnia  ondansetron Injectable 4 milliGRAM(s) IV Push every 6 hours PRN Nausea and/or Vomiting  oxyCODONE    IR 20 milliGRAM(s) Oral every 6 hours PRN Severe Pain (7 - 10)      __________________________________________________  REVIEW OF SYSTEMS:    CONSTITUTIONAL: No fever,   EYES: no acute visual disturbances  NECK: No pain or stiffness  RESPIRATORY: No cough; No shortness of breath  CARDIOVASCULAR: No chest pain, no palpitations  GASTROINTESTINAL: No pain. No nausea or vomiting; No diarrhea   NEUROLOGICAL: No headache or numbness, no tremors  MUSCULOSKELETAL: No joint pain, no muscle pain  GENITOURINARY: no dysuria, no frequency, no hesitancy  PSYCHIATRY: no depression , no anxiety  ALL OTHER  ROS negative        Vital Signs Last 24 Hrs  T(C): 36.3 (23 May 2023 11:54), Max: 36.4 (23 May 2023 05:45)  T(F): 97.3 (23 May 2023 11:54), Max: 97.6 (23 May 2023 05:45)  HR: 89 (23 May 2023 11:54) (61 - 89)  BP: 105/57 (23 May 2023 11:54) (105/57 - 143/77)  BP(mean): --  RR: 17 (23 May 2023 11:54) (17 - 18)  SpO2: 95% (23 May 2023 11:54) (95% - 98%)    Parameters below as of 23 May 2023 11:54  Patient On (Oxygen Delivery Method): nasal cannula  O2 Flow (L/min): 2      ________________________________________________  PHYSICAL EXAM:  Appears dehydrated, lethargic today  GENERAL: NAD  HEENT: Normocephalic;  conjunctivae and sclerae clear; moist mucous membranes;   NECK : supple  CHEST/LUNG: Clear to auscultation bilaterally with good air entry   HEART: S1 S2  regular; no murmurs, gallops or rubs  ABDOMEN: Soft, Nontender, Nondistended; Bowel sounds present  EXTREMITIES: no cyanosis; no edema; no calf tenderness  SKIN: warm and dry; no rash  NERVOUS SYSTEM:  Awake and alert; lethargic  URO:  6 eye moreno in place, CBI clamped, draining clear yellow urine, no hematuria    _________________________________________________  LABS:                        12.4   7.85  )-----------( 203      ( 23 May 2023 05:40 )             40.9     05-23    138  |  107  |  54<H>  ----------------------------<  94  4.1   |  24  |  1.96<H>    Ca    8.6      23 May 2023 05:40      CAPILLARY BLOOD GLUCOSE    RADIOLOGY & ADDITIONAL TESTS:    < from: Xray Chest 1 View- PORTABLE-Routine (Xray Chest 1 View- PORTABLE-Routine in AM.) (05.15.23 @ 09:33) >    ACC: [id number] EXAM:  XR CHEST PORTABLE ROUTINE 1V   ORDERED BY: TAVON MCDERMOTT     PROCEDURE DATE:  05/15/2023          INTERPRETATION:  TIME OF EXAM: May 15, 2023 at 9:05 AM.    CLINICAL INFORMATION: Hypertension. Atelectasis.    COMPARISON:  APchest x-ray and CT scan of the chest from May 11, 2023.    TECHNIQUE:   AP Portable chest x-ray. Limited by rotation. Jewelry   projects over the left chest.    INTERPRETATION:    Heart size and the mediastinum cannot be accurately evaluated on this   projection.  Elevated right hemidiaphragm again seen.  There is right lower lung linear atelectasis with a possible trace right   pleural effusion.  The left lung is clear.  No left pleural effusion seen.  No pneumothorax.  Incompletely imaged orthopedic hardware projects over the thoracolumbar   spine.        IMPRESSION:  Limited by rotation.    Continued elevation of right hemidiaphragm.    Right lower lung linear atelectasis and possible trace right pleural   effusion.    < end of copied text >      < from: CT Cervical Spine No Cont (05.11.23 @ 05:14) >    ACC: [id number] EXAM:  CT ABDOMEN AND PELVIS IC   ORDERED BY: FADUMO SANTOS     ACC: [id number] EXAM:  CT CHEST IC   ORDERED BY: FADUMO SANTOS     ACC: [id number] EXAM:  CT BRAIN   ORDERED BY: FADUMO SANTOS     ACC: [id number] EXAM:  CT CERVICALSPINE   ORDERED BY: FADUMO SANTOS     PROCEDURE DATE:  05/11/2023          INTERPRETATION:  CLINICAL INFORMATION: Back pain after fall. CHF,   hypertension, shortness of breath.    COMPARISON: Chest radiograph same date.    CONTRAST/COMPLICATIONS:  90 cc Omnipaque 350 administered intravenously for the chest abdomen   pelvis.    PROCEDURE:  CT head and cervical spine without intravenous contrast.  CT of the Chest, Abdomen and Pelvis with intravenous contrast.  Sagittal and coronal reformats were performed.    FINDINGS:    HEAD:  BRAIN: No apparent acute infarct, hemmorhage or mass. No hydrocephalus.   Chronic appearing white matter hypodensities and volume loss.  CALVARIUM: No skull fracture or agreesive osseous legions.  EXTRACRANIAL SOFTTISSUES: No acute findings.  VISUALIZED PORTIONS OF THE PARANASAL SINUSES AND MASTOID AIR CELLS: No   air fluid levels.    CERVICAL SPINE:  VERTEBRAE: No fracture or dislocation. Exaggeration of cervical lordosis.   Mild degenerative retrolisthesis of C3 on C4 and mild degenerative   anterolisthesis of C6 on C7 and C7 on T1.  SPINAL CANAL AND FORAMINA: Multilevel degenerative changes with no   apparent high grade spinal canal narrowing.  PARASPINAL SOFT TISSUES: No paravertebral hematoma or acutefinding.    CHEST:  LUNGS AND LARGE AIRWAYS: Low lung volumes with right greater than left   basilar opacities and mild elevation of the right hemidiaphragm. No   pulmonary nodules.  PLEURA: No pleural effusion or pneumothorax.  VESSELS: No evidence of injury to the heart and great vessels. No   thoracic aortic dissection or aneurysm.  HEART: Mild four-chamber cardiomegaly. No pericardial effusion.  MEDIASTINUM AND LUIS ALBERTO: No lymphadenopathy.  CHEST WALL AND LOWER NECK: Within normal limits.    ABDOMEN AND PELVIS:  LIVER: Within normal limits.  BILE DUCTS: Normal caliber.  GALLBLADDER: Within normal limits.  SPLEEN: No evidence of splenic injury or concerning finding. Small cystic   lesion upper spleen.  PANCREAS: Within normal limits.  ADRENALS: Within normal limits.  KIDNEYS/URETERS: No evidence of renal injury. No hydronephrosis or renal   stone or concerning mass. Small cysts bilaterally.    BLADDER: Within normal limits.  REPRODUCTIVE ORGANS: Radiation seeds in the prostate.    BOWEL: No bowel obstruction. Appendix is not visualized. No evidence of   inflammation in the pericecal region.  PERITONEUM: No ascites.  VESSELS: No abdominal aortic aneurysm. Calcific atherosclerosis   particularly of the distal abdominal aorta and iliac arteries.  RETROPERITONEUM/LYMPH NODES: No lymphadenopathy.  ABDOMINAL WALL: Large right lower quadrant anterior abdominal wall hernia   extending into the right inguinal region, not fully visible, contains the   cecum, much of the ascending colon, anddistal ileum. No evidence of   obstruction or other acute complication regarding this hernia.  BONES: Several thoracic and lumbar vertebral compression fractures but   with no discrete fracture line or adjacent edema. Mild depression of the   superior endplate of T1. Chronic moderate to severe compression T4   vertebral body. Mild compression T5, T6, T7, T8, T9, T10, T11 and T12   vertebral bodies. Chronic moderate compression of T3 vertebral body with   focal right scoliosis centered at this level. Status post posterior   fusion T2-sacroiliac. The hardware is intact. Bony demineralization.    IMPRESSION:  CT head: No evidence of intracranial injury or skull fracture.    CT cervical spine: No fracture or dislocation of the cervical spine.    CT Chest abdomen pelvis:  No evidence of acute traumatic injury.  Multilevel thoracic and lumbar vertebral compression fractures are   difficult to definitively age though there are no visible fracture lines   or adjacent edema to suggest acute injury. Comparison with priors would   be helpful.  Dependent densities in the lower lungs most likely atelectasis given the   low lung volumes. Superimposed pneumonia less likely.      --- End of Report ---    < end of copied text >      Imaging Personally Reviewed:  YES/NO    Consultant(s) Notes Reviewed:   YES/ No    Care Discussed with Consultants :     Plan of care was discussed with patient and /or primary care giver; all questions and concerns were addressed and care was aligned with patient's wishes.

## 2023-05-23 NOTE — PROGRESS NOTE ADULT - ASSESSMENT
81 y/o male from NH with past medical history of HTN, HLD, BPH, CHF admitted to medicine s/p mechanical fall and found to be in urinary retention s/p moreno placement with subsequent failed TOV, moreno replaced now with recurrent hematuria, likely to trauma. CBI started 5/21.    - AM labs reviewed  - CBI clamped this morning  - Monitor urine color  - Trend H/H  - Continue to hold plavix if medically safe  - Monitor patient to prevent pulling on catheter  - Recommend oxybutynin for bladder spasms PRN  - Trend Cr

## 2023-05-23 NOTE — PROGRESS NOTE ADULT - SUBJECTIVE AND OBJECTIVE BOX
Source of information: TOM HERRERA, Chart review  Patient language: English  : n/a    HPI:  Patient is a 83 y/o M from [facility] who walks with a walker and has PMH of HTN, HLD, venous insufficiency, CAD, BPH, CHF who presented s/p mechanical fall. Patient reports that he was by his bed when he felt lightheaded and felt like he might pass out and had a fall. Patient denies hitting his head or any other bodily injury. Patient denies any loss of consciousness and reports he remembers the entire episode. Patient denies any pain at this time. Patient also reported that since being in the ED he is in a lot of discomfort as he has a very strong urge to urinate but he is not able to. Patient denies any history of fevers, chills, headache, prior lightheadedness, chest pain, palpitations, shortness of breath and cough (contradictory to ED note), nausea, vomiting, abdominal pain, diarrhea, constipation, melena, hematochezia, or other urinary symptoms.  (11 May 2023 09:20)    CTAP demonstrates- several thoracic and lumbar vertebral compression fractures but with no discrete fracture line or adjacent edema. Mild depression of the superior endplate of T1. Chronic moderate to severe compression T4 vertebral body. Mild compression T5, T6, T7, T8, T9, T10, T11 and T12 vertebral bodies. Chronic moderate compression of T3 vertebral body with focal right scoliosis centered at this level. Status post posterior fusion T2-sacroiliac. The hardware is intact. Bony demineralization.    CT head: No evidence of intracranial injury or skull fracture.    CT cervical spine: No fracture or dislocation of the cervical spine.    CT Chest abdomen pelvis:No evidence of acute traumatic injury. Multilevel thoracic and lumbar vertebral compression fractures are   difficult to definitively age though there are no visible fracture lines or adjacent edema to suggest acute injury. Comparison with priors would be helpful.  Dependent densities in the lower lungs most likely atelectasis given the low lung volumes. Superimposed pneumonia less likely.    Pt is admitted for hypoxemia, s/p fall.  CXR demonstrates- Continued elevation of right hemidiaphragm. Right lower lung linear atelectasis and possible trace right pleural effusion. Pain consulted for back pain, chronic on 5/15. Pt seen and examined at bedside. Pt laying in bed, reports lumbar back pain score 8/10  SCALE USED: (1-10 VNRS). Pt describes pain as dull, constant, non- radiating, alleviated by current pain medication, exacerbated by movement. Denies numbness/ tingling. Reports generalized weakness. Pt also reports urethral pain from urinary catheter. Taking oxycodone 20mg PO PRN pain at home. Per istop, pt on chronic oxycodone 20mg PO 4x/day, last filled 5/4. Pt tolerating PO diet. Denies lethargy, chest pain, SOB, nausea, vomiting, constipation. Last BM 5/15. Patient stated goal for pain control: to be able to take deep breaths, get out of bed to chair and ambulate with tolerable pain control.     PAST MEDICAL & SURGICAL HISTORY:  Hypertension      Hyperlipidemia      Venous insufficiency          FAMILY HISTORY:      Social History:  Patient reports he is a former smoker but he quit smoking 40 years ago.   Patient denies alcohol or illicit drug use. (11 May 2023 09:20)    Allergies    No Known Allergies    MEDICATIONS  (STANDING):  albuterol    90 MICROgram(s) HFA Inhaler 2 Puff(s) Inhalation every 6 hours  amLODIPine   Tablet 5 milliGRAM(s) Oral daily  aspirin  chewable 81 milliGRAM(s) Oral daily  atorvastatin 80 milliGRAM(s) Oral at bedtime  baclofen 10 milliGRAM(s) Oral every 12 hours  DULoxetine 60 milliGRAM(s) Oral daily  furosemide    Tablet 20 milliGRAM(s) Oral daily  lidocaine   4% Patch 2 Patch Transdermal daily  metoprolol tartrate 25 milliGRAM(s) Oral two times a day  oxybutynin 5 milliGRAM(s) Oral every 8 hours  polyethylene glycol 3350 17 Gram(s) Oral daily  pregabalin 75 milliGRAM(s) Oral three times a day  senna 2 Tablet(s) Oral at bedtime  sodium chloride 0.9% lock flush 3 milliLiter(s) IV Push every 8 hours  tamsulosin 0.4 milliGRAM(s) Oral at bedtime    MEDICATIONS  (PRN):  acetaminophen     Tablet .. 1000 milliGRAM(s) Oral every 8 hours PRN Moderate Pain (4 - 6)  melatonin 3 milliGRAM(s) Oral at bedtime PRN Insomnia  ondansetron Injectable 4 milliGRAM(s) IV Push every 6 hours PRN Nausea and/or Vomiting  oxyCODONE    IR 20 milliGRAM(s) Oral every 6 hours PRN Severe Pain (7 - 10)      Vital Signs Last 24 Hrs  T(C): 36.4 (23 May 2023 05:45), Max: 36.4 (23 May 2023 05:45)  T(F): 97.6 (23 May 2023 05:45), Max: 97.6 (23 May 2023 05:45)  HR: 86 (23 May 2023 05:45) (61 - 90)  BP: 122/79 (23 May 2023 05:45) (110/63 - 143/77)  BP(mean): --  RR: 17 (23 May 2023 05:45) (17 - 18)  SpO2: 95% (23 May 2023 05:45) (95% - 98%)    Parameters below as of 23 May 2023 05:45  Patient On (Oxygen Delivery Method): nasal cannula  O2 Flow (L/min): 2    COVID-19 PCR: [facility] (16 May 2023 09:14)    LABS: Reviewed                          12.4   7.85  )-----------( 203      ( 23 May 2023 05:40 )             40.9     05-23    138  |  107  |  54<H>  ----------------------------<  94  4.1   |  24  |  1.96<H>    Ca    8.6      23 May 2023 05:40    COVID-19 PCR: [facility] (16 May 2023 09:14)    Radiology: Reviewed.     < from: Xray Chest 1 View- PORTABLE-Routine (Xray Chest 1 View- PORTABLE-Routine in AM.) (05.15.23 @ 09:33) >    ACC: [id number] EXAM:  XR CHEST PORTABLE ROUTINE 1V   ORDERED BY: TAVON MCDERMOTT     PROCEDURE DATE:  05/15/2023          INTERPRETATION:  TIME OF EXAM: May 15, 2023 at 9:05 AM.    CLINICAL INFORMATION: Hypertension. Atelectasis.    COMPARISON:  APchest x-ray and CT scan of the chest from May 11, 2023.    TECHNIQUE:   AP Portable chest x-ray. Limited by rotation. Jewelry   projects over the left chest.    INTERPRETATION:    Heart size and the mediastinum cannot be accurately evaluated on this   projection.  Elevated right hemidiaphragm again seen.  There is right lower lung linear atelectasis with a possible trace right   pleural effusion.  The left lung is clear.  No left pleural effusion seen.  No pneumothorax.  Incompletely imaged orthopedic hardware projects over the thoracolumbar   spine.        IMPRESSION:  Limited by rotation.    Continued elevation of right hemidiaphragm.    Right lower lung linear atelectasis and possible trace right pleural   effusion.    --- End of Report ---            SKY GARZA MD; Attending Radiologist  This document has been electronically signed. May 16 2023  4:46PM    < end of copied text >    < from: CT Head No Cont (05.11.23 @ 05:14) >    ACC: [id number] EXAM:  CT ABDOMEN AND PELVIS IC   ORDERED BY: FADUMO SANTOS     ACC: [id number] EXAM:  CT CHEST IC   ORDERED BY: FADUMO SANTOS     ACC: [id number] EXAM:  CT BRAIN   ORDERED BY: FADUMO SANTOS     ACC: [id number] EXAM:  CT CERVICALSPINE   ORDERED BY: FADUMO SANTOS     PROCEDURE DATE:  05/11/2023          INTERPRETATION:  CLINICAL INFORMATION: Back pain after fall. CHF,   hypertension, shortness of breath.    COMPARISON: Chest radiograph same date.    CONTRAST/COMPLICATIONS:  90 cc Omnipaque 350 administered intravenously for the chest abdomen   pelvis.    PROCEDURE:  CT head and cervical spine without intravenous contrast.  CT of the Chest, Abdomen and Pelvis with intravenous contrast.  Sagittal and coronal reformats were performed.    FINDINGS:    HEAD:  BRAIN: No apparent acute infarct, hemmorhage or mass. No hydrocephalus.   Chronic appearing white matter hypodensities and volume loss.  CALVARIUM: No skull fracture or agreesive osseous legions.  EXTRACRANIAL SOFTTISSUES: No acute findings.  VISUALIZED PORTIONS OF THE PARANASAL SINUSES AND MASTOID AIR CELLS: No   air fluid levels.    CERVICAL SPINE:  VERTEBRAE: No fracture or dislocation. Exaggeration of cervical lordosis.   Mild degenerative retrolisthesis of C3 on C4 and mild degenerative   anterolisthesis of C6 on C7 and C7 on T1.  SPINAL CANAL AND FORAMINA: Multilevel degenerative changes with no   apparent high grade spinal canal narrowing.  PARASPINAL SOFT TISSUES: No paravertebral hematoma or acutefinding.    CHEST:  LUNGS AND LARGE AIRWAYS: Low lung volumes with right greater than left   basilar opacities and mild elevation of the right hemidiaphragm. No   pulmonary nodules.  PLEURA: No pleural effusion or pneumothorax.  VESSELS: No evidence of injury to the heart and great vessels. No   thoracic aortic dissection or aneurysm.  HEART: Mild four-chamber cardiomegaly. No pericardial effusion.  MEDIASTINUM AND LUIS ALBERTO: No lymphadenopathy.  CHEST WALL AND LOWER NECK: Within normal limits.    ABDOMEN AND PELVIS:  LIVER: Within normal limits.  BILE DUCTS: Normal caliber.  GALLBLADDER: Within normal limits.  SPLEEN: No evidence of splenic injury or concerning finding. Small cystic   lesion upper spleen.  PANCREAS: Within normal limits.  ADRENALS: Within normal limits.  KIDNEYS/URETERS: No evidence of renal injury. No hydronephrosis or renal   stone or concerning mass. Small cysts bilaterally.    BLADDER: Within normal limits.  REPRODUCTIVE ORGANS: Radiation seeds in the prostate.    BOWEL: No bowel obstruction. Appendix is not visualized. No evidence of   inflammation in the pericecal region.  PERITONEUM: No ascites.  VESSELS: No abdominal aortic aneurysm. Calcific atherosclerosis   particularly of the distal abdominal aorta and iliac arteries.  RETROPERITONEUM/LYMPH NODES: No lymphadenopathy.  ABDOMINAL WALL: Large right lower quadrant anterior abdominal wall hernia   extending into the right inguinal region, not fully visible, contains the   cecum, much of the ascending colon, anddistal ileum. No evidence of   obstruction or other acute complication regarding this hernia.  BONES: Several thoracic and lumbar vertebral compression fractures but   with no discrete fracture line or adjacent edema. Mild depression of the   superior endplate of T1. Chronic moderate to severe compression T4   vertebral body. Mild compression T5, T6, T7, T8, T9, T10, T11 and T12   vertebral bodies. Chronic moderate compression of T3 vertebral body with   focal right scoliosis centered at this level. Status post posterior   fusion T2-sacroiliac. The hardware is intact. Bony demineralization.    IMPRESSION:  CT head: No evidence of intracranial injury or skull fracture.    CT cervical spine: No fracture or dislocation of the cervical spine.    CT Chest abdomen pelvis:  No evidence of acute traumatic injury.  Multilevel thoracic and lumbar vertebral compression fractures are   difficult to definitively age though there are no visible fracture lines   or adjacent edema to suggest acute injury. Comparison with priors would   be helpful.  Dependent densities in the lower lungs most likely atelectasis given the   low lung volumes. Superimposed pneumonia less likely.      --- End of Report ---             ALEX MACIEL MD; Attending Radiologist  This document has been electronically signed. May 11 2023  5:55AM    < end of copied text >    < from: CT Abdomen and Pelvis w/ IV Cont (05.11.23 @ 05:13) >    ACC: [id number] EXAM:  CT ABDOMEN AND PELVIS IC   ORDERED BY: FADUMO SANTOS     ACC: [id number] EXAM:  CT CHEST IC   ORDERED BY: FADUMO SANTOS     ACC: [id number] EXAM:  CT BRAIN   ORDERED BY: FADUMO SANTOS     ACC: [id number] EXAM:  CT CERVICALSPINE   ORDERED BY: FADUMO SANTOS     PROCEDURE DATE:  05/11/2023          INTERPRETATION:  CLINICAL INFORMATION: Back pain after fall. CHF,   hypertension, shortness of breath.    COMPARISON: Chest radiograph same date.    CONTRAST/COMPLICATIONS:  90 cc Omnipaque 350 administered intravenously for the chest abdomen   pelvis.    PROCEDURE:  CT head and cervical spine without intravenous contrast.  CT of the Chest, Abdomen and Pelvis with intravenous contrast.  Sagittal and coronal reformats were performed.    FINDINGS:    HEAD:  BRAIN: No apparent acute infarct, hemmorhage or mass. No hydrocephalus.   Chronic appearing white matter hypodensities and volume loss.  CALVARIUM: No skull fracture or agreesive osseous legions.  EXTRACRANIAL SOFTTISSUES: No acute findings.  VISUALIZED PORTIONS OF THE PARANASAL SINUSES AND MASTOID AIR CELLS: No   air fluid levels.    CERVICAL SPINE:  VERTEBRAE: No fracture or dislocation. Exaggeration of cervical lordosis.   Mild degenerative retrolisthesis of C3 on C4 and mild degenerative   anterolisthesis of C6 on C7 and C7 on T1.  SPINAL CANAL AND FORAMINA: Multilevel degenerative changes with no   apparent high grade spinal canal narrowing.  PARASPINAL SOFT TISSUES: No paravertebral hematoma or acutefinding.    CHEST:  LUNGS AND LARGE AIRWAYS: Low lung volumes with right greater than left   basilar opacities and mild elevation of the right hemidiaphragm. No   pulmonary nodules.  PLEURA: No pleural effusion or pneumothorax.  VESSELS: No evidence of injury to the heart and great vessels. No   thoracic aortic dissection or aneurysm.  HEART: Mild four-chamber cardiomegaly. No pericardial effusion.  MEDIASTINUM AND LUIS ALBERTO: No lymphadenopathy.  CHEST WALL AND LOWER NECK: Within normal limits.    ABDOMEN AND PELVIS:  LIVER: Within normal limits.  BILE DUCTS: Normal caliber.  GALLBLADDER: Within normal limits.  SPLEEN: No evidence of splenic injury or concerning finding. Small cystic   lesion upper spleen.  PANCREAS: Within normal limits.  ADRENALS: Within normal limits.  KIDNEYS/URETERS: No evidence of renal injury. No hydronephrosis or renal   stone or concerning mass. Small cysts bilaterally.    BLADDER: Within normal limits.  REPRODUCTIVE ORGANS: Radiation seeds in the prostate.    BOWEL: No bowel obstruction. Appendix is not visualized. No evidence of   inflammation in the pericecal region.  PERITONEUM: No ascites.  VESSELS: No abdominal aortic aneurysm. Calcific atherosclerosis   particularly of the distal abdominal aorta and iliac arteries.  RETROPERITONEUM/LYMPH NODES: No lymphadenopathy.  ABDOMINAL WALL: Large right lower quadrant anterior abdominal wall hernia   extending into the right inguinal region, not fully visible, contains the   cecum, much of the ascending colon, anddistal ileum. No evidence of   obstruction or other acute complication regarding this hernia.  BONES: Several thoracic and lumbar vertebral compression fractures but   with no discrete fracture line or adjacent edema. Mild depression of the   superior endplate of T1. Chronic moderate to severe compression T4   vertebral body. Mild compression T5, T6, T7, T8, T9, T10, T11 and T12   vertebral bodies. Chronic moderate compression of T3 vertebral body with   focal right scoliosis centered at this level. Status post posterior   fusion T2-sacroiliac. The hardware is intact. Bony demineralization.    IMPRESSION:  CT head: No evidence of intracranial injury or skull fracture.    CT cervical spine: No fracture or dislocation of the cervical spine.    CT Chest abdomen pelvis:  No evidence of acute traumatic injury.  Multilevel thoracic and lumbar vertebral compression fractures are   difficult to definitively age though there are no visible fracture lines   or adjacent edema to suggest acute injury. Comparison with priors would   be helpful.  Dependent densities in the lower lungs most likely atelectasis given the   low lung volumes. Superimposed pneumonia less likely.      --- End of Report ---             ALEX MACIEL MD; Attending Radiologist  This document has been electronically signed. May 11 2023  5:55AM    < end of copied text >      ORT Score -   Family Hx of substance abuse	Female	      Male  Alcohol 	                                           1                     3  Illegal drugs	                                   2                     3  Rx drugs                                           4 	                  4  Personal Hx of substance abuse		  Alcohol 	                                          3	                  3  Illegal drugs                                     4	                  4  Rx drugs                                            5 	                  5  Age between 16- 45 years	           1                     1  hx preadolescent sexual abuse	   3 	                  0  Psychological disease		  ADD, OCD, bipolar, schizophrenia   2	          2  Depression                                           1 	          1  Total: 0    a score of 3 or lower indicates low risk for opioid abuse		  a score of 4-7 indicates moderate risk for opioid abuse		  a score of 8 or higher indicates high risk for opioid abuse    REVIEW OF SYSTEMS:  CONSTITUTIONAL: No fever + fatigue + [place]  RESPIRATORY: No cough, wheezing, chills or hemoptysis; No shortness of breath  CARDIOVASCULAR: No chest pain, palpitations, dizziness, or leg swelling  GASTROINTESTINAL: No loss of appetite, decreased PO intake. + right upper abdominal pain. No nausea, vomiting; No diarrhea or constipation.   GENITOURINARY: No dysuria, frequency, hematuria, or incontinence +urinary retention + uretral site pain   MUSCULOSKELETAL: + chronic back pain. No joint swelling; + generalized motor strength weakness, no saddle anesthesia, bowel/bladder incontinence, +falls   NEURO: No headaches, No numbness/tingling b/l LE    PHYSICAL EXAM:  GENERAL: + fatigued Alert & Oriented X4, cooperative, NAD, Good concentration. Speech is clear. + [place] + disheveled malodorous, poor oral hygiene   RESPIRATORY: Respirations even and unlabored. Diminished to auscultation bilaterally; No rales, rhonchi, wheezing, or rubs + O2 2L NC.   CARDIOVASCULAR: Normal S1/S2, regular rate and rhythm; No murmurs, rubs, or gallops. No JVD.   GENITOURINARY: Urinary catheter draining clear yellow urine   GASTROINTESTINAL:  Soft, Nontender, Nondistended; Bowel sounds present  PERIPHERAL VASCULAR:  Extremities warm without edema. 2+ Peripheral Pulses, No cyanosis, No calf tenderness  MUSCULOSKELETAL: Motor Strength 4/5 B/L upper and 4/5 lower extremities; moves all extremities equally against gravity; ROM intact; negative SLR; + thoracic and lumbar back tenderness on palpation   SKIN: Warm, dry, intact. No rashes, lesions, scars or wounds.+ hyperpigmentation b/l lower extremities + multiple scabs over back.    Risk factors associated with adverse outcomes related to opioid treatment  [ ]  Concurrent benzodiazepine use  [ ]  History/ Active substance use or alcohol use disorder  [ ] Psychiatric co-morbidity  [ ] Sleep apnea  [ ] COPD  [ ] BMI> 35  [ ] Liver dysfunction  [ ] Renal dysfunction  [ ] CHF  [X] Former Smoker  [X ]  Age > 60 years    [X ]  [facility]  Reviewed and Copied to Chart. See below.    Plan of care and goal oriented pain management treatment options were discussed with patient and /or primary care giver; all questions and concerns were addressed and care was aligned with patient's wishes.    Educated patient on goal oriented pain management treatment options     05-23-23 @ 11:44   Source of information: TOM HERRERA, Chart review  Patient language: English  : n/a    HPI:  Patient is a 83 y/o M from [facility] who walks with a walker and has PMH of HTN, HLD, venous insufficiency, CAD, BPH, CHF who presented s/p mechanical fall. Patient reports that he was by his bed when he felt lightheaded and felt like he might pass out and had a fall. Patient denies hitting his head or any other bodily injury. Patient denies any loss of consciousness and reports he remembers the entire episode. Patient denies any pain at this time. Patient also reported that since being in the ED he is in a lot of discomfort as he has a very strong urge to urinate but he is not able to. Patient denies any history of fevers, chills, headache, prior lightheadedness, chest pain, palpitations, shortness of breath and cough (contradictory to ED note), nausea, vomiting, abdominal pain, diarrhea, constipation, melena, hematochezia, or other urinary symptoms.  (11 May 2023 09:20)    CTAP demonstrates- several thoracic and lumbar vertebral compression fractures but with no discrete fracture line or adjacent edema. Mild depression of the superior endplate of T1. Chronic moderate to severe compression T4 vertebral body. Mild compression T5, T6, T7, T8, T9, T10, T11 and T12 vertebral bodies. Chronic moderate compression of T3 vertebral body with focal right scoliosis centered at this level. Status post posterior fusion T2-sacroiliac. The hardware is intact. Bony demineralization.    CT head: No evidence of intracranial injury or skull fracture.    CT cervical spine: No fracture or dislocation of the cervical spine.    CT Chest abdomen pelvis:No evidence of acute traumatic injury. Multilevel thoracic and lumbar vertebral compression fractures are   difficult to definitively age though there are no visible fracture lines or adjacent edema to suggest acute injury. Comparison with priors would be helpful.  Dependent densities in the lower lungs most likely atelectasis given the low lung volumes. Superimposed pneumonia less likely.    Pt is admitted for hypoxemia, s/p fall.  CXR demonstrates- Continued elevation of right hemidiaphragm. Right lower lung linear atelectasis and possible trace right pleural effusion. Pain consulted for back pain, chronic on 5/15. Pt seen and examined at bedside. Pt laying in bed, reports lumbar back pain score 8/10  SCALE USED: (1-10 VNRS). Pt describes pain as dull, constant, non- radiating, alleviated by current pain medication, exacerbated by movement. Denies numbness/ tingling. Reports generalized weakness. Pt also reports urethral pain from urinary catheter. Taking oxycodone 20mg PO PRN pain at home. Per istop, pt on chronic oxycodone 20mg PO 4x/day, last filled 5/4. Pt tolerating PO diet. Denies lethargy, chest pain, SOB, nausea, vomiting, constipation. Last BM 5/15. Patient stated goal for pain control: to be able to take deep breaths, get out of bed to chair and ambulate with tolerable pain control.     PAST MEDICAL & SURGICAL HISTORY:  Hypertension      Hyperlipidemia      Venous insufficiency          FAMILY HISTORY:      Social History:  Patient reports he is a former smoker but he quit smoking 40 years ago.   Patient denies alcohol or illicit drug use. (11 May 2023 09:20)    Allergies    No Known Allergies    MEDICATIONS  (STANDING):  albuterol    90 MICROgram(s) HFA Inhaler 2 Puff(s) Inhalation every 6 hours  amLODIPine   Tablet 5 milliGRAM(s) Oral daily  aspirin  chewable 81 milliGRAM(s) Oral daily  atorvastatin 80 milliGRAM(s) Oral at bedtime  baclofen 10 milliGRAM(s) Oral every 12 hours  DULoxetine 60 milliGRAM(s) Oral daily  furosemide    Tablet 20 milliGRAM(s) Oral daily  lidocaine   4% Patch 2 Patch Transdermal daily  metoprolol tartrate 25 milliGRAM(s) Oral two times a day  oxybutynin 5 milliGRAM(s) Oral every 8 hours  polyethylene glycol 3350 17 Gram(s) Oral daily  pregabalin 75 milliGRAM(s) Oral three times a day  senna 2 Tablet(s) Oral at bedtime  sodium chloride 0.9% lock flush 3 milliLiter(s) IV Push every 8 hours  tamsulosin 0.4 milliGRAM(s) Oral at bedtime    MEDICATIONS  (PRN):  acetaminophen     Tablet .. 1000 milliGRAM(s) Oral every 8 hours PRN Moderate Pain (4 - 6)  melatonin 3 milliGRAM(s) Oral at bedtime PRN Insomnia  ondansetron Injectable 4 milliGRAM(s) IV Push every 6 hours PRN Nausea and/or Vomiting  oxyCODONE    IR 20 milliGRAM(s) Oral every 6 hours PRN Severe Pain (7 - 10)      Vital Signs Last 24 Hrs  T(C): 36.4 (23 May 2023 05:45), Max: 36.4 (23 May 2023 05:45)  T(F): 97.6 (23 May 2023 05:45), Max: 97.6 (23 May 2023 05:45)  HR: 86 (23 May 2023 05:45) (61 - 90)  BP: 122/79 (23 May 2023 05:45) (110/63 - 143/77)  BP(mean): --  RR: 17 (23 May 2023 05:45) (17 - 18)  SpO2: 95% (23 May 2023 05:45) (95% - 98%)    Parameters below as of 23 May 2023 05:45  Patient On (Oxygen Delivery Method): nasal cannula  O2 Flow (L/min): 2    COVID-19 PCR: [facility] (16 May 2023 09:14)    LABS: Reviewed                          12.4   7.85  )-----------( 203      ( 23 May 2023 05:40 )             40.9     05-23    138  |  107  |  54<H>  ----------------------------<  94  4.1   |  24  |  1.96<H>    Ca    8.6      23 May 2023 05:40    COVID-19 PCR: [facility] (16 May 2023 09:14)    Radiology: Reviewed.     < from: Xray Chest 1 View- PORTABLE-Routine (Xray Chest 1 View- PORTABLE-Routine in AM.) (05.15.23 @ 09:33) >    ACC: [id number] EXAM:  XR CHEST PORTABLE ROUTINE 1V   ORDERED BY: TAVON MCDERMOTT     PROCEDURE DATE:  05/15/2023          INTERPRETATION:  TIME OF EXAM: May 15, 2023 at 9:05 AM.    CLINICAL INFORMATION: Hypertension. Atelectasis.    COMPARISON:  APchest x-ray and CT scan of the chest from May 11, 2023.    TECHNIQUE:   AP Portable chest x-ray. Limited by rotation. Jewelry   projects over the left chest.    INTERPRETATION:    Heart size and the mediastinum cannot be accurately evaluated on this   projection.  Elevated right hemidiaphragm again seen.  There is right lower lung linear atelectasis with a possible trace right   pleural effusion.  The left lung is clear.  No left pleural effusion seen.  No pneumothorax.  Incompletely imaged orthopedic hardware projects over the thoracolumbar   spine.        IMPRESSION:  Limited by rotation.    Continued elevation of right hemidiaphragm.    Right lower lung linear atelectasis and possible trace right pleural   effusion.    --- End of Report ---            SKY GARZA MD; Attending Radiologist  This document has been electronically signed. May 16 2023  4:46PM    < end of copied text >    < from: CT Head No Cont (05.11.23 @ 05:14) >    ACC: [id number] EXAM:  CT ABDOMEN AND PELVIS IC   ORDERED BY: FADUMO SANTOS     ACC: [id number] EXAM:  CT CHEST IC   ORDERED BY: FADUMO SANTOS     ACC: [id number] EXAM:  CT BRAIN   ORDERED BY: FADUMO SANTOS     ACC: [id number] EXAM:  CT CERVICALSPINE   ORDERED BY: FADUMO SANTOS     PROCEDURE DATE:  05/11/2023          INTERPRETATION:  CLINICAL INFORMATION: Back pain after fall. CHF,   hypertension, shortness of breath.    COMPARISON: Chest radiograph same date.    CONTRAST/COMPLICATIONS:  90 cc Omnipaque 350 administered intravenously for the chest abdomen   pelvis.    PROCEDURE:  CT head and cervical spine without intravenous contrast.  CT of the Chest, Abdomen and Pelvis with intravenous contrast.  Sagittal and coronal reformats were performed.    FINDINGS:    HEAD:  BRAIN: No apparent acute infarct, hemmorhage or mass. No hydrocephalus.   Chronic appearing white matter hypodensities and volume loss.  CALVARIUM: No skull fracture or agreesive osseous legions.  EXTRACRANIAL SOFTTISSUES: No acute findings.  VISUALIZED PORTIONS OF THE PARANASAL SINUSES AND MASTOID AIR CELLS: No   air fluid levels.    CERVICAL SPINE:  VERTEBRAE: No fracture or dislocation. Exaggeration of cervical lordosis.   Mild degenerative retrolisthesis of C3 on C4 and mild degenerative   anterolisthesis of C6 on C7 and C7 on T1.  SPINAL CANAL AND FORAMINA: Multilevel degenerative changes with no   apparent high grade spinal canal narrowing.  PARASPINAL SOFT TISSUES: No paravertebral hematoma or acutefinding.    CHEST:  LUNGS AND LARGE AIRWAYS: Low lung volumes with right greater than left   basilar opacities and mild elevation of the right hemidiaphragm. No   pulmonary nodules.  PLEURA: No pleural effusion or pneumothorax.  VESSELS: No evidence of injury to the heart and great vessels. No   thoracic aortic dissection or aneurysm.  HEART: Mild four-chamber cardiomegaly. No pericardial effusion.  MEDIASTINUM AND LUIS ALBERTO: No lymphadenopathy.  CHEST WALL AND LOWER NECK: Within normal limits.    ABDOMEN AND PELVIS:  LIVER: Within normal limits.  BILE DUCTS: Normal caliber.  GALLBLADDER: Within normal limits.  SPLEEN: No evidence of splenic injury or concerning finding. Small cystic   lesion upper spleen.  PANCREAS: Within normal limits.  ADRENALS: Within normal limits.  KIDNEYS/URETERS: No evidence of renal injury. No hydronephrosis or renal   stone or concerning mass. Small cysts bilaterally.    BLADDER: Within normal limits.  REPRODUCTIVE ORGANS: Radiation seeds in the prostate.    BOWEL: No bowel obstruction. Appendix is not visualized. No evidence of   inflammation in the pericecal region.  PERITONEUM: No ascites.  VESSELS: No abdominal aortic aneurysm. Calcific atherosclerosis   particularly of the distal abdominal aorta and iliac arteries.  RETROPERITONEUM/LYMPH NODES: No lymphadenopathy.  ABDOMINAL WALL: Large right lower quadrant anterior abdominal wall hernia   extending into the right inguinal region, not fully visible, contains the   cecum, much of the ascending colon, anddistal ileum. No evidence of   obstruction or other acute complication regarding this hernia.  BONES: Several thoracic and lumbar vertebral compression fractures but   with no discrete fracture line or adjacent edema. Mild depression of the   superior endplate of T1. Chronic moderate to severe compression T4   vertebral body. Mild compression T5, T6, T7, T8, T9, T10, T11 and T12   vertebral bodies. Chronic moderate compression of T3 vertebral body with   focal right scoliosis centered at this level. Status post posterior   fusion T2-sacroiliac. The hardware is intact. Bony demineralization.    IMPRESSION:  CT head: No evidence of intracranial injury or skull fracture.    CT cervical spine: No fracture or dislocation of the cervical spine.    CT Chest abdomen pelvis:  No evidence of acute traumatic injury.  Multilevel thoracic and lumbar vertebral compression fractures are   difficult to definitively age though there are no visible fracture lines   or adjacent edema to suggest acute injury. Comparison with priors would   be helpful.  Dependent densities in the lower lungs most likely atelectasis given the   low lung volumes. Superimposed pneumonia less likely.      --- End of Report ---             ALEX MACIEL MD; Attending Radiologist  This document has been electronically signed. May 11 2023  5:55AM    < end of copied text >    < from: CT Abdomen and Pelvis w/ IV Cont (05.11.23 @ 05:13) >    ACC: [id number] EXAM:  CT ABDOMEN AND PELVIS IC   ORDERED BY: FADUMO SANTOS     ACC: [id number] EXAM:  CT CHEST IC   ORDERED BY: FADUMO SANTOS     ACC: [id number] EXAM:  CT BRAIN   ORDERED BY: FADUMO SANTOS     ACC: [id number] EXAM:  CT CERVICALSPINE   ORDERED BY: FADUMO SANTOS     PROCEDURE DATE:  05/11/2023          INTERPRETATION:  CLINICAL INFORMATION: Back pain after fall. CHF,   hypertension, shortness of breath.    COMPARISON: Chest radiograph same date.    CONTRAST/COMPLICATIONS:  90 cc Omnipaque 350 administered intravenously for the chest abdomen   pelvis.    PROCEDURE:  CT head and cervical spine without intravenous contrast.  CT of the Chest, Abdomen and Pelvis with intravenous contrast.  Sagittal and coronal reformats were performed.    FINDINGS:    HEAD:  BRAIN: No apparent acute infarct, hemmorhage or mass. No hydrocephalus.   Chronic appearing white matter hypodensities and volume loss.  CALVARIUM: No skull fracture or agreesive osseous legions.  EXTRACRANIAL SOFTTISSUES: No acute findings.  VISUALIZED PORTIONS OF THE PARANASAL SINUSES AND MASTOID AIR CELLS: No   air fluid levels.    CERVICAL SPINE:  VERTEBRAE: No fracture or dislocation. Exaggeration of cervical lordosis.   Mild degenerative retrolisthesis of C3 on C4 and mild degenerative   anterolisthesis of C6 on C7 and C7 on T1.  SPINAL CANAL AND FORAMINA: Multilevel degenerative changes with no   apparent high grade spinal canal narrowing.  PARASPINAL SOFT TISSUES: No paravertebral hematoma or acutefinding.    CHEST:  LUNGS AND LARGE AIRWAYS: Low lung volumes with right greater than left   basilar opacities and mild elevation of the right hemidiaphragm. No   pulmonary nodules.  PLEURA: No pleural effusion or pneumothorax.  VESSELS: No evidence of injury to the heart and great vessels. No   thoracic aortic dissection or aneurysm.  HEART: Mild four-chamber cardiomegaly. No pericardial effusion.  MEDIASTINUM AND LUIS ALBERTO: No lymphadenopathy.  CHEST WALL AND LOWER NECK: Within normal limits.    ABDOMEN AND PELVIS:  LIVER: Within normal limits.  BILE DUCTS: Normal caliber.  GALLBLADDER: Within normal limits.  SPLEEN: No evidence of splenic injury or concerning finding. Small cystic   lesion upper spleen.  PANCREAS: Within normal limits.  ADRENALS: Within normal limits.  KIDNEYS/URETERS: No evidence of renal injury. No hydronephrosis or renal   stone or concerning mass. Small cysts bilaterally.    BLADDER: Within normal limits.  REPRODUCTIVE ORGANS: Radiation seeds in the prostate.    BOWEL: No bowel obstruction. Appendix is not visualized. No evidence of   inflammation in the pericecal region.  PERITONEUM: No ascites.  VESSELS: No abdominal aortic aneurysm. Calcific atherosclerosis   particularly of the distal abdominal aorta and iliac arteries.  RETROPERITONEUM/LYMPH NODES: No lymphadenopathy.  ABDOMINAL WALL: Large right lower quadrant anterior abdominal wall hernia   extending into the right inguinal region, not fully visible, contains the   cecum, much of the ascending colon, anddistal ileum. No evidence of   obstruction or other acute complication regarding this hernia.  BONES: Several thoracic and lumbar vertebral compression fractures but   with no discrete fracture line or adjacent edema. Mild depression of the   superior endplate of T1. Chronic moderate to severe compression T4   vertebral body. Mild compression T5, T6, T7, T8, T9, T10, T11 and T12   vertebral bodies. Chronic moderate compression of T3 vertebral body with   focal right scoliosis centered at this level. Status post posterior   fusion T2-sacroiliac. The hardware is intact. Bony demineralization.    IMPRESSION:  CT head: No evidence of intracranial injury or skull fracture.    CT cervical spine: No fracture or dislocation of the cervical spine.    CT Chest abdomen pelvis:  No evidence of acute traumatic injury.  Multilevel thoracic and lumbar vertebral compression fractures are   difficult to definitively age though there are no visible fracture lines   or adjacent edema to suggest acute injury. Comparison with priors would   be helpful.  Dependent densities in the lower lungs most likely atelectasis given the   low lung volumes. Superimposed pneumonia less likely.      --- End of Report ---             ALEX MACIEL MD; Attending Radiologist  This document has been electronically signed. May 11 2023  5:55AM    < end of copied text >      ORT Score -   Family Hx of substance abuse	Female	      Male  Alcohol 	                                           1                     3  Illegal drugs	                                   2                     3  Rx drugs                                           4 	                  4  Personal Hx of substance abuse		  Alcohol 	                                          3	                  3  Illegal drugs                                     4	                  4  Rx drugs                                            5 	                  5  Age between 16- 45 years	           1                     1  hx preadolescent sexual abuse	   3 	                  0  Psychological disease		  ADD, OCD, bipolar, schizophrenia   2	          2  Depression                                           1 	          1  Total: 0    a score of 3 or lower indicates low risk for opioid abuse		  a score of 4-7 indicates moderate risk for opioid abuse		  a score of 8 or higher indicates high risk for opioid abuse    REVIEW OF SYSTEMS:  CONSTITUTIONAL: No fever + fatigue + [place] +dizziness  RESPIRATORY: No cough, wheezing, chills or hemoptysis; No shortness of breath  CARDIOVASCULAR: No chest pain, palpitations, dizziness, or leg swelling  GASTROINTESTINAL: No loss of appetite, decreased PO intake. + right upper abdominal pain. No nausea, vomiting; No diarrhea or constipation.   GENITOURINARY: No dysuria, frequency, hematuria, or incontinence +urinary retention + uretral site pain   MUSCULOSKELETAL: + chronic back pain. No joint swelling; + generalized motor strength weakness, no saddle anesthesia, bowel/bladder incontinence, +falls   NEURO: No headaches, No numbness/tingling b/l LE    PHYSICAL EXAM:  GENERAL: + fatigued & Oriented X4, cooperative, NAD, Speech is clear. + [place] + disheveled malodorous, poor oral hygiene   RESPIRATORY: Respirations even and unlabored. Diminished to auscultation bilaterally; No rales, rhonchi, wheezing, or rubs + O2 2L NC.   CARDIOVASCULAR: Normal S1/S2, regular rate and rhythm; No murmurs, rubs, or gallops. No JVD.   GENITOURINARY: Urinary catheter draining clear yellow urine   GASTROINTESTINAL:  Soft, Nontender, Nondistended; Bowel sounds present  PERIPHERAL VASCULAR:  Extremities warm without edema. 2+ Peripheral Pulses, No cyanosis, No calf tenderness  MUSCULOSKELETAL: Motor Strength 4/5 B/L upper and 4/5 lower extremities; moves all extremities equally against gravity; ROM intact; negative SLR; + thoracic and lumbar back tenderness on palpation   SKIN: Warm, dry, intact. No rashes, lesions, scars or wounds.+ hyperpigmentation b/l lower extremities + multiple scabs over back.    Risk factors associated with adverse outcomes related to opioid treatment  [ ]  Concurrent benzodiazepine use  [ ]  History/ Active substance use or alcohol use disorder  [ ] Psychiatric co-morbidity  [ ] Sleep apnea  [ ] COPD  [ ] BMI> 35  [ ] Liver dysfunction  [ ] Renal dysfunction  [ ] CHF  [X] Former Smoker  [X ]  Age > 60 years    [X ]  [facility] Reviewed and Copied to Chart. See below.    Plan of care and goal oriented pain management treatment options were discussed with patient and /or primary care giver; all questions and concerns were addressed and care was aligned with patient's wishes.    Educated patient on goal oriented pain management treatment options     05-23-23 @ 11:44

## 2023-05-24 LAB
ANION GAP SERPL CALC-SCNC: 2 MMOL/L — LOW (ref 5–17)
BUN SERPL-MCNC: 46 MG/DL — HIGH (ref 7–18)
CALCIUM SERPL-MCNC: 8.1 MG/DL — LOW (ref 8.4–10.5)
CHLORIDE SERPL-SCNC: 109 MMOL/L — HIGH (ref 96–108)
CO2 SERPL-SCNC: 28 MMOL/L — SIGNIFICANT CHANGE UP (ref 22–31)
CREAT SERPL-MCNC: 1.01 MG/DL — SIGNIFICANT CHANGE UP (ref 0.5–1.3)
EGFR: 73 ML/MIN/1.73M2 — SIGNIFICANT CHANGE UP
GLUCOSE SERPL-MCNC: 103 MG/DL — HIGH (ref 70–99)
POTASSIUM SERPL-MCNC: 4 MMOL/L — SIGNIFICANT CHANGE UP (ref 3.5–5.3)
POTASSIUM SERPL-SCNC: 4 MMOL/L — SIGNIFICANT CHANGE UP (ref 3.5–5.3)
SODIUM SERPL-SCNC: 139 MMOL/L — SIGNIFICANT CHANGE UP (ref 135–145)

## 2023-05-24 PROCEDURE — 99232 SBSQ HOSP IP/OBS MODERATE 35: CPT

## 2023-05-24 RX ORDER — CHLORHEXIDINE GLUCONATE 213 G/1000ML
1 SOLUTION TOPICAL DAILY
Refills: 0 | Status: DISCONTINUED | OUTPATIENT
Start: 2023-05-24 | End: 2023-05-26

## 2023-05-24 RX ADMIN — Medication 10 MILLIGRAM(S): at 17:58

## 2023-05-24 RX ADMIN — Medication 3 MILLIGRAM(S): at 22:44

## 2023-05-24 RX ADMIN — Medication 81 MILLIGRAM(S): at 13:15

## 2023-05-24 RX ADMIN — OXYCODONE HYDROCHLORIDE 15 MILLIGRAM(S): 5 TABLET ORAL at 15:48

## 2023-05-24 RX ADMIN — AMLODIPINE BESYLATE 5 MILLIGRAM(S): 2.5 TABLET ORAL at 05:27

## 2023-05-24 RX ADMIN — OXYCODONE HYDROCHLORIDE 15 MILLIGRAM(S): 5 TABLET ORAL at 09:12

## 2023-05-24 RX ADMIN — Medication 25 MILLIGRAM(S): at 05:27

## 2023-05-24 RX ADMIN — OXYCODONE HYDROCHLORIDE 15 MILLIGRAM(S): 5 TABLET ORAL at 16:48

## 2023-05-24 RX ADMIN — Medication 5 MILLIGRAM(S): at 22:40

## 2023-05-24 RX ADMIN — OXYCODONE HYDROCHLORIDE 15 MILLIGRAM(S): 5 TABLET ORAL at 23:50

## 2023-05-24 RX ADMIN — SENNA PLUS 2 TABLET(S): 8.6 TABLET ORAL at 22:40

## 2023-05-24 RX ADMIN — POLYETHYLENE GLYCOL 3350 17 GRAM(S): 17 POWDER, FOR SOLUTION ORAL at 13:14

## 2023-05-24 RX ADMIN — SODIUM CHLORIDE 80 MILLILITER(S): 9 INJECTION INTRAMUSCULAR; INTRAVENOUS; SUBCUTANEOUS at 05:26

## 2023-05-24 RX ADMIN — OXYCODONE HYDROCHLORIDE 15 MILLIGRAM(S): 5 TABLET ORAL at 10:12

## 2023-05-24 RX ADMIN — DULOXETINE HYDROCHLORIDE 60 MILLIGRAM(S): 30 CAPSULE, DELAYED RELEASE ORAL at 13:15

## 2023-05-24 RX ADMIN — ALBUTEROL 2 PUFF(S): 90 AEROSOL, METERED ORAL at 10:03

## 2023-05-24 RX ADMIN — Medication 5 MILLIGRAM(S): at 05:27

## 2023-05-24 RX ADMIN — SODIUM CHLORIDE 3 MILLILITER(S): 9 INJECTION INTRAMUSCULAR; INTRAVENOUS; SUBCUTANEOUS at 22:27

## 2023-05-24 RX ADMIN — SODIUM CHLORIDE 3 MILLILITER(S): 9 INJECTION INTRAMUSCULAR; INTRAVENOUS; SUBCUTANEOUS at 13:09

## 2023-05-24 RX ADMIN — TAMSULOSIN HYDROCHLORIDE 0.4 MILLIGRAM(S): 0.4 CAPSULE ORAL at 22:41

## 2023-05-24 RX ADMIN — Medication 20 MILLIGRAM(S): at 05:27

## 2023-05-24 RX ADMIN — OXYCODONE HYDROCHLORIDE 15 MILLIGRAM(S): 5 TABLET ORAL at 22:44

## 2023-05-24 RX ADMIN — CHLORHEXIDINE GLUCONATE 1 APPLICATION(S): 213 SOLUTION TOPICAL at 17:57

## 2023-05-24 RX ADMIN — ALBUTEROL 2 PUFF(S): 90 AEROSOL, METERED ORAL at 15:59

## 2023-05-24 RX ADMIN — ATORVASTATIN CALCIUM 80 MILLIGRAM(S): 80 TABLET, FILM COATED ORAL at 22:40

## 2023-05-24 RX ADMIN — SODIUM CHLORIDE 80 MILLILITER(S): 9 INJECTION INTRAMUSCULAR; INTRAVENOUS; SUBCUTANEOUS at 22:39

## 2023-05-24 RX ADMIN — SODIUM CHLORIDE 80 MILLILITER(S): 9 INJECTION INTRAMUSCULAR; INTRAVENOUS; SUBCUTANEOUS at 15:58

## 2023-05-24 RX ADMIN — SODIUM CHLORIDE 3 MILLILITER(S): 9 INJECTION INTRAMUSCULAR; INTRAVENOUS; SUBCUTANEOUS at 05:13

## 2023-05-24 RX ADMIN — ALBUTEROL 2 PUFF(S): 90 AEROSOL, METERED ORAL at 02:14

## 2023-05-24 RX ADMIN — Medication 5 MILLIGRAM(S): at 13:15

## 2023-05-24 RX ADMIN — Medication 10 MILLIGRAM(S): at 05:27

## 2023-05-24 NOTE — PROGRESS NOTE ADULT - SUBJECTIVE AND OBJECTIVE BOX
Time of Visit:  Patient seen and examined. pat is laying in bed comfortable     MEDICATIONS  (STANDING):  albuterol    90 MICROgram(s) HFA Inhaler 2 Puff(s) Inhalation every 6 hours  amLODIPine   Tablet 5 milliGRAM(s) Oral daily  aspirin  chewable 81 milliGRAM(s) Oral daily  atorvastatin 80 milliGRAM(s) Oral at bedtime  baclofen 10 milliGRAM(s) Oral every 12 hours  chlorhexidine 2% Cloths 1 Application(s) Topical daily  DULoxetine 60 milliGRAM(s) Oral daily  furosemide    Tablet 20 milliGRAM(s) Oral daily  metoprolol tartrate 25 milliGRAM(s) Oral two times a day  oxybutynin 5 milliGRAM(s) Oral every 8 hours  polyethylene glycol 3350 17 Gram(s) Oral daily  senna 2 Tablet(s) Oral at bedtime  sodium chloride 0.9% lock flush 3 milliLiter(s) IV Push every 8 hours  sodium chloride 0.9%. 1000 milliLiter(s) (80 mL/Hr) IV Continuous <Continuous>  tamsulosin 0.4 milliGRAM(s) Oral at bedtime      MEDICATIONS  (PRN):  acetaminophen     Tablet .. 1000 milliGRAM(s) Oral every 8 hours PRN Moderate Pain (4 - 6)  melatonin 3 milliGRAM(s) Oral at bedtime PRN Insomnia  ondansetron Injectable 4 milliGRAM(s) IV Push every 6 hours PRN Nausea and/or Vomiting  oxyCODONE    IR 15 milliGRAM(s) Oral every 6 hours PRN Severe Pain (7 - 10)       Medications up to date at time of exam.      PHYSICAL EXAMINATION:  Patient has no new complaints.  GENERAL: The patient is a well-developed, well-nourished, in no apparent distress.     Vital Signs Last 24 Hrs  T(C): 36.3 (24 May 2023 19:06), Max: 36.6 (24 May 2023 13:37)  T(F): 97.4 (24 May 2023 19:06), Max: 97.8 (24 May 2023 13:37)  HR: 69 (24 May 2023 19:06) (57 - 78)  BP: 125/64 (24 May 2023 19:06) (107/54 - 125/64)  BP(mean): --  RR: 18 (24 May 2023 19:06) (18 - 18)  SpO2: 96% (24 May 2023 19:06) (94% - 97%)    Parameters below as of 24 May 2023 19:06  Patient On (Oxygen Delivery Method): nasal cannula  O2 Flow (L/min): 2     (if applicable)    Chest Tube (if applicable)    HEENT: Head is normocephalic and atraumatic. Extraocular muscles are intact. Mucous membranes are moist.     NECK: Supple, no palpable adenopathy.    LUNGS: Clear to auscultation, no wheezing, rales, or rhonchi.    HEART: Regular rate and rhythm without murmur.    ABDOMEN: Soft, nontender, and nondistended.  No hepatosplenomegaly is noted.    : No painful voiding, no pelvic pain + moreno , no hematuria     EXTREMITIES: Without any cyanosis, clubbing, rash, lesions or edema.    NEUROLOGIC: Asleep     SKIN: Warm, dry, good turgor.      LABS:                        12.4   7.85  )-----------( 203      ( 23 May 2023 05:40 )             40.9     05-24    139  |  109<H>  |  46<H>  ----------------------------<  103<H>  4.0   |  28  |  1.01    Ca    8.1<L>      24 May 2023 10:37                          MICROBIOLOGY: (if applicable)    RADIOLOGY & ADDITIONAL STUDIES:  EKG:   CXR:  ECHO:    IMPRESSION: 82y Male PAST MEDICAL & SURGICAL HISTORY:  Hypertension      Hyperlipidemia      Venous insufficiency       p/w            Impression: This is an 83 Y/O Male  from [facility] presented to ED with s/p Mechanical Fall. Former smoker. Patient admitted for acute hypoxic respiratory  failure requiring supp O2 due to b/l dependent atelectasis with multiple thoracic and lumbar vertebral fx.  . I doubt PNA or PE at this time . He may have undiagnosed COPD . 05-15-23 CXR with trace Right Pleural Effusion.  05-16-23 Negative PCR for Covid 19. 05-11-23 Negative swab for Covid 19 and Negative Blood Cx x2.  He is complaining of back pain from prior surgery . Hematuria due to traumatic moreno  no change in H/H. CBI as per  urology     Sugg  - O2 saturation 96% with o2 supplement. Continue Oxygen supplementation 2L NC.   - Continue Albuterol 90 mcg 2 Puffs Q 6 Hours.    - Incentive spirometry .  - Anticoagulation on hold  due to hematuria   - On Lasix 20 mg Oral daily.    - Urology following , moreno care . On Flomax 0.4 mg Oral Daily. oxybutynin

## 2023-05-24 NOTE — PROGRESS NOTE ADULT - PROBLEM SELECTOR PLAN 4
-Mechanical fall preceded by dizziness.   -CT head: No evidence of intracranial injury or skull fracture.  -CT spine: No fracture or dislocation of the cervical spine.  -CT chest: No evidence of acute traumatic injury. Multilevel thoracic and lumbar vertebral compression fractures are difficult to definitively age though there are no visible fracture lines or adjacent edema to suggest acute injury.  -PT recs ALBERTA: pending auth, CM assisting

## 2023-05-24 NOTE — PROGRESS NOTE ADULT - ASSESSMENT
83 y/o male from NH with past medical history of HTN, HLD, BPH, CHF admitted to medicine s/p mechanical fall and found to be in urinary retention s/p moreno placement with subsequent failed TOV, moreno replaced now with recurrent hematuria, likely to trauma. CBI started 5/21 and clamped 5/23.    - AM labs pending  - Keep CBI clamped  - Patient would rather keep current 24 Fr Moreno than downsize catheter  - Monitor urine color  - Trend H/H  - Continue to hold plavix if medically safe  - Monitor patient to prevent pulling on catheter  - Can discontinue oxybutynin for bladder spasms  - Trend Cr  - Urology to sign off, please contact with any questions

## 2023-05-24 NOTE — PROGRESS NOTE ADULT - PROBLEM SELECTOR PLAN 3
-Likely secondary to pain VS atelectasis.   -On admission saturating 88% on room air as per ED note.   -CXR - Small bibasilar infiltrates.  -CT chest - Dependent densities in the lower lungs most likely atelectasis given the low lung volumes. Superimposed pneumonia less likely.  -Pulm Dr. Bailey consulted recs appreciated  -Cont Albuterol, oxygen, Incentive spirometry, Out pt pulmo f/u.

## 2023-05-24 NOTE — PROGRESS NOTE ADULT - ASSESSMENT
Confidential Drug Utilization Report  Search Terms: Chucky Dove, 1940Search Date: 05/15/2023 08:50:06 AM  The Drug Utilization Report below displays all of the controlled substance prescriptions, if any, that your patient has filled in the last twelve months. The information displayed on this report is compiled from pharmacy submissions to the Department, and accurately reflects the information as submitted by the pharmacies.    This report was requested by: Camilla Thompson | Reference #: [id number]    You have not added a THELMA number. Keeping your THELMA number(s) up to date on the My THELMA # tab will enable the separation of your prescriptions from others in the search results.    Practitioner Count: 1  Pharmacy Count: 1  Current Opioid Prescriptions: 1  Current Benzodiazepine Prescriptions: 0  Current Stimulant Prescriptions: 0      Patient Demographic Information (PDI)       PDI	First Name	Last Name	Birth Date	Gender	Street Address	[facility]	Zip Code  A	Chucky Dove	1940	Male	[phone] [facility]	[id number]    Prescription Information      PDI Filter:    PDI	Current Rx	Drug Type	Rx Written	Rx Dispensed	Drug	Quantity	Days Supply	Prescriber Name	Prescriber THELMA #	Payment Method	Dispenser  A	Y	O	05/04/2023	05/04/2023	oxycodone hcl (ir) 20 mg tab	120	30	Tavdy, Esa	[id number]	Medicare	[facility]  A	N	O	03/30/2023	03/30/2023	oxycodone hcl (ir) 20 mg tab	120	30	Tavdy, Esa	[id number]	Medicare	[facility]  A	N	O	02/14/2023	02/27/2023	oxycodone hcl (ir) 20 mg tab	120	30	Tavdy, Esa	[id number]	Medicare	[facility]  A	N	O	02/02/2023	02/02/2023	oxycodone hcl (ir) 20 mg tab	120	30	Tavdy, Esa	[id number]	Medicare	[facility]  A	N	O	01/13/2023	01/13/2023	oxycodone hcl (ir) 10 mg tab	30	30	Tavdy, Esa	[id number]	Medicare	[facility]  A	N	O	01/13/2023	01/13/2023	oxycodone hcl (ir) 20 mg tab	90	30	Tavdy, Esa	[id number]	Medicare	[facility]  A	N	O	12/06/2022	12/06/2022	oxycodone hcl (ir) 20 mg tab	90	30	Tavdy, Esa	[id number]	Medicare	[facility]  A	N	O	12/06/2022	12/06/2022	oxycodone hcl (ir) 10 mg tab	30	30	Tavdy, Esa	[id number]	Medicare	[facility]  A	N	O	10/27/2022	10/31/2022	oxycodone hcl (ir) 10 mg tab	30	30	Tavdy, Esa	[id number]	Medicare	[facility]  A	N	O	10/27/2022	10/31/2022	oxycodone hcl (ir) 20 mg tab	90	30	Tavdy, Esa	[id number]	Medicare	[facility]  A	N	O	10/06/2022	10/06/2022	oxycodone hcl (ir) 20 mg tab	90	30	Tavdy, Esa	[id number]	Medicare	[facility]  A	N	O	10/06/2022	10/06/2022	oxycodone hcl (ir) 10 mg tab	30	30	Tavdy, Esa	[id number]	Medicare	[facility]  A	N	O	08/23/2022	08/23/2022	oxycodone hcl (ir) 10 mg tab	30	30	Tavdy, Esa	[id number]	Medicare	[facility]  A	N	O	08/23/2022	08/23/2022	oxycodone hcl (ir) 20 mg tab	90	30	Tavdy, Esa	[id number]	Medicare	[facility]  A	N	O	07/21/2022	07/31/2022	oxycodone hcl (ir) 10 mg tab	30	30	Tavdy, Esa	[id number]	Medicare	[facility]  A	N	O	07/21/2022	07/31/2022	oxycodone hcl (ir) 20 mg tab	90	30	Tavdy, Esa	[id number]	Medicare	[employer]  A	N	O	07/05/2022	07/05/2022	oxycodone hcl (ir) 10 mg tab	30	30	Tavdy, Esa	[id number]	Medicare	[facility]  A	N	O	07/05/2022	07/05/2022	oxycodone hcl (ir) 20 mg tab	90	30	Tavdy, Esa	[id number]	Medicare	[facility]  A	N	O	05/27/2022	05/31/2022	oxycodone hcl (ir) 10 mg tab	30	30	Esa Herring	[id number]	Medicare	[facility]  A	N	O	05/27/2022	05/31/2022	oxycodone hcl (ir) 20 mg tab	90	30	[facility]	[id number]	Medicare	[facility]    * - Details of Drug Type : O = Opioid, B = Benzodiazepine, S = Stimulant    * - Drugs marked with an asterisk are compound drugs. If the compound drug is made up of more than one controlled substance, then each controlled substance will be a separate row in the table.

## 2023-05-24 NOTE — PROGRESS NOTE ADULT - PROBLEM SELECTOR PLAN 1
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 + opioid dependent.   High risk medications reviewed. Avoid polypharmacy. Avoid IV opioids. Avoid NSAIDs and benzodiazepines. Non-pharmacological sleep aides initiated. Non-opioid medications and non-pharmacological pain management measures initiated.   Opioid pain recommendations   - Continue oxycodone 15mg PO q 6 hours PRN severe pain (decreased from 20mg due to lethargy on 5/23). Monitor for sedation/ respiratory depression.   Non-opioid pain recommendations   - Continue Acetaminophen 1 gram PO q 8 hours PRN moderate pain. Monitor LFTs  - Continue home dose baclofen 10mg PO BID.   - Continue home dose cymbalta 60mg PO daily.   - Discontinued lyrica 75mg po q 8 hours due to dizziness/ lethargy 5/23. Monitor renal function.   - Discontinued Lidoderm 4% patch 2 patches daily 5/23- pt refused.   Bowel Regimen  - Continue Miralax 17G PO daily  - Continue Senna 2 tablets at bedtime for constipation  Mild pain (score 1-3)  - Non-pharmacological pain treatment recommendations  - Warm/ Cool packs PRN   - Repositioning extremity, elevation, imagery, relaxation, distraction.  - Physical therapy OOB if no contraindications   Recommendations discussed with primary team and RN.

## 2023-05-24 NOTE — PROGRESS NOTE ADULT - SUBJECTIVE AND OBJECTIVE BOX
NP Note discussed with Primary Attending    Patient is a 82y old Male who presents with a chief complaint of Mechanical fall (24 May 2023 12:26)      INTERVAL HPI/OVERNIGHT EVENTS: no new complaints. Denies all complaints at time of eval. Moreno with clear yellow urine.     MEDICATIONS  (STANDING):  albuterol    90 MICROgram(s) HFA Inhaler 2 Puff(s) Inhalation every 6 hours  amLODIPine   Tablet 5 milliGRAM(s) Oral daily  aspirin  chewable 81 milliGRAM(s) Oral daily  atorvastatin 80 milliGRAM(s) Oral at bedtime  baclofen 10 milliGRAM(s) Oral every 12 hours  DULoxetine 60 milliGRAM(s) Oral daily  furosemide    Tablet 20 milliGRAM(s) Oral daily  metoprolol tartrate 25 milliGRAM(s) Oral two times a day  oxybutynin 5 milliGRAM(s) Oral every 8 hours  polyethylene glycol 3350 17 Gram(s) Oral daily  senna 2 Tablet(s) Oral at bedtime  sodium chloride 0.9% lock flush 3 milliLiter(s) IV Push every 8 hours  sodium chloride 0.9%. 1000 milliLiter(s) (80 mL/Hr) IV Continuous <Continuous>  tamsulosin 0.4 milliGRAM(s) Oral at bedtime    MEDICATIONS  (PRN):  acetaminophen     Tablet .. 1000 milliGRAM(s) Oral every 8 hours PRN Moderate Pain (4 - 6)  melatonin 3 milliGRAM(s) Oral at bedtime PRN Insomnia  ondansetron Injectable 4 milliGRAM(s) IV Push every 6 hours PRN Nausea and/or Vomiting  oxyCODONE    IR 15 milliGRAM(s) Oral every 6 hours PRN Severe Pain (7 - 10)      __________________________________________________  REVIEW OF SYSTEMS:    CONSTITUTIONAL: No fever,   EYES: no acute visual disturbances  NECK: No pain or stiffness  RESPIRATORY: No cough; No shortness of breath  CARDIOVASCULAR: No chest pain, no palpitations  GASTROINTESTINAL: No pain. No nausea or vomiting; No diarrhea   NEUROLOGICAL: No headache or numbness, no tremors  MUSCULOSKELETAL: No joint pain, no muscle pain  GENITOURINARY: no dysuria, no frequency, no hesitancy  PSYCHIATRY: no depression , no anxiety  ALL OTHER  ROS negative        Vital Signs Last 24 Hrs  T(C): 36.2 (24 May 2023 05:13), Max: 36.3 (23 May 2023 19:41)  T(F): 97.2 (24 May 2023 05:13), Max: 97.3 (23 May 2023 19:41)  HR: 66 (24 May 2023 05:13) (57 - 88)  BP: 120/64 (24 May 2023 05:13) (108/62 - 120/64)  BP(mean): 77 (23 May 2023 17:01) (77 - 77)  RR: 18 (24 May 2023 05:13) (18 - 18)  SpO2: 97% (24 May 2023 05:13) (94% - 97%)    Parameters below as of 24 May 2023 05:13  Patient On (Oxygen Delivery Method): nasal cannula  O2 Flow (L/min): 2      ________________________________________________  PHYSICAL EXAM:  GENERAL: NAD  HEENT: Normocephalic; conjunctivae and sclerae clear; moist mucous membranes;   NECK : supple  CHEST/LUNG: Clear to auscultation bilaterally with good air entry   HEART: S1 S2  regular; no murmurs, gallops or rubs  ABDOMEN: Soft, Nontender, Nondistended; Bowel sounds present, no rebound or guarding  - 3 way moreno with clear yellow urine.   EXTREMITIES: no cyanosis; no edema; no calf tenderness  SKIN: warm and dry; no rash  NERVOUS SYSTEM:  Awake and alert; Oriented  to place, person and time ; no new deficits    _________________________________________________  LABS:                        12.4   7.85  )-----------( 203      ( 23 May 2023 05:40 )             40.9     05-24    139  |  109<H>  |  46<H>  ----------------------------<  103<H>  4.0   |  28  |  1.01    Ca    8.1<L>      24 May 2023 10:37          CAPILLARY BLOOD GLUCOSE            RADIOLOGY & ADDITIONAL TESTS:    Imaging  Reviewed:  YES/NO    Consultant(s) Notes Reviewed:   YES/ No      Plan of care was discussed with patient and /or primary care giver; all questions and concerns were addressed

## 2023-05-24 NOTE — PROGRESS NOTE ADULT - PROBLEM SELECTOR PLAN 1
- Patient developed hematuria a few hours after admission.   - Likely 2/2 to traumatic moreno insertion as multiple tries to place moreno was done.  - noted with hematuria  5/19, moreno irrigated by nursing and Hematuria improved by afternoon  - Holding Lovenox in setting of hematuria.   - Recurrent gross hematuria over night 5/21  - H/H stable  - Uro interventions and note appreciated  - CBI clamped  - Clear from urology standpoint   - Monitor CBC

## 2023-05-24 NOTE — PROGRESS NOTE ADULT - PROBLEM SELECTOR PLAN 2
likely due to urinary retention/hematuria  -SCr 0.84-->2.06-->1.01  -Cont Flomax  -Avoid nephrotoxic meds  -continue f/u AM BMP

## 2023-05-24 NOTE — CHART NOTE - NSCHARTNOTEFT_GEN_A_CORE
Reassessment:     Patient is a 82y old  Male who presents with a chief complaint of Mechanical fall (24 May 2023 13:18)      Factors impacting intake: [ ] none [ ] nausea  [ ] vomiting [ ] diarrhea [ ] constipation  [ ]chewing problems [ ] swallowing issues  [X ] other: HTN, HLD, venous insufficiency, CAD, BPH, CHF    Diet Prescription: Diet, Regular:   DASH/ TLC {Sodium & Cholesterol Restricted} (05-11-23 @ 08:48)    Intake: Patient visited, alert & at times "confused", d/w PCA/nursing with "ate well at breakfast", consumed >50% of tray with feeding assists, no reports of GI distress, per flow sheets intake 51-75% noted, rec. c/w diet as ordered, awaiting placement. RD available.    Daily weight; nursing to monitor daily weights   % Weight Change    Pertinent Medications: MEDICATIONS  (STANDING):  albuterol    90 MICROgram(s) HFA Inhaler 2 Puff(s) Inhalation every 6 hours  amLODIPine   Tablet 5 milliGRAM(s) Oral daily  aspirin  chewable 81 milliGRAM(s) Oral daily  atorvastatin 80 milliGRAM(s) Oral at bedtime  baclofen 10 milliGRAM(s) Oral every 12 hours  chlorhexidine 2% Cloths 1 Application(s) Topical daily  DULoxetine 60 milliGRAM(s) Oral daily  furosemide    Tablet 20 milliGRAM(s) Oral daily  metoprolol tartrate 25 milliGRAM(s) Oral two times a day  oxybutynin 5 milliGRAM(s) Oral every 8 hours  polyethylene glycol 3350 17 Gram(s) Oral daily  senna 2 Tablet(s) Oral at bedtime  sodium chloride 0.9% lock flush 3 milliLiter(s) IV Push every 8 hours  sodium chloride 0.9%. 1000 milliLiter(s) (80 mL/Hr) IV Continuous <Continuous>  tamsulosin 0.4 milliGRAM(s) Oral at bedtime    MEDICATIONS  (PRN):  acetaminophen     Tablet .. 1000 milliGRAM(s) Oral every 8 hours PRN Moderate Pain (4 - 6)  melatonin 3 milliGRAM(s) Oral at bedtime PRN Insomnia  ondansetron Injectable 4 milliGRAM(s) IV Push every 6 hours PRN Nausea and/or Vomiting  oxyCODONE    IR 15 milliGRAM(s) Oral every 6 hours PRN Severe Pain (7 - 10)    Pertinent Labs: 05-24 Na139 mmol/L Glu 103 mg/dL<H> K+ 4.0 mmol/L Cr  1.01 mg/dL BUN 46 mg/dL<H>     CAPILLARY BLOOD GLUCOSE    Skin: pressure injury w/wound care     Estimated Needs:   [X ] no change since previous assessment  [ ] recalculated:     Previous Nutrition Diagnosis:   [ ] Inadequate Energy Intake [X ] Inadequate Oral Intake [ ] Excessive Energy Intake   [ ] Underweight [ ] Increased Nutrient Needs [ ] Overweight/Obesity   [ ] Altered GI Function [ ] Unintended Weight Loss [ ] Food & Nutrition Related Knowledge Deficit [ ] Malnutrition     Nutrition Diagnosis is [X ] ongoing  [ ] resolved [ ] not applicable     Interventions: Optimal nutrition meeting >75% of energy nutrient needs via tolerated route     Recommend  [ ] Change Diet To:  [X ] Nutrition Supplement: Add MVI/minerals daily  [ ] Nutrition Support  [X ] Other: Nursing to continue with meal set up and encouragement    Monitoring and Evaluation:   [X PO intake [ x ] Tolerance to diet prescription [ x ] weights [ x ] labs[ x ] follow up per protocol  [ ] other:

## 2023-05-24 NOTE — PROGRESS NOTE ADULT - ASSESSMENT
Patient is a 83 y/o M from [facility] [employer], [facility] who walks with a walker and has PMH of HTN, HLD, venous insufficiency, CAD, BPH, CHF who presented s/p mechanical fall. Admitted to medicine for AHRF 2/2 to atelectasis and mechanical fall. Noted to have erythema to upper thigh and complte course of IV abxCT Head, chest, spine no fx. Chest CT shows atelectasis. CXR shows small bibasilar infiltrates, pulmonary consulted, required nasal cannula now tolerating room air. Also found to have urinary retention, moreno placed, + hematuria, Urology consulted, failed TOV and re placed on 5/15 by Urology. Noted with hematuria today, Hgb stable, moreno catheter irrigated and noted with improvement in hematuria. Per discussion with Urology will continue to monitor. PT reccs ALBERTA, pending auth to [facility]  5/22-Pt. with gross hematuria over night, required moreno replacement by uro, 6 eye moreno placed, CBI continues with clear drainage at this time.  5/23- CBI clamped and moreno with clear yellow urine.  Pt declined to have moreno downsize and no objection from Urology standpoint to d/c with current moreno.  D/C planning ongoing and CM assisting, screening Covid for d/c ordered.

## 2023-05-24 NOTE — PROGRESS NOTE ADULT - SUBJECTIVE AND OBJECTIVE BOX
INTERVAL HPI/OVERNIGHT EVENTS:  Patient seen,doing better,on cbi,no acute issues  VITAL SIGNS:  T(F): 97.2 (05-24-23 @ 05:13)  HR: 66 (05-24-23 @ 05:13)  BP: 120/64 (05-24-23 @ 05:13)  RR: 18 (05-24-23 @ 05:13)  SpO2: 97% (05-24-23 @ 05:13)  Wt(kg): --    PHYSICAL EXAM:  awake  Constitutional:  Eyes:  ENMT:perrla  Neck:  Respiratory:clear  Cardiovascular:s1s2,m-none  Gastrointestinal:soft,bs pos,moreno in place with clear urine  Extremities:  Vascular:  Neurological:no focal deficit  Musculoskeletal:    MEDICATIONS  (STANDING):  albuterol    90 MICROgram(s) HFA Inhaler 2 Puff(s) Inhalation every 6 hours  amLODIPine   Tablet 5 milliGRAM(s) Oral daily  aspirin  chewable 81 milliGRAM(s) Oral daily  atorvastatin 80 milliGRAM(s) Oral at bedtime  baclofen 10 milliGRAM(s) Oral every 12 hours  DULoxetine 60 milliGRAM(s) Oral daily  furosemide    Tablet 20 milliGRAM(s) Oral daily  metoprolol tartrate 25 milliGRAM(s) Oral two times a day  oxybutynin 5 milliGRAM(s) Oral every 8 hours  polyethylene glycol 3350 17 Gram(s) Oral daily  senna 2 Tablet(s) Oral at bedtime  sodium chloride 0.9% lock flush 3 milliLiter(s) IV Push every 8 hours  sodium chloride 0.9%. 1000 milliLiter(s) (80 mL/Hr) IV Continuous <Continuous>  tamsulosin 0.4 milliGRAM(s) Oral at bedtime    MEDICATIONS  (PRN):  acetaminophen     Tablet .. 1000 milliGRAM(s) Oral every 8 hours PRN Moderate Pain (4 - 6)  melatonin 3 milliGRAM(s) Oral at bedtime PRN Insomnia  ondansetron Injectable 4 milliGRAM(s) IV Push every 6 hours PRN Nausea and/or Vomiting  oxyCODONE    IR 15 milliGRAM(s) Oral every 6 hours PRN Severe Pain (7 - 10)      Allergies    No Known Allergies    Intolerances        LABS:                        12.4   7.85  )-----------( 203      ( 23 May 2023 05:40 )             40.9     05-24    139  |  109<H>  |  46<H>  ----------------------------<  103<H>  4.0   |  28  |  1.01    Ca    8.1<L>      24 May 2023 10:37            RADIOLOGY & ADDITIONAL TESTS:      Assessment and Plan:   · Assessment	  Patient is a 81 y/o M from [facility] [employer], [facility] who walks with a walker and has PMH of HTN, HLD, venous insufficiency, CAD, BPH, CHF who presented s/p mechanical fall. Admitted to medicine for AHRF 2/2 to atelectasis and mechanical fall. Noted to have erythema to upper thigh and complte course of IV abxCT Head, chest, spine no fx. Chest CT shows atelectasis. CXR shows small bibasilar infiltrates, pulmonary consulted, required nasal cannula now tolerating room air. Also found to have urinary retention, moreno placed, + hematuria, Urology consulted, failed TOV and re placed on 5/15 by Urology. Noted with hematuria today, Hgb stable, moreno catheter irrigated and noted with improvement in hematuria. Per discussion with Urology will continue to monitor. PT reccs ALBERTA, pending auth to [facility]  5/22-Pt. with gross hematuria over night, required moreno replacement by uro, 6 eye moreno placed, CBI continues with clear drainage at this time.  5/23-CBI clamped by uro in am, moreno draining small amounts yellow urine, no hematuria.  Pt. appears dehydrated with dry mucous membranes and elevated SCR.  Started IVF NS @80cc/hr.          Problem/Plan - 1:  ·  Problem: Hematuria-improved clinically  ·  Plan: - Patient developed hematuria a few hours after admission.   - Recurrent gross hematuria over night 5/21  - H/H stable  - Uro interventions and note appreciated  - CBI on hold,obseravtion  - 5/23 am CBI clamped bu Uro  - f/u U/O  - urology f/up appret     Problem/Plan - 2:  ·  Problem: YUNIOR (acute kidney injury).   ·  Plan: likely due to urinary retention/hematuria/dehydration  -SCr 0.84-->2.06-->1.96  -CBI clamped at this time  -Cont Flomax  -Avoid nephrotoxic meds  -Started IVF   -f/u am BMP.     Problem/Plan - 3:  ·  Problem: Acute respiratory failure with hypoxia.   ·  Plan: Likely secondary to pain VS atelectasis.   -On admission saturating 88% on room air as per ED note.   -CXR - Small bibasilar infiltrates.  -CT chest - Dependent densities in the lower lungs most likely atelectasis given the low lung volumes. Superimposed pneumonia less likely.  -Pulm Dr. Bailey consulted  recs appreciated  -Cont Albuterol, oxygen, Incentive spirometry, Out pt pulmo f/u.     Problem/Plan - 4:  ·  Problem: Fall, accidental.   ·  Plan: -Mechanical fall preceded by dizziness.   -CT head: No evidence of intracranial injury or skull fracture.  -CT spine: No fracture or dislocation of the cervical spine.  -CT chest: No evidence of acute traumatic injury. Multilevel thoracic and lumbar vertebral compression fractures are difficult to definitively age though there are no visible fracture lines or adjacent edema to suggest acute injury.  -PT recs ALBERTA: pending auth.     Problem/Plan - 5:  ·  Problem: Venous insufficiency.   ·  Plan: - patient with significant venous dermatitis.   - edema resolving    -continue Furosemide 20mg PO.     Problem/Plan - 6:  ·  Problem: Chronic back pain.   ·  Plan: home meds Oxycodon 20 TID and oxycodone 10 OD.   c/w pain regimen per Pain Mgmt : oxycodone 20mg PO q 6 hours PRN severe pain.     - Continue home dose baclofen 10mg PO BID.   - Continue home dose cymbalta 60mg PO daily.   - Continue home dose lyrica 75mg po q 8 hours. Monitor renal function.   - Continue Lidoderm 4% patch 2 patches daily.   - Continue bowel regimen   - Pain Mgmt team following.     Problem/Plan - 7:  ·  Problem: CAD (coronary artery disease).   ·  Plan: - Continue with  statin and plavix, metoprolol.     Problem/Plan - 8:  ·  Problem: Hypertension.   ·  Plan: - BP controlled  - Continue amlodipine.     Problem/Plan - 9:  ·  Problem: Hyperlipidemia.   ·  Plan: - continue statin.     Problem/Plan - 10:  ·  Problem: Prophylactic measure.   ·  Plan; DVT ppx: SCDs. Hold lovenox in setting of hematuria.     Problem/Plan - 11:  ·  Problem: Discharge planning issues.   ·  Plan: PT recc ALBERTA  Pt. for discharge to [facility]  Discharge pending resolution of urological issues  CM following.

## 2023-05-24 NOTE — PROGRESS NOTE ADULT - SUBJECTIVE AND OBJECTIVE BOX
Subjective  No acute events overnight. Urine remains clear.    Objective    Vital signs  T(F): , Max: 97.3 (05-23-23 @ 11:54)  HR: 66 (05-24-23 @ 05:13)  BP: 120/64 (05-24-23 @ 05:13)  SpO2: 97% (05-24-23 @ 05:13)  Wt(kg): --    Output     OUT:    Indwelling Catheter - Urethral (mL): 900 mL  Total OUT: 900 mL    Total NET: -900 mL          Gen: NAD  Abd: soft, nontender, nondistended  : moreno secured in place, draining CYU with CBI clamped    Labs      05-23 @ 05:40    WBC 7.85  / Hct 40.9  / SCr 1.96

## 2023-05-24 NOTE — PROGRESS NOTE ADULT - SUBJECTIVE AND OBJECTIVE BOX
Time of Visit:  Patient seen and examined.     MEDICATIONS  (STANDING):  albuterol    90 MICROgram(s) HFA Inhaler 2 Puff(s) Inhalation every 6 hours  amLODIPine   Tablet 5 milliGRAM(s) Oral daily  aspirin  chewable 81 milliGRAM(s) Oral daily  atorvastatin 80 milliGRAM(s) Oral at bedtime  baclofen 10 milliGRAM(s) Oral every 12 hours  chlorhexidine 2% Cloths 1 Application(s) Topical daily  DULoxetine 60 milliGRAM(s) Oral daily  furosemide    Tablet 20 milliGRAM(s) Oral daily  metoprolol tartrate 25 milliGRAM(s) Oral two times a day  oxybutynin 5 milliGRAM(s) Oral every 8 hours  polyethylene glycol 3350 17 Gram(s) Oral daily  senna 2 Tablet(s) Oral at bedtime  sodium chloride 0.9% lock flush 3 milliLiter(s) IV Push every 8 hours  sodium chloride 0.9%. 1000 milliLiter(s) (80 mL/Hr) IV Continuous <Continuous>  tamsulosin 0.4 milliGRAM(s) Oral at bedtime      MEDICATIONS  (PRN):  acetaminophen     Tablet .. 1000 milliGRAM(s) Oral every 8 hours PRN Moderate Pain (4 - 6)  melatonin 3 milliGRAM(s) Oral at bedtime PRN Insomnia  ondansetron Injectable 4 milliGRAM(s) IV Push every 6 hours PRN Nausea and/or Vomiting  oxyCODONE    IR 15 milliGRAM(s) Oral every 6 hours PRN Severe Pain (7 - 10)       Medications up to date at time of exam.      PHYSICAL EXAMINATION:  Patient has no new complaints.  GENERAL: The patient is a well-developed, well-nourished, in no apparent distress.     Vital Signs Last 24 Hrs  T(C): 36.3 (24 May 2023 19:06), Max: 36.6 (24 May 2023 13:37)  T(F): 97.4 (24 May 2023 19:06), Max: 97.8 (24 May 2023 13:37)  HR: 69 (24 May 2023 19:06) (57 - 78)  BP: 125/64 (24 May 2023 19:06) (107/54 - 125/64)  BP(mean): --  RR: 18 (24 May 2023 19:06) (18 - 18)  SpO2: 96% (24 May 2023 19:06) (94% - 97%)    Parameters below as of 24 May 2023 19:06  Patient On (Oxygen Delivery Method): nasal cannula  O2 Flow (L/min): 2     (if applicable)    Chest Tube (if applicable)    HEENT: Head is normocephalic and atraumatic. Extraocular muscles are intact. Mucous membranes are moist.     NECK: Supple, no palpable adenopathy.    LUNGS: Clear to auscultation, no wheezing, rales, or rhonchi.    HEART: Regular rate and rhythm without murmur.    ABDOMEN: Soft, nontender, and nondistended.  No hepatosplenomegaly is noted.    : No painful voiding, no pelvic pain    EXTREMITIES: Without any cyanosis, clubbing, rash, lesions or edema.    NEUROLOGIC: Awake, alert, oriented, grossly intact    SKIN: Warm, dry, good turgor.      LABS:                        12.4   7.85  )-----------( 203      ( 23 May 2023 05:40 )             40.9     05-24    139  |  109<H>  |  46<H>  ----------------------------<  103<H>  4.0   |  28  |  1.01    Ca    8.1<L>      24 May 2023 10:37                          MICROBIOLOGY: (if applicable)    RADIOLOGY & ADDITIONAL STUDIES:  EKG:   CXR:  ECHO:    IMPRESSION: 82y Male PAST MEDICAL & SURGICAL HISTORY:  Hypertension      Hyperlipidemia      Venous insufficiency       p/w           RECOMMENDATIONS:

## 2023-05-24 NOTE — PROGRESS NOTE ADULT - SUBJECTIVE AND OBJECTIVE BOX
Source of information: TMO HERRERA, Chart review  Patient language: English  : n/a    HPI:  Patient is a 83 y/o M from [facility] who walks with a walker and has PMH of HTN, HLD, venous insufficiency, CAD, BPH, CHF who presented s/p mechanical fall. Patient reports that he was by his bed when he felt lightheaded and felt like he might pass out and had a fall. Patient denies hitting his head or any other bodily injury. Patient denies any loss of consciousness and reports he remembers the entire episode. Patient denies any pain at this time. Patient also reported that since being in the ED he is in a lot of discomfort as he has a very strong urge to urinate but he is not able to. Patient denies any history of fevers, chills, headache, prior lightheadedness, chest pain, palpitations, shortness of breath and cough (contradictory to ED note), nausea, vomiting, abdominal pain, diarrhea, constipation, melena, hematochezia, or other urinary symptoms.  (11 May 2023 09:20)    CTAP demonstrates- several thoracic and lumbar vertebral compression fractures but with no discrete fracture line or adjacent edema. Mild depression of the superior endplate of T1. Chronic moderate to severe compression T4 vertebral body. Mild compression T5, T6, T7, T8, T9, T10, T11 and T12 vertebral bodies. Chronic moderate compression of T3 vertebral body with focal right scoliosis centered at this level. Status post posterior fusion T2-sacroiliac. The hardware is intact. Bony demineralization.    CT head: No evidence of intracranial injury or skull fracture.    CT cervical spine: No fracture or dislocation of the cervical spine.    CT Chest abdomen pelvis:No evidence of acute traumatic injury. Multilevel thoracic and lumbar vertebral compression fractures are   difficult to definitively age though there are no visible fracture lines or adjacent edema to suggest acute injury. Comparison with priors would be helpful.  Dependent densities in the lower lungs most likely atelectasis given the low lung volumes. Superimposed pneumonia less likely.    Pt is admitted for hypoxemia, s/p fall.  CXR demonstrates- Continued elevation of right hemidiaphragm. Right lower lung linear atelectasis and possible trace right pleural effusion. Pain consulted for back pain, chronic on 5/15. Pt seen and examined at bedside. Pt laying in bed, reports lumbar back pain score 8/10  SCALE USED: (1-10 VNRS). Pt describes pain as dull, constant, non- radiating, alleviated by current pain medication, exacerbated by movement. Denies numbness/ tingling. Reports generalized weakness. Pt also reports urethral pain from urinary catheter.  Pt tolerating PO diet. Denies lethargy, chest pain, SOB, nausea, vomiting, constipation. Last BM 5/23. Patient stated goal for pain control: to be able to take deep breaths, get out of bed to chair and ambulate with tolerable pain control. Per istop, pt on chronic oxycodone 20mg PO 4x/day, last filled 5/4.    PAST MEDICAL & SURGICAL HISTORY:  Hypertension      Hyperlipidemia      Venous insufficiency          FAMILY HISTORY:      Social History:  Patient reports he is a former smoker but he quit smoking 40 years ago.   Patient denies alcohol or illicit drug use. (11 May 2023 09:20)    Allergies    No Known Allergies    MEDICATIONS  (STANDING):  albuterol    90 MICROgram(s) HFA Inhaler 2 Puff(s) Inhalation every 6 hours  amLODIPine   Tablet 5 milliGRAM(s) Oral daily  aspirin  chewable 81 milliGRAM(s) Oral daily  atorvastatin 80 milliGRAM(s) Oral at bedtime  baclofen 10 milliGRAM(s) Oral every 12 hours  DULoxetine 60 milliGRAM(s) Oral daily  furosemide    Tablet 20 milliGRAM(s) Oral daily  metoprolol tartrate 25 milliGRAM(s) Oral two times a day  oxybutynin 5 milliGRAM(s) Oral every 8 hours  polyethylene glycol 3350 17 Gram(s) Oral daily  senna 2 Tablet(s) Oral at bedtime  sodium chloride 0.9% lock flush 3 milliLiter(s) IV Push every 8 hours  sodium chloride 0.9%. 1000 milliLiter(s) (80 mL/Hr) IV Continuous <Continuous>  tamsulosin 0.4 milliGRAM(s) Oral at bedtime    MEDICATIONS  (PRN):  acetaminophen     Tablet .. 1000 milliGRAM(s) Oral every 8 hours PRN Moderate Pain (4 - 6)  melatonin 3 milliGRAM(s) Oral at bedtime PRN Insomnia  ondansetron Injectable 4 milliGRAM(s) IV Push every 6 hours PRN Nausea and/or Vomiting  oxyCODONE    IR 15 milliGRAM(s) Oral every 6 hours PRN Severe Pain (7 - 10)      Vital Signs Last 24 Hrs  T(C): 36.2 (24 May 2023 05:13), Max: 36.3 (23 May 2023 19:41)  T(F): 97.2 (24 May 2023 05:13), Max: 97.3 (23 May 2023 19:41)  HR: 66 (24 May 2023 05:13) (57 - 88)  BP: 120/64 (24 May 2023 05:13) (108/62 - 120/64)  BP(mean): 77 (23 May 2023 17:01) (77 - 77)  RR: 18 (24 May 2023 05:13) (18 - 18)  SpO2: 97% (24 May 2023 05:13) (94% - 97%)    Parameters below as of 24 May 2023 05:13  Patient On (Oxygen Delivery Method): nasal cannula  O2 Flow (L/min): 2    COVID-19 PCR: [facility] (16 May 2023 09:14)    LABS: Reviewed                          12.4   7.85  )-----------( 203      ( 23 May 2023 05:40 )             40.9     05-24    139  |  109<H>  |  46<H>  ----------------------------<  103<H>  4.0   |  28  |  1.01    Ca    8.1<L>      24 May 2023 10:37    COVID-19 PCR: [facility] (16 May 2023 09:14)    Radiology: Reviewed.     < from: Xray Chest 1 View- PORTABLE-Routine (Xray Chest 1 View- PORTABLE-Routine in AM.) (05.15.23 @ 09:33) >    ACC: [id number] EXAM:  XR CHEST PORTABLE ROUTINE 1V   ORDERED BY: TAVON MCDERMOTT     PROCEDURE DATE:  05/15/2023          INTERPRETATION:  TIME OF EXAM: May 15, 2023 at 9:05 AM.    CLINICAL INFORMATION: Hypertension. Atelectasis.    COMPARISON:  APchest x-ray and CT scan of the chest from May 11, 2023.    TECHNIQUE:   AP Portable chest x-ray. Limited by rotation. Jewelry   projects over the left chest.    INTERPRETATION:    Heart size and the mediastinum cannot be accurately evaluated on this   projection.  Elevated right hemidiaphragm again seen.  There is right lower lung linear atelectasis with a possible trace right   pleural effusion.  The left lung is clear.  No left pleural effusion seen.  No pneumothorax.  Incompletely imaged orthopedic hardware projects over the thoracolumbar   spine.        IMPRESSION:  Limited by rotation.    Continued elevation of right hemidiaphragm.    Right lower lung linear atelectasis and possible trace right pleural   effusion.    --- End of Report ---            SKY GARZA MD; Attending Radiologist  This document has been electronically signed. May 16 2023  4:46PM    < end of copied text >    < from: CT Head No Cont (05.11.23 @ 05:14) >    ACC: [id number] EXAM:  CT ABDOMEN AND PELVIS IC   ORDERED BY: FADUMO SANTOS     ACC: [id number] EXAM:  CT CHEST IC   ORDERED BY: FADUMO SANTOS     ACC: [id number] EXAM:  CT BRAIN   ORDERED BY: FADUMO SANTOS     ACC: [id number] EXAM:  CT CERVICALSPINE   ORDERED BY: FADUMO SANTOS     PROCEDURE DATE:  05/11/2023          INTERPRETATION:  CLINICAL INFORMATION: Back pain after fall. CHF,   hypertension, shortness of breath.    COMPARISON: Chest radiograph same date.    CONTRAST/COMPLICATIONS:  90 cc Omnipaque 350 administered intravenously for the chest abdomen   pelvis.    PROCEDURE:  CT head and cervical spine without intravenous contrast.  CT of the Chest, Abdomen and Pelvis with intravenous contrast.  Sagittal and coronal reformats were performed.    FINDINGS:    HEAD:  BRAIN: No apparent acute infarct, hemmorhage or mass. No hydrocephalus.   Chronic appearing white matter hypodensities and volume loss.  CALVARIUM: No skull fracture or agreesive osseous legions.  EXTRACRANIAL SOFTTISSUES: No acute findings.  VISUALIZED PORTIONS OF THE PARANASAL SINUSES AND MASTOID AIR CELLS: No   air fluid levels.    CERVICAL SPINE:  VERTEBRAE: No fracture or dislocation. Exaggeration of cervical lordosis.   Mild degenerative retrolisthesis of C3 on C4 and mild degenerative   anterolisthesis of C6 on C7 and C7 on T1.  SPINAL CANAL AND FORAMINA: Multilevel degenerative changes with no   apparent high grade spinal canal narrowing.  PARASPINAL SOFT TISSUES: No paravertebral hematoma or acutefinding.    CHEST:  LUNGS AND LARGE AIRWAYS: Low lung volumes with right greater than left   basilar opacities and mild elevation of the right hemidiaphragm. No   pulmonary nodules.  PLEURA: No pleural effusion or pneumothorax.  VESSELS: No evidence of injury to the heart and great vessels. No   thoracic aortic dissection or aneurysm.  HEART: Mild four-chamber cardiomegaly. No pericardial effusion.  MEDIASTINUM AND LUIS ALBERTO: No lymphadenopathy.  CHEST WALL AND LOWER NECK: Within normal limits.    ABDOMEN AND PELVIS:  LIVER: Within normal limits.  BILE DUCTS: Normal caliber.  GALLBLADDER: Within normal limits.  SPLEEN: No evidence of splenic injury or concerning finding. Small cystic   lesion upper spleen.  PANCREAS: Within normal limits.  ADRENALS: Within normal limits.  KIDNEYS/URETERS: No evidence of renal injury. No hydronephrosis or renal   stone or concerning mass. Small cysts bilaterally.    BLADDER: Within normal limits.  REPRODUCTIVE ORGANS: Radiation seeds in the prostate.    BOWEL: No bowel obstruction. Appendix is not visualized. No evidence of   inflammation in the pericecal region.  PERITONEUM: No ascites.  VESSELS: No abdominal aortic aneurysm. Calcific atherosclerosis   particularly of the distal abdominal aorta and iliac arteries.  RETROPERITONEUM/LYMPH NODES: No lymphadenopathy.  ABDOMINAL WALL: Large right lower quadrant anterior abdominal wall hernia   extending into the right inguinal region, not fully visible, contains the   cecum, much of the ascending colon, anddistal ileum. No evidence of   obstruction or other acute complication regarding this hernia.  BONES: Several thoracic and lumbar vertebral compression fractures but   with no discrete fracture line or adjacent edema. Mild depression of the   superior endplate of T1. Chronic moderate to severe compression T4   vertebral body. Mild compression T5, T6, T7, T8, T9, T10, T11 and T12   vertebral bodies. Chronic moderate compression of T3 vertebral body with   focal right scoliosis centered at this level. Status post posterior   fusion T2-sacroiliac. The hardware is intact. Bony demineralization.    IMPRESSION:  CT head: No evidence of intracranial injury or skull fracture.    CT cervical spine: No fracture or dislocation of the cervical spine.    CT Chest abdomen pelvis:  No evidence of acute traumatic injury.  Multilevel thoracic and lumbar vertebral compression fractures are   difficult to definitively age though there are no visible fracture lines   or adjacent edema to suggest acute injury. Comparison with priors would   be helpful.  Dependent densities in the lower lungs most likely atelectasis given the   low lung volumes. Superimposed pneumonia less likely.      --- End of Report ---             ALEX MACIEL MD; Attending Radiologist  This document has been electronically signed. May 11 2023  5:55AM    < end of copied text >    < from: CT Abdomen and Pelvis w/ IV Cont (05.11.23 @ 05:13) >    ACC: [id number] EXAM:  CT ABDOMEN AND PELVIS IC   ORDERED BY: FADUMO SANTOS     ACC: [id number] EXAM:  CT CHEST IC   ORDERED BY: FADUMO SANTOS     ACC: [id number] EXAM:  CT BRAIN   ORDERED BY: FADUMO SANTOS     ACC: [id number] EXAM:  CT CERVICALSPINE   ORDERED BY: FADUMO SANTOS     PROCEDURE DATE:  05/11/2023          INTERPRETATION:  CLINICAL INFORMATION: Back pain after fall. CHF,   hypertension, shortness of breath.    COMPARISON: Chest radiograph same date.    CONTRAST/COMPLICATIONS:  90 cc Omnipaque 350 administered intravenously for the chest abdomen   pelvis.    PROCEDURE:  CT head and cervical spine without intravenous contrast.  CT of the Chest, Abdomen and Pelvis with intravenous contrast.  Sagittal and coronal reformats were performed.    FINDINGS:    HEAD:  BRAIN: No apparent acute infarct, hemmorhage or mass. No hydrocephalus.   Chronic appearing white matter hypodensities and volume loss.  CALVARIUM: No skull fracture or agreesive osseous legions.  EXTRACRANIAL SOFTTISSUES: No acute findings.  VISUALIZED PORTIONS OF THE PARANASAL SINUSES AND MASTOID AIR CELLS: No   air fluid levels.    CERVICAL SPINE:  VERTEBRAE: No fracture or dislocation. Exaggeration of cervical lordosis.   Mild degenerative retrolisthesis of C3 on C4 and mild degenerative   anterolisthesis of C6 on C7 and C7 on T1.  SPINAL CANAL AND FORAMINA: Multilevel degenerative changes with no   apparent high grade spinal canal narrowing.  PARASPINAL SOFT TISSUES: No paravertebral hematoma or acutefinding.    CHEST:  LUNGS AND LARGE AIRWAYS: Low lung volumes with right greater than left   basilar opacities and mild elevation of the right hemidiaphragm. No   pulmonary nodules.  PLEURA: No pleural effusion or pneumothorax.  VESSELS: No evidence of injury to the heart and great vessels. No   thoracic aortic dissection or aneurysm.  HEART: Mild four-chamber cardiomegaly. No pericardial effusion.  MEDIASTINUM AND LUIS ALBERTO: No lymphadenopathy.  CHEST WALL AND LOWER NECK: Within normal limits.    ABDOMEN AND PELVIS:  LIVER: Within normal limits.  BILE DUCTS: Normal caliber.  GALLBLADDER: Within normal limits.  SPLEEN: No evidence of splenic injury or concerning finding. Small cystic   lesion upper spleen.  PANCREAS: Within normal limits.  ADRENALS: Within normal limits.  KIDNEYS/URETERS: No evidence of renal injury. No hydronephrosis or renal   stone or concerning mass. Small cysts bilaterally.    BLADDER: Within normal limits.  REPRODUCTIVE ORGANS: Radiation seeds in the prostate.    BOWEL: No bowel obstruction. Appendix is not visualized. No evidence of   inflammation in the pericecal region.  PERITONEUM: No ascites.  VESSELS: No abdominal aortic aneurysm. Calcific atherosclerosis   particularly of the distal abdominal aorta and iliac arteries.  RETROPERITONEUM/LYMPH NODES: No lymphadenopathy.  ABDOMINAL WALL: Large right lower quadrant anterior abdominal wall hernia   extending into the right inguinal region, not fully visible, contains the   cecum, much of the ascending colon, anddistal ileum. No evidence of   obstruction or other acute complication regarding this hernia.  BONES: Several thoracic and lumbar vertebral compression fractures but   with no discrete fracture line or adjacent edema. Mild depression of the   superior endplate of T1. Chronic moderate to severe compression T4   vertebral body. Mild compression T5, T6, T7, T8, T9, T10, T11 and T12   vertebral bodies. Chronic moderate compression of T3 vertebral body with   focal right scoliosis centered at this level. Status post posterior   fusion T2-sacroiliac. The hardware is intact. Bony demineralization.    IMPRESSION:  CT head: No evidence of intracranial injury or skull fracture.    CT cervical spine: No fracture or dislocation of the cervical spine.    CT Chest abdomen pelvis:  No evidence of acute traumatic injury.  Multilevel thoracic and lumbar vertebral compression fractures are   difficult to definitively age though there are no visible fracture lines   or adjacent edema to suggest acute injury. Comparison with priors would   be helpful.  Dependent densities in the lower lungs most likely atelectasis given the   low lung volumes. Superimposed pneumonia less likely.      --- End of Report ---             ALEX MACIEL MD; Attending Radiologist  This document has been electronically signed. May 11 2023  5:55AM    < end of copied text >      ORT Score -   Family Hx of substance abuse	Female	      Male  Alcohol 	                                           1                     3  Illegal drugs	                                   2                     3  Rx drugs                                           4 	                  4  Personal Hx of substance abuse		  Alcohol 	                                          3	                  3  Illegal drugs                                     4	                  4  Rx drugs                                            5 	                  5  Age between 16- 45 years	           1                     1  hx preadolescent sexual abuse	   3 	                  0  Psychological disease		  ADD, OCD, bipolar, schizophrenia   2	          2  Depression                                           1 	          1  Total: 0    a score of 3 or lower indicates low risk for opioid abuse		  a score of 4-7 indicates moderate risk for opioid abuse		  a score of 8 or higher indicates high risk for opioid abuse    REVIEW OF SYSTEMS:  CONSTITUTIONAL: No fever + fatigue + [place]indstone   RESPIRATORY: No cough, wheezing, chills or hemoptysis; No shortness of breath  CARDIOVASCULAR: No chest pain, palpitations, dizziness, or leg swelling  GASTROINTESTINAL: No loss of appetite, decreased PO intake. + right upper abdominal pain. No nausea, vomiting; No diarrhea or constipation.   GENITOURINARY: No dysuria, frequency, hematuria, or incontinence +urinary retention   MUSCULOSKELETAL: + chronic back pain. No joint swelling; + generalized motor strength weakness, no saddle anesthesia, bowel/bladder incontinence, +falls   NEURO: No headaches, No numbness/tingling b/l LE    PHYSICAL EXAM:  GENERAL: + fatigued & Oriented X4, cooperative, NAD, Speech is clear. + [place]indstone + disheveled malodorous, poor oral hygiene   RESPIRATORY: Respirations even and unlabored. Diminished to auscultation bilaterally; No rales, rhonchi, wheezing, or rubs + O2 2L NC.   CARDIOVASCULAR: Normal S1/S2, regular rate and rhythm; No murmurs, rubs, or gallops. No JVD.   GENITOURINARY: Urinary catheter draining clear yellow urine. CBI clamped   GASTROINTESTINAL:  Soft, Nontender, Nondistended; Bowel sounds present  PERIPHERAL VASCULAR:  Extremities warm without edema. 2+ Peripheral Pulses, No cyanosis, No calf tenderness  MUSCULOSKELETAL: Motor Strength 4/5 B/L upper and 4/5 lower extremities; moves all extremities equally against gravity; ROM intact; negative SLR; + thoracic and lumbar back tenderness on palpation   SKIN: Warm, dry, intact. No rashes, lesions, scars or wounds.+ hyperpigmentation b/l lower extremities + multiple scabs over back.    Risk factors associated with adverse outcomes related to opioid treatment  [ ]  Concurrent benzodiazepine use  [ ]  History/ Active substance use or alcohol use disorder  [ ] Psychiatric co-morbidity  [ ] Sleep apnea  [ ] COPD  [ ] BMI> 35  [ ] Liver dysfunction  [ ] Renal dysfunction  [ ] CHF  [X] Former Smoker  [X ]  Age > 60 years    [X ]  [facility]  Reviewed and Copied to Chart. See below.    Plan of care and goal oriented pain management treatment options were discussed with patient and /or primary care giver; all questions and concerns were addressed and care was aligned with patient's wishes.    Educated patient on goal oriented pain management treatment options     05-24-23 @ 12:14   Source of information: TOM HERRERA, Chart review  Patient language: English  : n/a    HPI:  Patient is a 81 y/o M [place] [facility] who walks with a walker and has PMH of HTN, HLD, venous insufficiency, CAD, BPH, CHF who presented s/p mechanical fall. Patient reports that he was by his bed when he felt lightheaded and felt like he might pass out and had a fall. Patient denies hitting his head or any other bodily injury. Patient denies any loss of consciousness and reports he remembers the entire episode. Patient denies any pain at this time. Patient also reported that since being in the ED he is in a lot of discomfort as he has a very strong urge to urinate but he is not able to. Patient denies any history of fevers, chills, headache, prior lightheadedness, chest pain, palpitations, shortness of breath and cough (contradictory to ED note), nausea, vomiting, abdominal pain, diarrhea, constipation, melena, hematochezia, or other urinary symptoms.  (11 May 2023 09:20)    CTAP demonstrates- several thoracic and lumbar vertebral compression fractures but with no discrete fracture line or adjacent edema. Mild depression of the superior endplate of T1. Chronic moderate to severe compression T4 vertebral body. Mild compression T5, T6, T7, T8, T9, T10, T11 and T12 vertebral bodies. Chronic moderate compression of T3 vertebral body with focal right scoliosis centered at this level. Status post posterior fusion T2-sacroiliac. The hardware is intact. Bony demineralization.    CT head: No evidence of intracranial injury or skull fracture.    CT cervical spine: No fracture or dislocation of the cervical spine.    CT Chest abdomen pelvis:No evidence of acute traumatic injury. Multilevel thoracic and lumbar vertebral compression fractures are   difficult to definitively age though there are no visible fracture lines or adjacent edema to suggest acute injury. Comparison with priors would be helpful.  Dependent densities in the lower lungs most likely atelectasis given the low lung volumes. Superimposed pneumonia less likely.    Pt is admitted for hypoxemia, s/p fall.  CXR demonstrates- Continued elevation of right hemidiaphragm. Right lower lung linear atelectasis and possible trace right pleural effusion. Pain consulted for back pain, chronic on 5/15. Pt seen and examined at bedside. Pt laying in bed, reports lumbar back pain score 8/10  SCALE USED: (1-10 VNRS). Pt describes pain as dull, constant, non- radiating, alleviated by current pain medication, exacerbated by movement. Denies numbness/ tingling. Reports generalized weakness. Pt tolerating PO diet. Denies chest pain, SOB, nausea, vomiting, constipation. Last BM 5/23. Patient stated goal for pain control: to be able to take deep breaths, get out of bed to chair and ambulate with tolerable pain control. Per istop, pt on chronic oxycodone 20mg PO 4x/day, last filled 5/4.    PAST MEDICAL & SURGICAL HISTORY:  Hypertension      Hyperlipidemia      Venous insufficiency          FAMILY HISTORY:      Social History:  Patient reports he is a former smoker but he quit smoking 40 years ago.   Patient denies alcohol or illicit drug use. (11 May 2023 09:20)    Allergies    No Known Allergies    MEDICATIONS  (STANDING):  albuterol    90 MICROgram(s) HFA Inhaler 2 Puff(s) Inhalation every 6 hours  amLODIPine   Tablet 5 milliGRAM(s) Oral daily  aspirin  chewable 81 milliGRAM(s) Oral daily  atorvastatin 80 milliGRAM(s) Oral at bedtime  baclofen 10 milliGRAM(s) Oral every 12 hours  DULoxetine 60 milliGRAM(s) Oral daily  furosemide    Tablet 20 milliGRAM(s) Oral daily  metoprolol tartrate 25 milliGRAM(s) Oral two times a day  oxybutynin 5 milliGRAM(s) Oral every 8 hours  polyethylene glycol 3350 17 Gram(s) Oral daily  senna 2 Tablet(s) Oral at bedtime  sodium chloride 0.9% lock flush 3 milliLiter(s) IV Push every 8 hours  sodium chloride 0.9%. 1000 milliLiter(s) (80 mL/Hr) IV Continuous <Continuous>  tamsulosin 0.4 milliGRAM(s) Oral at bedtime    MEDICATIONS  (PRN):  acetaminophen     Tablet .. 1000 milliGRAM(s) Oral every 8 hours PRN Moderate Pain (4 - 6)  melatonin 3 milliGRAM(s) Oral at bedtime PRN Insomnia  ondansetron Injectable 4 milliGRAM(s) IV Push every 6 hours PRN Nausea and/or Vomiting  oxyCODONE    IR 15 milliGRAM(s) Oral every 6 hours PRN Severe Pain (7 - 10)      Vital Signs Last 24 Hrs  T(C): 36.2 (24 May 2023 05:13), Max: 36.3 (23 May 2023 19:41)  T(F): 97.2 (24 May 2023 05:13), Max: 97.3 (23 May 2023 19:41)  HR: 66 (24 May 2023 05:13) (57 - 88)  BP: 120/64 (24 May 2023 05:13) (108/62 - 120/64)  BP(mean): 77 (23 May 2023 17:01) (77 - 77)  RR: 18 (24 May 2023 05:13) (18 - 18)  SpO2: 97% (24 May 2023 05:13) (94% - 97%)    Parameters below as of 24 May 2023 05:13  Patient On (Oxygen Delivery Method): nasal cannula  O2 Flow (L/min): 2    [facility] (16 May 2023 09:14)    LABS: Reviewed                          12.4   7.85  )-----------( 203      ( 23 May 2023 05:40 )             40.9     05-24    139  |  109<H>  |  46<H>  ----------------------------<  103<H>  4.0   |  28  |  1.01    Ca    8.1<L>      24 May 2023 10:37    COVID[facility]19 [facility]: [facility] [place] (16 May 2023 09:14)    Radiology: Reviewed.     < from: Xray Chest 1 View- PORTABLE-Routine (Xray Chest 1 View- PORTABLE-Routine in AM.) (05.15.23 @ 09:33) >    ACC: [id number] EXAM:  XR CHEST PORTABLE ROUTINE 1V   ORDERED BY: TAVON ZULETA DATE:  05/15/2023          INTERPRETATION:  TIME OF EXAM: May 15, 2023 at 9:05 AM.    CLINICAL INFORMATION: Hypertension. Atelectasis.    COMPARISON:  APchest x-ray and CT scan of the chest from May 11, 2023.    TECHNIQUE:   AP Portable chest x-ray. Limited by rotation. Jewelry   projects over the left chest.    INTERPRETATION:    Heart size and the mediastinum cannot be accurately evaluated on this   projection.  Elevated right hemidiaphragm again seen.  There is right lower lung linear atelectasis with a possible trace right   pleural effusion.  The left lung is clear.  No left pleural effusion seen.  No pneumothorax.  Incompletely imaged orthopedic hardware projects over the thoracolumbar   spine.        IMPRESSION:  Limited by rotation.    Continued elevation of right hemidiaphragm.    Right lower lung linear atelectasis and possible trace right pleural   effusion.    --- End of Report ---            SKY GARZA MD; Attending Radiologist  This document has been electronically signed. May 16 2023  4:46PM    < end of copied text >    < from: CT Head No Cont (05.11.23 @ 05:14) >    ACC: [id number] EXAM:  CT ABDOMEN AND PELVIS IC   ORDERED BY: FADUMO SANTOS     ACC: [id number] EXAM:  CT CHEST IC   ORDERED BY: FADUMO SANTOS     ACC: [id number] EXAM:  CT BRAIN   ORDERED BY: FADUMO SANTOS     ACC: [id number] EXAM:  CT CERVICALSPINE   ORDERED BY: FADUMO SANTOS     PROCEDURE DATE:  05/11/2023          INTERPRETATION:  CLINICAL INFORMATION: Back pain after fall. CHF,   hypertension, shortness of breath.    COMPARISON: Chest radiograph same date.    CONTRAST/COMPLICATIONS:  90 cc Omnipaque 350 administered intravenously for the chest abdomen   pelvis.    PROCEDURE:  CT head and cervical spine without intravenous contrast.  CT of the Chest, Abdomen and Pelvis with intravenous contrast.  Sagittal and coronal reformats were performed.    FINDINGS:    HEAD:  BRAIN: No apparent acute infarct, hemmorhage or mass. No hydrocephalus.   Chronic appearing white matter hypodensities and volume loss.  CALVARIUM: No skull fracture or agreesive osseous legions.  EXTRACRANIAL SOFTTISSUES: No acute findings.  VISUALIZED PORTIONS OF THE PARANASAL SINUSES AND MASTOID AIR CELLS: No   air fluid levels.    CERVICAL SPINE:  VERTEBRAE: No fracture or dislocation. Exaggeration of cervical lordosis.   Mild degenerative retrolisthesis of C3 on C4 and mild degenerative   anterolisthesis of C6 on C7 and C7 on T1.  SPINAL CANAL AND FORAMINA: Multilevel degenerative changes with no   apparent high grade spinal canal narrowing.  PARASPINAL SOFT TISSUES: No paravertebral hematoma or acutefinding.    CHEST:  LUNGS AND LARGE AIRWAYS: Low lung volumes with right greater than left   basilar opacities and mild elevation of the right hemidiaphragm. No   pulmonary nodules.  PLEURA: No pleural effusion or pneumothorax.  VESSELS: No evidence of injury to the heart and great vessels. No   thoracic aortic dissection or aneurysm.  HEART: Mild four-chamber cardiomegaly. No pericardial effusion.  MEDIASTINUM AND LUIS ALBERTO: No lymphadenopathy.  CHEST WALL AND LOWER NECK: Within normal limits.    ABDOMEN AND PELVIS:  LIVER: Within normal limits.  BILE DUCTS: Normal caliber.  GALLBLADDER: Within normal limits.  SPLEEN: No evidence of splenic injury or concerning finding. Small cystic   lesion upper spleen.  PANCREAS: Within normal limits.  ADRENALS: Within normal limits.  KIDNEYS/URETERS: No evidence of renal injury. No hydronephrosis or renal   stone or concerning mass. Small cysts bilaterally.    BLADDER: Within normal limits.  REPRODUCTIVE ORGANS: Radiation seeds in the prostate.    BOWEL: No bowel obstruction. Appendix is not visualized. No evidence of   inflammation in the pericecal region.  PERITONEUM: No ascites.  VESSELS: No abdominal aortic aneurysm. Calcific atherosclerosis   particularly of the distal abdominal aorta and iliac arteries.  RETROPERITONEUM/LYMPH NODES: No lymphadenopathy.  ABDOMINAL WALL: Large right lower quadrant anterior abdominal wall hernia   extending into the right inguinal region, not fully visible, contains the   cecum, much of the ascending colon, anddistal ileum. No evidence of   obstruction or other acute complication regarding this hernia.  BONES: Several thoracic and lumbar vertebral compression fractures but   with no discrete fracture line or adjacent edema. Mild depression of the   superior endplate of T1. Chronic moderate to severe compression T4   vertebral body. Mild compression T5, T6, T7, T8, T9, T10, T11 and T12   vertebral bodies. Chronic moderate compression of T3 vertebral body with   focal right scoliosis centered at this level. Status post posterior   fusion T2-sacroiliac. The hardware is intact. Bony demineralization.    IMPRESSION:  CT head: No evidence of intracranial injury or skull fracture.    CT cervical spine: No fracture or dislocation of the cervical spine.    CT Chest abdomen pelvis:  No evidence of acute traumatic injury.  Multilevel thoracic and lumbar vertebral compression fractures are   difficult to definitively age though there are no visible fracture lines   or adjacent edema to suggest acute injury. Comparison with priors would   be helpful.  Dependent densities in the lower lungs most likely atelectasis given the   low lung volumes. Superimposed pneumonia less likely.      --- End of Report ---             ALEX MACIEL MD; Attending Radiologist  This document has been electronically signed. May 11 2023  5:55AM    < end of copied text >    < from: CT Abdomen and Pelvis w/ IV Cont (05.11.23 @ 05:13) >    ACC: [id number] EXAM:  CT ABDOMEN AND PELVIS IC   ORDERED BY: FADUMO SANTOS     ACC: [id number] EXAM:  CT CHEST IC   ORDERED BY: FADUMO HIDALGOAP     ACC: [id number] EXAM:  CT BRAIN   ORDERED BY: FADUMO HIDALGOAP     ACC: [id number] EXAM:  CT CERVICALSPINE   ORDERED BY: FADUMO SANTOS     PROCEDURE DATE:  05/11/2023          INTERPRETATION:  CLINICAL INFORMATION: Back pain after fall. CHF,   hypertension, shortness of breath.    COMPARISON: Chest radiograph same date.    CONTRAST/COMPLICATIONS:  90 cc Omnipaque 350 administered intravenously for the chest abdomen   pelvis.    PROCEDURE:  CT head and cervical spine without intravenous contrast.  CT of the Chest, Abdomen and Pelvis with intravenous contrast.  Sagittal and coronal reformats were performed.    FINDINGS:    HEAD:  BRAIN: No apparent acute infarct, hemmorhage or mass. No hydrocephalus.   Chronic appearing white matter hypodensities and volume loss.  CALVARIUM: No skull fracture or agreesive osseous legions.  EXTRACRANIAL SOFTTISSUES: No acute findings.  VISUALIZED PORTIONS OF THE PARANASAL SINUSES AND MASTOID AIR CELLS: No   air fluid levels.    CERVICAL SPINE:  VERTEBRAE: No fracture or dislocation. Exaggeration of cervical lordosis.   Mild degenerative retrolisthesis of C3 on C4 and mild degenerative   anterolisthesis of C6 on C7 and C7 on T1.  SPINAL CANAL AND FORAMINA: Multilevel degenerative changes with no   apparent high grade spinal canal narrowing.  PARASPINAL SOFT TISSUES: No paravertebral hematoma or acutefinding.    CHEST:  LUNGS AND LARGE AIRWAYS: Low lung volumes with right greater than left   basilar opacities and mild elevation of the right hemidiaphragm. No   pulmonary nodules.  PLEURA: No pleural effusion or pneumothorax.  VESSELS: No evidence of injury to the heart and great vessels. No   thoracic aortic dissection or aneurysm.  HEART: Mild four-chamber cardiomegaly. No pericardial effusion.  MEDIASTINUM AND LUIS ALBERTO: No lymphadenopathy.  CHEST WALL AND LOWER NECK: Within normal limits.    ABDOMEN AND PELVIS:  LIVER: Within normal limits.  BILE DUCTS: Normal caliber.  GALLBLADDER: Within normal limits.  SPLEEN: No evidence of splenic injury or concerning finding. Small cystic   lesion upper spleen.  PANCREAS: Within normal limits.  ADRENALS: Within normal limits.  KIDNEYS/URETERS: No evidence of renal injury. No hydronephrosis or renal   stone or concerning mass. Small cysts bilaterally.    BLADDER: Within normal limits.  REPRODUCTIVE ORGANS: Radiation seeds in the prostate.    BOWEL: No bowel obstruction. Appendix is not visualized. No evidence of   inflammation in the pericecal region.  PERITONEUM: No ascites.  VESSELS: No abdominal aortic aneurysm. Calcific atherosclerosis   particularly of the distal abdominal aorta and iliac arteries.  RETROPERITONEUM/LYMPH NODES: No lymphadenopathy.  ABDOMINAL WALL: Large right lower quadrant anterior abdominal wall hernia   extending into the right inguinal region, not fully visible, contains the   cecum, much of the ascending colon, anddistal ileum. No evidence of   obstruction or other acute complication regarding this hernia.  BONES: Several thoracic and lumbar vertebral compression fractures but   with no discrete fracture line or adjacent edema. Mild depression of the   superior endplate of T1. Chronic moderate to severe compression T4   vertebral body. Mild compression T5, T6, T7, T8, T9, T10, T11 and T12   vertebral bodies. Chronic moderate compression of T3 vertebral body with   focal right scoliosis centered at this level. Status post posterior   fusion T2-sacroiliac. The hardware is intact. Bony demineralization.    IMPRESSION:  CT head: No evidence of intracranial injury or skull fracture.    CT cervical spine: No fracture or dislocation of the cervical spine.    CT Chest abdomen pelvis:  No evidence of acute traumatic injury.  Multilevel thoracic and lumbar vertebral compression fractures are   difficult to definitively age though there are no visible fracture lines   or adjacent edema to suggest acute injury. Comparison with priors would   be helpful.  Dependent densities in the lower lungs most likely atelectasis given the   low lung volumes. Superimposed pneumonia less likely.      --- End of Report ---             ALEX MACIEL MD; Attending Radiologist  This document has been electronically signed. May 11 2023  5:55AM    < end of copied text >      ORT Score -   Family Hx of substance abuse	Female	      Male  Alcohol 	                                           1                     3  Illegal drugs	                                   2                     3  Rx drugs                                           4 	                  4  Personal Hx of substance abuse		  Alcohol 	                                          3	                  3  Illegal drugs                                     4	                  4  Rx drugs                                            5 	                  5  Age between 16- 45 years	           1                     1  hx preadolescent sexual abuse	   3 	                  0  Psychological disease		  ADD, OCD, bipolar, schizophrenia   2	          2  Depression                                           1 	          1  Total: 0    a score of 3 or lower indicates low risk for opioid abuse		  a score of 4-7 indicates moderate risk for opioid abuse		  a score of 8 or higher indicates high risk for opioid abuse    REVIEW OF SYSTEMS:  CONSTITUTIONAL: No fever + fatigue + [place]   RESPIRATORY: No cough, wheezing, chills or hemoptysis; No shortness of breath  CARDIOVASCULAR: No chest pain, palpitations, dizziness, or leg swelling  GASTROINTESTINAL: No loss of appetite, decreased PO intake. + right upper abdominal pain. No nausea, vomiting; No diarrhea or constipation.   GENITOURINARY: No dysuria, frequency, hematuria, or incontinence +urinary retention   MUSCULOSKELETAL: + chronic back pain. No joint swelling; + generalized motor strength weakness, no saddle anesthesia, bowel/bladder incontinence, +falls   NEURO: No headaches, No numbness/tingling b/l LE    PHYSICAL EXAM:  GENERAL: + fatigued & Oriented X3, cooperative, NAD, Speech is clear. + [place] + disheveled malodorous, poor oral hygiene   RESPIRATORY: Respirations even and unlabored. Diminished to auscultation bilaterally; No rales, rhonchi, wheezing, or rubs + O2 2L NC.   CARDIOVASCULAR: Normal S1/S2, regular rate and rhythm; No murmurs, rubs, or gallops. No JVD.   GENITOURINARY: Urinary catheter draining clear yellow urine. CBI clamped   GASTROINTESTINAL:  Soft, Nontender, Nondistended; Bowel sounds present  PERIPHERAL VASCULAR:  Extremities warm without edema. 2+ Peripheral Pulses, No cyanosis, No calf tenderness  MUSCULOSKELETAL: Motor Strength 4/5 B/L upper and 4/5 lower extremities; moves all extremities equally against gravity; ROM intact; negative SLR; + thoracic and lumbar back tenderness on palpation   SKIN: Warm, dry, intact. No rashes, lesions, scars or wounds.+ hyperpigmentation b/l lower extremities + multiple scabs over back.    Risk factors associated with adverse outcomes related to opioid treatment  [ ]  Concurrent benzodiazepine use  [ ]  History/ Active substance use or alcohol use disorder  [ ] Psychiatric co-morbidity  [ ] Sleep apnea  [ ] COPD  [ ] BMI> 35  [ ] Liver dysfunction  [ ] Renal dysfunction  [ ] CHF  [X] Former Smoker  [X ]  Age > 60 years    [X ]  [place]  Reviewed and Copied to Chart. See below.    Plan of care and goal oriented pain management treatment options were discussed with patient and /or primary care giver; all questions and concerns were addressed and care was aligned with patient's wishes.    Educated patient on goal oriented pain management treatment options     05-24-23 @ 12:14   Source of information: TOM HERRERA, Chart review  Patient language: English  : n/a    HPI:  Patient is a 81 y/o M from [facility] who walks with a walker and has PMH of HTN, HLD, venous insufficiency, CAD, BPH, CHF who presented s/p mechanical fall. Patient reports that he was by his bed when he felt lightheaded and felt like he might pass out and had a fall. Patient denies hitting his head or any other bodily injury. Patient denies any loss of consciousness and reports he remembers the entire episode. Patient denies any pain at this time. Patient also reported that since being in the ED he is in a lot of discomfort as he has a very strong urge to urinate but he is not able to. Patient denies any history of fevers, chills, headache, prior lightheadedness, chest pain, palpitations, shortness of breath and cough (contradictory to ED note), nausea, vomiting, abdominal pain, diarrhea, constipation, melena, hematochezia, or other urinary symptoms.  (11 May 2023 09:20)    CTAP demonstrates- several thoracic and lumbar vertebral compression fractures but with no discrete fracture line or adjacent edema. Mild depression of the superior endplate of T1. Chronic moderate to severe compression T4 vertebral body. Mild compression T5, T6, T7, T8, T9, T10, T11 and T12 vertebral bodies. Chronic moderate compression of T3 vertebral body with focal right scoliosis centered at this level. Status post posterior fusion T2-sacroiliac. The hardware is intact. Bony demineralization.    CT head: No evidence of intracranial injury or skull fracture.    CT cervical spine: No fracture or dislocation of the cervical spine.    CT Chest abdomen pelvis:No evidence of acute traumatic injury. Multilevel thoracic and lumbar vertebral compression fractures are   difficult to definitively age though there are no visible fracture lines or adjacent edema to suggest acute injury. Comparison with priors would be helpful.  Dependent densities in the lower lungs most likely atelectasis given the low lung volumes. Superimposed pneumonia less likely.    Pt is admitted for hypoxemia, s/p fall.  CXR demonstrates- Continued elevation of right hemidiaphragm. Right lower lung linear atelectasis and possible trace right pleural effusion. Pain consulted for back pain, chronic on 5/15. Pt seen and examined at bedside. Pt laying in bed, reports lumbar back pain score 8/10  SCALE USED: (1-10 VNRS). Pt describes pain as dull, constant, non- radiating, alleviated by current pain medication, exacerbated by movement. Denies numbness/ tingling. Reports generalized weakness. Pt tolerating PO diet. Denies chest pain, SOB, nausea, vomiting, constipation. Last BM 5/23. Patient stated goal for pain control: to be able to take deep breaths, get out of bed to chair and ambulate with tolerable pain control. Per [facility], pt on chronic oxycodone 20mg PO 4x/day, last filled 5/4.    PAST MEDICAL & SURGICAL HISTORY:  Hypertension      Hyperlipidemia      Venous insufficiency          FAMILY HISTORY:      Social History:  Patient reports he is a former smoker but he quit smoking 40 years ago.   Patient denies alcohol or illicit drug use. (11 May 2023 09:20)    Allergies    No Known Allergies    MEDICATIONS  (STANDING):  albuterol    90 MICROgram(s) HFA Inhaler 2 Puff(s) Inhalation every 6 hours  amLODIPine   Tablet 5 milliGRAM(s) Oral daily  aspirin  chewable 81 milliGRAM(s) Oral daily  atorvastatin 80 milliGRAM(s) Oral at bedtime  baclofen 10 milliGRAM(s) Oral every 12 hours  DULoxetine 60 milliGRAM(s) Oral daily  furosemide    Tablet 20 milliGRAM(s) Oral daily  metoprolol tartrate 25 milliGRAM(s) Oral two times a day  oxybutynin 5 milliGRAM(s) Oral every 8 hours  polyethylene glycol 3350 17 Gram(s) Oral daily  senna 2 Tablet(s) Oral at bedtime  sodium chloride 0.9% lock flush 3 milliLiter(s) IV Push every 8 hours  sodium chloride 0.9%. 1000 milliLiter(s) (80 mL/Hr) IV Continuous <Continuous>  tamsulosin 0.4 milliGRAM(s) Oral at bedtime    MEDICATIONS  (PRN):  acetaminophen     Tablet .. 1000 milliGRAM(s) Oral every 8 hours PRN Moderate Pain (4 - 6)  melatonin 3 milliGRAM(s) Oral at bedtime PRN Insomnia  ondansetron Injectable 4 milliGRAM(s) IV Push every 6 hours PRN Nausea and/or Vomiting  oxyCODONE    IR 15 milliGRAM(s) Oral every 6 hours PRN Severe Pain (7 - 10)      Vital Signs Last 24 Hrs  T(C): 36.2 (24 May 2023 05:13), Max: 36.3 (23 May 2023 19:41)  T(F): 97.2 (24 May 2023 05:13), Max: 97.3 (23 May 2023 19:41)  HR: 66 (24 May 2023 05:13) (57 - 88)  BP: 120/64 (24 May 2023 05:13) (108/62 - 120/64)  BP(mean): 77 (23 May 2023 17:01) (77 - 77)  RR: 18 (24 May 2023 05:13) (18 - 18)  SpO2: 97% (24 May 2023 05:13) (94% - 97%)    Parameters below as of 24 May 2023 05:13  Patient On (Oxygen Delivery Method): nasal cannula  O2 Flow (L/min): 2    [facility] (16 May 2023 09:14)    LABS: Reviewed                          12.4   7.85  )-----------( 203      ( 23 May 2023 05:40 )             40.9     05-24    139  |  109<H>  |  46<H>  ----------------------------<  103<H>  4.0   |  28  |  1.01    Ca    8.1<L>      24 May 2023 10:37    COVID-19 PCR: [facility] (16 May 2023 09:14)    Radiology: Reviewed.     < from: Xray Chest 1 View- PORTABLE-Routine (Xray Chest 1 View- PORTABLE-Routine in AM.) (05.15.23 @ 09:33) >    ACC: [id number] EXAM:  XR CHEST PORTABLE ROUTINE 1V   ORDERED BY: TAVON ZULETA DATE:  05/15/2023          INTERPRETATION:  TIME OF EXAM: May 15, 2023 at 9:05 AM.    CLINICAL INFORMATION: Hypertension. Atelectasis.    COMPARISON:  APchest x-ray and CT scan of the chest from May 11, 2023.    TECHNIQUE:   AP Portable chest x-ray. Limited by rotation. Jewelry   projects over the left chest.    INTERPRETATION:    Heart size and the mediastinum cannot be accurately evaluated on this   projection.  Elevated right hemidiaphragm again seen.  There is right lower lung linear atelectasis with a possible trace right   pleural effusion.  The left lung is clear.  No left pleural effusion seen.  No pneumothorax.  Incompletely imaged orthopedic hardware projects over the thoracolumbar   spine.        IMPRESSION:  Limited by rotation.    Continued elevation of right hemidiaphragm.    Right lower lung linear atelectasis and possible trace right pleural   effusion.    --- End of Report ---            SKY GARZA MD; Attending Radiologist  This document has been electronically signed. May 16 2023  4:46PM    < end of copied text >    < from: CT Head No Cont (05.11.23 @ 05:14) >    ACC: [id number] EXAM:  CT ABDOMEN AND PELVIS IC   ORDERED BY: FADUMO SANTOS     ACC: [id number] EXAM:  CT CHEST IC   ORDERED BY: FADUMO SANTOS     ACC: [id number] EXAM:  CT BRAIN   ORDERED BY: FADUMO SANTOS     ACC: [id number] EXAM:  CT CERVICALSPINE   ORDERED BY: FADUMO SANTOS     PROCEDURE DATE:  05/11/2023          INTERPRETATION:  CLINICAL INFORMATION: Back pain after fall. CHF,   hypertension, shortness of breath.    COMPARISON: Chest radiograph same date.    CONTRAST/COMPLICATIONS:  90 cc Omnipaque 350 administered intravenously for the chest abdomen   pelvis.    PROCEDURE:  CT head and cervical spine without intravenous contrast.  CT of the Chest, Abdomen and Pelvis with intravenous contrast.  Sagittal and coronal reformats were performed.    FINDINGS:    HEAD:  BRAIN: No apparent acute infarct, hemmorhage or mass. No hydrocephalus.   Chronic appearing white matter hypodensities and volume loss.  CALVARIUM: No skull fracture or agreesive osseous legions.  EXTRACRANIAL SOFTTISSUES: No acute findings.  VISUALIZED PORTIONS OF THE PARANASAL SINUSES AND MASTOID AIR CELLS: No   air fluid levels.    CERVICAL SPINE:  VERTEBRAE: No fracture or dislocation. Exaggeration of cervical lordosis.   Mild degenerative retrolisthesis of C3 on C4 and mild degenerative   anterolisthesis of C6 on C7 and C7 on T1.  SPINAL CANAL AND FORAMINA: Multilevel degenerative changes with no   apparent high grade spinal canal narrowing.  PARASPINAL SOFT TISSUES: No paravertebral hematoma or acutefinding.    CHEST:  LUNGS AND LARGE AIRWAYS: Low lung volumes with right greater than left   basilar opacities and mild elevation of the right hemidiaphragm. No   pulmonary nodules.  PLEURA: No pleural effusion or pneumothorax.  VESSELS: No evidence of injury to the heart and great vessels. No   thoracic aortic dissection or aneurysm.  HEART: Mild four-chamber cardiomegaly. No pericardial effusion.  MEDIASTINUM AND LUIS ALBERTO: No lymphadenopathy.  CHEST WALL AND LOWER NECK: Within normal limits.    ABDOMEN AND PELVIS:  LIVER: Within normal limits.  BILE DUCTS: Normal caliber.  GALLBLADDER: Within normal limits.  SPLEEN: No evidence of splenic injury or concerning finding. Small cystic   lesion upper spleen.  PANCREAS: Within normal limits.  ADRENALS: Within normal limits.  KIDNEYS/URETERS: No evidence of renal injury. No hydronephrosis or renal   stone or concerning mass. Small cysts bilaterally.    BLADDER: Within normal limits.  REPRODUCTIVE ORGANS: Radiation seeds in the prostate.    BOWEL: No bowel obstruction. Appendix is not visualized. No evidence of   inflammation in the pericecal region.  PERITONEUM: No ascites.  VESSELS: No abdominal aortic aneurysm. Calcific atherosclerosis   particularly of the distal abdominal aorta and iliac arteries.  RETROPERITONEUM/LYMPH NODES: No lymphadenopathy.  ABDOMINAL WALL: Large right lower quadrant anterior abdominal wall hernia   extending into the right inguinal region, not fully visible, contains the   cecum, much of the ascending colon, anddistal ileum. No evidence of   obstruction or other acute complication regarding this hernia.  BONES: Several thoracic and lumbar vertebral compression fractures but   with no discrete fracture line or adjacent edema. Mild depression of the   superior endplate of T1. Chronic moderate to severe compression T4   vertebral body. Mild compression T5, T6, T7, T8, T9, T10, T11 and T12   vertebral bodies. Chronic moderate compression of T3 vertebral body with   focal right scoliosis centered at this level. Status post posterior   fusion T2-sacroiliac. The hardware is intact. Bony demineralization.    IMPRESSION:  CT head: No evidence of intracranial injury or skull fracture.    CT cervical spine: No fracture or dislocation of the cervical spine.    CT Chest abdomen pelvis:  No evidence of acute traumatic injury.  Multilevel thoracic and lumbar vertebral compression fractures are   difficult to definitively age though there are no visible fracture lines   or adjacent edema to suggest acute injury. Comparison with priors would   be helpful.  Dependent densities in the lower lungs most likely atelectasis given the   low lung volumes. Superimposed pneumonia less likely.      --- End of Report ---             ALEX MACIEL MD; Attending Radiologist  This document has been electronically signed. May 11 2023  5:55AM    < end of copied text >    < from: CT Abdomen and Pelvis w/ IV Cont (05.11.23 @ 05:13) >    ACC: [id number] EXAM:  CT ABDOMEN AND PELVIS IC   ORDERED BY: FADUMO SANTOS     ACC: [id number] EXAM:  CT CHEST IC   ORDERED BY: FADUMO HIDALGOAP     ACC: [id number] EXAM:  CT BRAIN   ORDERED BY: FADUMO HIDALGOAP     ACC: [id number] EXAM:  CT CERVICALSPINE   ORDERED BY: FADUMO SANTOS     PROCEDURE DATE:  05/11/2023          INTERPRETATION:  CLINICAL INFORMATION: Back pain after fall. CHF,   hypertension, shortness of breath.    COMPARISON: Chest radiograph same date.    CONTRAST/COMPLICATIONS:  90 cc Omnipaque 350 administered intravenously for the chest abdomen   pelvis.    PROCEDURE:  CT head and cervical spine without intravenous contrast.  CT of the Chest, Abdomen and Pelvis with intravenous contrast.  Sagittal and coronal reformats were performed.    FINDINGS:    HEAD:  BRAIN: No apparent acute infarct, hemmorhage or mass. No hydrocephalus.   Chronic appearing white matter hypodensities and volume loss.  CALVARIUM: No skull fracture or agreesive osseous legions.  EXTRACRANIAL SOFTTISSUES: No acute findings.  VISUALIZED PORTIONS OF THE PARANASAL SINUSES AND MASTOID AIR CELLS: No   air fluid levels.    CERVICAL SPINE:  VERTEBRAE: No fracture or dislocation. Exaggeration of cervical lordosis.   Mild degenerative retrolisthesis of C3 on C4 and mild degenerative   anterolisthesis of C6 on C7 and C7 on T1.  SPINAL CANAL AND FORAMINA: Multilevel degenerative changes with no   apparent high grade spinal canal narrowing.  PARASPINAL SOFT TISSUES: No paravertebral hematoma or acutefinding.    CHEST:  LUNGS AND LARGE AIRWAYS: Low lung volumes with right greater than left   basilar opacities and mild elevation of the right hemidiaphragm. No   pulmonary nodules.  PLEURA: No pleural effusion or pneumothorax.  VESSELS: No evidence of injury to the heart and great vessels. No   thoracic aortic dissection or aneurysm.  HEART: Mild four-chamber cardiomegaly. No pericardial effusion.  MEDIASTINUM AND LUIS ALBERTO: No lymphadenopathy.  CHEST WALL AND LOWER NECK: Within normal limits.    ABDOMEN AND PELVIS:  LIVER: Within normal limits.  BILE DUCTS: Normal caliber.  GALLBLADDER: Within normal limits.  SPLEEN: No evidence of splenic injury or concerning finding. Small cystic   lesion upper spleen.  PANCREAS: Within normal limits.  ADRENALS: Within normal limits.  KIDNEYS/URETERS: No evidence of renal injury. No hydronephrosis or renal   stone or concerning mass. Small cysts bilaterally.    BLADDER: Within normal limits.  REPRODUCTIVE ORGANS: Radiation seeds in the prostate.    BOWEL: No bowel obstruction. Appendix is not visualized. No evidence of   inflammation in the pericecal region.  PERITONEUM: No ascites.  VESSELS: No abdominal aortic aneurysm. Calcific atherosclerosis   particularly of the distal abdominal aorta and iliac arteries.  RETROPERITONEUM/LYMPH NODES: No lymphadenopathy.  ABDOMINAL WALL: Large right lower quadrant anterior abdominal wall hernia   extending into the right inguinal region, not fully visible, contains the   cecum, much of the ascending colon, anddistal ileum. No evidence of   obstruction or other acute complication regarding this hernia.  BONES: Several thoracic and lumbar vertebral compression fractures but   with no discrete fracture line or adjacent edema. Mild depression of the   superior endplate of T1. Chronic moderate to severe compression T4   vertebral body. Mild compression T5, T6, T7, T8, T9, T10, T11 and T12   vertebral bodies. Chronic moderate compression of T3 vertebral body with   focal right scoliosis centered at this level. Status post posterior   fusion T2-sacroiliac. The hardware is intact. Bony demineralization.    IMPRESSION:  CT head: No evidence of intracranial injury or skull fracture.    CT cervical spine: No fracture or dislocation of the cervical spine.    CT Chest abdomen pelvis:  No evidence of acute traumatic injury.  Multilevel thoracic and lumbar vertebral compression fractures are   difficult to definitively age though there are no visible fracture lines   or adjacent edema to suggest acute injury. Comparison with priors would   be helpful.  Dependent densities in the lower lungs most likely atelectasis given the   low lung volumes. Superimposed pneumonia less likely.      --- End of Report ---             ALEX MACIEL MD; Attending Radiologist  This document has been electronically signed. May 11 2023  5:55AM    < end of copied text >      ORT Score -   Family Hx of substance abuse	Female	      Male  Alcohol 	                                           1                     3  Illegal drugs	                                   2                     3  Rx drugs                                           4 	                  4  Personal Hx of substance abuse		  Alcohol 	                                          3	                  3  Illegal drugs                                     4	                  4  Rx drugs                                            5 	                  5  Age between 16- 45 years	           1                     1  hx preadolescent sexual abuse	   3 	                  0  Psychological disease		  ADD, OCD, bipolar, schizophrenia   2	          2  Depression                                           1 	          1  Total: 0    a score of 3 or lower indicates low risk for opioid abuse		  a score of 4-7 indicates moderate risk for opioid abuse		  a score of 8 or higher indicates high risk for opioid abuse    REVIEW OF SYSTEMS:  CONSTITUTIONAL: No fever + fatigue + [place]   RESPIRATORY: No cough, wheezing, chills or hemoptysis; No shortness of breath  CARDIOVASCULAR: No chest pain, palpitations, dizziness, or leg swelling  GASTROINTESTINAL: No loss of appetite, decreased PO intake. No nausea, vomiting; No diarrhea or constipation.   GENITOURINARY: No dysuria, frequency, hematuria, or incontinence +urinary retention   MUSCULOSKELETAL: + chronic back pain. No joint swelling; + generalized motor strength weakness, no saddle anesthesia, bowel/bladder incontinence, +falls   NEURO: No headaches, No numbness/tingling b/l LE    PHYSICAL EXAM:  GENERAL: + fatigued & Oriented X3, cooperative, NAD, Speech is clear. + [place] + disheveled malodorous, poor oral hygiene   RESPIRATORY: Respirations even and unlabored. Diminished to auscultation bilaterally; No rales, rhonchi, wheezing, or rubs + O2 2L NC.   CARDIOVASCULAR: Normal S1/S2, regular rate and rhythm; No murmurs, rubs, or gallops. No JVD.   GENITOURINARY: Urinary catheter draining clear yellow urine. CBI clamped   GASTROINTESTINAL:  Soft, Nontender, Nondistended; Bowel sounds present  PERIPHERAL VASCULAR:  Extremities warm without edema. 2+ Peripheral Pulses, No cyanosis, No calf tenderness  MUSCULOSKELETAL: Motor Strength 4/5 B/L upper and 4/5 lower extremities; moves all extremities equally against gravity; ROM intact; negative SLR; + thoracic and lumbar back tenderness on palpation   SKIN: Warm, dry, intact. No rashes, lesions, scars or wounds.+ hyperpigmentation b/l lower extremities + multiple scabs over back.    Risk factors associated with adverse outcomes related to opioid treatment  [ ]  Concurrent benzodiazepine use  [ ]  History/ Active substance use or alcohol use disorder  [ ] Psychiatric co-morbidity  [ ] Sleep apnea  [ ] COPD  [ ] BMI> 35  [ ] Liver dysfunction  [ ] Renal dysfunction  [ ] CHF  [X] Former Smoker  [X ]  Age > 60 years    [X ]  [facility]  Reviewed and Copied to Chart. See below.    Plan of care and goal oriented pain management treatment options were discussed with patient and /or primary care giver; all questions and concerns were addressed and care was aligned with patient's wishes.    Educated patient on goal oriented pain management treatment options     05-24-23 @ 12:14

## 2023-05-24 NOTE — PROGRESS NOTE ADULT - PROBLEM SELECTOR PLAN 6
home meds Oxycodone 20 TID and oxycodone 10 OD.   c/w pain regimen per Pain Mgmt: oxycodone 15 mg PO q 6 hours PRN severe pain.   Continue home dose baclofen 10mg PO BID.   -- Continue home dose cymbalta 60mg PO daily.   -- Continue Lidoderm 4% patch 2 patches daily.   --Continue bowel regimen   --Pain Mgmt team onboard

## 2023-05-25 LAB
ANION GAP SERPL CALC-SCNC: 5 MMOL/L — SIGNIFICANT CHANGE UP (ref 5–17)
BUN SERPL-MCNC: 31 MG/DL — HIGH (ref 7–18)
CALCIUM SERPL-MCNC: 8.3 MG/DL — LOW (ref 8.4–10.5)
CHLORIDE SERPL-SCNC: 112 MMOL/L — HIGH (ref 96–108)
CO2 SERPL-SCNC: 24 MMOL/L — SIGNIFICANT CHANGE UP (ref 22–31)
CREAT SERPL-MCNC: 0.79 MG/DL — SIGNIFICANT CHANGE UP (ref 0.5–1.3)
EGFR: 88 ML/MIN/1.73M2 — SIGNIFICANT CHANGE UP
GLUCOSE SERPL-MCNC: 102 MG/DL — HIGH (ref 70–99)
POTASSIUM SERPL-MCNC: 4.8 MMOL/L — SIGNIFICANT CHANGE UP (ref 3.5–5.3)
POTASSIUM SERPL-SCNC: 4.8 MMOL/L — SIGNIFICANT CHANGE UP (ref 3.5–5.3)
SARS-COV-2 RNA SPEC QL NAA+PROBE: SIGNIFICANT CHANGE UP
SODIUM SERPL-SCNC: 141 MMOL/L — SIGNIFICANT CHANGE UP (ref 135–145)

## 2023-05-25 PROCEDURE — 99232 SBSQ HOSP IP/OBS MODERATE 35: CPT

## 2023-05-25 RX ORDER — OXYCODONE HYDROCHLORIDE 5 MG/1
15 TABLET ORAL EVERY 6 HOURS
Refills: 0 | Status: DISCONTINUED | OUTPATIENT
Start: 2023-05-25 | End: 2023-05-26

## 2023-05-25 RX ORDER — OXYCODONE HYDROCHLORIDE 5 MG/1
5 TABLET ORAL ONCE
Refills: 0 | Status: DISCONTINUED | OUTPATIENT
Start: 2023-05-25 | End: 2023-05-25

## 2023-05-25 RX ORDER — OXYCODONE HYDROCHLORIDE 5 MG/1
10 TABLET ORAL EVERY 4 HOURS
Refills: 0 | Status: DISCONTINUED | OUTPATIENT
Start: 2023-05-25 | End: 2023-05-25

## 2023-05-25 RX ADMIN — Medication 20 MILLIGRAM(S): at 05:56

## 2023-05-25 RX ADMIN — ATORVASTATIN CALCIUM 80 MILLIGRAM(S): 80 TABLET, FILM COATED ORAL at 21:39

## 2023-05-25 RX ADMIN — OXYCODONE HYDROCHLORIDE 10 MILLIGRAM(S): 5 TABLET ORAL at 16:18

## 2023-05-25 RX ADMIN — POLYETHYLENE GLYCOL 3350 17 GRAM(S): 17 POWDER, FOR SOLUTION ORAL at 11:05

## 2023-05-25 RX ADMIN — Medication 10 MILLIGRAM(S): at 17:17

## 2023-05-25 RX ADMIN — DULOXETINE HYDROCHLORIDE 60 MILLIGRAM(S): 30 CAPSULE, DELAYED RELEASE ORAL at 11:05

## 2023-05-25 RX ADMIN — Medication 5 MILLIGRAM(S): at 21:39

## 2023-05-25 RX ADMIN — OXYCODONE HYDROCHLORIDE 15 MILLIGRAM(S): 5 TABLET ORAL at 06:50

## 2023-05-25 RX ADMIN — Medication 81 MILLIGRAM(S): at 11:04

## 2023-05-25 RX ADMIN — SODIUM CHLORIDE 3 MILLILITER(S): 9 INJECTION INTRAMUSCULAR; INTRAVENOUS; SUBCUTANEOUS at 13:13

## 2023-05-25 RX ADMIN — OXYCODONE HYDROCHLORIDE 10 MILLIGRAM(S): 5 TABLET ORAL at 16:48

## 2023-05-25 RX ADMIN — AMLODIPINE BESYLATE 5 MILLIGRAM(S): 2.5 TABLET ORAL at 05:56

## 2023-05-25 RX ADMIN — Medication 25 MILLIGRAM(S): at 05:56

## 2023-05-25 RX ADMIN — Medication 10 MILLIGRAM(S): at 05:56

## 2023-05-25 RX ADMIN — ONDANSETRON 4 MILLIGRAM(S): 8 TABLET, FILM COATED ORAL at 13:15

## 2023-05-25 RX ADMIN — OXYCODONE HYDROCHLORIDE 15 MILLIGRAM(S): 5 TABLET ORAL at 05:57

## 2023-05-25 RX ADMIN — OXYCODONE HYDROCHLORIDE 5 MILLIGRAM(S): 5 TABLET ORAL at 17:17

## 2023-05-25 RX ADMIN — ALBUTEROL 2 PUFF(S): 90 AEROSOL, METERED ORAL at 02:20

## 2023-05-25 RX ADMIN — TAMSULOSIN HYDROCHLORIDE 0.4 MILLIGRAM(S): 0.4 CAPSULE ORAL at 21:38

## 2023-05-25 RX ADMIN — Medication 5 MILLIGRAM(S): at 13:14

## 2023-05-25 RX ADMIN — Medication 25 MILLIGRAM(S): at 17:18

## 2023-05-25 RX ADMIN — OXYCODONE HYDROCHLORIDE 10 MILLIGRAM(S): 5 TABLET ORAL at 11:04

## 2023-05-25 RX ADMIN — Medication 5 MILLIGRAM(S): at 05:57

## 2023-05-25 RX ADMIN — SODIUM CHLORIDE 3 MILLILITER(S): 9 INJECTION INTRAMUSCULAR; INTRAVENOUS; SUBCUTANEOUS at 05:42

## 2023-05-25 RX ADMIN — SENNA PLUS 2 TABLET(S): 8.6 TABLET ORAL at 21:38

## 2023-05-25 RX ADMIN — OXYCODONE HYDROCHLORIDE 10 MILLIGRAM(S): 5 TABLET ORAL at 11:34

## 2023-05-25 RX ADMIN — OXYCODONE HYDROCHLORIDE 15 MILLIGRAM(S): 5 TABLET ORAL at 23:42

## 2023-05-25 RX ADMIN — OXYCODONE HYDROCHLORIDE 5 MILLIGRAM(S): 5 TABLET ORAL at 17:47

## 2023-05-25 RX ADMIN — CHLORHEXIDINE GLUCONATE 1 APPLICATION(S): 213 SOLUTION TOPICAL at 11:08

## 2023-05-25 RX ADMIN — SODIUM CHLORIDE 3 MILLILITER(S): 9 INJECTION INTRAMUSCULAR; INTRAVENOUS; SUBCUTANEOUS at 21:39

## 2023-05-25 NOTE — PROGRESS NOTE ADULT - PROBLEM SELECTOR PROBLEM 10
History of lumbar fusion
Hyperlipidemia
Prophylactic measure
Prophylactic measure
History of lumbar fusion
Prophylactic measure
Hyperlipidemia
Prophylactic measure
Hyperlipidemia

## 2023-05-25 NOTE — PROGRESS NOTE ADULT - SUBJECTIVE AND OBJECTIVE BOX
Time of Visit:  Patient seen and examined.     MEDICATIONS  (STANDING):  albuterol    90 MICROgram(s) HFA Inhaler 2 Puff(s) Inhalation every 6 hours  amLODIPine   Tablet 5 milliGRAM(s) Oral daily  aspirin  chewable 81 milliGRAM(s) Oral daily  atorvastatin 80 milliGRAM(s) Oral at bedtime  baclofen 10 milliGRAM(s) Oral every 12 hours  chlorhexidine 2% Cloths 1 Application(s) Topical daily  DULoxetine 60 milliGRAM(s) Oral daily  furosemide    Tablet 20 milliGRAM(s) Oral daily  metoprolol tartrate 25 milliGRAM(s) Oral two times a day  oxybutynin 5 milliGRAM(s) Oral every 8 hours  polyethylene glycol 3350 17 Gram(s) Oral daily  senna 2 Tablet(s) Oral at bedtime  sodium chloride 0.9% lock flush 3 milliLiter(s) IV Push every 8 hours  sodium chloride 0.9%. 1000 milliLiter(s) (80 mL/Hr) IV Continuous <Continuous>  tamsulosin 0.4 milliGRAM(s) Oral at bedtime      MEDICATIONS  (PRN):  acetaminophen     Tablet .. 1000 milliGRAM(s) Oral every 8 hours PRN Moderate Pain (4 - 6)  melatonin 3 milliGRAM(s) Oral at bedtime PRN Insomnia  ondansetron Injectable 4 milliGRAM(s) IV Push every 6 hours PRN Nausea and/or Vomiting  oxyCODONE    IR 10 milliGRAM(s) Oral every 4 hours PRN Severe Pain (7 - 10)       Medications up to date at time of exam.      PHYSICAL EXAMINATION:    Vital Signs Last 24 Hrs  T(C): 36.7 (25 May 2023 13:22), Max: 36.7 (25 May 2023 13:22)  T(F): 98.1 (25 May 2023 13:22), Max: 98.1 (25 May 2023 13:22)  HR: 96 (25 May 2023 13:22) (69 - 96)  BP: 140/60 (25 May 2023 13:22) (107/54 - 140/60)  BP(mean): --  RR: 18 (25 May 2023 13:22) (18 - 18)  SpO2: 93% (25 May 2023 13:22) (91% - 96%)    Parameters below as of 25 May 2023 13:22  Patient On (Oxygen Delivery Method): nasal cannula  O2 Flow (L/min): 2      General : Alert and oriented. No acute distress.     HEENT: Head is normocephalic and atraumatic. No nasal tenderness. Mucous membranes are moist.     NECK: Supple, no palpable adenopathy.    LUNGS: Clear to auscultation bilaterally with no wheezing, rales, or rhonchi. Non labored.  No use of accessory muscle.     HEART: S1 S2 Regular rate and no click / rub.     ABDOMEN: Soft, nontender, and nondistended.  Active bowel sounds .     EXTREMITIES: Without any cyanosis, clubbing, rash, lesions . Has chronic venous insufficiency .     NEUROLOGIC: Awake, alert, oriented.     SKIN: Warm and moist . Non diaphoretic.     LABS:    05-25    141  |  112<H>  |  31<H>  ----------------------------<  102<H>  4.8   |  24  |  0.79    Ca    8.3<L>      25 May 2023 06:42      MICROBIOLOGY: (if applicable)    RADIOLOGY & ADDITIONAL STUDIES:  EKG:   CXR:  ECHO:    IMPRESSION: 82y Male PAST MEDICAL & SURGICAL HISTORY:  Hypertension      Hyperlipidemia      Venous insufficiency       Impression: This is an 81 Y/O Male  from [facility] presented to ED with s/p Mechanical Fall. Former smoker. Patient admitted for acute hypoxic respiratory  failure requiring supp O2 due to b/l dependent atelectasis with multiple thoracic and lumbar vertebral fx.  . I doubt PNA or PE at this time . He may have undiagnosed COPD . 05-15-23 CXR with trace Right Pleural Effusion.  05-16-23 Negative PCR for Covid 19. 05-25-23, 05-16-23 , 05-11-23 Negative swab for Covid 19 and Negative Blood Cx x2.      Suggestion :   O2 saturation 95% with o2 supplement. Continue Oxygen supplementation 2L NC.   Continue Albuterol 90 mcg 2 Puffs Q 6 Hours.     Incentive spirometry .   Anticoagulation on hold  due to episode of hematuria .  On Lasix 20 mg Oral daily.    Urology following , moreno care . On Flomax 0.4 mg Oral Daily. Oxybutynin 5 mg Oral every 8 hours

## 2023-05-25 NOTE — PROGRESS NOTE ADULT - SUBJECTIVE AND OBJECTIVE BOX
Source of information: TOM HERRERA, Chart review  Patient language: English  : n/a    HPI:  Patient is a 81 y/o M from [facility] who walks with a walker and has PMH of HTN, HLD, venous insufficiency, CAD, BPH, CHF who presented s/p mechanical fall. Patient reports that he was by his bed when he felt lightheaded and felt like he might pass out and had a fall. Patient denies hitting his head or any other bodily injury. Patient denies any loss of consciousness and reports he remembers the entire episode. Patient denies any pain at this time. Patient also reported that since being in the ED he is in a lot of discomfort as he has a very strong urge to urinate but he is not able to. Patient denies any history of fevers, chills, headache, prior lightheadedness, chest pain, palpitations, shortness of breath and cough (contradictory to ED note), nausea, vomiting, abdominal pain, diarrhea, constipation, melena, hematochezia, or other urinary symptoms.  (11 May 2023 09:20)    CTAP demonstrates- several thoracic and lumbar vertebral compression fractures but with no discrete fracture line or adjacent edema. Mild depression of the superior endplate of T1. Chronic moderate to severe compression T4 vertebral body. Mild compression T5, T6, T7, T8, T9, T10, T11 and T12 vertebral bodies. Chronic moderate compression of T3 vertebral body with focal right scoliosis centered at this level. Status post posterior fusion T2-sacroiliac. The hardware is intact. Bony demineralization.    CT head: No evidence of intracranial injury or skull fracture.    CT cervical spine: No fracture or dislocation of the cervical spine.    CT Chest abdomen pelvis:No evidence of acute traumatic injury. Multilevel thoracic and lumbar vertebral compression fractures are   difficult to definitively age though there are no visible fracture lines or adjacent edema to suggest acute injury. Comparison with priors would be helpful.  Dependent densities in the lower lungs most likely atelectasis given the low lung volumes. Superimposed pneumonia less likely.    Pt is admitted for hypoxemia, s/p fall.  CXR demonstrates- Continued elevation of right hemidiaphragm. Right lower lung linear atelectasis and possible trace right pleural effusion. Pain consulted for back pain, chronic on 5/15. Pt seen and examined at bedside. Pt laying in bed, reports lumbar back pain score 7/10 and tolerable at this time SCALE USED: (1-10 VNRS). Pt describes pain as dull, constant, non- radiating, alleviated by current pain medication, exacerbated by movement. Denies numbness/ tingling. Reports generalized weakness. Requesting more frequent dosing of oxycodone. Reports taking oxycodone 20mg q4h during the day at nursing home. Per [facility], pt on chronic oxycodone 20mg PO 4x/day, last filled 5/4. Pt tolerating PO diet. Denies chest pain, SOB, nausea, vomiting, constipation. Last BM 5/23. Patient stated goal for pain control: to be able to take deep breaths, get out of bed to chair and ambulate with tolerable pain control.     PAST MEDICAL & SURGICAL HISTORY:  Hypertension      Hyperlipidemia      Venous insufficiency          FAMILY HISTORY:      Social History:  Patient reports he is a former smoker but he quit smoking 40 years ago.   Patient denies alcohol or illicit drug use. (11 May 2023 09:20)    Allergies    No Known Allergies    MEDICATIONS  (STANDING):  albuterol    90 MICROgram(s) HFA Inhaler 2 Puff(s) Inhalation every 6 hours  amLODIPine   Tablet 5 milliGRAM(s) Oral daily  aspirin  chewable 81 milliGRAM(s) Oral daily  atorvastatin 80 milliGRAM(s) Oral at bedtime  baclofen 10 milliGRAM(s) Oral every 12 hours  chlorhexidine 2% Cloths 1 Application(s) Topical daily  DULoxetine 60 milliGRAM(s) Oral daily  furosemide    Tablet 20 milliGRAM(s) Oral daily  metoprolol tartrate 25 milliGRAM(s) Oral two times a day  oxybutynin 5 milliGRAM(s) Oral every 8 hours  polyethylene glycol 3350 17 Gram(s) Oral daily  senna 2 Tablet(s) Oral at bedtime  sodium chloride 0.9% lock flush 3 milliLiter(s) IV Push every 8 hours  sodium chloride 0.9%. 1000 milliLiter(s) (80 mL/Hr) IV Continuous <Continuous>  tamsulosin 0.4 milliGRAM(s) Oral at bedtime    MEDICATIONS  (PRN):  acetaminophen     Tablet .. 1000 milliGRAM(s) Oral every 8 hours PRN Moderate Pain (4 - 6)  melatonin 3 milliGRAM(s) Oral at bedtime PRN Insomnia  ondansetron Injectable 4 milliGRAM(s) IV Push every 6 hours PRN Nausea and/or Vomiting  oxyCODONE    IR 10 milliGRAM(s) Oral every 4 hours PRN Severe Pain (7 - 10)      Vital Signs Last 24 Hrs  T(C): 36.3 (25 May 2023 05:53), Max: 36.6 (24 May 2023 13:37)  T(F): 97.4 (25 May 2023 05:53), Max: 97.8 (24 May 2023 13:37)  HR: 88 (25 May 2023 05:53) (59 - 88)  BP: 137/69 (25 May 2023 05:53) (107/54 - 137/69)  BP(mean): --  RR: 18 (25 May 2023 05:53) (18 - 18)  SpO2: 91% (25 May 2023 05:53) (91% - 97%)    Parameters below as of 25 May 2023 05:53  Patient On (Oxygen Delivery Method): nasal cannula  O2 Flow (L/min): 2    COVID-19 PCR: NotDetec (25 May 2023 06:00)  COVID-19 PCR: NotDetec (16 May 2023 09:14)    LABS: Reviewed      05-25    141  |  112<H>  |  31<H>  ----------------------------<  102<H>  4.8   |  24  |  0.79    Ca    8.3<L>      25 May 2023 06:42    COVID-19 PCR: NotDetec (25 May 2023 06:00)  COVID-19 PCR: NotDetec (16 May 2023 09:14)      Radiology: Reviewed.     < from: Xray Chest 1 View- PORTABLE-Routine (Xray Chest 1 View- PORTABLE-Routine in AM.) (05.15.23 @ 09:33) >    ACC: [id number] EXAM:  XR CHEST PORTABLE ROUTINE 1V   ORDERED BY: TAVON MCDERMOTT     PROCEDURE DATE:  05/15/2023          INTERPRETATION:  TIME OF EXAM: May 15, 2023 at 9:05 AM.    CLINICAL INFORMATION: Hypertension. Atelectasis.    COMPARISON:  APchest x-ray and CT scan of the chest from May 11, 2023.    TECHNIQUE:   AP Portable chest x-ray. Limited by rotation. Jewelry   projects over the left chest.    INTERPRETATION:    Heart size and the mediastinum cannot be accurately evaluated on this   projection.  Elevated right hemidiaphragm again seen.  There is right lower lung linear atelectasis with a possible trace right   pleural effusion.  The left lung is clear.  No left pleural effusion seen.  No pneumothorax.  Incompletely imaged orthopedic hardware projects over the thoracolumbar   spine.        IMPRESSION:  Limited by rotation.    Continued elevation of right hemidiaphragm.    Right lower lung linear atelectasis and possible trace right pleural   effusion.    --- End of Report ---            SKY GARZA MD; Attending Radiologist  This document has been electronically signed. May 16 2023  4:46PM    < end of copied text >    < from: CT Head No Cont (05.11.23 @ 05:14) >    ACC: [id number] EXAM:  CT ABDOMEN AND PELVIS IC   ORDERED BY: FADUMO SANTOS     ACC: [id number] EXAM:  CT CHEST IC   ORDERED BY: FADUMO SANTOS     ACC: [id number] EXAM:  CT BRAIN   ORDERED BY: FADUMO SANTOS     ACC: [id number] EXAM:  CT CERVICALSPINE   ORDERED BY: FADUMO SANTOS     PROCEDURE DATE:  05/11/2023          INTERPRETATION:  CLINICAL INFORMATION: Back pain after fall. CHF,   hypertension, shortness of breath.    COMPARISON: Chest radiograph same date.    CONTRAST/COMPLICATIONS:  90 cc Omnipaque 350 administered intravenously for the chest abdomen   pelvis.    PROCEDURE:  CT head and cervical spine without intravenous contrast.  CT of the Chest, Abdomen and Pelvis with intravenous contrast.  Sagittal and coronal reformats were performed.    FINDINGS:    HEAD:  BRAIN: No apparent acute infarct, hemmorhage or mass. No hydrocephalus.   Chronic appearing white matter hypodensities and volume loss.  CALVARIUM: No skull fracture or agreesive osseous legions.  EXTRACRANIAL SOFTTISSUES: No acute findings.  VISUALIZED PORTIONS OF THE PARANASAL SINUSES AND MASTOID AIR CELLS: No   air fluid levels.    CERVICAL SPINE:  VERTEBRAE: No fracture or dislocation. Exaggeration of cervical lordosis.   Mild degenerative retrolisthesis of C3 on C4 and mild degenerative   anterolisthesis of C6 on C7 and C7 on T1.  SPINAL CANAL AND FORAMINA: Multilevel degenerative changes with no   apparent high grade spinal canal narrowing.  PARASPINAL SOFT TISSUES: No paravertebral hematoma or acutefinding.    CHEST:  LUNGS AND LARGE AIRWAYS: Low lung volumes with right greater than left   basilar opacities and mild elevation of the right hemidiaphragm. No   pulmonary nodules.  PLEURA: No pleural effusion or pneumothorax.  VESSELS: No evidence of injury to the heart and great vessels. No   thoracic aortic dissection or aneurysm.  HEART: Mild four-chamber cardiomegaly. No pericardial effusion.  MEDIASTINUM AND LUIS ALBERTO: No lymphadenopathy.  CHEST WALL AND LOWER NECK: Within normal limits.    ABDOMEN AND PELVIS:  LIVER: Within normal limits.  BILE DUCTS: Normal caliber.  GALLBLADDER: Within normal limits.  SPLEEN: No evidence of splenic injury or concerning finding. Small cystic   lesion upper spleen.  PANCREAS: Within normal limits.  ADRENALS: Within normal limits.  KIDNEYS/URETERS: No evidence of renal injury. No hydronephrosis or renal   stone or concerning mass. Small cysts bilaterally.    BLADDER: Within normal limits.  REPRODUCTIVE ORGANS: Radiation seeds in the prostate.    BOWEL: No bowel obstruction. Appendix is not visualized. No evidence of   inflammation in the pericecal region.  PERITONEUM: No ascites.  VESSELS: No abdominal aortic aneurysm. Calcific atherosclerosis   particularly of the distal abdominal aorta and iliac arteries.  RETROPERITONEUM/LYMPH NODES: No lymphadenopathy.  ABDOMINAL WALL: Large right lower quadrant anterior abdominal wall hernia   extending into the right inguinal region, not fully visible, contains the   cecum, much of the ascending colon, anddistal ileum. No evidence of   obstruction or other acute complication regarding this hernia.  BONES: Several thoracic and lumbar vertebral compression fractures but   with no discrete fracture line or adjacent edema. Mild depression of the   superior endplate of T1. Chronic moderate to severe compression T4   vertebral body. Mild compression T5, T6, T7, T8, T9, T10, T11 and T12   vertebral bodies. Chronic moderate compression of T3 vertebral body with   focal right scoliosis centered at this level. Status post posterior   fusion T2-sacroiliac. The hardware is intact. Bony demineralization.    IMPRESSION:  CT head: No evidence of intracranial injury or skull fracture.    CT cervical spine: No fracture or dislocation of the cervical spine.    CT Chest abdomen pelvis:  No evidence of acute traumatic injury.  Multilevel thoracic and lumbar vertebral compression fractures are   difficult to definitively age though there are no visible fracture lines   or adjacent edema to suggest acute injury. Comparison with priors would   be helpful.  Dependent densities in the lower lungs most likely atelectasis given the   low lung volumes. Superimposed pneumonia less likely.      --- End of Report ---             ALEX MACIEL MD; Attending Radiologist  This document has been electronically signed. May 11 2023  5:55AM    < end of copied text >    < from: CT Abdomen and Pelvis w/ IV Cont (05.11.23 @ 05:13) >    ACC: [id number] EXAM:  CT ABDOMEN AND PELVIS IC   ORDERED BY: FADUMO SANTOS     ACC: [id number] EXAM:  CT CHEST IC   ORDERED BY: FADUMO SANTOS     ACC: [id number] EXAM:  CT BRAIN   ORDERED BY: FADUMO SANTOS     ACC: [id number] EXAM:  CT CERVICALSPINE   ORDERED BY: FADUMO SANTOS     PROCEDURE DATE:  05/11/2023          INTERPRETATION:  CLINICAL INFORMATION: Back pain after fall. CHF,   hypertension, shortness of breath.    COMPARISON: Chest radiograph same date.    CONTRAST/COMPLICATIONS:  90 cc Omnipaque 350 administered intravenously for the chest abdomen   pelvis.    PROCEDURE:  CT head and cervical spine without intravenous contrast.  CT of the Chest, Abdomen and Pelvis with intravenous contrast.  Sagittal and coronal reformats were performed.    FINDINGS:    HEAD:  BRAIN: No apparent acute infarct, hemmorhage or mass. No hydrocephalus.   Chronic appearing white matter hypodensities and volume loss.  CALVARIUM: No skull fracture or agreesive osseous legions.  EXTRACRANIAL SOFTTISSUES: No acute findings.  VISUALIZED PORTIONS OF THE PARANASAL SINUSES AND MASTOID AIR CELLS: No   air fluid levels.    CERVICAL SPINE:  VERTEBRAE: No fracture or dislocation. Exaggeration of cervical lordosis.   Mild degenerative retrolisthesis of C3 on C4 and mild degenerative   anterolisthesis of C6 on C7 and C7 on T1.  SPINAL CANAL AND FORAMINA: Multilevel degenerative changes with no   apparent high grade spinal canal narrowing.  PARASPINAL SOFT TISSUES: No paravertebral hematoma or acutefinding.    CHEST:  LUNGS AND LARGE AIRWAYS: Low lung volumes with right greater than left   basilar opacities and mild elevation of the right hemidiaphragm. No   pulmonary nodules.  PLEURA: No pleural effusion or pneumothorax.  VESSELS: No evidence of injury to the heart and great vessels. No   thoracic aortic dissection or aneurysm.  HEART: Mild four-chamber cardiomegaly. No pericardial effusion.  MEDIASTINUM AND LUIS ALBERTO: No lymphadenopathy.  CHEST WALL AND LOWER NECK: Within normal limits.    ABDOMEN AND PELVIS:  LIVER: Within normal limits.  BILE DUCTS: Normal caliber.  GALLBLADDER: Within normal limits.  SPLEEN: No evidence of splenic injury or concerning finding. Small cystic   lesion upper spleen.  PANCREAS: Within normal limits.  ADRENALS: Within normal limits.  KIDNEYS/URETERS: No evidence of renal injury. No hydronephrosis or renal   stone or concerning mass. Small cysts bilaterally.    BLADDER: Within normal limits.  REPRODUCTIVE ORGANS: Radiation seeds in the prostate.    BOWEL: No bowel obstruction. Appendix is not visualized. No evidence of   inflammation in the pericecal region.  PERITONEUM: No ascites.  VESSELS: No abdominal aortic aneurysm. Calcific atherosclerosis   particularly of the distal abdominal aorta and iliac arteries.  RETROPERITONEUM/LYMPH NODES: No lymphadenopathy.  ABDOMINAL WALL: Large right lower quadrant anterior abdominal wall hernia   extending into the right inguinal region, not fully visible, contains the   cecum, much of the ascending colon, anddistal ileum. No evidence of   obstruction or other acute complication regarding this hernia.  BONES: Several thoracic and lumbar vertebral compression fractures but   with no discrete fracture line or adjacent edema. Mild depression of the   superior endplate of T1. Chronic moderate to severe compression T4   vertebral body. Mild compression T5, T6, T7, T8, T9, T10, T11 and T12   vertebral bodies. Chronic moderate compression of T3 vertebral body with   focal right scoliosis centered at this level. Status post posterior   fusion T2-sacroiliac. The hardware is intact. Bony demineralization.    IMPRESSION:  CT head: No evidence of intracranial injury or skull fracture.    CT cervical spine: No fracture or dislocation of the cervical spine.    CT Chest abdomen pelvis:  No evidence of acute traumatic injury.  Multilevel thoracic and lumbar vertebral compression fractures are   difficult to definitively age though there are no visible fracture lines   or adjacent edema to suggest acute injury. Comparison with priors would   be helpful.  Dependent densities in the lower lungs most likely atelectasis given the   low lung volumes. Superimposed pneumonia less likely.      --- End of Report ---             ALEX MACIEL MD; Attending Radiologist  This document has been electronically signed. May 11 2023  5:55AM    < end of copied text >      ORT Score -   Family Hx of substance abuse	Female	      Male  Alcohol 	                                           1                     3  Illegal drugs	                                   2                     3  Rx drugs                                           4 	                  4  Personal Hx of substance abuse		  Alcohol 	                                          3	                  3  Illegal drugs                                     4	                  4  Rx drugs                                            5 	                  5  Age between 16- 45 years	           1                     1  hx preadolescent sexual abuse	   3 	                  0  Psychological disease		  ADD, OCD, bipolar, schizophrenia   2	          2  Depression                                           1 	          1  Total: 0    a score of 3 or lower indicates low risk for opioid abuse		  a score of 4-7 indicates moderate risk for opioid abuse		  a score of 8 or higher indicates high risk for opioid abuse    REVIEW OF SYSTEMS:  CONSTITUTIONAL: No fever + fatigue + Cahto   RESPIRATORY: No cough, wheezing, chills or hemoptysis; No shortness of breath  CARDIOVASCULAR: No chest pain, palpitations, dizziness, or leg swelling  GASTROINTESTINAL: No loss of appetite, decreased PO intake. No nausea, vomiting; No diarrhea or constipation.   GENITOURINARY: No dysuria, frequency, hematuria, or incontinence +urinary retention   MUSCULOSKELETAL: + chronic back pain. No joint swelling; + generalized motor strength weakness, no saddle anesthesia, bowel/bladder incontinence, +falls   NEURO: No headaches, No numbness/tingling b/l LE    PHYSICAL EXAM:  GENERAL: + alert & Oriented X3, cooperative, NAD, Speech is clear. + Cahto + disheveled malodorous, poor oral hygiene   RESPIRATORY: Respirations even and unlabored. Diminished to auscultation bilaterally; No rales, rhonchi, wheezing, or rubs Room air + occasional dry cough  CARDIOVASCULAR: Normal S1/S2, regular rate and rhythm; No murmurs, rubs, or gallops. No JVD.   GENITOURINARY: Urinary catheter draining caitlin urine. CBI clamped   GASTROINTESTINAL:  Soft, Nontender, Nondistended; Bowel sounds present  PERIPHERAL VASCULAR:  Extremities warm without edema. 2+ Peripheral Pulses, No cyanosis, No calf tenderness  MUSCULOSKELETAL: Motor Strength 4/5 B/L upper and 4/5 lower extremities; moves all extremities equally against gravity; ROM intact; negative SLR; + thoracic and lumbar back tenderness on palpation   SKIN: Warm, dry, intact. No rashes, lesions, scars or wounds.+ hyperpigmentation b/l lower extremities + multiple scabs over back.    Risk factors associated with adverse outcomes related to opioid treatment  [ ]  Concurrent benzodiazepine use  [ ]  History/ Active substance use or alcohol use disorder  [ ] Psychiatric co-morbidity  [ ] Sleep apnea  [ ] COPD  [ ] BMI> 35  [ ] Liver dysfunction  [ ] Renal dysfunction  [ ] CHF  [X] Former Smoker  [X ]  Age > 60 years    [X ]  [place]  Reviewed and Copied to Chart. See below.    Plan of care and goal oriented pain management treatment options were discussed with patient and /or primary care giver; all questions and concerns were addressed and care was aligned with patient's wishes.    Educated patient on goal oriented pain management treatment options     05-25-23 @ 12:34

## 2023-05-25 NOTE — PROGRESS NOTE ADULT - PROBLEM SELECTOR PLAN 2
likely due to urinary retention/hematuria  -SCr 0.84-->2.06-->1.01  -Cont Flomax  -Avoid nephrotoxic meds  -continue f/u AM BMP  - bladder scan w/ 320ml, moreno irrigated w/ clear yellow urine output

## 2023-05-25 NOTE — PROGRESS NOTE ADULT - PROBLEM SELECTOR PLAN 8
- BP controlled  - Continue amlodipine
Continue with  statin and plavix, metoprolol.
Patient takes aspirin 325 daily.   Started Aspirin 81 in hopital.   C/w with statin and plavix, metoprolol.
Patient takes aspirin 325 daily.   Started Aspirin 81 in hopital.   C/w with statin and plavix, metoprolol.
- Continue with  statin and plavix, metoprolol
Patient takes aspirin 325 daily.   Started Aspirin 81 in hopital.   C/w with statin and plavix, metoprolol.
Patient takes aspirin 325 daily.   Started Aspirin 81 in hopital.   C/w with statin and plavix, metoprolol.
-home med aspirin 325 daily.   -c/w with statin and plavix, metoprolol.
Patient takes aspirin 325 daily.   Started Aspirin 81 in hopital.   C/w with statin and plavix, metoprolol.
-home med aspirin 325 daily.   -c/w with statin and plavix, metoprolol.

## 2023-05-25 NOTE — PROGRESS NOTE ADULT - SUBJECTIVE AND OBJECTIVE BOX
NP Note:    Patient is a 82y old Male who presents with a chief complaint of Mechanical fall (25 May 2023 9:30)      INTERVAL HPI/OVERNIGHT EVENTS: Reports mild lower abd pressure.  Pt denies cp or sob, no reported fever or chills.      MEDICATIONS  (STANDING):  albuterol    90 MICROgram(s) HFA Inhaler 2 Puff(s) Inhalation every 6 hours  amLODIPine   Tablet 5 milliGRAM(s) Oral daily  aspirin  chewable 81 milliGRAM(s) Oral daily  atorvastatin 80 milliGRAM(s) Oral at bedtime  baclofen 10 milliGRAM(s) Oral every 12 hours  chlorhexidine 2% Cloths 1 Application(s) Topical daily  DULoxetine 60 milliGRAM(s) Oral daily  furosemide    Tablet 20 milliGRAM(s) Oral daily  metoprolol tartrate 25 milliGRAM(s) Oral two times a day  oxybutynin 5 milliGRAM(s) Oral every 8 hours  polyethylene glycol 3350 17 Gram(s) Oral daily  senna 2 Tablet(s) Oral at bedtime  sodium chloride 0.9% lock flush 3 milliLiter(s) IV Push every 8 hours  sodium chloride 0.9%. 1000 milliLiter(s) (80 mL/Hr) IV Continuous <Continuous>  tamsulosin 0.4 milliGRAM(s) Oral at bedtime    MEDICATIONS  (PRN):  acetaminophen     Tablet .. 1000 milliGRAM(s) Oral every 8 hours PRN Moderate Pain (4 - 6)  melatonin 3 milliGRAM(s) Oral at bedtime PRN Insomnia  ondansetron Injectable 4 milliGRAM(s) IV Push every 6 hours PRN Nausea and/or Vomiting  oxyCODONE    IR 10 milliGRAM(s) Oral every 4 hours PRN Severe Pain (7 - 10)      __________________________________________________  REVIEW OF SYSTEMS:    CONSTITUTIONAL: No fever,   EYES: no acute visual disturbances  NECK: No pain or stiffness  RESPIRATORY: No cough; No shortness of breath  CARDIOVASCULAR: No chest pain, no palpitations  GASTROINTESTINAL: Abd/bladder pressure, No nausea or vomiting; No diarrhea   NEUROLOGICAL: No headache or numbness, no tremors  MUSCULOSKELETAL: No joint pain, no muscle pain  GENITOURINARY: no dysuria, no frequency, no hesitancy  PSYCHIATRY: no depression , no anxiety  ALL OTHER  ROS negative        Vital Signs Last 24 Hrs  T(C): 36.3 (25 May 2023 05:53), Max: 36.6 (24 May 2023 13:37)  T(F): 97.4 (25 May 2023 05:53), Max: 97.8 (24 May 2023 13:37)  HR: 88 (25 May 2023 05:53) (59 - 88)  BP: 137/69 (25 May 2023 05:53) (107/54 - 137/69)  BP(mean): --  RR: 18 (25 May 2023 05:53) (18 - 18)  SpO2: 91% (25 May 2023 05:53) (91% - 97%)    Parameters below as of 25 May 2023 05:53  Patient On (Oxygen Delivery Method): nasal cannula  O2 Flow (L/min): 2      ________________________________________________  PHYSICAL EXAM:  GENERAL: Appears uncomfortable   HEENT: Normocephalic; conjunctivae and sclerae clear; moist mucous membranes;   NECK : Supple  CHEST/LUNG: Clear to auscultation bilaterally with good air entry   HEART: S1 S2 regular; no murmurs, gallops or rubs  ABDOMEN: bladder mildly distended, not rebounding or guarding, bowel sounds present  - 3 way moreno, CBI clamped, scrotal edema, clear yellow urine in moreno bag  EXTREMITIES: no cyanosis; no edema; no calf tenderness  SKIN: warm and dry; no rash  NERVOUS SYSTEM:  Awake and alert; Oriented to place, person and time ; no new deficits    _________________________________________________  LABS:    05-25    141  |  112<H>  |  31<H>  ----------------------------<  102<H>  4.8   |  24  |  0.79    Ca    8.3<L>      25 May 2023 06:42          CAPILLARY BLOOD GLUCOSE            RADIOLOGY & ADDITIONAL TESTS:    Imaging  Reviewed:  YES/NO    Consultant(s) Notes Reviewed:   YES/ No      Plan of care was discussed with patient and /or primary care giver; all questions and concerns were addressed  NP Note:    Patient is a 82y old Male who presents with a chief complaint of Mechanical fall (25 May 2023 9:30)      INTERVAL HPI/OVERNIGHT EVENTS: Reports mild lower abd pressure.  Pt denies cp or sob, no reported fever or chills.      MEDICATIONS  (STANDING):  albuterol    90 MICROgram(s) HFA Inhaler 2 Puff(s) Inhalation every 6 hours  amLODIPine   Tablet 5 milliGRAM(s) Oral daily  aspirin  chewable 81 milliGRAM(s) Oral daily  atorvastatin 80 milliGRAM(s) Oral at bedtime  baclofen 10 milliGRAM(s) Oral every 12 hours  chlorhexidine 2% Cloths 1 Application(s) Topical daily  DULoxetine 60 milliGRAM(s) Oral daily  furosemide    Tablet 20 milliGRAM(s) Oral daily  metoprolol tartrate 25 milliGRAM(s) Oral two times a day  oxybutynin 5 milliGRAM(s) Oral every 8 hours  polyethylene glycol 3350 17 Gram(s) Oral daily  senna 2 Tablet(s) Oral at bedtime  sodium chloride 0.9% lock flush 3 milliLiter(s) IV Push every 8 hours  sodium chloride 0.9%. 1000 milliLiter(s) (80 mL/Hr) IV Continuous <Continuous>  tamsulosin 0.4 milliGRAM(s) Oral at bedtime    MEDICATIONS  (PRN):  acetaminophen     Tablet .. 1000 milliGRAM(s) Oral every 8 hours PRN Moderate Pain (4 - 6)  melatonin 3 milliGRAM(s) Oral at bedtime PRN Insomnia  ondansetron Injectable 4 milliGRAM(s) IV Push every 6 hours PRN Nausea and/or Vomiting  oxyCODONE    IR 10 milliGRAM(s) Oral every 4 hours PRN Severe Pain (7 - 10)      __________________________________________________  REVIEW OF SYSTEMS:    CONSTITUTIONAL: No fever,   EYES: no acute visual disturbances  NECK: No pain or stiffness  RESPIRATORY: No cough; No shortness of breath  CARDIOVASCULAR: No chest pain, no palpitations  GASTROINTESTINAL: Abd/bladder pressure, No nausea or vomiting; No diarrhea   NEUROLOGICAL: No headache or numbness, no tremors  MUSCULOSKELETAL: No joint pain, no muscle pain  GENITOURINARY: no dysuria, no frequency, no hesitancy  PSYCHIATRY: no depression , no anxiety  ALL OTHER  ROS negative        Vital Signs Last 24 Hrs  T(C): 36.3 (25 May 2023 05:53), Max: 36.6 (24 May 2023 13:37)  T(F): 97.4 (25 May 2023 05:53), Max: 97.8 (24 May 2023 13:37)  HR: 88 (25 May 2023 05:53) (59 - 88)  BP: 137/69 (25 May 2023 05:53) (107/54 - 137/69)  BP(mean): --  RR: 18 (25 May 2023 05:53) (18 - 18)  SpO2: 91% (25 May 2023 05:53) (91% - 97%)    Parameters below as of 25 May 2023 05:53  Patient On (Oxygen Delivery Method): nasal cannula  O2 Flow (L/min): 2      ________________________________________________  PHYSICAL EXAM:  GENERAL: Appears uncomfortable   HEENT: Normocephalic; conjunctivae and sclerae clear; moist mucous membranes;   NECK : Supple  CHEST/LUNG: Clear to auscultation bilaterally with good air entry   HEART: S1 S2 regular; no murmurs, gallops or rubs  ABDOMEN: bladder mildly distended, not rebounding or guarding, bowel sounds present  - 3 way moreno, CBI clamped, scrotal edema, clear yellow urine in moreno bag  EXTREMITIES: no cyanosis; no edema; no calf tenderness  SKIN: warm and dry; no rash  NERVOUS SYSTEM:  Awake and alert; Oriented to place, person and time ; no new deficits.    _________________________________________________  LABS:    05-25    141  |  112<H>  |  31<H>  ----------------------------<  102<H>  4.8   |  24  |  0.79    Ca    8.3<L>      25 May 2023 06:42          CAPILLARY BLOOD GLUCOSE            RADIOLOGY & ADDITIONAL TESTS:    Imaging  Reviewed:  YES/NO    Consultant(s) Notes Reviewed:   YES/ No      Plan of care was discussed with patient and /or primary care giver; all questions and concerns were addressed

## 2023-05-25 NOTE — PROGRESS NOTE ADULT - PROBLEM SELECTOR PLAN 9
- uncontrolled  -c/w home dose of amlodipine 5mg.
- continue statin
- continue statin
Improved control  Continue amlodipine  Continue furosemide
C/w home dose of amlodipine 5mg.
- BP controlled  - Continue amlodipine
-c/w home dose of amlodipine 5mg.
C/w home dose of amlodipine 5mg.
- continue statin
- continue statin

## 2023-05-25 NOTE — CHART NOTE - NSCHARTNOTEFT_GEN_A_CORE
Reassessed pt at bedside, pt laying in bed, lethargic. Reports lumbar back pain 8/10 and not tolerable. Describes as aching, non- radiating. Requesting oxycodone 20mg PO q4h. Recently received oxycodone 10mg PO. Will order additional oxycodone 5mg PO once now, changed oxycodone 15mgPO q6h PRN severe pain.

## 2023-05-25 NOTE — PROGRESS NOTE ADULT - NS ATTEND AMEND GEN_ALL_CORE FT
Impression: This is an 81 Y/O Male from [facility] presented to ED with s/p Mechanical Fall. Former smoker. Patient admitted for acute hypoxic respiratory  failure requiring supp O2 due to b/l dependent atelectasis with multiple thoracic and lumbar vertebral fx.  . I doubt PNA or PE at this time . He may have undiagnosed COPD . 05-15-23 CXR with trace Right Pleural Effusion.  05-16-23 Negative PCR for Covid 19. 05-11-23 Negative swab for Covid 19 and Negative Blood Cx x2.     Suggestion:  O2 saturation 91% room air at rest , O2 saturation 94% with O2 supplement. Continue Oxygen supplementation 2L NC for now then will monitor O2 saturation trend room air.   Continue Albuterol 90 mcg 2 Puffs Q 6 Hours .   No need to drain Trace Right Lung Effusion .On Lasix 20 mg Oral Daily   .   Incentive spirometry .  Out patient  pulmonary follow up.     No steroids .   PT eval    Fall precaution    DVT GI prophylactic .     Lidocaine patch to back on x 12 Hours .
Impression: This is an 81 Y/O Male  from [facility] presented to ED with s/p Mechanical Fall. Former smoker. Patient admitted for acute hypoxic respiratory  failure requiring supp O2 due to b/l dependent atelectasis with multiple thoracic and lumbar vertebral fx.  . I doubt PNA or PE at this time . He may have undiagnosed COPD . 05-15-23 CXR with trace Right Pleural Effusion.  05-16-23 Negative PCR for Covid 19. 05-11-23 Negative swab for Covid 19 and Negative Blood Cx x2.  He is complaining of back pain from prior surgery . Hematuria due to traumatic moreno  no change in H/H.     Sugg  O2 saturation 96% with o2 supplement. Continue Oxygen supplementation 2L NC.   Continue Albuterol 90 mcg 2 Puffs Q 6 Hours.     Incentive spirometry .   Anticoagulation on hold  due to hematuria   On Lasix 20 mg Oral daily.    Urology following , moreno care . On Flomax 0.4 mg Oral Daily.
Impression: This is an 81 Y/O Male  from [facility] presented to ED with s/p Mechanical Fall. Former smoker. Patient admitted for acute hypoxic respiratory  failure requiring supp O2 due to b/l dependent atelectasis with multiple thoracic and lumbar vertebral fx.  . I doubt PNA or PE at this time . He may have undiagnosed COPD . 05-15-23 CXR with trace Right Pleural Effusion.  05-16-23 Negative PCR for Covid 19. 05-25-23, 05-16-23 , 05-11-23 Negative swab for Covid 19 and Negative Blood Cx x2.      Suggestion :   O2 saturation 95% with o2 supplement. Continue Oxygen supplementation 2L NC.   Continue Albuterol 90 mcg 2 Puffs Q 6 Hours.     Incentive spirometry .   Anticoagulation on hold  due to episode of hematuria .  On Lasix 20 mg Oral daily.    Urology following , moreno care . On Flomax 0.4 mg Oral Daily. Oxybutynin 5 mg Oral every 8 hours
Impression: This is an 83 Y/O Male from [facility] presented to ED with s/p Mechanical Fall. Former smoker. Patient admitted for acute hypoxic respiratory  failure requiring supp O2 due to b/l dependent atelectasis with multiple thoracic and lumbar vertebral fx.  . I doubt PNA or PE at this time . He may have undiagnosed COPD . 05-11-23 Negative swab for Covid 19 and Negative Blood Cx x2.     Suggestion:  O2 saturation 90% room air at rest , O2 saturation 94% with O2 supplement. Continue Oxygen supplementation 2L NC for now then will monitor O2 saturation trend room air.   Continue Albuterol 90 mcg 2 Puffs Q 6 Hours .   On Cefazolin 2,000 Mg IVPB Q 8 Hours for Cellulitis  .   Incentive spirometry .  2 D ECHO .    Out patient  pulmo eval    No steroids    PT eval    Fall precaution    DVT GI prophylactic .     Lidocaine patch to back on x 12 Hours .

## 2023-05-25 NOTE — PROGRESS NOTE ADULT - PROBLEM SELECTOR PLAN 7
- Continue with  statin and plavix, metoprolol
Takes Oxycodon 20 TID and oxycodon 10 OD.   Started on 15 TID PRN for severe pain.
Takes Oxycodon 20 TID and oxycodon 10 OD.   Started on 15 TID PRN for severe pain.
home meds Oxycodon 20 TID and oxycodone 10 OD.   c/w pain regimen per Pain Mgmt : oxycodone 20mg PO q 6 hours PRN severe pain.   Continue home dose baclofen 10mg PO BID.   - Continue home dose cymbalta 60mg PO daily.   - Continue home dose lyrica 75mg po q 8 hours. Monitor renal function.   - Continue Lidoderm 4% patch 2 patches daily.   Continue bowel regimen   Pain Mgmt team following.
Takes Oxycodon 20 TID and oxycodon 10 OD.   Started on 15 TID PRN for severe pain.
home meds Oxycodon 20 TID and oxycodone 10 OD.   c/w pain regimen per Pain Mgmt : oxycodone 20mg PO q 6 hours PRN severe pain.   Continue home dose baclofen 10mg PO BID.   - Continue home dose cymbalta 60mg PO daily.   - Continue home dose lyrica 75mg po q 8 hours. Monitor renal function.   - Continue Lidoderm 4% patch 2 patches daily.   Continue bowel regimen   Pain Mgmt team following.
- Continue with statin and plavix, metoprolol
Takes Oxycodon 20 TID and oxycodon 10 OD.   Started on 15 TID PRN for severe pain.
- Continue with  statin and plavix, metoprolol
-home meds Oxycodon 20 TID and oxycodon 10 OD.   -c/w pain meds  -pain mgmt team following
-home meds Oxycodon 20 TID and oxycodon 10 OD.   -c/w pain meds  -pain mgmt team following
- Continue with statin and plavix, metoprolol
Takes Oxycodon 20 TID and oxycodon 10 OD.   Started on 15 TID PRN for severe pain.

## 2023-05-25 NOTE — PROGRESS NOTE ADULT - ASSESSMENT
Confidential Drug Utilization Report  Search Terms: Chucky Dove, 1940Search Date: 05/15/2023 08:50:06 AM  The Drug Utilization Report below displays all of the controlled substance prescriptions, if any, that your patient has filled in the last twelve months. The information displayed on this report is compiled from pharmacy submissions to the Department, and accurately reflects the information as submitted by the pharmacies.    This report was requested by: Camilla Thompson | Reference #: [id number]    You have not added a THELMA number. Keeping your THELMA number(s) up to date on the My THELMA # tab will enable the separation of your prescriptions from others in the search results.    Practitioner Count: 1  Pharmacy Count: 1  Current Opioid Prescriptions: 1  Current Benzodiazepine Prescriptions: 0  Current Stimulant Prescriptions: 0      Patient Demographic Information (PDI)       PDI	First Name	Last Name	Birth Date	Gender	Street Address	[facility]	Zip Code  A	Chucky Dove	1940	Male	[address] [facility]	[address]    Prescription Information      PDI Filter:    PDI	Current Rx	Drug Type	Rx Written	Rx Dispensed	Drug	Quantity	Days Supply	Prescriber Name	Prescriber THELMA #	Payment Method	Dispenser  A	Y	O	05/04/2023	05/04/2023	oxycodone hcl (ir) 20 mg tab	120	30	Tavdy, Esa	[id number]	Medicare	[facility]  A	N	O	03/30/2023	03/30/2023	oxycodone hcl (ir) 20 mg tab	120	30	Tavdy, Esa	[id number]	Medicare	[facility]  A	N	O	02/14/2023	02/27/2023	oxycodone hcl (ir) 20 mg tab	120	30	Tavdy, Esa	[id number]	Medicare	[facility]  A	N	O	02/02/2023	02/02/2023	oxycodone hcl (ir) 20 mg tab	120	30	Tavdy, Esa	[id number]	Medicare	[facility]  A	N	O	01/13/2023	01/13/2023	oxycodone hcl (ir) 10 mg tab	30	30	Tavdy, Esa	[id number]	Medicare	[facility]  A	N	O	01/13/2023	01/13/2023	oxycodone hcl (ir) 20 mg tab	90	30	Tavdy, Esa	[id number]	Medicare	[facility]  A	N	O	12/06/2022	12/06/2022	oxycodone hcl (ir) 20 mg tab	90	30	Tavdy, Esa	[id number]	Medicare	[facility]  A	N	O	12/06/2022	12/06/2022	oxycodone hcl (ir) 10 mg tab	30	30	Tavdy, Esa	[id number]	Medicare	[facility]  A	N	O	10/27/2022	10/31/2022	oxycodone hcl (ir) 10 mg tab	30	30	Tavdy, Esa	[id number]	Medicare	[facility]  A	N	O	10/27/2022	10/31/2022	oxycodone hcl (ir) 20 mg tab	90	30	Tavdy, Esa	[id number]	Medicare	[facility]  A	N	O	10/06/2022	10/06/2022	oxycodone hcl (ir) 20 mg tab	90	30	Tavdy, Esa	[id number]	Medicare	[facility]  A	N	O	10/06/2022	10/06/2022	oxycodone hcl (ir) 10 mg tab	30	30	Tavdy, Esa	[id number]	Medicare	[facility]  A	N	O	08/23/2022	08/23/2022	oxycodone hcl (ir) 10 mg tab	30	30	Tavdy, Esa	[id number]	Medicare	[facility]  A	N	O	08/23/2022	08/23/2022	oxycodone hcl (ir) 20 mg tab	90	30	Tavdy, Esa	[id number]	Medicare	[facility]  A	N	O	07/21/2022	07/31/2022	oxycodone hcl (ir) 10 mg tab	30	30	Tavdy, Esa	[id number]	Medicare	[facility]  A	N	O	07/21/2022	07/31/2022	oxycodone hcl (ir) 20 mg tab	90	30	Tavdy, Esa	[id number]	Medicare	[facility] [facility]  A	N	O	07/05/2022	07/05/2022	oxycodone hcl (ir) 10 mg tab	30	30	Tavdy, Esa	[id number]	Medicare	[facility]  A	N	O	07/05/2022	07/05/2022	oxycodone hcl (ir) 20 mg tab	90	30	Tavdy, Esa	[id number]	Medicare	[facility]  A	N	O	05/27/2022	05/31/2022	oxycodone hcl (ir) 10 mg tab	30	30	Esa Herring	[id number]	Medicare	[facility]  A	N	O	05/27/2022	05/31/2022	oxycodone hcl (ir) 20 mg tab	90	30	[facility]	[id number]	Medicare	[facility]    * - Details of Drug Type : O = Opioid, B = Benzodiazepine, S = Stimulant    * - Drugs marked with an asterisk are compound drugs. If the compound drug is made up of more than one controlled substance, then each controlled substance will be a separate row in the table.

## 2023-05-25 NOTE — PROGRESS NOTE ADULT - PROBLEM SELECTOR PLAN 11
PT recc ALBERTA  Pt. for discharge to [facility]  Bladder scan 320ml-   Discharge pending resolution of urological issues  CM following

## 2023-05-25 NOTE — PROGRESS NOTE ADULT - PROBLEM SELECTOR PROBLEM 11
Prophylactic measure
Discharge planning issues
Discharge planning issues
Prophylactic measure
Prophylactic measure
Discharge planning issues
Discharge planning issues
Prophylactic measure

## 2023-05-25 NOTE — PROGRESS NOTE ADULT - PROBLEM SELECTOR PLAN 5
Patient developed hematuria a few hours after admission.    Likely 2/2 to traumatic moreno insertion as multiple tries to place moreno was done.  Resolved.   Holding lovenox in setting of hematuria.   Monitor H/H.
- patient with significant venous dermatitis.   - edema resolving    -continue Furosemide 20mg PO
- patient with significant venous dermatitis.   - edema resolving    -continue Furosemide 20mg PO
- Patient developed hematuria a few hours after admission.   - Likely 2/2 to traumatic moreno insertion as multiple tries to place moreno was done.  - Holding lovenox in setting of hematuria.   - Monitor H/H.
- Patient developed hematuria a few hours after admission.   - Likely 2/2 to traumatic moreno insertion as multiple tries to place moreno was done.  - Holding lovenox in setting of hematuria.   - Monitor H/H.
- Patient developed hematuria a few hours after admission.   - Likely 2/2 to traumatic moreno insertion as multiple tries to place moreno was done.  - noted with hematuria today 5/19, moreno irrigated by nursing and Hematuria improved by afternoon  - continue monitor for hematuria  - Holding lovenox in setting of hematuria.   - Monitor CBC
- patient with significant venous dermatitis.   - edema resolving    -continue Furosemide 20mg PO
- Patient developed hematuria a few hours after admission.   - Likely 2/2 to traumatic moreno insertion as multiple tries to place moreno was done.  - Holding lovenox in setting of hematuria.   - Monitor H/H.
- patient with significant venous dermatitis.   - edema resolving    -continue Furosemide 20mg PO

## 2023-05-25 NOTE — PROGRESS NOTE ADULT - PROBLEM SELECTOR PLAN 1
- Patient developed hematuria a few hours after admission.   - Likely 2/2 to traumatic morneo insertion as multiple tries to place moreno was done.  - noted with hematuria  5/19, moreno irrigated by nursing and Hematuria improved by afternoon  - Holding Lovenox in setting of hematuria.   - Recurrent gross hematuria over night 5/21  - H/H stable  - Uro interventions and note appreciated  - CBI clamped  - Clear from urology standpoint   - Monitor CBC

## 2023-05-25 NOTE — PROGRESS NOTE ADULT - PROBLEM SELECTOR PLAN 10
C/w atorvastatin.
DVT ppx: SCDs. Hold lovenox in setting of hematuria.
- continue statin
C/w atorvastatin.
DVT ppx: SCDs. Hold lovenox in setting of hematuria.
Continue statin

## 2023-05-25 NOTE — PROGRESS NOTE ADULT - ASSESSMENT
Patient is a 81 y/o M from [facility] [email or web address] who walks with a walker and has PMH of HTN, HLD, venous insufficiency, CAD, BPH, CHF who presented s/p mechanical fall. Admitted to medicine for AHRF 2/2 to atelectasis and mechanical fall. Noted to have erythema to upper thigh and complte course of IV abxCT Head, chest, spine no fx. Chest CT shows atelectasis. CXR shows small bibasilar infiltrates, pulmonary consulted, required nasal cannula now tolerating room air. Also found to have urinary retention, moreno placed, + hematuria, Urology consulted, failed TOV and re placed on 5/15 by Urology. Noted with hematuria today, Hgb stable, moreno catheter irrigated and noted with improvement in hematuria. Per discussion with Urology will continue to monitor. PT reccs ALBERTA, pending auth to [facility]  5/22-Pt. with gross hematuria over night, required moreno replacement by uro, 6 eye moreno placed, CBI continues with clear drainage at this time.  5/24- CBI clamped and moreno with clear yellow urine.  Pt declined to have moreno downsize and no objection from Urology standpoint to d/c with current moreno.  D/C planning ongoing and CM assisting, screening Covid for d/c ordered.  5/25- Bladder scan with 350ml with mildly distended urinary bladder.

## 2023-05-25 NOTE — PROGRESS NOTE ADULT - PROBLEM SELECTOR PLAN 1
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 + opioid dependent.   High risk medications reviewed. Avoid polypharmacy. Avoid IV opioids. Avoid NSAIDs and benzodiazepines. Non-pharmacological sleep aides initiated. Non-opioid medications and non-pharmacological pain management measures initiated.   Opioid pain recommendations   - Change oxycodone 10 mg PO q 4 hours PRN severe pain (pt requesting more frequent dosing). Pt on oxycodone 80mg/day at home decreased to 60mg/day 5/24 due to lethargy. Monitor for sedation/ respiratory depression.   Non-opioid pain recommendations   - Continue Acetaminophen 1 gram PO q 8 hours PRN moderate pain. Monitor LFTs  - Continue home dose baclofen 10mg PO BID.   - Continue home dose cymbalta 60mg PO daily.   - Discontinued lyrica 75mg po q 8 hours due to dizziness/ lethargy 5/23. Monitor renal function.   - Discontinued Lidoderm 4% patch 2 patches daily 5/23- pt refused.   Bowel Regimen  - Continue Miralax 17G PO daily  - Continue Senna 2 tablets at bedtime for constipation  Mild pain (score 1-3)  - Non-pharmacological pain treatment recommendations  - Warm/ Cool packs PRN   - Repositioning extremity, elevation, imagery, relaxation, distraction.  - Physical therapy OOB if no contraindications   Recommendations discussed with primary team and RN.

## 2023-05-26 ENCOUNTER — TRANSCRIPTION ENCOUNTER (OUTPATIENT)
Age: 83
End: 2023-05-26

## 2023-05-26 VITALS
DIASTOLIC BLOOD PRESSURE: 76 MMHG | TEMPERATURE: 98 F | SYSTOLIC BLOOD PRESSURE: 146 MMHG | RESPIRATION RATE: 17 BRPM | HEART RATE: 79 BPM | OXYGEN SATURATION: 91 %

## 2023-05-26 PROCEDURE — 36415 COLL VENOUS BLD VENIPUNCTURE: CPT

## 2023-05-26 PROCEDURE — 99285 EMERGENCY DEPT VISIT HI MDM: CPT | Mod: 25

## 2023-05-26 PROCEDURE — 97110 THERAPEUTIC EXERCISES: CPT

## 2023-05-26 PROCEDURE — 87635 SARS-COV-2 COVID-19 AMP PRB: CPT

## 2023-05-26 PROCEDURE — 96374 THER/PROPH/DIAG INJ IV PUSH: CPT

## 2023-05-26 PROCEDURE — 93005 ELECTROCARDIOGRAM TRACING: CPT

## 2023-05-26 PROCEDURE — 82803 BLOOD GASES ANY COMBINATION: CPT

## 2023-05-26 PROCEDURE — 85730 THROMBOPLASTIN TIME PARTIAL: CPT

## 2023-05-26 PROCEDURE — 94640 AIRWAY INHALATION TREATMENT: CPT

## 2023-05-26 PROCEDURE — 84100 ASSAY OF PHOSPHORUS: CPT

## 2023-05-26 PROCEDURE — 83880 ASSAY OF NATRIURETIC PEPTIDE: CPT

## 2023-05-26 PROCEDURE — 93306 TTE W/DOPPLER COMPLETE: CPT

## 2023-05-26 PROCEDURE — 99232 SBSQ HOSP IP/OBS MODERATE 35: CPT

## 2023-05-26 PROCEDURE — 72125 CT NECK SPINE W/O DYE: CPT | Mod: MA

## 2023-05-26 PROCEDURE — 87637 SARSCOV2&INF A&B&RSV AMP PRB: CPT

## 2023-05-26 PROCEDURE — 96375 TX/PRO/DX INJ NEW DRUG ADDON: CPT

## 2023-05-26 PROCEDURE — 80048 BASIC METABOLIC PNL TOTAL CA: CPT

## 2023-05-26 PROCEDURE — 74177 CT ABD & PELVIS W/CONTRAST: CPT | Mod: MA

## 2023-05-26 PROCEDURE — 97530 THERAPEUTIC ACTIVITIES: CPT

## 2023-05-26 PROCEDURE — 83735 ASSAY OF MAGNESIUM: CPT

## 2023-05-26 PROCEDURE — 71045 X-RAY EXAM CHEST 1 VIEW: CPT

## 2023-05-26 PROCEDURE — 84484 ASSAY OF TROPONIN QUANT: CPT

## 2023-05-26 PROCEDURE — 97116 GAIT TRAINING THERAPY: CPT

## 2023-05-26 PROCEDURE — 85027 COMPLETE CBC AUTOMATED: CPT

## 2023-05-26 PROCEDURE — 70450 CT HEAD/BRAIN W/O DYE: CPT | Mod: MA

## 2023-05-26 PROCEDURE — 85025 COMPLETE CBC W/AUTO DIFF WBC: CPT

## 2023-05-26 PROCEDURE — 97162 PT EVAL MOD COMPLEX 30 MIN: CPT

## 2023-05-26 PROCEDURE — 85610 PROTHROMBIN TIME: CPT

## 2023-05-26 PROCEDURE — 87040 BLOOD CULTURE FOR BACTERIA: CPT

## 2023-05-26 PROCEDURE — 71260 CT THORAX DX C+: CPT | Mod: MA

## 2023-05-26 PROCEDURE — 80053 COMPREHEN METABOLIC PANEL: CPT

## 2023-05-26 RX ADMIN — SODIUM CHLORIDE 80 MILLILITER(S): 9 INJECTION INTRAMUSCULAR; INTRAVENOUS; SUBCUTANEOUS at 08:22

## 2023-05-26 RX ADMIN — OXYCODONE HYDROCHLORIDE 15 MILLIGRAM(S): 5 TABLET ORAL at 11:51

## 2023-05-26 RX ADMIN — OXYCODONE HYDROCHLORIDE 15 MILLIGRAM(S): 5 TABLET ORAL at 06:54

## 2023-05-26 RX ADMIN — Medication 20 MILLIGRAM(S): at 05:49

## 2023-05-26 RX ADMIN — ALBUTEROL 2 PUFF(S): 90 AEROSOL, METERED ORAL at 08:22

## 2023-05-26 RX ADMIN — OXYCODONE HYDROCHLORIDE 15 MILLIGRAM(S): 5 TABLET ORAL at 05:49

## 2023-05-26 RX ADMIN — SODIUM CHLORIDE 80 MILLILITER(S): 9 INJECTION INTRAMUSCULAR; INTRAVENOUS; SUBCUTANEOUS at 05:50

## 2023-05-26 RX ADMIN — AMLODIPINE BESYLATE 5 MILLIGRAM(S): 2.5 TABLET ORAL at 05:49

## 2023-05-26 RX ADMIN — ONDANSETRON 4 MILLIGRAM(S): 8 TABLET, FILM COATED ORAL at 12:53

## 2023-05-26 RX ADMIN — Medication 25 MILLIGRAM(S): at 05:49

## 2023-05-26 RX ADMIN — DULOXETINE HYDROCHLORIDE 60 MILLIGRAM(S): 30 CAPSULE, DELAYED RELEASE ORAL at 11:51

## 2023-05-26 RX ADMIN — Medication 10 MILLIGRAM(S): at 05:49

## 2023-05-26 RX ADMIN — OXYCODONE HYDROCHLORIDE 15 MILLIGRAM(S): 5 TABLET ORAL at 00:21

## 2023-05-26 RX ADMIN — SODIUM CHLORIDE 3 MILLILITER(S): 9 INJECTION INTRAMUSCULAR; INTRAVENOUS; SUBCUTANEOUS at 05:50

## 2023-05-26 RX ADMIN — OXYCODONE HYDROCHLORIDE 15 MILLIGRAM(S): 5 TABLET ORAL at 12:51

## 2023-05-26 RX ADMIN — Medication 5 MILLIGRAM(S): at 05:49

## 2023-05-26 NOTE — PROGRESS NOTE ADULT - PROBLEM SELECTOR PROBLEM 9
Hypertension
Hypertension
Hyperlipidemia
Hypertension
Hyperlipidemia
Hyperlipidemia
Hypertension
Hyperlipidemia
Venous insufficiency
Hyperlipidemia
Venous insufficiency
Hyperlipidemia
Hypertension

## 2023-05-26 NOTE — PROGRESS NOTE ADULT - SUBJECTIVE AND OBJECTIVE BOX
Time of Visit:  Patient seen and examined.     MEDICATIONS  (STANDING):  albuterol    90 MICROgram(s) HFA Inhaler 2 Puff(s) Inhalation every 6 hours  amLODIPine   Tablet 5 milliGRAM(s) Oral daily  aspirin  chewable 81 milliGRAM(s) Oral daily  atorvastatin 80 milliGRAM(s) Oral at bedtime  baclofen 10 milliGRAM(s) Oral every 12 hours  chlorhexidine 2% Cloths 1 Application(s) Topical daily  DULoxetine 60 milliGRAM(s) Oral daily  furosemide    Tablet 20 milliGRAM(s) Oral daily  metoprolol tartrate 25 milliGRAM(s) Oral two times a day  oxybutynin 5 milliGRAM(s) Oral every 8 hours  polyethylene glycol 3350 17 Gram(s) Oral daily  senna 2 Tablet(s) Oral at bedtime  sodium chloride 0.9% lock flush 3 milliLiter(s) IV Push every 8 hours  tamsulosin 0.4 milliGRAM(s) Oral at bedtime      MEDICATIONS  (PRN):  acetaminophen     Tablet .. 1000 milliGRAM(s) Oral every 8 hours PRN Moderate Pain (4 - 6)  melatonin 3 milliGRAM(s) Oral at bedtime PRN Insomnia  ondansetron Injectable 4 milliGRAM(s) IV Push every 6 hours PRN Nausea and/or Vomiting  oxyCODONE    IR 15 milliGRAM(s) Oral every 6 hours PRN Severe Pain (7 - 10)       Medications up to date at time of exam.      PHYSICAL EXAMINATION:  Patient has no new complaints.  GENERAL: The patient is a well-developed, well-nourished, in no apparent distress.     Vital Signs Last 24 Hrs  T(C): 36.4 (26 May 2023 12:00), Max: 36.5 (26 May 2023 05:18)  T(F): 97.6 (26 May 2023 12:00), Max: 97.7 (26 May 2023 05:18)  HR: 79 (26 May 2023 12:00) (60 - 89)  BP: 146/76 (26 May 2023 12:00) (136/69 - 165/74)  BP(mean): --  RR: 17 (26 May 2023 12:00) (16 - 17)  SpO2: 91% (26 May 2023 12:00) (91% - 93%)    Parameters below as of 26 May 2023 12:00  Patient On (Oxygen Delivery Method): room air, Nurse was awear of the patient o2        (if applicable)    Chest Tube (if applicable)    HEENT: Head is normocephalic and atraumatic. Extraocular muscles are intact. Mucous membranes are moist.     NECK: Supple, no palpable adenopathy.    LUNGS: Clear to auscultation, no wheezing, rales, or rhonchi.    HEART: Regular rate and rhythm without murmur.    ABDOMEN: Soft, nontender, and nondistended.  No hepatosplenomegaly is noted.    : No painful voiding, no pelvic pain    EXTREMITIES: Without any cyanosis, clubbing, rash, lesions or edema.    NEUROLOGIC: Awake, alert, oriented, grossly intact    SKIN: Warm, dry, good turgor.      LABS:    05-25    141  |  112<H>  |  31<H>  ----------------------------<  102<H>  4.8   |  24  |  0.79    Ca    8.3<L>      25 May 2023 06:42                          MICROBIOLOGY: (if applicable)    RADIOLOGY & ADDITIONAL STUDIES:  EKG:   CXR:  ECHO:    IMPRESSION: 82y Male PAST MEDICAL & SURGICAL HISTORY:  Hypertension      Hyperlipidemia      Venous insufficiency       p/w       Impression: This is an 83 Y/O Male  from [facility] presented to ED with s/p Mechanical Fall. Former smoker. Patient admitted for acute hypoxic respiratory  failure requiring supp O2 due to b/l dependent atelectasis with multiple thoracic and lumbar vertebral fx.  . I doubt PNA or PE at this time . He may have undiagnosed COPD . 05-15-23 CXR with trace Right Pleural Effusion.  05-16-23 Negative PCR for Covid 19. 05-25-23, 05-16-23 , 05-11-23 Negative swab for Covid 19 and Negative Blood Cx x2.      Suggestion :   O2 saturation 95% with o2 supplement. Continue Oxygen supplementation 2L NC.   Continue Albuterol 90 mcg 2 Puffs Q 6 Hours.     Incentive spirometry .   Anticoagulation on hold  due to episode of hematuria .  On Lasix 20 mg Oral daily.    Urology following , moreno care . On Flomax 0.4 mg Oral Daily. Oxybutynin 5 mg Oral every 8 hours

## 2023-05-26 NOTE — PROGRESS NOTE ADULT - PROBLEM SELECTOR PROBLEM 2
Lumbar compression fracture
Fall, accidental
Opioid dependence
YUNIOR (acute kidney injury)
Lumbar compression fracture
Opioid dependence
Opioid dependence
Fall, accidental
YUNIOR (acute kidney injury)
Fall, accidental
Opioid dependence
Fall, accidental

## 2023-05-26 NOTE — PROGRESS NOTE ADULT - SUBJECTIVE AND OBJECTIVE BOX
INTERVAL HPI/OVERNIGHT EVENTS:  Patient seen,events noticed,stable clinically  VITAL SIGNS:  T(F): 97.6 (05-26-23 @ 12:00)  HR: 79 (05-26-23 @ 12:00)  BP: 146/76 (05-26-23 @ 12:00)  RR: 17 (05-26-23 @ 12:00)  SpO2: 91% (05-26-23 @ 12:00)  Wt(kg): --    PHYSICAL EXAM:  awake  Constitutional:  Eyes:  ENMT:perrla  Neck:  Respiratory:clear  Cardiovascular:s1s2,m-none  Gastrointestinal:soft,bs pos  Extremities:  Vascular:  Neurological:no focal deficit  Musculoskeletal:    MEDICATIONS  (STANDING):  albuterol    90 MICROgram(s) HFA Inhaler 2 Puff(s) Inhalation every 6 hours  amLODIPine   Tablet 5 milliGRAM(s) Oral daily  aspirin  chewable 81 milliGRAM(s) Oral daily  atorvastatin 80 milliGRAM(s) Oral at bedtime  baclofen 10 milliGRAM(s) Oral every 12 hours  chlorhexidine 2% Cloths 1 Application(s) Topical daily  DULoxetine 60 milliGRAM(s) Oral daily  furosemide    Tablet 20 milliGRAM(s) Oral daily  metoprolol tartrate 25 milliGRAM(s) Oral two times a day  oxybutynin 5 milliGRAM(s) Oral every 8 hours  polyethylene glycol 3350 17 Gram(s) Oral daily  senna 2 Tablet(s) Oral at bedtime  sodium chloride 0.9% lock flush 3 milliLiter(s) IV Push every 8 hours  tamsulosin 0.4 milliGRAM(s) Oral at bedtime    MEDICATIONS  (PRN):  acetaminophen     Tablet .. 1000 milliGRAM(s) Oral every 8 hours PRN Moderate Pain (4 - 6)  melatonin 3 milliGRAM(s) Oral at bedtime PRN Insomnia  ondansetron Injectable 4 milliGRAM(s) IV Push every 6 hours PRN Nausea and/or Vomiting  oxyCODONE    IR 15 milliGRAM(s) Oral every 6 hours PRN Severe Pain (7 - 10)      Allergies    No Known Allergies    Intolerances        LABS:    05-25    141  |  112<H>  |  31<H>  ----------------------------<  102<H>  4.8   |  24  |  0.79    Ca    8.3<L>      25 May 2023 06:42            RADIOLOGY & ADDITIONAL TESTS:      Assessment and Plan:   · Assessment	  Patient is a 81 y/o M from [facility], [facility] who walks with a walker and has PMH of HTN, HLD, venous insufficiency, CAD, BPH, CHF who presented s/p mechanical fall. Admitted to medicine for AHRF 2/2 to atelectasis and mechanical fall. Noted to have erythema to upper thigh and complte course of IV abxCT Head, chest, spine no fx. Chest CT shows atelectasis. CXR shows small bibasilar infiltrates, pulmonary consulted, required nasal cannula now tolerating room air. Also found to have urinary retention, moreno placed, + hematuria, Urology consulted, failed TOV and re placed on 5/15 by Urology. Noted with hematuria today, Hgb stable, moreno catheter irrigated and noted with improvement in hematuria. Per discussion with Urology will continue to monitor. PT reccs ALBERTA, pending auth to [facility]  5/22-Pt. with gross hematuria over night, required moreno replacement by uro, 6 eye moreno placed, CBI continues with clear drainage at this time.  5/24- CBI clamped and moreno with clear yellow urine.  Pt declined to have moreno downsize and no objection from Urology standpoint to d/c with current moreno.  D/C planning ongoing and CM assisting, screening Covid for d/c ordered.  5/25- Bladder scan with 350ml with mildly distended urinary bladder.           Problem/Plan - 1:  ·  Problem: Hematuria-stable ,resolved  - noted with hematuria  5/19, moreno irrigated by nursing and Hematuria improved by afternoon  - Holding Lovenox in setting of hematuria.   - Recurrent gross hematuria over night 5/21  - H/H stable  - Uro interventions and note appreciated  - CBI clamped  - Clear from urology standpoint   - Monitor CBC.     Problem/Plan - 2:  ·  Problem: YUNIOR (acute kidney injury)-stable clinically  ·  Plan: likely due to urinary retention/hematuria  -Cont Flomax  -Avoid nephrotoxic meds  -continue f/u AM BMP     Problem/Plan - 3:  ·  Problem: Acute respiratory failure with hypoxia-resolved  -CT chest - Dependent densities in the lower lungs most likely atelectasis given the low lung volumes. Superimposed pneumonia less likely.  -Pulm Dr. Bailey consulted recs appreciated  -Cont Albuterol, oxygen, Incentive spirometry, Out pt pulmo f/u.     Problem/Plan - 4:  ·  Problem: Fall, accidental.   ·  Plan: -Mechanical fall preceded by dizziness.   -PT recs ALBERTA: pending auth, CM assisting.     Problem/Plan - 5:  ·  Problem: Venous insufficiency.   ·  Plan: - patient with significant venous dermatitis.   - edema resolving    -continue Furosemide 20mg PO.     Problem/Plan - 6:  ·  Problem: Chronic back pain.   ·  Plan: home meds Oxycodone 20 TID and oxycodone 10 OD.   c/w pain regimen per Pain Mgmt: oxycodone 15 mg PO q 6 hours PRN severe pain.   Continue home dose baclofen 10mg PO BID.   -- Continue home dose cymbalta 60mg PO daily.   -- Continue Lidoderm 4% patch 2 patches daily.   --Continue bowel regimen   --Pain Mgmt team onboard.     Problem/Plan - 7:  ·  Problem: CAD (coronary artery disease).   ·  Plan: - Continue with statin and plavix, metoprolol.     Problem/Plan - 8:  ·  Problem: Hypertension.   ·  Plan: - BP controlled  - Continue amlodipine.     Problem/Plan - 9:  ·  Problem: Hyperlipidemia.   ·  Plan: - continue statin.     Problem/Plan - 10:  ·  Problem: Prophylactic measure.   ·  Plan; DVT ppx: SCDs. Hold lovenox in setting of hematuria.     Problem/Plan - 11:  ·  Problem: Discharge planning issues.   ·  Plan: PT recc ALBERTA  Pt. for discharge to [facility]  Bladder scan 320ml-   Discharge pending resolution of urological issues  CM following.

## 2023-05-26 NOTE — PROGRESS NOTE ADULT - PROBLEM SELECTOR PROBLEM 4
Fall, accidental
Lumbar compression fracture
Fall, accidental
Lumbar compression fracture
Urinary retention
Lumbar compression fracture
Fall, accidental
Urinary retention
Urinary retention
Fall, accidental
Lumbar compression fracture
Urinary retention

## 2023-05-26 NOTE — PROGRESS NOTE ADULT - PROBLEM SELECTOR PROBLEM 3
Acute respiratory failure with hypoxia
Thoracic compression fracture
Cellulitis of left thigh
Opioid dependence
Thoracic compression fracture
Acute respiratory failure with hypoxia
Cellulitis of left thigh
Thoracic compression fracture
Acute respiratory failure with hypoxia
Opioid dependence
Thoracic compression fracture
Cellulitis of left thigh
Acute respiratory failure with hypoxia

## 2023-05-26 NOTE — PROGRESS NOTE ADULT - PROBLEM SELECTOR PROBLEM 1
Chronic back pain
Hematuria
Hematuria
Acute respiratory failure with hypoxia
Hematuria
Acute respiratory failure with hypoxia
Acute respiratory failure with hypoxia
Hematuria
Acute respiratory failure with hypoxia
Acute respiratory failure with hypoxia
Chronic back pain
Acute respiratory failure with hypoxia
Acute respiratory failure with hypoxia

## 2023-05-26 NOTE — PROGRESS NOTE ADULT - PROBLEM SELECTOR PROBLEM 8
CAD (coronary artery disease)
Hypertension
CAD (coronary artery disease)
Hypertension
CAD (coronary artery disease)
Hypertension
Hypertension
CAD (coronary artery disease)

## 2023-05-26 NOTE — PROGRESS NOTE ADULT - REASON FOR ADMISSION
Mechanical fall

## 2023-05-26 NOTE — PROGRESS NOTE ADULT - SUBJECTIVE AND OBJECTIVE BOX
Source of information: TOM HERRERA, Chart review  Patient language: English  : n/a    HPI:  Patient is a 81 y/o M from [facility], [facility] who walks with a walker and has PMH of HTN, HLD, venous insufficiency, CAD, BPH, CHF who presented s/p mechanical fall. Patient reports that he was by his bed when he felt lightheaded and felt like he might pass out and had a fall. Patient denies hitting his head or any other bodily injury. Patient denies any loss of consciousness and reports he remembers the entire episode. Patient denies any pain at this time. Patient also reported that since being in the ED he is in a lot of discomfort as he has a very strong urge to urinate but he is not able to. Patient denies any history of fevers, chills, headache, prior lightheadedness, chest pain, palpitations, shortness of breath and cough (contradictory to ED note), nausea, vomiting, abdominal pain, diarrhea, constipation, melena, hematochezia, or other urinary symptoms.  (11 May 2023 09:20)    CT head: No evidence of intracranial injury or skull fracture.    CT cervical spine: No fracture or dislocation of the cervical spine.    CT Chest abdomen pelvis:No evidence of acute traumatic injury. Multilevel thoracic and lumbar vertebral compression fractures are   difficult to definitively age though there are no visible fracture lines or adjacent edema to suggest acute injury. Comparison with priors would be helpful.  Dependent densities in the lower lungs most likely atelectasis given the low lung volumes. Superimposed pneumonia less likely.    Pt is admitted for hypoxemia, s/p fall.  CXR demonstrates- Continued elevation of right hemidiaphragm. Right lower lung linear atelectasis and possible trace right pleural effusion. Pain consulted for back pain, chronic on 5/15. Pt seen and examined at bedside this morning. Pt found laying in bed, awake, alert and oriented x 3, reports lumbar back pain score 9/10 and not tolerable at this time SCALE USED: (1-10 VNRS). RN informed, pt will be medicated soon with his pain medication as ordered. Pt describes pain as dull, constant, non- radiating, alleviated by current pain medication, exacerbated by movement. Denies numbness/ tingling. Reports generalized body pain. Requesting more frequent dosing of oxycodone. Reports taking oxycodone 20mg q4h during the day at nursing home. Per [facility], pt on chronic oxycodone 20mg PO 4x/day, last filled 5/4. Pt tolerating PO diet. Denies chest pain, SOB, nausea, vomiting, constipation. Last BM 5/23. Patient stated goal for pain control: to be able to take deep breaths, get out of bed to chair and ambulate with tolerable pain control. Per RN, plan to be DC to his Assisting living facility today.    PAST MEDICAL & SURGICAL HISTORY:  Hypertension    Hyperlipidemia    Venous insufficiency    FAMILY HISTORY:    Social History:  Patient reports he is a former smoker but he quit smoking 40 years ago.   Patient denies alcohol or illicit drug use. (11 May 2023 09:20)   [x]Denies ETOH use, illicit drug use, and smoking     Allergies    No Known Allergies    Intolerances    MEDICATIONS  (STANDING):  albuterol    90 MICROgram(s) HFA Inhaler 2 Puff(s) Inhalation every 6 hours  amLODIPine   Tablet 5 milliGRAM(s) Oral daily  aspirin  chewable 81 milliGRAM(s) Oral daily  atorvastatin 80 milliGRAM(s) Oral at bedtime  baclofen 10 milliGRAM(s) Oral every 12 hours  chlorhexidine 2% Cloths 1 Application(s) Topical daily  DULoxetine 60 milliGRAM(s) Oral daily  furosemide    Tablet 20 milliGRAM(s) Oral daily  metoprolol tartrate 25 milliGRAM(s) Oral two times a day  oxybutynin 5 milliGRAM(s) Oral every 8 hours  polyethylene glycol 3350 17 Gram(s) Oral daily  senna 2 Tablet(s) Oral at bedtime  sodium chloride 0.9% lock flush 3 milliLiter(s) IV Push every 8 hours  tamsulosin 0.4 milliGRAM(s) Oral at bedtime    MEDICATIONS  (PRN):  acetaminophen     Tablet .. 1000 milliGRAM(s) Oral every 8 hours PRN Moderate Pain (4 - 6)  melatonin 3 milliGRAM(s) Oral at bedtime PRN Insomnia  ondansetron Injectable 4 milliGRAM(s) IV Push every 6 hours PRN Nausea and/or Vomiting  oxyCODONE    IR 15 milliGRAM(s) Oral every 6 hours PRN Severe Pain (7 - 10)    Vital Signs Last 24 Hrs  T(C): 36.4 (26 May 2023 12:00), Max: 36.7 (25 May 2023 13:22)  T(F): 97.6 (26 May 2023 12:00), Max: 98.1 (25 May 2023 13:22)  HR: 79 (26 May 2023 12:00) (60 - 96)  BP: 146/76 (26 May 2023 12:00) (136/69 - 165/74)  BP(mean): --  RR: 17 (26 May 2023 12:00) (16 - 18)  SpO2: 91% (26 May 2023 12:00) (91% - 93%)    Parameters below as of 26 May 2023 12:00  Patient On (Oxygen Delivery Method): room air, Nurse was awear of the patient o2     COVID-19 PCR: NotDetec (25 May 2023 06:00)  COVID-19 PCR: NotDetec (16 May 2023 09:14)    LABS: Reviewed    05-25    141  |  112<H>  |  31<H>  ----------------------------<  102<H>  4.8   |  24  |  0.79    Ca    8.3<L>      25 May 2023 06:42    CAPILLARY BLOOD GLUCOSE    COVID-19 PCR: NotDetec (25 May 2023 06:00)  COVID-19 PCR: NotDetec (16 May 2023 09:14)    Radiology: Reviewed  ACC: [id number] EXAM:  XR CHEST PORTABLE ROUTINE 1V   ORDERED BY: TAVON MCDERMOTT     PROCEDURE DATE:  05/15/2023      INTERPRETATION:  TIME OF EXAM: May 15, 2023 at 9:05 AM.    CLINICAL INFORMATION: Hypertension. Atelectasis.    COMPARISON:  APchest x-ray and CT scan of the chest from May 11, 2023.    TECHNIQUE:   AP Portable chest x-ray. Limited by rotation. Jewelry   projects over the left chest.    INTERPRETATION:    Heart size and the mediastinum cannot be accurately evaluated on this   projection.  Elevated right hemidiaphragm again seen.  There is right lower lung linear atelectasis with a possible trace right   pleural effusion.  The left lung is clear.  No left pleural effusion seen.  No pneumothorax.  Incompletely imaged orthopedic hardware projects over the thoracolumbar   spine.    IMPRESSION:  Limited by rotation.    Continued elevation of right hemidiaphragm.    Right lower lung linear atelectasis and possible trace right pleural   effusion.    --- End of Report ---    SKY GARZA MD; Attending Radiologist  This document has been electronically signed. May 16 2023  4:46PM  ACC: [id number] EXAM:  CT ABDOMEN AND PELVIS IC   ORDERED BY: FADUMO SANTOS     ACC: [id number] EXAM:  CT CHEST IC   ORDERED BY: FADUMO SANTOS     ACC: [id number] EXAM:  CT BRAIN   ORDERED BY: FADUMO SANTOS     ACC: [id number] EXAM:  CT CERVICALSPINE   ORDERED BY: FADUMO SANTOS     PROCEDURE DATE:  05/11/2023          INTERPRETATION:  CLINICAL INFORMATION: Back pain after fall. CHF,   hypertension, shortness of breath.    COMPARISON: Chest radiograph same date.    CONTRAST/COMPLICATIONS:  90 cc Omnipaque 350 administered intravenously for the chest abdomen   pelvis.    PROCEDURE:  CT head and cervical spine without intravenous contrast.  CT of the Chest, Abdomen and Pelvis with intravenous contrast.  Sagittal and coronal reformats were performed.    FINDINGS:    HEAD:  BRAIN: No apparent acute infarct, hemmorhage or mass. No hydrocephalus.   Chronic appearing white matter hypodensities and volume loss.  CALVARIUM: No skull fracture or agreesive osseous legions.  EXTRACRANIAL SOFTTISSUES: No acute findings.  VISUALIZED PORTIONS OF THE PARANASAL SINUSES AND MASTOID AIR CELLS: No   air fluid levels.    CERVICAL SPINE:  VERTEBRAE: No fracture or dislocation. Exaggeration of cervical lordosis.   Mild degenerative retrolisthesis of C3 on C4 and mild degenerative   anterolisthesis of C6 on C7 and C7 on T1.  SPINAL CANAL AND FORAMINA: Multilevel degenerative changes with no   apparent high grade spinal canal narrowing.  PARASPINAL SOFT TISSUES: No paravertebral hematoma or acutefinding.    CHEST:  LUNGS AND LARGE AIRWAYS: Low lung volumes with right greater than left   basilar opacities and mild elevation of the right hemidiaphragm. No   pulmonary nodules.  PLEURA: No pleural effusion or pneumothorax.  VESSELS: No evidence of injury to the heart and great vessels. No   thoracic aortic dissection or aneurysm.  HEART: Mild four-chamber cardiomegaly. No pericardial effusion.  MEDIASTINUM AND LUIS ALBERTO: No lymphadenopathy.  CHEST WALL AND LOWER NECK: Within normal limits.    ABDOMEN AND PELVIS:  LIVER: Within normal limits.  BILE DUCTS: Normal caliber.  GALLBLADDER: Within normal limits.  SPLEEN: No evidence of splenic injury or concerning finding. Small cystic   lesion upper spleen.  PANCREAS: Within normal limits.  ADRENALS: Within normal limits.  KIDNEYS/URETERS: No evidence of renal injury. No hydronephrosis or renal   stone or concerning mass. Small cysts bilaterally.    BLADDER: Within normal limits.  REPRODUCTIVE ORGANS: Radiation seeds in the prostate.    BOWEL: No bowel obstruction. Appendix is not visualized. No evidence of   inflammation in the pericecal region.  PERITONEUM: No ascites.  VESSELS: No abdominal aortic aneurysm. Calcific atherosclerosis   particularly of the distal abdominal aorta and iliac arteries.  RETROPERITONEUM/LYMPH NODES: No lymphadenopathy.  ABDOMINAL WALL: Large right lower quadrant anterior abdominal wall hernia   extending into the right inguinal region, not fully visible, contains the   cecum, much of the ascending colon, anddistal ileum. No evidence of   obstruction or other acute complication regarding this hernia.  BONES: Several thoracic and lumbar vertebral compression fractures but   with no discrete fracture line or adjacent edema. Mild depression of the   superior endplate of T1. Chronic moderate to severe compression T4   vertebral body. Mild compression T5, T6, T7, T8, T9, T10, T11 and T12   vertebral bodies. Chronic moderate compression of T3 vertebral body with   focal right scoliosis centered at this level. Status post posterior   fusion T2-sacroiliac. The hardware is intact. Bony demineralization.    IMPRESSION:  CT head: No evidence of intracranial injury or skull fracture.    CT cervical spine: No fracture or dislocation of the cervical spine.    CT Chest abdomen pelvis:  No evidence of acute traumatic injury.  Multilevel thoracic and lumbar vertebral compression fractures are   difficult to definitively age though there are no visible fracture lines   or adjacent edema to suggest acute injury. Comparison with priors would   be helpful.  Dependent densities in the lower lungs most likely atelectasis given the   low lung volumes. Superimposed pneumonia less likely.    --- End of Report ---    ALEX MACIEL MD; Attending Radiologist  This document has been electronically signed. May 11 2023  5:55AM    ORT Score -   Family Hx of substance abuse	Female	      Male  Alcohol 	                                           1                     3  Illegal drugs	                                   2                     3  Rx drugs                                           4 	                  4  Personal Hx of substance abuse		  Alcohol 	                                          3	                  3  Illegal drugs                                     4	                  4  Rx drugs                                            5 	                  5  Age between 16- 45 years	           1                     1  hx preadolescent sexual abuse	   3 	                  0  Psychological disease		  ADD, OCD, bipolar, schizophrenia   2	          2  Depression                                           1 	          1  Total: 0    a score of 3 or lower indicates low risk for opioid abuse		  a score of 4-7 indicates moderate risk for opioid abuse		  a score of 8 or higher indicates high risk for opioid abuse    REVIEW OF SYSTEMS:  CONSTITUTIONAL: No fever, + [place]   RESPIRATORY: No cough, wheezing, chills or hemoptysis; No shortness of breath  CARDIOVASCULAR: No chest pain, palpitations, dizziness, or leg swelling  GASTROINTESTINAL: No loss of appetite, decreased PO intake. No nausea, vomiting; No diarrhea or constipation.   GENITOURINARY: No dysuria, frequency, hematuria, or incontinence, +urinary retention   MUSCULOSKELETAL: + chronic back pain. No joint swelling; + generalized motor strength weakness, no saddle anesthesia, bowel/bladder incontinence, +falls   NEURO: No headaches, No numbness/tingling b/l LE    PHYSICAL EXAM:  GENERAL: awake, alert & Oriented X3, cooperative, NAD, Speech is clear. + [place], answers questions appropriately and follows simple commands.  RESPIRATORY: Respirations even and unlabored. Diminished to auscultation bilaterally; No rales, rhonchi, wheezing, or rubs, on room air.  CARDIOVASCULAR: Normal S1/S2, regular rate and rhythm; No murmurs, rubs, or gallops. No JVD.   GENITOURINARY: Urinary catheter draining caitlin urine.   GASTROINTESTINAL:  Soft, Nontender, Nondistended; Bowel sounds present  PERIPHERAL VASCULAR:  Extremities warm without edema. 2+ Peripheral Pulses, No cyanosis, No calf tenderness  MUSCULOSKELETAL: Motor Strength 4/5 B/L upper and 4/5 lower extremities; moves all extremities equally against gravity; ROM intact; negative SLR; + thoracic and lumbar back tenderness on palpation   SKIN: Warm, dry, intact. No rashes, lesions, scars or wounds.+ hyperpigmentation b/l lower extremities + multiple scabs over back.    Risk factors associated with adverse outcomes related to opioid treatment  [ ]  Concurrent benzodiazepine use  [ ]  History/ Active substance use or alcohol use disorder  [ ] Psychiatric co-morbidity  [ ] Sleep apnea  [ ] COPD  [ ] BMI> 35  [ ] Liver dysfunction  [ ] Renal dysfunction  [ ] CHF  [x] Former Smoker  [x]  Age > 60 years    [x]  [place]  Reviewed and Copied to Chart. See below.    Plan of care and goal oriented pain management treatment options were discussed with patient and /or primary care giver; all questions and concerns were addressed and care was aligned with patient's wishes.    Educated patient on goal oriented pain management treatment options     05-26-23 @ 12:01

## 2023-05-26 NOTE — PROGRESS NOTE ADULT - PROBLEM SELECTOR PROBLEM 5
Fall, accidental
Fall, accidental
Hematuria
Fall, accidental
Hematuria
Hypertension
Fall, accidental
Hematuria
Hypertension
Venous insufficiency
Hematuria
Venous insufficiency
Hematuria

## 2023-05-26 NOTE — PROGRESS NOTE ADULT - PROVIDER SPECIALTY LIST ADULT
Internal Medicine
Internal Medicine
Pulmonology
Internal Medicine
Pulmonology
Internal Medicine
Pulmonology
Urology
Pain Medicine
Urology
Internal Medicine
Pain Medicine
Internal Medicine
Internal Medicine
Pain Medicine
Internal Medicine
Internal Medicine
Pain Medicine
Internal Medicine
Internal Medicine
Pain Medicine
Pain Medicine
Internal Medicine

## 2023-05-26 NOTE — PROGRESS NOTE ADULT - ASSESSMENT
Confidential Drug Utilization Report  Search Terms: Chucky Dove, 1940Search Date: 05/15/2023 08:50:06 AM  The Drug Utilization Report below displays all of the controlled substance prescriptions, if any, that your patient has filled in the last twelve months. The information displayed on this report is compiled from pharmacy submissions to the Department, and accurately reflects the information as submitted by the pharmacies.    This report was requested by: Camilla Thompson | Reference #: [id number]    You have not added a THELMA number. Keeping your THELMA number(s) up to date on the My THELMA # tab will enable the separation of your prescriptions from others in the search results.    Practitioner Count: 1  Pharmacy Count: 1  Current Opioid Prescriptions: 1  Current Benzodiazepine Prescriptions: 0  Current Stimulant Prescriptions: 0      Patient Demographic Information (PDI)       PDI	First Name	Last Name	Birth Date	Gender	Street Address	[facility] [place]	Zip Code  A	Chucky Dove	1940	Male	[address] [facility]	[address]    Prescription Information      PDI Filter:    PDI	Current Rx	Drug Type	Rx Written	Rx Dispensed	Drug	Quantity	Days Supply	Prescriber Name	Prescriber THELMA #	Payment Method	Dispenser  A	Y	O	05/04/2023	05/04/2023	oxycodone hcl (ir) 20 mg tab	120	30	Tavdy, Esa	[id number]	Medicare	[facility] [facility]  A	N	O	03/30/2023	03/30/2023	oxycodone hcl (ir) 20 mg tab	120	30	Tavdy, Esa	[id number]	Medicare	[facility]  A	N	O	02/14/2023	02/27/2023	oxycodone hcl (ir) 20 mg tab	120	30	Tavdy, Esa	[id number]	Medicare	[facility]  A	N	O	02/02/2023	02/02/2023	oxycodone hcl (ir) 20 mg tab	120	30	Tavdy, Esa	[id number]	Medicare	[facility]  A	N	O	01/13/2023	01/13/2023	oxycodone hcl (ir) 10 mg tab	30	30	Tavdy, Esa	[id number]	Medicare	[facility]  A	N	O	01/13/2023	01/13/2023	oxycodone hcl (ir) 20 mg tab	90	30	Tavdy, Esa	[id number]	Medicare	[facility]  A	N	O	12/06/2022	12/06/2022	oxycodone hcl (ir) 20 mg tab	90	30	Tavdy, Esa	[id number]	Medicare	[facility] [employer] [facility]  A	N	O	12/06/2022	12/06/2022	oxycodone hcl (ir) 10 mg tab	30	30	Tavdy, Esa	[id number]	Medicare	[facility]  A	N	O	10/27/2022	10/31/2022	oxycodone hcl (ir) 10 mg tab	30	30	Tavdy, Esa	[id number]	Medicare	[facility]  A	N	O	10/27/2022	10/31/2022	oxycodone hcl (ir) 20 mg tab	90	30	Tavdy, Esa	[id number]	Medicare	[facility]  A	N	O	10/06/2022	10/06/2022	oxycodone hcl (ir) 20 mg tab	90	30	Tavdy, Esa	[id number]	Medicare	[facility]  A	N	O	10/06/2022	10/06/2022	oxycodone hcl (ir) 10 mg tab	30	30	Tavdy, Esa	[id number]	Medicare	[facility]  A	N	O	08/23/2022	08/23/2022	oxycodone hcl (ir) 10 mg tab	30	30	Tavdy, Esa	[id number]	Medicare	[facility]  A	N	O	08/23/2022	08/23/2022	oxycodone hcl (ir) 20 mg tab	90	30	Tavdy, Esa	[id number]	Medicare	[facility]  A	N	O	07/21/2022	07/31/2022	oxycodone hcl (ir) 10 mg tab	30	30	Tavdy, Esa	[id number]	Medicare	[facility]  A	N	O	07/21/2022	07/31/2022	oxycodone hcl (ir) 20 mg tab	90	30	Tavdy, Esa	[id number]	Medicare	[employer] [facility]  A	N	O	07/05/2022	07/05/2022	oxycodone hcl (ir) 10 mg tab	30	30	Tavdy, Esa	[id number]	Medicare	[facility] [employer] [facility]  A	N	O	07/05/2022	07/05/2022	oxycodone hcl (ir) 20 mg tab	90	30	Tavdy, Esa	[id number]	Medicare	[facility]  A	N	O	05/27/2022	05/31/2022	oxycodone hcl (ir) 10 mg tab	30	30	Esa Herring	[id number]	Medicare	[facility] [employer] [facility]  A	N	O	05/27/2022	05/31/2022	oxycodone hcl (ir) 20 mg tab	90	30	[facility]	[id number]	Medicare	[facility] [facility]    * - Details of Drug Type : O = Opioid, B = Benzodiazepine, S = Stimulant    * - Drugs marked with an asterisk are compound drugs. If the compound drug is made up of more than one controlled substance, then each controlled substance will be a separate row in the table.

## 2023-05-26 NOTE — PROGRESS NOTE ADULT - PROBLEM SELECTOR PROBLEM 6
Chronic back pain
Acute respiratory failure with hypoxia
Chronic back pain
Venous insufficiency
Acute respiratory failure with hypoxia
Venous insufficiency
Urinary retention
Chronic back pain
Acute respiratory failure with hypoxia
Acute respiratory failure with hypoxia
Urinary retention
Venous insufficiency
Chronic back pain
Venous insufficiency

## 2023-05-26 NOTE — DISCHARGE NOTE NURSING/CASE MANAGEMENT/SOCIAL WORK - PATIENT PORTAL LINK FT
You can access the FollowMyHealth Patient Portal offered by [facility] by registering at the following website: [email or web address]. By joining [facility]’s FollowMyHealth portal, you will also be able to view your health information using other applications (apps) compatible with our system.

## 2023-05-26 NOTE — PROGRESS NOTE ADULT - PROBLEM SELECTOR PROBLEM 7
Chronic back pain
Hyperlipidemia
Chronic back pain
CAD (coronary artery disease)
Hypertension
Hypertension
Hyperlipidemia
Chronic back pain
Hypertension
CAD (coronary artery disease)
Hypertension
CAD (coronary artery disease)
Chronic back pain
CAD (coronary artery disease)
Chronic back pain

## 2023-05-30 ENCOUNTER — INPATIENT (INPATIENT)
Facility: HOSPITAL | Age: 83
LOS: 8 days | Discharge: NURSING, REHAB OR LONG-TERM CARE | DRG: 699 | End: 2023-06-08
Attending: INTERNAL MEDICINE | Admitting: INTERNAL MEDICINE
Payer: MEDICARE

## 2023-05-30 VITALS
HEART RATE: 64 BPM | TEMPERATURE: 98 F | DIASTOLIC BLOOD PRESSURE: 57 MMHG | SYSTOLIC BLOOD PRESSURE: 100 MMHG | OXYGEN SATURATION: 97 % | RESPIRATION RATE: 19 BRPM

## 2023-05-30 PROCEDURE — 99285 EMERGENCY DEPT VISIT HI MDM: CPT

## 2023-05-30 RX ORDER — OXYCODONE HYDROCHLORIDE 5 MG/1
10 TABLET ORAL ONCE
Refills: 0 | Status: DISCONTINUED | OUTPATIENT
Start: 2023-05-30 | End: 2023-05-30

## 2023-05-30 NOTE — ED PROVIDER NOTE - OBJECTIVE STATEMENT
82-year-old male with history of hypertension hypoxia on baseline oxygen on nasal cannula, CAD, hyperlipidemia, BPH with indwelling [place], GERD, CHF, chronic back pain sent in from nursing home for hematuria and back pain.  Patient reports that today he has been having severe low back pain which she has had in the past related to previous surgeries.  Denies any leg weakness or numbness that is new recently reports that he has not been ambulatory for the last 2 weeks but usually uses a walker to ambulate.  Reports that he has had hematuria in the past but does not know the cause.  Patient takes aspirin and Plavix.Patient is DNR/DNI MOLST form sent from nursing home.

## 2023-05-30 NOTE — ED PROVIDER NOTE - DIFFERENTIAL DIAGNOSIS
Chronic back pain versus lumbar radiculopathy versus ureteral stone versus bladder/renal mass versus UTI Differential Diagnosis

## 2023-05-30 NOTE — ED PROVIDER NOTE - CLINICAL SUMMARY MEDICAL DECISION MAKING FREE TEXT BOX
82-year-old male with history of hypertension hypoxia on baseline oxygen on nasal cannula, CAD, hyperlipidemia, BPH with indwelling Cunningham, GERD, CHF, chronic back pain sent in from nursing home for hematuria and back pain.Will obtain EKG labs chest x-ray, UA, CT abdomen pelvis.  Patient requesting oxycodone which he takes for his chronic back pain.  ekg interpretation-   lab interpretation-  imaging interpretation- CXR  CT A/P  Discussed above with patient  patient stable for 82-year-old male with history of hypertension hypoxia on baseline oxygen on nasal cannula, CAD, hyperlipidemia, BPH with indwelling Cunningham, GERD, CHF, chronic back pain sent in from nursing home for hematuria and back pain.Will obtain EKG labs chest x-ray, UA, CT abdomen pelvis.  Patient requesting oxycodone which he takes for his chronic back pain.  ekg interpretation- no ischemic changes  lab interpretation- wbc 5k, H/H 11.9/38, cmp unremarkable, ua shows blood without infection  imaging interpretation- CXR shows no new focal infiltrate  CT A/P  Discussed above with patient  patient stable for 82-year-old male with history of hypertension hypoxia on baseline oxygen on nasal cannula, CAD, hyperlipidemia, BPH with indwelling Moreno, GERD, CHF, chronic back pain sent in from nursing home for hematuria and back pain.Will obtain EKG labs chest x-ray, UA, CT abdomen pelvis.  Patient requesting oxycodone which he takes for his chronic back pain. RN placed 3way moreno for bladder irrigation  ekg interpretation- no ischemic changes  lab interpretation- wbc 5k, H/H 11.9/38, cmp unremarkable, ua shows blood without infection  imaging interpretation- CXR shows no new focal infiltrate  CT A/P shows Blood and Moreno catheter in the urinary bladder lumen are new compared to the prior study.Numerous other chronic findings are not significantly changed.  On reeval patient continues to form clots despite 4 hours of CBI  @530am surgical house officer Dr. Stone consulted, awaiting recs 82-year-old male with history of hypertension hypoxia on baseline oxygen on nasal cannula, CAD, hyperlipidemia, BPH with indwelling Moreno, GERD, CHF, chronic back pain sent in from nursing home for hematuria and back pain.Will obtain EKG labs chest x-ray, UA, CT abdomen pelvis.  Patient requesting oxycodone which he takes for his chronic back pain. RN placed 3way moreno for bladder irrigation  ekg interpretation- no ischemic changes  lab interpretation- wbc 5k, H/H 11.9/38, cmp unremarkable, ua shows blood without infection  imaging interpretation- CXR shows no new focal infiltrate  CT A/P shows Blood and Moreno catheter in the urinary bladder lumen are new compared to the prior study.Numerous other chronic findings are not significantly changed.  On reeval patient continues to form clots despite 4 hours of CBI  @530am surgical house officer Dr. Stone consulted, awaiting recs  patient signedout to Dr. gilbert at 730am.

## 2023-05-30 NOTE — ED PROVIDER NOTE - PROGRESS NOTE DETAILS
Lucks-DO: pt received at sign-out, seen and evaluated at bedside.  Pt sitting comfortably in NAD. Signout pending urology consult and disposition. Discussed with urology PA, Octavio draining pink urine, recommending medical admission for CBI. Discussed with hospitalist and MAR re: admission.

## 2023-05-31 DIAGNOSIS — R31.9 HEMATURIA, UNSPECIFIED: ICD-10-CM

## 2023-05-31 DIAGNOSIS — I10 ESSENTIAL (PRIMARY) HYPERTENSION: ICD-10-CM

## 2023-05-31 DIAGNOSIS — Z29.9 ENCOUNTER FOR PROPHYLACTIC MEASURES, UNSPECIFIED: ICD-10-CM

## 2023-05-31 DIAGNOSIS — I50.9 HEART FAILURE, UNSPECIFIED: ICD-10-CM

## 2023-05-31 DIAGNOSIS — N40.0 BENIGN PROSTATIC HYPERPLASIA WITHOUT LOWER URINARY TRACT SYMPTOMS: ICD-10-CM

## 2023-05-31 DIAGNOSIS — E78.5 HYPERLIPIDEMIA, UNSPECIFIED: ICD-10-CM

## 2023-05-31 DIAGNOSIS — I87.2 VENOUS INSUFFICIENCY (CHRONIC) (PERIPHERAL): ICD-10-CM

## 2023-05-31 LAB
ALBUMIN SERPL ELPH-MCNC: 2.9 G/DL — LOW (ref 3.5–5)
ALP SERPL-CCNC: 91 U/L — SIGNIFICANT CHANGE UP (ref 40–120)
ALT FLD-CCNC: 12 U/L DA — SIGNIFICANT CHANGE UP (ref 10–60)
ANION GAP SERPL CALC-SCNC: 4 MMOL/L — LOW (ref 5–17)
APPEARANCE UR: ABNORMAL
APTT BLD: 24.7 SEC — LOW (ref 27.5–35.5)
AST SERPL-CCNC: 33 U/L — SIGNIFICANT CHANGE UP (ref 10–40)
BACTERIA # UR AUTO: NEGATIVE /HPF — SIGNIFICANT CHANGE UP
BASOPHILS # BLD AUTO: 0.03 K/UL — SIGNIFICANT CHANGE UP (ref 0–0.2)
BASOPHILS NFR BLD AUTO: 0.5 % — SIGNIFICANT CHANGE UP (ref 0–2)
BILIRUB SERPL-MCNC: 0.6 MG/DL — SIGNIFICANT CHANGE UP (ref 0.2–1.2)
BILIRUB UR-MCNC: NEGATIVE — SIGNIFICANT CHANGE UP
BUN SERPL-MCNC: 23 MG/DL — HIGH (ref 7–18)
CALCIUM SERPL-MCNC: 9.4 MG/DL — SIGNIFICANT CHANGE UP (ref 8.4–10.5)
CHLORIDE SERPL-SCNC: 106 MMOL/L — SIGNIFICANT CHANGE UP (ref 96–108)
CO2 SERPL-SCNC: 28 MMOL/L — SIGNIFICANT CHANGE UP (ref 22–31)
COLOR SPEC: ABNORMAL
CREAT SERPL-MCNC: 0.97 MG/DL — SIGNIFICANT CHANGE UP (ref 0.5–1.3)
DIFF PNL FLD: ABNORMAL
EGFR: 77 ML/MIN/1.73M2 — SIGNIFICANT CHANGE UP
EOSINOPHIL # BLD AUTO: 0.28 K/UL — SIGNIFICANT CHANGE UP (ref 0–0.5)
EOSINOPHIL NFR BLD AUTO: 4.8 % — SIGNIFICANT CHANGE UP (ref 0–6)
GLUCOSE SERPL-MCNC: 89 MG/DL — SIGNIFICANT CHANGE UP (ref 70–99)
GLUCOSE UR QL: NEGATIVE — SIGNIFICANT CHANGE UP
HCT VFR BLD CALC: 32.6 % — LOW (ref 39–50)
HCT VFR BLD CALC: 38.4 % — LOW (ref 39–50)
HGB BLD-MCNC: 10.2 G/DL — LOW (ref 13–17)
HGB BLD-MCNC: 11.9 G/DL — LOW (ref 13–17)
IMM GRANULOCYTES NFR BLD AUTO: 0.2 % — SIGNIFICANT CHANGE UP (ref 0–0.9)
INR BLD: 1.32 RATIO — HIGH (ref 0.88–1.16)
KETONES UR-MCNC: ABNORMAL
LEUKOCYTE ESTERASE UR-ACNC: NEGATIVE — SIGNIFICANT CHANGE UP
LYMPHOCYTES # BLD AUTO: 1.09 K/UL — SIGNIFICANT CHANGE UP (ref 1–3.3)
LYMPHOCYTES # BLD AUTO: 18.7 % — SIGNIFICANT CHANGE UP (ref 13–44)
MCHC RBC-ENTMCNC: 26.7 PG — LOW (ref 27–34)
MCHC RBC-ENTMCNC: 26.8 PG — LOW (ref 27–34)
MCHC RBC-ENTMCNC: 31 GM/DL — LOW (ref 32–36)
MCHC RBC-ENTMCNC: 31.3 GM/DL — LOW (ref 32–36)
MCV RBC AUTO: 85.8 FL — SIGNIFICANT CHANGE UP (ref 80–100)
MCV RBC AUTO: 86.3 FL — SIGNIFICANT CHANGE UP (ref 80–100)
MONOCYTES # BLD AUTO: 0.41 K/UL — SIGNIFICANT CHANGE UP (ref 0–0.9)
MONOCYTES NFR BLD AUTO: 7 % — SIGNIFICANT CHANGE UP (ref 2–14)
NEUTROPHILS # BLD AUTO: 4.02 K/UL — SIGNIFICANT CHANGE UP (ref 1.8–7.4)
NEUTROPHILS NFR BLD AUTO: 68.8 % — SIGNIFICANT CHANGE UP (ref 43–77)
NITRITE UR-MCNC: NEGATIVE — SIGNIFICANT CHANGE UP
NRBC # BLD: 0 /100 WBCS — SIGNIFICANT CHANGE UP (ref 0–0)
NRBC # BLD: 0 /100 WBCS — SIGNIFICANT CHANGE UP (ref 0–0)
PH UR: 7 — SIGNIFICANT CHANGE UP (ref 5–8)
PLATELET # BLD AUTO: 161 K/UL — SIGNIFICANT CHANGE UP (ref 150–400)
PLATELET # BLD AUTO: 176 K/UL — SIGNIFICANT CHANGE UP (ref 150–400)
POTASSIUM SERPL-MCNC: 4.9 MMOL/L — SIGNIFICANT CHANGE UP (ref 3.5–5.3)
POTASSIUM SERPL-SCNC: 4.9 MMOL/L — SIGNIFICANT CHANGE UP (ref 3.5–5.3)
PROT SERPL-MCNC: 7.6 G/DL — SIGNIFICANT CHANGE UP (ref 6–8.3)
PROT UR-MCNC: 500 MG/DL
PROTHROM AB SERPL-ACNC: 15.8 SEC — HIGH (ref 10.5–13.4)
RAPID RVP RESULT: SIGNIFICANT CHANGE UP
RBC # BLD: 3.8 M/UL — LOW (ref 4.2–5.8)
RBC # BLD: 4.45 M/UL — SIGNIFICANT CHANGE UP (ref 4.2–5.8)
RBC # FLD: 16.8 % — HIGH (ref 10.3–14.5)
RBC # FLD: 17.5 % — HIGH (ref 10.3–14.5)
RBC CASTS # UR COMP ASSIST: >50 /HPF (ref 0–2)
SARS-COV-2 RNA SPEC QL NAA+PROBE: SIGNIFICANT CHANGE UP
SODIUM SERPL-SCNC: 138 MMOL/L — SIGNIFICANT CHANGE UP (ref 135–145)
SP GR SPEC: 1.01 — SIGNIFICANT CHANGE UP (ref 1.01–1.02)
UROBILINOGEN FLD QL: NEGATIVE — SIGNIFICANT CHANGE UP
WBC # BLD: 5.4 K/UL — SIGNIFICANT CHANGE UP (ref 3.8–10.5)
WBC # BLD: 5.84 K/UL — SIGNIFICANT CHANGE UP (ref 3.8–10.5)
WBC # FLD AUTO: 5.4 K/UL — SIGNIFICANT CHANGE UP (ref 3.8–10.5)
WBC # FLD AUTO: 5.84 K/UL — SIGNIFICANT CHANGE UP (ref 3.8–10.5)
WBC UR QL: SIGNIFICANT CHANGE UP /HPF (ref 0–5)

## 2023-05-31 PROCEDURE — 74177 CT ABD & PELVIS W/CONTRAST: CPT | Mod: 26,MA

## 2023-05-31 PROCEDURE — 71045 X-RAY EXAM CHEST 1 VIEW: CPT | Mod: 26

## 2023-05-31 RX ORDER — OXYCODONE HYDROCHLORIDE 5 MG/1
10 TABLET ORAL ONCE
Refills: 0 | Status: DISCONTINUED | OUTPATIENT
Start: 2023-05-31 | End: 2023-05-31

## 2023-05-31 RX ORDER — ATORVASTATIN CALCIUM 80 MG/1
80 TABLET, FILM COATED ORAL AT BEDTIME
Refills: 0 | Status: DISCONTINUED | OUTPATIENT
Start: 2023-05-31 | End: 2023-06-08

## 2023-05-31 RX ORDER — CLOPIDOGREL BISULFATE 75 MG/1
75 TABLET, FILM COATED ORAL DAILY
Refills: 0 | Status: DISCONTINUED | OUTPATIENT
Start: 2023-05-31 | End: 2023-06-02

## 2023-05-31 RX ORDER — OXYCODONE HYDROCHLORIDE 5 MG/1
20 TABLET ORAL
Refills: 0 | Status: DISCONTINUED | OUTPATIENT
Start: 2023-05-31 | End: 2023-06-07

## 2023-05-31 RX ORDER — METOPROLOL TARTRATE 50 MG
25 TABLET ORAL
Refills: 0 | Status: DISCONTINUED | OUTPATIENT
Start: 2023-05-31 | End: 2023-06-02

## 2023-05-31 RX ORDER — OXYCODONE HYDROCHLORIDE 5 MG/1
10 TABLET ORAL
Refills: 0 | Status: DISCONTINUED | OUTPATIENT
Start: 2023-05-31 | End: 2023-06-07

## 2023-05-31 RX ORDER — DULOXETINE HYDROCHLORIDE 30 MG/1
60 CAPSULE, DELAYED RELEASE ORAL DAILY
Refills: 0 | Status: DISCONTINUED | OUTPATIENT
Start: 2023-05-31 | End: 2023-06-08

## 2023-05-31 RX ORDER — TAMSULOSIN HYDROCHLORIDE 0.4 MG/1
0.4 CAPSULE ORAL AT BEDTIME
Refills: 0 | Status: DISCONTINUED | OUTPATIENT
Start: 2023-05-31 | End: 2023-06-08

## 2023-05-31 RX ORDER — FUROSEMIDE 40 MG
20 TABLET ORAL DAILY
Refills: 0 | Status: DISCONTINUED | OUTPATIENT
Start: 2023-05-31 | End: 2023-06-02

## 2023-05-31 RX ORDER — ACETAMINOPHEN 500 MG
650 TABLET ORAL EVERY 6 HOURS
Refills: 0 | Status: DISCONTINUED | OUTPATIENT
Start: 2023-05-31 | End: 2023-06-08

## 2023-05-31 RX ORDER — LANOLIN ALCOHOL/MO/W.PET/CERES
3 CREAM (GRAM) TOPICAL AT BEDTIME
Refills: 0 | Status: DISCONTINUED | OUTPATIENT
Start: 2023-05-31 | End: 2023-06-08

## 2023-05-31 RX ORDER — ASPIRIN/CALCIUM CARB/MAGNESIUM 324 MG
81 TABLET ORAL DAILY
Refills: 0 | Status: DISCONTINUED | OUTPATIENT
Start: 2023-05-31 | End: 2023-06-02

## 2023-05-31 RX ORDER — OXYCODONE HYDROCHLORIDE 5 MG/1
20 TABLET ORAL
Refills: 0 | Status: DISCONTINUED | OUTPATIENT
Start: 2023-05-31 | End: 2023-05-31

## 2023-05-31 RX ORDER — FAMOTIDINE 10 MG/ML
20 INJECTION INTRAVENOUS DAILY
Refills: 0 | Status: DISCONTINUED | OUTPATIENT
Start: 2023-05-31 | End: 2023-06-08

## 2023-05-31 RX ORDER — OXYCODONE HYDROCHLORIDE 5 MG/1
5 TABLET ORAL ONCE
Refills: 0 | Status: DISCONTINUED | OUTPATIENT
Start: 2023-05-31 | End: 2023-05-31

## 2023-05-31 RX ORDER — ONDANSETRON 8 MG/1
4 TABLET, FILM COATED ORAL EVERY 8 HOURS
Refills: 0 | Status: DISCONTINUED | OUTPATIENT
Start: 2023-05-31 | End: 2023-06-08

## 2023-05-31 RX ORDER — BACLOFEN 100 %
5 POWDER (GRAM) MISCELLANEOUS EVERY 12 HOURS
Refills: 0 | Status: DISCONTINUED | OUTPATIENT
Start: 2023-05-31 | End: 2023-06-08

## 2023-05-31 RX ORDER — AMLODIPINE BESYLATE 2.5 MG/1
5 TABLET ORAL DAILY
Refills: 0 | Status: DISCONTINUED | OUTPATIENT
Start: 2023-05-31 | End: 2023-06-02

## 2023-05-31 RX ADMIN — OXYCODONE HYDROCHLORIDE 5 MILLIGRAM(S): 5 TABLET ORAL at 12:00

## 2023-05-31 RX ADMIN — FAMOTIDINE 20 MILLIGRAM(S): 10 INJECTION INTRAVENOUS at 12:18

## 2023-05-31 RX ADMIN — OXYCODONE HYDROCHLORIDE 10 MILLIGRAM(S): 5 TABLET ORAL at 05:04

## 2023-05-31 RX ADMIN — Medication 5 MILLIGRAM(S): at 18:25

## 2023-05-31 RX ADMIN — ATORVASTATIN CALCIUM 80 MILLIGRAM(S): 80 TABLET, FILM COATED ORAL at 21:17

## 2023-05-31 RX ADMIN — DULOXETINE HYDROCHLORIDE 60 MILLIGRAM(S): 30 CAPSULE, DELAYED RELEASE ORAL at 15:53

## 2023-05-31 RX ADMIN — Medication 75 MILLIGRAM(S): at 21:17

## 2023-05-31 RX ADMIN — CLOPIDOGREL BISULFATE 75 MILLIGRAM(S): 75 TABLET, FILM COATED ORAL at 12:18

## 2023-05-31 RX ADMIN — OXYCODONE HYDROCHLORIDE 20 MILLIGRAM(S): 5 TABLET ORAL at 22:14

## 2023-05-31 RX ADMIN — AMLODIPINE BESYLATE 5 MILLIGRAM(S): 2.5 TABLET ORAL at 21:18

## 2023-05-31 RX ADMIN — OXYCODONE HYDROCHLORIDE 5 MILLIGRAM(S): 5 TABLET ORAL at 10:39

## 2023-05-31 RX ADMIN — OXYCODONE HYDROCHLORIDE 20 MILLIGRAM(S): 5 TABLET ORAL at 21:17

## 2023-05-31 RX ADMIN — Medication 81 MILLIGRAM(S): at 12:18

## 2023-05-31 RX ADMIN — OXYCODONE HYDROCHLORIDE 10 MILLIGRAM(S): 5 TABLET ORAL at 10:56

## 2023-05-31 RX ADMIN — TAMSULOSIN HYDROCHLORIDE 0.4 MILLIGRAM(S): 0.4 CAPSULE ORAL at 21:18

## 2023-05-31 RX ADMIN — Medication 20 MILLIGRAM(S): at 21:18

## 2023-05-31 RX ADMIN — OXYCODONE HYDROCHLORIDE 20 MILLIGRAM(S): 5 TABLET ORAL at 16:00

## 2023-05-31 RX ADMIN — OXYCODONE HYDROCHLORIDE 20 MILLIGRAM(S): 5 TABLET ORAL at 15:01

## 2023-05-31 RX ADMIN — OXYCODONE HYDROCHLORIDE 10 MILLIGRAM(S): 5 TABLET ORAL at 06:44

## 2023-05-31 RX ADMIN — OXYCODONE HYDROCHLORIDE 10 MILLIGRAM(S): 5 TABLET ORAL at 02:43

## 2023-05-31 RX ADMIN — Medication 75 MILLIGRAM(S): at 15:53

## 2023-05-31 RX ADMIN — Medication 25 MILLIGRAM(S): at 18:25

## 2023-05-31 RX ADMIN — OXYCODONE HYDROCHLORIDE 10 MILLIGRAM(S): 5 TABLET ORAL at 18:25

## 2023-05-31 NOTE — H&P ADULT - ASSESSMENT
This is a 82 year old male, coming from [facility], [facility], with medical history of HTN, HLD, venous insufficiency, CAD, BPH, CHF who presented with hematuria

## 2023-05-31 NOTE — CONSULT NOTE ADULT - SUBJECTIVE AND OBJECTIVE BOX
81 y/o male from NH with past medical history of HTN, HLD, BPH, CHF admitted to medicine earlier in May s/p mechanical fall and found to be in urinary retention s/p moreno placement with subsequent failed TOV, moreno replaced with recurrent hematuria, likely to trauma. CBI started 5/21 and clamped 5/23.  sent in from nursing home for hematuria and back pain.  Patient reports that today he has been having severe low back pain which she has had in the past related to previous surgeries.  Denies any leg weakness or numbness that is new recently reports that he has not been ambulatory for the last 2 weeks but usually uses a walker to ambulate.  Reports that he has had hematuria in the past but does not know the cause.  Patient takes aspirin and Plavix.Patient is DNR/DNI MOLST form sent from nursing home    CBI running in ED with UO light pink now, no clot    PAST MEDICAL & SURGICAL HISTORY:  Hypertension      Hyperlipidemia      Venous insufficiency          Vital Signs Last 24 Hrs  T(C): 36.8 (31 May 2023 07:50), Max: 36.8 (31 May 2023 07:50)  T(F): 98.2 (31 May 2023 07:50), Max: 98.2 (31 May 2023 07:50)  HR: 76 (31 May 2023 07:50) (64 - 76)  BP: 131/73 (31 May 2023 07:50) (100/57 - 131/73)  BP(mean): --  RR: 18 (31 May 2023 07:50) (18 - 19)  SpO2: 98% (31 May 2023 07:50) (97% - 98%)    Parameters below as of 31 May 2023 07:50  Patient On (Oxygen Delivery Method): room air                              11.9   5.84  )-----------( 176      ( 31 May 2023 01:08 )             38.4     05-31    138  |  106  |  23<H>  ----------------------------<  89  4.9   |  28  |  0.97    Ca    9.4      31 May 2023 01:08    TPro  7.6  /  Alb  2.9<L>  /  TBili  0.6  /  DBili  x   /  AST  33  /  ALT  12  /  AlkPhos  91  05-31    PT/INR - ( 31 May 2023 01:08 )   PT: 15.8 sec;   INR: 1.32 ratio         PTT - ( 31 May 2023 01:08 )  PTT:24.7 sec    PHYSICAL EXAM  General: WN/WD NAD  Neurology: A&Ox3, nonfocal, ROTH x 4  Respiratory: CTA B/L  CV: S1S2  Abdominal: Soft, NT  Extremities: No edema, + peripheral pulses        ASSESSMENT/ PLAN:  81 y/o male from NH with past medical history of HTN, HLD, BPH, CHF admitted to medicine earlier in May s/p mechanical fall and found to be in urinary retention s/p moreno placement with subsequent failed TOV, moreno replaced with recurrent hematuria, likely to trauma. CBI started 5/21 and clamped 5/23.  sent in from nursing home for hematuria and back pain.  Patient reports that today he has been having severe low back pain which she has had in the past related to previous surgeries.  Denies any leg weakness or numbness that is new recently reports that he has not been ambulatory for the last 2 weeks but usually uses a walker to ambulate.  Reports that he has had hematuria in the past but does not know the cause.  Patient takes aspirin and Plavix.Patient is DNR/DNI MOLST form sent from nursing home    CBI running in ED with UO light pink now, no clot    case discussed with Dr Harden  cont CBI for now, wean as odalys  irrigate prn  hold AC if medically safe  trend CBC  urology will follow

## 2023-05-31 NOTE — ED ADULT NURSE REASSESSMENT NOTE - NS ED NURSE REASSESS COMMENT FT1
started care at 7:10am /pt is aa and able to follow commands /cbi is continuous /vitals stable /safety maintained /side rails up /will continue to monitor

## 2023-05-31 NOTE — H&P ADULT - PROBLEM SELECTOR PLAN 1
Pt presented with hematuria with chronic moreno.   continiue with CBI as pt tolerates   monitor cbc q12   urology following.

## 2023-05-31 NOTE — H&P ADULT - PROBLEM SELECTOR PLAN 3
- Pt has a history of HTN.   - Will start pts home medication of amlodipine and lasix with parameters.

## 2023-05-31 NOTE — ED ADULT NURSE NOTE - NSFALLRISKINTERV_ED_ALL_ED
Assistance OOB with selected safe patient handling equipment if applicable/Communicate fall risk and risk factors to all staff, patient, and family/Provide visual cue: yellow wristband, yellow gown, etc/Reinforce activity limits and safety measures with patient and family/Toileting schedule using arm’s reach rule for commode and bathroom/Call bell, personal items and telephone in reach/Instruct patient to call for assistance before getting out of bed/chair/stretcher/Non-slip footwear applied when patient is off stretcher/[place] to call system/Physically safe environment - no spills, clutter or unnecessary equipment/Purposeful Proactive Rounding/Room/bathroom lighting operational, light cord in reach

## 2023-05-31 NOTE — H&P ADULT - NSHPREVIEWOFSYSTEMS_GEN_ALL_CORE
CONSTITUTIONAL: No fever, weight loss, or fatigue  RESPIRATORY: No cough, wheezing, chills or hemoptysis; No shortness of breath  CARDIOVASCULAR: No chest pain, palpitations, dizziness, or leg swelling  GASTROINTESTINAL: No abdominal pain. No nausea, vomiting, or hematemesis; No diarrhea or constipation. No melena or hematochezia.  GENITOURINARY: No dysuria or hematuria, urinary frequency  NEUROLOGICAL: No headaches, memory loss, loss of strength, numbness, or tremors  ENDOCRINE: No polyuria, polydipsia, or heat/cold intolerance  MUSCULOSKELETAL: Chronic back pain.   HEME: no easy bruisability, no tender or enlarged lymph nodes  SKIN: No itching, burning, rashes, or lesions .

## 2023-05-31 NOTE — PROGRESS NOTE ADULT - SUBJECTIVE AND OBJECTIVE BOX
Irrigated moreno with 1500cc sterile saline. Retrieved abundant clots.  Now irrigating clear and cbi running clear.

## 2023-05-31 NOTE — H&P ADULT - NSHPPHYSICALEXAM_GEN_ALL_CORE
Vital Signs Last 24 Hrs  T(C): 36.8 (31 May 2023 07:50), Max: 36.8 (31 May 2023 07:50)  T(F): 98.2 (31 May 2023 07:50), Max: 98.2 (31 May 2023 07:50)  HR: 76 (31 May 2023 07:50) (64 - 76)  BP: 131/73 (31 May 2023 07:50) (100/57 - 131/73)  BP(mean): --  RR: 18 (31 May 2023 07:50) (18 - 19)  SpO2: 98% (31 May 2023 07:50) (97% - 98%)    Parameters below as of 31 May 2023 07:50  Patient On (Oxygen Delivery Method): room air    PHYSICAL EXAM:  GENERAL: NAD, speaks in full sentences, no signs of respiratory distress  HEAD:  Atraumatic, Normocephalic  EYES: EOMI, PERRLA, conjunctiva and sclera clear  NECK: Supple, No JVD  CHEST/LUNG: Clear to auscultation bilaterally; No wheeze; No crackles; No accessory muscles used  HEART: Regular rate and rhythm; No murmurs;   ABDOMEN: Soft, Nontender, Nondistended; Bowel sounds present; No guarding  EXTREMITIES:  2+ Peripheral Pulses, No cyanosis or edema  PSYCH: AAOx3  NEUROLOGY: non-focal  SKIN: No rashes or lesions

## 2023-05-31 NOTE — PATIENT PROFILE ADULT - FALL HARM RISK - HARM RISK INTERVENTIONS
Assistance with ambulation/Assistance OOB with selected safe patient handling equipment/Communicate Risk of Fall with Harm to all staff/Discuss with provider need for PT consult/Monitor gait and stability/Provide patient with walking aids - walker, cane, crutches/Reinforce activity limits and safety measures with patient and family/Tailored Fall Risk Interventions/Use of alarms - bed, chair and/or voice tab/Visual Cue: Yellow wristband and red socks/Bed in lowest position, wheels locked, appropriate side rails in place/Call bell, personal items and telephone in reach/Instruct patient to call for assistance before getting out of bed or chair/Non-slip footwear when patient is out of bed/[place] to call system/Physically safe environment - no spills, clutter or unnecessary equipment/Purposeful Proactive Rounding/Room/bathroom lighting operational, light cord in reach

## 2023-05-31 NOTE — H&P ADULT - HISTORY OF PRESENT ILLNESS
This is a 82 year old male, coming from [facility], [facility], with medical history of HTN, HLD, venous insufficiency, CAD, BPH, CHF who presented with hematuria. As per the pt he noticed that the urine started to get red and has been having chronic back pain. He was recently discharged from the hospital with chronic moreno secondary to urinary retention along with hematuria. He denies any headaches, visual disturbances, N/V/D, dizziness, falls, chest pain, palpitations, lower extremity swelling, fevers, skin rash, recent travel, or sick contacts

## 2023-06-01 DIAGNOSIS — M54.9 DORSALGIA, UNSPECIFIED: ICD-10-CM

## 2023-06-01 DIAGNOSIS — N39.0 URINARY TRACT INFECTION, SITE NOT SPECIFIED: ICD-10-CM

## 2023-06-01 DIAGNOSIS — Z02.9 ENCOUNTER FOR ADMINISTRATIVE EXAMINATIONS, UNSPECIFIED: ICD-10-CM

## 2023-06-01 LAB
ANION GAP SERPL CALC-SCNC: 4 MMOL/L — LOW (ref 5–17)
BUN SERPL-MCNC: 14 MG/DL — SIGNIFICANT CHANGE UP (ref 7–18)
CALCIUM SERPL-MCNC: 8.7 MG/DL — SIGNIFICANT CHANGE UP (ref 8.4–10.5)
CHLORIDE SERPL-SCNC: 103 MMOL/L — SIGNIFICANT CHANGE UP (ref 96–108)
CO2 SERPL-SCNC: 31 MMOL/L — SIGNIFICANT CHANGE UP (ref 22–31)
CREAT SERPL-MCNC: 0.88 MG/DL — SIGNIFICANT CHANGE UP (ref 0.5–1.3)
EGFR: 85 ML/MIN/1.73M2 — SIGNIFICANT CHANGE UP
GLUCOSE SERPL-MCNC: 113 MG/DL — HIGH (ref 70–99)
HCT VFR BLD CALC: 31.1 % — LOW (ref 39–50)
HGB BLD-MCNC: 9.9 G/DL — LOW (ref 13–17)
MCHC RBC-ENTMCNC: 27.3 PG — SIGNIFICANT CHANGE UP (ref 27–34)
MCHC RBC-ENTMCNC: 31.8 GM/DL — LOW (ref 32–36)
MCV RBC AUTO: 85.7 FL — SIGNIFICANT CHANGE UP (ref 80–100)
NRBC # BLD: 0 /100 WBCS — SIGNIFICANT CHANGE UP (ref 0–0)
PLATELET # BLD AUTO: 166 K/UL — SIGNIFICANT CHANGE UP (ref 150–400)
POTASSIUM SERPL-MCNC: 3.5 MMOL/L — SIGNIFICANT CHANGE UP (ref 3.5–5.3)
POTASSIUM SERPL-SCNC: 3.5 MMOL/L — SIGNIFICANT CHANGE UP (ref 3.5–5.3)
RBC # BLD: 3.63 M/UL — LOW (ref 4.2–5.8)
RBC # FLD: 17.3 % — HIGH (ref 10.3–14.5)
SODIUM SERPL-SCNC: 138 MMOL/L — SIGNIFICANT CHANGE UP (ref 135–145)
WBC # BLD: 4.54 K/UL — SIGNIFICANT CHANGE UP (ref 3.8–10.5)
WBC # FLD AUTO: 4.54 K/UL — SIGNIFICANT CHANGE UP (ref 3.8–10.5)

## 2023-06-01 RX ORDER — CEFTRIAXONE 500 MG/1
INJECTION, POWDER, FOR SOLUTION INTRAMUSCULAR; INTRAVENOUS
Refills: 0 | Status: DISCONTINUED | OUTPATIENT
Start: 2023-06-01 | End: 2023-06-02

## 2023-06-01 RX ORDER — CEFTRIAXONE 500 MG/1
1000 INJECTION, POWDER, FOR SOLUTION INTRAMUSCULAR; INTRAVENOUS ONCE
Refills: 0 | Status: COMPLETED | OUTPATIENT
Start: 2023-06-01 | End: 2023-06-01

## 2023-06-01 RX ORDER — CEFTRIAXONE 500 MG/1
1000 INJECTION, POWDER, FOR SOLUTION INTRAMUSCULAR; INTRAVENOUS EVERY 24 HOURS
Refills: 0 | Status: DISCONTINUED | OUTPATIENT
Start: 2023-06-02 | End: 2023-06-02

## 2023-06-01 RX ADMIN — Medication 20 MILLIGRAM(S): at 05:57

## 2023-06-01 RX ADMIN — CEFTRIAXONE 100 MILLIGRAM(S): 500 INJECTION, POWDER, FOR SOLUTION INTRAMUSCULAR; INTRAVENOUS at 17:26

## 2023-06-01 RX ADMIN — CLOPIDOGREL BISULFATE 75 MILLIGRAM(S): 75 TABLET, FILM COATED ORAL at 11:02

## 2023-06-01 RX ADMIN — OXYCODONE HYDROCHLORIDE 20 MILLIGRAM(S): 5 TABLET ORAL at 23:01

## 2023-06-01 RX ADMIN — AMLODIPINE BESYLATE 5 MILLIGRAM(S): 2.5 TABLET ORAL at 05:57

## 2023-06-01 RX ADMIN — TAMSULOSIN HYDROCHLORIDE 0.4 MILLIGRAM(S): 0.4 CAPSULE ORAL at 22:11

## 2023-06-01 RX ADMIN — OXYCODONE HYDROCHLORIDE 20 MILLIGRAM(S): 5 TABLET ORAL at 06:31

## 2023-06-01 RX ADMIN — Medication 5 MILLIGRAM(S): at 17:26

## 2023-06-01 RX ADMIN — FAMOTIDINE 20 MILLIGRAM(S): 10 INJECTION INTRAVENOUS at 11:02

## 2023-06-01 RX ADMIN — ATORVASTATIN CALCIUM 80 MILLIGRAM(S): 80 TABLET, FILM COATED ORAL at 22:10

## 2023-06-01 RX ADMIN — OXYCODONE HYDROCHLORIDE 20 MILLIGRAM(S): 5 TABLET ORAL at 06:00

## 2023-06-01 RX ADMIN — OXYCODONE HYDROCHLORIDE 10 MILLIGRAM(S): 5 TABLET ORAL at 17:26

## 2023-06-01 RX ADMIN — Medication 81 MILLIGRAM(S): at 11:02

## 2023-06-01 RX ADMIN — DULOXETINE HYDROCHLORIDE 60 MILLIGRAM(S): 30 CAPSULE, DELAYED RELEASE ORAL at 11:02

## 2023-06-01 RX ADMIN — OXYCODONE HYDROCHLORIDE 20 MILLIGRAM(S): 5 TABLET ORAL at 11:02

## 2023-06-01 RX ADMIN — OXYCODONE HYDROCHLORIDE 20 MILLIGRAM(S): 5 TABLET ORAL at 22:12

## 2023-06-01 RX ADMIN — OXYCODONE HYDROCHLORIDE 10 MILLIGRAM(S): 5 TABLET ORAL at 18:02

## 2023-06-01 RX ADMIN — Medication 75 MILLIGRAM(S): at 13:10

## 2023-06-01 RX ADMIN — Medication 25 MILLIGRAM(S): at 17:25

## 2023-06-01 RX ADMIN — OXYCODONE HYDROCHLORIDE 20 MILLIGRAM(S): 5 TABLET ORAL at 10:38

## 2023-06-01 RX ADMIN — Medication 25 MILLIGRAM(S): at 05:58

## 2023-06-01 RX ADMIN — Medication 75 MILLIGRAM(S): at 22:15

## 2023-06-01 RX ADMIN — Medication 5 MILLIGRAM(S): at 05:58

## 2023-06-01 RX ADMIN — Medication 75 MILLIGRAM(S): at 05:57

## 2023-06-01 NOTE — PROGRESS NOTE ADULT - SUBJECTIVE AND OBJECTIVE BOX
Subjective  No acute events overnight. CBI running on fast drip with clear output.    Objective    Vital signs  T(F): , Max: 98.6 (06-01-23 @ 13:07)  HR: 58 (06-01-23 @ 13:07)  BP: 128/70 (06-01-23 @ 13:07)  SpO2: 94% (06-01-23 @ 13:07)  Wt(kg): --    Output         Gen: NAD  Abd: soft, nontender, nondistended  : moreno secured in place, draining clear urine on fast drip    Labs      06-01 @ 05:32    WBC 4.54  / Hct 31.1  / SCr 0.88     05-31 @ 23:00    WBC 5.40  / Hct 32.6  / SCr --           Culture - Urine (collected 05-31-23 @ 02:16)  Source: Clean Catch Clean Catch (Midstream)  Preliminary Report (06-01-23 @ 07:31):    >100,000 CFU/ml Enterococcus species

## 2023-06-01 NOTE — PROGRESS NOTE ADULT - SUBJECTIVE AND OBJECTIVE BOX
NP Note discussed with  Primary Attending    Patient is a 82y old  Male who presents with a chief complaint of Hematuria (2023 14:57)      INTERVAL HPI/OVERNIGHT EVENTS: no new complaints    MEDICATIONS  (STANDING):  amLODIPine   Tablet 5 milliGRAM(s) Oral daily  aspirin  chewable 81 milliGRAM(s) Oral daily  atorvastatin 80 milliGRAM(s) Oral at bedtime  baclofen 5 milliGRAM(s) Oral every 12 hours  cefTRIAXone   IVPB 1000 milliGRAM(s) IV Intermittent once  cefTRIAXone   IVPB      clopidogrel Tablet 75 milliGRAM(s) Oral daily  DULoxetine 60 milliGRAM(s) Oral daily  famotidine    Tablet 20 milliGRAM(s) Oral daily  furosemide    Tablet 20 milliGRAM(s) Oral daily  metoprolol tartrate 25 milliGRAM(s) Oral two times a day  oxyCODONE    IR 20 milliGRAM(s) Oral <User Schedule>  oxyCODONE    IR 10 milliGRAM(s) Oral <User Schedule>  pregabalin 75 milliGRAM(s) Oral three times a day  tamsulosin 0.4 milliGRAM(s) Oral at bedtime    MEDICATIONS  (PRN):  acetaminophen     Tablet .. 650 milliGRAM(s) Oral every 6 hours PRN Temp greater or equal to 38C (100.4F), Mild Pain (1 - 3)  aluminum hydroxide/magnesium hydroxide/simethicone Suspension 30 milliLiter(s) Oral every 4 hours PRN Dyspepsia  melatonin 3 milliGRAM(s) Oral at bedtime PRN Insomnia  ondansetron Injectable 4 milliGRAM(s) IV Push every 8 hours PRN Nausea and/or Vomiting      __________________________________________________  REVIEW OF SYSTEMS:    CONSTITUTIONAL: No fever,   EYES: no acute visual disturbances  NECK: No pain or stiffness  RESPIRATORY: No cough; No shortness of breath  CARDIOVASCULAR: No chest pain, no palpitations  GASTROINTESTINAL: No pain. No nausea or vomiting; No diarrhea   NEUROLOGICAL: No headache or numbness, no tremors  MUSCULOSKELETAL: No joint pain, no muscle pain  GENITOURINARY: no dysuria, no frequency, no hesitancy  PSYCHIATRY: no depression , no anxiety  ALL OTHER  ROS negative        Vital Signs Last 24 Hrs  T(C): 37 (2023 13:07), Max: 37 (2023 13:07)  T(F): 98.6 (2023 13:07), Max: 98.6 (2023 13:07)  HR: 58 (2023 13:07) (58 - 76)  BP: 128/70 (2023 13:07) (127/58 - 130/67)  BP(mean): --  RR: 18 (:07) (17 - 18)  SpO2: 94% (2023 13:07) (94% - 97%)    Parameters below as of 2023 13:07  Patient On (Oxygen Delivery Method): room air        ________________________________________________  PHYSICAL EXAM:  well developed  GENERAL: NAD  HEENT: Normocephalic;  conjunctivae and sclerae clear; moist mucous membranes;   NECK : supple  CHEST/LUNG: Clear to auscultation bilaterally with good air entry   HEART: S1 S2  regular; no murmurs, gallops or rubs  ABDOMEN: Soft, Nontender, Nondistended; Bowel sounds present  EXTREMITIES: no cyanosis; no edema; no calf tenderness  SKIN: warm and dry; no rash  NERVOUS SYSTEM:  Awake and alert; Oriented  to place, person and time ; no new deficits  URO:  CBI in progress-->clear  _________________________________________________  LABS:                        9.9    4.54  )-----------( 166      ( 2023 05:32 )             31.1     06-01    138  |  103  |  14  ----------------------------<  113<H>  3.5   |  31  |  0.88    Ca    8.7      2023 05:32    TPro  7.6  /  Alb  2.9<L>  /  TBili  0.6  /  DBili  x   /  AST  33  /  ALT  12  /  AlkPhos  91      PT/INR - ( 31 May 2023 01:08 )   PT: 15.8 sec;   INR: 1.32 ratio         PTT - ( 31 May 2023 01:08 )  PTT:24.7 sec  Urinalysis Basic - ( 31 May 2023 02:10 )    Color: Red / Appearance: bloody / S.010 / pH: x  Gluc: x / Ketone: Trace  / Bili: Negative / Urobili: Negative   Blood: x / Protein: 500 mg/dL / Nitrite: Negative   Leuk Esterase: Negative / RBC: >50 /HPF / WBC 0-2 /HPF   Sq Epi: x / Non Sq Epi: x / Bacteria: Negative /HPF      CAPILLARY BLOOD GLUCOSE      RADIOLOGY & ADDITIONAL TESTS:    < from: CT Abdomen and Pelvis w/ IV Cont (23 @ 04:35) >    ACC: [id number] EXAM:  CT ABDOMEN AND PELVIS IC   ORDERED BY: ALISSON MOORE     PROCEDURE DATE:  2023          INTERPRETATION:  CLINICAL INFORMATION: Hematuria and back pain.   Hypertension, BPH with indwelling Cunningham, CHF.    COMPARISON: 2023 CT abdomen pelvis.    CONTRAST/COMPLICATIONS:  IV Contrast: Omnipaque 350  90 cc administered   10 cc discarded  Oral Contrast: NONE  Complications: None reported at time of study completion    PROCEDURE:  CT of the Abdomen and Pelvis was performed.  Sagittal and coronal reformats were performed.    FINDINGS:  LOWER CHEST: Chronic consolidation posterior aspect right lung base   similar to prior.    LIVER: Within normal limits.  BILE DUCTS: Normal caliber.  GALLBLADDER: Within normal limits.  SPLEEN: Within normal limits.  PANCREAS: Within normal limits.  ADRENALS: Within normal limits.  KIDNEYS/URETERS: No hydronephrosis or renal stone or concerning mass.   Small cysts bilaterally.    BLADDER: Mildly distended with blood and a small amount of air. Cunningham   catheter in place.  REPRODUCTIVE ORGANS: Radiation seeds in the prostate bed.    BOWEL: No inflammation of the bowel. Again demonstrated is a right lower   quadrant anterior abdominal wall hernia containing the distal ileum,   cecum, and a portion of the transverse colon. As before, the colonic loop   within the hernia is mildly distended with stool and the lumen narrows as   it exits the hernia. Appendix is not visualized. No evidence of   inflammation in the pericecal region.  PERITONEUM: No ascites.  VESSELS: No abdominal aortic aneurysm. Atherosclerosis.  RETROPERITONEUM/LYMPH NODES: No lymphadenopathy.  ABDOMINAL WALL: RLQ anterior abdominal wall hernia as described in the   bowel section. Smaller left lower quadrantanterior abdominal hernia   contains fat but no bowel.  BONES: No acute osseous abnormality. Thoracic-ileal posterior fusion   hardware, right scoliosis centered at L3, with chronic compression   deformity of the L3 vertebral body, several other mildcompression   deformities, all similar to prior.    IMPRESSION:  Blood and Cunningham catheter in the urinary bladder lumen are new compared to   the prior study.    Numerous other chronic findings are not significantly changed.    < end of copied text >    < from: Xray Chest 1 View-PORTABLE IMMEDIATE (Xray Chest 1 View-PORTABLE IMMEDIATE .) (23 @ 00:11) >    ACC: [id number] EXAM:  XR CHEST PORTABLE IMMED 1V   ORDERED BY: ALISSON MOORE     PROCEDURE DATE:  2023          INTERPRETATION:  AP erect chest on May 30, 2023 11:42 PM. Patient is   hypoxic.    Elevated right hemidiaphragm noted.    Extensive spinal hardware is seen.    Heart magnified by technique. No lung consolidation or layering effusion   is evident.    Chest is similar to May 15 this year.    IMPRESSION: No acute finding or change.    < end of copied text >    Urinalysis Basic - ( 31 May 2023 02:10 )    Color: Red / Appearance: bloody / S.010 / pH: x  Gluc: x / Ketone: Trace  / Bili: Negative / Urobili: Negative   Blood: x / Protein: 500 mg/dL / Nitrite: Negative   Leuk Esterase: Negative / RBC: >50 /HPF / WBC 0-2 /HPF   Sq Epi: x / Non Sq Epi: x / Bacteria: Negative /HPF    Culture - Urine (23 @ 02:16)    Specimen Source: Clean Catch Clean Catch (Midstream)   Culture Results:   >100,000 CFU/ml Enterococcus species      Imaging Personally Reviewed:  YES/NO    Consultant(s) Notes Reviewed:   YES/ No    Care Discussed with Consultants :     Plan of care was discussed with patient and /or primary care giver; all questions and concerns were addressed and care was aligned with patient's wishes.

## 2023-06-02 DIAGNOSIS — I25.10 ATHEROSCLEROTIC HEART DISEASE OF NATIVE CORONARY ARTERY WITHOUT ANGINA PECTORIS: ICD-10-CM

## 2023-06-02 LAB
ANION GAP SERPL CALC-SCNC: 3 MMOL/L — LOW (ref 5–17)
ANION GAP SERPL CALC-SCNC: 4 MMOL/L — LOW (ref 5–17)
BLD GP AB SCN SERPL QL: SIGNIFICANT CHANGE UP
BUN SERPL-MCNC: 15 MG/DL — SIGNIFICANT CHANGE UP (ref 7–18)
BUN SERPL-MCNC: 18 MG/DL — SIGNIFICANT CHANGE UP (ref 7–18)
CALCIUM SERPL-MCNC: 8.4 MG/DL — SIGNIFICANT CHANGE UP (ref 8.4–10.5)
CALCIUM SERPL-MCNC: 8.5 MG/DL — SIGNIFICANT CHANGE UP (ref 8.4–10.5)
CHLORIDE SERPL-SCNC: 105 MMOL/L — SIGNIFICANT CHANGE UP (ref 96–108)
CHLORIDE SERPL-SCNC: 106 MMOL/L — SIGNIFICANT CHANGE UP (ref 96–108)
CO2 SERPL-SCNC: 30 MMOL/L — SIGNIFICANT CHANGE UP (ref 22–31)
CO2 SERPL-SCNC: 32 MMOL/L — HIGH (ref 22–31)
CREAT SERPL-MCNC: 0.91 MG/DL — SIGNIFICANT CHANGE UP (ref 0.5–1.3)
CREAT SERPL-MCNC: 0.94 MG/DL — SIGNIFICANT CHANGE UP (ref 0.5–1.3)
EGFR: 80 ML/MIN/1.73M2 — SIGNIFICANT CHANGE UP
EGFR: 83 ML/MIN/1.73M2 — SIGNIFICANT CHANGE UP
GLUCOSE BLDC GLUCOMTR-MCNC: 134 MG/DL — HIGH (ref 70–99)
GLUCOSE SERPL-MCNC: 101 MG/DL — HIGH (ref 70–99)
GLUCOSE SERPL-MCNC: 124 MG/DL — HIGH (ref 70–99)
HCT VFR BLD CALC: 31.2 % — LOW (ref 39–50)
HCT VFR BLD CALC: 31.9 % — LOW (ref 39–50)
HGB BLD-MCNC: 9.7 G/DL — LOW (ref 13–17)
HGB BLD-MCNC: 9.9 G/DL — LOW (ref 13–17)
MCHC RBC-ENTMCNC: 26.8 PG — LOW (ref 27–34)
MCHC RBC-ENTMCNC: 26.8 PG — LOW (ref 27–34)
MCHC RBC-ENTMCNC: 31 GM/DL — LOW (ref 32–36)
MCHC RBC-ENTMCNC: 31.1 GM/DL — LOW (ref 32–36)
MCV RBC AUTO: 86.2 FL — SIGNIFICANT CHANGE UP (ref 80–100)
MCV RBC AUTO: 86.4 FL — SIGNIFICANT CHANGE UP (ref 80–100)
NRBC # BLD: 0 /100 WBCS — SIGNIFICANT CHANGE UP (ref 0–0)
NRBC # BLD: 0 /100 WBCS — SIGNIFICANT CHANGE UP (ref 0–0)
PLATELET # BLD AUTO: 155 K/UL — SIGNIFICANT CHANGE UP (ref 150–400)
PLATELET # BLD AUTO: 162 K/UL — SIGNIFICANT CHANGE UP (ref 150–400)
POTASSIUM SERPL-MCNC: 4 MMOL/L — SIGNIFICANT CHANGE UP (ref 3.5–5.3)
POTASSIUM SERPL-MCNC: 4.4 MMOL/L — SIGNIFICANT CHANGE UP (ref 3.5–5.3)
POTASSIUM SERPL-SCNC: 4 MMOL/L — SIGNIFICANT CHANGE UP (ref 3.5–5.3)
POTASSIUM SERPL-SCNC: 4.4 MMOL/L — SIGNIFICANT CHANGE UP (ref 3.5–5.3)
RBC # BLD: 3.62 M/UL — LOW (ref 4.2–5.8)
RBC # BLD: 3.69 M/UL — LOW (ref 4.2–5.8)
RBC # FLD: 17.3 % — HIGH (ref 10.3–14.5)
RBC # FLD: 17.6 % — HIGH (ref 10.3–14.5)
SODIUM SERPL-SCNC: 139 MMOL/L — SIGNIFICANT CHANGE UP (ref 135–145)
SODIUM SERPL-SCNC: 141 MMOL/L — SIGNIFICANT CHANGE UP (ref 135–145)
WBC # BLD: 5.01 K/UL — SIGNIFICANT CHANGE UP (ref 3.8–10.5)
WBC # BLD: 5.47 K/UL — SIGNIFICANT CHANGE UP (ref 3.8–10.5)
WBC # FLD AUTO: 5.01 K/UL — SIGNIFICANT CHANGE UP (ref 3.8–10.5)
WBC # FLD AUTO: 5.47 K/UL — SIGNIFICANT CHANGE UP (ref 3.8–10.5)

## 2023-06-02 RX ORDER — AMPICILLIN TRIHYDRATE 250 MG
1 CAPSULE ORAL EVERY 6 HOURS
Refills: 0 | Status: DISCONTINUED | OUTPATIENT
Start: 2023-06-02 | End: 2023-06-08

## 2023-06-02 RX ORDER — AMPICILLIN TRIHYDRATE 250 MG
1 CAPSULE ORAL ONCE
Refills: 0 | Status: COMPLETED | OUTPATIENT
Start: 2023-06-02 | End: 2023-06-02

## 2023-06-02 RX ORDER — AMPICILLIN TRIHYDRATE 250 MG
CAPSULE ORAL
Refills: 0 | Status: DISCONTINUED | OUTPATIENT
Start: 2023-06-02 | End: 2023-06-08

## 2023-06-02 RX ADMIN — Medication 108 GRAM(S): at 11:29

## 2023-06-02 RX ADMIN — ATORVASTATIN CALCIUM 80 MILLIGRAM(S): 80 TABLET, FILM COATED ORAL at 22:31

## 2023-06-02 RX ADMIN — OXYCODONE HYDROCHLORIDE 20 MILLIGRAM(S): 5 TABLET ORAL at 07:00

## 2023-06-02 RX ADMIN — TAMSULOSIN HYDROCHLORIDE 0.4 MILLIGRAM(S): 0.4 CAPSULE ORAL at 22:31

## 2023-06-02 RX ADMIN — Medication 75 MILLIGRAM(S): at 13:22

## 2023-06-02 RX ADMIN — Medication 75 MILLIGRAM(S): at 22:30

## 2023-06-02 RX ADMIN — Medication 5 MILLIGRAM(S): at 17:12

## 2023-06-02 RX ADMIN — OXYCODONE HYDROCHLORIDE 20 MILLIGRAM(S): 5 TABLET ORAL at 23:48

## 2023-06-02 RX ADMIN — OXYCODONE HYDROCHLORIDE 20 MILLIGRAM(S): 5 TABLET ORAL at 06:22

## 2023-06-02 RX ADMIN — FAMOTIDINE 20 MILLIGRAM(S): 10 INJECTION INTRAVENOUS at 11:29

## 2023-06-02 RX ADMIN — Medication 25 MILLIGRAM(S): at 06:22

## 2023-06-02 RX ADMIN — Medication 20 MILLIGRAM(S): at 06:22

## 2023-06-02 RX ADMIN — Medication 108 GRAM(S): at 17:12

## 2023-06-02 RX ADMIN — OXYCODONE HYDROCHLORIDE 20 MILLIGRAM(S): 5 TABLET ORAL at 22:31

## 2023-06-02 RX ADMIN — DULOXETINE HYDROCHLORIDE 60 MILLIGRAM(S): 30 CAPSULE, DELAYED RELEASE ORAL at 11:29

## 2023-06-02 RX ADMIN — Medication 75 MILLIGRAM(S): at 06:22

## 2023-06-02 RX ADMIN — Medication 5 MILLIGRAM(S): at 06:22

## 2023-06-02 RX ADMIN — AMLODIPINE BESYLATE 5 MILLIGRAM(S): 2.5 TABLET ORAL at 06:22

## 2023-06-02 NOTE — CONSULT NOTE ADULT - ASSESSMENT
82 year old male, coming from [facility], [facility], with medical history of HTN, HLD, venous insufficiency, CAD, BPH, CHF who presented with hematuria and UTI.  1.Hematuria-CBI, f/u.  2.UTI-ABX.  3.HTN-d/c norvasc, lopressor.  4.CAD by hx but pt denies-asa and plavix on hold, b blocker,statin.  5.Monitor h/h-transfuse as needed.  6.Lipid d/o-statin.  7.BPH-flomax.  8.GI and DVT prophylaxis.
UTI      Plan - Cont Ampicillin 1gm iv q6hrs  When ready for discharge will plan to switch to Amoxicillin 500mgs po TID to complete a total of 7 days of therapy.

## 2023-06-02 NOTE — CONSULT NOTE ADULT - SUBJECTIVE AND OBJECTIVE BOX
HPI:  This is a 82 year old male, coming from [facility], x23, with medical history of HTN, HLD, venous insufficiency, CAD, BPH, CHF who presented with hematuria. As per the pt he noticed that the urine started to get red and has been having chronic back pain. He was recently discharged from the hospital with chronic moreno secondary to urinary retention along with hematuria. He denies any headaches, visual disturbances, N/V/D, dizziness, falls, chest pain, palpitations, lower extremity swelling, fevers, skin rash, recent travel, or sick contacts   (31 May 2023 09:12)      PAST MEDICAL & SURGICAL HISTORY:  Hypertension      Hyperlipidemia      Venous insufficiency      No significant past surgical history          No Known Allergies      Meds:  acetaminophen     Tablet .. 650 milliGRAM(s) Oral every 6 hours PRN  aluminum hydroxide/magnesium hydroxide/simethicone Suspension 30 milliLiter(s) Oral every 4 hours PRN  ampicillin  IVPB      ampicillin  IVPB 1 Gram(s) IV Intermittent every 6 hours  atorvastatin 80 milliGRAM(s) Oral at bedtime  baclofen 5 milliGRAM(s) Oral every 12 hours  DULoxetine 60 milliGRAM(s) Oral daily  famotidine    Tablet 20 milliGRAM(s) Oral daily  melatonin 3 milliGRAM(s) Oral at bedtime PRN  ondansetron Injectable 4 milliGRAM(s) IV Push every 8 hours PRN  oxyCODONE    IR 10 milliGRAM(s) Oral <User Schedule>  oxyCODONE    IR 20 milliGRAM(s) Oral <User Schedule>  pregabalin 75 milliGRAM(s) Oral three times a day  tamsulosin 0.4 milliGRAM(s) Oral at bedtime      SOCIAL HISTORY:  Smoker:  YES / NO        PACK YEARS:                         WHEN QUIT?  ETOH use:  YES / NO               FREQUENCY / QUANTITY:  Ilicit Drug use:  YES / NO  Occupation:  Assisted device use (Cane / Walker):  Live with:    FAMILY HISTORY:  No pertinent family history in first degree relatives        VITALS:  Vital Signs Last 24 Hrs  T(C): 36.5 (02 Jun 2023 13:10), Max: 36.5 (01 Jun 2023 20:54)  T(F): 97.7 (02 Jun 2023 13:10), Max: 97.7 (01 Jun 2023 20:54)  HR: 56 (02 Jun 2023 13:10) (56 - 63)  BP: 95/65 (02 Jun 2023 13:10) (95/65 - 130/66)  BP(mean): --  RR: 18 (02 Jun 2023 13:10) (18 - 18)  SpO2: 93% (02 Jun 2023 13:10) (93% - 94%)    Parameters below as of 02 Jun 2023 13:10  Patient On (Oxygen Delivery Method): room air        LABS/DIAGNOSTIC TESTS:                          9.9    5.01  )-----------( 162      ( 02 Jun 2023 17:37 )             31.9     WBC Count: 5.01 K/uL (06-02 @ 17:37)  WBC Count: 5.47 K/uL (06-02 @ 05:52)  WBC Count: 4.54 K/uL (06-01 @ 05:32)  WBC Count: 5.40 K/uL (05-31 @ 23:00)  WBC Count: 5.84 K/uL (05-31 @ 01:08)      06-02    139  |  106  |  18  ----------------------------<  124<H>  4.4   |  30  |  0.94    Ca    8.5      02 Jun 2023 17:37                    LACTATE:    ABG -     CULTURES:   Clean Catch Clean Catch (Midstream)  05-31 @ 02:16   >100,000 CFU/ml Enterococcus species Identification and susceptibility to  follow.  --  --      .Blood Blood-Peripheral  05-11 @ 06:45   No Growth Final  --  --      .Blood Blood-Peripheral  05-11 @ 06:40   No Growth Final  --  --          RADIOLOGY:      ROS  [  ] UNABLE TO ELICIT               HPI:  This is a 82 year old male, coming from [facility], [facility], with medical history of HTN, HLD, venous insufficiency, CAD, BPH, CHF who presented with hematuria. As per the pt he noticed that the urine started to get red and has been having chronic back pain. He was recently discharged from the hospital with chronic moreno secondary to urinary retention along with hematuria. He denies any headaches, visual disturbances, N/V/D, dizziness, falls, chest pain, palpitations, lower extremity swelling, fevers, skin rash, recent travel, or sick contacts   (31 May 2023 09:12)        History as above, asked to see this patient who was admitted from a NH because of hematuria, he has an indwelling moreno from his last admission here apparently also for hematuria. He was found to have a UTI with Enterococcus hence asked to see him. He was sleeping soundly and so I woke him up but appears confused and is not answering my questions appropriately and is barely answering my questions in general. He is currently on a CBI with clear urine in his moreno bag, he has no fevers or leukocytosis.           PAST MEDICAL & SURGICAL HISTORY:  Hypertension      Hyperlipidemia      Venous insufficiency      No significant past surgical history          No Known Allergies      Meds:  acetaminophen     Tablet .. 650 milliGRAM(s) Oral every 6 hours PRN  aluminum hydroxide/magnesium hydroxide/simethicone Suspension 30 milliLiter(s) Oral every 4 hours PRN  ampicillin  IVPB      ampicillin  IVPB 1 Gram(s) IV Intermittent every 6 hours  atorvastatin 80 milliGRAM(s) Oral at bedtime  baclofen 5 milliGRAM(s) Oral every 12 hours  DULoxetine 60 milliGRAM(s) Oral daily  famotidine    Tablet 20 milliGRAM(s) Oral daily  melatonin 3 milliGRAM(s) Oral at bedtime PRN  ondansetron Injectable 4 milliGRAM(s) IV Push every 8 hours PRN  oxyCODONE    IR 10 milliGRAM(s) Oral <User Schedule>  oxyCODONE    IR 20 milliGRAM(s) Oral <User Schedule>  pregabalin 75 milliGRAM(s) Oral three times a day  tamsulosin 0.4 milliGRAM(s) Oral at bedtime      SOCIAL HISTORY:  Smoker:  denies  ETOH use:  denies        FAMILY HISTORY:  No pertinent family history in first degree relatives        VITALS:  Vital Signs Last 24 Hrs  T(C): 36.5 (02 Jun 2023 13:10), Max: 36.5 (01 Jun 2023 20:54)  T(F): 97.7 (02 Jun 2023 13:10), Max: 97.7 (01 Jun 2023 20:54)  HR: 56 (02 Jun 2023 13:10) (56 - 63)  BP: 95/65 (02 Jun 2023 13:10) (95/65 - 130/66)  BP(mean): --  RR: 18 (02 Jun 2023 13:10) (18 - 18)  SpO2: 93% (02 Jun 2023 13:10) (93% - 94%)    Parameters below as of 02 Jun 2023 13:10  Patient On (Oxygen Delivery Method): room air        LABS/DIAGNOSTIC TESTS:                          9.9    5.01  )-----------( 162      ( 02 Jun 2023 17:37 )             31.9     WBC Count: 5.01 K/uL (06-02 @ 17:37)  WBC Count: 5.47 K/uL (06-02 @ 05:52)  WBC Count: 4.54 K/uL (06-01 @ 05:32)  WBC Count: 5.40 K/uL (05-31 @ 23:00)  WBC Count: 5.84 K/uL (05-31 @ 01:08)      06-02    139  |  106  |  18  ----------------------------<  124<H>  4.4   |  30  |  0.94    Ca    8.5      02 Jun 2023 17:37                    LACTATE:    ABG -     CULTURES:   Clean Catch Clean Catch (Midstream)  05-31 @ 02:16   >100,000 CFU/ml Enterococcus species Identification and susceptibility to  follow.  --  --      .Blood Blood-Peripheral  05-11 @ 06:45   No Growth Final  --  --      .Blood Blood-Peripheral  05-11 @ 06:40   No Growth Final  --  --          RADIOLOGY:< from: CT Abdomen and Pelvis w/ IV Cont (05.31.23 @ 04:35) >  ACC: [id number] EXAM:  CT ABDOMEN AND PELVIS IC   ORDERED BY: ALISSON MOORE     PROCEDURE DATE:  05/31/2023          INTERPRETATION:  CLINICAL INFORMATION: Hematuria and back pain.   Hypertension, BPH with indwelling Moreno, CHF.    COMPARISON: 5/11/2023 CT abdomen pelvis.    CONTRAST/COMPLICATIONS:  IV Contrast: Omnipaque 350  90 cc administered   10 cc discarded  Oral Contrast: NONE  Complications: None reported at time of study completion    PROCEDURE:  CT of the Abdomen and Pelvis was performed.  Sagittal and coronal reformats were performed.    FINDINGS:  LOWER CHEST: Chronic consolidation posterior aspect right lung base   similar to prior.    LIVER: Within normal limits.  BILE DUCTS: Normal caliber.  GALLBLADDER: Within normal limits.  SPLEEN: Within normal limits.  PANCREAS: Within normal limits.  ADRENALS: Within normal limits.  KIDNEYS/URETERS: No hydronephrosis or renal stone or concerning mass.   Small cysts bilaterally.    BLADDER: Mildly distended with blood and a small amount of air. Moreno   catheter in place.  REPRODUCTIVE ORGANS: Radiation seeds in the prostate bed.    BOWEL: No inflammation of the bowel. Again demonstrated is a right lower   quadrant anterior abdominal wall hernia containing the distal ileum,   cecum, and a portion of the transverse colon. As before, the colonic loop   within the hernia is mildly distended with stool and the lumen narrows as   it exits the hernia. Appendix is not visualized. No evidence of   inflammation in the pericecal region.  PERITONEUM: No ascites.  VESSELS: No abdominal aortic aneurysm. Atherosclerosis.  RETROPERITONEUM/LYMPH NODES: No lymphadenopathy.  ABDOMINAL WALL: RLQ anterior abdominal wall hernia as described in the   bowel section. Smaller left lower quadrantanterior abdominal hernia   contains fat but no bowel.  BONES: No acute osseous abnormality. Thoracic-ileal posterior fusion   hardware, right scoliosis centered at L3, with chronic compression   deformity of the L3 vertebral body, several other mildcompression   deformities, all similar to prior.    IMPRESSION:  Blood and Moreno catheter in the urinary bladder lumen are new compared to   the prior study.    Numerous other chronic findings are not significantly changed.        --- End of Report ---             ALEX MACIEL MD; Attending Radiologist  This document has been electronically signed. May 31 2023  5:00AM    < end of copied text >        ROS  [  ] UNABLE TO ELICIT, very limited as he is lethargic and appears confused

## 2023-06-02 NOTE — PROGRESS NOTE ADULT - SUBJECTIVE AND OBJECTIVE BOX
NP Note discussed with  Primary Attending    Patient is a 82y old  Male who presents with a chief complaint of Hematuria (02 Jun 2023 14:40)      INTERVAL HPI/OVERNIGHT EVENTS: no new complaints    MEDICATIONS  (STANDING):  ampicillin  IVPB      ampicillin  IVPB 1 Gram(s) IV Intermittent every 6 hours  atorvastatin 80 milliGRAM(s) Oral at bedtime  baclofen 5 milliGRAM(s) Oral every 12 hours  DULoxetine 60 milliGRAM(s) Oral daily  famotidine    Tablet 20 milliGRAM(s) Oral daily  furosemide    Tablet 20 milliGRAM(s) Oral daily  metoprolol tartrate 25 milliGRAM(s) Oral two times a day  oxyCODONE    IR 10 milliGRAM(s) Oral <User Schedule>  oxyCODONE    IR 20 milliGRAM(s) Oral <User Schedule>  pregabalin 75 milliGRAM(s) Oral three times a day  tamsulosin 0.4 milliGRAM(s) Oral at bedtime    MEDICATIONS  (PRN):  acetaminophen     Tablet .. 650 milliGRAM(s) Oral every 6 hours PRN Temp greater or equal to 38C (100.4F), Mild Pain (1 - 3)  aluminum hydroxide/magnesium hydroxide/simethicone Suspension 30 milliLiter(s) Oral every 4 hours PRN Dyspepsia  melatonin 3 milliGRAM(s) Oral at bedtime PRN Insomnia  ondansetron Injectable 4 milliGRAM(s) IV Push every 8 hours PRN Nausea and/or Vomiting      __________________________________________________  REVIEW OF SYSTEMS:    CONSTITUTIONAL: No fever,   EYES: no acute visual disturbances  NECK: No pain or stiffness  RESPIRATORY: No cough; No shortness of breath  CARDIOVASCULAR: No chest pain, no palpitations  GASTROINTESTINAL: No pain. No nausea or vomiting; No diarrhea   NEUROLOGICAL: No headache or numbness, no tremors  MUSCULOSKELETAL: No joint pain, no muscle pain  GENITOURINARY: no dysuria, no frequency, no hesitancy  PSYCHIATRY: no depression , no anxiety  ALL OTHER  ROS negative        Vital Signs Last 24 Hrs  T(C): 36.5 (02 Jun 2023 13:10), Max: 36.5 (01 Jun 2023 20:54)  T(F): 97.7 (02 Jun 2023 13:10), Max: 97.7 (01 Jun 2023 20:54)  HR: 56 (02 Jun 2023 13:10) (56 - 63)  BP: 95/65 (02 Jun 2023 13:10) (95/65 - 130/66)  BP(mean): --  RR: 18 (02 Jun 2023 13:10) (18 - 18)  SpO2: 93% (02 Jun 2023 13:10) (93% - 94%)    Parameters below as of 02 Jun 2023 13:10  Patient On (Oxygen Delivery Method): room air        ________________________________________________  PHYSICAL EXAM:  GENERAL: NAD  HEENT: Normocephalic;  conjunctivae and sclerae clear; moist mucous membranes;   NECK : supple  CHEST/LUNG: Clear to auscultation bilaterally with good air entry   HEART: S1 S2  regular; no murmurs, gallops or rubs  ABDOMEN: Soft, Nontender, Nondistended; Bowel sounds present  EXTREMITIES: bilateral no cyanosis; no edema; no calf tenderness  SKIN: warm and dry; no rash  NERVOUS SYSTEM:  Awake and alert; Oriented  to place, person and time ; no new deficits  : three-way moreno- bright red urine    _________________________________________________  LABS:                        9.7    5.47  )-----------( 155      ( 02 Jun 2023 05:52 )             31.2     06-02    141  |  105  |  15  ----------------------------<  101<H>  4.0   |  32<H>  |  0.91    Ca    8.4      02 Jun 2023 05:52          CAPILLARY BLOOD GLUCOSE            RADIOLOGY & ADDITIONAL TESTS:    Imaging  Reviewed:  YES/NO    Consultant(s) Notes Reviewed:   YES/ No      Plan of care was discussed with patient and /or primary care giver; all questions and concerns were addressed

## 2023-06-02 NOTE — CONSULT NOTE ADULT - SUBJECTIVE AND OBJECTIVE BOX
CHIEF COMPLAINT:Patient is a 82y old  Male who presents with a chief complaint of Hematuria .      HPI:  This is a 82 year old male, coming from [facility], [facility], with medical history of HTN, HLD, venous insufficiency, CAD, BPH, CHF who presented with hematuria. As per the pt he noticed that the urine started to get red and has been having chronic back pain. He was recently discharged from the hospital with chronic moreno secondary to urinary retention along with hematuria. He denies any headaches, visual disturbances, N/V/D, dizziness, falls, chest pain, palpitations, lower extremity swelling, fevers, skin rash, recent travel, or sick contacts   (31 May 2023 09:12)      PAST MEDICAL & SURGICAL HISTORY:  Hypertension      Hyperlipidemia      Venous insufficiency            MEDICATIONS  (STANDING):  amLODIPine   Tablet 5 milliGRAM(s) Oral daily  ampicillin  IVPB      ampicillin  IVPB 1 Gram(s) IV Intermittent every 6 hours  atorvastatin 80 milliGRAM(s) Oral at bedtime  baclofen 5 milliGRAM(s) Oral every 12 hours  DULoxetine 60 milliGRAM(s) Oral daily  famotidine    Tablet 20 milliGRAM(s) Oral daily  furosemide    Tablet 20 milliGRAM(s) Oral daily  metoprolol tartrate 25 milliGRAM(s) Oral two times a day  oxyCODONE    IR 10 milliGRAM(s) Oral <User Schedule>  oxyCODONE    IR 20 milliGRAM(s) Oral <User Schedule>  pregabalin 75 milliGRAM(s) Oral three times a day  tamsulosin 0.4 milliGRAM(s) Oral at bedtime    MEDICATIONS  (PRN):  acetaminophen     Tablet .. 650 milliGRAM(s) Oral every 6 hours PRN Temp greater or equal to 38C (100.4F), Mild Pain (1 - 3)  aluminum hydroxide/magnesium hydroxide/simethicone Suspension 30 milliLiter(s) Oral every 4 hours PRN Dyspepsia  melatonin 3 milliGRAM(s) Oral at bedtime PRN Insomnia  ondansetron Injectable 4 milliGRAM(s) IV Push every 8 hours PRN Nausea and/or Vomiting      FAMILY HISTORY:  No pertinent family history in first degree relatives        SOCIAL HISTORY:    [ x] Non-smoker    [x ] Alcohol-denies    Allergies    No Known Allergies    Intolerances    	    REVIEW OF SYSTEMS:  CONSTITUTIONAL: No fever, weight loss, or fatigue  EYES: No eye pain, visual disturbances, or discharge  ENT:  No difficulty hearing, tinnitus, vertigo; No sinus or throat pain  NECK: No pain or stiffness  RESPIRATORY: No cough, wheezing, chills or hemoptysis; No Shortness of Breath  CARDIOVASCULAR: No chest pain, palpitations, passing out, dizziness, or leg swelling  GASTROINTESTINAL: No abdominal or epigastric pain. No nausea, vomiting, or hematemesis; No diarrhea or constipation. No melena or hematochezia.  GENITOURINARY: No dysuria, frequency,+ hematuria, No incontinence  NEUROLOGICAL: No headaches, memory loss, loss of strength, numbness, or tremors  SKIN: No itching, burning, rashes, or lesions   LYMPH Nodes: No enlarged glands  ENDOCRINE: No heat or cold intolerance; No hair loss  MUSCULOSKELETAL: No joint pain or swelling; No muscle, back, or extremity pain  PSYCHIATRIC: No depression, anxiety, mood swings, or difficulty sleeping  HEME/LYMPH: No easy bruising, or bleeding gums  ALLERGY AND IMMUNOLOGIC: No hives or eczema	        PHYSICAL EXAM:  T(C): 36.5 (06-02-23 @ 13:10), Max: 36.5 (06-01-23 @ 20:54)  HR: 56 (06-02-23 @ 13:10) (56 - 63)  BP: 95/65 (06-02-23 @ 13:10) (95/65 - 130/66)  RR: 18 (06-02-23 @ 13:10) (18 - 18)  SpO2: 93% (06-02-23 @ 13:10) (93% - 94%)  Wt(kg): --  I&O's Summary    01 Jun 2023 07:01  -  02 Jun 2023 07:00  --------------------------------------------------------  IN: 96491 mL / OUT: 35163 mL / NET: -4600 mL    02 Jun 2023 07:01  -  02 Jun 2023 14:40  --------------------------------------------------------  IN: 6000 mL / OUT: 6800 mL / NET: -800 mL        Appearance: Normal	  HEENT:   Normal oral mucosa, PERRL, EOMI	  Lymphatic: No lymphadenopathy  Cardiovascular: Normal S1 S2, No JVD, No murmurs, No edema  Respiratory: Lungs clear to auscultation	  Psychiatry: A & O x 3, Mood & affect appropriate  Gastrointestinal:  Soft, Non-tender, + BS	  Skin: No rashes, No ecchymoses, No cyanosis	  Neurologic: Non-focal  Extremities: Normal range of motion, No clubbing, cyanosis or edema  Vascular: Peripheral pulses palpable 2+ bilaterally    	    ECG:  nsr,nl axis	  	  	  LABS:	 	                       9.7    5.47  )-----------( 155      ( 02 Jun 2023 05:52 )             31.2     06-02    141  |  105  |  15  ----------------------------<  101<H>  4.0   |  32<H>  |  0.91    Ca    8.4      02 Jun 2023 05:52      < from: Transthoracic Echocardiogram (05.15.23 @ 07:11) >  OBSERVATIONS:  Mitral Valve: Normal mitral valve. Mild posterior mitral  annular calcification. Trace mitral regurgitation.  Aortic Root: Aortic Root: 3.6 cm. Ascending aorta not well  seen.  Aortic Valve: Calcified trileaflet aortic valve with normal  opening. No aortic stenosis by Doppler gradients.  Trace  aortic regurgitation.  Left Atrium: Normal left atrium.  Left Ventricle: Normal Left Ventricular Systolic Function,  (EF = 55 to 60% by visual estimation) Not all LV wall  segments were seen. Mildly increased left ventricular wall  thickness in a concentric pattern.  Unable to adequately  assess diastolic function due to technical aspects of this  study.  Right Heart: Normal right atrium. Normal right ventricular  size and systolic function (TAPSE 1.9 cm). Tricuspid valve  not well visualized, probably normal. Trace tricuspid  regurgitation. Pulmonic valve not well seen. Trace pulmonic  insufficiency is noted.  Pericardium/PleuraNormal pericardium with no pericardial  effusion.  Hemodynamic: Unable to estimateRA pressure.  Insufficient  tricuspid regurgitation jet to allow calculation of RVSP.    < end of copied text >  < from: CT Abdomen and Pelvis w/ IV Cont (05.31.23 @ 04:35) >    ACC: [id number] EXAM:  CT ABDOMEN AND PELVIS IC   ORDERED BY: ALISSON MOORE     PROCEDURE DATE:  05/31/2023          INTERPRETATION:  CLINICAL INFORMATION: Hematuria and back pain.   Hypertension, BPH with indwelling Moreno, CHF.    COMPARISON: 5/11/2023 CT abdomen pelvis.    CONTRAST/COMPLICATIONS:  IV Contrast: Omnipaque 350  90 cc administered   10 cc discarded  Oral Contrast: NONE  Complications: None reported at time of study completion    PROCEDURE:  CT of the Abdomen and Pelvis was performed.  Sagittal and coronal reformats were performed.    FINDINGS:  LOWER CHEST: Chronic consolidation posterior aspect right lung base   similar to prior.    LIVER: Within normal limits.  BILE DUCTS: Normal caliber.  GALLBLADDER: Within normal limits.  SPLEEN: Within normal limits.  PANCREAS: Within normal limits.  ADRENALS: Within normal limits.  KIDNEYS/URETERS: No hydronephrosis or renal stone or concerning mass.   Small cysts bilaterally.    BLADDER: Mildly distended with blood and a small amount of air. Moreno   catheter in place.  REPRODUCTIVE ORGANS: Radiation seeds in the prostate bed.    BOWEL: No inflammation of the bowel. Again demonstrated is a right lower   quadrant anterior abdominal wall hernia containing the distal ileum,   cecum, and a portion of the transverse colon. As before, the colonic loop   within the hernia is mildly distended with stool and the lumen narrows as   it exits the hernia. Appendix is not visualized. No evidence of   inflammation in the pericecal region.  PERITONEUM: No ascites.  VESSELS: No abdominal aortic aneurysm. Atherosclerosis.  RETROPERITONEUM/LYMPH NODES: No lymphadenopathy.  ABDOMINAL WALL: RLQ anterior abdominal wall hernia as described in the   bowel section. Smaller left lower quadrantanterior abdominal hernia   contains fat but no bowel.  BONES: No acute osseous abnormality. Thoracic-ileal posterior fusion   hardware, right scoliosis centered at L3, with chronic compression   deformity of the L3 vertebral body, several other mildcompression   deformities, all similar to prior.    IMPRESSION:  Blood and Moreno catheter in the urinary bladder lumen are new compared to   the prior study.    Numerous other chronic findings are not significantly changed.        --- End of Report ---             ALEX MACIEL MD; Attending Radiologist  This document has been electronically signed. May 31 2023  5:00AM    Culture - Urine (05.31.23 @ 02:16)   Specimen Source: Clean Catch Clean Catch (Midstream)  Culture Results:   >100,000 CFU/ml Enterococcus species Identification and susceptibility to   follow.

## 2023-06-02 NOTE — PROGRESS NOTE ADULT - SUBJECTIVE AND OBJECTIVE BOX
Subjective  Irrigation of catheter needed overnight due to clots within tubing. CBI at moderate/fast drip, currently clear.    Objective    Vital signs  T(F): , Max: 98.6 (06-01-23 @ 13:07)  HR: 59 (06-02-23 @ 05:18)  BP: 130/66 (06-02-23 @ 05:18)  SpO2: 94% (06-02-23 @ 05:18)  Wt(kg): --    Output     OUT:    Voided (mL): 5150 mL  Total OUT: 5150 mL    Total NET: -5150 mL          Gen: NAD  Abd: soft, nontender, nondistended  : moreno secured in place, draining CYU on fast/moderate drip    Labs      06-02 @ 05:52    WBC 5.47  / Hct 31.2  / SCr 0.91     06-01 @ 05:32    WBC 4.54  / Hct 31.1  / SCr 0.88         Culture - Urine (collected 05-31-23 @ 02:16)  Source: Clean Catch Clean Catch (Midstream)  Preliminary Report (06-01-23 @ 07:31):    >100,000 CFU/ml Enterococcus species

## 2023-06-02 NOTE — PROGRESS NOTE ADULT - SUBJECTIVE AND OBJECTIVE BOX
INTERVAL HPI/OVERNIGHT EVENTS:  Patient seen,events noticed,doing better  VITAL SIGNS:  T(F): 97.3 (06-02-23 @ 05:18)  HR: 59 (06-02-23 @ 05:18)  BP: 130/66 (06-02-23 @ 05:18)  RR: 18 (06-02-23 @ 05:18)  SpO2: 94% (06-02-23 @ 05:18)  Wt(kg): --    PHYSICAL EXAM:  awake,afebrile  Constitutional:  Eyes:  ENMT:  Neck:perrla  Respiratory:clear  Cardiovascular:s1s2,m-none  Gastrointestinal:soft,bs pos  Extremities:  Vascular:  Neurological:no focal deficit  Musculoskeletal:    MEDICATIONS  (STANDING):  amLODIPine   Tablet 5 milliGRAM(s) Oral daily  ampicillin  IVPB 1 Gram(s) IV Intermittent once  ampicillin  IVPB      ampicillin  IVPB 1 Gram(s) IV Intermittent every 6 hours  aspirin  chewable 81 milliGRAM(s) Oral daily  atorvastatin 80 milliGRAM(s) Oral at bedtime  baclofen 5 milliGRAM(s) Oral every 12 hours  clopidogrel Tablet 75 milliGRAM(s) Oral daily  DULoxetine 60 milliGRAM(s) Oral daily  famotidine    Tablet 20 milliGRAM(s) Oral daily  furosemide    Tablet 20 milliGRAM(s) Oral daily  metoprolol tartrate 25 milliGRAM(s) Oral two times a day  oxyCODONE    IR 10 milliGRAM(s) Oral <User Schedule>  oxyCODONE    IR 20 milliGRAM(s) Oral <User Schedule>  pregabalin 75 milliGRAM(s) Oral three times a day  tamsulosin 0.4 milliGRAM(s) Oral at bedtime    MEDICATIONS  (PRN):  acetaminophen     Tablet .. 650 milliGRAM(s) Oral every 6 hours PRN Temp greater or equal to 38C (100.4F), Mild Pain (1 - 3)  aluminum hydroxide/magnesium hydroxide/simethicone Suspension 30 milliLiter(s) Oral every 4 hours PRN Dyspepsia  melatonin 3 milliGRAM(s) Oral at bedtime PRN Insomnia  ondansetron Injectable 4 milliGRAM(s) IV Push every 8 hours PRN Nausea and/or Vomiting      Allergies    No Known Allergies    Intolerances        LABS:                        9.7    5.47  )-----------( 155      ( 02 Jun 2023 05:52 )             31.2     06-02    141  |  105  |  15  ----------------------------<  101<H>  4.0   |  32<H>  |  0.91    Ca    8.4      02 Jun 2023 05:52            RADIOLOGY & ADDITIONAL TESTS:      Assessment and Plan:   · Assessment	  This is a 82 year old male, coming from [facility], Aox2-3, with medical history of HTN, HLD, venous insufficiency, CAD, BPH, CHF who presented with hematuria, followed by urology with CBI, currently clear drainage.  UCx gew enterococcus, started Ceftriaxone, sens pending.       Problem/Plan - 1:  ·  Problem: Hematuria-improved clinically  Urology following  Taper CBI as tolerated  H/H stable.     Problem/Plan - 2:  ·  Problem: Enterococcus UTI.   ·  Plan: Asymptomatic, afebrile with no leukocytosis  -cont abx  -f/u sens.     Problem/Plan - 3:  ·  Problem: Chronic CHF.   ·  Plan: Not in exacerbation  history of CHF  takes metoprolol lasix at home  c/w home meds.     Problem/Plan - 4:  ·  Problem: Hypertension.   ·  Plan: b/p controlled on current regimen  - Cont pts home medication of amlodipine, Metoprolol and lasix with parameters.  - Low sodium diet.     Problem/Plan - 5:  ·  Problem: Chronic back pain.   ·  Plan: -Cont Oxycodone and Baclofen per pt. schedule  -Cont Lyrica and Cymbalta  -PT.     Problem/Plan - 6:  ·  Problem: Hyperlipidemia.   ·  Plan: - Pt has a history of HLD.   - Will continue pts home medication Atorvastatin.     Problem/Plan - 7:  ·  Problem: Venous insufficiency.   ·  Plan: History of chronic venous insufficiency.     Problem/Plan - 8:  ·  Problem: BPH (benign prostatic hyperplasia).   ·  Plan: - Pt has a history of BPH.   - Will continue pts home medication Tamsulosin.  - Will likely discharge with moreno given urinary retention.     Problem/Plan - 9:  ·  Problem: Prophylactic measure.   ·  Plan: DVT - SCD  GI - Famotidine.     Problem/Plan - 10:  ·  Problem: Discharge planning issues.   ·  Plan; Pt. is from [facility]  f/u PT eval  consult uro re discharge w/wo moreno (has failed TOV in the past)  CM following.

## 2023-06-02 NOTE — ADVANCED PRACTICE NURSE CONSULT - RECOMMEDATIONS
-Clean the L. Gluteal wound with normal saline and apply skin prep to the surrounding skin  -Apply a Foam dressing Q 72hrs PRN  -Elevate/float the patients heels using heel protectors and reposition the patient Q 2hrs using wedges or pillows -Clean the L. Gluteal wound with normal saline and apply skin prep to the surrounding skin  -Apply a Foam dressing Q 72hrs PRN  -Offload the Bridge of the Nose area  -Elevate/float the patients heels using heel protectors and reposition the patient Q 2hrs using wedges or pillows

## 2023-06-02 NOTE — ADVANCED PRACTICE NURSE CONSULT - ASSESSMENT
This is a 82yr old male patient admitted for Hematuria, presenting with a Stage 2 Pressure Injury to the L. Gluteus (2cm x 4cm x 0.1cm) with red tissue and drainage This is a 82yr old male patient admitted for Hematuria, presenting with the following:  -There is a Stage 2 Pressure Injury to the L. Gluteus (2cm x 4cm x 0.1cm) with red tissue and drainage  -There is evidence of blanchable erythema to the Bridge of the Nose

## 2023-06-03 ENCOUNTER — TRANSCRIPTION ENCOUNTER (OUTPATIENT)
Age: 83
End: 2023-06-03

## 2023-06-03 LAB
-  AMPICILLIN: SIGNIFICANT CHANGE UP
-  CIPROFLOXACIN: SIGNIFICANT CHANGE UP
-  LEVOFLOXACIN: SIGNIFICANT CHANGE UP
-  NITROFURANTOIN: SIGNIFICANT CHANGE UP
-  TETRACYCLINE: SIGNIFICANT CHANGE UP
-  VANCOMYCIN: SIGNIFICANT CHANGE UP
ANION GAP SERPL CALC-SCNC: 2 MMOL/L — LOW (ref 5–17)
BUN SERPL-MCNC: 15 MG/DL — SIGNIFICANT CHANGE UP (ref 7–18)
CALCIUM SERPL-MCNC: 8.4 MG/DL — SIGNIFICANT CHANGE UP (ref 8.4–10.5)
CHLORIDE SERPL-SCNC: 109 MMOL/L — HIGH (ref 96–108)
CO2 SERPL-SCNC: 30 MMOL/L — SIGNIFICANT CHANGE UP (ref 22–31)
CREAT SERPL-MCNC: 0.84 MG/DL — SIGNIFICANT CHANGE UP (ref 0.5–1.3)
CULTURE RESULTS: SIGNIFICANT CHANGE UP
EGFR: 86 ML/MIN/1.73M2 — SIGNIFICANT CHANGE UP
GLUCOSE SERPL-MCNC: 101 MG/DL — HIGH (ref 70–99)
HCT VFR BLD CALC: 31.9 % — LOW (ref 39–50)
HGB BLD-MCNC: 10 G/DL — LOW (ref 13–17)
MCHC RBC-ENTMCNC: 27 PG — SIGNIFICANT CHANGE UP (ref 27–34)
MCHC RBC-ENTMCNC: 31.3 GM/DL — LOW (ref 32–36)
MCV RBC AUTO: 86 FL — SIGNIFICANT CHANGE UP (ref 80–100)
METHOD TYPE: SIGNIFICANT CHANGE UP
NRBC # BLD: 0 /100 WBCS — SIGNIFICANT CHANGE UP (ref 0–0)
ORGANISM # SPEC MICROSCOPIC CNT: SIGNIFICANT CHANGE UP
ORGANISM # SPEC MICROSCOPIC CNT: SIGNIFICANT CHANGE UP
PLATELET # BLD AUTO: 154 K/UL — SIGNIFICANT CHANGE UP (ref 150–400)
POTASSIUM SERPL-MCNC: 3.9 MMOL/L — SIGNIFICANT CHANGE UP (ref 3.5–5.3)
POTASSIUM SERPL-SCNC: 3.9 MMOL/L — SIGNIFICANT CHANGE UP (ref 3.5–5.3)
RBC # BLD: 3.71 M/UL — LOW (ref 4.2–5.8)
RBC # FLD: 17 % — HIGH (ref 10.3–14.5)
SODIUM SERPL-SCNC: 141 MMOL/L — SIGNIFICANT CHANGE UP (ref 135–145)
SPECIMEN SOURCE: SIGNIFICANT CHANGE UP
WBC # BLD: 6.43 K/UL — SIGNIFICANT CHANGE UP (ref 3.8–10.5)
WBC # FLD AUTO: 6.43 K/UL — SIGNIFICANT CHANGE UP (ref 3.8–10.5)

## 2023-06-03 RX ADMIN — Medication 5 MILLIGRAM(S): at 18:04

## 2023-06-03 RX ADMIN — Medication 108 GRAM(S): at 05:25

## 2023-06-03 RX ADMIN — OXYCODONE HYDROCHLORIDE 10 MILLIGRAM(S): 5 TABLET ORAL at 18:34

## 2023-06-03 RX ADMIN — Medication 5 MILLIGRAM(S): at 05:24

## 2023-06-03 RX ADMIN — OXYCODONE HYDROCHLORIDE 20 MILLIGRAM(S): 5 TABLET ORAL at 09:12

## 2023-06-03 RX ADMIN — Medication 75 MILLIGRAM(S): at 18:04

## 2023-06-03 RX ADMIN — FAMOTIDINE 20 MILLIGRAM(S): 10 INJECTION INTRAVENOUS at 11:55

## 2023-06-03 RX ADMIN — OXYCODONE HYDROCHLORIDE 20 MILLIGRAM(S): 5 TABLET ORAL at 22:29

## 2023-06-03 RX ADMIN — OXYCODONE HYDROCHLORIDE 10 MILLIGRAM(S): 5 TABLET ORAL at 18:04

## 2023-06-03 RX ADMIN — OXYCODONE HYDROCHLORIDE 20 MILLIGRAM(S): 5 TABLET ORAL at 12:04

## 2023-06-03 RX ADMIN — OXYCODONE HYDROCHLORIDE 20 MILLIGRAM(S): 5 TABLET ORAL at 12:48

## 2023-06-03 RX ADMIN — Medication 75 MILLIGRAM(S): at 05:24

## 2023-06-03 RX ADMIN — Medication 108 GRAM(S): at 11:54

## 2023-06-03 RX ADMIN — OXYCODONE HYDROCHLORIDE 20 MILLIGRAM(S): 5 TABLET ORAL at 21:56

## 2023-06-03 RX ADMIN — Medication 108 GRAM(S): at 18:04

## 2023-06-03 RX ADMIN — DULOXETINE HYDROCHLORIDE 60 MILLIGRAM(S): 30 CAPSULE, DELAYED RELEASE ORAL at 11:54

## 2023-06-03 RX ADMIN — OXYCODONE HYDROCHLORIDE 20 MILLIGRAM(S): 5 TABLET ORAL at 09:55

## 2023-06-03 RX ADMIN — Medication 108 GRAM(S): at 01:32

## 2023-06-03 RX ADMIN — TAMSULOSIN HYDROCHLORIDE 0.4 MILLIGRAM(S): 0.4 CAPSULE ORAL at 21:56

## 2023-06-03 RX ADMIN — ATORVASTATIN CALCIUM 80 MILLIGRAM(S): 80 TABLET, FILM COATED ORAL at 21:56

## 2023-06-03 RX ADMIN — Medication 75 MILLIGRAM(S): at 21:56

## 2023-06-03 NOTE — DISCHARGE NOTE PROVIDER - HOSPITAL COURSE
82 year old male, coming from [facility], Aox2-3, with medical history of HTN, HLD, venous insufficiency, CAD, BPH with indwelling catheter, CHF who presented with hematuria and severe back pain.  ua shows blood without infection. CT A/P shows Blood and Moreno catheter in the urinary bladder lumen are new compared to the prior study.  3 way moreno placed in ED, for bladder irrigation. urology consulted recc: hold ac, continue cbi and trend cbc. UCx grew enterococcus, treated with antibiotic, ID consulted Dr. Ramey. pt also s/p 1 unit prbc with good response.       Please note that this a brief summary of hospital course please refer to daily progress notes and consult notes for full course and events      incomplete 6/3--->>>. 82 year old male, coming from [facility], Aox2-3, with medical history of HTN, HLD, venous insufficiency, CAD, BPH with indwelling catheter, CHF who presented with hematuria and severe back pain.  ua shows blood without infection. CT A/P shows Blood and Moreno catheter in the urinary bladder lumen are new compared to the prior study.  3 way moreno placed in ED, for bladder irrigation. urology consulted recc: hold ac, continue cbi and trend cbc. UCx grew enterococcus, treated with antibiotic, ID consulted Dr. Cordero. Transfused 1U PRBC with good response.     Case discussed with attending.  Given patient's improved clinical status and current hemodynamic stability, the decision was made for discharge.    This is a summary of the patients hospitalization.  For full hospital course, please see medical record.

## 2023-06-03 NOTE — PROGRESS NOTE ADULT - SUBJECTIVE AND OBJECTIVE BOX
Patient seen and examined at bedside with no complaints.   Denies abdominal pain   No manual irrigation required today per nursing, but done overnight    Vital Signs Last 24 Hrs  T(F): 97.7 (06-03-23 @ 05:05), Max: 97.7 (06-02-23 @ 13:10)  HR: 79 (06-03-23 @ 09:00)  BP: 107/61 (06-03-23 @ 09:00)  RR: 18 (06-03-23 @ 09:00)  SpO2: 94% (06-03-23 @ 09:00)  POCT Blood Glucose.: 134 mg/dL (02 Jun 2023 18:24)    GENERAL: Alert, NAD  CHEST/LUNG: respirations nonlabored  ABDOMEN: soft, Nontender, Nondistended  : CBI running at moderate to slow rate and jillian colored. No clots, no suprapubic tenderness   EXTREMITIES:  no calf tenderness, No edema    I&O's Detail    02 Jun 2023 07:01  -  03 Jun 2023 07:00  --------------------------------------------------------  IN:    Continuous Bladder Irrigation (mL): 40323 mL  Total IN: 24018 mL    OUT:    Continuous Bladder Irrigation (mL): 69263 mL  Total OUT: 75141 mL    Total NET: 4900 mL    03 Jun 2023 07:01  -  03 Jun 2023 12:21  --------------------------------------------------------  IN:    Continuous Bladder Irrigation (mL): 5250 mL  Total IN: 5250 mL    OUT:    Continuous Bladder Irrigation (mL): 4300 mL  Total OUT: 4300 mL    Total NET: 950 mL    LABS:                        10.0   6.43  )-----------( 154      ( 03 Jun 2023 06:30 )             31.9     06-03    141  |  109<H>  |  15  ----------------------------<  101<H>  3.9   |  30  |  0.84    Ca    8.4      03 Jun 2023 06:30

## 2023-06-03 NOTE — DISCHARGE NOTE PROVIDER - NSDCCPCAREPLAN_GEN_ALL_CORE_FT
PRINCIPAL DISCHARGE DIAGNOSIS  Diagnosis: Hematuria  Assessment and Plan of Treatment: You presented to the hospital with complaints of back pain and hematuria. Hematuria is blood in your urine.  You had a ct scan of your abdomen and pelvis which showed: blood in your urinary bladder. your blood count was monitored, and you a blood transfusion. you were treated by continuous bladder irrigation and followed by the urology team.   What causes hematuria?   Urinary tract infection  Kidney or bladder stones  Swollen prostate  Kidney disease  Abdomen or pelvic injury  Return to the emergency department if:  You have blood in your urine after a new injury, such as a fall.  You have severe back or side pain that does not go away with treatment.  Follow with your primary care provider.      SECONDARY DISCHARGE DIAGNOSES  Diagnosis: Enterococcus UTI  Assessment and Plan of Treatment: You were treated here for a urinary tract infection with IV antibiotics. you were followed by an infectious disease doctor. If you were prescribed antibiotics, take them exactly as your caregiver instructs you. Finish the medication even if you feel better after you have only taken some of the medication.  Drink enough water and fluids to keep your urine clear or pale yellow.  Avoid caffeine, tea, and carbonated beverages. They tend to irritate your bladder.  Empty your bladder often. Avoid holding urine for long periods of time.  SEEK MEDICAL CARE IF:  You have back pain.  You develop a fever.  Your symptoms do not begin to resolve within 3 days.  SEEK IMMEDIATE MEDICAL CARE IF:  You have severe back pain or lower abdominal pain.  You develop chills.  You have nausea or vomiting.  You have continued burning or discomfort with urination.  Follow with your healthcare provider.    Diagnosis: CAD (coronary artery disease)  Assessment and Plan of Treatment: Coronary artery disease is a condition where the arteries the supply the heart muscle gets clogged with fatty deposits & puts you at risk for a heart attack. Continue with medications as prescribed.   Call your doctor if you have any new pain, pressure, or discomfort in the center of your chest, pain, tingling or discomfort in arms, back, neck, jaw, or stomach, shortness of breath, nausea, vomiting, burping or heartburn, sweating, cold and clammy skin, racing or abnormal heartbeat for more than 10 minutes or if they keep coming & going.  Call 911 and do not tr to get to hospital by care  You can help yourself with lifestyle changes (quitting smoking if you smoke), eat lots of fruits & vegetables & low fat dairy products, not a lot of meat & fatty foods, walk or some form of physical activity most days of the week, lose weight if you are overweight  Take your cardiac medication as prescribed to lower cholesterol, to lower blood pressure, aspirin to prevent blood clots, and diabetes control  Make sure to keep appointments with doctor for cardiac follow up care      Diagnosis: Chronic back pain  Assessment and Plan of Treatment: You have a history of chronic back pain. Chronic back pain is back pain that lasts 3 months or longer.   Call your doctor if:  You have severe pain.  You have new numbness, tingling, or weakness, especially in your lower back, legs, arms, or genital area.  You lose control of your bladder or bowel movements.  You have a fever or sudden weight loss.  You have new or worse pain.  Take your medicine as directed  Follow up with your primary care provider.      Diagnosis: Hypertension  Assessment and Plan of Treatment: You have a history of high blood pressure. High blood pressure is a condition that puts you at risk for heart attack, stroke and kidney disease. We held your blood pressure medications while you were in the hospital. Please follow up with your primary care provider on when you should resume your blood pressure medications--------->>>>>>>>You can also help control your blood pressure by maintaining a healthy weight, eating a diet low in fat and rich in fruits and vegetables, reduce the amount of salt in your diet. Also, reduce alcohol and try to include some form of physical activity daily for at least 30 mins. Follow up with your medical doctor to establish long term blood pressure treatment goals.  Notify your doctor if you have any of the following symptoms:   Dizziness, Lightheadedness, Blurry vision, Headache, Chest pain, Shortness of breath.      Diagnosis: Chronic CHF  Assessment and Plan of Treatment: You have a history of chf. Please continue your lasix as prescribed. Weigh yourself daily.  If you gain 3lbs in 3 days, or 5lbs in a week call your Health Care Provider.  Do not eat or drink foods containing more than 2000mg of salt (sodium) in your diet every day.  Call your Health Care Provider if you have any swelling or increased swelling in your feet, ankles, and/or stomach.  Take all of your medication as directed.  If you become dizzy call your Health Care Provider.      Diagnosis: BPH (benign prostatic hyperplasia)  Assessment and Plan of Treatment: You have an enlarged prostate gland which gets bigger as men get older - it is a very common problem and has nothing to do with prostate cancer  Call your doctor if you are urinating more frequently, have trouble starting to urinate, have weak stream, urine leaking or dribbling, and feeling as though bladder is not empty after urination  Your doctor will monitor your prostate with a rectal exam as well as urine or blood testing  You can help yourself by reducing the amount of fluid you drink before going to bed, limiting the amount of alcohol & caffeine you drink   Avoid cold & allergy medication that contain decongestants or antihistamines which make BPH symptoms worse  You can also "double void" by waiting a moment after urinating & trying again  Take your medication as prescribed - one medication helps to relax the muscle around the urethra and the other medication you may take prevents the prostate from growing more or even shrinking the prostate      Diagnosis: Hyperlipidemia  Assessment and Plan of Treatment: You have a history of hyperlipidemia, which is when you have too much cholesterol in your blood. High amounts of cholesterol in your blood can put you at higher risks for heart attck, strokes and other health problems. Follow up with PCP for treatment goals, continue medication as prescribed, have liver function testing every 3 months as anti lipid medications can cause liver irritation, eat low fat meals, avoid red meat, butter, fried foods and cheese. Get daily exercise.

## 2023-06-03 NOTE — PROGRESS NOTE ADULT - SUBJECTIVE AND OBJECTIVE BOX
INTERVAL HPI/OVERNIGHT EVENTS:  Patient seen,stable clinically  VITAL SIGNS:  T(F): 97.7 (06-03-23 @ 05:05)  HR: 79 (06-03-23 @ 09:00)  BP: 107/61 (06-03-23 @ 09:00)  RR: 18 (06-03-23 @ 09:00)  SpO2: 94% (06-03-23 @ 09:00)  Wt(kg): --    PHYSICAL EXAM:  awake  Constitutional:  Eyes:  ENMT:perrla  Neck:  Respiratory:clear  Cardiovascular:s1s2,m-none  Gastrointestinal:soft,bs pos  Extremities:  Vascular:  Neurological:no focal deficit  Musculoskeletal:    MEDICATIONS  (STANDING):  ampicillin  IVPB      ampicillin  IVPB 1 Gram(s) IV Intermittent every 6 hours  atorvastatin 80 milliGRAM(s) Oral at bedtime  baclofen 5 milliGRAM(s) Oral every 12 hours  DULoxetine 60 milliGRAM(s) Oral daily  famotidine    Tablet 20 milliGRAM(s) Oral daily  oxyCODONE    IR 10 milliGRAM(s) Oral <User Schedule>  oxyCODONE    IR 20 milliGRAM(s) Oral <User Schedule>  pregabalin 75 milliGRAM(s) Oral three times a day  tamsulosin 0.4 milliGRAM(s) Oral at bedtime    MEDICATIONS  (PRN):  acetaminophen     Tablet .. 650 milliGRAM(s) Oral every 6 hours PRN Temp greater or equal to 38C (100.4F), Mild Pain (1 - 3)  aluminum hydroxide/magnesium hydroxide/simethicone Suspension 30 milliLiter(s) Oral every 4 hours PRN Dyspepsia  melatonin 3 milliGRAM(s) Oral at bedtime PRN Insomnia  ondansetron Injectable 4 milliGRAM(s) IV Push every 8 hours PRN Nausea and/or Vomiting      Allergies    No Known Allergies    Intolerances        LABS:                        10.0   6.43  )-----------( 154      ( 03 Jun 2023 06:30 )             31.9     06-03    141  |  109<H>  |  15  ----------------------------<  101<H>  3.9   |  30  |  0.84    Ca    8.4      03 Jun 2023 06:30            RADIOLOGY & ADDITIONAL TESTS:      Assessment and Plan:   · Assessment	  This is a 82 year old male, coming from [facility], Aox2-3, with medical history of HTN, HLD, venous insufficiency, CAD, BPH, CHF who presented with hematuria, followed by urology with CBI, currently clear drainage.  UCx gew enterococcus, started Ceftriaxone, sens pending.       Problem/Plan - 1:  ·  Problem: Hematuria-improved clinically  Urology following  Taper CBI as tolerated  s/p 1 unit transfussion     Problem/Plan - 2:  ·  Problem: Enterococcus UTI.   ·  Plan: Asymptomatic, afebrile with no leukocytosis  -cont abx  -f/u sens.     Problem/Plan - 3:  ·  Problem: Chronic CHF.   ·  Plan: Not in exacerbation  history of CHF  takes metoprolol lasix at home  c/w home meds.     Problem/Plan - 4:  ·  Problem: Hypertension.   ·  Plan: b/p controlled on current regimen  - Cont pts home medication of amlodipine, Metoprolol and lasix with parameters.  - Low sodium diet.     Problem/Plan - 5:  ·  Problem: Chronic back pain.   ·  Plan: -Cont Oxycodone and Baclofen per pt. schedule  -Cont Lyrica and Cymbalta  -PT.     Problem/Plan - 6:  ·  Problem: Hyperlipidemia.   ·  Plan: - Pt has a history of HLD.   - Will continue pts home medication Atorvastatin.     Problem/Plan - 7:  ·  Problem: Venous insufficiency.   ·  Plan: History of chronic venous insufficiency.     Problem/Plan - 8:  ·  Problem: BPH (benign prostatic hyperplasia).   ·  Plan: - Pt has a history of BPH.   - Will continue pts home medication Tamsulosin.  - Will likely discharge with moreno given urinary retention.     Problem/Plan - 9:  ·  Problem: Prophylactic measure.   ·  Plan: DVT - SCD  GI - Famotidine.     Problem/Plan - 10:  ·  Problem: Discharge planning issues.   ·  Plan; Pt. is from [facility]  f/u PT eval  consult uro re discharge w/wo mroeno (has failed TOV in the past)  CM following.

## 2023-06-03 NOTE — PROGRESS NOTE ADULT - SUBJECTIVE AND OBJECTIVE BOX
CHIEF COMPLAINT:Patient is a 82y old  Male who presents with a chief complaint of Hematuria.Pt appears comfortable.    	  REVIEW OF SYSTEMS:  CONSTITUTIONAL: No fever, weight loss, or fatigue  EYES: No eye pain, visual disturbances, or discharge  ENT:  No difficulty hearing, tinnitus, vertigo; No sinus or throat pain  NECK: No pain or stiffness  RESPIRATORY: No cough, wheezing, chills or hemoptysis; No Shortness of Breath  CARDIOVASCULAR: No chest pain, palpitations, passing out, dizziness, or leg swelling  GASTROINTESTINAL: No abdominal or epigastric pain. No nausea, vomiting, or hematemesis; No diarrhea or constipation. No melena or hematochezia.  GENITOURINARY: No dysuria, frequency, hematuria, or incontinence  NEUROLOGICAL: No headaches, memory loss, loss of strength, numbness, or tremors  SKIN: No itching, burning, rashes, or lesions   LYMPH Nodes: No enlarged glands  ENDOCRINE: No heat or cold intolerance; No hair loss  MUSCULOSKELETAL: No joint pain or swelling; No muscle, back, or extremity pain  PSYCHIATRIC: No depression, anxiety, mood swings, or difficulty sleeping  HEME/LYMPH: No easy bruising, or bleeding gums  ALLERGY AND IMMUNOLOGIC: No hives or eczema	        PHYSICAL EXAM:  T(C): 36.5 (06-03-23 @ 05:05), Max: 36.5 (06-02-23 @ 13:10)  HR: 79 (06-03-23 @ 09:00) (56 - 79)  BP: 107/61 (06-03-23 @ 09:00) (95/65 - 157/76)  RR: 18 (06-03-23 @ 09:00) (17 - 18)  SpO2: 94% (06-03-23 @ 09:00) (93% - 97%)  Wt(kg): --  I&O's Summary    02 Jun 2023 07:01  -  03 Jun 2023 07:00  --------------------------------------------------------  IN: 68095 mL / OUT: 04878 mL / NET: 4900 mL    03 Jun 2023 07:01  -  03 Jun 2023 12:34  --------------------------------------------------------  IN: 5250 mL / OUT: 4300 mL / NET: 950 mL        Appearance: Normal	  HEENT:   Normal oral mucosa, PERRL, EOMI	  Lymphatic: No lymphadenopathy  Cardiovascular: Normal S1 S2, No JVD, No murmurs, No edema  Respiratory: Lungs clear to auscultation	  Psychiatry: A & O x 3, Mood & affect appropriate  Gastrointestinal:  Soft, Non-tender, + BS	  Skin: No rashes, No ecchymoses, No cyanosis	  Neurologic: Non-focal  Extremities: Normal range of motion, No clubbing, cyanosis or edema  Vascular: Peripheral pulses palpable 2+ bilaterally    MEDICATIONS  (STANDING):  ampicillin  IVPB      ampicillin  IVPB 1 Gram(s) IV Intermittent every 6 hours  atorvastatin 80 milliGRAM(s) Oral at bedtime  baclofen 5 milliGRAM(s) Oral every 12 hours  DULoxetine 60 milliGRAM(s) Oral daily  famotidine    Tablet 20 milliGRAM(s) Oral daily  oxyCODONE    IR 10 milliGRAM(s) Oral <User Schedule>  oxyCODONE    IR 20 milliGRAM(s) Oral <User Schedule>  pregabalin 75 milliGRAM(s) Oral three times a day  tamsulosin 0.4 milliGRAM(s) Oral at bedtime      	  LABS:	 	                          10.0   6.43  )-----------( 154      ( 03 Jun 2023 06:30 )             31.9     06-03    141  |  109<H>  |  15  ----------------------------<  101<H>  3.9   |  30  |  0.84    Ca    8.4      03 Jun 2023 06:30      Culture - Urine (05.31.23 @ 02:16)   Specimen Source: Clean Catch Clean Catch (Midstream)  Culture Results:   >100,000 CFU/ml Enterococcus species Identification and susceptibility to   follow.Culture - Blood (05.11.23 @ 06:45)   Specimen Source: .Blood Blood-Peripheral  Culture Results:   No Growth FinalCulture - Blood (05.11.23 @ 06:40)   Specimen Source: .Blood Blood-Peripheral  Culture Results:   No Growth Final

## 2023-06-04 LAB
ANION GAP SERPL CALC-SCNC: 2 MMOL/L — LOW (ref 5–17)
BUN SERPL-MCNC: 17 MG/DL — SIGNIFICANT CHANGE UP (ref 7–18)
CALCIUM SERPL-MCNC: 8.4 MG/DL — SIGNIFICANT CHANGE UP (ref 8.4–10.5)
CHLORIDE SERPL-SCNC: 110 MMOL/L — HIGH (ref 96–108)
CO2 SERPL-SCNC: 29 MMOL/L — SIGNIFICANT CHANGE UP (ref 22–31)
CREAT SERPL-MCNC: 0.88 MG/DL — SIGNIFICANT CHANGE UP (ref 0.5–1.3)
EGFR: 85 ML/MIN/1.73M2 — SIGNIFICANT CHANGE UP
GLUCOSE SERPL-MCNC: 107 MG/DL — HIGH (ref 70–99)
HCT VFR BLD CALC: 31.1 % — LOW (ref 39–50)
HGB BLD-MCNC: 9.3 G/DL — LOW (ref 13–17)
MCHC RBC-ENTMCNC: 26.3 PG — LOW (ref 27–34)
MCHC RBC-ENTMCNC: 29.9 GM/DL — LOW (ref 32–36)
MCV RBC AUTO: 88.1 FL — SIGNIFICANT CHANGE UP (ref 80–100)
NRBC # BLD: 0 /100 WBCS — SIGNIFICANT CHANGE UP (ref 0–0)
PLATELET # BLD AUTO: 154 K/UL — SIGNIFICANT CHANGE UP (ref 150–400)
POTASSIUM SERPL-MCNC: 4.1 MMOL/L — SIGNIFICANT CHANGE UP (ref 3.5–5.3)
POTASSIUM SERPL-SCNC: 4.1 MMOL/L — SIGNIFICANT CHANGE UP (ref 3.5–5.3)
RBC # BLD: 3.53 M/UL — LOW (ref 4.2–5.8)
RBC # FLD: 17.2 % — HIGH (ref 10.3–14.5)
SODIUM SERPL-SCNC: 141 MMOL/L — SIGNIFICANT CHANGE UP (ref 135–145)
WBC # BLD: 4.77 K/UL — SIGNIFICANT CHANGE UP (ref 3.8–10.5)
WBC # FLD AUTO: 4.77 K/UL — SIGNIFICANT CHANGE UP (ref 3.8–10.5)

## 2023-06-04 RX ADMIN — OXYCODONE HYDROCHLORIDE 20 MILLIGRAM(S): 5 TABLET ORAL at 22:43

## 2023-06-04 RX ADMIN — Medication 108 GRAM(S): at 06:05

## 2023-06-04 RX ADMIN — Medication 650 MILLIGRAM(S): at 01:06

## 2023-06-04 RX ADMIN — Medication 75 MILLIGRAM(S): at 06:05

## 2023-06-04 RX ADMIN — OXYCODONE HYDROCHLORIDE 20 MILLIGRAM(S): 5 TABLET ORAL at 12:12

## 2023-06-04 RX ADMIN — DULOXETINE HYDROCHLORIDE 60 MILLIGRAM(S): 30 CAPSULE, DELAYED RELEASE ORAL at 12:13

## 2023-06-04 RX ADMIN — Medication 108 GRAM(S): at 17:58

## 2023-06-04 RX ADMIN — OXYCODONE HYDROCHLORIDE 20 MILLIGRAM(S): 5 TABLET ORAL at 06:04

## 2023-06-04 RX ADMIN — FAMOTIDINE 20 MILLIGRAM(S): 10 INJECTION INTRAVENOUS at 12:48

## 2023-06-04 RX ADMIN — Medication 5 MILLIGRAM(S): at 06:05

## 2023-06-04 RX ADMIN — OXYCODONE HYDROCHLORIDE 10 MILLIGRAM(S): 5 TABLET ORAL at 17:23

## 2023-06-04 RX ADMIN — Medication 75 MILLIGRAM(S): at 14:23

## 2023-06-04 RX ADMIN — OXYCODONE HYDROCHLORIDE 20 MILLIGRAM(S): 5 TABLET ORAL at 07:15

## 2023-06-04 RX ADMIN — Medication 108 GRAM(S): at 12:13

## 2023-06-04 RX ADMIN — OXYCODONE HYDROCHLORIDE 10 MILLIGRAM(S): 5 TABLET ORAL at 17:59

## 2023-06-04 RX ADMIN — ATORVASTATIN CALCIUM 80 MILLIGRAM(S): 80 TABLET, FILM COATED ORAL at 22:43

## 2023-06-04 RX ADMIN — TAMSULOSIN HYDROCHLORIDE 0.4 MILLIGRAM(S): 0.4 CAPSULE ORAL at 22:44

## 2023-06-04 RX ADMIN — Medication 5 MILLIGRAM(S): at 17:58

## 2023-06-04 RX ADMIN — Medication 75 MILLIGRAM(S): at 22:49

## 2023-06-04 RX ADMIN — Medication 108 GRAM(S): at 00:33

## 2023-06-04 RX ADMIN — OXYCODONE HYDROCHLORIDE 20 MILLIGRAM(S): 5 TABLET ORAL at 12:58

## 2023-06-04 NOTE — PHYSICAL THERAPY INITIAL EVALUATION ADULT - PERTINENT HX OF CURRENT PROBLEM, REHAB EVAL
Hematuria, weakness. hx  on imaging: Chronic moderate to severe compression T4   vertebral body. Mild compression T5, T6, T7, T8, T9, T10, T11 and T12   vertebral bodies. Chronic moderate compression of T3 vertebral body with   focal right scoliosis centered at this level. Status post posterior   fusion T2-sacroiliac:

## 2023-06-04 NOTE — PHYSICAL THERAPY INITIAL EVALUATION ADULT - PLANNED THERAPY INTERVENTIONS, PT EVAL
balance training/neuromuscular re-education/postural re-education/ROM/strengthening/transfer training

## 2023-06-04 NOTE — PHYSICAL THERAPY INITIAL EVALUATION ADULT - DIAGNOSIS, PT EVAL
Cardiac, low salt, low fat,   (ICF Model) Pt. present w/deficits in Body Structures/Function (Impairments), incl: Strength, Balance, ROM leading to deficits in performing the below noted Activities (Limitations).

## 2023-06-04 NOTE — PHYSICAL THERAPY INITIAL EVALUATION ADULT - GAIT DEVIATIONS NOTED, PT EVAL
decreased [place]/decreased velocity of limb motion/decreased step length/decreased stride length/decreased weight-shifting ability

## 2023-06-04 NOTE — PHYSICAL THERAPY INITIAL EVALUATION ADULT - GENERAL OBSERVATIONS, REHAB EVAL
Consult received, chart reviewed. Patient received supine in bed, NAD, +CBI w/ hemataurea. Patient agreed to EVALUATION from Physical Therapist.

## 2023-06-04 NOTE — PROGRESS NOTE ADULT - SUBJECTIVE AND OBJECTIVE BOX
Patient seen and examined at bedside  Denies lower abdominal pain     Vital Signs Last 24 Hrs  T(F): 98.7 (06-04-23 @ 05:17), Max: 98.7 (06-04-23 @ 05:17)  HR: 66 (06-04-23 @ 10:22)  BP: 110/68 (06-04-23 @ 10:22)  RR: 18 (06-04-23 @ 05:17)  SpO2: 99% (06-04-23 @ 10:22)  POCT Blood Glucose.: 134 mg/dL (02 Jun 2023 18:24)    GENERAL: Alert, NAD  CHEST/LUNG: respirations nonlabored  ABDOMEN: soft, Nontender, Nondistended  : CBI clamped at time of exam, blood in tubing light pink however new output darkers, CBI resumed at slow rate and jillian colored. No clots, no suprapubic tenderness   EXTREMITIES:  no calf tenderness, No edema    I&O's Detail    03 Jun 2023 07:01  -  04 Jun 2023 07:00  --------------------------------------------------------  IN:    Continuous Bladder Irrigation (mL): 55723 mL  Total IN: 22535 mL    OUT:    Continuous Bladder Irrigation (mL): 24098 mL  Total OUT: 81717 mL    Total NET: 5150 mL    04 Jun 2023 07:01  -  04 Jun 2023 13:41  --------------------------------------------------------  IN:    Continuous Bladder Irrigation (mL): 79708 mL  Total IN: 74360 mL    OUT:    Continuous Bladder Irrigation (mL): 61072 mL  Total OUT: 90387 mL    Total NET: -600 mL    LABS:                        9.3    4.77  )-----------( 154      ( 04 Jun 2023 05:21 )             31.1     06-04    141  |  110<H>  |  17  ----------------------------<  107<H>  4.1   |  29  |  0.88    Ca    8.4      04 Jun 2023 05:21

## 2023-06-04 NOTE — PROGRESS NOTE ADULT - SUBJECTIVE AND OBJECTIVE BOX
CHIEF COMPLAINT:Patient is a 82y old  Male who presents with a chief complaint of Hematuria.Pt appears comfortable.    	  REVIEW OF SYSTEMS:  CONSTITUTIONAL: No fever, weight loss, or fatigue  EYES: No eye pain, visual disturbances, or discharge  ENT:  No difficulty hearing, tinnitus, vertigo; No sinus or throat pain  NECK: No pain or stiffness  RESPIRATORY: No cough, wheezing, chills or hemoptysis; No Shortness of Breath  CARDIOVASCULAR: No chest pain, palpitations, passing out, dizziness, or leg swelling  GASTROINTESTINAL: No abdominal or epigastric pain. No nausea, vomiting, or hematemesis; No diarrhea or constipation. No melena or hematochezia.  GENITOURINARY: No dysuria, frequency, hematuria, or incontinence  NEUROLOGICAL: No headaches, memory loss, loss of strength, numbness, or tremors  SKIN: No itching, burning, rashes, or lesions   LYMPH Nodes: No enlarged glands  ENDOCRINE: No heat or cold intolerance; No hair loss  MUSCULOSKELETAL: No joint pain or swelling; No muscle, back, or extremity pain  PSYCHIATRIC: No depression, anxiety, mood swings, or difficulty sleeping  HEME/LYMPH: No easy bruising, or bleeding gums  ALLERGY AND IMMUNOLOGIC: No hives or eczema	      PHYSICAL EXAM:  T(C): 37.1 (06-04-23 @ 05:17), Max: 37.1 (06-04-23 @ 05:17)  HR: 66 (06-04-23 @ 10:22) (59 - 67)  BP: 110/68 (06-04-23 @ 10:22) (107/46 - 145/62)  RR: 18 (06-04-23 @ 05:17) (16 - 18)  SpO2: 99% (06-04-23 @ 10:22) (95% - 99%)  Wt(kg): --  I&O's Summary    03 Jun 2023 07:01  -  04 Jun 2023 07:00  --------------------------------------------------------  IN: 02890 mL / OUT: 88715 mL / NET: 5150 mL    04 Jun 2023 07:01  -  04 Jun 2023 13:07  --------------------------------------------------------  IN: 68573 mL / OUT: 55459 mL / NET: -600 mL        Appearance: Normal	  HEENT:   Normal oral mucosa, PERRL, EOMI	  Lymphatic: No lymphadenopathy  Cardiovascular: Normal S1 S2, No JVD, No murmurs, No edema  Respiratory: Lungs clear to auscultation	  Psychiatry: A & O x 3, Mood & affect appropriate  Gastrointestinal:  Soft, Non-tender, + BS	  Skin: No rashes, No ecchymoses, No cyanosis	  Neurologic: Non-focal  Extremities: Normal range of motion, No clubbing, cyanosis or edema  Vascular: Peripheral pulses palpable 2+ bilaterally    MEDICATIONS  (STANDING):  ampicillin  IVPB      ampicillin  IVPB 1 Gram(s) IV Intermittent every 6 hours  atorvastatin 80 milliGRAM(s) Oral at bedtime  baclofen 5 milliGRAM(s) Oral every 12 hours  DULoxetine 60 milliGRAM(s) Oral daily  famotidine    Tablet 20 milliGRAM(s) Oral daily  oxyCODONE    IR 20 milliGRAM(s) Oral <User Schedule>  oxyCODONE    IR 10 milliGRAM(s) Oral <User Schedule>  pregabalin 75 milliGRAM(s) Oral three times a day  tamsulosin 0.4 milliGRAM(s) Oral at bedtime      	  	  LABS:	 	                     9.3    4.77  )-----------( 154      ( 04 Jun 2023 05:21 )             31.1     06-04    141  |  110<H>  |  17  ----------------------------<  107<H>  4.1   |  29  |  0.88    Ca    8.4      04 Jun 2023 05:21

## 2023-06-04 NOTE — PHYSICAL THERAPY INITIAL EVALUATION ADULT - ACTIVE RANGE OF MOTION EXAMINATION, REHAB EVAL
except /bl hips ~2/3 range/bilateral upper extremity Active ROM was WFL (within functional limits)/bilateral  lower extremity Active ROM was WFL (within functional limits)

## 2023-06-04 NOTE — PROGRESS NOTE ADULT - SUBJECTIVE AND OBJECTIVE BOX
INTERVAL HPI/OVERNIGHT EVENTS:  Patient seen,awake,no acute issues  VITAL SIGNS:  T(F): 98.7 (06-04-23 @ 05:17)  HR: 59 (06-04-23 @ 05:17)  BP: 145/62 (06-04-23 @ 05:17)  RR: 18 (06-04-23 @ 05:17)  SpO2: 95% (06-04-23 @ 05:17)  Wt(kg): --    PHYSICAL EXAM:  awake  Constitutional:  Eyes:  ENMT:perrla  Neck:  Respiratory:clear  Cardiovascular:s1s,m-none  Gastrointestinal:soft,bs pos,moreno with clear urine  Extremities:  Vascular:  Neurological:no focal deficit  Musculoskeletal:    MEDICATIONS  (STANDING):  ampicillin  IVPB      ampicillin  IVPB 1 Gram(s) IV Intermittent every 6 hours  atorvastatin 80 milliGRAM(s) Oral at bedtime  baclofen 5 milliGRAM(s) Oral every 12 hours  DULoxetine 60 milliGRAM(s) Oral daily  famotidine    Tablet 20 milliGRAM(s) Oral daily  oxyCODONE    IR 20 milliGRAM(s) Oral <User Schedule>  oxyCODONE    IR 10 milliGRAM(s) Oral <User Schedule>  pregabalin 75 milliGRAM(s) Oral three times a day  tamsulosin 0.4 milliGRAM(s) Oral at bedtime    MEDICATIONS  (PRN):  acetaminophen     Tablet .. 650 milliGRAM(s) Oral every 6 hours PRN Temp greater or equal to 38C (100.4F), Mild Pain (1 - 3)  aluminum hydroxide/magnesium hydroxide/simethicone Suspension 30 milliLiter(s) Oral every 4 hours PRN Dyspepsia  melatonin 3 milliGRAM(s) Oral at bedtime PRN Insomnia  ondansetron Injectable 4 milliGRAM(s) IV Push every 8 hours PRN Nausea and/or Vomiting      Allergies    No Known Allergies    Intolerances        LABS:                        9.3    4.77  )-----------( 154      ( 04 Jun 2023 05:21 )             31.1     06-04    141  |  110<H>  |  17  ----------------------------<  107<H>  4.1   |  29  |  0.88    Ca    8.4      04 Jun 2023 05:21            RADIOLOGY & ADDITIONAL TESTS:      Assessment and Plan:   · Assessment	  This is a 82 year old male, coming from [facility], Aox2-3, with medical history of HTN, HLD, venous insufficiency, CAD, BPH, CHF who presented with hematuria, followed by urology with CBI, currently clear drainage.  UCx gew enterococcus, started Ceftriaxone, sens pending.       Problem/Plan - 1:  ·  Problem: Hematuria-improved clinically  Urology following  Taper CBI as tolerated  s/p 1 unit transfussion     Problem/Plan - 2:  ·  Problem: Enterococcus UTI.   ·  Plan: Asymptomatic, afebrile with no leukocytosis  -cont abx  -f/u sens.     Problem/Plan - 3:  ·  Problem: Chronic CHF.   ·  Plan: Not in exacerbation  history of CHF  takes metoprolol lasix at home  c/w home meds.     Problem/Plan - 4:  ·  Problem: Hypertension.   ·  Plan: b/p controlled on current regimen  - Cont pts home medication of amlodipine, Metoprolol and lasix with parameters.  - Low sodium diet.     Problem/Plan - 5:  ·  Problem: Chronic back pain.   ·  Plan: -Cont Oxycodone and Baclofen per pt. schedule  -Cont Lyrica and Cymbalta  -PT.     Problem/Plan - 6:  ·  Problem: Hyperlipidemia.   ·  Plan: - Pt has a history of HLD.   - Will continue pts home medication Atorvastatin.     Problem/Plan - 7:  ·  Problem: Venous insufficiency.   ·  Plan: History of chronic venous insufficiency.     Problem/Plan - 8:  ·  Problem: BPH (benign prostatic hyperplasia).   ·  Plan: - Pt has a history of BPH.   - Will continue pts home medication Tamsulosin.  - Will likely discharge with moreno given urinary retention.     Problem/Plan - 9:  ·  Problem: Prophylactic measure.   ·  Plan: DVT - SCD  GI - Famotidine.     Problem/Plan - 10:  ·  Problem: Discharge planning issues.   ·  Plan; Pt. is from [facility]  f/u PT eval  consult uro re discharge w/wo moreno (has failed TOV in the past)  CM following.

## 2023-06-05 LAB
ANION GAP SERPL CALC-SCNC: 3 MMOL/L — LOW (ref 5–17)
BUN SERPL-MCNC: 13 MG/DL — SIGNIFICANT CHANGE UP (ref 7–18)
CALCIUM SERPL-MCNC: 8.3 MG/DL — LOW (ref 8.4–10.5)
CHLORIDE SERPL-SCNC: 110 MMOL/L — HIGH (ref 96–108)
CO2 SERPL-SCNC: 28 MMOL/L — SIGNIFICANT CHANGE UP (ref 22–31)
CREAT SERPL-MCNC: 0.78 MG/DL — SIGNIFICANT CHANGE UP (ref 0.5–1.3)
EGFR: 88 ML/MIN/1.73M2 — SIGNIFICANT CHANGE UP
GLUCOSE SERPL-MCNC: 90 MG/DL — SIGNIFICANT CHANGE UP (ref 70–99)
HCT VFR BLD CALC: 29.6 % — LOW (ref 39–50)
HGB BLD-MCNC: 9.2 G/DL — LOW (ref 13–17)
MCHC RBC-ENTMCNC: 27.5 PG — SIGNIFICANT CHANGE UP (ref 27–34)
MCHC RBC-ENTMCNC: 31.1 GM/DL — LOW (ref 32–36)
MCV RBC AUTO: 88.4 FL — SIGNIFICANT CHANGE UP (ref 80–100)
NRBC # BLD: 0 /100 WBCS — SIGNIFICANT CHANGE UP (ref 0–0)
PLATELET # BLD AUTO: 155 K/UL — SIGNIFICANT CHANGE UP (ref 150–400)
POTASSIUM SERPL-MCNC: 4 MMOL/L — SIGNIFICANT CHANGE UP (ref 3.5–5.3)
POTASSIUM SERPL-SCNC: 4 MMOL/L — SIGNIFICANT CHANGE UP (ref 3.5–5.3)
RBC # BLD: 3.35 M/UL — LOW (ref 4.2–5.8)
RBC # FLD: 17.5 % — HIGH (ref 10.3–14.5)
SODIUM SERPL-SCNC: 141 MMOL/L — SIGNIFICANT CHANGE UP (ref 135–145)
WBC # BLD: 5.29 K/UL — SIGNIFICANT CHANGE UP (ref 3.8–10.5)
WBC # FLD AUTO: 5.29 K/UL — SIGNIFICANT CHANGE UP (ref 3.8–10.5)

## 2023-06-05 RX ADMIN — Medication 108 GRAM(S): at 17:11

## 2023-06-05 RX ADMIN — OXYCODONE HYDROCHLORIDE 10 MILLIGRAM(S): 5 TABLET ORAL at 17:45

## 2023-06-05 RX ADMIN — OXYCODONE HYDROCHLORIDE 20 MILLIGRAM(S): 5 TABLET ORAL at 07:20

## 2023-06-05 RX ADMIN — ATORVASTATIN CALCIUM 80 MILLIGRAM(S): 80 TABLET, FILM COATED ORAL at 21:27

## 2023-06-05 RX ADMIN — Medication 5 MILLIGRAM(S): at 17:10

## 2023-06-05 RX ADMIN — OXYCODONE HYDROCHLORIDE 20 MILLIGRAM(S): 5 TABLET ORAL at 11:20

## 2023-06-05 RX ADMIN — Medication 75 MILLIGRAM(S): at 15:28

## 2023-06-05 RX ADMIN — OXYCODONE HYDROCHLORIDE 10 MILLIGRAM(S): 5 TABLET ORAL at 17:10

## 2023-06-05 RX ADMIN — Medication 5 MILLIGRAM(S): at 06:17

## 2023-06-05 RX ADMIN — TAMSULOSIN HYDROCHLORIDE 0.4 MILLIGRAM(S): 0.4 CAPSULE ORAL at 21:28

## 2023-06-05 RX ADMIN — FAMOTIDINE 20 MILLIGRAM(S): 10 INJECTION INTRAVENOUS at 11:20

## 2023-06-05 RX ADMIN — Medication 75 MILLIGRAM(S): at 06:19

## 2023-06-05 RX ADMIN — OXYCODONE HYDROCHLORIDE 20 MILLIGRAM(S): 5 TABLET ORAL at 11:50

## 2023-06-05 RX ADMIN — Medication 108 GRAM(S): at 00:14

## 2023-06-05 RX ADMIN — OXYCODONE HYDROCHLORIDE 20 MILLIGRAM(S): 5 TABLET ORAL at 21:27

## 2023-06-05 RX ADMIN — Medication 75 MILLIGRAM(S): at 21:28

## 2023-06-05 RX ADMIN — Medication 108 GRAM(S): at 11:19

## 2023-06-05 RX ADMIN — OXYCODONE HYDROCHLORIDE 20 MILLIGRAM(S): 5 TABLET ORAL at 06:17

## 2023-06-05 RX ADMIN — Medication 108 GRAM(S): at 06:15

## 2023-06-05 RX ADMIN — Medication 108 GRAM(S): at 23:52

## 2023-06-05 RX ADMIN — DULOXETINE HYDROCHLORIDE 60 MILLIGRAM(S): 30 CAPSULE, DELAYED RELEASE ORAL at 11:19

## 2023-06-05 RX ADMIN — Medication 3 MILLIGRAM(S): at 21:28

## 2023-06-05 NOTE — PROGRESS NOTE ADULT - SUBJECTIVE AND OBJECTIVE BOX
NP Note discussed with Primary Attending.      Patient is a 82y old  Male who presents with a chief complaint of Hematuria (05 Jun 2023 11:08)      INTERVAL HPI/OVERNIGHT EVENTS: no new complaints    MEDICATIONS  (STANDING):  ampicillin  IVPB      ampicillin  IVPB 1 Gram(s) IV Intermittent every 6 hours  atorvastatin 80 milliGRAM(s) Oral at bedtime  baclofen 5 milliGRAM(s) Oral every 12 hours  DULoxetine 60 milliGRAM(s) Oral daily  famotidine    Tablet 20 milliGRAM(s) Oral daily  oxyCODONE    IR 10 milliGRAM(s) Oral <User Schedule>  oxyCODONE    IR 20 milliGRAM(s) Oral <User Schedule>  pregabalin 75 milliGRAM(s) Oral three times a day  tamsulosin 0.4 milliGRAM(s) Oral at bedtime    MEDICATIONS  (PRN):  acetaminophen     Tablet .. 650 milliGRAM(s) Oral every 6 hours PRN Temp greater or equal to 38C (100.4F), Mild Pain (1 - 3)  aluminum hydroxide/magnesium hydroxide/simethicone Suspension 30 milliLiter(s) Oral every 4 hours PRN Dyspepsia  melatonin 3 milliGRAM(s) Oral at bedtime PRN Insomnia  ondansetron Injectable 4 milliGRAM(s) IV Push every 8 hours PRN Nausea and/or Vomiting      __________________________________________________  REVIEW OF SYSTEMS:    CONSTITUTIONAL: No fever,   EYES: no acute visual disturbances  NECK: No pain or stiffness  RESPIRATORY: No cough; No shortness of breath  CARDIOVASCULAR: No chest pain, no palpitations  GASTROINTESTINAL: No pain. No nausea or vomiting; No diarrhea   NEUROLOGICAL: No headache or numbness, no tremors  MUSCULOSKELETAL: No joint pain, no muscle pain  GENITOURINARY: Hematuria,+ dysuria.  PSYCHIATRY: no depression , no anxiety  ALL OTHER  ROS negative        Vital Signs Last 24 Hrs  T(C): 36.3 (05 Jun 2023 05:16), Max: 36.4 (04 Jun 2023 22:04)  T(F): 97.3 (05 Jun 2023 05:16), Max: 97.5 (04 Jun 2023 22:04)  HR: 68 (05 Jun 2023 05:16) (68 - 69)  BP: 135/67 (05 Jun 2023 05:16) (130/57 - 135/67)  BP(mean): --  RR: 18 (05 Jun 2023 05:16) (18 - 18)  SpO2: 94% (05 Jun 2023 05:16) (94% - 95%)    Parameters below as of 05 Jun 2023 05:16  Patient On (Oxygen Delivery Method): room air        ________________________________________________  PHYSICAL EXAM:  GENERAL: NAD  HEENT: Normocephalic;  conjunctivae and sclerae clear; moist mucous membranes;   NECK : supple  CHEST/LUNG: Clear to auscultation bilaterally with good air entry   HEART: S1 S2  regular; no murmurs, gallops or rubs  ABDOMEN: Soft, Nontender, Nondistended; Bowel sounds present  EXTREMITIES: no cyanosis; no edema; no calf tenderness  SKIN: warm and dry; no rash  NERVOUS SYSTEM:  Awake and alert; Oriented  to place, person and time ; no new deficits    _________________________________________________  LABS:                        9.2    5.29  )-----------( 155      ( 05 Jun 2023 05:31 )             29.6     06-05    141  |  110<H>  |  13  ----------------------------<  90  4.0   |  28  |  0.78    Ca    8.3<L>      05 Jun 2023 05:31          CAPILLARY BLOOD GLUCOSE            RADIOLOGY & ADDITIONAL TESTS:    ACC: [id number] EXAM:  CT ABDOMEN AND PELVIS IC   ORDERED BY: ALISSON MOORE     PROCEDURE DATE:  05/31/2023          INTERPRETATION:  CLINICAL INFORMATION: Hematuria and back pain.   Hypertension, BPH with indwelling Cunningham, CHF.    COMPARISON: 5/11/2023 CT abdomen pelvis.    CONTRAST/COMPLICATIONS:  IV Contrast: Omnipaque 350  90 cc administered   10 cc discarded  Oral Contrast: NONE  Complications: None reported at time of study completion    PROCEDURE:  CT of the Abdomen and Pelvis was performed.  Sagittal and coronal reformats were performed.    FINDINGS:  LOWER CHEST: Chronic consolidation posterior aspect right lung base   similar to prior.    LIVER: Within normal limits.  BILE DUCTS: Normal caliber.  GALLBLADDER: Within normal limits.  SPLEEN: Within normal limits.  PANCREAS: Within normal limits.  ADRENALS: Within normal limits.  KIDNEYS/URETERS: No hydronephrosis or renal stone or concerning mass.   Small cysts bilaterally.    BLADDER: Mildly distended with blood and a small amount of air. Cunningham   catheter in place.  REPRODUCTIVE ORGANS: Radiation seeds in the prostate bed.    BOWEL: No inflammation of the bowel. Again demonstrated is a right lower   quadrant anterior abdominal wall hernia containing the distal ileum,   cecum, and a portion of the transverse colon. As before, the colonic loop   within the hernia is mildly distended with stool and the lumen narrows as   it exits the hernia. Appendix is not visualized. No evidence of   inflammation in the pericecal region.  PERITONEUM: No ascites.  VESSELS: No abdominal aortic aneurysm. Atherosclerosis.  RETROPERITONEUM/LYMPH NODES: No lymphadenopathy.  ABDOMINAL WALL: RLQ anterior abdominal wall hernia as described in the   bowel section. Smaller left lower quadrantanterior abdominal hernia   contains fat but no bowel.  BONES: No acute osseous abnormality. Thoracic-ileal posterior fusion   hardware, right scoliosis centered at L3, with chronic compression   deformity of the L3 vertebral body, several other mildcompression   deformities, all similar to prior.    IMPRESSION:  Blood and Cunningham catheter in the urinary bladder lumen are new compared to   the prior study.    Numerous other chronic findings are not significantly changed.        --- End of Report ---             ALEX MACIEL MD; Attending Radiologist  This document has been electronically signed. May 31 2023  5:00AM  ACC: [id number] EXAM:  XR CHEST PORTABLE IMMED 1V   ORDERED BY: ALISSON MOORE     PROCEDURE DATE:  05/31/2023          INTERPRETATION:  AP erect chest on May 30, 2023 11:42 PM. Patient is   hypoxic.    Elevated right hemidiaphragm noted.    Extensive spinal hardware is seen.    Heart magnified by technique. No lung consolidation or layering effusion   is evident.    Chest is similar to May 15 this year.    IMPRESSION: No acute finding or change.    --- End of Report ---            DEON MULLER MD; Attending Radiologist  This document has been electronically signed. May 31 2023  9:25AM      Imaging  Reviewed:  YES/NO    Consultant(s) Notes Reviewed:   YES/ No      Plan of care was discussed with patient and /or primary care giver; all questions and concerns were addressed

## 2023-06-05 NOTE — PROGRESS NOTE ADULT - SUBJECTIVE AND OBJECTIVE BOX
Subjective  No acute events overnight. Remained on CBI which remains clear.    Objective    Vital signs  T(F): , Max: 97.5 (06-04-23 @ 22:04)  HR: 68 (06-05-23 @ 05:16)  BP: 135/67 (06-05-23 @ 05:16)  SpO2: 94% (06-05-23 @ 05:16)  Wt(kg): --    Output         Gen: NAD  Abd: soft, nontender, nondistended  : moreno secured in place, draining clear urine    Labs      06-05 @ 05:31    WBC 5.29  / Hct 29.6  / SCr 0.78     06-04 @ 05:21    WBC 4.77  / Hct 31.1  / SCr 0.88         Culture - Urine (collected 05-31-23 @ 02:16)  Source: Clean Catch Clean Catch (Midstream)  Final Report (06-03-23 @ 21:54):    >100,000 CFU/ml Enterococcus faecalis  Organism: Enterococcus faecalis (06-03-23 @ 21:54)  Organism: Enterococcus faecalis (06-03-23 @ 21:54)      Method Type: HOMERO      -  Ampicillin: S <=2 Predicts results to ampicillin/sulbactam, amoxacillin-clavulanate and  piperacillin-tazobactam.      -  Ciprofloxacin: S <=1      -  Levofloxacin: S <=0.5      -  Nitrofurantoin: I 64 Should not be used to treat pyelonephritis.      -  Tetracycline: R >8      -  Vancomycin: S 2

## 2023-06-05 NOTE — PROGRESS NOTE ADULT - SUBJECTIVE AND OBJECTIVE BOX
CHIEF COMPLAINT:Patient is a 82y old  Male who presents with a chief complaint of Hematuria.Pt appears comfortable.    	  REVIEW OF SYSTEMS:  CONSTITUTIONAL: No fever, weight loss, or fatigue  EYES: No eye pain, visual disturbances, or discharge  ENT:  No difficulty hearing, tinnitus, vertigo; No sinus or throat pain  NECK: No pain or stiffness  RESPIRATORY: No cough, wheezing, chills or hemoptysis; No Shortness of Breath  CARDIOVASCULAR: No chest pain, palpitations, passing out, dizziness, or leg swelling  GASTROINTESTINAL: No abdominal or epigastric pain. No nausea, vomiting, or hematemesis; No diarrhea or constipation. No melena or hematochezia.  GENITOURINARY: No dysuria, frequency, hematuria, or incontinence  NEUROLOGICAL: No headaches, memory loss, loss of strength, numbness, or tremors  SKIN: No itching, burning, rashes, or lesions   LYMPH Nodes: No enlarged glands  ENDOCRINE: No heat or cold intolerance; No hair loss  MUSCULOSKELETAL: No joint pain or swelling; No muscle, back, or extremity pain  PSYCHIATRIC: No depression, anxiety, mood swings, or difficulty sleeping  HEME/LYMPH: No easy bruising, or bleeding gums  ALLERGY AND IMMUNOLOGIC: No hives or eczema	    PHYSICAL EXAM:  T(C): 36.3 (06-05-23 @ 05:16), Max: 36.4 (06-04-23 @ 22:04)  HR: 68 (06-05-23 @ 05:16) (62 - 69)  BP: 135/67 (06-05-23 @ 05:16) (113/49 - 135/67)  RR: 18 (06-05-23 @ 05:16) (18 - 18)  SpO2: 94% (06-05-23 @ 05:16) (94% - 95%)  Wt(kg): --  I&O's Summary    04 Jun 2023 07:01  -  05 Jun 2023 07:00  --------------------------------------------------------  IN: 23914 mL / OUT: 50674 mL / NET: -3950 mL        Appearance: Normal	  HEENT:   Normal oral mucosa, PERRL, EOMI	  Lymphatic: No lymphadenopathy  Cardiovascular: Normal S1 S2, No JVD, No murmurs, No edema  Respiratory: Lungs clear to auscultation	  Psychiatry: A & O x 3, Mood & affect appropriate  Gastrointestinal:  Soft, Non-tender, + BS	  Skin: No rashes, No ecchymoses, No cyanosis	  Neurologic: Non-focal  Extremities: Normal range of motion, No clubbing, cyanosis or edema  Vascular: Peripheral pulses palpable 2+ bilaterally    MEDICATIONS  (STANDING):  ampicillin  IVPB      ampicillin  IVPB 1 Gram(s) IV Intermittent every 6 hours  atorvastatin 80 milliGRAM(s) Oral at bedtime  baclofen 5 milliGRAM(s) Oral every 12 hours  DULoxetine 60 milliGRAM(s) Oral daily  famotidine    Tablet 20 milliGRAM(s) Oral daily  oxyCODONE    IR 20 milliGRAM(s) Oral <User Schedule>  oxyCODONE    IR 10 milliGRAM(s) Oral <User Schedule>  pregabalin 75 milliGRAM(s) Oral three times a day  tamsulosin 0.4 milliGRAM(s) Oral at bedtime      	  	  LABS:	 	                      9.2    5.29  )-----------( 155      ( 05 Jun 2023 05:31 )             29.6     06-05    141  |  110<H>  |  13  ----------------------------<  90  4.0   |  28  |  0.78    Ca    8.3<L>      05 Jun 2023 05:31

## 2023-06-06 LAB
ANION GAP SERPL CALC-SCNC: 3 MMOL/L — LOW (ref 5–17)
BUN SERPL-MCNC: 13 MG/DL — SIGNIFICANT CHANGE UP (ref 7–18)
CALCIUM SERPL-MCNC: 8.3 MG/DL — LOW (ref 8.4–10.5)
CHLORIDE SERPL-SCNC: 111 MMOL/L — HIGH (ref 96–108)
CO2 SERPL-SCNC: 27 MMOL/L — SIGNIFICANT CHANGE UP (ref 22–31)
CREAT SERPL-MCNC: 0.81 MG/DL — SIGNIFICANT CHANGE UP (ref 0.5–1.3)
EGFR: 87 ML/MIN/1.73M2 — SIGNIFICANT CHANGE UP
GLUCOSE SERPL-MCNC: 97 MG/DL — SIGNIFICANT CHANGE UP (ref 70–99)
HCT VFR BLD CALC: 28.9 % — LOW (ref 39–50)
HGB BLD-MCNC: 9 G/DL — LOW (ref 13–17)
MCHC RBC-ENTMCNC: 27.5 PG — SIGNIFICANT CHANGE UP (ref 27–34)
MCHC RBC-ENTMCNC: 31.1 GM/DL — LOW (ref 32–36)
MCV RBC AUTO: 88.4 FL — SIGNIFICANT CHANGE UP (ref 80–100)
NRBC # BLD: 0 /100 WBCS — SIGNIFICANT CHANGE UP (ref 0–0)
PLATELET # BLD AUTO: 146 K/UL — LOW (ref 150–400)
POTASSIUM SERPL-MCNC: 4.2 MMOL/L — SIGNIFICANT CHANGE UP (ref 3.5–5.3)
POTASSIUM SERPL-SCNC: 4.2 MMOL/L — SIGNIFICANT CHANGE UP (ref 3.5–5.3)
RBC # BLD: 3.27 M/UL — LOW (ref 4.2–5.8)
RBC # FLD: 17.5 % — HIGH (ref 10.3–14.5)
SARS-COV-2 RNA SPEC QL NAA+PROBE: SIGNIFICANT CHANGE UP
SODIUM SERPL-SCNC: 141 MMOL/L — SIGNIFICANT CHANGE UP (ref 135–145)
WBC # BLD: 4.44 K/UL — SIGNIFICANT CHANGE UP (ref 3.8–10.5)
WBC # FLD AUTO: 4.44 K/UL — SIGNIFICANT CHANGE UP (ref 3.8–10.5)

## 2023-06-06 RX ORDER — SODIUM CHLORIDE 9 MG/ML
1000 INJECTION INTRAMUSCULAR; INTRAVENOUS; SUBCUTANEOUS
Refills: 0 | Status: DISCONTINUED | OUTPATIENT
Start: 2023-06-06 | End: 2023-06-08

## 2023-06-06 RX ADMIN — OXYCODONE HYDROCHLORIDE 10 MILLIGRAM(S): 5 TABLET ORAL at 17:15

## 2023-06-06 RX ADMIN — TAMSULOSIN HYDROCHLORIDE 0.4 MILLIGRAM(S): 0.4 CAPSULE ORAL at 20:58

## 2023-06-06 RX ADMIN — OXYCODONE HYDROCHLORIDE 10 MILLIGRAM(S): 5 TABLET ORAL at 17:45

## 2023-06-06 RX ADMIN — OXYCODONE HYDROCHLORIDE 20 MILLIGRAM(S): 5 TABLET ORAL at 07:13

## 2023-06-06 RX ADMIN — Medication 75 MILLIGRAM(S): at 05:16

## 2023-06-06 RX ADMIN — OXYCODONE HYDROCHLORIDE 20 MILLIGRAM(S): 5 TABLET ORAL at 11:41

## 2023-06-06 RX ADMIN — FAMOTIDINE 20 MILLIGRAM(S): 10 INJECTION INTRAVENOUS at 11:11

## 2023-06-06 RX ADMIN — Medication 75 MILLIGRAM(S): at 15:00

## 2023-06-06 RX ADMIN — OXYCODONE HYDROCHLORIDE 20 MILLIGRAM(S): 5 TABLET ORAL at 20:58

## 2023-06-06 RX ADMIN — Medication 108 GRAM(S): at 17:16

## 2023-06-06 RX ADMIN — Medication 108 GRAM(S): at 23:41

## 2023-06-06 RX ADMIN — Medication 75 MILLIGRAM(S): at 20:57

## 2023-06-06 RX ADMIN — DULOXETINE HYDROCHLORIDE 60 MILLIGRAM(S): 30 CAPSULE, DELAYED RELEASE ORAL at 11:11

## 2023-06-06 RX ADMIN — OXYCODONE HYDROCHLORIDE 20 MILLIGRAM(S): 5 TABLET ORAL at 21:30

## 2023-06-06 RX ADMIN — OXYCODONE HYDROCHLORIDE 20 MILLIGRAM(S): 5 TABLET ORAL at 06:46

## 2023-06-06 RX ADMIN — OXYCODONE HYDROCHLORIDE 20 MILLIGRAM(S): 5 TABLET ORAL at 11:11

## 2023-06-06 RX ADMIN — Medication 108 GRAM(S): at 05:16

## 2023-06-06 RX ADMIN — Medication 5 MILLIGRAM(S): at 05:16

## 2023-06-06 RX ADMIN — ATORVASTATIN CALCIUM 80 MILLIGRAM(S): 80 TABLET, FILM COATED ORAL at 20:57

## 2023-06-06 RX ADMIN — Medication 108 GRAM(S): at 11:10

## 2023-06-06 RX ADMIN — Medication 5 MILLIGRAM(S): at 17:15

## 2023-06-06 NOTE — PROGRESS NOTE ADULT - SUBJECTIVE AND OBJECTIVE BOX
INTERVAL HPI/OVERNIGHT EVENTS:  Patient seen,events noticed,still has mild hematuria  VITAL SIGNS:  T(F): 98.7 (06-06-23 @ 05:08)  HR: 66 (06-06-23 @ 05:08)  BP: 150/74 (06-06-23 @ 05:08)  RR: 18 (06-06-23 @ 05:08)  SpO2: 95% (06-06-23 @ 05:08)  Wt(kg): --    PHYSICAL EXAM:  awake  Constitutional:  Eyes:  ENMT:perrla  Neck:  Respiratory:clear  Cardiovascular:s1s2,m-none  Gastrointestinal:soft,bs pos  Extremities:  Vascular:  Neurological:no focal deficit  Musculoskeletal:    MEDICATIONS  (STANDING):  ampicillin  IVPB      ampicillin  IVPB 1 Gram(s) IV Intermittent every 6 hours  atorvastatin 80 milliGRAM(s) Oral at bedtime  baclofen 5 milliGRAM(s) Oral every 12 hours  DULoxetine 60 milliGRAM(s) Oral daily  famotidine    Tablet 20 milliGRAM(s) Oral daily  oxyCODONE    IR 10 milliGRAM(s) Oral <User Schedule>  oxyCODONE    IR 20 milliGRAM(s) Oral <User Schedule>  pregabalin 75 milliGRAM(s) Oral three times a day  tamsulosin 0.4 milliGRAM(s) Oral at bedtime    MEDICATIONS  (PRN):  acetaminophen     Tablet .. 650 milliGRAM(s) Oral every 6 hours PRN Temp greater or equal to 38C (100.4F), Mild Pain (1 - 3)  aluminum hydroxide/magnesium hydroxide/simethicone Suspension 30 milliLiter(s) Oral every 4 hours PRN Dyspepsia  melatonin 3 milliGRAM(s) Oral at bedtime PRN Insomnia  ondansetron Injectable 4 milliGRAM(s) IV Push every 8 hours PRN Nausea and/or Vomiting      Allergies    No Known Allergies    Intolerances        LABS:                        9.0    4.44  )-----------( 146      ( 06 Jun 2023 05:27 )             28.9     06-06    141  |  111<H>  |  13  ----------------------------<  97  4.2   |  27  |  0.81    Ca    8.3<L>      06 Jun 2023 05:27            RADIOLOGY & ADDITIONAL TESTS:      Assessment and Plan:   · Assessment	  Patient is a 82 year old male, coming from [facility], Aox2-3, with medical history of HTN, HLD, venous insufficiency, CAD, BPH, CHF who presented with hematuria, followed by urology with CBI, currently clear drainage.  UCx grew enterococcus, treated with antibiotic, ID consulted. Patient antiplatelet therapy discontinued due to gross hematuria, Cardiology consulted.   Urology onboard, CBI clamped today hematuria resolving. H&H 9.2/29.6 stable, ASA and Plavix on hold as per urology.       Problem/Plan - 1:  ·  Problem: Hematuria.   Urology following  CBI -held in am, restarted due to gross bright red blood  dual antiplatelet discontinue  CBI on hold, urine is clear today  continue to hold ASA and plavix as per urology  Urology dr. Osman following.     Problem/Plan - 2:  ·  Problem: Enterococcus UTI.   ·  Plan: Asymptomatic, afebrile with no leukocytosis  -switched Ceftriaxone to Ampicillin   -ID Dr. Cordero.     Problem/Plan - 3:  ·  Problem: CAD (coronary artery disease).   ·  Plan: aspirin and Plavix held  Cardiology Dr. Hart.     Problem/Plan - 4:  ·  Problem: Chronic CHF.   ·  Plan: Not in exacerbation  Lasix held- hypotensive  restart as appropriate.     Problem/Plan - 5:  ·  Problem: Hypertension.   ·  Plan: held HTN meds due to hypotension restart as appropriate   - Low sodium diet.     Problem/Plan - 6:  ·  Problem: Chronic back pain.   ·  Plan: -Cont Oxycodone and Baclofen per pt. schedule  -Cont Lyrica and Cymbalta  -PT recommending ALBERTA.     Problem/Plan - 7:  ·  Problem: Hyperlipidemia.   ·  Plan: - Pt has a history of HLD.   - Will continue pts home medication Atorvastatin.     Problem/Plan - 8:  ·  Problem: BPH (benign prostatic hyperplasia).   ·  Plan: - Pt has a history of BPH.   - Will continue pts home medication Tamsulosin.  - Will likely discharge with moreno given urinary retention.     Problem/Plan - 9:  ·  Problem: Prophylactic measure.   ·  Plan: DVT - SCD  GI - Famotidine.     Problem/Plan - 10:  ·  Problem: Discharge planning issues.   ·  Plan; Pt. is from [facility]  f/u PT eval  Hematuria resolution  f/u ID final rec.

## 2023-06-06 NOTE — PROGRESS NOTE ADULT - SUBJECTIVE AND OBJECTIVE BOX
Subjective  No acute events overnight. CBI clamped yesterday morning, with urine clear light red.    Objective    Vital signs  T(F): , Max: 98.7 (06-06-23 @ 05:08)  HR: 68 (06-06-23 @ 14:15)  BP: 128/60 (06-06-23 @ 14:15)  SpO2: 93% (06-06-23 @ 14:15)  Wt(kg): --    Output     OUT:    Indwelling Catheter - Urethral (mL): 600 mL    Voided (mL): 301 mL  Total OUT: 901 mL    Total NET: -901 mL      OUT:    Indwelling Catheter - Urethral (mL): 800 mL  Total OUT: 800 mL    Total NET: -800 mL          Gen: NAD  Abd: soft, nontender, nondistended  : moreno secured in place, draining clear light red urine    Labs      06-06 @ 05:27    WBC 4.44  / Hct 28.9  / SCr 0.81     06-05 @ 05:31    WBC 5.29  / Hct 29.6  / SCr 0.78         Culture - Urine (collected 05-31-23 @ 02:16)  Source: Clean Catch Clean Catch (Midstream)  Final Report (06-03-23 @ 21:54):    >100,000 CFU/ml Enterococcus faecalis  Organism: Enterococcus faecalis (06-03-23 @ 21:54)  Organism: Enterococcus faecalis (06-03-23 @ 21:54)      Method Type: HOMERO      -  Ampicillin: S <=2 Predicts results to ampicillin/sulbactam, amoxacillin-clavulanate and  piperacillin-tazobactam.      -  Ciprofloxacin: S <=1      -  Levofloxacin: S <=0.5      -  Nitrofurantoin: I 64 Should not be used to treat pyelonephritis.      -  Tetracycline: R >8      -  Vancomycin: S 2 Subjective  No acute events overnight. CBI clamped yesterday morning. Urine clear light red this morning. CBI restarted by primary team later in morning.    Objective    Vital signs  T(F): , Max: 98.7 (06-06-23 @ 05:08)  HR: 68 (06-06-23 @ 14:15)  BP: 128/60 (06-06-23 @ 14:15)  SpO2: 93% (06-06-23 @ 14:15)  Wt(kg): --    Output     OUT:    Indwelling Catheter - Urethral (mL): 600 mL    Voided (mL): 301 mL  Total OUT: 901 mL    Total NET: -901 mL      OUT:    Indwelling Catheter - Urethral (mL): 800 mL  Total OUT: 800 mL    Total NET: -800 mL          Gen: NAD  Abd: soft, nontender, nondistended  : moreno secured in place, draining clear light red urine    Labs      06-06 @ 05:27    WBC 4.44  / Hct 28.9  / SCr 0.81     06-05 @ 05:31    WBC 5.29  / Hct 29.6  / SCr 0.78         Culture - Urine (collected 05-31-23 @ 02:16)  Source: Clean Catch Clean Catch (Midstream)  Final Report (06-03-23 @ 21:54):    >100,000 CFU/ml Enterococcus faecalis  Organism: Enterococcus faecalis (06-03-23 @ 21:54)  Organism: Enterococcus faecalis (06-03-23 @ 21:54)      Method Type: HOMERO      -  Ampicillin: S <=2 Predicts results to ampicillin/sulbactam, amoxacillin-clavulanate and  piperacillin-tazobactam.      -  Ciprofloxacin: S <=1      -  Levofloxacin: S <=0.5      -  Nitrofurantoin: I 64 Should not be used to treat pyelonephritis.      -  Tetracycline: R >8      -  Vancomycin: S 2

## 2023-06-06 NOTE — PROGRESS NOTE ADULT - SUBJECTIVE AND OBJECTIVE BOX
82y Male    Meds:  ampicillin  IVPB      ampicillin  IVPB 1 Gram(s) IV Intermittent every 6 hours    Allergies    No Known Allergies    Intolerances        VITALS:  Vital Signs Last 24 Hrs  T(C): 36.8 (06 Jun 2023 14:15), Max: 37.1 (06 Jun 2023 05:08)  T(F): 98.2 (06 Jun 2023 14:15), Max: 98.7 (06 Jun 2023 05:08)  HR: 68 (06 Jun 2023 14:15) (66 - 69)  BP: 128/60 (06 Jun 2023 14:15) (121/60 - 150/74)  BP(mean): --  RR: 18 (06 Jun 2023 14:15) (18 - 18)  SpO2: 93% (06 Jun 2023 14:15) (93% - 95%)    Parameters below as of 06 Jun 2023 14:15  Patient On (Oxygen Delivery Method): room air        LABS/DIAGNOSTIC TESTS:                          9.0    4.44  )-----------( 146      ( 06 Jun 2023 05:27 )             28.9         06-06    141  |  111<H>  |  13  ----------------------------<  97  4.2   |  27  |  0.81    Ca    8.3<L>      06 Jun 2023 05:27            CULTURES: Clean Catch Clean Catch (Midstream)  05-31 @ 02:16   >100,000 CFU/ml Enterococcus faecalis  --  Enterococcus faecalis                RADIOLOGY:      ROS:  [  ] UNABLE TO ELICIT 82y Male who is doing better, he is awake and alert and talking but is not a good historian, he still has his CBI in place but urine is clear currently, he has no fevers or chills, no nausea, vomiting or diarrhea , his WBC count is WNL.     Meds:  ampicillin  IVPB      ampicillin  IVPB 1 Gram(s) IV Intermittent every 6 hours    Allergies    No Known Allergies    Intolerances        VITALS:  Vital Signs Last 24 Hrs  T(C): 36.8 (06 Jun 2023 14:15), Max: 37.1 (06 Jun 2023 05:08)  T(F): 98.2 (06 Jun 2023 14:15), Max: 98.7 (06 Jun 2023 05:08)  HR: 68 (06 Jun 2023 14:15) (66 - 69)  BP: 128/60 (06 Jun 2023 14:15) (121/60 - 150/74)  BP(mean): --  RR: 18 (06 Jun 2023 14:15) (18 - 18)  SpO2: 93% (06 Jun 2023 14:15) (93% - 95%)    Parameters below as of 06 Jun 2023 14:15  Patient On (Oxygen Delivery Method): room air        LABS/DIAGNOSTIC TESTS:                          9.0    4.44  )-----------( 146      ( 06 Jun 2023 05:27 )             28.9         06-06    141  |  111<H>  |  13  ----------------------------<  97  4.2   |  27  |  0.81    Ca    8.3<L>      06 Jun 2023 05:27            CULTURES: Clean Catch Clean Catch (Midstream)  05-31 @ 02:16   >100,000 CFU/ml Enterococcus faecalis  --  Enterococcus faecalis                RADIOLOGY:      ROS:  [  ] UNABLE TO ELICIT

## 2023-06-06 NOTE — PROGRESS NOTE ADULT - SUBJECTIVE AND OBJECTIVE BOX
CHIEF COMPLAINT:Patient is a 82y old  Male who presents with a chief complaint of Hematuria.Pt appears comfortable.    	  REVIEW OF SYSTEMS:  CONSTITUTIONAL: No fever, weight loss, or fatigue  EYES: No eye pain, visual disturbances, or discharge  ENT:  No difficulty hearing, tinnitus, vertigo; No sinus or throat pain  NECK: No pain or stiffness  RESPIRATORY: No cough, wheezing, chills or hemoptysis; No Shortness of Breath  CARDIOVASCULAR: No chest pain, palpitations, passing out, dizziness, or leg swelling  GASTROINTESTINAL: No abdominal or epigastric pain. No nausea, vomiting, or hematemesis; No diarrhea or constipation. No melena or hematochezia.  GENITOURINARY: No dysuria, frequency, hematuria, or incontinence  NEUROLOGICAL: No headaches, memory loss, loss of strength, numbness, or tremors  SKIN: No itching, burning, rashes, or lesions   LYMPH Nodes: No enlarged glands  ENDOCRINE: No heat or cold intolerance; No hair loss  MUSCULOSKELETAL: No joint pain or swelling; No muscle, back, or extremity pain  PSYCHIATRIC: No depression, anxiety, mood swings, or difficulty sleeping  HEME/LYMPH: No easy bruising, or bleeding gums  ALLERGY AND IMMUNOLOGIC: No hives or eczema	        PHYSICAL EXAM:  T(C): 37.1 (06-06-23 @ 05:08), Max: 37.1 (06-06-23 @ 05:08)  HR: 66 (06-06-23 @ 05:08) (63 - 70)  BP: 150/74 (06-06-23 @ 05:08) (105/70 - 150/74)  RR: 18 (06-06-23 @ 05:08) (16 - 18)  SpO2: 95% (06-06-23 @ 05:08) (93% - 95%)  Wt(kg): --  I&O's Summary    05 Jun 2023 07:01  -  06 Jun 2023 07:00  --------------------------------------------------------  IN: 0 mL / OUT: 901 mL / NET: -901 mL        Appearance: Normal	  HEENT:   Normal oral mucosa, PERRL, EOMI	  Lymphatic: No lymphadenopathy  Cardiovascular: Normal S1 S2, No JVD, No murmurs, No edema  Respiratory: Lungs clear to auscultation	  Psychiatry: A & O x 3, Mood & affect appropriate  Gastrointestinal:  Soft, Non-tender, + BS	  Skin: No rashes, No ecchymoses, No cyanosis	  Neurologic: Non-focal  Extremities: Normal range of motion, No clubbing, cyanosis or edema  Vascular: Peripheral pulses palpable 2+ bilaterally    MEDICATIONS  (STANDING):  ampicillin  IVPB      ampicillin  IVPB 1 Gram(s) IV Intermittent every 6 hours  atorvastatin 80 milliGRAM(s) Oral at bedtime  baclofen 5 milliGRAM(s) Oral every 12 hours  DULoxetine 60 milliGRAM(s) Oral daily  famotidine    Tablet 20 milliGRAM(s) Oral daily  oxyCODONE    IR 10 milliGRAM(s) Oral <User Schedule>  oxyCODONE    IR 20 milliGRAM(s) Oral <User Schedule>  pregabalin 75 milliGRAM(s) Oral three times a day  sodium chloride 0.9%. 1000 milliLiter(s) (100 mL/Hr) IV Continuous <Continuous>  tamsulosin 0.4 milliGRAM(s) Oral at bedtime        LABS:	 	                     9.0    4.44  )-----------( 146      ( 06 Jun 2023 05:27 )             28.9     06-06    141  |  111<H>  |  13  ----------------------------<  97  4.2   |  27  |  0.81    Ca    8.3<L>      06 Jun 2023 05:27

## 2023-06-06 NOTE — PROGRESS NOTE ADULT - SUBJECTIVE AND OBJECTIVE BOX
NP Note discussed with Primary Attending.    Patient is a 82y old  Male who presents with a chief complaint of Hematuria (06 Jun 2023 09:46)      INTERVAL HPI/OVERNIGHT EVENTS: no new complaints    MEDICATIONS  (STANDING):  ampicillin  IVPB      ampicillin  IVPB 1 Gram(s) IV Intermittent every 6 hours  atorvastatin 80 milliGRAM(s) Oral at bedtime  baclofen 5 milliGRAM(s) Oral every 12 hours  DULoxetine 60 milliGRAM(s) Oral daily  famotidine    Tablet 20 milliGRAM(s) Oral daily  oxyCODONE    IR 20 milliGRAM(s) Oral <User Schedule>  oxyCODONE    IR 10 milliGRAM(s) Oral <User Schedule>  pregabalin 75 milliGRAM(s) Oral three times a day  sodium chloride 0.9%. 1000 milliLiter(s) (100 mL/Hr) IV Continuous <Continuous>  tamsulosin 0.4 milliGRAM(s) Oral at bedtime    MEDICATIONS  (PRN):  acetaminophen     Tablet .. 650 milliGRAM(s) Oral every 6 hours PRN Temp greater or equal to 38C (100.4F), Mild Pain (1 - 3)  aluminum hydroxide/magnesium hydroxide/simethicone Suspension 30 milliLiter(s) Oral every 4 hours PRN Dyspepsia  melatonin 3 milliGRAM(s) Oral at bedtime PRN Insomnia  ondansetron Injectable 4 milliGRAM(s) IV Push every 8 hours PRN Nausea and/or Vomiting      __________________________________________________  REVIEW OF SYSTEMS:    CONSTITUTIONAL: No fever,   EYES: no acute visual disturbances  NECK: No pain or stiffness  RESPIRATORY: No cough; No shortness of breath  CARDIOVASCULAR: No chest pain, no palpitations  GASTROINTESTINAL: No pain. No nausea or vomiting; No diarrhea   NEUROLOGICAL: No headache or numbness, no tremors  MUSCULOSKELETAL: No joint pain, no muscle pain  GENITOURINARY: hematuria, + dysuria  PSYCHIATRY: no depression , no anxiety  ALL OTHER  ROS negative        Vital Signs Last 24 Hrs  T(C): 37.1 (06 Jun 2023 05:08), Max: 37.1 (06 Jun 2023 05:08)  T(F): 98.7 (06 Jun 2023 05:08), Max: 98.7 (06 Jun 2023 05:08)  HR: 66 (06 Jun 2023 05:08) (63 - 70)  BP: 150/74 (06 Jun 2023 05:08) (105/70 - 150/74)  BP(mean): --  RR: 18 (06 Jun 2023 05:08) (16 - 18)  SpO2: 95% (06 Jun 2023 05:08) (93% - 95%)    Parameters below as of 06 Jun 2023 05:08  Patient On (Oxygen Delivery Method): room air        ________________________________________________  PHYSICAL EXAM:  GENERAL: NAD  HEENT: Normocephalic;  conjunctivae and sclerae clear; moist mucous membranes;   NECK : supple  CHEST/LUNG: Clear to auscultation bilaterally with good air entry   HEART: S1 S2  regular; no murmurs, gallops or rubs  ABDOMEN: Soft, Nontender, Nondistended; Bowel sounds present  EXTREMITIES: no cyanosis; no edema; no calf tenderness  SKIN: warm and dry; no rash  NERVOUS SYSTEM:  Awake and alert; Oriented  to place, person disoriented to time ; no new deficits    _________________________________________________  LABS:                        9.0    4.44  )-----------( 146      ( 06 Jun 2023 05:27 )             28.9     06-06    141  |  111<H>  |  13  ----------------------------<  97  4.2   |  27  |  0.81    Ca    8.3<L>      06 Jun 2023 05:27          CAPILLARY BLOOD GLUCOSE            RADIOLOGY & ADDITIONAL TESTS:    ACC: [id number] EXAM:  CT ABDOMEN AND PELVIS IC   ORDERED BY: ALISSON MOORE     PROCEDURE DATE:  05/31/2023          INTERPRETATION:  CLINICAL INFORMATION: Hematuria and back pain.   Hypertension, BPH with indwelling Cunningham, CHF.    COMPARISON: 5/11/2023 CT abdomen pelvis.    CONTRAST/COMPLICATIONS:  IV Contrast: Omnipaque 350  90 cc administered   10 cc discarded  Oral Contrast: NONE  Complications: None reported at time of study completion    PROCEDURE:  CT of the Abdomen and Pelvis was performed.  Sagittal and coronal reformats were performed.    FINDINGS:  LOWER CHEST: Chronic consolidation posterior aspect right lung base   similar to prior.    LIVER: Within normal limits.  BILE DUCTS: Normal caliber.  GALLBLADDER: Within normal limits.  SPLEEN: Within normal limits.  PANCREAS: Within normal limits.  ADRENALS: Within normal limits.  KIDNEYS/URETERS: No hydronephrosis or renal stone or concerning mass.   Small cysts bilaterally.    BLADDER: Mildly distended with blood and a small amount of air. Cunningham   catheter in place.  REPRODUCTIVE ORGANS: Radiation seeds in the prostate bed.    BOWEL: No inflammation of the bowel. Again demonstrated is a right lower   quadrant anterior abdominal wall hernia containing the distal ileum,   cecum, and a portion of the transverse colon. As before, the colonic loop   within the hernia is mildly distended with stool and the lumen narrows as   it exits the hernia. Appendix is not visualized. No evidence of   inflammation in the pericecal region.  PERITONEUM: No ascites.  VESSELS: No abdominal aortic aneurysm. Atherosclerosis.  RETROPERITONEUM/LYMPH NODES: No lymphadenopathy.  ABDOMINAL WALL: RLQ anterior abdominal wall hernia as described in the   bowel section. Smaller left lower quadrantanterior abdominal hernia   contains fat but no bowel.  BONES: No acute osseous abnormality. Thoracic-ileal posterior fusion   hardware, right scoliosis centered at L3, with chronic compression   deformity of the L3 vertebral body, several other mildcompression   deformities, all similar to prior.    IMPRESSION:  Blood and Cunningham catheter in the urinary bladder lumen are new compared to   the prior study.    Numerous other chronic findings are not significantly changed.        --- End of Report ---             ALEX MACIEL MD; Attending Radiologist  This document has been electronically signed. May 31 2023  5:00AM    ACC: [id number] EXAM:  XR CHEST PORTABLE IMMED 1V   ORDERED BY: ALISSON MOORE     PROCEDURE DATE:  05/31/2023          INTERPRETATION:  AP erect chest on May 30, 2023 11:42 PM. Patient is   hypoxic.    Elevated right hemidiaphragm noted.    Extensive spinal hardware is seen.    Heart magnified by technique. No lung consolidation or layering effusion   is evident.    Chest is similar to May 15 this year.    IMPRESSION: No acute finding or change.    --- End of Report ---            DEON MULLER MD; Attending Radiologist  This document has been electronically signed. May 31 2023  9:25AM    Imaging  Reviewed:  YES/NO    Consultant(s) Notes Reviewed:   YES/ No      Plan of care was discussed with patient and /or primary care giver; all questions and concerns were addressed

## 2023-06-07 LAB
ANION GAP SERPL CALC-SCNC: -1 MMOL/L — LOW (ref 5–17)
BUN SERPL-MCNC: 12 MG/DL — SIGNIFICANT CHANGE UP (ref 7–18)
CALCIUM SERPL-MCNC: 8.1 MG/DL — LOW (ref 8.4–10.5)
CHLORIDE SERPL-SCNC: 111 MMOL/L — HIGH (ref 96–108)
CO2 SERPL-SCNC: 30 MMOL/L — SIGNIFICANT CHANGE UP (ref 22–31)
CREAT SERPL-MCNC: 0.8 MG/DL — SIGNIFICANT CHANGE UP (ref 0.5–1.3)
EGFR: 87 ML/MIN/1.73M2 — SIGNIFICANT CHANGE UP
GLUCOSE SERPL-MCNC: 94 MG/DL — SIGNIFICANT CHANGE UP (ref 70–99)
HCT VFR BLD CALC: 28.1 % — LOW (ref 39–50)
HGB BLD-MCNC: 8.4 G/DL — LOW (ref 13–17)
MCHC RBC-ENTMCNC: 27.1 PG — SIGNIFICANT CHANGE UP (ref 27–34)
MCHC RBC-ENTMCNC: 29.9 GM/DL — LOW (ref 32–36)
MCV RBC AUTO: 90.6 FL — SIGNIFICANT CHANGE UP (ref 80–100)
NRBC # BLD: 0 /100 WBCS — SIGNIFICANT CHANGE UP (ref 0–0)
PLATELET # BLD AUTO: 147 K/UL — LOW (ref 150–400)
POTASSIUM SERPL-MCNC: 3.9 MMOL/L — SIGNIFICANT CHANGE UP (ref 3.5–5.3)
POTASSIUM SERPL-SCNC: 3.9 MMOL/L — SIGNIFICANT CHANGE UP (ref 3.5–5.3)
RBC # BLD: 3.1 M/UL — LOW (ref 4.2–5.8)
RBC # FLD: 17.7 % — HIGH (ref 10.3–14.5)
SARS-COV-2 RNA SPEC QL NAA+PROBE: SIGNIFICANT CHANGE UP
SODIUM SERPL-SCNC: 140 MMOL/L — SIGNIFICANT CHANGE UP (ref 135–145)
WBC # BLD: 4.66 K/UL — SIGNIFICANT CHANGE UP (ref 3.8–10.5)
WBC # FLD AUTO: 4.66 K/UL — SIGNIFICANT CHANGE UP (ref 3.8–10.5)

## 2023-06-07 RX ORDER — METOPROLOL TARTRATE 50 MG
12.5 TABLET ORAL EVERY 12 HOURS
Refills: 0 | Status: DISCONTINUED | OUTPATIENT
Start: 2023-06-07 | End: 2023-06-08

## 2023-06-07 RX ORDER — BACLOFEN 100 %
1 POWDER (GRAM) MISCELLANEOUS
Qty: 0 | Refills: 0 | DISCHARGE
Start: 2023-06-07

## 2023-06-07 RX ORDER — METOPROLOL TARTRATE 50 MG
25 TABLET ORAL
Refills: 0 | Status: DISCONTINUED | OUTPATIENT
Start: 2023-06-07 | End: 2023-06-07

## 2023-06-07 RX ORDER — ACETAMINOPHEN 500 MG
2 TABLET ORAL
Qty: 0 | Refills: 0 | DISCHARGE
Start: 2023-06-07

## 2023-06-07 RX ADMIN — Medication 108 GRAM(S): at 18:15

## 2023-06-07 RX ADMIN — Medication 75 MILLIGRAM(S): at 05:28

## 2023-06-07 RX ADMIN — OXYCODONE HYDROCHLORIDE 20 MILLIGRAM(S): 5 TABLET ORAL at 06:00

## 2023-06-07 RX ADMIN — TAMSULOSIN HYDROCHLORIDE 0.4 MILLIGRAM(S): 0.4 CAPSULE ORAL at 21:07

## 2023-06-07 RX ADMIN — OXYCODONE HYDROCHLORIDE 10 MILLIGRAM(S): 5 TABLET ORAL at 18:12

## 2023-06-07 RX ADMIN — ATORVASTATIN CALCIUM 80 MILLIGRAM(S): 80 TABLET, FILM COATED ORAL at 21:07

## 2023-06-07 RX ADMIN — Medication 5 MILLIGRAM(S): at 05:27

## 2023-06-07 RX ADMIN — FAMOTIDINE 20 MILLIGRAM(S): 10 INJECTION INTRAVENOUS at 11:09

## 2023-06-07 RX ADMIN — OXYCODONE HYDROCHLORIDE 20 MILLIGRAM(S): 5 TABLET ORAL at 06:30

## 2023-06-07 RX ADMIN — Medication 5 MILLIGRAM(S): at 18:12

## 2023-06-07 RX ADMIN — Medication 108 GRAM(S): at 11:09

## 2023-06-07 RX ADMIN — OXYCODONE HYDROCHLORIDE 20 MILLIGRAM(S): 5 TABLET ORAL at 21:06

## 2023-06-07 RX ADMIN — Medication 75 MILLIGRAM(S): at 18:12

## 2023-06-07 RX ADMIN — OXYCODONE HYDROCHLORIDE 20 MILLIGRAM(S): 5 TABLET ORAL at 23:23

## 2023-06-07 RX ADMIN — Medication 75 MILLIGRAM(S): at 21:08

## 2023-06-07 RX ADMIN — OXYCODONE HYDROCHLORIDE 10 MILLIGRAM(S): 5 TABLET ORAL at 19:55

## 2023-06-07 RX ADMIN — DULOXETINE HYDROCHLORIDE 60 MILLIGRAM(S): 30 CAPSULE, DELAYED RELEASE ORAL at 11:09

## 2023-06-07 RX ADMIN — Medication 108 GRAM(S): at 05:26

## 2023-06-07 RX ADMIN — OXYCODONE HYDROCHLORIDE 20 MILLIGRAM(S): 5 TABLET ORAL at 11:04

## 2023-06-07 RX ADMIN — OXYCODONE HYDROCHLORIDE 20 MILLIGRAM(S): 5 TABLET ORAL at 12:05

## 2023-06-07 RX ADMIN — Medication 12.5 MILLIGRAM(S): at 18:13

## 2023-06-07 NOTE — PROGRESS NOTE ADULT - PROBLEM SELECTOR PLAN 10
Pt. is from [facility]  f/u PT eval  Hematuria resolution  Urine culture sensitivity pending
Medically stable for discharge  PT recs ALBERTA  Accepted at [facility] COVID (-) 6/7  Pending auth  Care management following
Pt. is from [facility]  f/u PT eval  Hematuria resolution  f/u ID final rec.
Pt. is from [facility]  PT rec ALBERTA  Hematuria CBI in progress  f/u ID final rec.
Pt. is from [facility]  f/u PT eval  consult uro re discharge w/wo josh (has failed TOV in the past)  CM following

## 2023-06-07 NOTE — DIETITIAN INITIAL EVALUATION ADULT - NSFNSGIIOFT_GEN_A_CORE
IBW=160 lb   Wt data in EMR: 184.5 lb 5/11/23; 184.7 lb 5/12/23; 176.5 lb 5/15/23; 174 lb 5/31/23  (change may partly due to scale/fluid variance)

## 2023-06-07 NOTE — PROGRESS NOTE ADULT - PROBLEM SELECTOR PLAN 9
DVT - SCD  GI - Famotidine

## 2023-06-07 NOTE — DIETITIAN INITIAL EVALUATION ADULT - FACTORS AFF FOOD INTAKE
acute on chronic comorbidities/difficulty with food procurement/preparation/[employer]/ethnic/cultural/personal food preferences

## 2023-06-07 NOTE — PROGRESS NOTE ADULT - PROBLEM SELECTOR PLAN 5
Restart low dose BB  DASH diet   Monitor BP
held HTN meds due to hypotension restart as appropriate   - Low sodium diet
held HTN meds due to hypotension restart as appropriate   - Low sodium diet
-Cont Oxycodone and Baclofen per pt. schedule  -Cont Lyrica and Cymbalta  -PT
held HTN meds due to hypotension restart as appropriate   - Low sodium diet

## 2023-06-07 NOTE — PROGRESS NOTE ADULT - PROBLEM SELECTOR PLAN 4
Not in exacerbation  lasix held- hypotensive  restart as appropriate
Not in exacerbation  Lasix held- hypotensive  restart as appropriate
EF normal in TTE
Not in exacerbation  Lasix held- hypotensive  restart as appropriate
b/p controlled on current regimen  - Cont pts home medication of amlodipine, Metoprolol and lasix with parameters.  - Low sodium diet

## 2023-06-07 NOTE — PROGRESS NOTE ADULT - PROBLEM SELECTOR PLAN 6
- Pt has a history of HLD.   - Will continue pts home medication Atorvastatin.
Continue pain regimen
-Cont Oxycodone and Baclofen per pt. schedule  -Cont Lyrica and Cymbalta  -PT recommending ALBERTA
-Cont Oxycodone and Baclofen per pt. schedule  -Cont Lyrica and Cymbalta  -PT recommending ALBERTA
-Cont Oxycodone and Baclofen per pt. schedule  -Cont Lyrica and Cymbalta  -PT

## 2023-06-07 NOTE — DIETITIAN INITIAL EVALUATION ADULT - OTHER INFO
Pt from skilled nursing facility, recent admission with RD Assessment 5/17/23 alert, oriented, very weak, difficult to obtain information at present, Limited intake/wt change history data available at present; 51-75% intake at time per flowsheet and observation today at breakfast, feeding self after tray set=up; Unknown food allergies per Chart; Pending discharge planning back to skilled nursing facility for rehab when medically ready per team

## 2023-06-07 NOTE — PROGRESS NOTE ADULT - PROBLEM SELECTOR PLAN 3
aspirin and plavix held  Cardiology Dr. Hart
Continue with home dose statin  Home ASA and Plavix held in the setting of hematuria  Dr. Hart, Cardiology, following   Recommendations appreciated
aspirin and Plavix held  Cardiology Dr. Hart
Not in exacerbation  history of CHF  takes metoprolol lasix at home  c/w home meds
aspirin and Plavix held  Cardiology Dr. Hart

## 2023-06-07 NOTE — PROGRESS NOTE ADULT - PROBLEM SELECTOR PROBLEM 3
CAD (coronary artery disease)
Chronic CHF
CAD (coronary artery disease)

## 2023-06-07 NOTE — PROGRESS NOTE ADULT - PROBLEM SELECTOR PLAN 7
- Pt has a history of HLD.   - Will continue pts home medication Atorvastatin.
History of chronic venous insufficiency
- Pt has a history of HLD.   - Will continue pts home medication Atorvastatin.
- Pt has a history of HLD.   - Will continue pts home medication Atorvastatin.
Continue with home dose statin

## 2023-06-07 NOTE — PROGRESS NOTE ADULT - PROBLEM SELECTOR PLAN 2
Asymptomatic, afebrile with no leukocytosis  -switched Ceftriaxone to Ampicillin   -ID Dr. Cordero
Asymptomatic, afebrile with no leukocytosis  -switched Ceftriaxone to Ampicillin   -ID Dr. Cordero
Asymptomatic, afebrile with no leukocytosis  -Short Ceftriaxone course started given moreno placement  -f/u sens
Asymptomatic, afebrile with no leukocytosis  -switched Ceftriaxone to Ampicillin   -f/u sens  -ID Dr. Cordero
Continue Ampicillin  Upon d/c transition to Amoxicillin 500 mg TID to complete 7 day course  YING Richard, following   Recommendations appreciated
Never smoker

## 2023-06-07 NOTE — PROGRESS NOTE ADULT - SUBJECTIVE AND OBJECTIVE BOX
INTERVAL HPI/OVERNIGHT EVENTS:  Patient seen,events noticed for overnight  VITAL SIGNS:  T(F): 98.2 (06-07-23 @ 05:04)  HR: 61 (06-07-23 @ 05:04)  BP: 139/81 (06-07-23 @ 05:04)  RR: 17 (06-07-23 @ 05:04)  SpO2: 90% (06-07-23 @ 05:04)  Wt(kg): --    PHYSICAL EXAM:  awake  Constitutional:  Eyes:  ENMT:perrla  Neck:  Respiratory:clear  Cardiovascular:s12s,m-none  Gastrointestinal:soft,bs pos,moreno with clear urine  Extremities:  Vascular:  Neurological:no focal deficit  Musculoskeletal:    MEDICATIONS  (STANDING):  ampicillin  IVPB      ampicillin  IVPB 1 Gram(s) IV Intermittent every 6 hours  atorvastatin 80 milliGRAM(s) Oral at bedtime  baclofen 5 milliGRAM(s) Oral every 12 hours  DULoxetine 60 milliGRAM(s) Oral daily  famotidine    Tablet 20 milliGRAM(s) Oral daily  oxyCODONE    IR 10 milliGRAM(s) Oral <User Schedule>  oxyCODONE    IR 20 milliGRAM(s) Oral <User Schedule>  pregabalin 75 milliGRAM(s) Oral three times a day  sodium chloride 0.9%. 1000 milliLiter(s) (100 mL/Hr) IV Continuous <Continuous>  tamsulosin 0.4 milliGRAM(s) Oral at bedtime    MEDICATIONS  (PRN):  acetaminophen     Tablet .. 650 milliGRAM(s) Oral every 6 hours PRN Temp greater or equal to 38C (100.4F), Mild Pain (1 - 3)  aluminum hydroxide/magnesium hydroxide/simethicone Suspension 30 milliLiter(s) Oral every 4 hours PRN Dyspepsia  melatonin 3 milliGRAM(s) Oral at bedtime PRN Insomnia  ondansetron Injectable 4 milliGRAM(s) IV Push every 8 hours PRN Nausea and/or Vomiting      Allergies    No Known Allergies    Intolerances        LABS:                        8.4    4.66  )-----------( 147      ( 07 Jun 2023 05:39 )             28.1     06-07    140  |  111<H>  |  12  ----------------------------<  94  3.9   |  30  |  0.80    Ca    8.1<L>      07 Jun 2023 05:39            RADIOLOGY & ADDITIONAL TESTS:      Assessment and Plan:   · Assessment	  Patient is a 82 year old male, coming from [facility], [facility], with medical history of HTN, HLD, venous insufficiency, CAD, BPH, CHF who presented with hematuria, followed by urology with CBI, currently clear drainage.  UCx grew enterococcus, treated with antibiotic, ID consulted. Patient antiplatelet therapy discontinued due to gross hematuria, Cardiology consulted.   Urology onboard, CBI clamped today hematuria resolving. H&H 9.2/29.6 stable, ASA and Plavix on hold as per urology.       Problem/Plan - 1:  ·  Problem: Hematuria.   CBI on hold, urine is clear today  continue to hold ASA and plavix as per urology  Urology dr. Osman following.     Problem/Plan - 2:  ·  Problem: Enterococcus UTI.   ·  Plan: Asymptomatic, afebrile with no leukocytosis  -switched Ceftriaxone to Ampicillin   -ID Dr. Cordero.     Problem/Plan - 3:  ·  Problem: CAD (coronary artery disease).   ·  Plan: aspirin and Plavix held  Cardiology Dr. Hart.     Problem/Plan - 4:  ·  Problem: Chronic CHF.   ·  Plan: Not in exacerbation  Lasix held- hypotensive  restart as appropriate.     Problem/Plan - 5:  ·  Problem: Hypertension.   ·  Plan: held HTN meds due to hypotension restart as appropriate   - Low sodium diet.     Problem/Plan - 6:  ·  Problem: Chronic back pain.   ·  Plan: -Cont Oxycodone and Baclofen per pt. schedule  -Cont Lyrica and Cymbalta  -PT recommending ALBERTA.     Problem/Plan - 7:  ·  Problem: Hyperlipidemia.   ·  Plan: - Pt has a history of HLD.   - Will continue pts home medication Atorvastatin.     Problem/Plan - 8:  ·  Problem: BPH (benign prostatic hyperplasia).   ·  Plan: - Pt has a history of BPH.   - Will continue pts home medication Tamsulosin.  - Will likely discharge with moreno given urinary retention.     Problem/Plan - 9:  ·  Problem: Prophylactic measure.   ·  Plan: DVT - SCD  GI - Famotidine.     Problem/Plan - 10:  ·  Problem: Discharge planning issues.   f/u PT eval       119

## 2023-06-07 NOTE — DIETITIAN INITIAL EVALUATION ADULT - NSFNSPHYEXAMSKINFT_GEN_A_CORE
Pressure Injury 1: Left:, buttocks, Stage II  Pressure Injury 2: none, none  Pressure Injury 3: none, none  Pressure Injury 4: none, none  Pressure Injury 5: none, none  Pressure Injury 6: none, none  Pressure Injury 7: none, none  Pressure Injury 8: none, none  Pressure Injury 9: none, none  Pressure Injury 10: none, none  Pressure Injury 11: none, none, Pressure Injury 1: Left:, buttocks, Stage II  Pressure Injury 2: none, none  Pressure Injury 3: none, none  Pressure Injury 4: none, none  Pressure Injury 5: none, none  Pressure Injury 6: none, none  Pressure Injury 7: none, none  Pressure Injury 8: none, none  Pressure Injury 9: none, none  Pressure Injury 10: none, none  Pressure Injury 11: none, none Pressure Injury 1: Left:, buttocks, Stage II

## 2023-06-07 NOTE — PROGRESS NOTE ADULT - SUBJECTIVE AND OBJECTIVE BOX
CHIEF COMPLAINT:Patient is a 82y old  Male who presents with a chief complaint of Hematuria.Pt appears comfortable.        	  REVIEW OF SYSTEMS:  CONSTITUTIONAL: No fever, weight loss, or fatigue  EYES: No eye pain, visual disturbances, or discharge  ENT:  No difficulty hearing, tinnitus, vertigo; No sinus or throat pain  NECK: No pain or stiffness  RESPIRATORY: No cough, wheezing, chills or hemoptysis; No Shortness of Breath  CARDIOVASCULAR: No chest pain, palpitations, passing out, dizziness, or leg swelling  GASTROINTESTINAL: No abdominal or epigastric pain. No nausea, vomiting, or hematemesis; No diarrhea or constipation. No melena or hematochezia.  GENITOURINARY: No dysuria, frequency, hematuria, or incontinence  NEUROLOGICAL: No headaches, memory loss, loss of strength, numbness, or tremors  SKIN: No itching, burning, rashes, or lesions   LYMPH Nodes: No enlarged glands  ENDOCRINE: No heat or cold intolerance; No hair loss  MUSCULOSKELETAL: No joint pain or swelling; No muscle, back, or extremity pain  PSYCHIATRIC: No depression, anxiety, mood swings, or difficulty sleeping  HEME/LYMPH: No easy bruising, or bleeding gums  ALLERGY AND IMMUNOLOGIC: No hives or eczema	      PHYSICAL EXAM:  T(C): 36.8 (06-07-23 @ 05:04), Max: 36.8 (06-06-23 @ 14:15)  HR: 61 (06-07-23 @ 05:04) (61 - 73)  BP: 139/81 (06-07-23 @ 05:04) (101/65 - 139/81)  RR: 17 (06-07-23 @ 05:04) (17 - 18)  SpO2: 90% (06-07-23 @ 05:04) (90% - 93%)  Wt(kg): --  I&O's Summary    06 Jun 2023 07:01  -  07 Jun 2023 07:00  --------------------------------------------------------  IN: 0 mL / OUT: 1500 mL / NET: -1500 mL        Appearance: Normal	  HEENT:   Normal oral mucosa, PERRL, EOMI	  Lymphatic: No lymphadenopathy  Cardiovascular: Normal S1 S2, No JVD, No murmurs, No edema  Respiratory: Lungs clear to auscultation	  Psychiatry: A & O x 3, Mood & affect appropriate  Gastrointestinal:  Soft, Non-tender, + BS	  Skin: No rashes, No ecchymoses, No cyanosis	  Neurologic: Non-focal  Extremities: Normal range of motion, No clubbing, cyanosis or edema  Vascular: Peripheral pulses palpable 2+ bilaterally    MEDICATIONS  (STANDING):  ampicillin  IVPB      ampicillin  IVPB 1 Gram(s) IV Intermittent every 6 hours  atorvastatin 80 milliGRAM(s) Oral at bedtime  baclofen 5 milliGRAM(s) Oral every 12 hours  DULoxetine 60 milliGRAM(s) Oral daily  famotidine    Tablet 20 milliGRAM(s) Oral daily  oxyCODONE    IR 10 milliGRAM(s) Oral <User Schedule>  oxyCODONE    IR 20 milliGRAM(s) Oral <User Schedule>  pregabalin 75 milliGRAM(s) Oral three times a day  sodium chloride 0.9%. 1000 milliLiter(s) (100 mL/Hr) IV Continuous <Continuous>  tamsulosin 0.4 milliGRAM(s) Oral at bedtime        LABS:	 	                     8.4    4.66  )-----------( 147      ( 07 Jun 2023 05:39 )             28.1     06-07    140  |  111<H>  |  12  ----------------------------<  94  3.9   |  30  |  0.80    Ca    8.1<L>      07 Jun 2023 05:39

## 2023-06-07 NOTE — PROGRESS NOTE ADULT - SUBJECTIVE AND OBJECTIVE BOX
Subjective  No acute events overnight. Urine remains clear with CBI clamped.    Objective    Vital signs  T(F): , Max: 98.2 (06-06-23 @ 14:15)  HR: 61 (06-07-23 @ 05:04)  BP: 139/81 (06-07-23 @ 05:04)  SpO2: 90% (06-07-23 @ 05:04)  Wt(kg): --    Output     OUT:    Indwelling Catheter - Urethral (mL): 1500 mL  Total OUT: 1500 mL    Total NET: -1500 mL          Gen: NAD  Abd: soft, nontender, nondistended  : moreno secured in place, draining clear urine with CBI clamped    Labs      06-07 @ 05:39    WBC 4.66  / Hct 28.1  / SCr 0.80     06-06 @ 05:27    WBC 4.44  / Hct 28.9  / SCr 0.81

## 2023-06-07 NOTE — PROGRESS NOTE ADULT - PROBLEM SELECTOR PLAN 8
- Pt has a history of BPH.   - Will continue pts home medication Tamsulosin.  - Will likely discharge with moreno given urinary retention
- Pt has a history of BPH.   - Will continue pts home medication Tamsulosin.  - Will likely discharge with moreno given urinary retention
Continue home regimen Flomax  Continue moreno  Urology following  Recommendations appreciated
- Pt has a history of BPH.   - Will continue pts home medication Tamsulosin.  - Will likely discharge with moreno given urinary retention
- Pt has a history of BPH.   - Will continue pts home medication Tamsulosin.  - Will likely discharge with moreno given urinary retention

## 2023-06-07 NOTE — DIETITIAN INITIAL EVALUATION ADULT - PERTINENT LABORATORY DATA
06-07    140  |  111<H>  |  12  ----------------------------<  94  3.9   |  30  |  0.80    Ca    8.1<L>      07 Jun 2023 05:39

## 2023-06-07 NOTE — DIETITIAN INITIAL EVALUATION ADULT - OBTAIN CURRENT WEIGHT
Recheck wt with calibrated bedscale when feasible Recheck wt with calibrated bedscale when feasible.

## 2023-06-07 NOTE — DIETITIAN INITIAL EVALUATION ADULT - DIET TYPE
Ensure Enlive 1can daily as medically feasible (350kcal, 20g protein )/DASH/TLC (sodium and cholesterol restricted diet)/soft and bite-sized Ensure Enlive 1can daily as medically feasible (350kcal, 20g protein )./DASH/TLC (sodium and cholesterol restricted diet)/soft and bite-sized

## 2023-06-07 NOTE — PROGRESS NOTE ADULT - PROBLEM SELECTOR PLAN 1
Pt presented with hematuria likely due to traumatic moreno placement  Urology following  CBI -held in am, restarted due to gross bright red blood  Hemoglobin initial 11.9 on admission  Hemoglobin today 9.7   PRBC 1 unit today- hypotensive and SpO2 93%  dual antiplatelet discontinue
Pt presented with hematuria likely due to traumatic moreno placement  Urology following  CBI -held in am, restarted due to gross bright red blood  Hemoglobin initial 11.9 on admission  Hemoglobin today 9.7   s/p PRBC 1 unit- hypotensive and SpO2 93%  dual antiplatelet discontinue  CBI on hold, urine is clear today  continue to hold ASA and plavix as per urology  Urology dr. Osman following.
Resolved  S/p 1U PRBC  Continue moreno  Urology Dr. Osman following  Recommendations appreciated
Pt presented with hematuria likely due to traumatic moreno placement  Urology following  CBI -held in am, restarted due to gross bright red blood  Hemoglobin initial 11.9 on admission  Hemoglobin today 9.7   s/p PRBC 1 unit- hypotensive and SpO2 93%  dual antiplatelet discontinue  CBI on hold, urine is clear today  continue to hold ASA and plavix as per urology  Hematuria noted again, resume CBI @low rate.  Urology dr. Osman following.
Pt presented with hematuria likely due to traumatic moreno placement  Urology following  CBI with clear drng at this time  Taper CBI as tolerated  H/H stable

## 2023-06-07 NOTE — DIETITIAN INITIAL EVALUATION ADULT - PERTINENT MEDS FT
MEDICATIONS  (STANDING):  ampicillin  IVPB      ampicillin  IVPB 1 Gram(s) IV Intermittent every 6 hours  atorvastatin 80 milliGRAM(s) Oral at bedtime  baclofen 5 milliGRAM(s) Oral every 12 hours  DULoxetine 60 milliGRAM(s) Oral daily  famotidine    Tablet 20 milliGRAM(s) Oral daily  oxyCODONE    IR 10 milliGRAM(s) Oral <User Schedule>  oxyCODONE    IR 20 milliGRAM(s) Oral <User Schedule>  pregabalin 75 milliGRAM(s) Oral three times a day  sodium chloride 0.9%. 1000 milliLiter(s) (100 mL/Hr) IV Continuous <Continuous>  tamsulosin 0.4 milliGRAM(s) Oral at bedtime    MEDICATIONS  (PRN):  acetaminophen     Tablet .. 650 milliGRAM(s) Oral every 6 hours PRN Temp greater or equal to 38C (100.4F), Mild Pain (1 - 3)  aluminum hydroxide/magnesium hydroxide/simethicone Suspension 30 milliLiter(s) Oral every 4 hours PRN Dyspepsia  melatonin 3 milliGRAM(s) Oral at bedtime PRN Insomnia  ondansetron Injectable 4 milliGRAM(s) IV Push every 8 hours PRN Nausea and/or Vomiting

## 2023-06-08 ENCOUNTER — TRANSCRIPTION ENCOUNTER (OUTPATIENT)
Age: 83
End: 2023-06-08

## 2023-06-08 VITALS
RESPIRATION RATE: 18 BRPM | HEART RATE: 86 BPM | DIASTOLIC BLOOD PRESSURE: 56 MMHG | OXYGEN SATURATION: 91 % | SYSTOLIC BLOOD PRESSURE: 126 MMHG | TEMPERATURE: 98 F

## 2023-06-08 LAB
ANION GAP SERPL CALC-SCNC: 4 MMOL/L — LOW (ref 5–17)
BUN SERPL-MCNC: 11 MG/DL — SIGNIFICANT CHANGE UP (ref 7–18)
CALCIUM SERPL-MCNC: 8.3 MG/DL — LOW (ref 8.4–10.5)
CHLORIDE SERPL-SCNC: 110 MMOL/L — HIGH (ref 96–108)
CO2 SERPL-SCNC: 25 MMOL/L — SIGNIFICANT CHANGE UP (ref 22–31)
CREAT SERPL-MCNC: 0.7 MG/DL — SIGNIFICANT CHANGE UP (ref 0.5–1.3)
EGFR: 91 ML/MIN/1.73M2 — SIGNIFICANT CHANGE UP
GLUCOSE SERPL-MCNC: 84 MG/DL — SIGNIFICANT CHANGE UP (ref 70–99)
HCT VFR BLD CALC: 29.8 % — LOW (ref 39–50)
HGB BLD-MCNC: 9.1 G/DL — LOW (ref 13–17)
MAGNESIUM SERPL-MCNC: 2 MG/DL — SIGNIFICANT CHANGE UP (ref 1.6–2.6)
MCHC RBC-ENTMCNC: 27.3 PG — SIGNIFICANT CHANGE UP (ref 27–34)
MCHC RBC-ENTMCNC: 30.5 GM/DL — LOW (ref 32–36)
MCV RBC AUTO: 89.5 FL — SIGNIFICANT CHANGE UP (ref 80–100)
NRBC # BLD: 0 /100 WBCS — SIGNIFICANT CHANGE UP (ref 0–0)
PHOSPHATE SERPL-MCNC: 2.6 MG/DL — SIGNIFICANT CHANGE UP (ref 2.5–4.5)
PLATELET # BLD AUTO: 134 K/UL — LOW (ref 150–400)
POTASSIUM SERPL-MCNC: 4.2 MMOL/L — SIGNIFICANT CHANGE UP (ref 3.5–5.3)
POTASSIUM SERPL-SCNC: 4.2 MMOL/L — SIGNIFICANT CHANGE UP (ref 3.5–5.3)
RBC # BLD: 3.33 M/UL — LOW (ref 4.2–5.8)
RBC # FLD: 17.6 % — HIGH (ref 10.3–14.5)
SODIUM SERPL-SCNC: 139 MMOL/L — SIGNIFICANT CHANGE UP (ref 135–145)
WBC # BLD: 5.5 K/UL — SIGNIFICANT CHANGE UP (ref 3.8–10.5)
WBC # FLD AUTO: 5.5 K/UL — SIGNIFICANT CHANGE UP (ref 3.8–10.5)

## 2023-06-08 PROCEDURE — 87077 CULTURE AEROBIC IDENTIFY: CPT

## 2023-06-08 PROCEDURE — 86923 COMPATIBILITY TEST ELECTRIC: CPT

## 2023-06-08 PROCEDURE — 81001 URINALYSIS AUTO W/SCOPE: CPT

## 2023-06-08 PROCEDURE — 86901 BLOOD TYPING SEROLOGIC RH(D): CPT

## 2023-06-08 PROCEDURE — 71045 X-RAY EXAM CHEST 1 VIEW: CPT

## 2023-06-08 PROCEDURE — 80048 BASIC METABOLIC PNL TOTAL CA: CPT

## 2023-06-08 PROCEDURE — 87186 SC STD MICRODIL/AGAR DIL: CPT

## 2023-06-08 PROCEDURE — 99285 EMERGENCY DEPT VISIT HI MDM: CPT | Mod: 25

## 2023-06-08 PROCEDURE — 85610 PROTHROMBIN TIME: CPT

## 2023-06-08 PROCEDURE — 83735 ASSAY OF MAGNESIUM: CPT

## 2023-06-08 PROCEDURE — 87086 URINE CULTURE/COLONY COUNT: CPT

## 2023-06-08 PROCEDURE — 80053 COMPREHEN METABOLIC PANEL: CPT

## 2023-06-08 PROCEDURE — 85730 THROMBOPLASTIN TIME PARTIAL: CPT

## 2023-06-08 PROCEDURE — 87635 SARS-COV-2 COVID-19 AMP PRB: CPT

## 2023-06-08 PROCEDURE — 85027 COMPLETE CBC AUTOMATED: CPT

## 2023-06-08 PROCEDURE — 36415 COLL VENOUS BLD VENIPUNCTURE: CPT

## 2023-06-08 PROCEDURE — 74177 CT ABD & PELVIS W/CONTRAST: CPT | Mod: MA

## 2023-06-08 PROCEDURE — 0225U NFCT DS DNA&RNA 21 SARSCOV2: CPT

## 2023-06-08 PROCEDURE — 86850 RBC ANTIBODY SCREEN: CPT

## 2023-06-08 PROCEDURE — 97162 PT EVAL MOD COMPLEX 30 MIN: CPT

## 2023-06-08 PROCEDURE — 93005 ELECTROCARDIOGRAM TRACING: CPT

## 2023-06-08 PROCEDURE — 97530 THERAPEUTIC ACTIVITIES: CPT

## 2023-06-08 PROCEDURE — 84100 ASSAY OF PHOSPHORUS: CPT

## 2023-06-08 PROCEDURE — 82962 GLUCOSE BLOOD TEST: CPT

## 2023-06-08 PROCEDURE — 86900 BLOOD TYPING SEROLOGIC ABO: CPT

## 2023-06-08 PROCEDURE — 36430 TRANSFUSION BLD/BLD COMPNT: CPT

## 2023-06-08 PROCEDURE — P9040: CPT

## 2023-06-08 PROCEDURE — 85025 COMPLETE CBC W/AUTO DIFF WBC: CPT

## 2023-06-08 RX ORDER — OXYCODONE HYDROCHLORIDE 5 MG/1
10 TABLET ORAL ONCE
Refills: 0 | Status: DISCONTINUED | OUTPATIENT
Start: 2023-06-08 | End: 2023-06-08

## 2023-06-08 RX ORDER — OXYCODONE HYDROCHLORIDE 5 MG/1
20 TABLET ORAL
Refills: 0 | Status: DISCONTINUED | OUTPATIENT
Start: 2023-06-08 | End: 2023-06-08

## 2023-06-08 RX ORDER — OXYCODONE HYDROCHLORIDE 5 MG/1
10 TABLET ORAL DAILY
Refills: 0 | Status: DISCONTINUED | OUTPATIENT
Start: 2023-06-08 | End: 2023-06-08

## 2023-06-08 RX ADMIN — OXYCODONE HYDROCHLORIDE 20 MILLIGRAM(S): 5 TABLET ORAL at 11:43

## 2023-06-08 RX ADMIN — Medication 108 GRAM(S): at 05:04

## 2023-06-08 RX ADMIN — Medication 12.5 MILLIGRAM(S): at 05:05

## 2023-06-08 RX ADMIN — OXYCODONE HYDROCHLORIDE 10 MILLIGRAM(S): 5 TABLET ORAL at 08:31

## 2023-06-08 RX ADMIN — OXYCODONE HYDROCHLORIDE 10 MILLIGRAM(S): 5 TABLET ORAL at 09:48

## 2023-06-08 RX ADMIN — FAMOTIDINE 20 MILLIGRAM(S): 10 INJECTION INTRAVENOUS at 11:43

## 2023-06-08 RX ADMIN — Medication 108 GRAM(S): at 01:20

## 2023-06-08 RX ADMIN — Medication 5 MILLIGRAM(S): at 05:04

## 2023-06-08 RX ADMIN — Medication 108 GRAM(S): at 11:43

## 2023-06-08 RX ADMIN — DULOXETINE HYDROCHLORIDE 60 MILLIGRAM(S): 30 CAPSULE, DELAYED RELEASE ORAL at 11:43

## 2023-06-08 NOTE — PROGRESS NOTE ADULT - ASSESSMENT
82 year old male, coming from [facility], [facility], with medical history of HTN, HLD, venous insufficiency, CAD, BPH, CHF who presented with hematuria and UTI-Enterococcus.  1.Hematuria- resiolved off CBI, f/u.  2.UTI-ABX as per ID.  3.HTN- lopressor.  4.CAD by hx but pt denies-asa and plavix on hold, b blocker,statin.  5.Monitor h/h-transfuse as needed.  6.Lipid d/o-statin.  7.BPH-flomax.  8.GI and DVT prophylaxis.
 82 year old male, coming from [facility], [facility], with medical history of HTN, HLD, venous insufficiency, CAD, BPH, CHF who presented with hematuria and UTI-Enterococcus.  1.Hematuria-CBI, f/u.  2.UTI-ABX as per ID.  3.HTN-d/c norvasc, lopressor.  4.CAD by hx but pt denies-asa and plavix on hold, b blocker,statin.  5.Monitor h/h-transfuse as needed.  6.Lipid d/o-statin.  7.BPH-flomax.  8.GI and DVT prophylaxis.
81 y/o male from NH with past medical history of HTN, HLD, BPH, CHF admitted to medicine earlier in May s/p mechanical fall and found to be in urinary retention s/p moreno placement with subsequent failed TOV, moreno replaced with recurrent hematuria, likely to trauma. CBI started 5/21 and clamped 5/23. Sent in from nursing home for hematuria and back pain and CBI initiated.    - AM labs reviewed  - Urine culture reviewed (Enterococcus), f/u sensitivities  - Continue antibiotics - would switch from Ceftriaxone to Ampicillin until sensitivities finalize  - Continue CBI, wean as tolerated  - Monitor urine color  - Trend H&H
82 year old male with hematuria, on CBI   enterococcus UTI     - continue CBI and titrate as needed  - manually irrigation PRN   - continue IV antibiotics  - ASA/plavix on hold   - trend H/H and transfuse PRN   - continue medical management  - discuss with Dr. Harden
 82 year old male, coming from [facility], [facility], with medical history of HTN, HLD, venous insufficiency, CAD, BPH, CHF who presented with hematuria and UTI-Enterococcus.  1.Hematuria- resiolved off CBI, f/u.  2.UTI-ABX as per ID.  3.HTN- lopressor.  4.CAD by hx but pt denies-asa and plavix on hold, b blocker,statin.  5.Monitor h/h-transfuse as needed.  6.Lipid d/o-statin.  7.BPH-flomax.  8.GI and DVT prophylaxis.
 82 year old male, coming from [facility], [facility], with medical history of HTN, HLD, venous insufficiency, CAD, BPH, CHF who presented with hematuria and UTI-Enterococcus.  1.Hematuria-CBI, f/u.  2.UTI-ABX as per ID.  3.HTN- lopressor.  4.CAD by hx but pt denies-asa and plavix on hold, b blocker,statin.  5.Monitor h/h-transfuse as needed.  6.Lipid d/o-statin.  7.BPH-flomax.  8.GI and DVT prophylaxis.
82 year old male with hematuria, on CBI   enterococcus UTI     - continue CBI and titrate as needed  - manually irrigation PRN   - continue IV antibiotics  - ASA/plavix on hold   - trend H/H and transfuse PRN   - continue medical management  - discuss with Dr. Harden 
82 year old male with hematuria, on CBI. Enterococcus UTI being treated with ampicillin.    - AM labs reviewed  - Continue IV antibiotics  - Keep CBI clamped - urine this morning acceptable in color  - Monitor urine color, restart CBI if urine becomes significantly bloodier  - Manually irrigation PRN  - Trend H/H and transfuse PRN   - Continue medical management
82 year old male with hematuria, on CBI. Enterococcus UTI being treated with ampicillin.    - AM labs reviewed  - Urine remains clear with CBI clamped  - Continue IV antibiotics per ID  - Monitor urine color, restart CBI if urine becomes significantly bloodier  - Manually irrigation PRN  - Trend H/H and transfuse PRN   - Continue medical management
 82 year old male, coming from [facility], [facility], with medical history of HTN, HLD, venous insufficiency, CAD, BPH, CHF who presented with hematuria and UTI-Enterococcus.  1.Hematuria- resiolved off CBI, f/u.  2.UTI-ABX as per ID.  3.HTN- lopressor.  4.CAD by hx but pt denies-asa and plavix on hold, b blocker,statin.  5.Monitor h/h-transfuse as needed.  6.Lipid d/o-statin.  7.BPH-flomax.  8.GI and DVT prophylaxis.
83 y/o male from NH with past medical history of HTN, HLD, BPH, CHF admitted to medicine earlier in May s/p mechanical fall and found to be in urinary retention s/p moreno placement with subsequent failed TOV, moreno replaced with recurrent hematuria, likely to trauma. CBI started 5/21 and clamped 5/23. Sent in from nursing home for hematuria and back pain and CBI initiated.    - AM labs reviewed  - Urine culture reviewed (Enterococcos) - would recommend antibiotics in setting of CBI and hematuria  - Continue CBI, wean as tolerated  - Monitor urine color  - Trend H&H  - Hold AC if medically safe    
 82 year old male, coming from [facility], [facility], with medical history of HTN, HLD, venous insufficiency, CAD, BPH, CHF who presented with hematuria and UTI-Enterococcus.  1.Hematuria- resiolved off CBI, f/u.  2.UTI-ABX as per ID.  3.HTN- lopressor.  4.CAD by hx but pt denies-asa and plavix on hold, b blocker,statin.  5.Monitor h/h-transfuse as needed.  6.Lipid d/o-statin.  7.BPH-flomax.  8.GI and DVT prophylaxis.
82 year old male with hematuria, on CBI. Enterococcus UTI being treated with ampicillin.    - AM labs reviewed  - Continue IV antibiotics  - CBI clamped with urine remaining clear  - Monitor urine color, restart CBI if urine becomes bloody  - Manually irrigation PRN  - ASA/plavix on hold   - Trend H/H and transfuse PRN   - Continue medical management
Patient is a 82 year old male, coming from [facility], [facility], with medical history of HTN, HLD, venous insufficiency, CAD, BPH, CHF who presented with hematuria, followed by urology with CBI, currently clear drainage.  UCx grew enterococcus, treated with antibiotic, ID consulted. Patient antiplatelet therapy discontinued due to gross hematuria, Cardiology consulted.   Urology onboard, CBI clamped today hematuria resolving. H&H 9.2/29.6 stable, ASA and Plavix on hold as per urology.    6/6- pt seen and examined at bedside, Hematuria noted again, resume CBI @ low rate IVF, monitor CBC. Follow urology rec. CM onboard pending dc to [facility] when medically cleared.
Patient is a 82 year old male, coming from [facility], [facility], with medical history of HTN, HLD, venous insufficiency, CAD, BPH, CHF who presented with hematuria, followed by urology with CBI, currently clear drainage.  UCx grew enterococcus, treated with antibiotic, ID consulted. Patient antiplatelet therapy discontinued due to gross hematuria, Cardiology consulted.   Urology onboard, CBI clamped today hematuria resolving. H&H 9.2/29.6 stable, ASA and Plavix on hold as per urology.
UTI      Plan - Cont Ampicillin 1gm iv q6hrs  When ready for discharge will plan to switch to Amoxicillin 500mgs po TID to complete a total of 7 days of therapy.
Patient is a 82 year old male, coming from [facility], [facility], with medical history of HTN, HLD, venous insufficiency, CAD, BPH, CHF who presented with hematuria, followed by urology with CBI, currently clear drainage.  UCx grew enterococcus, treated with antibiotic, ID consulted. Patient antiplatelet therapy discontinued due to gross hematuria, Cardiology consulted.   Urology onboard, CBI clamped today hematuria resolving. H&H 9.2/29.6 stable, ASA and Plavix on hold as per urology.    6/6- pt seen and examined at bedside, Hematuria noted again, resume CBI @ low rate IVF, monitor CBC. Follow urology rec. CM onboard pending dc to [facility] when medically cleared.
Patient is a 82 year old male, coming from [facility], [facility], with medical history of HTN, HLD, venous insufficiency, CAD, BPH, CHF who presented with hematuria, followed by urology with CBI, currently clear drainage.  UCx gew enterococcus, treated with antibiotic, ID consulted. Patient antiplatelet therapy discontinued due to gross hematuria, Cardiology consulted. 
This is a 82 year old male, coming from [facility], [facility], with medical history of HTN, HLD, venous insufficiency, CAD, BPH, CHF who presented with hematuria, followed by urology with CBI, currently clear drainage.  UCx gew enterococcus, started Ceftriaxone, sens pending.

## 2023-06-08 NOTE — PROGRESS NOTE ADULT - SUBJECTIVE AND OBJECTIVE BOX
CHIEF COMPLAINT:Patient is a 82y old  Male who presents with a chief complaint of Hematuria.Pt appears comfortable.    	  REVIEW OF SYSTEMS:  CONSTITUTIONAL: No fever, weight loss, or fatigue  EYES: No eye pain, visual disturbances, or discharge  ENT:  No difficulty hearing, tinnitus, vertigo; No sinus or throat pain  NECK: No pain or stiffness  RESPIRATORY: No cough, wheezing, chills or hemoptysis; No Shortness of Breath  CARDIOVASCULAR: No chest pain, palpitations, passing out, dizziness, or leg swelling  GASTROINTESTINAL: No abdominal or epigastric pain. No nausea, vomiting, or hematemesis; No diarrhea or constipation. No melena or hematochezia.  GENITOURINARY: No dysuria, frequency, hematuria, or incontinence  NEUROLOGICAL: No headaches, memory loss, loss of strength, numbness, or tremors  SKIN: No itching, burning, rashes, or lesions   LYMPH Nodes: No enlarged glands  ENDOCRINE: No heat or cold intolerance; No hair loss  MUSCULOSKELETAL: No joint pain or swelling; No muscle, back, or extremity pain  PSYCHIATRIC: No depression, anxiety, mood swings, or difficulty sleeping  HEME/LYMPH: No easy bruising, or bleeding gums  ALLERGY AND IMMUNOLOGIC: No hives or eczema	      PHYSICAL EXAM:  T(C): 36.7 (06-08-23 @ 04:51), Max: 37.1 (06-07-23 @ 18:01)  HR: 60 (06-08-23 @ 04:51) (60 - 67)  BP: 154/64 (06-08-23 @ 04:51) (116/94 - 154/64)  RR: 17 (06-08-23 @ 04:51) (16 - 18)  SpO2: 96% (06-08-23 @ 04:51) (91% - 96%)  Wt(kg): --  I&O's Summary    07 Jun 2023 07:01  -  08 Jun 2023 07:00  --------------------------------------------------------  IN: 0 mL / OUT: 3000 mL / NET: -3000 mL        Appearance: Normal	  HEENT:   Normal oral mucosa, PERRL, EOMI	  Lymphatic: No lymphadenopathy  Cardiovascular: Normal S1 S2, No JVD, No murmurs, No edema  Respiratory: Lungs clear to auscultation	  Psychiatry: A & O x 3, Mood & affect appropriate  Gastrointestinal:  Soft, Non-tender, + BS	  Skin: No rashes, No ecchymoses, No cyanosis	  Neurologic: Non-focal  Extremities: Normal range of motion, No clubbing, cyanosis or edema  Vascular: Peripheral pulses palpable 2+ bilaterally    MEDICATIONS  (STANDING):  ampicillin  IVPB      ampicillin  IVPB 1 Gram(s) IV Intermittent every 6 hours  atorvastatin 80 milliGRAM(s) Oral at bedtime  baclofen 5 milliGRAM(s) Oral every 12 hours  DULoxetine 60 milliGRAM(s) Oral daily  famotidine    Tablet 20 milliGRAM(s) Oral daily  metoprolol tartrate 12.5 milliGRAM(s) Oral every 12 hours  oxyCODONE    IR 10 milliGRAM(s) Oral daily  oxyCODONE    IR 20 milliGRAM(s) Oral <User Schedule>  pregabalin 75 milliGRAM(s) Oral every 8 hours  sodium chloride 0.9%. 1000 milliLiter(s) (100 mL/Hr) IV Continuous <Continuous>  tamsulosin 0.4 milliGRAM(s) Oral at bedtime      LABS:	 	                      9.1    5.50  )-----------( 134      ( 08 Jun 2023 05:41 )             29.8     06-08    139  |  110<H>  |  11  ----------------------------<  84  4.2   |  25  |  0.70    Ca    8.3<L>      08 Jun 2023 05:41  Phos  2.6     06-08  Mg     2.0     06-08

## 2023-06-08 NOTE — PROGRESS NOTE ADULT - REASON FOR ADMISSION
Hematuria

## 2023-06-08 NOTE — PROGRESS NOTE ADULT - PROVIDER SPECIALTY LIST ADULT
Cardiology
Internal Medicine
Internal Medicine
Urology
Cardiology
Cardiology
Infectious Disease
Internal Medicine
Internal Medicine
Urology
Internal Medicine
Internal Medicine
Urology
Cardiology
Urology
Internal Medicine

## 2023-06-08 NOTE — DISCHARGE NOTE NURSING/CASE MANAGEMENT/SOCIAL WORK - PATIENT PORTAL LINK FT
You can access the FollowMyHealth Patient Portal offered by [facility] by registering at the following website: [email or web address]. By joining [email or web address]’s FollowMyHealth portal, you will also be able to view your health information using other applications (apps) compatible with our system.

## 2023-06-08 NOTE — PROGRESS NOTE ADULT - SUBJECTIVE AND OBJECTIVE BOX
INTERVAL HPI/OVERNIGHT EVENTS:  Patient seen,awake,events noticed  VITAL SIGNS:  T(F): 98.1 (06-08-23 @ 04:51)  HR: 60 (06-08-23 @ 04:51)  BP: 154/64 (06-08-23 @ 04:51)  RR: 17 (06-08-23 @ 04:51)  SpO2: 96% (06-08-23 @ 04:51)  Wt(kg): --    PHYSICAL EXAM:  awake  Constitutional:  Eyes:  ENMT:perrla  Neck:  Respiratory:clear  Cardiovascular:s1s2,m-none  Gastrointestinal:soft,bs pos,moreno with redish urine  Extremities:  Vascular:  Neurological:no focal deficit  Musculoskeletal:    MEDICATIONS  (STANDING):  ampicillin  IVPB 1 Gram(s) IV Intermittent every 6 hours  ampicillin  IVPB      atorvastatin 80 milliGRAM(s) Oral at bedtime  baclofen 5 milliGRAM(s) Oral every 12 hours  DULoxetine 60 milliGRAM(s) Oral daily  famotidine    Tablet 20 milliGRAM(s) Oral daily  metoprolol tartrate 12.5 milliGRAM(s) Oral every 12 hours  oxyCODONE    IR 10 milliGRAM(s) Oral daily  oxyCODONE    IR 20 milliGRAM(s) Oral <User Schedule>  pregabalin 75 milliGRAM(s) Oral every 8 hours  sodium chloride 0.9%. 1000 milliLiter(s) (100 mL/Hr) IV Continuous <Continuous>  tamsulosin 0.4 milliGRAM(s) Oral at bedtime    MEDICATIONS  (PRN):  acetaminophen     Tablet .. 650 milliGRAM(s) Oral every 6 hours PRN Temp greater or equal to 38C (100.4F), Mild Pain (1 - 3)  aluminum hydroxide/magnesium hydroxide/simethicone Suspension 30 milliLiter(s) Oral every 4 hours PRN Dyspepsia  melatonin 3 milliGRAM(s) Oral at bedtime PRN Insomnia  ondansetron Injectable 4 milliGRAM(s) IV Push every 8 hours PRN Nausea and/or Vomiting      Allergies    No Known Allergies    Intolerances        LABS:                        9.1    5.50  )-----------( 134      ( 08 Jun 2023 05:41 )             29.8     06-08    139  |  110<H>  |  11  ----------------------------<  84  4.2   |  25  |  0.70    Ca    8.3<L>      08 Jun 2023 05:41  Phos  2.6     06-08  Mg     2.0     06-08            RADIOLOGY & ADDITIONAL TESTS:      Assessment and Plan:   · Assessment	  Patient is a 82 year old male, coming from [facility], [facility], with medical history of HTN, HLD, venous insufficiency, CAD, BPH, CHF who presented with hematuria, followed by urology with CBI, currently clear drainage.  UCx grew enterococcus, treated with antibiotic, ID consulted. Patient antiplatelet therapy discontinued due to gross hematuria, Cardiology consulted.   Urology onboard, CBI clamped today hematuria resolving. H&H 9.2/29.6 stable, ASA and Plavix on hold as per urology.       Problem/Plan - 1:  ·  Problem: Hematuria-improved clinically  CBI on hold, urine is clear today  continue to hold ASA and plavix as per urology  Urology dr. Osman following.     Problem/Plan - 2:  ·  Problem: Enterococcus UTI.   ·  Plan: Asymptomatic, afebrile with no leukocytosis  -switched Ceftriaxone to Ampicillin   -ID Dr. Cordero.     Problem/Plan - 3:  ·  Problem: CAD (coronary artery disease).   ·  Plan: aspirin and Plavix held  Cardiology Dr. Hart.     Problem/Plan - 4:  ·  Problem: Chronic CHF.   ·  Plan: Not in exacerbation  Lasix held- hypotensive  restart as appropriate.     Problem/Plan - 5:  ·  Problem: Hypertension.   ·  Plan: held HTN meds due to hypotension restart as appropriate   - Low sodium diet.     Problem/Plan - 6:  ·  Problem: Chronic back pain.   ·  Plan: -Cont Oxycodone and Baclofen per pt. schedule  -Cont Lyrica and Cymbalta  -PT recommending [facility].     Problem/Plan - 7:  ·  Problem: Hyperlipidemia.   ·  Plan: - Pt has a history of HLD.   - Will continue pts home medication Atorvastatin.     Problem/Plan - 8:  ·  Problem: BPH (benign prostatic hyperplasia).   ·  Plan: - Pt has a history of BPH.   - Will continue pts home medication Tamsulosin.  - Will likely discharge with moreno given urinary retention.     Problem/Plan - 9:  ·  Problem: Prophylactic measure.   ·  Plan: DVT - SCD  GI - Famotidine.     Problem/Plan - 10:  ·  Problem: Discharge planning issues.   f/u PT eval

## 2023-07-27 RX ORDER — BACLOFEN 100 %
1 POWDER (GRAM) MISCELLANEOUS
Refills: 0 | DISCHARGE

## 2023-07-27 RX ORDER — ASPIRIN/CALCIUM CARB/MAGNESIUM 324 MG
1 TABLET ORAL
Refills: 0 | DISCHARGE

## 2023-07-27 RX ORDER — DULOXETINE HYDROCHLORIDE 30 MG/1
1 CAPSULE, DELAYED RELEASE ORAL
Refills: 0 | DISCHARGE

## 2023-07-27 RX ORDER — CLOPIDOGREL BISULFATE 75 MG/1
1 TABLET, FILM COATED ORAL
Refills: 0 | DISCHARGE

## 2023-07-27 RX ORDER — FERROUS FUMARATE 350(115)MG
1 TABLET ORAL
Refills: 0 | DISCHARGE

## 2023-07-27 RX ORDER — OXYCODONE HYDROCHLORIDE 5 MG/1
1 TABLET ORAL
Refills: 0 | DISCHARGE

## 2023-07-27 RX ORDER — FAMOTIDINE 10 MG/ML
1 INJECTION INTRAVENOUS
Refills: 0 | DISCHARGE

## 2023-07-27 RX ORDER — TAMSULOSIN HYDROCHLORIDE 0.4 MG/1
1 CAPSULE ORAL
Refills: 0 | DISCHARGE

## 2023-07-27 RX ORDER — METOPROLOL TARTRATE 50 MG
1 TABLET ORAL
Refills: 0 | DISCHARGE

## 2023-07-27 RX ORDER — ATORVASTATIN CALCIUM 80 MG/1
1 TABLET, FILM COATED ORAL
Refills: 0 | DISCHARGE

## 2023-07-27 RX ORDER — DOCUSATE SODIUM 100 MG
2 CAPSULE ORAL
Refills: 0 | DISCHARGE

## 2023-07-27 RX ORDER — AMLODIPINE BESYLATE 2.5 MG/1
1 TABLET ORAL
Refills: 0 | DISCHARGE

## 2023-07-27 RX ORDER — AMOXICILLIN 250 MG/5ML
1 SUSPENSION, RECONSTITUTED, ORAL (ML) ORAL
Qty: 0 | Refills: 0 | DISCHARGE
End: 2023-06-09

## 2023-07-27 RX ORDER — FUROSEMIDE 40 MG
1 TABLET ORAL
Refills: 0 | DISCHARGE

## 2023-10-04 NOTE — PROGRESS NOTE ADULT - PROBLEM SELECTOR PLAN 1
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 + opioid dependent. High risk medications reviewed. Avoid polypharmacy. Avoid IV opioids. Avoid NSAIDs and benzodiazepines. Non-pharmacological sleep aides initiated. Non-opioid medications and non-pharmacological pain management measures initiated.   Opioid pain recommendations   - Continue oxycodone 15 mg PO q 6 hours PRN severe pain (pt requesting more frequent dosing). Pt on oxycodone 80mg/day at home decreased to 60mg/day 5/24 due to lethargy. Pt now, more awake, A&Ox3. Monitor for sedation/ respiratory depression.   Non-opioid pain recommendations   - Continue Acetaminophen 1 gram PO q 8 hours PRN moderate pain. Monitor LFTs  - Continue home dose baclofen 10mg PO BID.   - Continue home dose Cymbalta 60mg PO daily.   Bowel Regimen  - Continue Miralax 17G PO daily  - Continue Senna 2 tablets at bedtime for constipation  Mild pain (score 1-3)  - Non-pharmacological pain treatment recommendations  - Warm/ Cool packs PRN   - Repositioning extremity, elevation, imagery, relaxation, distraction.  - Physical therapy OOB if no contraindications   Recommendations discussed with primary team and RN. I have re-evaluated the patient's fluid status and reviewed vital signs. Clinical perfusion assessment was performed.

## 2023-10-11 NOTE — H&P ADULT - PROBLEM SELECTOR PLAN 5
Addended by: REGIS HUDSON on: 10/11/2023 02:15 PM     Modules accepted: Orders     History of chronic venous insufficiency

## 2023-11-03 NOTE — ED ADULT NURSE NOTE - NS ED NURSE DC TEACHING
Please wear boot for one week  Please return if symptoms persist  Continue to ice and elevate foot. back pain

## 2024-01-01 NOTE — ED ADULT NURSE NOTE - TEMPLATE LIST FOR HEAD TO TOE ASSESSMENT
Brief pediatric cardiology note:    Lu is an 8 week old femal born at 34w5d by emergent  for evidence of fetal compromise. She has done well from a cardiac perspective. Her most recent echocardiogram demonstrate a small PFO with a left to right shunt (normal finding) and a small aortopulmonary collateral. At this time, she has no evidence of excessive pulmonary blood flow secondary to this collateral with normal left atrial and left ventricular size.      Recommend outpatient follow up in cardiology clinic in 4 months      Daniele Villalobos MD  PGY-5, Pediatric Cardiology Fellow  [facility]      The patient was staffed with Dr. Araujo. The recommendations were conveyed with the primary team            Echo 9/3/24  Normal cardiac anatomy. There is no patent ductus arteriosus. There is a  patent foramen ovale with a left to right shunt, a normal finding. There is a  small aortopulmonary collateral. No evidence of right ventricular  hypertension. Normal contour of the interventricular septum. Trivial tricuspid  valve insufficiency, inadequate jet for RV pressure estimate. The left and  right ventricles have normal chamber size, wall thickness, and systolic  function. No pericardial effusion.    General

## 2024-01-25 NOTE — DISCHARGE NOTE PROVIDER - NSDCMRMEDTOKEN_GEN_ALL_CORE_FT
Brief Postoperative Note      Patient: Bernard Hollingsworth  YOB: 1958  MRN: [id number]    Date of Procedure: 1/25/2024    Pre-Op Diagnosis Codes:     * Acute cholecystitis [K81.0]    Post-Op Diagnosis: Same       Procedure(s):  LAPAROSCOPIC CHOLECYSTECTOMY,    Surgeon(s):  Arturo Aguirre MD    Assistant:  Resident: Paris Zarate MD    Anesthesia: General    Estimated Blood Loss (mL): Minimal    Complications: None    Specimens:   ID Type Source Tests Collected by Time Destination   A : GALLBLADDER AND CONTENTS Tissue Gallbladder SURGICAL PATHOLOGY Arturo Aguirre MD 1/25/2024 1415        Implants:  * No implants in log *      Drains: * No LDAs found *    Findings: hydrops gallbladder, critical view obtained       Electronically signed by Paris Zarate MD on 1/25/2024 at 3:23 PM   amLODIPine 5 mg oral tablet: 1 tab(s) orally once a day  aspirin 325 mg oral capsule: 1 tab(s) orally once a day  atorvastatin 80 mg oral tablet: 1 tab(s) orally once a day  baclofen 10 mg oral tablet: 1 tab(s) orally 2 times a day  Colace 100 mg oral capsule: 2 tab(s) orally once a day (at bedtime)  Cymbalta 60 mg oral delayed release capsule: 1 tab(s) orally once a day  famotidine 20 mg oral tablet: 1 tab(s) orally once a day  ferrous fumarate 350 mg (115 mg elemental iron) oral tablet: 1 tab(s) orally 2 times a day  furosemide 20 mg oral tablet: 1 tab(s) orally once a day  Lyrica 75 mg oral capsule: 1 tab(s) orally 3 times a day  metoprolol tartrate 25 mg oral tablet: 1 tab(s) orally 2 times a day  oxyCODONE 10 mg oral tablet: 1 tab(s) orally once a day Takes at 5pm  oxyCODONE 20 mg oral tablet: 1 tab(s) orally 3 times a day 7am, 12pm, 8pm  Plavix 75 mg oral tablet: 1 tab(s) orally once a day  tamsulosin 0.4 mg oral capsule: 1 cap(s) orally once a day (at bedtime)   acetaminophen 325 mg oral tablet: 2 tab(s) orally every 6 hours As needed Temp greater or equal to 38C (100.4F), Mild Pain (1 - 3)  amLODIPine 5 mg oral tablet: 1 tab(s) orally once a day  amoxicillin 500 mg oral capsule: 1 orally every 8 hours  aspirin 325 mg oral capsule: 1 tab(s) orally once a day  atorvastatin 80 mg oral tablet: 1 tab(s) orally once a day  baclofen 5 mg oral tablet: 1 tab(s) orally every 12 hours  Colace 100 mg oral capsule: 2 tab(s) orally once a day (at bedtime)  Cymbalta 60 mg oral delayed release capsule: 1 tab(s) orally once a day  famotidine 20 mg oral tablet: 1 tab(s) orally once a day  ferrous fumarate 350 mg (115 mg elemental iron) oral tablet: 1 tab(s) orally 2 times a day  furosemide 20 mg oral tablet: 1 tab(s) orally once a day  Lyrica 75 mg oral capsule: 1 tab(s) orally 3 times a day  metoprolol tartrate 25 mg oral tablet: 1 tab(s) orally 2 times a day  oxyCODONE 10 mg oral tablet: 1 tab(s) orally once a day Takes at 5pm  oxyCODONE 20 mg oral tablet: 1 tab(s) orally 3 times a day 7am, 12pm, 8pm  Plavix 75 mg oral tablet: 1 tab(s) orally once a day  tamsulosin 0.4 mg oral capsule: 1 cap(s) orally once a day (at bedtime)

## 2024-02-09 NOTE — ED ADULT NURSE NOTE - CAS TRG GENERAL AIRWAY, MLM
Patent 86 y/o F PMH of CKD stage IV presents the emergency department today with a reported low sodium on outpatient labs.  Per the patient and daughter she has been not acting herself for the last couple days and more slow and weak than usual.  Denies any fevers no nausea vomiting diarrhea.  There is some decreased appetite.  No reported heavy drinking.  Patient had a sodium of 117 drawn today.  She was notified at 1 AM by her primary care doctor and she subsequently called an ambulance and her to the hospital.  Patient that she has a history of hypernatremia not hyponatremia. States that her supplements have recently changed per her primary doctor's request    ED Course: T 97, HR 71, /63, RR 18, SpO2 98% RA. Labs showing Na 120, Cl 90, BiCarb 14, BUN/Cr 61/3.4, GFR 12, CBC showing mildly low WBC, H/H 8.6/25. Pt was admitted to the floors for further management of hyponatremia.  86 y/o F PMH of CKD 4, colon cancer (adenocarcinoma)-5-2011 Stage IIIC-T4 N2a s/p right colectomy followed by chemo, breast cancer, anemia, HTN, thrombocytopenia presents to the emergency department today with a reported low sodium on outpatient labs.  Per the patient and daughter she has been not acting herself for the last couple days and more slow and weak than usual together with nausea and vomiting.  Denies any fevers or chills. There is some decreased appetite.  No reported heavy drinking.  Patient had a sodium of 117 drawn today at her doctors visit. She was notified at 1 AM by her primary care doctor and she subsequently called an ambulance to come to the hospital. Patient stating that she has a history of hypernatremia and hyponatremia. Upon further questioning, it was revealed that the patient was supposed to be taking sodium bicarb 650mg 3 tabs Q8. About a week and a half ago, the patient stopped taking 9 tabs per day and was only taking 1 bicarb tab per day.     ED Course: T 97, HR 71, /63, RR 18, SpO2 98% RA. Labs showing Na 120, Cl 90, BiCarb 14, BUN/Cr 61/3.4, GFR 12, CBC showing mildly low WBC, H/H 8.6/25. Pt was admitted to the floors for further management of hyponatremia.

## 2024-03-22 NOTE — ED PROVIDER NOTE - PRINCIPAL DIAGNOSIS
Notified Home Health and Perla will talk to patient on what she is taking and if worked    Also had tea recommended from past and may have tried   Nausea

## 2024-05-07 NOTE — ED PROVIDER NOTE - NSCAREINITIATED _GEN_ER
Initiate Treatment: Apply Mupirocin to biopsy site twice daily until healed. (Patient has Mupirocin at home) Detail Level: Zone Kevin Jaime(Attending)

## 2024-05-07 NOTE — CONSULT NOTE ADULT - ASSESSMENT
76y Male complaining of pain, low back with history of chronic back pain, spinal fusion (approximately a month ago at [facility]) presents to the ED from [facility] c/o lower back pain, as well as nausea. Pt takes 6mg Dilaudid every 3 hours for pain.  Pt has left foot drop which is chronic.
lumbar radiculopathy
PROVIDER:[TOKEN:[[id number]]]

## 2024-05-07 NOTE — PROGRESS NOTE ADULT - PROBLEM SELECTOR PLAN 1
[facility]     Fausto Saldana Patient Status:  Inpatient    1955 MRN [id number]   Location [facility] CARDIAC STEPDOWN UNIT Attending Joselin Wilde MD   Hosp Day # 0 PCP Sarah Love MD     Reason for Consultation:  Chest pain and dyspnea.    History of Present Illness:  Mr. Saldana is a a(n) 69 year old male who presents with 1 day of shortness of breath and pedal edema.  He also complains of substernal chest tightness like an elephant on his chest.  He has a known history of coronary disease and heart failure.  He was admitted on 3/24/2024 with severe pedal edema.  At that time a stress test demonstrated large area of fixed defect with no reversible defects.      Primary Cardiologist: Ray Rogel    History:   has a past medical history of DAMARIS (acute kidney injury) (CMD), Cardiomyopathy  (CMD), Congestive cardiac failure  (CMD), COPD (chronic obstructive pulmonary disease)  (CMD), Coronary artery disease, Defibrillator discharge, Essential (primary) hypertension, Hepatitis C, High cholesterol, Hypertension, Myocardial infarction  (CMD), NSVT (nonsustained ventricular tachycardia)  (CMD), and Peripheral arterial disease (CMD).    He has no past medical history of Alzheimer's dementia  (CMD), Anemia, Anxiety, Arthritis, Asthma (CMD), Attention deficit disorder, Blood clot associated with vein wall inflammation, Bronchitis, Cerebral infarction  (CMD), Chronic pain, Delayed emergence from anesthesia, Depression, Diabetes mellitus  (CMD), Difficult intubation, Failed moderate sedation during procedure, Family history of malignant hyperthermia, Fracture, Gastroesophageal reflux disease, Inflammatory bowel disease, Malignant hyperthermia, Malignant neoplasm  (CMD), Motion sickness, MRSA (methicillin resistant Staphylococcus aureus), Osteoporosis, Otitis media, Pneumonia, PONV (postoperative nausea and vomiting), Pseudocholinesterase deficiency, Sinusitis, chronic,  Sleep apnea, Spinal headache, Thyroid condition, Transfusion reaction, Uncomplicated senile dementia  (CMD), Urinary incontinence, or Urinary tract infection.   has a past surgical history that includes Cardiac surgery (2019); Cardiac defibrillator placement (June 26th, 2012); Bi-V ICD Implant (06/26/2012); Cardiac catheterization; Left heart cath; Coronary stent placement; Coronary artery bypass graft; and vein bypass graft aortobifemoral (Bilateral).  family history includes Asthma in his mother; COPD in his mother; Heart disease in his father and mother; Hypertension in his father and mother; Kidney disease in his father.   reports that he has quit smoking. His smoking use included cigarettes. He has never used smokeless tobacco. He reports that he does not currently use alcohol. He reports that he does not use drugs.     Allergies:  ALLERGIES:  No Known Allergies    Medications:  Prior to Admission medications    Medication Sig Start Date End Date Taking? Authorizing Provider   spironolactone (ALDACTONE) 25 MG tablet Take 1 tablet by mouth daily. Do not start before March 26, 2024. 3/26/24 4/25/24  Williams Huynh MD   aspirin 325 MG tablet Take 1 tablet by mouth daily. 3/25/24   Lorelei Whelan MD   atorvastatin (LIPITOR) 40 MG tablet Take 1 tablet by mouth nightly. 3/25/24 4/24/24  Lorelei Whelan MD   clopidogrel (PLAVIX) 75 MG tablet Take 1 tablet by mouth daily. 3/25/24   Lorelei Whelan MD   empagliflozin (JARDIANCE) 10 MG tablet Take 1 tablet by mouth daily (before breakfast). 3/25/24   Lorelei Whelan MD   famotidine (PEPCID) 20 MG tablet Take 1 tablet by mouth nightly. 3/25/24   Lorelei Whealn MD   fluticasone-vilanterol (BREO ELLIPTA) 100-25 MCG/ACT inhaler Inhale 1 puff into the lungs daily. 3/25/24   Lorelei Whelan MD   isosorbide mononitrate (IMDUR) 30 MG 24 hr tablet Take 1 tablet by mouth daily. 3/25/24   Lorelei Whelan MD   metoPROLOL  succinate (TOPROL-XL) 200 MG 24 hr tablet Take 1 tablet by mouth daily. 3/25/24 5/24/24  Lorelei Whelan MD   Torsemide 40 MG Tab Take 40 mg by mouth daily. 3/25/24   Lorelei Whelan MD   cinacalcet (SENSIPAR) 30 MG tablet Take 1 tablet by mouth in the morning and 1 tablet in the evening. Take with meals. 2/19/24   Matteo Harley MD   tamsulosin (FLOMAX) 0.4 MG Cap Take 1 capsule by mouth at bedtime. 2/19/24   Matteo Harley MD   sacubitril-valsartan (ENTRESTO) 49-51 MG per tablet Take 1 tablet by mouth in the morning and 1 tablet in the evening. 5/13/23 5/12/24  Oracio Arce MD        Inpatient Medications  Scheduled  Current Facility-Administered Medications   Medication Dose Route Frequency Provider Last Rate Last Admin    furosemide (LASIX INJECT) injection 60 mg  60 mg Intravenous BID Aleksandr Echeverria CNP        aspirin tablet 325 mg  325 mg Oral Daily Aleksandr Echeverria CNP        clopidogrel (PLAVIX) tablet 75 mg  75 mg Oral Daily Aleksandr Echeverria CNP        empagliflozin (JARDIANCE) tablet 10 mg  10 mg Oral QAM AC Aleksandr Echeverria CNP   10 mg at 05/07/24 0613    famotidine (PEPCID) tablet 20 mg  20 mg Oral Nightly Aleksandr Echeverria CNP        fluticasone-vilanterol (BREO ELLIPTA) 100-25 MCG/ACT inhaler 1 puff  1 puff Inhalation Daily Resp Aleksandr Echeverria CNP        tamsulosin (FLOMAX) capsule 0.4 mg  0.4 mg Oral QHS Aleksandr Echeverria CNP   0.4 mg at 05/07/24 0455    atorvastatin (LIPITOR) tablet 40 mg  40 mg Oral Nightly Aleksandr Echeverria CNP   40 mg at 05/07/24 0455    cinacalcet (SENSIPAR) tablet 30 mg  30 mg Oral BID WC Aleksandr Echeverria CNP        magnesium sulfate 1 g in dextrose 5% 100 mL IVPB premix  1 g Intravenous Once Aleksandr Echeverria  mL/hr at 05/07/24 0616 1 g at 05/07/24 0616      Infusion  Current Facility-Administered Medications   Medication Dose Route Frequency Provider Last Rate Last Admin    heparin (porcine) 25,000 units/250 mL in dextrose 5 % infusion  1-30  Units/kg/hr (Dosing Weight) Intravenous Continuous Krzywiec, Katherin, DO 9.9 mL/hr at 05/07/24 0327 12 Units/kg/hr at 05/07/24 0327    nitroGLYCERIN 50 mg in dextrose 5% 250 mL infusion  0-200 mcg/min Intravenous Continuous Krzywiec, Katherin, DO 60 mL/hr at 05/07/24 0622 200 mcg/min at 05/07/24 0622      PRN  Current Facility-Administered Medications   Medication Dose Route Frequency Provider Last Rate Last Admin    heparin (porcine) injection 4,000 Units  4,000 Units Intravenous PRN Krstephanieec Katherin, DO        heparin (porcine) injection 2,000 Units  2,000 Units Intravenous PRN Katherin Morrison DO        hydrALAZINE (APRESOLINE) injection 10 mg  10 mg Intravenous Q6H PRN Aleksandr Echeverria CNP   10 mg at 05/07/24 0220    acetaminophen (TYLENOL) tablet 650 mg  650 mg Oral Q4H PRN Aleksandr Echeverria CNP              Review of Systems:  Constitutional: Negative for fever, chills, change in appetite or fatigue.  Skin: Negative for rash or wounds.  HEENT: Negative for eye drainage, rhinorrhea, ear pain, sore throat or neck pain.  Respiratory: Shortness of breath with orthopnea and dyspnea on exertion.  This started yesterday morning.  Cardiovascular: Had chest pain like \"somebody sitting on my chest\".  Gastrointestinal: Negative for nausea, vomiting, diarrhea, abdominal pain, black or tarry stools.  Genitourinary: Negative for dysuria, urgency, frequency, hematuria or flank pain.  Extremities:  Negative for joint swelling or joint pain.  Neurologic:  Negative for change in sensory or motor function.  Negative for headache, change in gait, vertigo, vision or speech.  Endocrine: Negative for heat or cold intolerance, weight loss or gain.  Hematological: Negative for bleeding, bruising or lymphadenopathy.  Psychiatric: Negative for change in affect, anxiety, depression, mentation or sleep disturbance.    Physical Exam:  Temp:  [97.6 °F (36.4 °C)-98.5 °F (36.9 °C)] 98.5 °F (36.9 °C)  Heart Rate:  [79-93] 80  Resp:  [17-26] 19  BP:  (161-225)/() 190/108     Telemetry: Normal sinus rhythm  General: Alert and oriented in no apparent distress.  HEENT: No focal deficits.  Neck: No JVD or carotid bruits  Cardiac: Regular rate and rhythm, no murmur  Lungs: Clear to ausculation, no crackles   Abdomen: Soft, non-tender.   Extremities:  Palp pedal pulses, 1+ edema  Neurologic: Alert and oriented, normal affect.  Skin: Warm and dry.   Psych: cooperative    Laboratory Data:    CBC  Recent Labs   Lab 05/07/24 0252 05/06/24 2052   WBC 6.2 6.9   HCT 37.0* 40.5   HGB 11.8* 12.9*    223       CMP  Recent Labs   Lab 05/07/24 0252 05/06/24 2052   SODIUM 140 140   POTASSIUM 3.3* 3.5   CHLORIDE 109 109   CO2 26 25   GLUCOSE 121* 128*   BUN 25* 24*   CREATININE 1.71* 1.80*   CALCIUM 10.5* 10.4*   TOTPROTEIN 7.4 8.2   ALBUMIN 3.5* 3.9   BILIRUBIN 0.9 1.0   AST 16 18   GPT 14 17   ALKPT 63 74       Cardiac Labs  Recent Labs   Lab 05/06/24 2052   NTPROB >70,000*       Lipid Panel  No results found    Coags  Recent Labs   Lab 05/06/24 2052   INR 1.2   PTT 26       ABG  No results found        Imaging/Diagnostics:  XR CHEST PA AND LATERAL 2 VIEWS   Final Result   Mild diffuse airspace opacities scattered throughout both lung fields,   suspicious for pneumonia with small bibasilar pleural effusions. Mild   cardiomegaly.         Electronically Signed by: JIM MEJIA DO    Signed on: 5/6/2024 8:35 PM    Workstation ID: [id number]      [facility] EXTREMITY LOWER VENOUS DUPLEX    (Results Pending)          Impression:  Principal Problem:    Acute on chronic congestive heart failure, unspecified heart failure type  (CMD)       69-year-old male admitted with dyspnea and chest pain consistent with angina.  BNP is greater than 70,000.  Troponin was mildly elevated initially at 150, then increased to 280.  Patient has no chest pain at this time.    Coronary disease  Multiple stents to the LAD and circumflex artery, then patient underwent coronary bypass  BUSHRA to LAD, mLAD & OM1  cont ASA and plavix  comt Lopressor and lipitor grafting in approximately 2017.  Following bypass surgery his ejection fraction improved from 30% up to 45%.  Patient has a LIMA to the LAD, saphenous vein bypass graft to the right coronary, obtuse marginal, and ramus intermedius.  I believe these 3 bypasses are separate bypasses as opposed to sequential grafts.    Ischemic cardiomyopathy  Patient's ejection fraction was as high as 45%.  Last outpatient echo was 30%.  Stress test done in March 2024 demonstrated ejection fraction of 21%.  Patient was admitted at this time with heart failure symptoms along with chest pain.    Renal insufficiency  Creatinine mildly increased to 1.8.  Second creatinine on this admission came down to 1.7.    Hypertension  Patient hypertensive crisis on admission with blood pressure as high as 215/150.  Blood pressure slightly improved now but would like to see better, particularly in the presence of cardiomyopathy.    [employer] defibrillator.    Diabetes mellitus    Hypertension    Hyperlipidemia    Patient has a history of hypercalcemia with a calcium up to 11.5  On metolazone.        Plan:  Will hold Zaroxolyn to see if creatinine improves.  If possible I would like to keep him on Entresto.  Add hydralazine for hypertension.  Cardiac catheterization  At this point we will hold off on metolazone because of history of hypercalcemia with metolazone.          Thank you, will follow    Ray Rogel MD   Interventional Cardiology

## 2024-06-22 NOTE — ED PROVIDER NOTE - DISPOSITION TYPE
Return to the emergency department immediately for any change or worsening symptoms including but not limited to chest pain, shortness of breath, dizziness, nausea or vomiting.  Please continue to take your Augmentin for the course prescribed.  Take your Zofran that you have at home as needed for nausea.   ADMIT

## 2024-09-04 NOTE — ED PROVIDER NOTE - CROS ED ENMT ALL NEG
negative...
Detail Level: Simple
Comment: Doing well with added 8u in the procerus
Render Risk Assessment In Note?: no
Comment: 2 treated with LN2 on the back, no charge

## 2024-11-18 NOTE — PROGRESS NOTE ADULT - PROBLEM SELECTOR PLAN 6
Recent Visits  Date Type Provider Dept   04/22/24 Office Visit Carmen Tejada APRN - CNP [facility]   11/28/23 Office Visit Carmen Tejada APRN - CNP [facility]   08/10/23 Office Visit Carmen Tejada APRN - CNP [facility]   Showing recent visits within past 540 days with a meds authorizing provider and meeting all other requirements  Future Appointments  No visits were found meeting these conditions.  Showing future appointments within next 150 days with a meds authorizing provider and meeting all other requirements       home meds Oxycodon 20 TID and oxycodone 10 OD.   c/w pain regimen per Pain Mgmt : oxycodone 20mg PO q 6 hours PRN severe pain.   Continue home dose baclofen 10mg PO BID.   - Continue home dose cymbalta 60mg PO daily.   - Continue home dose lyrica 75mg po q 8 hours. Monitor renal function.   - Continue Lidoderm 4% patch 2 patches daily.   Continue bowel regimen   Pain Mgmt team following.

## 2025-06-11 NOTE — ED ADULT NURSE NOTE - CHIEF COMPLAINT
The patient is a 76y Male complaining of back pain/injury. Detail Level: Detailed Initiate Treatment: Adapalene 0.3% gel OTC

## 2025-06-18 NOTE — PATIENT PROFILE ADULT. - NSNUTRITIONRISK_GI_A_CORE
- dvt ppx- early ambulation
Significant decrease of oral intake greater than 5 days prior to admission

## 2025-07-04 NOTE — ED ADULT NURSE NOTE - PAIN RATING/NUMBER SCALE (0-10): ACTIVITY
- 2/2 cellulitis  Continue vancomycin, added rocephin d/c zosyn on 07/02/2025  Imrpoving slowly   Discussed with pt daughter kate continue rocephin  Will need vascular work up for possible intervention   8
